# Patient Record
Sex: MALE | Race: WHITE | URBAN - METROPOLITAN AREA
[De-identification: names, ages, dates, MRNs, and addresses within clinical notes are randomized per-mention and may not be internally consistent; named-entity substitution may affect disease eponyms.]

---

## 2017-03-08 ENCOUNTER — TRANSFERRED RECORDS (OUTPATIENT)
Dept: HEALTH INFORMATION MANAGEMENT | Facility: CLINIC | Age: 52
End: 2017-03-08

## 2017-03-09 ENCOUNTER — TRANSFERRED RECORDS (OUTPATIENT)
Dept: HEALTH INFORMATION MANAGEMENT | Facility: CLINIC | Age: 52
End: 2017-03-09

## 2017-03-10 ENCOUNTER — TRANSFERRED RECORDS (OUTPATIENT)
Dept: HEALTH INFORMATION MANAGEMENT | Facility: CLINIC | Age: 52
End: 2017-03-10

## 2017-03-16 PROCEDURE — 88341 IMHCHEM/IMCYTCHM EA ADD ANTB: CPT | Performed by: INTERNAL MEDICINE

## 2017-03-16 PROCEDURE — 00000346 ZZHCL STATISTIC REVIEW OUTSIDE SLIDES TC 88321: Performed by: INTERNAL MEDICINE

## 2017-03-16 PROCEDURE — 88342 IMHCHEM/IMCYTCHM 1ST ANTB: CPT | Performed by: INTERNAL MEDICINE

## 2017-03-16 PROCEDURE — 88323 CONSLTJ&REPRT MATRL PREP SLD: CPT | Performed by: INTERNAL MEDICINE

## 2017-03-21 ENCOUNTER — ONCOLOGY VISIT (OUTPATIENT)
Dept: ONCOLOGY | Facility: CLINIC | Age: 52
End: 2017-03-21
Payer: COMMERCIAL

## 2017-03-21 VITALS
TEMPERATURE: 97.9 F | OXYGEN SATURATION: 97 % | RESPIRATION RATE: 15 BRPM | HEART RATE: 69 BPM | BODY MASS INDEX: 27.7 KG/M2 | SYSTOLIC BLOOD PRESSURE: 90 MMHG | DIASTOLIC BLOOD PRESSURE: 49 MMHG | HEIGHT: 69 IN | WEIGHT: 187 LBS

## 2017-03-21 DIAGNOSIS — C16.9 MALIGNANT NEOPLASM OF STOMACH, UNSPECIFIED LOCATION (H): Primary | ICD-10-CM

## 2017-03-21 PROCEDURE — 99205 OFFICE O/P NEW HI 60 MIN: CPT | Performed by: INTERNAL MEDICINE

## 2017-03-21 RX ORDER — HYDROCHLOROTHIAZIDE 12.5 MG/1
12.5 CAPSULE ORAL EVERY MORNING
COMMUNITY
End: 2017-05-10

## 2017-03-21 ASSESSMENT — PAIN SCALES - GENERAL: PAINLEVEL: SEVERE PAIN (6)

## 2017-03-21 NOTE — NURSING NOTE
Met with patient today and family regarding new diagnosis of gastric cancer.  Patient is from [place]; he needs to have some work-up done first before determining treatment plan - PET scan, EUS with surgeon consult.  He will come back on April 3 to review results with Dr. Maravilla; he will most likely need FOLFOX chemotherapy.  I gave him written information on this plan and briefly reviewed need for PORT, and need for continuous infusion  Pump along with every two week IV infusion.  He will need reinforcement upon return.

## 2017-03-21 NOTE — MR AVS SNAPSHOT
After Visit Summary   3/21/2017    Terry Yi    MRN: [id number]           Patient Information     Date Of Birth          1965        Visit Information        Provider Department      3/21/2017 10:15 AM Yakov Shaffer MD [facility]        Today's Diagnoses     Malignant neoplasm of stomach, unspecified location (H)    -  1      Care Instructions    Schedule PET CT asap    Schedule EUS and Surgery appointment a couple of days AFTER PET CT    See Dr Maravilla on 4/3 in my absence to go over the results        Follow-ups after your visit        Additional Services     GENERAL SURG ADULT REFERRAL       Your provider has referred you to: PREFERRED PROVIDERS: for Gastric cancer surgery    Please be aware that coverage of these services is subject to the terms and limitations of your health insurance plan.  Call member services at your health plan with any benefit or coverage questions.      Please bring the following with you to your appointment:    (1) Any X-Rays, CTs or MRIs which have been performed.  Contact the facility where they were done to arrange for  prior to your scheduled appointment.   (2) List of current medications   (3) This referral request   (4) Any documents/labs given to you for this referral                  Your next 10 appointments already scheduled     Apr 03, 2017  9:00 AM CDT   Return Visit with Raul Maravilla MD   [facility] ([facility])    [id number] [place] [id number] [phone]              Future tests that were ordered for you today     Open Future Orders        Priority Expected Expires Ordered    PET Oncology Whole Body Routine  3/21/2018 3/21/2017            Who to contact     If you have questions or need follow up information about today's clinic visit or your schedule please contact [facility] directly at [phone].  Normal or non-critical lab and imaging  "results will be communicated to you by MyChart, letter or phone within 4 business days after the clinic has received the results. If you do not hear from us within 7 days, please contact the clinic through [employer] or phone. If you have a critical or abnormal lab result, we will notify you by phone as soon as possible.  Submit refill requests through [employer] or call your pharmacy and they will forward the refill request to us. Please allow 3 business days for your refill to be completed.          Additional Information About Your Visit        SoCAT Information     So[employer] is an electronic gateway that provides easy, online access to your medical records. With SoCAT, you can request a clinic appointment, read your test results, renew a prescription or communicate with your care team.     To sign up for [employer] visit the website at [email or web address]   You will be asked to enter the access code listed below, as well as some personal information. Please follow the directions to create your username and password.     Your access code is: [email or web address]  Expires: 2017 11:55 AM     Your access code will  in 90 days. If you need help or a new code, please contact your [facility] or call [phone] for assistance.        Care EveryWhere ID     This is your Care EveryWhere ID. This could be used by other organizations to access your [facility] medical records  [id number]        Your Vitals Were     Pulse Temperature Respirations Height Pulse Oximetry BMI (Body Mass Index)    69 97.9  F (36.6  C) 15 1.753 m (5' 9\") 97% 27.62 kg/m2       Blood Pressure from Last 3 Encounters:   17 90/49    Weight from Last 3 Encounters:   17 84.8 kg (187 lb)              We Performed the Following     GENERAL SURG ADULT REFERRAL     [facility]        Primary Care Provider Office Phone # Fax #    Sahil Mustafa [phone]       [facility]  [address] [place] [address]        Thank you!     Thank you for choosing [facility]  for your care. Our goal is always to provide you with excellent care. Hearing back from our patients is one way we can continue to improve our services. Please take a few minutes to complete the written survey that you may receive in the mail after your visit with us. Thank you!             Your Updated Medication List - Protect others around you: Learn how to safely use, store and throw away your medicines at www.disposemymeds.org.          This list is accurate as of: 3/21/17 11:55 AM.  Always use your most recent med list.                   Brand Name Dispense Instructions for use    ALLOPURINOL PO          aspirin 81 MG tablet      Take by mouth daily       hydrochlorothiazide 12.5 MG capsule    MICROZIDE     Take 12.5 mg by mouth daily       INDOCIN PO      Reported on 3/21/2017       KEFLEX PO          LISINOPRIL PO

## 2017-03-21 NOTE — PATIENT INSTRUCTIONS
Schedule PET CT asap    Schedule EUS and Surgery appointment a couple of days AFTER PET CT    See Dr Maravilla on 4/3 in my absence to go over the results

## 2017-03-21 NOTE — NURSING NOTE
"Terry Yi is a 51 year old male who presents for:  Chief Complaint   Patient presents with     Oncology Clinic Visit     new gastric ca        Initial Vitals:  BP 90/49  Pulse 69  Temp 97.9  F (36.6  C)  Resp 15  Ht 1.753 m (5' 9\")  Wt 84.8 kg (187 lb)  SpO2 97%  BMI 27.62 kg/m2 Estimated body mass index is 27.62 kg/(m^2) as calculated from the following:    Height as of this encounter: 1.753 m (5' 9\").    Weight as of this encounter: 84.8 kg (187 lb).. Body surface area is 2.03 meters squared. BP completed using cuff size: regular  Severe Pain (6) No LMP for male patient. Allergies and medications reviewed.     Medications: Medication refills not needed today.  Pharmacy name entered into EPIC: Data Unavailable    Comments:     5 minutes for nursing intake (face to face time)   Katja Lopez LPN        "

## 2017-03-21 NOTE — PROGRESS NOTES
CHIEF COMPLAINT:  Newly diagnosed gastric cancer.      HISTORY OF PRESENT ILLNESS:  Terry Yi is a 51-year-old male who is here with his wife, son, and a friend.  He has a past medical history significant for hypertension, gout and previous alcohol abuse but has been clean for more than 3-1/2 years.  Over the last 6 months or so, he has started noticing that he cannot eat a full meal.  He was complaining of early satiety, although he was hungry, but if he would eat a bigger portion of a meal he would feel pain in the epigastrium and would vomit.  During this time, he lost about 25 pounds or so.  He told me that if he was not overeating then he was not in any pain and he did not have any nausea and vomiting.  Otherwise, he was continuing to feel well, very active and fully functional without any change in his energy levels.  He eventually had it worked up and had a CT scan of the abdomen and pelvis done on 03/09/2017 which showed a diffusely increased soft tissue density about the proximal abdominal aorta at the level of the superior mesenteric artery and extending distally to the level of the bifurcation and slightly about the proximal common iliac arteries.  There is an enlarged lymph node in the central mesentery at the level of the celiac artery measuring 1.3 x 1.4 cm and multiple additional smaller mesenteric lymph nodes, particularly in the upper mesentery which are not pathologically enlarged.  There was minimal gastric wall thickening; and small 1.3 cm hypodense lesion in the peripheral aspect of the liver, which possibly represents a hemangioma.  Because of these findings, he underwent an EGD on 03/10/2017 which showed significant thickening and hardening of the greater curvature of the stomach with edema.  The biopsies which were taken from it showed invasive gastric carcinoma, diffuse type, grade 3.  IHC for HER2/catrachita was negative; it was zero.  He was then referred here.  He tells me that since the  diagnosis of his gastric cancer he has been eating small frequent meals and has actually gained back 5 or 6 pounds.  As I mentioned, he continues to feel well.  He does not have any neuropathy.  He does not have any infection.  His bowels are working well.  He denies any bleeding, any difficulty breathing.  No neurological problem.  No new lumps, bumps or swellings.  No pain anywhere else.      He tells me that previously many years ago he had surgeries to both of his ears and he does have decreased hearing on both the sides and was told to wear hearing aids, but he is not doing that.  He also tells me that off and on he does have tinnitus on both the sides.  This has been stable for a number of years.      REVIEW OF SYSTEMS:  Otherwise, a comprehensive review of systems was negative.      PAST MEDICAL HISTORY:  Significant for hypertension, gout, stomach cancer as mentioned above, and obesity in the past.  He has a history of alcohol abuse.      FAMILY HISTORY:  His father had coronary artery disease.  One of the maternal aunts probably had colon cancer.  Paternal grandfather probably had throat cancer.  He has 2 kids who are healthy.      SOCIAL HISTORY:  He does not smoke tobacco, but he smokes weed.  Denies IV drug use.  He used to be a very heavy alcohol drinker and underwent rehab and now for the last 44 months or so he has not drank alcohol.  He works in the car and truck business.  He lives in [place].      ALLERGIES:  LATEX.      MEDICATIONS:  He is on baby aspirin.  He takes allopurinol, hydrochlorothiazide 12.5, lisinopril, and indomethacin as needed.      PHYSICAL EXAMINATION:   VITAL SIGNS:  Reviewed.       CONSTITUTIONAL: no acute distress  EYES: PERRLA, no palor or icterus.   ENT/MOUTH: no mouth lesions. Oropharynx normal  CVS: s1s2 no m r g .   RESPIRATORY: clear to auscultation b/l  GI: soft non tender no hepatosplenomegaly  NEURO: AAOX3  Grossly non focal neuro exam  INTEGUMENT: no obvious  rashes  LYMPHATIC: no palpable cervical, supraclavicular, axillary or inguinal LAD  MUSCULOSKELETAL: Unremarkable. No bony tenderness.   EXTREMITIES: no edema  PSYCH: Mentation, mood and affect are normal. Decision making capacity is intact      LABORATORY DATA:  Reviewed from outside.  His recent CBC and comprehensive metabolic panel was unremarkable.        PATHOLOGY:  Reviewed.  The pathology consult from the [facility] is pending.      IMAGING:  Reviewed.  Endoscopy review, as mentioned above.      ASSESSMENT AND PLAN:  Terry is a 51-year-old otherwise pretty healthy gentleman who was recently diagnosed with gastric cancer when he presented with early satiety and significant weight loss and pain in the epigastrium upon eating along with some vomiting.  At this time, I am not sure of the extent of the disease.  The CT scan does show some haziness in the mesentery with 1 enlarged lymph node, but at this point it is not entirely clear whether he has localized disease versus metastatic disease.  I would like to check a PET scan and then if it does not show any evidence of metastatic disease, then we will proceed with endoscopic ultrasound and a meeting with a surgeon, and also possibly a laparoscopy for peritoneal washings to make sure that he does not have evidence of peritoneal spread.  We discussed today that for localized gastric cancer we give treatment with the intent of cure, although metastatic disease is incurable.  We also discussed that typically for localized cancers we give neoadjuvant chemotherapy followed by surgery followed by more adjuvant chemotherapy.  We also discussed that sometimes all of the chemotherapy is given in a neoadjuvant setting and then surgery is performed later on.  We also discussed that for metastatic disease we do not do surgery as it is not curable and only offer palliative chemotherapy.  I did not go into details of chemotherapy with him today.  At this time, my  plan is to complete the staging workup and then decide what would be the best therapeutic approach for him.  I am concerned about his significant hearing issues and tinnitus.  There has been some evidence that anthracyclines do not add a lot of benefit to 5-FU and platinum for gastric cancer and I am inclined towards offering him FOLFOX even if he has localized disease rather than ECF.  Once his above workup is done, then he will return to clinic.  I will be off for the following 2 weeks, so in my absence I would like one of my colleagues to follow up with him.  I also discussed with him that in case anything comes up that needs further testing, like a biopsy, then we will be in touch with him so that appropriate testing could be done.  He also mentioned to me that going forward he would probably prefer getting chemotherapy close to home, although for surgery he would come to the [facility].  I think that this would be very reasonable.        We also discussed the importance of maintaining good nutrition during this time.  Currently, he is eating small frequent meals and actually has gained 5-6 pounds and he is otherwise feeling okay.  In the future, if need be, we can refer him to a nutritionist as well.      All of his questions and his family's questions were answered to their satisfaction.  They are agreeable and comfortable with this plan.     Yakov Shaffer

## 2017-03-22 ENCOUNTER — TELEPHONE (OUTPATIENT)
Dept: GASTROENTEROLOGY | Facility: CLINIC | Age: 52
End: 2017-03-22

## 2017-03-22 ENCOUNTER — CARE COORDINATION (OUTPATIENT)
Dept: ONCOLOGY | Facility: CLINIC | Age: 52
End: 2017-03-22

## 2017-03-22 NOTE — LETTER
March 22, 2017       TO: Terry Yi  [address] [place] [address]         Dear Terry,    It was a pleasure speaking with you this morning.       I am providing you with your appointments and instructions for each.    March 24, 2017    9:00 am. You are scheduled for a pre-op physical in the pre-anesthesia clinic. This appointment is located at the ambulatory surgery clinic, [address] [place] [address]. There is a shuttle bus that can take you to the hospital for your next appointment.        1:45 p.m You are scheduled for your PET scan. This is located at the [facility], [address] [place] [address]. Take the public elevators down 1 floor and follow the signs to the MRI/PET scan area.    To prepare for PET scan do not have anything to eat or drink for 6 hours. Do not smoke, chew gum or mints for 6 hours. Do not wear anything metal but do wear comfortable clothes. The exam can take up to 2 hours to complete.    Drink at least 2 glasses of water the morning of your exam.                                March 27, 2017    You are scheduled for your endoscopic ultrasound with Dr. Hancock. Please check in by 7:55 a.m on the 3rd floor, 3C, of the [facility]. I have included the patient education material for you.      Please use the  parking in front of the hospital.  I have included a Map for you as well.      Please call if you have any question  Thank you      Rema Sanders RN BSN  EUS Care Coordinator  [facility]  [phone]

## 2017-03-22 NOTE — TELEPHONE ENCOUNTER
Message left for patient to call back to schedule EUS for further evaluation of gastric cancer.   Rema Sanders RN

## 2017-03-22 NOTE — PROGRESS NOTES
Spoke with patient regarding his upcoming appointments for PET, PAC evaluation for physical and anesthesia and he is aware and has been informed.  His EUS is scheduled for 3/27/17.  Questions answered.

## 2017-03-22 NOTE — TELEPHONE ENCOUNTER
Contacted patient to schedule EUS with Dr. Hancock on March 27, 2017 at 9:55 with a 7:55 am check in. Patient is scheduled for the PET scan on Friday, March 24 at 1:45 pm. He will have his pre-op physical on Friday at 9:00 a.m    All instructions emailed to patient. Patient has contact number and will call back with any questions.    Rema Sanders RN

## 2017-03-24 ENCOUNTER — APPOINTMENT (OUTPATIENT)
Dept: SURGERY | Facility: CLINIC | Age: 52
End: 2017-03-24

## 2017-03-24 ENCOUNTER — CARE COORDINATION (OUTPATIENT)
Dept: ONCOLOGY | Facility: CLINIC | Age: 52
End: 2017-03-24

## 2017-03-24 ENCOUNTER — HOSPITAL ENCOUNTER (OUTPATIENT)
Dept: PET IMAGING | Facility: CLINIC | Age: 52
Discharge: HOME | End: 2017-03-24
Attending: INTERNAL MEDICINE | Admitting: INTERNAL MEDICINE
Payer: COMMERCIAL

## 2017-03-24 ENCOUNTER — ANESTHESIA EVENT (OUTPATIENT)
Dept: SURGERY | Facility: CLINIC | Age: 52
End: 2017-03-24
Payer: COMMERCIAL

## 2017-03-24 ENCOUNTER — OFFICE VISIT (OUTPATIENT)
Dept: SURGERY | Facility: CLINIC | Age: 52
End: 2017-03-24

## 2017-03-24 VITALS
OXYGEN SATURATION: 99 % | TEMPERATURE: 97.8 F | WEIGHT: 183.3 LBS | DIASTOLIC BLOOD PRESSURE: 77 MMHG | BODY MASS INDEX: 26.24 KG/M2 | SYSTOLIC BLOOD PRESSURE: 137 MMHG | HEART RATE: 82 BPM | HEIGHT: 70 IN | RESPIRATION RATE: 16 BRPM

## 2017-03-24 DIAGNOSIS — Z01.818 PRE-OP EVALUATION: Primary | ICD-10-CM

## 2017-03-24 DIAGNOSIS — C16.9 MALIGNANT NEOPLASM OF STOMACH, UNSPECIFIED LOCATION (H): ICD-10-CM

## 2017-03-24 PROBLEM — R68.81 EARLY SATIETY: Status: ACTIVE | Noted: 2017-03-24

## 2017-03-24 LAB — GLUCOSE BLDC GLUCOMTR-MCNC: 109 MG/DL (ref 70–99)

## 2017-03-24 PROCEDURE — 78816 PET IMAGE W/CT FULL BODY: CPT | Mod: PI

## 2017-03-24 PROCEDURE — 82962 GLUCOSE BLOOD TEST: CPT

## 2017-03-24 PROCEDURE — A9552 F18 FDG: HCPCS | Performed by: INTERNAL MEDICINE

## 2017-03-24 PROCEDURE — 71260 CT THORAX DX C+: CPT

## 2017-03-24 PROCEDURE — 25500064 ZZH RX 255 OP 636: Performed by: INTERNAL MEDICINE

## 2017-03-24 PROCEDURE — 34300033 ZZH RX 343: Performed by: INTERNAL MEDICINE

## 2017-03-24 RX ORDER — IOPAMIDOL 755 MG/ML
10-140 INJECTION, SOLUTION INTRAVASCULAR ONCE
Status: COMPLETED | OUTPATIENT
Start: 2017-03-24 | End: 2017-03-24

## 2017-03-24 RX ADMIN — FLUDEOXYGLUCOSE F-18 11.25 MCI.: 500 INJECTION, SOLUTION INTRAVENOUS at 13:42

## 2017-03-24 RX ADMIN — IOPAMIDOL 99 ML: 755 INJECTION, SOLUTION INTRAVENOUS at 14:35

## 2017-03-24 ASSESSMENT — LIFESTYLE VARIABLES: TOBACCO_USE: 1

## 2017-03-24 NOTE — ANESTHESIA PREPROCEDURE EVALUATION
Anesthesia Evaluation     . Pt has had prior anesthetic. Type: General    No history of anesthetic complications          ROS/MED HX    ENT/Pulmonary:     (+)PEBBLES risk factors vocal cord abnormalities- tobacco use, light smoker in past packs/day  , . .    Neurologic:  - neg neurologic ROS     Cardiovascular: Comment: VASOVAGAL SYNCOPE WITH TRAUMATIC IV START    (+) hypertension----. Taking blood thinners Pt has received instructions: Instructions Given to patient: stopped ASA 3/23/17. . fainting (syncope). :. . No previous cardiac testing      (-) CAD and RUSSELL   METS/Exercise Tolerance: Comment: Can walk miles.  Can run/walk up 13 flights of stairs.  >4 METS   Hematologic:  - neg hematologic  ROS       Musculoskeletal:   (+) , , other musculoskeletal (gout)- gout      GI/Hepatic:     (+) Other GI/Hepatic Gastroparesis     (-) GERD   Renal/Genitourinary:         Endo:  - neg endo ROS       Psychiatric:  - neg psychiatric ROS       Infectious Disease:  - neg infectious disease ROS       Malignancy:   (+) Malignancy History of GI  GI CA Active status post, Gastric cancer        Other:                     Physical Exam      Airway   Mallampati: I  TM distance: >3 FB  Neck ROM: full    Dental   (+) caps and chipped  Comment: Front upper teeth capped and chipped.     Cardiovascular   Rhythm and rate: regular and normal      Pulmonary    breath sounds clear to auscultation    Other findings: Labs 3/8/17 - Outside records  Hgb 14.1  Plt 335    Na+ 142  K+ 4.3  Bun 18  Cr 0.9  Glu 104  TSH 0.6  Ca++9.6  Normal LFT's            PAC Discussion and Assessment    ASA Classification: 3  Case is suitable for: [place]  Anesthetic techniques and relevant risks discussed: [place]  Invasive monitoring and risk discussed: No  Types:   Possibility and Risk of blood transfusion discussed: No  NPO instructions given:   Additional anesthetic preparation and risks discussed:   Needs early admission to pre-op area:   Other:     PAC Resident/NP  "Anesthesia Assessment:  Scheduled for EUS on 3/27/17 by Dr. Macdonald in evaluation of stomach cancer (adeno CA).  PAC referral for risk assessment and optimization for anesthesia with comorbid conditions of:    **RSI due to gastroparesis  **Reports \"extra lubrication\" was needed to pass probe in past    Pre-operative considerations:  1.  Cardiac:  Functional status excellent.  Can walk/run 13 flights of stairs. 0.4%  risk of major adverse cardiac event.  Hypertension, on lisinopril/HCTZ (hold day of surgery).  Instructed to hold ASA and follow up with PMD if ASA should be stopped due to new gastric cancer.   2.  Pulm:  Airway feasible.  PEBBLES risk: low.  Prior smoker.   3.  GI:  Risk of PONV score = 1.  If > 2, anti-emetic intervention recommended.  - Gastroparesis noted on prior EGD 3/17/17.  RSI.  - Grade 3 adeno ca      Patient is optimized and is acceptable candidate for the proposed procedure.  No further diagnostic evaluation is needed.     Patient also evaluated by Dr. Mota. See recommendations below.           Reviewed and Signed by PAC Mid-Level Provider/Resident  Mid-Level Provider/Resident: Manju Turner PA-C  Date: 3/24/17  Time: 1030    Attending Anesthesiologist Anesthesia Assessment:  51 jevon old with recently diagnosed stomach adenocarcinoma. Chart reviewed, patient seen and evaluated; agree with above assessment. As noted, no cardiopulmonary disease; will hold ASA.     Patient is appropriate for the planned procedure without further workup or medical management change. The final anesthesia plan will be determined by the physician anesthesiologist caring for the patient on the day of surgery.      Reviewed and Signed by PAC Anesthesiologist  Anesthesiologist: ESPERANZA  Date: 3/24/2017  Time:   Pass/Fail: Pass  Disposition:     PAC Pharmacist Assessment:        Pharmacist:   Date:   Time:      Anesthesia Plan      History & Physical Review  History and physical reviewed and following examination; no " interval change.    ASA Status:  3 .    NPO Status:  > 8 hours    Plan for General, RSI and ETT with Intravenous induction. Maintenance will be Balanced.    PONV prophylaxis:  Ondansetron (or other 5HT-3) and Dexamethasone or Solumedrol    I have reviewed Dr. Mota's pre-operative evaluation, performed my own assessment, and I agree with the plan for anesthesia.  Plan for GETA with RSI, PIV x 1, routine analgesia and antiemetics.    Richard Braga MD  Anesthesiologist  8:54 AM  March 27, 2017        Postoperative Care  Postoperative pain management:  Multi-modal analgesia.      Consents  Anesthetic plan, risks, benefits and alternatives discussed with:  Patient.  Use of blood products discussed: No .   .                          .

## 2017-03-24 NOTE — PATIENT INSTRUCTIONS
Preparing for Your Surgery      Name:  Terry Yi   MRN:  [id number]   :  1965   Today's Date:  3/24/2017     Arriving for surgery:  Surgery date: 3/27/17  Surgery time:  0955  Arrival time:  0800  Please come to:       [facility] [address] [place]  [address]    -   parking is available in front of the hospital from 5:15 am to 8:00 pm    -  Stop at the Information Desk in the lobby    -   Inform the information person that you are here for surgery. An escort to 3c will be provided. If you would not like an escort, please proceed to 3C on the 3rd floor. [phone]     What can I eat or drink?  -  You may have solid food or milk products until 8 hours prior to your surgery.  -  You may have water, apple juice or 7up/Sprite until 4 hours prior to your surgery.    Which medicines can I take?  -  Do NOT take these medications in the morning, the day of surgery:  Aspirin and fish oil x 7 days before surgery, ibuprofen x 2 days, supplements morning of surgery, lisinopril and hydrochlorothiazide if normally taken in the morning    -  Please take these medications the day of surgery:  Tylenol if needed    How do I prepare myself?  -  Take two showers: one the night before surgery; and one the morning of surgery.         Use Scrubcare or Hibiclens to wash from neck down.  You may use your own shampoo and conditioner. No other hair products.   -  Do NOT use lotion, powder, deodorant, or antiperspirant the day of your surgery.  -  Do NOT wear any makeup, fingernail polish or jewelry.  -  Begin using Incentive Spirometer 1 week prior to surgery.  Use 4 times per day, up to 5-10 breaths each time.  Bring Incentive Spirometer to hospital.  -Do not bring your own medications to the hospital, except for inhalers and eye drops.  -  Bring your ID and insurance card.    Questions or Concerns:  If you have questions or concerns, please call the  Preoperative  Assessment Center, Monday-Friday 7AM-7PM:  [phone]

## 2017-03-24 NOTE — MR AVS SNAPSHOT
After Visit Summary   3/24/2017    Terry Yi    MRN: [id number]           Patient Information     Date Of Birth          1965        Visit Information        Provider Department      3/24/2017 9:30 AM [facility] Preoperative Assessment [facility]        Care Instructions    Preparing for Your Surgery      Name:  Terry Yi   MRN:  [id number]   :  1965   Today's Date:  3/24/2017     Arriving for surgery:  Surgery date: 3/27/17  Surgery time:  0955  Arrival time:  0800  Please come to:       [facility] [address]  [address] [place]  [address]    -   parking is available in front of the hospital from 5:15 am to 8:00 pm    -  Stop at the Information Desk in the lobby    -   Inform the information person that you are here for surgery. An escort to 3c will be provided. If you would not like an escort, please proceed to 3C on the 3rd floor. [phone]     What can I eat or drink?  -  You may have solid food or milk products until 8 hours prior to your surgery.  -  You may have water, apple juice or 7up/Sprite until 4 hours prior to your surgery.    Which medicines can I take?  -  Do NOT take these medications in the morning, the day of surgery:  Aspirin and fish oil x 7 days before surgery, ibuprofen x 2 days, supplements morning of surgery, lisinopril and hydrochlorothiazide if normally taken in the morning    -  Please take these medications the day of surgery:  Tylenol if needed    How do I prepare myself?  -  Take two showers: one the night before surgery; and one the morning of surgery.         Use Scrubcare or Hibiclens to wash from neck down.  You may use your own shampoo and conditioner. No other hair products.   -  Do NOT use lotion, powder, deodorant, or antiperspirant the day of your surgery.  -  Do NOT wear any makeup, fingernail polish or jewelry.  -  Begin using Incentive Spirometer 1 week prior to surgery.  Use 4  times per day, up to 5-10 breaths each time.  Bring Incentive Spirometer to hospital.  -Do not bring your own medications to the hospital, except for inhalers and eye drops.  -  Bring your ID and insurance card.    Questions or Concerns:  If you have questions or concerns, please call the  Preoperative Assessment Center, Monday-Friday 7AM-7PM:  [phone]                  Follow-ups after your visit        Your next 10 appointments already scheduled     Mar 24, 2017 10:30 AM CDT   (Arrive by 10:15 AM)   PAC Anesthesia Consult with  Pac Anesthesiologist   [facility] Preoperative Assessment Center ([facility])    [address]  4th Floor  [facility] [id number] [phone]            Mar 24, 2017 10:45 AM CDT   LAB with  LAB   [facility] Lab ([facility])    [address] [place] [address]  [address] [facility] [id number] [phone]           Patient must bring picture ID.  Patient should be prepared to give a urine specimen  Please do not eat 10-12 hours before your appointment if you are coming in fasting for labs on lipids, cholesterol, or glucose (sugar).  Pregnant women should follow their Care Team instructions. Water with medications is okay. Do not drink coffee or other fluids.   If you have concerns about taking  your medications, please ask at office or if scheduling via [employer], send a message by clicking on Secure Messaging, Message Your Care Team.            Mar 24, 2017  2:15 PM CDT   PET ONCOLOGY WHOLE BODY with UUPET1   [facility] PET [facility] ([facility] [place])    [address] [facility] [id number] [phone]           Tell your doctor:   If there is any chance you may be pregnant or if you are breastfeeding.   If you have problems lying in small spaces (claustrophobia). If you do, your doctor may give you medicine to help you relax. If you have diabetes:   Have your exam early in the  morning. Your blood glucose will go up as the day goes by.   Your glucose level must be 180 or less at the start of the exam. Please take any medicines you need to ensure this blood glucose level. 24 hours before your scan: Don t do any heavy exercise. (No jogging, aerobics or other workouts.) Exercise will make your pictures less accurate. 6 hours before your scan:   Stop all food and liquids (except water).   Do not chew gum or suck on mints.   If you need to take medicine with food, you may take it with a few crackers.  Please call your Imaging Department at your exam site with any questions.            Mar 27, 2017   Procedure with Guru Brien Hancock MD   [facility], [place], Same Day Surgery (--)    [address] [facility] [id number] [phone]            Apr 03, 2017  9:00 AM CDT   Return Visit with Raul Maravilla MD   [facility] ([facility])    [address] [place] [address] [phone]              Who to contact     Please call your clinic at [phone] to:    Ask questions about your health    Make or cancel appointments    Discuss your medicines    Learn about your test results    Speak to your doctor   If you have compliments or concerns about an experience at your clinic, or if you wish to file a complaint, please contact [facility] Patient Relations at [phone] or email us at [email or web address]         Additional Information About Your Visit        [employer] Information     [employer] is an electronic gateway that provides easy, online access to your medical records. With [employer], you can request a clinic appointment, read your test results, renew a prescription or communicate with your care team.     To sign up for [employer] visit the website at [email or web address]   You will be asked to enter the access code listed below, as well as some personal information. Please follow the  directions to create your username and password.     Your access code is: [email or web address]  Expires: 2017 11:55 AM     Your access code will  in 90 days. If you need help or a new code, please contact your [facility] or call [phone] for assistance.        Care EveryWhere ID     This is your Care EveryWhere ID. This could be used by other organizations to access your [place] medical records  [id number]         Blood Pressure from Last 3 Encounters:   17 90/49    Weight from Last 3 Encounters:   17 84.8 kg (187 lb)              Today, you had the following     No orders found for display       Primary Care Provider Office Phone # Fax #    Sahil Mustafa [phone]       [facility]  [address]  [facility] [address]        Thank you!     Thank you for choosing [facility] PREOPERATIVE ASSESSMENT [place]  for your care. Our goal is always to provide you with excellent care. Hearing back from our patients is one way we can continue to improve our services. Please take a few minutes to complete the written survey that you may receive in the mail after your visit with us. Thank you!             Your Updated Medication List - Protect others around you: Learn how to safely use, store and throw away your medicines at www.disposemymeds.org.          This list is accurate as of: 3/24/17  9:34 AM.  Always use your most recent med list.                   Brand Name Dispense Instructions for use    aspirin 81 MG tablet      Take 81 mg by mouth every morning       hydrochlorothiazide 12.5 MG capsule    MICROZIDE     Take 12.5 mg by mouth every morning       KEFLEX PO      Take 500 mg by mouth 2 times daily       LISINOPRIL PO      Take 10 mg by mouth every morning       OMEGA 3 PO      Take by mouth every morning Pt unsure of dosage

## 2017-03-24 NOTE — H&P
Pre-Operative H & P     CC:  Preoperative exam to assess for increased cardiopulmonary risk while undergoing surgery and anesthesia.    Date of Encounter: 3/24/2017  Primary Care Physician:  Sahil Mustafa  Terry Yi is a 51 year old male who presents for pre-operative H & P in preparation for  EUS on 3/27/17 by Dr. Macdonald in evaluation of stomach cancer (adeno CA). Surgery at [facility].  Terry used to weigh #310; now 183.  He had unexplained weight loss and early satiety.  He had EGD at outside hospital with biopsy positive for adenocarcinoma, grade 3.  Here for pre-op for EUS to determine if cancer is spread or localized to finalize treatment plan.  Has MRI and PET scan scheduled for later today.      Will complete Keflex in 1 more day.  Not clear why he was taking it for but had nasal pain that resolved after 2 days of treatment.     No problems with anesthesia.  Vasovagal episode with IV stick.     History is obtained from the patient and electronic health record.     Past Medical History  Past Medical History:   Diagnosis Date     Alcohol abuse     in remission x 4 years (7/2013)     Diverticulosis      Gastric cancer (H)      Gastroparesis      HTN (hypertension)      Hyperplastic colon polyp      Marijuana abuse     Daily use     Vasovagal episode     with IV stick     Weight loss        Past Surgical History  Past Surgical History:   Procedure Laterality Date     AS ESOPHAGOSCOPY, DIAGNOSTIC  03/17/2017     COLONOSCOPY  03/2017     COLONOSCOPY  10/2014       Hx of Blood transfusions/reactions: no     Hx of abnormal bleeding or anti-platelet use: On ASA    Steroid use in the last year: no    Personal or FH with difficulty with Anesthesia:  no    Prior to Admission Medications  Current Outpatient Prescriptions   Medication Sig Dispense Refill     Omega-3 Fatty Acids (OMEGA 3 PO) Take by mouth every morning Pt unsure of dosage       aspirin 81 MG tablet Take 81 mg  by mouth every morning        hydrochlorothiazide (MICROZIDE) 12.5 MG capsule Take 12.5 mg by mouth every morning        LISINOPRIL PO Take 10 mg by mouth every morning        Cephalexin (KEFLEX PO) Take 500 mg by mouth 2 times daily          Allergies  Allergies   Allergen Reactions     Latex      Rash (when picked up rubber tires)       Social History  Social History     Social History     Marital status:      Spouse name: N/A     Number of children: N/A     Years of education: N/A     Occupational History     Not on file.     Social History Main Topics     Smoking status: Former Smoker     Types: Other, Cigarettes     Quit date: 2006     Smokeless tobacco: Not on file      Comment: Light smoker in past     Alcohol use No      Comment: Sober since 7/2013     Drug use: Yes     Special: Marijuana      Comment: marijuana     Sexual activity: Not on file     Other Topics Concern     Not on file     Social History Narrative     x 31 years.      [employer].    2 children - healthy    2 siblings - good health    Parents alive:  Mother with diabetes mellitus, obesity    Father with CAD, stents.       Family History  No family history on file.          ROS/MED HX  The complete review of systems is negative other than noted in the HPI or here.     ENT/Pulmonary:     (+)PEBBLES risk factors tobacco use, light smoker in past packs/day  , . .    Neurologic:  - neg neurologic ROS     Cardiovascular: Comment: VASOVAGAL SYNCOPE WITH TRAUMATIC IV START    (+) hypertension  . fainting (syncope). :. . No previous cardiac testing      (-) CAD and RUSSELL   METS/Exercise Tolerance: Comment: Can walk miles.  Can run/walk up 13 flights of stairs.  >4 METS   Hematologic:  - neg hematologic  ROS       Musculoskeletal:   (+) , , other musculoskeletal (gout)- gout      GI/Hepatic:     (+) Other GI/Hepatic Gastroparesis     (-) GERD   Renal/Genitourinary:         Endo:  - neg endo ROS       Psychiatric:  - neg psychiatric  "ROS       Infectious Disease:  - neg infectious disease ROS       Malignancy:   (+) Malignancy History of GI   Gastric cancer        Other:    Daily marijuana use          Temp: 97.8  F (36.6  C) Temp src: Oral BP: 137/77 Pulse: 82   Resp: 16 SpO2: 99 %      NOTE:  B/p 137/77 but did not take b/p meds today   183 lbs 4.8 oz  5' 9.5\"   Body mass index is 26.68 kg/(m^2).       Physical Exam  Constitutional: Awake, alert, cooperative, no apparent distress, and appears stated age.  Eyes: Pupils equal, round and reactive to light, sclera clear, conjunctiva normal.  HENT: Normocephalic, oral pharynx with moist mucus membranes, good dentition. Chipped front tooth.  Cap front tooth.  No goiter appreciated.   Respiratory: Clear to auscultation bilaterally, no crackles or wheezing.  Cardiovascular: Regular rate and rhythm, normal S1 and S2, and no murmur noted.  Carotids +2, no bruits. No edema. Palpable pulses to  DP and PT arteries.   GI: Normal bowel sounds, soft, non-distended, non-tender, no masses palpated, no hepatosplenomegaly.    Lymph/Hematologic: No cervical lymphadenopathy and no supraclavicular lymphadenopathy.  Skin: Warm and dry.    Musculoskeletal: Full ROM of neck. There is no redness, warmth, or swelling of the joints. Gross motor strength is normal.    Neurologic: Awake, alert, oriented to name, place and time. Cranial nerves II-XII are grossly intact. Gait is normal.   Neuropsychiatric: Calm, cooperative. Normal affect.     Labs: (personally reviewed)   Labs 3/8/17 - Outside records  Hgb 14.1  Plt 335    Na+ 142  K+ 4.3  Bun 18  Cr 0.9  Glu 104  TSH 0.6  Ca++9.6    Normal LFT's     Outside records reviewed from: [facility]    ASSESSMENT and PLAN  Terry Yi is a 51 year old male scheduled to undergo  EUS on 3/27/17 by Dr. Macdonald in evaluation of stomach cancer (adeno CA).  PAC referral for risk assessment and optimization for anesthesia with comorbid conditions of:    **RSI due to " gastroparesis    Pre-operative considerations:  1.  Cardiac:  Functional status excellent.  Can walk/run 13 flights of stairs. 0.4%  risk of major adverse cardiac event.  Hypertension, on lisinopril/HCTZ (hold day of surgery).  Instructed to hold ASA and follow up with PMD if ASA should be stopped due to new gastric cancer.  Also instructed to follow up with PMD if one or both b/p meds should be stopped due to history of low b/p 90/49 after weight loss.   He did not take meds today and pressure 137/77.   2.  Pulm:  Airway feasible.  PEBBLES risk: low.  Prior smoker.   3.  GI:  Risk of PONV score = 1.  If > 2, anti-emetic intervention recommended.  - Gastroparesis noted on prior EGD 3/17/17.  RSI.  - Grade 3 adeno ca  4.  Marijuana abuse.  Instructed to stop 24 hours before surgery.       Patient is optimized and is acceptable candidate for the proposed procedure.  No further diagnostic evaluation is needed.     Patient was discussed with Dr Mota.    Manju Turner PA-C  Preoperative Assessment Center  [facility]  Clinic and Surgery Center  Phone: [phone]  Fax: [phone]

## 2017-03-27 ENCOUNTER — SURGERY (OUTPATIENT)
Age: 52
End: 2017-03-27

## 2017-03-27 ENCOUNTER — HOSPITAL ENCOUNTER (OUTPATIENT)
Facility: CLINIC | Age: 52
Discharge: HOME | End: 2017-03-27
Attending: INTERNAL MEDICINE | Admitting: INTERNAL MEDICINE
Payer: COMMERCIAL

## 2017-03-27 ENCOUNTER — ANESTHESIA (OUTPATIENT)
Dept: SURGERY | Facility: CLINIC | Age: 52
End: 2017-03-27
Payer: COMMERCIAL

## 2017-03-27 VITALS
BODY MASS INDEX: 27.3 KG/M2 | TEMPERATURE: 97.5 F | WEIGHT: 184.3 LBS | SYSTOLIC BLOOD PRESSURE: 107 MMHG | OXYGEN SATURATION: 98 % | RESPIRATION RATE: 16 BRPM | HEIGHT: 69 IN | DIASTOLIC BLOOD PRESSURE: 66 MMHG

## 2017-03-27 LAB
ALBUMIN SERPL-MCNC: 3.6 G/DL (ref 3.4–5)
ALP SERPL-CCNC: 90 U/L (ref 40–150)
ALT SERPL W P-5'-P-CCNC: 26 U/L (ref 0–70)
ANION GAP SERPL CALCULATED.3IONS-SCNC: 9 MMOL/L (ref 3–14)
AST SERPL W P-5'-P-CCNC: 14 U/L (ref 0–45)
BILIRUB SERPL-MCNC: 0.5 MG/DL (ref 0.2–1.3)
BUN SERPL-MCNC: 15 MG/DL (ref 7–30)
CALCIUM SERPL-MCNC: 8.6 MG/DL (ref 8.5–10.1)
CHLORIDE SERPL-SCNC: 104 MMOL/L (ref 94–109)
CO2 SERPL-SCNC: 26 MMOL/L (ref 20–32)
CREAT SERPL-MCNC: 0.86 MG/DL (ref 0.66–1.25)
ERYTHROCYTE [DISTWIDTH] IN BLOOD BY AUTOMATED COUNT: 13.8 % (ref 10–15)
GFR SERPL CREATININE-BSD FRML MDRD: ABNORMAL ML/MIN/1.7M2
GLUCOSE SERPL-MCNC: 85 MG/DL (ref 70–99)
HCT VFR BLD AUTO: 40.9 % (ref 40–53)
HGB BLD-MCNC: 13.4 G/DL (ref 13.3–17.7)
INR PPP: 1.05 (ref 0.86–1.14)
MCH RBC QN AUTO: 28.9 PG (ref 26.5–33)
MCHC RBC AUTO-ENTMCNC: 32.8 G/DL (ref 31.5–36.5)
MCV RBC AUTO: 88 FL (ref 78–100)
PLATELET # BLD AUTO: 252 10E9/L (ref 150–450)
POTASSIUM SERPL-SCNC: 3.5 MMOL/L (ref 3.4–5.3)
PROT SERPL-MCNC: 6.5 G/DL (ref 6.8–8.8)
RBC # BLD AUTO: 4.64 10E12/L (ref 4.4–5.9)
SODIUM SERPL-SCNC: 138 MMOL/L (ref 133–144)
WBC # BLD AUTO: 8.6 10E9/L (ref 4–11)

## 2017-03-27 PROCEDURE — 25000566 ZZH SEVOFLURANE, EA 15 MIN: Performed by: INTERNAL MEDICINE

## 2017-03-27 PROCEDURE — 00000155 ZZHCL STATISTIC H-CELL BLOCK W/STAIN: Performed by: INTERNAL MEDICINE

## 2017-03-27 PROCEDURE — 37000009 ZZH ANESTHESIA TECHNICAL FEE, EACH ADDTL 15 MIN: Performed by: INTERNAL MEDICINE

## 2017-03-27 PROCEDURE — 25800025 ZZH RX 258: Performed by: NURSE ANESTHETIST, CERTIFIED REGISTERED

## 2017-03-27 PROCEDURE — 25000128 H RX IP 250 OP 636: Performed by: ANESTHESIOLOGY

## 2017-03-27 PROCEDURE — 37000008 ZZH ANESTHESIA TECHNICAL FEE, 1ST 30 MIN: Performed by: INTERNAL MEDICINE

## 2017-03-27 PROCEDURE — 85610 PROTHROMBIN TIME: CPT | Performed by: INTERNAL MEDICINE

## 2017-03-27 PROCEDURE — 36000057 ZZH SURGERY LEVEL 3 1ST 30 MIN - UMMC: Performed by: INTERNAL MEDICINE

## 2017-03-27 PROCEDURE — 40000170 ZZH STATISTIC PRE-PROCEDURE ASSESSMENT II: Performed by: INTERNAL MEDICINE

## 2017-03-27 PROCEDURE — 25000128 H RX IP 250 OP 636: Performed by: NURSE ANESTHETIST, CERTIFIED REGISTERED

## 2017-03-27 PROCEDURE — 27210794 ZZH OR GENERAL SUPPLY STERILE: Performed by: INTERNAL MEDICINE

## 2017-03-27 PROCEDURE — 88305 TISSUE EXAM BY PATHOLOGIST: CPT | Performed by: INTERNAL MEDICINE

## 2017-03-27 PROCEDURE — 88173 CYTOPATH EVAL FNA REPORT: CPT | Performed by: INTERNAL MEDICINE

## 2017-03-27 PROCEDURE — 25000125 ZZHC RX 250: Performed by: INTERNAL MEDICINE

## 2017-03-27 PROCEDURE — 88313 SPECIAL STAINS GROUP 2: CPT | Performed by: INTERNAL MEDICINE

## 2017-03-27 PROCEDURE — 36000059 ZZH SURGERY LEVEL 3 EA 15 ADDTL MIN UMMC: Performed by: INTERNAL MEDICINE

## 2017-03-27 PROCEDURE — 36415 COLL VENOUS BLD VENIPUNCTURE: CPT | Performed by: INTERNAL MEDICINE

## 2017-03-27 PROCEDURE — 71000027 ZZH RECOVERY PHASE 2 EACH 15 MINS: Performed by: INTERNAL MEDICINE

## 2017-03-27 PROCEDURE — 71000014 ZZH RECOVERY PHASE 1 LEVEL 2 FIRST HR: Performed by: INTERNAL MEDICINE

## 2017-03-27 PROCEDURE — 85027 COMPLETE CBC AUTOMATED: CPT | Performed by: INTERNAL MEDICINE

## 2017-03-27 PROCEDURE — 25000125 ZZHC RX 250: Performed by: NURSE ANESTHETIST, CERTIFIED REGISTERED

## 2017-03-27 PROCEDURE — C1725 CATH, TRANSLUMIN NON-LASER: HCPCS | Performed by: INTERNAL MEDICINE

## 2017-03-27 PROCEDURE — 80053 COMPREHEN METABOLIC PANEL: CPT | Performed by: INTERNAL MEDICINE

## 2017-03-27 PROCEDURE — 88172 CYTP DX EVAL FNA 1ST EA SITE: CPT | Performed by: INTERNAL MEDICINE

## 2017-03-27 RX ORDER — FENTANYL CITRATE 50 UG/ML
25-50 INJECTION, SOLUTION INTRAMUSCULAR; INTRAVENOUS
Status: DISCONTINUED | OUTPATIENT
Start: 2017-03-27 | End: 2017-03-27 | Stop reason: HOSPADM

## 2017-03-27 RX ORDER — NALOXONE HYDROCHLORIDE 0.4 MG/ML
.1-.4 INJECTION, SOLUTION INTRAMUSCULAR; INTRAVENOUS; SUBCUTANEOUS
Status: DISCONTINUED | OUTPATIENT
Start: 2017-03-27 | End: 2017-03-27 | Stop reason: HOSPADM

## 2017-03-27 RX ORDER — HYDRALAZINE HYDROCHLORIDE 20 MG/ML
2.5-5 INJECTION INTRAMUSCULAR; INTRAVENOUS EVERY 10 MIN PRN
Status: DISCONTINUED | OUTPATIENT
Start: 2017-03-27 | End: 2017-03-27 | Stop reason: HOSPADM

## 2017-03-27 RX ORDER — LIDOCAINE 40 MG/G
CREAM TOPICAL
Status: DISCONTINUED | OUTPATIENT
Start: 2017-03-27 | End: 2017-03-27 | Stop reason: HOSPADM

## 2017-03-27 RX ORDER — ONDANSETRON 2 MG/ML
4 INJECTION INTRAMUSCULAR; INTRAVENOUS EVERY 30 MIN PRN
Status: DISCONTINUED | OUTPATIENT
Start: 2017-03-27 | End: 2017-03-27 | Stop reason: HOSPADM

## 2017-03-27 RX ORDER — FENTANYL CITRATE 50 UG/ML
INJECTION, SOLUTION INTRAMUSCULAR; INTRAVENOUS PRN
Status: DISCONTINUED | OUTPATIENT
Start: 2017-03-27 | End: 2017-03-27

## 2017-03-27 RX ORDER — EPHEDRINE SULFATE 50 MG/ML
INJECTION, SOLUTION INTRAMUSCULAR; INTRAVENOUS; SUBCUTANEOUS PRN
Status: DISCONTINUED | OUTPATIENT
Start: 2017-03-27 | End: 2017-03-27

## 2017-03-27 RX ORDER — SODIUM CHLORIDE, SODIUM LACTATE, POTASSIUM CHLORIDE, CALCIUM CHLORIDE 600; 310; 30; 20 MG/100ML; MG/100ML; MG/100ML; MG/100ML
INJECTION, SOLUTION INTRAVENOUS CONTINUOUS PRN
Status: DISCONTINUED | OUTPATIENT
Start: 2017-03-27 | End: 2017-03-27

## 2017-03-27 RX ORDER — SODIUM CHLORIDE, SODIUM LACTATE, POTASSIUM CHLORIDE, CALCIUM CHLORIDE 600; 310; 30; 20 MG/100ML; MG/100ML; MG/100ML; MG/100ML
INJECTION, SOLUTION INTRAVENOUS CONTINUOUS
Status: DISCONTINUED | OUTPATIENT
Start: 2017-03-27 | End: 2017-03-27

## 2017-03-27 RX ORDER — ONDANSETRON 4 MG/1
4 TABLET, ORALLY DISINTEGRATING ORAL EVERY 30 MIN PRN
Status: DISCONTINUED | OUTPATIENT
Start: 2017-03-27 | End: 2017-03-27 | Stop reason: HOSPADM

## 2017-03-27 RX ORDER — LIDOCAINE HYDROCHLORIDE 20 MG/ML
INJECTION, SOLUTION INFILTRATION; PERINEURAL PRN
Status: DISCONTINUED | OUTPATIENT
Start: 2017-03-27 | End: 2017-03-27

## 2017-03-27 RX ORDER — NEOSTIGMINE METHYLSULFATE 1 MG/ML
VIAL (ML) INJECTION PRN
Status: DISCONTINUED | OUTPATIENT
Start: 2017-03-27 | End: 2017-03-27

## 2017-03-27 RX ORDER — MEPERIDINE HYDROCHLORIDE 25 MG/ML
12.5 INJECTION INTRAMUSCULAR; INTRAVENOUS; SUBCUTANEOUS
Status: DISCONTINUED | OUTPATIENT
Start: 2017-03-27 | End: 2017-03-27 | Stop reason: HOSPADM

## 2017-03-27 RX ORDER — LABETALOL HYDROCHLORIDE 5 MG/ML
10 INJECTION, SOLUTION INTRAVENOUS
Status: DISCONTINUED | OUTPATIENT
Start: 2017-03-27 | End: 2017-03-27 | Stop reason: HOSPADM

## 2017-03-27 RX ORDER — GLYCOPYRROLATE 0.2 MG/ML
INJECTION, SOLUTION INTRAMUSCULAR; INTRAVENOUS PRN
Status: DISCONTINUED | OUTPATIENT
Start: 2017-03-27 | End: 2017-03-27

## 2017-03-27 RX ORDER — FLUMAZENIL 0.1 MG/ML
0.2 INJECTION, SOLUTION INTRAVENOUS
Status: DISCONTINUED | OUTPATIENT
Start: 2017-03-27 | End: 2017-03-27 | Stop reason: HOSPADM

## 2017-03-27 RX ORDER — HYDROMORPHONE HYDROCHLORIDE 1 MG/ML
.3-.5 INJECTION, SOLUTION INTRAMUSCULAR; INTRAVENOUS; SUBCUTANEOUS EVERY 10 MIN PRN
Status: DISCONTINUED | OUTPATIENT
Start: 2017-03-27 | End: 2017-03-27 | Stop reason: HOSPADM

## 2017-03-27 RX ORDER — SODIUM CHLORIDE, SODIUM LACTATE, POTASSIUM CHLORIDE, CALCIUM CHLORIDE 600; 310; 30; 20 MG/100ML; MG/100ML; MG/100ML; MG/100ML
INJECTION, SOLUTION INTRAVENOUS CONTINUOUS
Status: DISCONTINUED | OUTPATIENT
Start: 2017-03-27 | End: 2017-03-27 | Stop reason: HOSPADM

## 2017-03-27 RX ORDER — PROPOFOL 10 MG/ML
INJECTION, EMULSION INTRAVENOUS PRN
Status: DISCONTINUED | OUTPATIENT
Start: 2017-03-27 | End: 2017-03-27

## 2017-03-27 RX ADMIN — FENTANYL CITRATE 50 MCG: 50 INJECTION, SOLUTION INTRAMUSCULAR; INTRAVENOUS at 10:12

## 2017-03-27 RX ADMIN — ROCURONIUM BROMIDE 20 MG: 10 INJECTION INTRAVENOUS at 10:12

## 2017-03-27 RX ADMIN — SODIUM CHLORIDE, POTASSIUM CHLORIDE, SODIUM LACTATE AND CALCIUM CHLORIDE: 600; 310; 30; 20 INJECTION, SOLUTION INTRAVENOUS at 09:06

## 2017-03-27 RX ADMIN — PHENYLEPHRINE HYDROCHLORIDE 100 MCG: 10 INJECTION, SOLUTION INTRAMUSCULAR; INTRAVENOUS; SUBCUTANEOUS at 10:19

## 2017-03-27 RX ADMIN — LIDOCAINE HYDROCHLORIDE 100 MG: 20 INJECTION, SOLUTION INFILTRATION; PERINEURAL at 09:46

## 2017-03-27 RX ADMIN — ROCURONIUM BROMIDE 20 MG: 10 INJECTION INTRAVENOUS at 09:56

## 2017-03-27 RX ADMIN — Medication 10 MG: at 10:07

## 2017-03-27 RX ADMIN — ONDANSETRON 4 MG: 2 INJECTION INTRAMUSCULAR; INTRAVENOUS at 13:17

## 2017-03-27 RX ADMIN — SUCCINYLCHOLINE CHLORIDE 100 MG: 20 INJECTION, SOLUTION INTRAMUSCULAR; INTRAVENOUS at 09:46

## 2017-03-27 RX ADMIN — SIMETHICONE 4 ML: 63.3; 3.7 SOLUTION/ DROPS ORAL at 10:15

## 2017-03-27 RX ADMIN — PHENYLEPHRINE HYDROCHLORIDE 100 MCG: 10 INJECTION, SOLUTION INTRAMUSCULAR; INTRAVENOUS; SUBCUTANEOUS at 10:07

## 2017-03-27 RX ADMIN — Medication 15 MG: at 10:03

## 2017-03-27 RX ADMIN — MIDAZOLAM HYDROCHLORIDE 2 MG: 1 INJECTION, SOLUTION INTRAMUSCULAR; INTRAVENOUS at 09:30

## 2017-03-27 RX ADMIN — Medication 4 MG: at 11:24

## 2017-03-27 RX ADMIN — SODIUM CHLORIDE, POTASSIUM CHLORIDE, SODIUM LACTATE AND CALCIUM CHLORIDE: 600; 310; 30; 20 INJECTION, SOLUTION INTRAVENOUS at 10:33

## 2017-03-27 RX ADMIN — FENTANYL CITRATE 100 MCG: 50 INJECTION, SOLUTION INTRAMUSCULAR; INTRAVENOUS at 09:46

## 2017-03-27 RX ADMIN — GLYCOPYRROLATE 0.6 MG: 0.2 INJECTION, SOLUTION INTRAMUSCULAR; INTRAVENOUS at 11:24

## 2017-03-27 RX ADMIN — ROCURONIUM BROMIDE 10 MG: 10 INJECTION INTRAVENOUS at 10:46

## 2017-03-27 RX ADMIN — PROPOFOL 200 MG: 10 INJECTION, EMULSION INTRAVENOUS at 09:46

## 2017-03-27 RX ADMIN — FENTANYL CITRATE 100 MCG: 50 INJECTION, SOLUTION INTRAMUSCULAR; INTRAVENOUS at 10:13

## 2017-03-27 NOTE — IP AVS SNAPSHOT
MRN:[id number]                      After Visit Summary   3/27/2017    Terry Yi    MRN: [id number]           Thank you!     Thank you for choosing [facility] for your care. Our goal is always to provide you with excellent care. Hearing back from our patients is one way we can continue to improve our services. Please take a few minutes to complete the written survey that you may receive in the mail after you visit with us. Thank you!        Patient Information     Date Of Birth          1965        About your hospital stay     You were admitted on:  March 27, 2017 You last received care in the:  Same Day Surgery [facility]    You were discharged on:  March 27, 2017       Who to Call     For medical emergencies, please call 911.  For non-urgent questions about your medical care, please call your primary care provider or clinic, [phone]  For questions related to your surgery, please call your surgery clinic        Attending Provider     Provider Guru Brien Kahn MD Gastroenterology       Primary Care Provider Office Phone # Fax #    Sahil DIDI Mustafa [phone]       [facility] [address] [facility] [address]        After Care Instructions     Discharge Instructions       Resume pre procedure diet            Discharge Instructions       Restart home medications.                  Your next 10 appointments already scheduled     Mar 29, 2017  4:00 PM CDT   Return Visit with Raul Maravilla MD   [facility] ([facility])    [address] [place] [address] [phone]            Apr 06, 2017  8:45 AM CDT   (Arrive by 8:30 AM)   New Patient Visit with Juan J Jimenez MD   [facility] ([facility])    [address] [facility]  2nd Floor  [facility] [address] [phone]              Further instructions from your care team        [facility]  Same-Day Surgery   Adult Discharge Orders & Instructions     For 24 hours after surgery    1. Get plenty of rest.  A responsible adult must stay with you for at least 24 hours after you leave the hospital.   2. Do not drive or use heavy equipment.  If you have weakness or tingling, don't drive or use heavy equipment until this feeling goes away.  3. Do not drink alcohol.  4. Avoid strenuous or risky activities.  Ask for help when climbing stairs.   5. You may feel lightheaded.  IF so, sit for a few minutes before standing.  Have someone help you get up.   6. If you have nausea (feel sick to your stomach): Drink only clear liquids such as apple juice, ginger ale, broth or 7-Up.  Rest may also help.  Be sure to drink enough fluids.  Move to a regular diet as you feel able.  7. You may have a slight fever. Call the doctor if your fever is over 100 F (37.7 C) (taken under the tongue) or lasts longer than 24 hours.  8. You may have a dry mouth, a sore throat, muscle aches or trouble sleeping.  These should go away after 24 hours.  9. Do not make important or legal decisions.   Call your doctor for any of the followin.  Signs of infection (fever, growing tenderness at the surgery site, a large amount of drainage or bleeding, severe pain, foul-smelling drainage, redness, swelling).    2. It has been over 8 to 10 hours since surgery and you are still not able to urinate (pass water).    3.  Headache for over 24 hours.      To contact a doctor, call Dr. Guru Hancock's @ [phone] (office) or [phone] (clinic) or:       [phone] and ask for the resident on call for GI (answered 24 hours a day)      Emergency Department:    [facility]: [phone]       (TTY for hearing impaired: [phone])              Pending Results     Date and Time Order Name Status Description    3/27/2017 1050 Fine needle aspiration In process             Admission  "Information     Date & Time Provider Department Dept. Phone    3/27/2017 SantiWarrenmellisa Sy MD Same Day Surgery [facility] [phone]      Your Vitals Were     Blood Pressure Temperature Respirations Height Weight Pulse Oximetry    135/80 97.3  F (36.3  C) (Axillary) 16 1.765 m (5' 9.49\") 83.6 kg (184 lb 4.9 oz) 99%    BMI (Body Mass Index)                   26.84 kg/m2           MyChart Information     [employer] gives you secure access to your electronic health record. If you see a primary care provider, you can also send messages to your care team and make appointments. If you have questions, please call your primary care clinic.  If you do not have a primary care provider, please call [phone] and they will assist you.        Care EveryWhere ID     This is your Care EveryWhere ID. This could be used by other organizations to access your [facility] medical records  [id number]           Review of your medicines      UNREVIEWED medicines. Ask your doctor about these medicines        Dose / Directions    aspirin 81 MG tablet        Dose:  81 mg   Take 81 mg by mouth every morning   Refills:  0       hydrochlorothiazide 12.5 MG capsule   Commonly known as:  MICROZIDE        Dose:  12.5 mg   Take 12.5 mg by mouth every morning   Refills:  0       LISINOPRIL PO        Dose:  10 mg   Take 10 mg by mouth every morning   Refills:  0       OMEGA 3 PO        Take by mouth every morning Pt unsure of dosage   Refills:  0                Protect others around you: Learn how to safely use, store and throw away your medicines at www.disposemymeds.org.             Medication List: This is a list of all your medications and when to take them. Check marks below indicate your daily home schedule. Keep this list as a reference.      Medications           Morning Afternoon Evening Bedtime As Needed    aspirin 81 MG tablet   Take 81 mg by mouth every morning                                hydrochlorothiazide " 12.5 MG capsule   Commonly known as:  MICROZIDE   Take 12.5 mg by mouth every morning                                LISINOPRIL PO   Take 10 mg by mouth every morning                                OMEGA 3 PO   Take by mouth every morning Pt unsure of dosage

## 2017-03-27 NOTE — ANESTHESIA POSTPROCEDURE EVALUATION
Patient: Terry Yi    Procedure(s):  Endoscopic Ultrasound with fine needle aspiration biopsy *Latex Allergy* - Wound Class: II-Clean Contaminated    Diagnosis:Malignant Neoplasm Of Stomach   Diagnosis Additional Information: No value filed.    Anesthesia Type:  General, RSI, ETT    Note:  Anesthesia Post Evaluation    Patient location during evaluation: bedside  Patient participation: Able to fully participate in evaluation  Level of consciousness: awake and alert  Pain management: adequate  Cardiovascular status: acceptable  Respiratory status: acceptable  Hydration status: acceptable  PONV: none     Anesthetic complications: None          Last vitals:  Vitals:    03/27/17 0747   BP: 118/74   Resp: 16   Temp: 36.6  C (97.9  F)   SpO2: 98%         Electronically Signed By: Perry Hays MD  March 27, 2017  11:35 AM

## 2017-03-27 NOTE — IP AVS SNAPSHOT
Post Anesthesia Care Unit [address] [address]    Phone:  [phone]                                       After Visit Summary   3/27/2017    Terry Yi    MRN: [id number]           After Visit Summary Signature Page     I have received my discharge instructions, and my questions have been answered. I have discussed any challenges I see with this plan with the nurse or doctor.    ..........................................................................................................................................  Patient/Patient Representative Signature      ..........................................................................................................................................  Patient Representative Print Name and Relationship to Patient    ..................................................               ................................................  Date                                            Time    ..........................................................................................................................................  Reviewed by Signature/Title    ...................................................              ..............................................  Date                                                            Time

## 2017-03-27 NOTE — ANESTHESIA CARE TRANSFER NOTE
Patient: Terry Yi    Procedure(s):  Endoscopic Ultrasound with fine needle aspiration biopsy *Latex Allergy* - Wound Class: II-Clean Contaminated    Diagnosis: Malignant Neoplasm Of Stomach   Diagnosis Additional Information: No value filed.    Anesthesia Type:   General, RSI, ETT     Note:  Airway :Face Mask  Patient transferred to:PACU  Comments: To  PAR awakening and ventilating well color pink  VSS  Report to nurses  Brian Mast CRNA      Vitals: (Last set prior to Anesthesia Care Transfer)    CRNA VITALS  3/27/2017 1102 - 3/27/2017 1141      3/27/2017             Pulse: 86    Ht Rate: 86    SpO2: 99 %    Resp Rate (observed): (!)  6    Resp Rate (set): 12                Electronically Signed By: MARLIN MILLER CRNA  March 27, 2017  11:41 AM

## 2017-03-27 NOTE — BRIEF OP NOTE
[facility] Brief Operative Note    Pre-operative diagnosis: Malignant Neoplasm Of Stomach    Post-operative diagnosis * No post-op diagnosis entered *   Procedure: Procedure(s):  Endoscopic Ultrasound with fine needle aspiration biopsy *Latex Allergy* - Wound Class: II-Clean Contaminated   Surgeon: Guru Santi MD       Estimated blood loss: None    Specimens: None   Findings:    EGD  Thickened gastric fold with two ulcers    EUS  Linitis plastica features    2 cm Soft tissue mass in the aldair-aortic region was visualized    EUS fine needle biopsy was performed using a 22 G Shark core needle (3 passes)    No focal liver masses  Mediastinal lymphadenopathy    Recommendations  Await pathology  Follow up with Medical oncology

## 2017-03-27 NOTE — DISCHARGE INSTRUCTIONS
[facility]  Same-Day Surgery   Adult Discharge Orders & Instructions     For 24 hours after surgery    1. Get plenty of rest.  A responsible adult must stay with you for at least 24 hours after you leave the hospital.   2. Do not drive or use heavy equipment.  If you have weakness or tingling, don't drive or use heavy equipment until this feeling goes away.  3. Do not drink alcohol.  4. Avoid strenuous or risky activities.  Ask for help when climbing stairs.   5. You may feel lightheaded.  IF so, sit for a few minutes before standing.  Have someone help you get up.   6. If you have nausea (feel sick to your stomach): Drink only clear liquids such as apple juice, ginger ale, broth or 7-Up.  Rest may also help.  Be sure to drink enough fluids.  Move to a regular diet as you feel able.  7. You may have a slight fever. Call the doctor if your fever is over 100 F (37.7 C) (taken under the tongue) or lasts longer than 24 hours.  8. You may have a dry mouth, a sore throat, muscle aches or trouble sleeping.  These should go away after 24 hours.  9. Do not make important or legal decisions.   Call your doctor for any of the followin.  Signs of infection (fever, growing tenderness at the surgery site, a large amount of drainage or bleeding, severe pain, foul-smelling drainage, redness, swelling).    2. It has been over 8 to 10 hours since surgery and you are still not able to urinate (pass water).    3.  Headache for over 24 hours.      To contact a doctor, call Dr. Guru Hancock's @ [phone] (office) or [phone] (clinic) or:       [phone] and ask for the resident on call for GI (answered 24 hours a day)      Emergency Department:    [facility]: [phone]       (TTY for hearing impaired: [phone])

## 2017-03-27 NOTE — OR NURSING
Patient has met discharge criteria but will be on hold because Dr. Hancock wants to see patient prior to discharge but he is in the OR operating on another patient.

## 2017-03-28 ENCOUNTER — CARE COORDINATION (OUTPATIENT)
Dept: ONCOLOGY | Facility: CLINIC | Age: 52
End: 2017-03-28

## 2017-03-28 ENCOUNTER — TELEPHONE (OUTPATIENT)
Dept: ONCOLOGY | Facility: CLINIC | Age: 52
End: 2017-03-28

## 2017-03-28 ENCOUNTER — MEDICAL CORRESPONDENCE (OUTPATIENT)
Dept: HEALTH INFORMATION MANAGEMENT | Facility: CLINIC | Age: 52
End: 2017-03-28

## 2017-03-28 LAB — COPATH REPORT: NORMAL

## 2017-03-28 NOTE — PROGRESS NOTES
Spoke with patient and wife today to advise them of appointment with Dr. Maravilla tomorrow at 4:00 p.m. And PORT would be scheduled as soon as possible at [facility].

## 2017-03-29 ENCOUNTER — CARE COORDINATION (OUTPATIENT)
Dept: GASTROENTEROLOGY | Facility: CLINIC | Age: 52
End: 2017-03-29

## 2017-03-29 ENCOUNTER — ONCOLOGY VISIT (OUTPATIENT)
Dept: ONCOLOGY | Facility: CLINIC | Age: 52
End: 2017-03-29
Payer: COMMERCIAL

## 2017-03-29 VITALS
WEIGHT: 187 LBS | HEIGHT: 69 IN | RESPIRATION RATE: 18 BRPM | BODY MASS INDEX: 27.7 KG/M2 | OXYGEN SATURATION: 98 % | HEART RATE: 70 BPM | SYSTOLIC BLOOD PRESSURE: 109 MMHG | TEMPERATURE: 97.5 F | DIASTOLIC BLOOD PRESSURE: 64 MMHG

## 2017-03-29 DIAGNOSIS — C16.9 MALIGNANT NEOPLASM OF STOMACH, UNSPECIFIED LOCATION (H): Primary | ICD-10-CM

## 2017-03-29 PROCEDURE — 99215 OFFICE O/P EST HI 40 MIN: CPT | Performed by: INTERNAL MEDICINE

## 2017-03-29 ASSESSMENT — PAIN SCALES - GENERAL: PAINLEVEL: NO PAIN (0)

## 2017-03-29 NOTE — PROGRESS NOTES
Post EUS (3/27/2017) with Dr. Hancock: Follow-up    Post procedure recommendations: Await pathology: Follow up with Medical oncology    Patient states he is doing fine, state his throat is pretty sore. Denies N/V/F, taking in PO with no issues. They are meeting with oncology later today.    Orders placed: None at this time.     Contact information verified for future questions/concerns.    Miladys SR RN Coordinator  Dr. Graf, Dr. Lopez & Dr. Gonzalez  Pancreas~Biliary  [phone]

## 2017-03-29 NOTE — NURSING NOTE
"Terry Yi is a 51 year old male who presents for:  Chief Complaint   Patient presents with     Oncology Clinic Visit     f/u to discuss test for         Initial Vitals:  /64 (BP Location: Right arm, Patient Position: Chair, Cuff Size: Adult Large)  Pulse 70  Temp 97.5  F (36.4  C) (Oral)  Resp 18  Ht 1.765 m (5' 9.49\")  Wt 84.8 kg (187 lb)  SpO2 98%  BMI 27.23 kg/m2 Estimated body mass index is 27.23 kg/(m^2) as calculated from the following:    Height as of this encounter: 1.765 m (5' 9.49\").    Weight as of this encounter: 84.8 kg (187 lb).. Body surface area is 2.04 meters squared. BP completed using cuff size: large  No Pain (0) No LMP for male patient. Allergies and medications reviewed.     Medications: Medication refills not needed today.  Pharmacy name entered into EPIC: Data Unavailable    Comments:     8 minutes for nursing intake (face to face time)   PAOLO PETTY LPN        "

## 2017-03-29 NOTE — MR AVS SNAPSHOT
After Visit Summary   3/29/2017    Terry Yi    MRN: [id number]           Patient Information     Date Of Birth          1965        Visit Information        Provider Department      3/29/2017 4:00 PM Raul Maravilla MD [facility]        Today's Diagnoses     Malignant neoplasm of stomach, unspecified location (H)    -  1       Follow-ups after your visit        Your next 10 appointments already scheduled     Apr 12, 2017  7:45 AM CDT   Return Visit with NURSE ONLY CANCER CENTER   [facility])    [address] [place] [id number] [phone]            Apr 12, 2017  8:15 AM CDT   Return Visit with Yakov Shaffer MD   [facility])    [address] [place] [id number] [phone]            Apr 12, 2017  8:45 AM CDT   Level 4 with BAY 2 INFUSION   [facility])    [address] [place] [id number]   [phone]              Who to contact     If you have questions or need follow up information about today's clinic visit or your schedule please contact [facility] directly at [phone].  Normal or non-critical lab and imaging results will be communicated to you by MyChart, letter or phone within 4 business days after the clinic has received the results. If you do not hear from us within 7 days, please contact the clinic through MyChart or phone. If you have a critical or abnormal lab result, we will notify you by phone as soon as possible.  Submit refill requests through Zo[employer] or call your pharmacy and they will forward the refill request to us. Please allow 3 business days for your refill to be completed.          Additional Information About Your Visit        [employer] Information     [employer] gives you secure access to your electronic health record. If you see a primary  "care provider, you can also send messages to your care team and make appointments. If you have questions, please call your primary care clinic.  If you do not have a primary care provider, please call [phone] and they will assist you.      [employer] is an electronic gateway that provides easy, online access to your medical records. With [employer], you can request a clinic appointment, read your test results, renew a prescription or communicate with your care team.     To access your existing account, please contact your [facility] or call [phone] for assistance.        Care EveryWhere ID     This is your Care EveryWhere ID. This could be used by other organizations to access your [place] medical records  [id number]        Your Vitals Were     Pulse Temperature Respirations Height Pulse Oximetry BMI (Body Mass Index)    70 97.5  F (36.4  C) (Oral) 18 1.765 m (5' 9.49\") 98% 27.23 kg/m2       Blood Pressure from Last 3 Encounters:   04/06/17 112/72   03/29/17 109/64   03/27/17 107/66    Weight from Last 3 Encounters:   04/06/17 85.7 kg (189 lb)   03/29/17 84.8 kg (187 lb)   03/27/17 83.6 kg (184 lb 4.9 oz)              Today, you had the following     No orders found for display       Primary Care Provider Office Phone # Fax #    Sahil Mustafa [phone]       [facility] [address]  [facility] [address]        Thank you!     Thank you for choosing [facility]  for your care. Our goal is always to provide you with excellent care. Hearing back from our patients is one way we can continue to improve our services. Please take a few minutes to complete the written survey that you may receive in the mail after your visit with us. Thank you!             Your Updated Medication List - Protect others around you: Learn how to safely use, store and throw away your medicines at www.disposemymeds.org.          This list is accurate as of: " 3/29/17 11:59 PM.  Always use your most recent med list.                   Brand Name Dispense Instructions for use    hydrochlorothiazide 12.5 MG capsule    MICROZIDE     Take 12.5 mg by mouth every morning       LISINOPRIL PO      Take 10 mg by mouth every morning

## 2017-03-30 ENCOUNTER — TRANSFERRED RECORDS (OUTPATIENT)
Dept: HEALTH INFORMATION MANAGEMENT | Facility: CLINIC | Age: 52
End: 2017-03-30

## 2017-03-31 ENCOUNTER — CARE COORDINATION (OUTPATIENT)
Dept: ONCOLOGY | Facility: CLINIC | Age: 52
End: 2017-03-31

## 2017-04-02 LAB — UPPER EUS: NORMAL

## 2017-04-06 ENCOUNTER — CARE COORDINATION (OUTPATIENT)
Dept: ONCOLOGY | Facility: CLINIC | Age: 52
End: 2017-04-06

## 2017-04-06 ENCOUNTER — ONCOLOGY VISIT (OUTPATIENT)
Dept: ONCOLOGY | Facility: CLINIC | Age: 52
End: 2017-04-06
Attending: SURGERY
Payer: COMMERCIAL

## 2017-04-06 VITALS
DIASTOLIC BLOOD PRESSURE: 72 MMHG | HEART RATE: 80 BPM | HEIGHT: 71 IN | RESPIRATION RATE: 16 BRPM | TEMPERATURE: 96.7 F | SYSTOLIC BLOOD PRESSURE: 112 MMHG | WEIGHT: 189 LBS | BODY MASS INDEX: 26.46 KG/M2 | OXYGEN SATURATION: 98 %

## 2017-04-06 DIAGNOSIS — C16.9 GASTRIC CANCER (H): Primary | ICD-10-CM

## 2017-04-06 PROCEDURE — 99212 OFFICE O/P EST SF 10 MIN: CPT | Mod: ZF

## 2017-04-06 RX ORDER — LIDOCAINE/PRILOCAINE 2.5 %-2.5%
CREAM (GRAM) TOPICAL PRN
Refills: 2 | COMMUNITY
Start: 2017-03-30

## 2017-04-06 RX ORDER — ALLOPURINOL 100 MG/1
TABLET ORAL PRN
COMMUNITY
End: 2018-01-01 | Stop reason: ALTCHOICE

## 2017-04-06 ASSESSMENT — ENCOUNTER SYMPTOMS
FLANK PAIN: 0
NIGHT SWEATS: 0
NECK PAIN: 0
JOINT SWELLING: 0
DIFFICULTY URINATING: 0
HALLUCINATIONS: 0
BOWEL INCONTINENCE: 0
TROUBLE SWALLOWING: 0
BACK PAIN: 0
MUSCLE WEAKNESS: 1
DECREASED APPETITE: 0
WEIGHT LOSS: 0
STIFFNESS: 0
SORE THROAT: 0
NECK MASS: 0
JAUNDICE: 0
SMELL DISTURBANCE: 0
ARTHRALGIAS: 0
MUSCLE CRAMPS: 0
HEARTBURN: 0
POLYDIPSIA: 0
DYSURIA: 0
WEIGHT GAIN: 1
RECTAL PAIN: 0
CHILLS: 0
NAUSEA: 0
ABDOMINAL PAIN: 1
BLOOD IN STOOL: 0
SKIN CHANGES: 0
SINUS PAIN: 0
VOMITING: 0
TASTE DISTURBANCE: 0
INCREASED ENERGY: 0
NAIL CHANGES: 0
FEVER: 0
SINUS CONGESTION: 0
CONSTIPATION: 1
ALTERED TEMPERATURE REGULATION: 1
POLYPHAGIA: 1
HEMATURIA: 0
POOR WOUND HEALING: 0
RECTAL BLEEDING: 0
FATIGUE: 0
DIARRHEA: 0
HOARSE VOICE: 0
BLOATING: 0
MYALGIAS: 0

## 2017-04-06 ASSESSMENT — PAIN SCALES - GENERAL: PAINLEVEL: NO PAIN (0)

## 2017-04-06 NOTE — PROGRESS NOTES
HISTORY OF PRESENT ILLNESS:  Terry Yi is a 61-year-old man I was asked to see at the request of Dr. Shaffer for evaluation of metastatic gastric cancer.  The patient noticed difficulty eating and weight loss.  He has lost about 40-50 pounds over the last several months.  He underwent an endoscopy which demonstrated diffuse thickening of the stomach.  Biopsies were performed which revealed adenocarcinoma.  He had a CT scan which I have reviewed.  This looks like linitis plastica with  lymphadenopathy.  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  This was biopsied and was found to have metastatic disease.  He then underwent a PET-CT scan which did again show a thickened stomach.  The tumor however was not PET avid.  He has seen [facility]. [facility], and has had a Port-A-Cath placed and is scheduled to start chemotherapy next week.  I was asked to see him to determine surgical resectability.  His main symptoms are difficulty eating and weight loss.  He does not have any abdominal pain.  He does not have any blood in stools.      PAST MEDICAL HISTORY:  Alcohol abuse.  He stopped drinking in 2013.  He has no cardiac, pulmonary, renal or hepatic diseases.      FAMILY HISTORY:  Significant for sister with colon cancer.      PHYSICAL EXAMINATION:   GENERAL:  He is a well-appearing man.  He does not appear to be nutritionally deplete.  He is not jaundiced.  His abdomen is nontender.        IMPRESSION:  Unresectable gastric cancer.      PLAN:  I told the patient because of the metastatic disease in the periaortic lymph node region that he is not a candidate for surgical resection and would not benefit from surgery.  I did recommend systemic chemotherapy.  If he has any surgical needs, I would certainly glad to see him.      TT:  35 minutes.  CT:  30 minute.       cc:   Yakov Shaffer MD   [facility]  [address]  [address]

## 2017-04-06 NOTE — PROGRESS NOTES
Patient had appointment with surgeon today at the [facility]- Dr. Jimenez; not a candidate for surgery and advised to start chemotherapy ASAP.  He is scheduled to start his chemotherapy next Wednesday, April 12.

## 2017-04-06 NOTE — NURSING NOTE
"Terry Yi is a 51 year old male who presents for:  Chief Complaint   Patient presents with     Oncology Clinic Visit     Gastric Ca        Initial Vitals:  /72 (BP Location: Right arm, Patient Position: Chair, Cuff Size: Adult Regular)  Pulse 80  Temp 96.7  F (35.9  C) (Oral)  Resp 16  Ht 1.8 m (5' 10.87\")  Wt 85.7 kg (189 lb)  SpO2 98%  BMI 26.46 kg/m2 Estimated body mass index is 26.46 kg/(m^2) as calculated from the following:    Height as of this encounter: 1.8 m (5' 10.87\").    Weight as of this encounter: 85.7 kg (189 lb).. Body surface area is 2.07 meters squared. BP completed using cuff size: regular  No Pain (0) No LMP for male patient. Allergies and medications reviewed.     Medications: Medication refills not needed today.  Pharmacy name entered into EPIC: Data Unavailable    Comments:     6 minutes for nursing intake (face to face time)   Danyell Matias CMA        "

## 2017-04-06 NOTE — MR AVS SNAPSHOT
After Visit Summary   4/6/2017    Terry Yi    MRN: [id number]           Patient Information     Date Of Birth          1965        Visit Information        Provider Department      4/6/2017 8:45 AM Juan J Jimenez MD [facility]        Today's Diagnoses     Gastric cancer (H)    -  1       Follow-ups after your visit        Your next 10 appointments already scheduled     Apr 12, 2017  7:45 AM CDT   Return Visit with [facility]    [address] [place] [id number] [phone]            Apr 12, 2017  8:15 AM CDT   Return Visit with Yakov Shaffer MD   [facility]    [address] [place] [id number] [phone]            Apr 12, 2017  8:45 AM CDT   Level 4 with BAY 2 INFUSION   [facility])    [address] [place] [id number] [phone]              Who to contact     If you have questions or need follow up information about today's clinic visit or your schedule please contact [facility] directly at [phone].  Normal or non-critical lab and imaging results will be communicated to you by Phantom[employer], letter or phone within 4 business days after the clinic has received the results. If you do not hear from us within 7 days, please contact the clinic through Phantomhart or phone. If you have a critical or abnormal lab result, we will notify you by phone as soon as possible.  Submit refill requests through [employer] or call your pharmacy and they will forward the refill request to us. Please allow 3 business days for your refill to be completed.          Additional Information About Your Visit        [employer] Information     [employer] gives you secure access to your electronic health record. If you see a primary care provider, you can also send messages to your care  "team and make appointments. If you have questions, please call your primary care clinic.  If you do not have a primary care provider, please call [phone] and they will assist you.        Care EveryWhere ID     This is your Care EveryWhere ID. This could be used by other organizations to access your [place] medical records  [id number]        Your Vitals Were     Pulse Temperature Respirations Height Pulse Oximetry BMI (Body Mass Index)    80 96.7  F (35.9  C) (Oral) 16 1.8 m (5' 10.87\") 98% 26.46 kg/m2       Blood Pressure from Last 3 Encounters:   04/06/17 112/72   03/29/17 109/64   03/27/17 107/66    Weight from Last 3 Encounters:   04/06/17 85.7 kg (189 lb)   03/29/17 84.8 kg (187 lb)   03/27/17 83.6 kg (184 lb 4.9 oz)              Today, you had the following     No orders found for display       Primary Care Provider Office Phone # Fax #    Sahil Mustafa [phone]       [facility]  [address]  [facility] [address]        Thank you!     Thank you for choosing [facility]  for your care. Our goal is always to provide you with excellent care. Hearing back from our patients is one way we can continue to improve our services. Please take a few minutes to complete the written survey that you may receive in the mail after your visit with us. Thank you!             Your Updated Medication List - Protect others around you: Learn how to safely use, store and throw away your medicines at www.disposemymeds.org.          This list is accurate as of: 4/6/17 11:59 PM.  Always use your most recent med list.                   Brand Name Dispense Instructions for use    allopurinol 100 MG tablet    ZYLOPRIM         hydrochlorothiazide 12.5 MG capsule    MICROZIDE     Take 12.5 mg by mouth every morning       lidocaine-prilocaine cream    EMLA         LISINOPRIL PO      Take 10 mg by mouth every morning         "

## 2017-04-10 NOTE — PROGRESS NOTES
[facility] CANCER CLINIC  FOLLOW-UP VISIT NOTE    PATIENT NAME: Terry Yi MRN # [id number]  DATE OF VISIT: Mar 29, 2017 YOB: 1965    REFERRING PROVIDER: No referring provider defined for this encounter.    CANCER TYPE: Gastric   STAGE: Ongoing    HISTORY OF PRESENTING ILLNESS:  Terry presented with several months of symptoms of early satiety and weight loss to his PCP. He eventually had it worked up and had a CT scan of the abdomen and pelvis done on 03/09/2017 which showed a diffusely increased soft tissue density about the proximal abdominal aorta at the level of the superior mesenteric artery and extending distally to the level of the bifurcation and slightly about the proximal common iliac arteries. There is an enlarged lymph node in the central mesentery at the level of the celiac artery measuring 1.3 x 1.4 cm and multiple additional smaller mesenteric lymph nodes, particularly in the upper mesentery which are not pathologically enlarged. There was minimal gastric wall thickening; and small 1.3 cm hypodense lesion in the peripheral aspect of the liver, which possibly represents a hemangioma. Because of these findings, he underwent an EGD on 03/10/2017 which showed significant thickening and hardening of the greater curvature of the stomach with edema. The biopsies which were taken from it showed invasive gastric carcinoma, diffuse type, grade 3. IHC for HER2/catrachita was negative; it was zero.     TREATMENT SUMMARY:  Staging completed    CURRENT INTERVENTIONS:  Staging completed and consultation in surgery pending    SUBJECTIVE   Terry is here with his children for follow up of his gastric cancer.     He continues to have early satiety. He has no new complains. He is happy and grateful that his cancer management is being expedited.       PAST MEDICAL HISTORY     Past Medical History:   Diagnosis Date     Alcohol abuse     in remission x 4 years (7/2013)     Diverticulosis  "     Gastric cancer (H)      Gastroparesis      HTN (hypertension)      Hyperplastic colon polyp      Marijuana abuse     Daily use     Vasovagal episode     with IV stick     Weight loss           CURRENT OUTPATIENT MEDICATIONS     Current Outpatient Prescriptions   Medication Sig     hydrochlorothiazide (MICROZIDE) 12.5 MG capsule Take 12.5 mg by mouth every morning      LISINOPRIL PO Take 10 mg by mouth every morning      allopurinol (ZYLOPRIM) 100 MG tablet      lidocaine-prilocaine (EMLA) cream      No current facility-administered medications for this visit.         ALLERGIES     Allergies   Allergen Reactions     Latex      Rash (when picked up rubber tires)        REVIEW OF SYSTEMS   As above in the HPI, o/w complete 12-point ROS was negative.     PHYSICAL EXAM   /64 (BP Location: Right arm, Patient Position: Chair, Cuff Size: Adult Large)  Pulse 70  Temp 97.5  F (36.4  C) (Oral)  Resp 18  Ht 1.765 m (5' 9.49\")  Wt 84.8 kg (187 lb)  SpO2 98%  BMI 27.23 kg/m2  SpO2 Readings from Last 4 Encounters:   04/06/17 98%   03/29/17 98%   03/27/17 98%   03/24/17 99%     Wt Readings from Last 3 Encounters:   04/06/17 85.7 kg (189 lb)   03/29/17 84.8 kg (187 lb)   03/27/17 83.6 kg (184 lb 4.9 oz)     GEN: NAD  HEENT: PERRL, EOMI, no icterus, injection or pallor. Oropharynx is clear.  NECK: no cervical or supraclavicular lymphadenopathy  LUNGS: clear bilaterally  CV: regular, no murmurs, rubs, or gallops  ABDOMEN: soft, non-tender, non-distended, normal bowel sounds, no hepatosplenomegaly by percussion or palpation  EXT: warm, well perfused, no edema  NEURO: alert  SKIN: no rashes   LABORATORY AND IMAGING STUDIES     Recent Labs   Lab Test  03/27/17   0806   NA  138   POTASSIUM  3.5   CHLORIDE  104   CO2  26   ANIONGAP  9   BUN  15   CR  0.86   GLC  85   BRITTNEY  8.6     Recent Labs   Lab Test  03/27/17   0806   WBC  8.6   HGB  13.4   PLT  252   MCV  88     Recent Labs   Lab Test  03/27/17   0806   BILITOTAL  0.5 "   ALKPHOS  90   ALT  26   AST  14   ALBUMIN  3.6       Results for orders placed or performed during the hospital encounter of 03/24/17   PET Oncology Whole Body    Narrative    Combined Report of:    PET and CT on  3/24/2017 3:13 PM :    1. PET of the neck, chest, abdomen, and pelvis.  2. PET CT Fusion for Attenuation Correction and Anatomical  Localization:    3. Diagnostic CT scan of the chest, abdomen, and pelvis with  intravenous contrast for interpretation.  4. 3D MIP and PET-CT fused images were processed on an independent  workstation and archived to PACS and reviewed by a radiologist.    Technique:    1. PET: The patient received 11.25 mCi of F-18-FDG; the serum glucose  was 109 prior to administration, body weight was 83.1 kg. Images were  evaluated in the axial, sagittal, and coronal planes as well as the  rotational whole body MIP. Images were acquired from the Vertex to the  Feet.    UPTAKE WAS MEASURED AT 60 MINUTES.     2. CT: Volumetric acquisition for clinical interpretation of the  chest, abdomen, and pelvis acquired at 3 mm sections  after the  uneventful administration of intravenous contrast. The chest, abdomen,  and pelvis were evaluated at 5 mm sections in bone, soft tissue, and  lung windows.      The patient received 99 cc. Of Isovue 370 intravenously for the  examination.    High resolution images of the neck were obtained with multiple oblique  projection reformats.    3. 3D MIP and PET-CT fused images were processed on an independent  workstation and archived to PACS and reviewed by a radiologist.    INDICATION: staging for stomach cancer, Malignant neoplasm of stomach,  unspecified    ADDITIONAL INFORMATION OBTAINED FROM EMR: recently diagnosed with  gastric cancer, underwent an EGD on 03/10/2017 which showed  significant thickening and hardening of the greater curvature of the  stomach with edema. The biopsies which were taken from it showed  invasive gastric carcinoma, diffuse type,  grade 3. IHC for HER2/catrachita  was negative; it was zero.     COMPARISON: None.    FINDINGS:     HEAD/NECK:  There is no  suspicious FDG uptake in the neck.     The paranasal sinuses are clear. The mastoid air cells .     The mucosal pharyngeal space, the , prevertebral and carotid  spaces are within normal limits.     No masses, mass effect or pathologically enlarged lymph nodes are  evident. The thyroid gland demonstrate hypodense nodule in the right  lobe of the thyroid, with no abnormal FDG uptake.    CHEST:  There is no suspicious FDG uptake in the chest.     Calcified subcarinal and bilateral perihilar lymph nodes with no  abnormal FDG uptake.      There are no pathologically enlarged axillary lymph nodes.    5 mm right upper lobe pulmonary nodule on series 9, image 23, without  abnormal FDG uptake, calcified. There are multiple additional  calcified pulmonary nodules with no abnormal FDG uptake throughout the  lungs. No focal pulmonary consolidation.    There is no significant pericardial or plural effusions.    ABDOMEN AND PELVIS:  There is an extensive ill-defined soft tissue lesion adjacent to the  cardia and gastroesophageal junction on series 3, image 197, without  abnormal FDG uptake. There is diffuse thickening of the gastric wall  with no abnormal FDG uptake. There is extensive infiltration of the  lesser omentum and peripancreatic fat. A soft tissue lesion/lymph node  in the lesser sac measuring 13 mm in short axis on series 3, image  203, unchanged compared to recent exam without abnormal FDG uptake.    Adrenals are unremarkable. Mild dilatation of the right renal  collecting system.    No calcified gallstone. No abnormal FDG uptake throughout the liver  parenchyma. Ill-defined 12 mm subcapsular hypoattenuating lesion in  the right lobe of the liver, segment 5/8 on series 3, image 197,  without abnormal FDG uptake.    No intrahepatic biliary ductal dilatation. There is a rind of soft  tissue  thickening in the left paraortic region on series 3, image 227,  without abnormal FDG uptake, and aortocaval region on series 3, image  222, without abnormal FDG uptake. Soft tissue infiltration along the  inferior mesenteric vasculature in the upper abdomen.    There is no splenomegaly or evidence for splenic mass lesion.  There is symmetric nephrographic renal phase.    There is no evidence for bowel obstruction or free fluid.  Prostate  calcifications. Sigmoid diverticulosis without diverticulitis.    Atherosclerotic calcification of the abdominal aorta. No abdominal  aortic aneurysm. Portal vein and mesenteric vasculature are patent.    LOWER EXTREMITIES:   No abnormal masses or hypermetabolic lesions.    BONES:   Degenerative changes in the lower thoracic and upper lumbar spine  including lower endplate irregularity of T12. Subchondral cyst  formation of the right hip joint and sclerotic changes of the  acetabular margin, consistent with degenerative changes.    There are no suspicious lytic or blastic osseous lesions.  There is no  abnormal FDG uptake in the skeleton.      Impression    IMPRESSION:   1. 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 This tumor is not FDG avid.  2. Indeterminate ill-defined subcapsular hypoattenuating lesion of the  right lobe of the liver, too small to characterize without abnormal  FDG uptake. May consider liver MRI if clinically indicated.  3. Multiple calcified pulmonary nodules along with calcified hilar and  subcarinal lymphadenopathy, most likely sequela of prior granulomatous  disease.    I have personally reviewed the examination and initial interpretation  and I agree with the findings.    ROSINA ROBLES MD         ASSESSMENT AND PLAN   1. Gastric cancer with involvement of the aldair-aortic  node  2. ECOG PS: 0  3. HTN, no other significant medical comorbidity    I had a lengthy discussion with Terry at this clinic visit. I have reviewed his pathology report with our staff pathologist . Ebony-aortic node biopsied during the EGD procedure has come back as positive. I have reviewed actual images of his PET-CT scan. There is no increased FDG uptake even at the site of disease. He does have clear gastric wall thickening. The official read has pointed out mesenteric infiltration by metastatic disease. I would suggest that we keep the appointment in surgery next week. However, I will reach out to Dr. Jimenez today and check if he could review the scans and path so far. If Dr. Jimenez clearly feels he has metastatic disease, then there would be little point in delaying any further.     I briefly reviewed chemotherapy regimens. In fact regimens are not very different in curative setting as neoadjuvant or metastatic setting however the intent changes to palliative in metastatic setting. With the change in intent we would be less tolerant of side effects and more willing to decrease dose, modify schedule and accommodate for all other ongoing life activities as chemotherapy essentially goes on.     We reviewed genetic counseling to assess risk for his kids. I would refer him to genetics counselor for this.     Over 45 min of direct face to face time spent with patient with more than 50% time spent in counseling and coordinating care.            Raul Maravilla  ,  Division of Hematology, Oncology & Transplantation  [facility].

## 2017-04-11 DIAGNOSIS — C16.8 MALIGNANT NEOPLASM OF OVERLAPPING SITES OF STOMACH (H): Primary | ICD-10-CM

## 2017-04-11 RX ORDER — PROCHLORPERAZINE MALEATE 10 MG
10 TABLET ORAL EVERY 6 HOURS PRN
Qty: 30 TABLET | Refills: 2 | Status: SHIPPED | OUTPATIENT
Start: 2017-04-11 | End: 2018-01-01

## 2017-04-11 RX ORDER — ONDANSETRON 8 MG/1
8 TABLET, FILM COATED ORAL EVERY 8 HOURS PRN
Qty: 10 TABLET | Refills: 2 | Status: SHIPPED | OUTPATIENT
Start: 2017-04-11 | End: 2018-01-01

## 2017-04-11 RX ORDER — LORAZEPAM 0.5 MG/1
0.5 TABLET ORAL EVERY 4 HOURS PRN
Qty: 30 TABLET | Refills: 2 | Status: SHIPPED | OUTPATIENT
Start: 2017-04-11 | End: 2018-01-01

## 2017-04-12 ENCOUNTER — INFUSION THERAPY VISIT (OUTPATIENT)
Dept: INFUSION THERAPY | Facility: CLINIC | Age: 52
End: 2017-04-12
Payer: COMMERCIAL

## 2017-04-12 ENCOUNTER — TELEPHONE (OUTPATIENT)
Dept: GENETICS | Facility: CLINIC | Age: 52
End: 2017-04-12

## 2017-04-12 ENCOUNTER — ONCOLOGY VISIT (OUTPATIENT)
Dept: ONCOLOGY | Facility: CLINIC | Age: 52
End: 2017-04-12
Payer: COMMERCIAL

## 2017-04-12 VITALS
DIASTOLIC BLOOD PRESSURE: 71 MMHG | OXYGEN SATURATION: 99 % | HEART RATE: 62 BPM | RESPIRATION RATE: 18 BRPM | SYSTOLIC BLOOD PRESSURE: 113 MMHG

## 2017-04-12 VITALS — OXYGEN SATURATION: 100 % | SYSTOLIC BLOOD PRESSURE: 122 MMHG | DIASTOLIC BLOOD PRESSURE: 63 MMHG | HEART RATE: 64 BPM

## 2017-04-12 DIAGNOSIS — C16.8 MALIGNANT NEOPLASM OF OVERLAPPING SITES OF STOMACH (H): Primary | ICD-10-CM

## 2017-04-12 DIAGNOSIS — C16.9 GASTRIC CANCER (H): Primary | ICD-10-CM

## 2017-04-12 DIAGNOSIS — C16.8 MALIGNANT NEOPLASM OF OVERLAPPING SITES OF STOMACH (H): ICD-10-CM

## 2017-04-12 LAB
ALBUMIN SERPL-MCNC: 3.4 G/DL (ref 3.4–5)
ALP SERPL-CCNC: 94 U/L (ref 40–150)
ALT SERPL W P-5'-P-CCNC: 29 U/L (ref 0–70)
ANION GAP SERPL CALCULATED.3IONS-SCNC: 5 MMOL/L (ref 3–14)
AST SERPL W P-5'-P-CCNC: 23 U/L (ref 0–45)
BASOPHILS # BLD AUTO: 0 10E9/L (ref 0–0.2)
BASOPHILS NFR BLD AUTO: 0.6 %
BILIRUB SERPL-MCNC: 0.5 MG/DL (ref 0.2–1.3)
BUN SERPL-MCNC: 11 MG/DL (ref 7–30)
CALCIUM SERPL-MCNC: 9.1 MG/DL (ref 8.5–10.1)
CHLORIDE SERPL-SCNC: 105 MMOL/L (ref 94–109)
CO2 SERPL-SCNC: 29 MMOL/L (ref 20–32)
CREAT SERPL-MCNC: 0.84 MG/DL (ref 0.66–1.25)
DIFFERENTIAL METHOD BLD: ABNORMAL
EOSINOPHIL # BLD AUTO: 0.2 10E9/L (ref 0–0.7)
EOSINOPHIL NFR BLD AUTO: 2.5 %
ERYTHROCYTE [DISTWIDTH] IN BLOOD BY AUTOMATED COUNT: 14 % (ref 10–15)
GFR SERPL CREATININE-BSD FRML MDRD: ABNORMAL ML/MIN/1.7M2
GLUCOSE BLDC GLUCOMTR-MCNC: 81 MG/DL (ref 70–99)
GLUCOSE SERPL-MCNC: 76 MG/DL (ref 70–99)
HCT VFR BLD AUTO: 38.6 % (ref 40–53)
HGB BLD-MCNC: 13 G/DL (ref 13.3–17.7)
LYMPHOCYTES # BLD AUTO: 1.9 10E9/L (ref 0.8–5.3)
LYMPHOCYTES NFR BLD AUTO: 28.8 %
MCH RBC QN AUTO: 29.1 PG (ref 26.5–33)
MCHC RBC AUTO-ENTMCNC: 33.7 G/DL (ref 31.5–36.5)
MCV RBC AUTO: 87 FL (ref 78–100)
MONOCYTES # BLD AUTO: 0.6 10E9/L (ref 0–1.3)
MONOCYTES NFR BLD AUTO: 9.8 %
NEUTROPHILS # BLD AUTO: 3.8 10E9/L (ref 1.6–8.3)
NEUTROPHILS NFR BLD AUTO: 58.3 %
PLATELET # BLD AUTO: 243 10E9/L (ref 150–450)
POTASSIUM SERPL-SCNC: 4 MMOL/L (ref 3.4–5.3)
PROT SERPL-MCNC: 6.4 G/DL (ref 6.8–8.8)
RBC # BLD AUTO: 4.46 10E12/L (ref 4.4–5.9)
SODIUM SERPL-SCNC: 139 MMOL/L (ref 133–144)
WBC # BLD AUTO: 6.5 10E9/L (ref 4–11)

## 2017-04-12 PROCEDURE — 82962 GLUCOSE BLOOD TEST: CPT | Performed by: INTERNAL MEDICINE

## 2017-04-12 PROCEDURE — 99215 OFFICE O/P EST HI 40 MIN: CPT | Mod: 25 | Performed by: INTERNAL MEDICINE

## 2017-04-12 PROCEDURE — 96416 CHEMO PROLONG INFUSE W/PUMP: CPT | Performed by: INTERNAL MEDICINE

## 2017-04-12 PROCEDURE — 99207 ZZC NO CHARGE NURSE ONLY: CPT

## 2017-04-12 PROCEDURE — 85025 COMPLETE CBC W/AUTO DIFF WBC: CPT | Performed by: NURSE PRACTITIONER

## 2017-04-12 PROCEDURE — 96413 CHEMO IV INFUSION 1 HR: CPT | Performed by: INTERNAL MEDICINE

## 2017-04-12 PROCEDURE — 80053 COMPREHEN METABOLIC PANEL: CPT | Performed by: NURSE PRACTITIONER

## 2017-04-12 PROCEDURE — 96415 CHEMO IV INFUSION ADDL HR: CPT | Performed by: INTERNAL MEDICINE

## 2017-04-12 PROCEDURE — 96411 CHEMO IV PUSH ADDL DRUG: CPT | Performed by: INTERNAL MEDICINE

## 2017-04-12 PROCEDURE — 99207 ZZC NO CHARGE LOS: CPT

## 2017-04-12 PROCEDURE — 96367 TX/PROPH/DG ADDL SEQ IV INF: CPT | Performed by: INTERNAL MEDICINE

## 2017-04-12 RX ORDER — ALBUTEROL SULFATE 90 UG/1
1-2 AEROSOL, METERED RESPIRATORY (INHALATION)
Status: CANCELLED
Start: 2017-04-12

## 2017-04-12 RX ORDER — EPINEPHRINE 0.3 MG/.3ML
0.3 INJECTION SUBCUTANEOUS EVERY 5 MIN PRN
Status: CANCELLED | OUTPATIENT
Start: 2017-04-12

## 2017-04-12 RX ORDER — METHYLPREDNISOLONE SODIUM SUCCINATE 125 MG/2ML
125 INJECTION, POWDER, LYOPHILIZED, FOR SOLUTION INTRAMUSCULAR; INTRAVENOUS
Status: CANCELLED
Start: 2017-04-12

## 2017-04-12 RX ORDER — FLUOROURACIL 50 MG/ML
400 INJECTION, SOLUTION INTRAVENOUS ONCE
Status: CANCELLED | OUTPATIENT
Start: 2017-04-12

## 2017-04-12 RX ORDER — LORAZEPAM 2 MG/ML
0.5 INJECTION INTRAMUSCULAR EVERY 4 HOURS PRN
Status: CANCELLED
Start: 2017-04-12

## 2017-04-12 RX ORDER — SODIUM CHLORIDE 9 MG/ML
1000 INJECTION, SOLUTION INTRAVENOUS CONTINUOUS PRN
Status: CANCELLED
Start: 2017-04-12

## 2017-04-12 RX ORDER — FLUOROURACIL 50 MG/ML
400 INJECTION, SOLUTION INTRAVENOUS ONCE
Status: COMPLETED | OUTPATIENT
Start: 2017-04-12 | End: 2017-04-12

## 2017-04-12 RX ORDER — HEPARIN SODIUM (PORCINE) LOCK FLUSH IV SOLN 100 UNIT/ML 100 UNIT/ML
5 SOLUTION INTRAVENOUS
Status: DISCONTINUED | OUTPATIENT
Start: 2017-04-12 | End: 2017-04-12 | Stop reason: HOSPADM

## 2017-04-12 RX ORDER — MEPERIDINE HYDROCHLORIDE 50 MG/ML
25 INJECTION INTRAMUSCULAR; INTRAVENOUS; SUBCUTANEOUS EVERY 30 MIN PRN
Status: CANCELLED | OUTPATIENT
Start: 2017-04-12

## 2017-04-12 RX ORDER — ALBUTEROL SULFATE 0.83 MG/ML
2.5 SOLUTION RESPIRATORY (INHALATION)
Status: CANCELLED | OUTPATIENT
Start: 2017-04-12

## 2017-04-12 RX ORDER — DIPHENHYDRAMINE HYDROCHLORIDE 50 MG/ML
50 INJECTION INTRAMUSCULAR; INTRAVENOUS
Status: CANCELLED
Start: 2017-04-12

## 2017-04-12 RX ORDER — EPINEPHRINE 1 MG/ML
0.3 INJECTION INTRAMUSCULAR; INTRAVENOUS; SUBCUTANEOUS EVERY 5 MIN PRN
Status: CANCELLED | OUTPATIENT
Start: 2017-04-12

## 2017-04-12 RX ADMIN — FLUOROURACIL 830 MG: 50 INJECTION, SOLUTION INTRAVENOUS at 12:11

## 2017-04-12 RX ADMIN — Medication 1000 ML: at 08:06

## 2017-04-12 NOTE — PROGRESS NOTES
"Infusion Nursing Note:  Terry Yi presents today for C1 D1 Oxaliplatin/ leucovorin/ fluorouracil push and pump connect.    Patient seen by provider today: Yes: Dr. Shaffer   present during visit today: Not Applicable.    Note: Patient \"passed out \" this morning in clinic while getting labs drawn. BP was running in the 80's/50's. Feet where elevated, IV fluids started, EKG checked. Within 15 min his BP normalized and he reported that he felt better. No changes during or after his infusion.    Intravenous Access:  Implanted Port.    Treatment Conditions:  Lab Results   Component Value Date    HGB 13.0 04/12/2017     Lab Results   Component Value Date    WBC 6.5 04/12/2017      Lab Results   Component Value Date    ANEU 3.8 04/12/2017     Lab Results   Component Value Date     04/12/2017      Lab Results   Component Value Date     04/12/2017                   Lab Results   Component Value Date    POTASSIUM 4.0 04/12/2017           No results found for: MAG         Lab Results   Component Value Date    CR 0.84 04/12/2017                   Lab Results   Component Value Date    BRITTNEY 9.1 04/12/2017                Lab Results   Component Value Date    BILITOTAL 0.5 04/12/2017           Lab Results   Component Value Date    ALBUMIN 3.4 04/12/2017                    Lab Results   Component Value Date    ALT 29 04/12/2017           Lab Results   Component Value Date    AST 23 04/12/2017     Results reviewed, labs MET treatment parameters, ok to proceed with treatment.      Post Infusion Assessment:  Patient tolerated infusion without incident.  Blood return noted pre and post infusion.  Prior to discharge: Port is secured in place with tegaderm and flushed with 10cc NS with positive blood return noted.  Continuous home infusion Dosi-Fuser pump connected.    All connectors secured in place and clamps taped open.    Pump started, \"running\" noted on display (CADD): Not Applicable.  Patient instructed to " call our clinic or [facility] with any questions or concerns at home.  Patient verbalized understanding.    Patient set up for pump disconnect at our clinic on 4/14/17 at 1000.            Discharge Plan:   Patient will return 4/14/17 for next appointment.   Patient discharged in stable condition accompanied by: self, wife, son and friend.  Departure Mode: Ambulatory.    Michelle Fermin RN

## 2017-04-12 NOTE — PROGRESS NOTES
4/12/2017    CHIEF COMPLAINT:  Unresectable metastatic invasive gastric carcinoma, diffuse type, grade 3. 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  His tumor is not FDG avid. He has been evaluated by Dr Jimenez who confirmed he is not a surgical candidate.    I am seeing him in follow up after completing staging workup to discuss palliative chemotherapy.  He said he was doing well during this time.  He was able to eat and drink well with small frequent meals without any nausea or vomiting.  He was a little constipated and tried Dulcolax.  That helped.  He denies bleeding.  He denies any pain per se.  He has been able to maintain his weight.  His energy is good.  He continues to work.  He denies any interval infections or fevers or any new neurological problems.  Today when he was in the clinic and he was having his blood drawn from the port, he had a syncopal episode which was very consistent with vasovagal syncope.  He told me that he was out for about 30 seconds or so and then he regained consciousness.  At that time, his blood pressure fell down to 82/40, although his pulse was fine.  His blood sugar was also fine.  He regained consciousness.  There was no seizure-like activity.  There was no postictal phase, either.  He did not have any chest pain, palpitations or shortness of breath or any focal neurological problems.  He told me that once previously when he was having his blood drawn he had kind of a similar episode.  I interviewed him and examined him at the time of the episode and then later on, as well, and later on he had recovered completely.            ROS:  A comprehensive ROS was otherwise neg    I reviewed other hx as  below     PAST MEDICAL HISTORY:  Significant for hypertension, gout, stomach cancer as mentioned above, and obesity in the past.  He has a history of alcohol abuse.  Many years ago he had surgeries to both of his ears and he does have decreased hearing on both the sides and was told to wear hearing aids, but he is not doing that.  He also tells me that off and on he does have tinnitus on both the sides.  This has been stable for a number of years.      FAMILY HISTORY:  His father had coronary artery disease.  One of the maternal aunts probably had colon cancer.  Paternal grandfather probably had throat cancer.  He has 2 kids who are healthy.      SOCIAL HISTORY:  He does not smoke tobacco, but he smokes weed.  Denies IV drug use.  He used to be a very heavy alcohol drinker and underwent rehab and now for the last 44 months or so he has not drank alcohol.  He works in the car and truck business.  He lives in [place].      ALLERGIES:  LATEX.      MEDICATIONS:  He is on baby aspirin.  He takes allopurinol, hydrochlorothiazide 12.5, lisinopril, and indomethacin as needed.      PHYSICAL EXAMINATION:   VITAL SIGNS:  Reviewed.   CONSTITUTIONAL:  After the episode, he was fine and looking normal in no acute  distress.   EYES:  Pupils were equal.  He had good eye movements.  There was no pallor, no icterus.   ENT:  Ears looked normal.  Mouth without oral lesions.     CARDIOVASCULAR:  S1, S2 audible.  No murmurs, rubs or gallops.   RESPIRATORY:  Clear to auscultation bilaterally.   GASTROINTESTINAL:  Abdomen was benign.   NEUROLOGIC:  He was alert and oriented x3.  He had a nonfocal neurologic exam.   EXTREMITIES:  No edema.    PSYCHIATRIC:  Decision-making capacity was intact.  Affect and mood were normal.    INTEGUMENT:  Skin without any obvious rashes.  Port site was intact and clean.     LABORATORY DATA:  Reviewed          PATHOLOGY:  Reviewed.  The pathology consult from the [facility] agrees with outside  reading.      IMAGING AND EUS:  Reviewed.       ASSESSMENT AND PLAN:  Terry is a 51-year-old otherwise pretty healthy gentleman who was recently diagnosed with metastatic gastric cancer when he presented with early satiety and significant weight loss and pain in the epigastrium upon eating along with some vomiting.  There is involvement of periaortic lymph node in the aortocaval region as well as extensive infiltration of lesser omentum and peripancreatic fat. There is an indeterminate right lobe liver lesion as well.  We discussed the unfortunate situation that his disease is incurable and discussed that treatment would be palliative in nature.  He understands that.  I recommended FOLFOX.  Previously he was given formal teaching, but I again went over the rationale, schedule and potential side effects of FOLFOX.  My plan would be to continue it for as long as the cancer is responding to it and he is tolerating it well.  He would start his first cycle today and his next cycle would be due in 2 weeks, and I will see him back.       Vasovagal Syncope during blood draw. EKG with NSR without any acute ST T wave changes. He tells me that this has happened in the past as well while drawing blood. Labs stable. Will observe for now    We again discussed the importance of keeping up with good nutrition and healthy living as well as keeping himself active and exercising regularly for maintenance of general well-being and also to maintain good health so as to increase the chances of tolerating the treatments well.       Because of the gastric cancer, I have recommended that he be evaluated by a genetic counselor.  He agrees with that and I have given him a referral for that.       All of his and his family's questions were answered to their satisfaction.  They are agreeable and comfortable with this plan.  I will see him back in 2 weeks before cycle number 2.  He will call us in the interim if he has any questions or  concerns.         Yakov Shaffer

## 2017-04-12 NOTE — MR AVS SNAPSHOT
After Visit Summary   4/12/2017    Terry Yi    MRN: [id number]           Patient Information     Date Of Birth          1965        Visit Information        Provider Department      4/12/2017 8:45 AM BAY 2 INFUSION [facility]        Today's Diagnoses     Gastric cancer (H)    -  1    Malignant neoplasm of overlapping sites of stomach (H)           Follow-ups after your visit        Your next 10 appointments already scheduled     Apr 14, 2017 10:00 AM CDT   Level O with BAY 1 INFUSION   [facility]    [address] [place] [address] [phone]            Apr 26, 2017  9:00 AM CDT   Return Visit with NURSE ONLY CANCER CENTER   [facility] ([facility])    [address] [place] [address] [phone]            Apr 26, 2017  9:45 AM CDT   Return Visit with Yakov Shaffer MD   [facility])    [address] [place] [address] [phone]            Apr 26, 2017 11:30 AM CDT   Level 4 with [place] 9 INFUSION   [facility])    [address] [place] [address] [phone]            May 24, 2017  2:15 PM CDT   New Visit with Rosa Banegas GC   [facility])    [address] [place] [address] [phone]              Who to contact     If you have questions or need follow up information about today's clinic visit or your schedule please contact [facility] directly at [phone].  Normal or non-critical lab and imaging results will be communicated to you by MyChart, letter or phone within 4 business days after the clinic has received the results. If you do not hear from us within 7 days, please contact the clinic through MyChart or phone. If you have a critical or  abnormal lab result, we will notify you by phone as soon as possible.  Submit refill requests through Boomr or call your pharmacy and they will forward the refill request to us. Please allow 3 business days for your refill to be completed.          Additional Information About Your Visit        [email or web address] Information     Boomr gives you secure access to your electronic health record. If you see a primary care provider, you can also send messages to your care team and make appointments. If you have questions, please call your primary care clinic.  If you do not have a primary care provider, please call [phone] and they will assist you.      Boomr is an electronic gateway that provides easy, online access to your medical records. With Boomr, you can request a clinic appointment, read your test results, renew a prescription or communicate with your care team.     To access your existing account, please contact your [facility] or call [phone] for assistance.        Care EveryWhere ID     This is your Care EveryWhere ID. This could be used by other organizations to access your [place] medical records  [id number]        Your Vitals Were     Pulse Pulse Oximetry                64 100%           Blood Pressure from Last 3 Encounters:   04/12/17 122/63   04/12/17 113/71   04/06/17 112/72    Weight from Last 3 Encounters:   04/06/17 85.7 kg (189 lb)   03/29/17 84.8 kg (187 lb)   03/27/17 83.6 kg (184 lb 4.9 oz)              We Performed the Following     EKG 12-lead complete w/read - Clinics     Glucose by meter     MD INSTRUCTION FOR PROTOCOL     Treatment Conditions        Primary Care Provider Office Phone # Fax #    Sahil Mustafa [phone]       [facility]  [address]  [facility] [address]        Thank you!     Thank you for choosing [facility]  for your care. Our goal is always to provide you with excellent care. Hearing  back from our patients is one way we can continue to improve our services. Please take a few minutes to complete the written survey that you may receive in the mail after your visit with us. Thank you!             Your Updated Medication List - Protect others around you: Learn how to safely use, store and throw away your medicines at www.disposemymeds.org.          This list is accurate as of: 4/12/17  2:21 PM.  Always use your most recent med list.                   Brand Name Dispense Instructions for use    allopurinol 100 MG tablet    ZYLOPRIM         hydrochlorothiazide 12.5 MG capsule    MICROZIDE     Take 12.5 mg by mouth every morning       lidocaine-prilocaine cream    EMLA         LISINOPRIL PO      Take 10 mg by mouth every morning       LORazepam 0.5 MG tablet    ATIVAN    30 tablet    Take 1 tablet (0.5 mg) by mouth every 4 hours as needed (Anxiety, Nausea/Vomiting or Sleep)       ondansetron 8 MG tablet    ZOFRAN    10 tablet    Take 1 tablet (8 mg) by mouth every 8 hours as needed (Nausea/Vomiting)       prochlorperazine 10 MG tablet    COMPAZINE    30 tablet    Take 1 tablet (10 mg) by mouth every 6 hours as needed (Nausea/Vomiting)

## 2017-04-12 NOTE — MR AVS SNAPSHOT
After Visit Summary   4/12/2017    Terry Yi    MRN: [id number]           Patient Information     Date Of Birth          1965        Visit Information        Provider Department      4/12/2017 7:45 AM NURSE ONLY CANCER CENTER [facility]        Today's Diagnoses     Malignant neoplasm of overlapping sites of stomach (H)    -  1       Follow-ups after your visit        Your next 10 appointments already scheduled     Apr 12, 2017  8:45 AM CDT   Level 4 with BAY 2 INFUSION   [facility] ([facility])    [address] [place] [address] [phone]              Who to contact     If you have questions or need follow up information about today's clinic visit or your schedule please contact [facility] directly at [phone].  Normal or non-critical lab and imaging results will be communicated to you by NanoDetection Technologyhart, letter or phone within 4 business days after the clinic has received the results. If you do not hear from us within 7 days, please contact the clinic through NanoDetection Technologyhart or phone. If you have a critical or abnormal lab result, we will notify you by phone as soon as possible.  Submit refill requests through Dials or call your pharmacy and they will forward the refill request to us. Please allow 3 business days for your refill to be completed.          Additional Information About Your Visit        NanoDetection Technologyhart Information     Dials gives you secure access to your electronic health record. If you see a primary care provider, you can also send messages to your care team and make appointments. If you have questions, please call your primary care clinic.  If you do not have a primary care provider, please call [phone] and they will assist you.      Dials is an electronic gateway that provides easy, online access to your medical records. With Dials, you can request a clinic appointment, read your test  results, renew a prescription or communicate with your care team.     To access your existing account, please contact your [facility] or call [phone] for assistance.        Care EveryWhere ID     This is your Care EveryWhere ID. This could be used by other organizations to access your [place] medical records  [id number]         Blood Pressure from Last 3 Encounters:   04/12/17 113/71   04/06/17 112/72   03/29/17 109/64    Weight from Last 3 Encounters:   04/06/17 85.7 kg (189 lb)   03/29/17 84.8 kg (187 lb)   03/27/17 83.6 kg (184 lb 4.9 oz)              We Performed the Following     CBC with platelets differential     Central Venous Catheter: implanted port (Port-A-Cath, Power Port)     Comprehensive metabolic panel        Primary Care Provider Office Phone # Fax #    Sahil TOLBERT Moon [phone]       [facility] [address]  [facility] [address]        Thank you!     Thank you for choosing [facility]  for your care. Our goal is always to provide you with excellent care. Hearing back from our patients is one way we can continue to improve our services. Please take a few minutes to complete the written survey that you may receive in the mail after your visit with us. Thank you!             Your Updated Medication List - Protect others around you: Learn how to safely use, store and throw away your medicines at www.disposemymeds.org.          This list is accurate as of: 4/12/17  8:39 AM.  Always use your most recent med list.                   Brand Name Dispense Instructions for use    allopurinol 100 MG tablet    ZYLOPRIM         hydrochlorothiazide 12.5 MG capsule    MICROZIDE     Take 12.5 mg by mouth every morning       lidocaine-prilocaine cream    EMLA         LISINOPRIL PO      Take 10 mg by mouth every morning       LORazepam 0.5 MG tablet    ATIVAN    30 tablet    Take 1 tablet (0.5 mg) by mouth every 4 hours as needed  (Anxiety, Nausea/Vomiting or Sleep)       ondansetron 8 MG tablet    ZOFRAN    10 tablet    Take 1 tablet (8 mg) by mouth every 8 hours as needed (Nausea/Vomiting)       prochlorperazine 10 MG tablet    COMPAZINE    30 tablet    Take 1 tablet (10 mg) by mouth every 6 hours as needed (Nausea/Vomiting)

## 2017-04-12 NOTE — MR AVS SNAPSHOT
After Visit Summary   4/12/2017    Terry Yi    MRN: [id number]           Patient Information     Date Of Birth          1965        Visit Information        Provider Department      4/12/2017 8:15 AM Yakov Shaffer MD [facility]        Today's Diagnoses     Malignant neoplasm of overlapping sites of stomach (H)    -  1      Care Instructions    Proceed to chemo  See me back in 2 weeks with labs and C#2 FOLFOX  Refer to Genetic Counselor        Follow-ups after your visit        Additional Services     CANCER RISK MGMT/CANCER GENETIC COUNSELING REFERRAL       Your provider has referred you to the Cancer Risk Management Program - Cancer Genetic Counseling.    Reason for Referral: gastric ca    We have a sent a notice to a staff member of the Cancer Risk Management Program to give you a call to assist with scheduling your appointment.  You may also call  [phone] ([phone]) to initiate scheduling.    Please be aware that coverage of these services is subject to the terms and limitations of your health insurance plan.  Call member services at your health plan with any benefit or coverage questions.      Please bring the completed family history sheet to your appointment in addition to any available outside medical records documenting your cancer diagnosis.                  Your next 10 appointments already scheduled     Apr 26, 2017  9:00 AM CDT   Return Visit with NURSE ONLY CANCER CENTER   [facility][note 3])    [address] [place] [id number] [phone]            Apr 26, 2017  9:45 AM CDT   Return Visit with Yakov Shaffer MD   [facility][note 3])    [address] [place] [id number] [phone]            Apr 26, 2017 11:30 AM CDT   Level 4 with [address] ([facility])    [address] [place] [address]  [facility] [address] [phone]            May 24, 2017  2:15 PM CDT   New Visit with Rosa Bangeas GC   [facility] ([facility])    [address] [place] [address] [phone]              Who to contact     If you have questions or need follow up information about today's clinic visit or your schedule please contact [facility] directly at [phone].  Normal or non-critical lab and imaging results will be communicated to you by HSystemhart, letter or phone within 4 business days after the clinic has received the results. If you do not hear from us within 7 days, please contact the clinic through HSystemhart or phone. If you have a critical or abnormal lab result, we will notify you by phone as soon as possible.  Submit refill requests through EnergyHub or call your pharmacy and they will forward the refill request to us. Please allow 3 business days for your refill to be completed.          Additional Information About Your Visit        HSystemharEvaluAgent Information     EnergyHub gives you secure access to your electronic health record. If you see a primary care provider, you can also send messages to your care team and make appointments. If you have questions, please call your primary care clinic.  If you do not have a primary care provider, please call [phone] and they will assist you.      EnergyHub is an electronic gateway that provides easy, online access to your medical records. With EnergyHub, you can request a clinic appointment, read your test results, renew a prescription or communicate with your care team.     To access your existing account, please contact your [facility] or call [phone] for assistance.        Care EveryWhere ID     This is your Care EveryWhere ID. This could be used by other organizations to access your [place] medical records  [id number]        Your Vitals Were     Pulse Respirations  Pulse Oximetry             62 18 99%          Blood Pressure from Last 3 Encounters:   04/12/17 113/71   04/06/17 112/72   03/29/17 109/64    Weight from Last 3 Encounters:   04/06/17 85.7 kg (189 lb)   03/29/17 84.8 kg (187 lb)   03/27/17 83.6 kg (184 lb 4.9 oz)              We Performed the Following     CANCER RISK MGMT/CANCER GENETIC COUNSELING REFERRAL        Primary Care Provider Office Phone # Fax #    Sahil TOLBERT Moon [phone]       [facility]  [address]  [facility] [address]        Thank you!     Thank you for choosing [facility]  for your care. Our goal is always to provide you with excellent care. Hearing back from our patients is one way we can continue to improve our services. Please take a few minutes to complete the written survey that you may receive in the mail after your visit with us. Thank you!             Your Updated Medication List - Protect others around you: Learn how to safely use, store and throw away your medicines at www.disposemymeds.org.          This list is accurate as of: 4/12/17 10:47 AM.  Always use your most recent med list.                   Brand Name Dispense Instructions for use    allopurinol 100 MG tablet    ZYLOPRIM         hydrochlorothiazide 12.5 MG capsule    MICROZIDE     Take 12.5 mg by mouth every morning       lidocaine-prilocaine cream    EMLA         LISINOPRIL PO      Take 10 mg by mouth every morning       LORazepam 0.5 MG tablet    ATIVAN    30 tablet    Take 1 tablet (0.5 mg) by mouth every 4 hours as needed (Anxiety, Nausea/Vomiting or Sleep)       ondansetron 8 MG tablet    ZOFRAN    10 tablet    Take 1 tablet (8 mg) by mouth every 8 hours as needed (Nausea/Vomiting)       prochlorperazine 10 MG tablet    COMPAZINE    30 tablet    Take 1 tablet (10 mg) by mouth every 6 hours as needed (Nausea/Vomiting)

## 2017-04-12 NOTE — TELEPHONE ENCOUNTER
Patient has genetic counseling benefits. No PA is required. Patient will have a $60 copay. No deductible or coinsurance. Ref # [id number]    Left message for patient to call back to give him the information.

## 2017-04-12 NOTE — NURSING NOTE
"Terry Yi is a 51 year old male who presents for:  Chief Complaint   Patient presents with     Oncology Clinic Visit     follow up        Initial Vitals:  /71  Pulse 62  Resp 18  SpO2 99% Estimated body mass index is 26.46 kg/(m^2) as calculated from the following:    Height as of 4/6/17: 1.8 m (5' 10.87\").    Weight as of 4/6/17: 85.7 kg (189 lb).. There is no height or weight on file to calculate BSA. BP completed using cuff size: regular  Data Unavailable No LMP for male patient. Allergies and medications reviewed.     Medications: Medication refills not needed today.  Pharmacy name entered into EPIC: Data Unavailable    Comments:     5 minutes for nursing intake (face to face time)   Katja Lopez LPN        "

## 2017-04-14 ENCOUNTER — INFUSION THERAPY VISIT (OUTPATIENT)
Dept: INFUSION THERAPY | Facility: CLINIC | Age: 52
End: 2017-04-14
Payer: COMMERCIAL

## 2017-04-14 ENCOUNTER — CARE COORDINATION (OUTPATIENT)
Dept: ONCOLOGY | Facility: CLINIC | Age: 52
End: 2017-04-14

## 2017-04-14 ENCOUNTER — ONCOLOGY VISIT (OUTPATIENT)
Dept: INFUSION THERAPY | Facility: CLINIC | Age: 52
End: 2017-04-14

## 2017-04-14 VITALS
OXYGEN SATURATION: 99 % | DIASTOLIC BLOOD PRESSURE: 56 MMHG | RESPIRATION RATE: 16 BRPM | TEMPERATURE: 97.1 F | SYSTOLIC BLOOD PRESSURE: 99 MMHG | HEART RATE: 73 BPM

## 2017-04-14 DIAGNOSIS — C16.9 GASTRIC CANCER (H): Primary | ICD-10-CM

## 2017-04-14 DIAGNOSIS — Z71.81 SPIRITUAL OR RELIGIOUS COUNSELING: Primary | ICD-10-CM

## 2017-04-14 PROCEDURE — 99207 ZZC NO CHARGE LOS: CPT | Performed by: INTERNAL MEDICINE

## 2017-04-14 RX ORDER — HEPARIN SODIUM (PORCINE) LOCK FLUSH IV SOLN 100 UNIT/ML 100 UNIT/ML
5 SOLUTION INTRAVENOUS
Status: DISCONTINUED | OUTPATIENT
Start: 2017-04-14 | End: 2017-04-14 | Stop reason: HOSPADM

## 2017-04-14 RX ADMIN — HEPARIN SODIUM (PORCINE) LOCK FLUSH IV SOLN 100 UNIT/ML 5 ML: 100 SOLUTION at 10:30

## 2017-04-14 NOTE — MR AVS SNAPSHOT
After Visit Summary   4/14/2017    Terry Yi    MRN: [id number]           Patient Information     Date Of Birth          1965        Visit Information        Provider Department      4/14/2017 1:54 PM Segundo Scott [facility]        Today's Diagnoses     Spiritual or Episcopal counseling    -  1       Follow-ups after your visit        Your next 10 appointments already scheduled     Apr 26, 2017  9:00 AM CDT   Return Visit with NURSE ONLY CANCER CENTER   [facility])    [address] [place] [phone] [phone]            Apr 26, 2017  9:45 AM CDT   Return Visit with Yakov Shaffer MD   [facility])    [address] [place] [address] [phone]            Apr 26, 2017 11:30 AM CDT   Level 4 with [place] 9 INFUSION   [facility])    [address] [place] [address] [phone]            May 24, 2017  2:15 PM CDT   New Visit with Rosa Banegas GC   [facility])    [address] [place] [address] [phone]              Who to contact     If you have questions or need follow up information about today's clinic visit or your schedule please contact [facility] directly at [phone].  Normal or non-critical lab and imaging results will be communicated to you by MyChart, letter or phone within 4 business days after the clinic has received the results. If you do not hear from us within 7 days, please contact the clinic through MyChart or phone. If you have a critical or abnormal lab result, we will notify you by phone as soon as possible.  Submit refill requests through "[employer] or call your pharmacy and they will forward the refill request to us. Please allow 3 business days for your refill to be completed.           Additional Information About Your Visit        Jayporehart Information     Droidhen gives you secure access to your electronic health record. If you see a primary care provider, you can also send messages to your care team and make appointments. If you have questions, please call your primary care clinic.  If you do not have a primary care provider, please call [phone] and they will assist you.      Droidhen is an electronic gateway that provides easy, online access to your medical records. With Droidhen, you can request a clinic appointment, read your test results, renew a prescription or communicate with your care team.     To access your existing account, please contact your [facility] or call [phone] for assistance.        Care EveryWhere ID     This is your Care EveryWhere ID. This could be used by other organizations to access your [place] medical records  [id number]         Blood Pressure from Last 3 Encounters:   04/14/17 99/56   04/12/17 122/63   04/12/17 113/71    Weight from Last 3 Encounters:   04/06/17 85.7 kg (189 lb)   03/29/17 84.8 kg (187 lb)   03/27/17 83.6 kg (184 lb 4.9 oz)              Today, you had the following     No orders found for display       Primary Care Provider Office Phone # Fax #    Sahil Mustafa [phone]       [facility] [address]  [facility] [address]        Thank you!     Thank you for choosing [facility]  for your care. Our goal is always to provide you with excellent care. Hearing back from our patients is one way we can continue to improve our services. Please take a few minutes to complete the written survey that you may receive in the mail after your visit with us. Thank you!             Your Updated Medication List - Protect others around you: Learn how to safely use, store and throw away your medicines at www.disposemymeds.org.          This list is accurate as of:  4/14/17  1:56 PM.  Always use your most recent med list.                   Brand Name Dispense Instructions for use    allopurinol 100 MG tablet    ZYLOPRIM         hydrochlorothiazide 12.5 MG capsule    MICROZIDE     Take 12.5 mg by mouth every morning       lidocaine-prilocaine cream    EMLA         LISINOPRIL PO      Take 10 mg by mouth every morning       LORazepam 0.5 MG tablet    ATIVAN    30 tablet    Take 1 tablet (0.5 mg) by mouth every 4 hours as needed (Anxiety, Nausea/Vomiting or Sleep)       ondansetron 8 MG tablet    ZOFRAN    10 tablet    Take 1 tablet (8 mg) by mouth every 8 hours as needed (Nausea/Vomiting)       prochlorperazine 10 MG tablet    COMPAZINE    30 tablet    Take 1 tablet (10 mg) by mouth every 6 hours as needed (Nausea/Vomiting)

## 2017-04-14 NOTE — PROGRESS NOTES
SPIRITUAL HEALTH SERVICES  SPIRITUAL ASSESSMENT Progress Note  [facility]     introduced himself to Terry Yi and informed him of his availability.    Segundo Scott M.Div., [facility]  Staff   Office tel: [phone]

## 2017-04-14 NOTE — MR AVS SNAPSHOT
After Visit Summary   4/14/2017    Terry Yi    MRN: [id number]           Patient Information     Date Of Birth          1965        Visit Information        Provider Department      4/14/2017 10:00 AM [address]        Today's Diagnoses     Gastric cancer (H)    -  1       Follow-ups after your visit        Your next 10 appointments already scheduled     Apr 26, 2017  9:00 AM CDT   Return Visit with NURSE ONLY CANCER CENTER   [facility])    [id number] [place] [id number] [phone]            Apr 26, 2017  9:45 AM CDT   Return Visit with Yakov Shaffer MD   [facility])    [address] [place] [address] [id number] [phone]            Apr 26, 2017 11:30 AM CDT   Level 4 with [address])    [address] [place] [id number] [phone]            May 24, 2017  2:15 PM CDT   New Visit with Rosa Banegas GC   [facility])    [address] [place] [address] [phone]              Who to contact     If you have questions or need follow up information about today's clinic visit or your schedule please contact [facility] directly at [phone].  Normal or non-critical lab and imaging results will be communicated to you by MyChart, letter or phone within 4 business days after the clinic has received the results. If you do not hear from us within 7 days, please contact the clinic through MyChart or phone. If you have a critical or abnormal lab result, we will notify you by phone as soon as possible.  Submit refill requests through [employer] or call your pharmacy and they will forward the refill request to us. Please allow 3 business days for your refill to be completed.           Additional Information About Your Visit        Ubitexxhart Information     Kigo gives you secure access to your electronic health record. If you see a primary care provider, you can also send messages to your care team and make appointments. If you have questions, please call your primary care clinic.  If you do not have a primary care provider, please call [phone] and they will assist you.      Kigo is an electronic gateway that provides easy, online access to your medical records. With Kigo, you can request a clinic appointment, read your test results, renew a prescription or communicate with your care team.     To access your existing account, please contact your [facility] or call [phone] for assistance.        Care EveryWhere ID     This is your Care EveryWhere ID. This could be used by other organizations to access your [place] medical records  [id number]        Your Vitals Were     Pulse Temperature Respirations Pulse Oximetry          73 97.1  F (36.2  C) 16 99%         Blood Pressure from Last 3 Encounters:   04/14/17 99/56   04/12/17 122/63   04/12/17 113/71    Weight from Last 3 Encounters:   04/06/17 85.7 kg (189 lb)   03/29/17 84.8 kg (187 lb)   03/27/17 83.6 kg (184 lb 4.9 oz)              Today, you had the following     No orders found for display       Primary Care Provider Office Phone # Fax #    Sahil Mustafa [phone]       [facility] [address]  [facility] [address]        Thank you!     Thank you for choosing [facility]  for your care. Our goal is always to provide you with excellent care. Hearing back from our patients is one way we can continue to improve our services. Please take a few minutes to complete the written survey that you may receive in the mail after your visit with us. Thank you!             Your Updated Medication List - Protect others around you: Learn how to safely use,  store and throw away your medicines at www.disposemymeds.org.          This list is accurate as of: 4/14/17  3:28 PM.  Always use your most recent med list.                   Brand Name Dispense Instructions for use    allopurinol 100 MG tablet    ZYLOPRIM         hydrochlorothiazide 12.5 MG capsule    MICROZIDE     Take 12.5 mg by mouth every morning       lidocaine-prilocaine cream    EMLA         LISINOPRIL PO      Take 10 mg by mouth every morning       LORazepam 0.5 MG tablet    ATIVAN    30 tablet    Take 1 tablet (0.5 mg) by mouth every 4 hours as needed (Anxiety, Nausea/Vomiting or Sleep)       ondansetron 8 MG tablet    ZOFRAN    10 tablet    Take 1 tablet (8 mg) by mouth every 8 hours as needed (Nausea/Vomiting)       prochlorperazine 10 MG tablet    COMPAZINE    30 tablet    Take 1 tablet (10 mg) by mouth every 6 hours as needed (Nausea/Vomiting)

## 2017-04-14 NOTE — PROGRESS NOTES
Infusion Nursing Note:  Terry Yi presents today for 5-FU pump disconnect.    Patient seen by provider today: No   present during visit today: Not Applicable.    Note: N/A.    Intravenous Access:  Implanted Port.    Treatment Conditions:  Not Applicable.      Post Infusion Assessment:  Patient tolerated infusion without incident.  Blood return noted post infusion.  Site patent and intact, free from redness, edema or discomfort.  No evidence of extravasations.  Access discontinued per protocol.    Discharge Plan:   AVS to patient via MYCHART.  Patient will return 4/26/17 for next appointment.   Patient discharged in stable condition accompanied by: daughter.  Departure Mode: Ambulatory.    Mayte Carbone RN

## 2017-04-18 ENCOUNTER — CARE COORDINATION (OUTPATIENT)
Dept: ONCOLOGY | Facility: CLINIC | Age: 52
End: 2017-04-18

## 2017-04-18 NOTE — PROGRESS NOTES
Message left from Jan, in Dr. Renteria's office with [facility] in [place] regarding message from last week to get Cad-pump disconnected following his chemotherapy here.  We will just need to let Dr. Priest's office know what time he will need to be disconnected.  The office number to call is [phone] to schedule.

## 2017-04-18 NOTE — PROGRESS NOTES
Left message on Harika's (spouse's) phone to let patient know that 5F-U cad pump would be able to be disconnected at the medical oncology clinic in [place].  The clinic would just need to know the time of the disconnect at next infusion cycle.

## 2017-04-24 ENCOUNTER — CARE COORDINATION (OUTPATIENT)
Dept: ONCOLOGY | Facility: CLINIC | Age: 52
End: 2017-04-24

## 2017-04-24 NOTE — PROGRESS NOTES
Confirmed with clinic staff that order for disconnect of CAD pump (5FU) can be disconnected in clinic - would only need order written for time it would need to be disconnected.  Orders can be faxed to [facility] - @ [phone].

## 2017-04-26 ENCOUNTER — ONCOLOGY VISIT (OUTPATIENT)
Dept: ONCOLOGY | Facility: CLINIC | Age: 52
End: 2017-04-26
Payer: COMMERCIAL

## 2017-04-26 ENCOUNTER — INFUSION THERAPY VISIT (OUTPATIENT)
Dept: INFUSION THERAPY | Facility: CLINIC | Age: 52
End: 2017-04-26
Payer: COMMERCIAL

## 2017-04-26 VITALS
WEIGHT: 191 LBS | TEMPERATURE: 97.1 F | SYSTOLIC BLOOD PRESSURE: 109 MMHG | OXYGEN SATURATION: 98 % | RESPIRATION RATE: 15 BRPM | DIASTOLIC BLOOD PRESSURE: 65 MMHG | HEIGHT: 71 IN | HEART RATE: 67 BPM | BODY MASS INDEX: 26.74 KG/M2

## 2017-04-26 DIAGNOSIS — C16.8 MALIGNANT NEOPLASM OF OVERLAPPING SITES OF STOMACH (H): Primary | ICD-10-CM

## 2017-04-26 DIAGNOSIS — C16.9 STOMACH CANCER (H): Primary | ICD-10-CM

## 2017-04-26 DIAGNOSIS — C16.8 MALIGNANT NEOPLASM OF OVERLAPPING SITES OF STOMACH (H): ICD-10-CM

## 2017-04-26 LAB
ALBUMIN SERPL-MCNC: 3.3 G/DL (ref 3.4–5)
ALP SERPL-CCNC: 95 U/L (ref 40–150)
ALT SERPL W P-5'-P-CCNC: 31 U/L (ref 0–70)
ANION GAP SERPL CALCULATED.3IONS-SCNC: 6 MMOL/L (ref 3–14)
AST SERPL W P-5'-P-CCNC: 21 U/L (ref 0–45)
BASOPHILS # BLD AUTO: 0 10E9/L (ref 0–0.2)
BASOPHILS NFR BLD AUTO: 0.6 %
BILIRUB SERPL-MCNC: 0.3 MG/DL (ref 0.2–1.3)
BUN SERPL-MCNC: 13 MG/DL (ref 7–30)
CALCIUM SERPL-MCNC: 8.9 MG/DL (ref 8.5–10.1)
CHLORIDE SERPL-SCNC: 108 MMOL/L (ref 94–109)
CO2 SERPL-SCNC: 29 MMOL/L (ref 20–32)
CREAT SERPL-MCNC: 0.8 MG/DL (ref 0.66–1.25)
DIFFERENTIAL METHOD BLD: ABNORMAL
EOSINOPHIL # BLD AUTO: 0.2 10E9/L (ref 0–0.7)
EOSINOPHIL NFR BLD AUTO: 3 %
ERYTHROCYTE [DISTWIDTH] IN BLOOD BY AUTOMATED COUNT: 14.2 % (ref 10–15)
GFR SERPL CREATININE-BSD FRML MDRD: ABNORMAL ML/MIN/1.7M2
GLUCOSE SERPL-MCNC: 64 MG/DL (ref 70–99)
HCT VFR BLD AUTO: 38.7 % (ref 40–53)
HGB BLD-MCNC: 13.2 G/DL (ref 13.3–17.7)
LYMPHOCYTES # BLD AUTO: 2.4 10E9/L (ref 0.8–5.3)
LYMPHOCYTES NFR BLD AUTO: 38.5 %
MCH RBC QN AUTO: 29.6 PG (ref 26.5–33)
MCHC RBC AUTO-ENTMCNC: 34.1 G/DL (ref 31.5–36.5)
MCV RBC AUTO: 87 FL (ref 78–100)
MONOCYTES # BLD AUTO: 0.6 10E9/L (ref 0–1.3)
MONOCYTES NFR BLD AUTO: 9.5 %
NEUTROPHILS # BLD AUTO: 3.1 10E9/L (ref 1.6–8.3)
NEUTROPHILS NFR BLD AUTO: 48.4 %
PLATELET # BLD AUTO: 261 10E9/L (ref 150–450)
POTASSIUM SERPL-SCNC: 4.1 MMOL/L (ref 3.4–5.3)
PROT SERPL-MCNC: 6.1 G/DL (ref 6.8–8.8)
RBC # BLD AUTO: 4.46 10E12/L (ref 4.4–5.9)
SODIUM SERPL-SCNC: 143 MMOL/L (ref 133–144)
WBC # BLD AUTO: 6.3 10E9/L (ref 4–11)

## 2017-04-26 PROCEDURE — 80053 COMPREHEN METABOLIC PANEL: CPT | Performed by: INTERNAL MEDICINE

## 2017-04-26 PROCEDURE — 96413 CHEMO IV INFUSION 1 HR: CPT | Performed by: INTERNAL MEDICINE

## 2017-04-26 PROCEDURE — 99207 ZZC NO CHARGE NURSE ONLY: CPT

## 2017-04-26 PROCEDURE — 85025 COMPLETE CBC W/AUTO DIFF WBC: CPT | Performed by: INTERNAL MEDICINE

## 2017-04-26 PROCEDURE — 93000 ELECTROCARDIOGRAM COMPLETE: CPT

## 2017-04-26 PROCEDURE — 96416 CHEMO PROLONG INFUSE W/PUMP: CPT | Performed by: INTERNAL MEDICINE

## 2017-04-26 PROCEDURE — 96368 THER/DIAG CONCURRENT INF: CPT | Performed by: INTERNAL MEDICINE

## 2017-04-26 PROCEDURE — 96411 CHEMO IV PUSH ADDL DRUG: CPT | Performed by: INTERNAL MEDICINE

## 2017-04-26 PROCEDURE — 96367 TX/PROPH/DG ADDL SEQ IV INF: CPT | Performed by: INTERNAL MEDICINE

## 2017-04-26 PROCEDURE — 96415 CHEMO IV INFUSION ADDL HR: CPT | Performed by: INTERNAL MEDICINE

## 2017-04-26 PROCEDURE — 99214 OFFICE O/P EST MOD 30 MIN: CPT | Mod: 25 | Performed by: INTERNAL MEDICINE

## 2017-04-26 PROCEDURE — 99207 ZZC NO CHARGE LOS: CPT

## 2017-04-26 RX ORDER — HEPARIN SODIUM (PORCINE) LOCK FLUSH IV SOLN 100 UNIT/ML 100 UNIT/ML
5 SOLUTION INTRAVENOUS
Status: DISCONTINUED | OUTPATIENT
Start: 2017-04-26 | End: 2017-04-26 | Stop reason: HOSPADM

## 2017-04-26 RX ORDER — ALBUTEROL SULFATE 0.83 MG/ML
2.5 SOLUTION RESPIRATORY (INHALATION)
Status: CANCELLED | OUTPATIENT
Start: 2017-04-26

## 2017-04-26 RX ORDER — DIPHENHYDRAMINE HYDROCHLORIDE 50 MG/ML
50 INJECTION INTRAMUSCULAR; INTRAVENOUS
Status: CANCELLED
Start: 2017-04-26

## 2017-04-26 RX ORDER — FLUOROURACIL 50 MG/ML
400 INJECTION, SOLUTION INTRAVENOUS ONCE
Status: COMPLETED | OUTPATIENT
Start: 2017-04-26 | End: 2017-04-26

## 2017-04-26 RX ORDER — EPINEPHRINE 1 MG/ML
0.3 INJECTION INTRAMUSCULAR; INTRAVENOUS; SUBCUTANEOUS EVERY 5 MIN PRN
Status: CANCELLED | OUTPATIENT
Start: 2017-04-26

## 2017-04-26 RX ORDER — METHYLPREDNISOLONE SODIUM SUCCINATE 125 MG/2ML
125 INJECTION, POWDER, LYOPHILIZED, FOR SOLUTION INTRAMUSCULAR; INTRAVENOUS
Status: CANCELLED
Start: 2017-04-26

## 2017-04-26 RX ORDER — ALBUTEROL SULFATE 90 UG/1
1-2 AEROSOL, METERED RESPIRATORY (INHALATION)
Status: CANCELLED
Start: 2017-04-26

## 2017-04-26 RX ORDER — EPINEPHRINE 0.3 MG/.3ML
0.3 INJECTION SUBCUTANEOUS EVERY 5 MIN PRN
Status: CANCELLED | OUTPATIENT
Start: 2017-04-26

## 2017-04-26 RX ORDER — LORAZEPAM 2 MG/ML
0.5 INJECTION INTRAMUSCULAR EVERY 4 HOURS PRN
Status: CANCELLED
Start: 2017-04-26

## 2017-04-26 RX ORDER — SODIUM CHLORIDE 9 MG/ML
1000 INJECTION, SOLUTION INTRAVENOUS CONTINUOUS PRN
Status: CANCELLED
Start: 2017-04-26

## 2017-04-26 RX ORDER — MEPERIDINE HYDROCHLORIDE 50 MG/ML
25 INJECTION INTRAMUSCULAR; INTRAVENOUS; SUBCUTANEOUS EVERY 30 MIN PRN
Status: CANCELLED | OUTPATIENT
Start: 2017-04-26

## 2017-04-26 RX ORDER — FLUOROURACIL 50 MG/ML
400 INJECTION, SOLUTION INTRAVENOUS ONCE
Status: CANCELLED | OUTPATIENT
Start: 2017-04-26

## 2017-04-26 RX ADMIN — FLUOROURACIL 830 MG: 50 INJECTION, SOLUTION INTRAVENOUS at 13:20

## 2017-04-26 RX ADMIN — HEPARIN SODIUM (PORCINE) LOCK FLUSH IV SOLN 100 UNIT/ML 5 ML: 100 SOLUTION at 09:07

## 2017-04-26 ASSESSMENT — PAIN SCALES - GENERAL: PAINLEVEL: NO PAIN (0)

## 2017-04-26 NOTE — PROGRESS NOTES
"Infusion Nursing Note:  Terry Yi presents today for C2 D1 Oxaliplatin/Leucovorin/Fluorouracil bolus and pump connect.    Patient seen by provider today: Yes: Dr Shaffer   present during visit today: Not Applicable.    Note: N/A.    Intravenous Access:  Implanted Port.    Treatment Conditions:  Lab Results   Component Value Date    HGB 13.2 04/26/2017     Lab Results   Component Value Date    WBC 6.3 04/26/2017      Lab Results   Component Value Date    ANEU 3.1 04/26/2017     Lab Results   Component Value Date     04/26/2017      Lab Results   Component Value Date     04/26/2017                   Lab Results   Component Value Date    POTASSIUM 4.1 04/26/2017           No results found for: MAG         Lab Results   Component Value Date    CR 0.80 04/26/2017                   Lab Results   Component Value Date    BRITTNEY 8.9 04/26/2017                Lab Results   Component Value Date    BILITOTAL 0.3 04/26/2017           Lab Results   Component Value Date    ALBUMIN 3.3 04/26/2017                    Lab Results   Component Value Date    ALT 31 04/26/2017           Lab Results   Component Value Date    AST 21 04/26/2017     Results reviewed, labs MET treatment parameters, ok to proceed with treatment.      Post Infusion Assessment:  Patient tolerated infusion without incident.  Prior to discharge: Port is secured in place with tegaderm and flushed with 10cc NS with positive blood return noted.  Continuous home infusion Dosi-Fuser pump connected.    All connectors secured in place and clamps taped open.    Pump started, \"running\" noted on display (CADD): Not Applicable.  Patient instructed to call our clinic or [facility] with any questions or concerns at home.  Patient verbalized understanding.    Patient set up for pump disconnect with [facility]  on 4/28/17 at 1130 .  Patient stated he has the phone number of the nurse there and just has to call her with the time.  "           Discharge Plan:   Copy of AVS reviewed with patient and/or family.  Patient will return 5/10/17 for next appointment.  Patient discharged in stable condition accompanied by: wife, son and friend.  Departure Mode: Ambulatory.    Cheryl Osorio RN

## 2017-04-26 NOTE — MR AVS SNAPSHOT
After Visit Summary   4/26/2017    Terry Yi    MRN: [id number]           Patient Information     Date Of Birth          1965        Visit Information        Provider Department      4/26/2017 11:30 AM [address]        Today's Diagnoses     Malignant neoplasm of overlapping sites of stomach (H)    -  1       Follow-ups after your visit        Your next 10 appointments already scheduled     May 10, 2017  9:15 AM CDT   [facility] with [facility]   [facility]    [address] [facility] [address]   [phone]            May 10, 2017  9:45 AM CDT   Return Visit with Yakov Shaffer MD   [facility])    [address] [facility] [address] [phone]            May 10, 2017 12:15 PM CDT   Level 4 with [address] ([facility])    [address] [facility] [address] [phone]            May 24, 2017  9:00 AM CDT   Return Visit with [facility]SE ONLY CANCER CENTER   [facility] ([facility])    [address] [facility] [address] [phone]            May 24, 2017  9:45 AM CDT   Return Visit with Yakov Shaffer MD   [facility] ([facility])    [address] [facility] [address] [phone]            May 24, 2017 10:15 AM CDT   Level 4 with [address] ([facility])    [address] [facility] [address] [phone]            May 24, 2017  2:15 PM CDT   New Visit with Rosa Banegas GC   [facility] ([facility])    [address] [facility] [address] [phone]              Who to contact     If you have questions or need follow up information about today's clinic  visit or your schedule please contact [facility] directly at [phone].  Normal or non-critical lab and imaging results will be communicated to you by Jemstephart, letter or phone within 4 business days after the clinic has received the results. If you do not hear from us within 7 days, please contact the clinic through Jemstephart or phone. If you have a critical or abnormal lab result, we will notify you by phone as soon as possible.  Submit refill requests through [employer] or call your pharmacy and they will forward the refill request to us. Please allow 3 business days for your refill to be completed.          Additional Information About Your Visit        JemstepharBitbrains Information     [employer] gives you secure access to your electronic health record. If you see a primary care provider, you can also send messages to your care team and make appointments. If you have questions, please call your primary care clinic.  If you do not have a primary care provider, please call [phone] and they will assist you.      [employer] is an electronic gateway that provides easy, online access to your medical records. With [employer], you can request a clinic appointment, read your test results, renew a prescription or communicate with your care team.     To access your existing account, please contact your [facility] or call [phone] for assistance.        Care EveryWhere ID     This is your Care EveryWhere ID. This could be used by other organizations to access your [place] medical records  [id number]         Blood Pressure from Last 3 Encounters:   04/26/17 109/65   04/14/17 99/56   04/12/17 122/63    Weight from Last 3 Encounters:   04/26/17 86.6 kg (191 lb)   04/06/17 85.7 kg (189 lb)   03/29/17 84.8 kg (187 lb)              We Performed the Following     Treatment Conditions        Primary Care Provider Office Phone # Fax #    Sahil Mustafa [phone]        [facility]  [address]  [facility] [address]        Thank you!     Thank you for choosing [facility]  for your care. Our goal is always to provide you with excellent care. Hearing back from our patients is one way we can continue to improve our services. Please take a few minutes to complete the written survey that you may receive in the mail after your visit with us. Thank you!             Your Updated Medication List - Protect others around you: Learn how to safely use, store and throw away your medicines at www.disposemymeds.org.          This list is accurate as of: 4/26/17  2:46 PM.  Always use your most recent med list.                   Brand Name Dispense Instructions for use    allopurinol 100 MG tablet    ZYLOPRIM         hydrochlorothiazide 12.5 MG capsule    MICROZIDE     Take 12.5 mg by mouth every morning Reported on 4/26/2017       lidocaine-prilocaine cream    EMLA         LISINOPRIL PO      Take 5 mg by mouth every morning Reported on 4/26/2017       LORazepam 0.5 MG tablet    ATIVAN    30 tablet    Take 1 tablet (0.5 mg) by mouth every 4 hours as needed (Anxiety, Nausea/Vomiting or Sleep)       ondansetron 8 MG tablet    ZOFRAN    10 tablet    Take 1 tablet (8 mg) by mouth every 8 hours as needed (Nausea/Vomiting)       prochlorperazine 10 MG tablet    COMPAZINE    30 tablet    Take 1 tablet (10 mg) by mouth every 6 hours as needed (Nausea/Vomiting)

## 2017-04-26 NOTE — MR AVS SNAPSHOT
After Visit Summary   4/26/2017    Terry Yi    MRN: [id number]           Patient Information     Date Of Birth          1965        Visit Information        Provider Department      4/26/2017 9:00 AM [facility]        Today's Diagnoses     Malignant neoplasm of overlapping sites of stomach (H)    -  1       Follow-ups after your visit        Your next 10 appointments already scheduled     Apr 26, 2017  9:30 AM CDT   ecg with MG STRESS RM, MG IMAGING NURSE   [facility])    [address] [place] [id number] [phone]            Apr 26, 2017  9:45 AM CDT   Return Visit with Yakov Shaffer MD   [facility])    [address] [place] [id number] [phone]            Apr 26, 2017 11:30 AM CDT   Level 4 with [address] INFUSION   [facility])    [address] [place] [id number] [phone]            May 24, 2017  2:15 PM CDT   New Visit with Rosa Banegas GC   [facility])    [address] [place] [address] [id number] [phone]              Who to contact     If you have questions or need follow up information about today's clinic visit or your schedule please contact [facility] directly at [phone].  Normal or non-critical lab and imaging results will be communicated to you by MyChart, letter or phone within 4 business days after the clinic has received the results. If you do not hear from us within 7 days, please contact the clinic through MyChart or phone. If you have a critical or abnormal lab result, we will notify you by phone as soon as possible.  Submit refill requests through [employer] or call your pharmacy and they will forward the refill request to us. Please allow 3 business days for  your refill to be completed.          Additional Information About Your Visit        DestinationRXhart Information     FITiST gives you secure access to your electronic health record. If you see a primary care provider, you can also send messages to your care team and make appointments. If you have questions, please call your primary care clinic.  If you do not have a primary care provider, please call [phone] and they will assist you.      FIT[employer]ST is an electronic gateway that provides easy, online access to your medical records. With FITiST, you can request a clinic appointment, read your test results, renew a prescription or communicate with your care team.     To access your existing account, please contact your [facility] or call [phone] for assistance.        Care EveryWhere ID     This is your Care EveryWhere ID. This could be used by other organizations to access your [place] medical records  [id number]         Blood Pressure from Last 3 Encounters:   04/14/17 99/56   04/12/17 122/63   04/12/17 113/71    Weight from Last 3 Encounters:   04/06/17 85.7 kg (189 lb)   03/29/17 84.8 kg (187 lb)   03/27/17 83.6 kg (184 lb 4.9 oz)              We Performed the Following     CBC with platelets differential     Comprehensive metabolic panel        Primary Care Provider Office Phone # Fax #    Sahil Mustafa [phone]       [facility]  [address]  [facility] [address]        Thank you!     Thank you for choosing [email or web address]  for your care. Our goal is always to provide you with excellent care. Hearing back from our patients is one way we can continue to improve our services. Please take a few minutes to complete the written survey that you may receive in the mail after your visit with us. Thank you!             Your Updated Medication List - Protect others around you: Learn how to safely use, store and throw away your medicines at  www.disposemymeds.org.          This list is accurate as of: 4/26/17  9:18 AM.  Always use your most recent med list.                   Brand Name Dispense Instructions for use    allopurinol 100 MG tablet    ZYLOPRIM         hydrochlorothiazide 12.5 MG capsule    MICROZIDE     Take 12.5 mg by mouth every morning       lidocaine-prilocaine cream    EMLA         LISINOPRIL PO      Take 10 mg by mouth every morning       LORazepam 0.5 MG tablet    ATIVAN    30 tablet    Take 1 tablet (0.5 mg) by mouth every 4 hours as needed (Anxiety, Nausea/Vomiting or Sleep)       ondansetron 8 MG tablet    ZOFRAN    10 tablet    Take 1 tablet (8 mg) by mouth every 8 hours as needed (Nausea/Vomiting)       prochlorperazine 10 MG tablet    COMPAZINE    30 tablet    Take 1 tablet (10 mg) by mouth every 6 hours as needed (Nausea/Vomiting)

## 2017-04-26 NOTE — PROGRESS NOTES
"Patient's name and  were verified.  See Doc Flowsheet - IV assess for details.  IVAD accessed with 20G 3/4\" matamoros gripper plus needle  blood return positive: YES  Site without redness, tenderness or swelling: YES  flushed with 30cc NS and 5cc 100u/ml heparin  Needle: Left accessed for infusion    Comments: Labs drawn.  Patient tolerated procedure without incident.    Amanda Licea  RN, BSN, OCN        "

## 2017-04-26 NOTE — PROGRESS NOTES
4/26/2017      CHIEF COMPLAINT:  Unresectable metastatic invasive gastric carcinoma, diffuse type, grade 3. 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 His tumor is not FDG avid. He is not a surgical candidate.  He started palliative chemo with FOLFOX on 4/12/17    INTERVAL HX:  Terry comes in today accompanied by his family.  He tells me that he tolerated cycle number 1 of FOLFOX really well.  He had minimal tiredness and minimal nausea for which he has had to take a couple of Compazine, but did not throw up.  He was able to eat and drink well without any GI problems.  He has actually been able to maintain his weight and able to go to work every day.  He did have some cold sensitivity, but no tingling or numbness or neuropathy.  He denies any new pain.  He denies any new swellings.  He denies any interval infections.          ROS:  Otherwise a comprehensive ROS was performed and it was neg    I reviewed other hx in EPIC as below     PAST MEDICAL HISTORY:  Significant for hypertension, gout, stomach cancer as mentioned above, and obesity in the past.  He has a history of alcohol abuse.  Many years ago he had surgeries to both of his ears and he does have decreased hearing on both the sides and was told to wear hearing aids, but he is not doing that.  He also tells me that off and on he does have tinnitus on both the sides.  This has been stable for a number of years.      FAMILY HISTORY:  His father had coronary artery disease.  One of the maternal aunts probably had colon cancer.  Paternal grandfather probably had throat cancer.  He has 2 kids who are healthy.      SOCIAL HISTORY:  He does not smoke tobacco, but he smokes weed.  Denies IV drug use.  He  "used to be a very heavy alcohol drinker and underwent rehab and now for the last 44 months or so he has not drank alcohol.  He works in the car and truck business.  He lives in [place].      ALLERGIES:  LATEX.      MEDICATIONS:  He is on baby aspirin.  He takes allopurinol, hydrochlorothiazide 12.5, lisinopril, and indomethacin as needed.      PHYSICAL EXAMINATION:   /65  Pulse 67  Temp 97.1  F (36.2  C)  Resp 15  Ht 1.8 m (5' 10.87\")  Wt 86.6 kg (191 lb)  SpO2 98%  BMI 26.74 kg/m2  CONSTITUTIONAL:  He is a middle-aged gentleman in no acute distress.   EYES:  Pupils are equal and reactive to light and accommodation without any pallor or icterus.    ENT:  Ears look normal.  There are no oral lesions or mouth sore.    CARDIOVASCULAR:  S1, S2 is audible without any murmurs, rubs or gallops.   RESPIRATORY:  Chest is clear.   GASTROINTESTINAL:  Abdomen is benign.   NEUROLOGIC:  He has a grossly nonfocal neurologic exam.  He is alert and oriented x3.    INTEGUMENT:  No concerning skin rashes.  He does have a dry, scaly rash at the dorsum of the left hand, but it just looks like dry skin.    LYMPHATICS:  No palpable lymph nodes.   EXTREMITIES:  No pedal edema.    PSYCHIATRIC:  Mentation, mood and affect are appropriate.         LABORATORY DATA:      Results for LEONOR RALPH (MRN [id number]) as of 4/26/2017 10:19   Ref. Range 4/26/2017 09:10   Sodium Latest Ref Range: 133 - 144 mmol/L 143   Potassium Latest Ref Range: 3.4 - 5.3 mmol/L 4.1   Chloride Latest Ref Range: 94 - 109 mmol/L 108   Carbon Dioxide Latest Ref Range: 20 - 32 mmol/L 29   Urea Nitrogen Latest Ref Range: 7 - 30 mg/dL 13   Creatinine Latest Ref Range: 0.66 - 1.25 mg/dL 0.80   GFR Estimate Latest Ref Range: >60 mL/min/1.7m2 >90...   GFR Estimate If Black Latest Ref Range: >60 mL/min/1.7m2 >90...   Calcium Latest Ref Range: 8.5 - 10.1 mg/dL 8.9   Anion Gap Latest Ref Range: 3 - 14 mmol/L 6   Albumin Latest Ref Range: 3.4 - 5.0 g/dL 3.3 (L) "   Protein Total Latest Ref Range: 6.8 - 8.8 g/dL 6.1 (L)   Bilirubin Total Latest Ref Range: 0.2 - 1.3 mg/dL 0.3   Alkaline Phosphatase Latest Ref Range: 40 - 150 U/L 95   ALT Latest Ref Range: 0 - 70 U/L 31   AST Latest Ref Range: 0 - 45 U/L 21   Glucose Latest Ref Range: 70 - 99 mg/dL 64 (L)   WBC Latest Ref Range: 4.0 - 11.0 10e9/L 6.3   Hemoglobin Latest Ref Range: 13.3 - 17.7 g/dL 13.2 (L)   Hematocrit Latest Ref Range: 40.0 - 53.0 % 38.7 (L)   Platelet Count Latest Ref Range: 150 - 450 10e9/L 261   RBC Count Latest Ref Range: 4.4 - 5.9 10e12/L 4.46   MCV Latest Ref Range: 78 - 100 fl 87   MCH Latest Ref Range: 26.5 - 33.0 pg 29.6   MCHC Latest Ref Range: 31.5 - 36.5 g/dL 34.1   RDW Latest Ref Range: 10.0 - 15.0 % 14.2   Diff Method Unknown Automated Method   % Neutrophils Latest Units: % 48.4   % Lymphocytes Latest Units: % 38.5   % Monocytes Latest Units: % 9.5   % Eosinophils Latest Units: % 3.0   % Basophils Latest Units: % 0.6   Absolute Neutrophil Latest Ref Range: 1.6 - 8.3 10e9/L 3.1   Absolute Lymphocytes Latest Ref Range: 0.8 - 5.3 10e9/L 2.4   Absolute Monocytes Latest Ref Range: 0.0 - 1.3 10e9/L 0.6   Absolute Eosinophils Latest Ref Range: 0.0 - 0.7 10e9/L 0.2   Absolute Basophils Latest Ref Range: 0.0 - 0.2 10e9/L 0.0        PATHOLOGY:  Reviewed.  The pathology consult from the [facility] agrees with outside reading.      IMAGING AND EUS:  Reviewed.       ASSESSMENT AND PLAN:  Terry is a 51-year-old otherwise pretty healthy gentleman who was recently diagnosed with metastatic gastric cancer when he presented with early satiety and significant weight loss and pain in the epigastrium upon eating along with some vomiting.  There is involvement of periaortic lymph node in the aortocaval region as well as extensive infiltration of lesser omentum and peripancreatic fat. There is an indeterminate right lobe liver lesion as well.  He was started on palliative FOLFOX on 4/12/17.  We will  proceed with C#2 today  Plan would be to give him 2-3 months of chemo before re-imaging  Nausea.  We discussed that he will continue to take Compazine as needed and he also can take Zofran as needed.  Fortunately, his nausea was minimal and he was able to eat and drink well.      We talked about maintaining good nutrition during this time so as to maintain good general health.        We also discussed about the fatigue due to the chemotherapy.  It was minimal.  I encouraged him to try to keep himself active and exercise regularly to help with the fatigue.        He is going to see the genetic counselor next month because of a history of gastric cancer.       I would like to see him back in 2 weeks.  He will call me in the interim if he has any questions or concerns.       All of his questions were answered to his satisfaction.  He is agreeable and comfortable with this plan.         Yakov Shaffer

## 2017-04-26 NOTE — MR AVS SNAPSHOT
After Visit Summary   4/26/2017    Terry Yi    MRN: [id number]           Patient Information     Date Of Birth          1965        Visit Information        Provider Department      4/26/2017 9:45 AM Yakov Shaffer MD [facility]        Care Instructions    Proceed to chemo  See me back in 2 weeks with labs and FOLFOX        Follow-ups after your visit        Your next 10 appointments already scheduled     May 10, 2017  9:15 AM CDT   Return Visit with NURSE ONLY CANCER CENTER   [facility])    [address] [place] [phone]            May 10, 2017  9:45 AM CDT   Return Visit with Yakov Shaffer MD   [facility])    [address] [place] [address] [phone] [phone]            May 10, 2017 12:15 PM CDT   Level 4 with BAY 7 INFUSION   [facility])    [address] [place] [id number] [phone]            May 24, 2017  2:15 PM CDT   New Visit with Rosa Banegas GC   [facility])    [address] [place] [address] [phone]              Who to contact     If you have questions or need follow up information about today's clinic visit or your schedule please contact [facility] directly at [phone].  Normal or non-critical lab and imaging results will be communicated to you by MyChart, letter or phone within 4 business days after the clinic has received the results. If you do not hear from us within 7 days, please contact the clinic through [employer] or phone. If you have a critical or abnormal lab result, we will notify you by phone as soon as possible.  Submit refill requests through iCreate or call your pharmacy and they will forward the refill request to us. Please allow 3 business days for your  "refill to be completed.          Additional Information About Your Visit        [email or web address] Information     [employer] gives you secure access to your electronic health record. If you see a primary care provider, you can also send messages to your care team and make appointments. If you have questions, please call your primary care clinic.  If you do not have a primary care provider, please call [phone] and they will assist you.      Conjur is an electronic gateway that provides easy, online access to your medical records. With [employer]jur, you can request a clinic appointment, read your test results, renew a prescription or communicate with your care team.     To access your existing account, please contact your [facility] or call [phone] for assistance.        Care EveryWhere ID     This is your Care EveryWhere ID. This could be used by other organizations to access your [place] medical records  [id number]        Your Vitals Were     Pulse Temperature Respirations Height Pulse Oximetry BMI (Body Mass Index)    67 97.1  F (36.2  C) 15 1.8 m (5' 10.87\") 98% 26.74 kg/m2       Blood Pressure from Last 3 Encounters:   04/26/17 109/65   04/14/17 99/56   04/12/17 122/63    Weight from Last 3 Encounters:   04/26/17 86.6 kg (191 lb)   04/06/17 85.7 kg (189 lb)   03/29/17 84.8 kg (187 lb)              Today, you had the following     No orders found for display       Primary Care Provider Office Phone # Fax #    Sahil Mustafa [phone]       [facility] [address]  [facility] [address]        Thank you!     Thank you for choosing [facility]  for your care. Our goal is always to provide you with excellent care. Hearing back from our patients is one way we can continue to improve our services. Please take a few minutes to complete the written survey that you may receive in the mail after your visit with us. Thank you!             Your " Updated Medication List - Protect others around you: Learn how to safely use, store and throw away your medicines at www.disposemymeds.org.          This list is accurate as of: 4/26/17  1:13 PM.  Always use your most recent med list.                   Brand Name Dispense Instructions for use    allopurinol 100 MG tablet    ZYLOPRIM         hydrochlorothiazide 12.5 MG capsule    MICROZIDE     Take 12.5 mg by mouth every morning Reported on 4/26/2017       lidocaine-prilocaine cream    EMLA         LISINOPRIL PO      Take 5 mg by mouth every morning Reported on 4/26/2017       LORazepam 0.5 MG tablet    ATIVAN    30 tablet    Take 1 tablet (0.5 mg) by mouth every 4 hours as needed (Anxiety, Nausea/Vomiting or Sleep)       ondansetron 8 MG tablet    ZOFRAN    10 tablet    Take 1 tablet (8 mg) by mouth every 8 hours as needed (Nausea/Vomiting)       prochlorperazine 10 MG tablet    COMPAZINE    30 tablet    Take 1 tablet (10 mg) by mouth every 6 hours as needed (Nausea/Vomiting)

## 2017-05-01 DIAGNOSIS — R55 SYNCOPE: Primary | ICD-10-CM

## 2017-05-01 DIAGNOSIS — C16.9 GASTRIC CANCER (H): ICD-10-CM

## 2017-05-01 PROCEDURE — 93000 ELECTROCARDIOGRAM COMPLETE: CPT | Performed by: INTERNAL MEDICINE

## 2017-05-03 DIAGNOSIS — R55 SYNCOPE: Primary | ICD-10-CM

## 2017-05-03 DIAGNOSIS — C16.9 GASTRIC CANCER (H): ICD-10-CM

## 2017-05-03 PROCEDURE — 93000 ELECTROCARDIOGRAM COMPLETE: CPT | Performed by: INTERNAL MEDICINE

## 2017-05-10 ENCOUNTER — INFUSION THERAPY VISIT (OUTPATIENT)
Dept: INFUSION THERAPY | Facility: CLINIC | Age: 52
End: 2017-05-10
Payer: COMMERCIAL

## 2017-05-10 ENCOUNTER — ONCOLOGY VISIT (OUTPATIENT)
Dept: ONCOLOGY | Facility: CLINIC | Age: 52
End: 2017-05-10
Payer: COMMERCIAL

## 2017-05-10 VITALS
WEIGHT: 185 LBS | TEMPERATURE: 97.6 F | DIASTOLIC BLOOD PRESSURE: 56 MMHG | RESPIRATION RATE: 20 BRPM | OXYGEN SATURATION: 100 % | SYSTOLIC BLOOD PRESSURE: 99 MMHG | HEIGHT: 71 IN | BODY MASS INDEX: 25.9 KG/M2 | HEART RATE: 60 BPM

## 2017-05-10 DIAGNOSIS — C16.8 MALIGNANT NEOPLASM OF OVERLAPPING SITES OF STOMACH (H): Primary | ICD-10-CM

## 2017-05-10 LAB
ALBUMIN SERPL-MCNC: 3.4 G/DL (ref 3.4–5)
ALP SERPL-CCNC: 87 U/L (ref 40–150)
ALT SERPL W P-5'-P-CCNC: 41 U/L (ref 0–70)
ANION GAP SERPL CALCULATED.3IONS-SCNC: 4 MMOL/L (ref 3–14)
AST SERPL W P-5'-P-CCNC: 29 U/L (ref 0–45)
BASOPHILS # BLD AUTO: 0.1 10E9/L (ref 0–0.2)
BASOPHILS NFR BLD AUTO: 1.1 %
BILIRUB SERPL-MCNC: 0.5 MG/DL (ref 0.2–1.3)
BUN SERPL-MCNC: 15 MG/DL (ref 7–30)
CALCIUM SERPL-MCNC: 9.1 MG/DL (ref 8.5–10.1)
CHLORIDE SERPL-SCNC: 108 MMOL/L (ref 94–109)
CO2 SERPL-SCNC: 30 MMOL/L (ref 20–32)
CREAT SERPL-MCNC: 0.84 MG/DL (ref 0.66–1.25)
DIFFERENTIAL METHOD BLD: ABNORMAL
EOSINOPHIL # BLD AUTO: 0.2 10E9/L (ref 0–0.7)
EOSINOPHIL NFR BLD AUTO: 3.6 %
ERYTHROCYTE [DISTWIDTH] IN BLOOD BY AUTOMATED COUNT: 15.1 % (ref 10–15)
GFR SERPL CREATININE-BSD FRML MDRD: ABNORMAL ML/MIN/1.7M2
GLUCOSE SERPL-MCNC: 97 MG/DL (ref 70–99)
HCT VFR BLD AUTO: 36.9 % (ref 40–53)
HGB BLD-MCNC: 12.7 G/DL (ref 13.3–17.7)
LYMPHOCYTES # BLD AUTO: 1.8 10E9/L (ref 0.8–5.3)
LYMPHOCYTES NFR BLD AUTO: 34 %
MCH RBC QN AUTO: 30.1 PG (ref 26.5–33)
MCHC RBC AUTO-ENTMCNC: 34.4 G/DL (ref 31.5–36.5)
MCV RBC AUTO: 87 FL (ref 78–100)
MONOCYTES # BLD AUTO: 0.6 10E9/L (ref 0–1.3)
MONOCYTES NFR BLD AUTO: 11 %
NEUTROPHILS # BLD AUTO: 2.6 10E9/L (ref 1.6–8.3)
NEUTROPHILS NFR BLD AUTO: 50.3 %
PLATELET # BLD AUTO: 165 10E9/L (ref 150–450)
POTASSIUM SERPL-SCNC: 4.4 MMOL/L (ref 3.4–5.3)
PROT SERPL-MCNC: 6.3 G/DL (ref 6.8–8.8)
RBC # BLD AUTO: 4.22 10E12/L (ref 4.4–5.9)
SODIUM SERPL-SCNC: 142 MMOL/L (ref 133–144)
WBC # BLD AUTO: 5.3 10E9/L (ref 4–11)

## 2017-05-10 PROCEDURE — 96367 TX/PROPH/DG ADDL SEQ IV INF: CPT | Mod: 59 | Performed by: INTERNAL MEDICINE

## 2017-05-10 PROCEDURE — 96416 CHEMO PROLONG INFUSE W/PUMP: CPT | Performed by: INTERNAL MEDICINE

## 2017-05-10 PROCEDURE — 99207 ZZC NO CHARGE LOS: CPT

## 2017-05-10 PROCEDURE — 96368 THER/DIAG CONCURRENT INF: CPT | Performed by: INTERNAL MEDICINE

## 2017-05-10 PROCEDURE — 36593 DECLOT VASCULAR DEVICE: CPT | Performed by: INTERNAL MEDICINE

## 2017-05-10 PROCEDURE — 99207 ZZC NO CHARGE NURSE ONLY: CPT

## 2017-05-10 PROCEDURE — 80053 COMPREHEN METABOLIC PANEL: CPT | Performed by: INTERNAL MEDICINE

## 2017-05-10 PROCEDURE — 96415 CHEMO IV INFUSION ADDL HR: CPT | Performed by: INTERNAL MEDICINE

## 2017-05-10 PROCEDURE — 85025 COMPLETE CBC W/AUTO DIFF WBC: CPT | Performed by: INTERNAL MEDICINE

## 2017-05-10 PROCEDURE — 96413 CHEMO IV INFUSION 1 HR: CPT | Performed by: INTERNAL MEDICINE

## 2017-05-10 PROCEDURE — 99214 OFFICE O/P EST MOD 30 MIN: CPT | Mod: 25 | Performed by: INTERNAL MEDICINE

## 2017-05-10 PROCEDURE — 96411 CHEMO IV PUSH ADDL DRUG: CPT | Performed by: INTERNAL MEDICINE

## 2017-05-10 RX ORDER — EPINEPHRINE 0.3 MG/.3ML
0.3 INJECTION SUBCUTANEOUS EVERY 5 MIN PRN
Status: CANCELLED | OUTPATIENT
Start: 2017-05-10

## 2017-05-10 RX ORDER — ALBUTEROL SULFATE 90 UG/1
1-2 AEROSOL, METERED RESPIRATORY (INHALATION)
Status: CANCELLED
Start: 2017-05-10

## 2017-05-10 RX ORDER — MEPERIDINE HYDROCHLORIDE 50 MG/ML
25 INJECTION INTRAMUSCULAR; INTRAVENOUS; SUBCUTANEOUS EVERY 30 MIN PRN
Status: CANCELLED | OUTPATIENT
Start: 2017-05-10

## 2017-05-10 RX ORDER — HEPARIN SODIUM (PORCINE) LOCK FLUSH IV SOLN 100 UNIT/ML 100 UNIT/ML
5 SOLUTION INTRAVENOUS
Status: DISCONTINUED | OUTPATIENT
Start: 2017-05-10 | End: 2017-05-10 | Stop reason: HOSPADM

## 2017-05-10 RX ORDER — FLUOROURACIL 50 MG/ML
400 INJECTION, SOLUTION INTRAVENOUS ONCE
Status: CANCELLED | OUTPATIENT
Start: 2017-05-10

## 2017-05-10 RX ORDER — LORAZEPAM 2 MG/ML
0.5 INJECTION INTRAMUSCULAR EVERY 4 HOURS PRN
Status: CANCELLED
Start: 2017-05-10

## 2017-05-10 RX ORDER — METHYLPREDNISOLONE SODIUM SUCCINATE 125 MG/2ML
125 INJECTION, POWDER, LYOPHILIZED, FOR SOLUTION INTRAMUSCULAR; INTRAVENOUS
Status: CANCELLED
Start: 2017-05-10

## 2017-05-10 RX ORDER — SODIUM CHLORIDE 9 MG/ML
1000 INJECTION, SOLUTION INTRAVENOUS CONTINUOUS PRN
Status: CANCELLED
Start: 2017-05-10

## 2017-05-10 RX ORDER — ALBUTEROL SULFATE 0.83 MG/ML
2.5 SOLUTION RESPIRATORY (INHALATION)
Status: CANCELLED | OUTPATIENT
Start: 2017-05-10

## 2017-05-10 RX ORDER — EPINEPHRINE 1 MG/ML
0.3 INJECTION INTRAMUSCULAR; INTRAVENOUS; SUBCUTANEOUS EVERY 5 MIN PRN
Status: CANCELLED | OUTPATIENT
Start: 2017-05-10

## 2017-05-10 RX ORDER — FLUOROURACIL 50 MG/ML
400 INJECTION, SOLUTION INTRAVENOUS ONCE
Status: COMPLETED | OUTPATIENT
Start: 2017-05-10 | End: 2017-05-10

## 2017-05-10 RX ORDER — DIPHENHYDRAMINE HYDROCHLORIDE 50 MG/ML
50 INJECTION INTRAMUSCULAR; INTRAVENOUS
Status: CANCELLED
Start: 2017-05-10

## 2017-05-10 RX ADMIN — HEPARIN SODIUM (PORCINE) LOCK FLUSH IV SOLN 100 UNIT/ML 5 ML: 100 SOLUTION at 09:34

## 2017-05-10 RX ADMIN — FLUOROURACIL 830 MG: 50 INJECTION, SOLUTION INTRAVENOUS at 15:59

## 2017-05-10 RX ADMIN — Medication 2 MG: at 12:49

## 2017-05-10 RX ADMIN — Medication 500 ML: at 11:45

## 2017-05-10 ASSESSMENT — PAIN SCALES - GENERAL: PAINLEVEL: NO PAIN (0)

## 2017-05-10 NOTE — MR AVS SNAPSHOT
After Visit Summary   5/10/2017    Terry Yi    MRN: [id number]           Patient Information     Date Of Birth          1965        Visit Information        Provider Department      5/10/2017 9:45 AM Yakov Shaffer MD [facility]        Today's Diagnoses     Malignant neoplasm of overlapping sites of stomach (H)    -  1      Care Instructions    Proceed to chemo    Give  cc today with chemo    See me back in 2 weeks with labs and next FOLFOX        Follow-ups after your visit        Your next 10 appointments already scheduled     May 24, 2017  9:00 AM CDT   Return Visit with [facility] ONLY [facility]CER [facility]ENTER   [facility]    [address] [place] [phone]            May 24, 2017  9:45 AM CDT   Return Visit with Yakov Shaffer MD   [facility] ([facility])    [address] [place] [phone]            May 24, 2017 10:15 AM CDT   Level 4 with BAY 7 INFUSION   [facility] ([facility]    [address] [place] [phone]            May 24, 2017  2:15 PM CDT   New Visit with Rosa Banegas GC   [facility] ([facility])    [address] [place] [address] [phone]              Who to contact     If you have questions or need follow up information about today's clinic visit or your schedule please contact [facility] directly at [phone].  Normal or non-critical lab and imaging results will be communicated to you by MyChart, letter or phone within 4 business days after the clinic has received the results. If you do not hear from us within 7 days, please contact the clinic through MyChart or phone. If you have a critical or abnormal lab result, we will notify you by phone as soon as possible.  Submit refill  "requests through [employer] or call your pharmacy and they will forward the refill request to us. Please allow 3 business days for your refill to be completed.          Additional Information About Your Visit        Masterbranch[employer] Apron Information     [employer] Software gives you secure access to your electronic health record. If you see a primary care provider, you can also send messages to your care team and make appointments. If you have questions, please call your primary care clinic.  If you do not have a primary care provider, please call [phone] and they will assist you.      [employer] is an electronic gateway that provides easy, online access to your medical records. With [employer], you can request a clinic appointment, read your test results, renew a prescription or communicate with your care team.     To access your existing account, please contact your [facility] or call [phone] for assistance.        Care EveryWhere ID     This is your Care EveryWhere ID. This could be used by other organizations to access your [place] medical records  [id number]        Your Vitals Were     Pulse Temperature Respirations Height Pulse Oximetry BMI (Body Mass Index)    60 97.6  F (36.4  C) (Oral) 20 1.8 m (5' 10.87\") 100% 25.9 kg/m2       Blood Pressure from Last 3 Encounters:   05/10/17 99/56   04/26/17 109/65   04/14/17 99/56    Weight from Last 3 Encounters:   05/10/17 83.9 kg (185 lb)   04/26/17 86.6 kg (191 lb)   04/06/17 85.7 kg (189 lb)              Today, you had the following     No orders found for display       Primary Care Provider Office Phone # Fax #    Sandra Dickerson [phone]       [facility]  [address]  [facility] [address]        Thank you!     Thank you for choosing [facility]  for your care. Our goal is always to provide you with excellent care. Hearing back from our patients is one way we can continue to improve our services. Please take a " few minutes to complete the written survey that you may receive in the mail after your visit with us. Thank you!             Your Updated Medication List - Protect others around you: Learn how to safely use, store and throw away your medicines at www.disposemymeds.org.          This list is accurate as of: 5/10/17 11:59 PM.  Always use your most recent med list.                   Brand Name Dispense Instructions for use    allopurinol 100 MG tablet    ZYLOPRIM         lidocaine-prilocaine cream    EMLA         LISINOPRIL PO      Take 5 mg by mouth every morning Reported on 4/26/2017       LORazepam 0.5 MG tablet    ATIVAN    30 tablet    Take 1 tablet (0.5 mg) by mouth every 4 hours as needed (Anxiety, Nausea/Vomiting or Sleep)       ondansetron 8 MG tablet    ZOFRAN    10 tablet    Take 1 tablet (8 mg) by mouth every 8 hours as needed (Nausea/Vomiting)       prochlorperazine 10 MG tablet    COMPAZINE    30 tablet    Take 1 tablet (10 mg) by mouth every 6 hours as needed (Nausea/Vomiting)

## 2017-05-10 NOTE — MR AVS SNAPSHOT
After Visit Summary   5/10/2017    Terry Yi    MRN: [id number]           Patient Information     Date Of Birth          1965        Visit Information        Provider Department      5/10/2017 12:15 PM [facility]        Today's Diagnoses     Malignant neoplasm of overlapping sites of stomach (H)    -  1       Follow-ups after your visit        Your next 10 appointments already scheduled     May 24, 2017  9:00 AM CDT   Return Visit with [facility]URSE ONLY CANCER CENTER   [facility]    [address] [place] [address] [id number] [phone]            May 24, 2017  9:45 AM CDT   Return Visit with Yakov Shaffer MD   [facility] ([facility])    [address] [place] [address] [phone]            May 24, 2017 10:15 AM CDT   Level 4 with [address] ([facility])    [address] [place] [address] [phone]            May 24, 2017  2:15 PM CDT   New Visit with Rosa Banegas GC   [facility])    [address] [place] [address] [phone] [phone]              Who to contact     If you have questions or need follow up information about today's clinic visit or your schedule please contact [facility] directly at [phone].  Normal or non-critical lab and imaging results will be communicated to you by MyChart, letter or phone within 4 business days after the clinic has received the results. If you do not hear from us within 7 days, please contact the clinic through MyChart or phone. If you have a critical or abnormal lab result, we will notify you by phone as soon as possible.  Submit refill requests through [employer] or call your pharmacy and they will forward the refill request to us. Please allow 3 business days for your  refill to be completed.          Additional Information About Your Visit        Dixon Technologieshart Information     [employer] gives you secure access to your electronic health record. If you see a primary care provider, you can also send messages to your care team and make appointments. If you have questions, please call your primary care clinic.  If you do not have a primary care provider, please call [phone] and they will assist you.      [employer] is an electronic gateway that provides easy, online access to your medical records. With [employer], you can request a clinic appointment, read your test results, renew a prescription or communicate with your care team.     To access your existing account, please contact your [facility] or call [phone] for assistance.        Care EveryWhere ID     This is your Care EveryWhere ID. This could be used by other organizations to access your [place] medical records  [id number]         Blood Pressure from Last 3 Encounters:   05/10/17 99/56   04/26/17 109/65   04/14/17 99/56    Weight from Last 3 Encounters:   05/10/17 83.9 kg (185 lb)   04/26/17 86.6 kg (191 lb)   04/06/17 85.7 kg (189 lb)              We Performed the Following     Treatment Conditions        Primary Care Provider Office Phone # Fax #    Sandra Dickerson [phone]       [facility]  [address]  [facility] [address]        Thank you!     Thank you for choosing [facility]  for your care. Our goal is always to provide you with excellent care. Hearing back from our patients is one way we can continue to improve our services. Please take a few minutes to complete the written survey that you may receive in the mail after your visit with us. Thank you!             Your Updated Medication List - Protect others around you: Learn how to safely use, store and throw away your medicines at www.disposemymeds.org.          This list is accurate as of:  5/10/17  4:15 PM.  Always use your most recent med list.                   Brand Name Dispense Instructions for use    allopurinol 100 MG tablet    ZYLOPRIM         lidocaine-prilocaine cream    EMLA         LISINOPRIL PO      Take 5 mg by mouth every morning Reported on 4/26/2017       LORazepam 0.5 MG tablet    ATIVAN    30 tablet    Take 1 tablet (0.5 mg) by mouth every 4 hours as needed (Anxiety, Nausea/Vomiting or Sleep)       ondansetron 8 MG tablet    ZOFRAN    10 tablet    Take 1 tablet (8 mg) by mouth every 8 hours as needed (Nausea/Vomiting)       prochlorperazine 10 MG tablet    COMPAZINE    30 tablet    Take 1 tablet (10 mg) by mouth every 6 hours as needed (Nausea/Vomiting)

## 2017-05-10 NOTE — PROGRESS NOTES
5/10/2017      CHIEF COMPLAINT:  Unresectable metastatic invasive gastric carcinoma, diffuse type, grade 3. 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 His tumor is not FDG avid. He is not a surgical candidate.  He started palliative chemo with FOLFOX on 4/12/17  C#2 4/26/17  INTERVAL HISTORY:  Terry comes in today and tells me that he tolerated the infusion really well.  His energy remains good.  He is trying to keep himself active and he is working full-time.  He had very mild nausea, but it was not to the point that he had to take any medications for it.  He does mention about cold sensitivity which lasts a couple of days and then it resolves.  He denies any lingering neuropathy.  He denies any interval infections or fevers.  He has been able to eat and drink well.  He does tell me that he is not drinking enough water or fluids.  He probably just drinks less than a liter of water, but he does drink 5-6 cups of coffee daily.  He has not noticed any new lumps, bumps or swellings.      REVIEW OF SYSTEMS:  Otherwise, a comprehensive review of the systems was negative.     I reviewed other hx in EPIC as below     PAST MEDICAL HISTORY:  Significant for hypertension, gout, stomach cancer as mentioned above, and obesity in the past.  He has a history of alcohol abuse.  Many years ago he had surgeries to both of his ears and he does have decreased hearing on both the sides and was told to wear hearing aids, but he is not doing that.  He also tells me that off and on he does have tinnitus on both the sides.  This has been stable for a number of years.      FAMILY HISTORY:  His father had coronary artery disease.  One of the maternal aunts probably had colon  "cancer.  Paternal grandfather probably had throat cancer.  He has 2 kids who are healthy.      SOCIAL HISTORY:  He does not smoke tobacco, but he smokes weed.  Denies IV drug use.  He used to be a very heavy alcohol drinker and underwent rehab and now for the last 44 months or so he has not drank alcohol.  He works in the car and truck business.  He lives in [place].      ALLERGIES:  LATEX.      MEDICATIONS:  He is on baby aspirin.  He takes allopurinol, hydrochlorothiazide 12.5, lisinopril, and indomethacin as needed.      PHYSICAL EXAMINATION:   BP 99/56  Pulse 60  Temp 97.6  F (36.4  C) (Oral)  Resp 20  Ht 1.8 m (5' 10.87\")  Wt 83.9 kg (185 lb)  SpO2 100%  BMI 25.9 kg/m2  PHYSICAL EXAMINATION:    CONSTITUTIONAL:  No apparent distress.    EYES:  Pupils are equal and reactive to light and accommodation without any pallor or icterus.   ENT:  Ears are unremarkable.  There are no mouth lesions.    CARDIOVASCULAR:  S1, S2 audible and normal.   RESPIRATORY:  Chest is clear bilaterally.    GASTROINTESTINAL:  Abdomen is soft, nontender.  No hepatosplenomegaly noted.   NEUROLOGIC:  Alert and oriented x3.  Nonfocal neurological exam.    INTEGUMENT:  No concerning skin problems.  The port site is intact.   LYMPHATICS:  No palpable cervical, supraclavicular or axillary lymph nodes.   EXTREMITIES:  No edema.   PSYCHIATRIC:  Decision-making capacity is intact.       LABORATORY DATA:  Reviewed.     PATHOLOGY:  Reviewed.  The pathology consult from the [facility] agrees with outside reading.      IMAGING AND EUS:  Reviewed.       ASSESSMENT AND PLAN:  Terry is a 51-year-old otherwise pretty healthy gentleman who was recently diagnosed with metastatic gastric cancer when he presented with early satiety and significant weight loss and pain in the epigastrium upon eating along with some vomiting.  There is involvement of periaortic lymph node in the aortocaval region as well as extensive infiltration of " lesser omentum and peripancreatic fat. There is an indeterminate right lobe liver lesion as well.  He was started on palliative FOLFOX on 4/12/17. C#2 given on 4/26/17  He is tolerating chemotherapy well.  I am going to proceed with cycle number 3 today.  In 2 weeks he will have cycle number 4.  My plan is to give him at least 5 or 6 cycles of FOLFOX before re-imaging.      He does have mild nausea.  With this cycle he did not take any p.r.n. medication, but he has p.r.n. antiemetics if need be.       He has some cold sensitivity but no lingering neuropathy.  At this time, I told him to avoid cold exposure after the chemotherapy.  We will continue to observe without any active treatment for that.        With this cycle he did have some weight loss, although he continued to eat well.  He also attributes it partly to clothes which he was wearing last time versus this time.  We will keep a close watch on his weight.  I encouraged him to eat healthy.  If need be, he will be seen by a nutritionist.        I encouraged him to continue exercising regularly.  He is working full-time, which is good.        Because of the history of gastric cancer, he is going to meet with a genetic counselor later this month.       He has a blood pressure of 99/56 today.  He does not drink enough fluids.  I encouraged him to drink plenty of fluids.  I also told him that he should not count any caffeinated drinks towards the fluid intake.  I will give him 500 mL of normal saline today with chemotherapy.       All of his and his family's questions were answered to their satisfaction.  They are agreeable and comfortable with this plan.  I will see him back in 2 weeks prior to his next cycle of chemotherapy.       Yakov Shaffer

## 2017-05-10 NOTE — PROGRESS NOTES
"Infusion Nursing Note:  Terry Yi presents today for C3 Oxaliplatin/ leucovorin/ fluorouracil push and pump connect.    Patient seen by provider today: Yes: Dr. Shaffer   present during visit today: Not Applicable.    Note: port would not give blood return. TPA instilled for 30 min and good return noted after.     Intravenous Access:  Implanted Port.    Treatment Conditions:  Lab Results   Component Value Date    HGB 12.7 05/10/2017     Lab Results   Component Value Date    WBC 5.3 05/10/2017      Lab Results   Component Value Date    ANEU 2.6 05/10/2017     Lab Results   Component Value Date     05/10/2017      Lab Results   Component Value Date     05/10/2017                   Lab Results   Component Value Date    POTASSIUM 4.4 05/10/2017           No results found for: MAG         Lab Results   Component Value Date    CR 0.84 05/10/2017                   Lab Results   Component Value Date    BRITTNEY 9.1 05/10/2017                Lab Results   Component Value Date    BILITOTAL 0.5 05/10/2017           Lab Results   Component Value Date    ALBUMIN 3.4 05/10/2017                    Lab Results   Component Value Date    ALT 41 05/10/2017           Lab Results   Component Value Date    AST 29 05/10/2017     Results reviewed, labs MET treatment parameters, ok to proceed with treatment.      Post Infusion Assessment:  Patient tolerated infusion without incident.  Blood return noted pre and post infusion.  Prior to discharge: Port is secured in place with tegaderm and flushed with 10cc NS with positive blood return noted.  Continuous home infusion Dosi-Fuser pump connected.    All connectors secured in place and clamps taped open.    Pump started, \"running\" noted on display (CADD): Not Applicable.  Patient instructed to call our clinic or [facility] with any questions or concerns at home.  Patient verbalized understanding.    Patient set up for pump disconnect in [place] clinic on " 5/12/17 at 2pm.            Discharge Plan:   Patient will return 5/24/17 for next appointment.   Patient discharged in stable condition accompanied by: self, wife and friend.  Departure Mode: Ambulatory.    Michelle Fermin RN

## 2017-05-10 NOTE — NURSING NOTE
"Oncology Rooming Note    May 10, 2017 9:50 AM   Terry Yi is a 51 year old male who presents for:    Chief Complaint   Patient presents with     Oncology Clinic Visit     f/u prior to tx     Initial Vitals: Ht 1.8 m (5' 10.87\")  Wt 83.9 kg (185 lb)  BMI 25.9 kg/m2 Estimated body mass index is 25.9 kg/(m^2) as calculated from the following:    Height as of this encounter: 1.8 m (5' 10.87\").    Weight as of this encounter: 83.9 kg (185 lb). Body surface area is 2.05 meters squared.  Data Unavailable Comment: Data Unavailable   No LMP for male patient.  Allergies reviewed: Yes  Medications reviewed: Yes    Medications: Medication refills not needed today.  Pharmacy name entered into EPIC: Data Unavailable    Clinical concerns:    8 minutes for nursing intake (face to face time)     PAOLO PETTY LPN              "

## 2017-05-10 NOTE — MR AVS SNAPSHOT
After Visit Summary   5/10/2017    Terry Yi    MRN: [id number]           Patient Information     Date Of Birth          1965        Visit Information        Provider Department      5/10/2017 9:15 AM NURSE ONLY CANCER CENTER [facility]        Today's Diagnoses     Malignant neoplasm of overlapping sites of stomach (H)    -  1       Follow-ups after your visit        Your next 10 appointments already scheduled     May 10, 2017  9:45 AM CDT   Return Visit with Yakov Shaffer MD   [facility] ([facility])    [address] [facility] [address] [phone]            May 10, 2017 12:15 PM CDT   Level 4 with BAY  INFUSION   [facility] ([facility])    [address] [facility] [address] [phone]            May 24, 2017  9:00 AM CDT   Return Visit with NURSE ONLY CANCER CENTER   [facility] ([facility])    [address] [facility] [address] [phone]            May 24, 2017  9:45 AM CDT   Return Visit with Yakov Shaffer MD   [facility] ([facility])    [address] [facility] [address] [phone]            May 24, 2017 10:15 AM CDT   Level 4 with BAY 7 INFUSION   [facility] ([facility])    [address] [facility] [address] [phone]            May 24, 2017  2:15 PM CDT   New Visit with Rosa Banegas GC   [facility] ([facility])    [address] [facility] [address] [phone]              Who to contact     If you have questions or need follow up information about today's clinic visit or your schedule please contact [facility] directly at [phone].  Normal or non-critical lab and imaging results will be communicated to you by MyChart, letter or phone  within 4 business days after the clinic has received the results. If you do not hear from us within 7 days, please contact the clinic through Jelli or phone. If you have a critical or abnormal lab result, we will notify you by phone as soon as possible.  Submit refill requests through Jelli or call your pharmacy and they will forward the refill request to us. Please allow 3 business days for your refill to be completed.          Additional Information About Your Visit        [employer] Information     Jelli gives you secure access to your electronic health record. If you see a primary care provider, you can also send messages to your care team and make appointments. If you have questions, please call your primary care clinic.  If you do not have a primary care provider, please call [phone] and they will assist you.      Jelli is an electronic gateway that provides easy, online access to your medical records. With Jelli, you can request a clinic appointment, read your test results, renew a prescription or communicate with your care team.     To access your existing account, please contact your [facility] or call [phone] for assistance.        Care EveryWhere ID     This is your Care EveryWhere ID. This could be used by other organizations to access your [place] medical records  [id number]         Blood Pressure from Last 3 Encounters:   04/26/17 109/65   04/14/17 99/56   04/12/17 122/63    Weight from Last 3 Encounters:   04/26/17 86.6 kg (191 lb)   04/06/17 85.7 kg (189 lb)   03/29/17 84.8 kg (187 lb)              We Performed the Following     CBC with platelets differential     Central Venous Catheter: implanted port (Port-A-Cath, Power Port)     Comprehensive metabolic panel        Primary Care Provider Office Phone # Fax #    Sandra Dickerson [phone]       [facility]  [address]  [facility] [address]        Thank you!     Thank you  for choosing [facility]  for your care. Our goal is always to provide you with excellent care. Hearing back from our patients is one way we can continue to improve our services. Please take a few minutes to complete the written survey that you may receive in the mail after your visit with us. Thank you!             Your Updated Medication List - Protect others around you: Learn how to safely use, store and throw away your medicines at www.disposemymeds.org.          This list is accurate as of: 5/10/17  9:34 AM.  Always use your most recent med list.                   Brand Name Dispense Instructions for use    allopurinol 100 MG tablet    ZYLOPRIM         hydrochlorothiazide 12.5 MG capsule    MICROZIDE     Take 12.5 mg by mouth every morning Reported on 4/26/2017       lidocaine-prilocaine cream    EMLA         LISINOPRIL PO      Take 5 mg by mouth every morning Reported on 4/26/2017       LORazepam 0.5 MG tablet    ATIVAN    30 tablet    Take 1 tablet (0.5 mg) by mouth every 4 hours as needed (Anxiety, Nausea/Vomiting or Sleep)       ondansetron 8 MG tablet    ZOFRAN    10 tablet    Take 1 tablet (8 mg) by mouth every 8 hours as needed (Nausea/Vomiting)       prochlorperazine 10 MG tablet    COMPAZINE    30 tablet    Take 1 tablet (10 mg) by mouth every 6 hours as needed (Nausea/Vomiting)

## 2017-05-24 ENCOUNTER — ONCOLOGY VISIT (OUTPATIENT)
Dept: ONCOLOGY | Facility: CLINIC | Age: 52
End: 2017-05-24
Payer: COMMERCIAL

## 2017-05-24 ENCOUNTER — INFUSION THERAPY VISIT (OUTPATIENT)
Dept: INFUSION THERAPY | Facility: CLINIC | Age: 52
End: 2017-05-24
Payer: COMMERCIAL

## 2017-05-24 VITALS
DIASTOLIC BLOOD PRESSURE: 66 MMHG | SYSTOLIC BLOOD PRESSURE: 108 MMHG | HEIGHT: 71 IN | BODY MASS INDEX: 26.39 KG/M2 | WEIGHT: 188.5 LBS | HEART RATE: 96 BPM | RESPIRATION RATE: 15 BRPM | TEMPERATURE: 97 F

## 2017-05-24 DIAGNOSIS — C16.8 MALIGNANT NEOPLASM OF OVERLAPPING SITES OF STOMACH (H): Primary | ICD-10-CM

## 2017-05-24 DIAGNOSIS — Z80.0 FAMILY HISTORY OF COLON CANCER: ICD-10-CM

## 2017-05-24 LAB
ALBUMIN SERPL-MCNC: 3.4 G/DL (ref 3.4–5)
ALP SERPL-CCNC: 81 U/L (ref 40–150)
ALT SERPL W P-5'-P-CCNC: 39 U/L (ref 0–70)
ANION GAP SERPL CALCULATED.3IONS-SCNC: 6 MMOL/L (ref 3–14)
AST SERPL W P-5'-P-CCNC: 24 U/L (ref 0–45)
BASOPHILS # BLD AUTO: 0.1 10E9/L (ref 0–0.2)
BASOPHILS NFR BLD AUTO: 1.6 %
BILIRUB SERPL-MCNC: 0.3 MG/DL (ref 0.2–1.3)
BUN SERPL-MCNC: 14 MG/DL (ref 7–30)
CALCIUM SERPL-MCNC: 8.7 MG/DL (ref 8.5–10.1)
CHLORIDE SERPL-SCNC: 105 MMOL/L (ref 94–109)
CO2 SERPL-SCNC: 28 MMOL/L (ref 20–32)
CREAT SERPL-MCNC: 0.72 MG/DL (ref 0.66–1.25)
DIFFERENTIAL METHOD BLD: ABNORMAL
EOSINOPHIL # BLD AUTO: 0.3 10E9/L (ref 0–0.7)
EOSINOPHIL NFR BLD AUTO: 4.4 %
ERYTHROCYTE [DISTWIDTH] IN BLOOD BY AUTOMATED COUNT: 16 % (ref 10–15)
GFR SERPL CREATININE-BSD FRML MDRD: ABNORMAL ML/MIN/1.7M2
GLUCOSE SERPL-MCNC: 99 MG/DL (ref 70–99)
HCT VFR BLD AUTO: 37.6 % (ref 40–53)
HGB BLD-MCNC: 12.9 G/DL (ref 13.3–17.7)
LYMPHOCYTES # BLD AUTO: 1.7 10E9/L (ref 0.8–5.3)
LYMPHOCYTES NFR BLD AUTO: 30.4 %
MCH RBC QN AUTO: 30.1 PG (ref 26.5–33)
MCHC RBC AUTO-ENTMCNC: 34.3 G/DL (ref 31.5–36.5)
MCV RBC AUTO: 88 FL (ref 78–100)
MISCELLANEOUS TEST: NORMAL
MISCELLANEOUS TEST: NORMAL
MONOCYTES # BLD AUTO: 0.6 10E9/L (ref 0–1.3)
MONOCYTES NFR BLD AUTO: 10.8 %
NEUTROPHILS # BLD AUTO: 3 10E9/L (ref 1.6–8.3)
NEUTROPHILS NFR BLD AUTO: 52.8 %
PLATELET # BLD AUTO: 133 10E9/L (ref 150–450)
POTASSIUM SERPL-SCNC: 4.3 MMOL/L (ref 3.4–5.3)
PROT SERPL-MCNC: 6 G/DL (ref 6.8–8.8)
RBC # BLD AUTO: 4.28 10E12/L (ref 4.4–5.9)
SODIUM SERPL-SCNC: 139 MMOL/L (ref 133–144)
WBC # BLD AUTO: 5.7 10E9/L (ref 4–11)

## 2017-05-24 PROCEDURE — 99207 ZZC NO CHARGE NURSE ONLY: CPT

## 2017-05-24 PROCEDURE — 99214 OFFICE O/P EST MOD 30 MIN: CPT | Mod: 25 | Performed by: INTERNAL MEDICINE

## 2017-05-24 PROCEDURE — 96040 ZZC GENETIC COUNSELING, EACH 30 MIN: CPT | Performed by: GENETIC COUNSELOR, MS

## 2017-05-24 PROCEDURE — 96416 CHEMO PROLONG INFUSE W/PUMP: CPT | Performed by: INTERNAL MEDICINE

## 2017-05-24 PROCEDURE — 96375 TX/PRO/DX INJ NEW DRUG ADDON: CPT | Performed by: INTERNAL MEDICINE

## 2017-05-24 PROCEDURE — 80053 COMPREHEN METABOLIC PANEL: CPT | Performed by: INTERNAL MEDICINE

## 2017-05-24 PROCEDURE — 85025 COMPLETE CBC W/AUTO DIFF WBC: CPT | Performed by: INTERNAL MEDICINE

## 2017-05-24 PROCEDURE — 96411 CHEMO IV PUSH ADDL DRUG: CPT | Performed by: INTERNAL MEDICINE

## 2017-05-24 PROCEDURE — 96368 THER/DIAG CONCURRENT INF: CPT | Performed by: INTERNAL MEDICINE

## 2017-05-24 PROCEDURE — 96413 CHEMO IV INFUSION 1 HR: CPT | Performed by: INTERNAL MEDICINE

## 2017-05-24 PROCEDURE — 96415 CHEMO IV INFUSION ADDL HR: CPT | Performed by: INTERNAL MEDICINE

## 2017-05-24 PROCEDURE — 99207 ZZC NO CHARGE LOS: CPT

## 2017-05-24 RX ORDER — EPINEPHRINE 0.3 MG/.3ML
0.3 INJECTION SUBCUTANEOUS EVERY 5 MIN PRN
Status: CANCELLED | OUTPATIENT
Start: 2017-05-24

## 2017-05-24 RX ORDER — HEPARIN SODIUM (PORCINE) LOCK FLUSH IV SOLN 100 UNIT/ML 100 UNIT/ML
5 SOLUTION INTRAVENOUS
Status: DISCONTINUED | OUTPATIENT
Start: 2017-05-24 | End: 2017-05-24 | Stop reason: HOSPADM

## 2017-05-24 RX ORDER — ALBUTEROL SULFATE 0.83 MG/ML
2.5 SOLUTION RESPIRATORY (INHALATION)
Status: CANCELLED | OUTPATIENT
Start: 2017-05-24

## 2017-05-24 RX ORDER — DIPHENHYDRAMINE HYDROCHLORIDE 50 MG/ML
50 INJECTION INTRAMUSCULAR; INTRAVENOUS
Status: CANCELLED
Start: 2017-05-24

## 2017-05-24 RX ORDER — MEPERIDINE HYDROCHLORIDE 50 MG/ML
25 INJECTION INTRAMUSCULAR; INTRAVENOUS; SUBCUTANEOUS EVERY 30 MIN PRN
Status: CANCELLED | OUTPATIENT
Start: 2017-05-24

## 2017-05-24 RX ORDER — SODIUM CHLORIDE 9 MG/ML
1000 INJECTION, SOLUTION INTRAVENOUS CONTINUOUS PRN
Status: CANCELLED
Start: 2017-05-24

## 2017-05-24 RX ORDER — METHYLPREDNISOLONE SODIUM SUCCINATE 125 MG/2ML
125 INJECTION, POWDER, LYOPHILIZED, FOR SOLUTION INTRAMUSCULAR; INTRAVENOUS
Status: CANCELLED
Start: 2017-05-24

## 2017-05-24 RX ORDER — ASPIRIN 81 MG/1
81 TABLET, CHEWABLE ORAL DAILY
Status: ON HOLD | COMMUNITY
End: 2018-01-01

## 2017-05-24 RX ORDER — ALBUTEROL SULFATE 90 UG/1
1-2 AEROSOL, METERED RESPIRATORY (INHALATION)
Status: CANCELLED
Start: 2017-05-24

## 2017-05-24 RX ORDER — FLUOROURACIL 50 MG/ML
400 INJECTION, SOLUTION INTRAVENOUS ONCE
Status: COMPLETED | OUTPATIENT
Start: 2017-05-24 | End: 2017-05-24

## 2017-05-24 RX ORDER — EPINEPHRINE 1 MG/ML
0.3 INJECTION INTRAMUSCULAR; INTRAVENOUS; SUBCUTANEOUS EVERY 5 MIN PRN
Status: CANCELLED | OUTPATIENT
Start: 2017-05-24

## 2017-05-24 RX ORDER — LORAZEPAM 2 MG/ML
0.5 INJECTION INTRAMUSCULAR EVERY 4 HOURS PRN
Status: CANCELLED
Start: 2017-05-24

## 2017-05-24 RX ADMIN — FLUOROURACIL 830 MG: 50 INJECTION, SOLUTION INTRAVENOUS at 13:07

## 2017-05-24 ASSESSMENT — PAIN SCALES - GENERAL: PAINLEVEL: NO PAIN (0)

## 2017-05-24 NOTE — MR AVS SNAPSHOT
After Visit Summary   5/24/2017    Terry Yi    MRN: [id number]           Patient Information     Date Of Birth          1965        Visit Information        Provider Department      5/24/2017 10:15 AM BAY  INFUSION [facility]        Today's Diagnoses     Malignant neoplasm of overlapping sites of stomach (H)    -  1       Follow-ups after your visit        Your next 10 appointments already scheduled     Jun 07, 2017 10:30 AM CDT   Return Visit with NURSE ONLY CANCER CENTER   [facility] ([facility])    [address] [place] [address] [phone]            Jun 07, 2017 11:15 AM CDT   Return Visit with Yakov Shaffer MD   [facility] ([facility])    [address] [place] [id number] [phone]            Jun 07, 2017 12:30 PM CDT   Level 4 with [address])    [address] [place] [id number] [phone]              Who to contact     If you have questions or need follow up information about today's clinic visit or your schedule please contact [facility] directly at [phone].  Normal or non-critical lab and imaging results will be communicated to you by MyChart, letter or phone within 4 business days after the clinic has received the results. If you do not hear from us within 7 days, please contact the clinic through MyChart or phone. If you have a critical or abnormal lab result, we will notify you by phone as soon as possible.  Submit refill requests through L[employer] or call your pharmacy and they will forward the refill request to us. Please allow 3 business days for your refill to be completed.          Additional Information About Your Visit        [employer] Information     [employer] gives you secure access to your electronic health record. If you see a primary care  provider, you can also send messages to your care team and make appointments. If you have questions, please call your primary care clinic.  If you do not have a primary care provider, please call [phone] and they will assist you.      [employer] is an electronic gateway that provides easy, online access to your medical records. With [employer], you can request a clinic appointment, read your test results, renew a prescription or communicate with your care team.     To access your existing account, please contact your [facility] or call [phone] for assistance.        Care EveryWhere ID     This is your Care EveryWhere ID. This could be used by other organizations to access your [place] medical records  [id number]         Blood Pressure from Last 3 Encounters:   05/24/17 108/66   05/10/17 99/56   04/26/17 109/65    Weight from Last 3 Encounters:   05/24/17 85.5 kg (188 lb 8 oz)   05/10/17 83.9 kg (185 lb)   04/26/17 86.6 kg (191 lb)              Today, you had the following     No orders found for display       Primary Care Provider Office Phone # Fax #    Sandra Dickerson [phone]       [facility]  [address]  [facility] [address]        Thank you!     Thank you for choosing [facility]  for your care. Our goal is always to provide you with excellent care. Hearing back from our patients is one way we can continue to improve our services. Please take a few minutes to complete the written survey that you may receive in the mail after your visit with us. Thank you!             Your Updated Medication List - Protect others around you: Learn how to safely use, store and throw away your medicines at www.disposemymeds.org.          This list is accurate as of: 5/24/17  3:00 PM.  Always use your most recent med list.                   Brand Name Dispense Instructions for use    allopurinol 100 MG tablet    ZYLOPRIM         aspirin 81 MG  chewable tablet      Take 81 mg by mouth daily       lidocaine-prilocaine cream    EMLA         LORazepam 0.5 MG tablet    ATIVAN    30 tablet    Take 1 tablet (0.5 mg) by mouth every 4 hours as needed (Anxiety, Nausea/Vomiting or Sleep)       ondansetron 8 MG tablet    ZOFRAN    10 tablet    Take 1 tablet (8 mg) by mouth every 8 hours as needed (Nausea/Vomiting)       prochlorperazine 10 MG tablet    COMPAZINE    30 tablet    Take 1 tablet (10 mg) by mouth every 6 hours as needed (Nausea/Vomiting)

## 2017-05-24 NOTE — PROGRESS NOTES
"Infusion Nursing Note:  Terry Yi presents today for C4 D1 Oxaliplatin/Leucovorin/Fluorouracil bolus and pump connect  Patient seen by provider today: Yes: Dr Shaffer   present during visit today: Not Applicable.    Note: N/A.    Intravenous Access:  Implanted Port.    Treatment Conditions:  Lab Results   Component Value Date    HGB 12.9 05/24/2017     Lab Results   Component Value Date    WBC 5.7 05/24/2017      Lab Results   Component Value Date    ANEU 3.0 05/24/2017     Lab Results   Component Value Date     05/24/2017      Lab Results   Component Value Date     05/24/2017                   Lab Results   Component Value Date    POTASSIUM 4.3 05/24/2017           No results found for: MAG         Lab Results   Component Value Date    CR 0.72 05/24/2017                   Lab Results   Component Value Date    BRITTNEY 8.7 05/24/2017                Lab Results   Component Value Date    BILITOTAL 0.3 05/24/2017           Lab Results   Component Value Date    ALBUMIN 3.4 05/24/2017                    Lab Results   Component Value Date    ALT 39 05/24/2017           Lab Results   Component Value Date    AST 24 05/24/2017     Results reviewed, labs MET treatment parameters, ok to proceed with treatment.      Post Infusion Assessment:  Patient tolerated infusion without incident.  Prior to discharge: Port is secured in place with tegaderm and flushed with 10cc NS with positive blood return noted.  Continuous home infusion Dosi-Fuser pump connected.    All connectors secured in place and clamps taped open.    Pump started, \"running\" noted on display (CADD): Not Applicable.  Patient instructed to call our clinic or [facility] with any questions or concerns at home.  Patient verbalized understanding.    Patient set up for pump disconnect at the [facility] in [place] on Fridaay 5/26 at 1100 He makes his own appointment.      Discharge Plan:   Patient and/or family verbalized understanding of " discharge instructions and all questions answered.  Patient discharged in stable condition accompanied by: wife, son, daughter and friend.    Cheryl Osorio RN

## 2017-05-24 NOTE — LETTER
Cancer Risk Management  Program Locations    [facility]  [facility]  Mailing Address  Cancer Risk Management Program  [facility]  [address]  [place] [address]    New patient appointments  [phone]  May 26, 2017    Terry Mixon [address] [place] [address]      Dear Terry,    It was a pleasure meeting with you at the [facility] on 5/24/2017.  Here is a copy of the progress note from your recent genetic counseling visit to the Cancer Risk Management Program.  If you have any additional questions, please feel free to call.    Referring Provider: Yakov Shaffer MD    Presenting Information:   I met with Terry Yi today for genetic counseling at the Cancer Risk Management Program at the [facility] due to his recent diagnosis of diffuse gastric cancer.  He is here today to review this history and available genetic testing options.    Personal History:  Terry is a 51 year old male.  He was diagnosed with metastatic invasive gastric carcinoma (diffuse type) at age 51; treatment has included chemotherapy.      His most recent colonoscopies at age 48 and 51 were reportedly normal.  He does not regularly do any other cancer screening at this time.  Terry reported a past history of tobacco use and also has a previous history of alcohol abuse.    Family History: (Please see scanned pedigree for detailed family history information)    One maternal aunt was diagnosed with colon cancer in her 80's and passed away at 87    His paternal grandfather was reportedly diagnosed with skin cancer (neck) at age 56 and passed away at age 57.      He also has a paternal family history of skin cancer at older ages (including his father and two paternal uncles), reportedly due to sun  exposure.  His father reportedly has colonoscopies annually due to a history of fewer than 10 polyps.      His maternal ethnicity is [place]. His paternal ethnicity is [place]ese.  There is no known Ashkenazi Taoist ancestry on either side of his family.     Discussion:    Terry's personal history of diffuse gastric cancer may be suggestive of a possible hereditary cancer syndrome.    We reviewed the features of sporadic, familial, and hereditary cancers.  Terry's personal history of invasive gastric cancer, diffuse type, may be suggestive of a hereditary cancer syndrome caused by mutations in the CDH1 gene: Hereditary Diffuse Gastric Cancer.  With that being said, Terry's family history does not present with features consistent with Hereditary Diffuse Gastric Cancer, or any other currently identifiable hereditary cancer syndrome.  We discussed that the vast majority of cancers are considered sporadic and not primarily due to an inherited factor.  Individuals can develop cancer due to aging, chance events, environmental exposures or lifestyles.        We discussed the natural history and genetics of Hereditary Diffuse Gastric (HDGC) cancer, due to mutations in the CDH1 gene, as a possible genetic explanation for diffuse gastric cancer. HDGC is associated with increased risks for diffuse gastric cancer and lobular breast cancer. A detailed handout regarding HDGC and the information we discussed was provided to Terry at the end of our appointment today and can be found in the after visit summary.  Topics included: inheritance pattern, cancer risks, cancer screening recommendations, and also risks, benefits and limitations of testing.    We discussed that there are additional genes that could cause increased risk of stomach cancer.  As many of these genes present with overlapping features in a family, it would be reasonable for Terry to consider panel genetic testing to analyze multiple genes at once.   We discussed the option for more comprehensive panel genetic testing, due to his personal history of gastric cancer and family history of colon cancer and polyps.    Genetic testing is available for 17 genes associated with increased risk for colon cancer: ColoNext (APC, BMPR1A, CDH1, CHEK2, GREM1, EPCAM, MLH1, MSH2, MSH6, MUTYH, PMS2, POLD1, POLE, PTEN, SMAD4, STK11, and TP53).  Several of the genes on this panel are associated with increased risk for stomach cancer.    We discussed that some of the genes in the ColoNext panel are associated with specific hereditary cancer syndromes and have published management guidelines:  Luke syndrome (MLH1, MSH2, MSH6, PMS2, EPCAM), Familial Adenomatous Polyposis (APC), MUTYH Associated Polyposis (MUTYH), Juvenile Polyposis syndrome (SMAD4, BMPR1A), Peutz-Jeghers syndrome (STK11), Cowden syndrome (PTEN), Li Fraumeni syndrome (TP53), Hereditary Diffuse Gastric Cancer (CDH1).   The CHEK2, GREM1, POLD1, and POLE genes have also been associated with increased colon cancer risk and have published management guidelines.  Terry was provided with a detailed brochure from [employer] explaining the ColoNext testing.    Terry elected to pursue sequencing and deletion/duplication analysis of the CDH1 gene.  He was also interested in pursuing ColoNext genetic testing, if CDH1 analysis is negative/normal.  Therefore, sequential testing of CDH1 and the ColoNext panel was sent to [facility].        Consent was obtained and genetic testing for CDH1 and the ColoNext panel was sent to [facility]. Results for the CDH1 gene should be available in 3-4 weeks.  If negative, the results for the remaining genes should be available in an additional 3-4 weeks.      Medical Management: The information from genetic testing may determine additional cancer screening recommendations as well as options for risk reducing surgeries for Terry and his relatives.  These recommendations  will be discussed in detail once genetic testing is completed.    Plan:  1) Today Terry elected to proceed with CDH1 sequencing and deletion/duplication analysis, followed by ColoNext panel testing if negative through [facility].   2) This information should be available in 3-4 weeks.  3) Terry will return to clinic to discuss the results    Face to face time: 45 minutes    Rosa Banegas MS, [facility]  Certified Genetic Counselor  [phone]

## 2017-05-24 NOTE — MR AVS SNAPSHOT
After Visit Summary   5/24/2017    Terry Yi    MRN: [id number]           Patient Information     Date Of Birth          1965        Visit Information        Provider Department      5/24/2017 9:45 AM Yakov Shaffer MD [facility]        Today's Diagnoses     Malignant neoplasm of overlapping sites of stomach (H)    -  1      Care Instructions    Proceed to chemo    See me back in 2 weeks with labs and FOLFOX            Follow-ups after your visit        Your next 10 appointments already scheduled     May 24, 2017  2:15 PM CDT   [facility] Visit with Rosa Banegas [facility]   [facility]    [address] [place] [id number]   [phone]            Jun 07, 2017 10:30 AM CDT   Return Visit with NURSE ONLY CANCER CENTER   [facility]    [address] [place] [id number] [phone]            Jun 07, 2017 11:15 AM CDT   Return Visit with Yakov Shaffer MD   [facility])    [address] [place] [id number] [phone]            Jun 07, 2017 12:30 PM CDT   Level 4 with BAY 7 INFUSION   [facility])    [address] [place] [id number] [phone]              Who to contact     If you have questions or need follow up information about today's clinic visit or your schedule please contact [facility] directly at [phone].  Normal or non-critical lab and imaging results will be communicated to you by MyChart, letter or phone within 4 business days after the clinic has received the results. If you do not hear from us within 7 days, please contact the clinic through MyChart or phone. If you have a critical or abnormal lab result, we will notify you by phone as soon as possible.  Submit refill requests through [facility] or call your  "pharmacy and they will forward the refill request to us. Please allow 3 business days for your refill to be completed.          Additional Information About Your Visit        [facility] Information     [employer] gives you secure access to your electronic health record. If you see a primary care provider, you can also send messages to your care team and make appointments. If you have questions, please call your primary care clinic.  If you do not have a primary care provider, please call [phone] and they will assist you.      [employer] is an electronic gateway that provides easy, online access to your medical records. With [employer], you can request a clinic appointment, read your test results, renew a prescription or communicate with your care team.     To access your existing account, please contact your [facility] or call [phone] for assistance.        Care EveryWhere ID     This is your Care EveryWhere ID. This could be used by other organizations to access your [facility] medical records  [id number]        Your Vitals Were     Pulse Temperature Respirations Height BMI (Body Mass Index)       96 97  F (36.1  C) 15 1.8 m (5' 10.87\") 26.39 kg/m2        Blood Pressure from Last 3 Encounters:   05/24/17 108/66   05/10/17 99/56   04/26/17 109/65    Weight from Last 3 Encounters:   05/24/17 85.5 kg (188 lb 8 oz)   05/10/17 83.9 kg (185 lb)   04/26/17 86.6 kg (191 lb)              Today, you had the following     No orders found for display       Primary Care Provider Office Phone # Fax #    Sandra Dickerson [phone]       [facility]  [address]  [facility] [address]        Thank you!     Thank you for choosing [facility]  for your care. Our goal is always to provide you with excellent care. Hearing back from our patients is one way we can continue to improve our services. Please take a few minutes to complete the written survey that you may " receive in the mail after your visit with us. Thank you!             Your Updated Medication List - Protect others around you: Learn how to safely use, store and throw away your medicines at www.disposemymeds.org.          This list is accurate as of: 5/24/17 12:57 PM.  Always use your most recent med list.                   Brand Name Dispense Instructions for use    allopurinol 100 MG tablet    ZYLOPRIM         aspirin 81 MG chewable tablet      Take 81 mg by mouth daily       lidocaine-prilocaine cream    EMLA         LORazepam 0.5 MG tablet    ATIVAN    30 tablet    Take 1 tablet (0.5 mg) by mouth every 4 hours as needed (Anxiety, Nausea/Vomiting or Sleep)       ondansetron 8 MG tablet    ZOFRAN    10 tablet    Take 1 tablet (8 mg) by mouth every 8 hours as needed (Nausea/Vomiting)       prochlorperazine 10 MG tablet    COMPAZINE    30 tablet    Take 1 tablet (10 mg) by mouth every 6 hours as needed (Nausea/Vomiting)

## 2017-05-24 NOTE — PROGRESS NOTES
5/24/2017    Referring Provider: Yakov Shaffer MD    Presenting Information:   I met with Terry Yi today for genetic counseling at the Cancer Risk Management Program at the [facility] due to his recent diagnosis of diffuse gastric cancer.  He is here today to review this history and available genetic testing options.    Personal History:  Terry is a 51 year old male.  He was diagnosed with metastatic invasive gastric carcinoma (diffuse type) at age 51; treatment has included chemotherapy.      His most recent colonoscopies at age 48 and 51 were reportedly normal.  He does not regularly do any other cancer screening at this time.  Terry reported a past history of tobacco use and also has a previous history of alcohol abuse.    Family History: (Please see scanned pedigree for detailed family history information)    One maternal aunt was diagnosed with colon cancer in her 80's and passed away at 87    His paternal grandfather was reportedly diagnosed with skin cancer (neck) at age 56 and passed away at age 57.      He also has a paternal family history of skin cancer at older ages (including his father and two paternal uncles), reportedly due to sun exposure.  His father reportedly has colonoscopies annually due to a history of fewer than 10 polyps.      His maternal ethnicity is [place]divian. His paternal ethnicity is [place]divian/[place]an/[place]ese.  There is no known Ashkenazi Sabianism ancestry on either side of his family.     Discussion:    Terry's personal history of diffuse gastric cancer may be suggestive of a possible hereditary cancer syndrome.    We reviewed the features of sporadic, familial, and hereditary cancers.  Terry's personal history of invasive gastric cancer, diffuse type, may be suggestive of a hereditary cancer syndrome caused by mutations in the CDH1 gene: Hereditary Diffuse Gastric Cancer.  With that being said, Terry's family history does not present with features consistent with  Hereditary Diffuse Gastric Cancer, or any other currently identifiable hereditary cancer syndrome.  We discussed that the vast majority of cancers are considered sporadic and not primarily due to an inherited factor.  Individuals can develop cancer due to aging, chance events, environmental exposures or lifestyles.        We discussed the natural history and genetics of Hereditary Diffuse Gastric (HDGC) cancer, due to mutations in the CDH1 gene, as a possible genetic explanation for diffuse gastric cancer. HDGC is associated with increased risks for diffuse gastric cancer and lobular breast cancer. A detailed handout regarding HDGC and the information we discussed was provided to Terry at the end of our appointment today and can be found in the after visit summary.  Topics included: inheritance pattern, cancer risks, cancer screening recommendations, and also risks, benefits and limitations of testing.    We discussed that there are additional genes that could cause increased risk of stomach cancer.  As many of these genes present with overlapping features in a family, it would be reasonable for Terry to consider panel genetic testing to analyze multiple genes at once.  We discussed the option for more comprehensive panel genetic testing, due to his personal history of gastric cancer and family history of colon cancer and polyps.    Genetic testing is available for 17 genes associated with increased risk for colon cancer: ColoNext (APC, BMPR1A, CDH1, CHEK2, GREM1, EPCAM, MLH1, MSH2, MSH6, MUTYH, PMS2, POLD1, POLE, PTEN, SMAD4, STK11, and TP53).  Several of the genes on this panel are associated with increased risk for stomach cancer.    We discussed that some of the genes in the ColoNext panel are associated with specific hereditary cancer syndromes and have published management guidelines:  Luke syndrome (MLH1, MSH2, MSH6, PMS2, EPCAM), Familial Adenomatous Polyposis (APC), MUTYH Associated Polyposis (MUTYH),  Juvenile Polyposis syndrome (SMAD4, BMPR1A), Peutz-Jeghers syndrome (STK11), Cowden syndrome (PTEN), Li Fraumeni syndrome (TP53), Hereditary Diffuse Gastric Cancer (CDH1).   The CHEK2, GREM1, POLD1, and POLE genes have also been associated with increased colon cancer risk and have published management guidelines.  Terry was provided with a detailed brochure from [employer] explaining the ColoNext testing.    Terry elected to pursue sequencing and deletion/duplication analysis of the CDH1 gene.  He was also interested in pursuing ColoNext genetic testing, if CDH1 analysis is negative/normal.  Therefore, sequential testing of CDH1 and the ColoNext panel was sent to [facility].        Consent was obtained and genetic testing for CDH1 and the ColoNext panel was sent to [facility]. Results for the CDH1 gene should be available in 3-4 weeks.  If negative, the results for the remaining genes should be available in an additional 3-4 weeks.      Medical Management: The information from genetic testing may determine additional cancer screening recommendations as well as options for risk reducing surgeries for Terry and his relatives.  These recommendations will be discussed in detail once genetic testing is completed.    Plan:  1) Today Terry elected to proceed with CDH1 sequencing and deletion/duplication analysis, followed by ColoNext panel testing if negative through [facility] [facility].   2) This information should be available in 3-4 weeks.  3) Terry will return to clinic to discuss the results    Face to face time: 45 minutes    Rosa Banegas MS, [facility] [place]  Certified Genetic Counselor  [phone]

## 2017-05-24 NOTE — MR AVS SNAPSHOT
After Visit Summary   5/24/2017    Terry Yi    MRN: [id number]           Patient Information     Date Of Birth          1965        Visit Information        Provider Department      5/24/2017 2:15 PM Rosa Banegas GC [facility]        Today's Diagnoses     Malignant neoplasm of overlapping sites of stomach (H)    -  1    Family history of colon cancer          Care Instructions        Assessing Cancer Risk  Only about 5-10% of cancers are thought to be due to an inherited cancer susceptibility gene.    These families often have:    Several people with the same or related types of cancer    Cancers diagnosed at a young age (before age 50)    Individuals with more than one primary cancer    Multiple generations of the family affected with cancer    Hereditary Diffuse Gastric Cancer (HDGC)  We each inherit two copies of every gene in our bodies: one from our mother and one from our father.  Each gene has a specific job to do.  When a gene has a mistake or  mutation  in it, it does not work like it should. Currently, one gene is known to cause hereditary diffuse gastric cancer: CDH1.  Of people diagnosed with HDGC, 30-50% have a mutation in the CDH1 gene.  This suggests there are likely other genes that may cause HDGC that have not been identified yet.  Individuals with HDGC are at increased risk for diffuse gastric cancer and lobular breast cancer. The table below lists the chance that someone with a CDH1 mutation would develop cancer in his or her lifetime3.      Lifetime Cancer Risks    General Population HDGC    Diffuse Gastric  <1% 67-83%   Breast 12% 39-52%     Inheriting a mutation does not mean a person will develop cancer, but it does significantly increase his or her risk above the general population risk.    Inheritance   CDH1 mutations are inherited in an autosomal dominant pattern.  This means that if a parent has a mutation, each of his or her children will have a  50% chance of inheriting that same mutation.  Therefore, each child--male or female--would have a 50% chance of being at increased risk for developing cancer.          Image obtained from Genetics Home Reference, 2013     Genetic Testing  Genetic testing involves a blood test and will look at the genetic information in the CDH1 for any harmful mutations that are associated with increased cancer risk.  If possible, it is recommended that the person(s) who has had cancer be tested first before other family members.  That person will give us the most useful information about whether or not a specific gene is associated with the cancer in the family.    Results  There are three possible results of CDH1 genetic testing:    Positive--a harmful mutation was identified     Negative--no mutation was identified     Variant of unknown significance--a variation in the CDH1 gene was identified, but it is unclear how this impacts cancer risk in the family    Advantages and Disadvantages  There are advantages and disadvantages to genetic testing of CDH1.    Advantages    May clarify your cancer risk    Can help you make medical decisions    May explain the cancers in your family    May give useful information to your family members (if you share your results)    Disadvantages    Possible negative emotional impact of learning about inherited cancer risk    Uncertainty in interpreting a negative test result in some situations    Possible genetic discrimination concerns (see below)    Genetic Information Nondiscrimination Act (ANAID)  ANAID is a federal law that protects individuals from health insurance or employment discrimination based on a genetic test result alone.  Although rare, there are currently no legal protections in terms of life insurance, long term care, or disability insurances.  Visit the National Human Genome Research [place] genome.gov/63505638 to learn more.    Reducing Cancer Risk  Current screening  recommendations for people with hereditary diffuse gastric cancer include1,2,3:    Stomach:   o H. Pylori test at diagnosis; treat if positive1  o Upper endoscopy (EGD) with multiple random biopsies at diagnosis; repeated every 6-12 months  o Recommend preventative gastrectomy between age 18-40.  Upper endoscopy with multiple random biopsies should be done prior to surgery.     Breast (same as BRCA carriers):  o Breast self-exams starting at age 18; monthly  o Clinical breast exams starting at age 25; repeated every 6-12 months  o Annual breast MRI starting at age 25   o Annual mammogram and breast MRI at age 30 (for women with and without a breast cancer history)  o Consideration of preventative mastectomy    Colorectal: Colonoscopy beginning at age 40; repeated every 3-5 years1    Questions to Think About Regarding Genetic Testing    What effect will the test result have on me and my relationship with my family members if I have an inherited gene mutation?  If I don t have a gene mutation?    Should I share my test results, and how will my family react to this news, which may also affect them?    Are my children ready to learn new information that may one day affect their own health?    Resources  No stomach for cancer, Inc. nostomachforcancer.[email or web address]   Stomach cancer relief network scrnet.org   American Cancer Society (ACS) cancer.org   National Cancer [place] (NCI) cancer.gov     Please call us if you have any questions or concerns.        Cancer Risk Management Program [phone] ([phone])    ? Rema Alvarez, MS, [facility]  [phone]  ? Karla Wiseman, MS, [facility]  [phone]  ? Kallie Nevarez, MS, [facility]  [phone]  ? Rosa Banegas, MS, [facility]  [phone]    References  1. Bari ORTIZ, Warren R, Carlos D, Nay F, Fermin P, Pk V, Fam DC, Satish J, Leon K, Van [place]ne FALLON, Nish S, Rola C; International Gastric Cancer Linkage Consortium. Hereditary diffuse gastric cancer: updated  consensus guidelines for clinical management and directions for future research. 2010.   2. National Comprehensive Cancer Network. Clinical practice guidelines in oncology, Gastric Cancer. Available online (registration required). 2015.  3. National Comprehensive Cancer Network. Clinical practice guidelines in oncology, genetic/familial high-risk assessment: breast and ovarian. Available online (registration required). 2015.            Follow-ups after your visit        Your next 10 appointments already scheduled     Jun 07, 2017 10:30 AM CDT   Return Visit with [facility] [place] [facility]   [facility])    [address] [place] [address] [id number] [phone]            Jun 07, 2017 11:15 AM CDT   Return Visit with Yakov Shaffer MD   [facility])    [address] [place] [address] [id number] [phone]            Jun 07, 2017 12:30 PM CDT   Level 4 with BAY 7 INFUSION   [facility])    [address] [place] [address] [id number] [phone]              Who to contact     If you have questions or need follow up information about today's clinic visit or your schedule please contact [facility] directly at [phone].  Normal or non-critical lab and imaging results will be communicated to you by MyChart, letter or phone within 4 business days after the clinic has received the results. If you do not hear from us within 7 days, please contact the clinic through MyChart or phone. If you have a critical or abnormal lab result, we will notify you by phone as soon as possible.  Submit refill requests through [employer] or call your pharmacy and they will forward the refill request to us. Please allow 3 business days for your refill to be completed.          Additional Information About Your Visit        [employer] Information     [employer] gives you  secure access to your electronic health record. If you see a primary care provider, you can also send messages to your care team and make appointments. If you have questions, please call your primary care clinic.  If you do not have a primary care provider, please call [phone] and they will assist you.      [employer] is an electronic gateway that provides easy, online access to your medical records. With [employer], you can request a clinic appointment, read your test results, renew a prescription or communicate with your care team.     To access your existing account, please contact your [facility] or call [phone] for assistance.        Care EveryWhere ID     This is your Care EveryWhere ID. This could be used by other organizations to access your [place] medical records  [id number]         Blood Pressure from Last 3 Encounters:   05/24/17 108/66   05/10/17 99/56   04/26/17 109/65    Weight from Last 3 Encounters:   05/24/17 85.5 kg (188 lb 8 oz)   05/10/17 83.9 kg (185 lb)   04/26/17 86.6 kg (191 lb)               Primary Care Provider Office Phone # Fax #    Sandra Dickerson [phone]       [facility]  [address]  [facility] [address]        Thank you!     Thank you for choosing [facility]  for your care. Our goal is always to provide you with excellent care. Hearing back from our patients is one way we can continue to improve our services. Please take a few minutes to complete the written survey that you may receive in the mail after your visit with us. Thank you!             Your Updated Medication List - Protect others around you: Learn how to safely use, store and throw away your medicines at www.disposemymeds.org.          This list is accurate as of: 5/24/17  3:16 PM.  Always use your most recent med list.                   Brand Name Dispense Instructions for use    allopurinol 100 MG tablet    ZYLOPRIM         aspirin 81  MG chewable tablet      Take 81 mg by mouth daily       lidocaine-prilocaine cream    EMLA         LORazepam 0.5 MG tablet    ATIVAN    30 tablet    Take 1 tablet (0.5 mg) by mouth every 4 hours as needed (Anxiety, Nausea/Vomiting or Sleep)       ondansetron 8 MG tablet    ZOFRAN    10 tablet    Take 1 tablet (8 mg) by mouth every 8 hours as needed (Nausea/Vomiting)       prochlorperazine 10 MG tablet    COMPAZINE    30 tablet    Take 1 tablet (10 mg) by mouth every 6 hours as needed (Nausea/Vomiting)

## 2017-05-24 NOTE — MR AVS SNAPSHOT
After Visit Summary   5/24/2017    Terry Yi    MRN: [id number]           Patient Information     Date Of Birth          1965        Visit Information        Provider Department      5/24/2017 9:00 AM [facility]        Today's Diagnoses     Malignant neoplasm of overlapping sites of stomach (H)    -  1       Follow-ups after your visit        Your next 10 appointments already scheduled     May 24, 2017  9:45 AM CDT   Return Visit with Yakov Shaffer MD   [facility])    [address] [place] [id number] [phone]            May 24, 2017 10:15 AM CDT   Level 4 with BAY 7 INFUSION   [facility])    [address] [place] [id number] [phone]            May 24, 2017  2:15 PM CDT   New Visit with Roas Banegas GC   [facility])    [address] [place] [id number] [phone]              Who to contact     If you have questions or need follow up information about today's clinic visit or your schedule please contact [facility] directly at [phone].  Normal or non-critical lab and imaging results will be communicated to you by MyChart, letter or phone within 4 business days after the clinic has received the results. If you do not hear from us within 7 days, please contact the clinic through MyChart or phone. If you have a critical or abnormal lab result, we will notify you by phone as soon as possible.  Submit refill requests through [employer] or call your pharmacy and they will forward the refill request to us. Please allow 3 business days for your refill to be completed.          Additional Information About Your Visit        [employer] Information     [employer] gives you secure access to your electronic health record. If you see a primary care  provider, you can also send messages to your care team and make appointments. If you have questions, please call your primary care clinic.  If you do not have a primary care provider, please call [phone] and they will assist you.      [employer] is an electronic gateway that provides easy, online access to your medical records. With [employer], you can request a clinic appointment, read your test results, renew a prescription or communicate with your care team.     To access your existing account, please contact your [facility] or call [phone] for assistance.        Care EveryWhere ID     This is your Care EveryWhere ID. This could be used by other organizations to access your [place] medical records  [id number]         Blood Pressure from Last 3 Encounters:   05/10/17 99/56   04/26/17 109/65   04/14/17 99/56    Weight from Last 3 Encounters:   05/10/17 83.9 kg (185 lb)   04/26/17 86.6 kg (191 lb)   04/06/17 85.7 kg (189 lb)              We Performed the Following     CBC with platelets differential     Comprehensive metabolic panel        Primary Care Provider Office Phone # Fax #    Sandra Dickerson [phone]       [facility]  [address]  [facility] [address]        Thank you!     Thank you for choosing [facility]  for your care. Our goal is always to provide you with excellent care. Hearing back from our patients is one way we can continue to improve our services. Please take a few minutes to complete the written survey that you may receive in the mail after your visit with us. Thank you!             Your Updated Medication List - Protect others around you: Learn how to safely use, store and throw away your medicines at www.disposemymeds.org.          This list is accurate as of: 5/24/17  9:15 AM.  Always use your most recent med list.                   Brand Name Dispense Instructions for use    allopurinol 100 MG tablet     ZYLOPRIM         aspirin 81 MG chewable tablet      Take 81 mg by mouth daily       lidocaine-prilocaine cream    EMLA         LORazepam 0.5 MG tablet    ATIVAN    30 tablet    Take 1 tablet (0.5 mg) by mouth every 4 hours as needed (Anxiety, Nausea/Vomiting or Sleep)       ondansetron 8 MG tablet    ZOFRAN    10 tablet    Take 1 tablet (8 mg) by mouth every 8 hours as needed (Nausea/Vomiting)       prochlorperazine 10 MG tablet    COMPAZINE    30 tablet    Take 1 tablet (10 mg) by mouth every 6 hours as needed (Nausea/Vomiting)

## 2017-05-24 NOTE — NURSING NOTE
"Oncology Rooming Note    May 24, 2017 9:11 AM   Terry Yi is a 51 year old male who presents for:    Chief Complaint   Patient presents with     Oncology Clinic Visit     follow up     Initial Vitals: There were no vitals taken for this visit. Estimated body mass index is 25.9 kg/(m^2) as calculated from the following:    Height as of 5/10/17: 1.8 m (5' 10.87\").    Weight as of 5/10/17: 83.9 kg (185 lb). There is no height or weight on file to calculate BSA.  Data Unavailable Comment: Data Unavailable   No LMP for male patient.  Allergies reviewed: Yes  Medications reviewed: Yes    Medications: Medication refills not needed today.  Pharmacy name entered into EPIC: Data Unavailable        5 minutes for nursing intake (face to face time)     Katja Lopez LPN              "

## 2017-05-24 NOTE — PATIENT INSTRUCTIONS
Assessing Cancer Risk  Only about 5-10% of cancers are thought to be due to an inherited cancer susceptibility gene.    These families often have:    Several people with the same or related types of cancer    Cancers diagnosed at a young age (before age 50)    Individuals with more than one primary cancer    Multiple generations of the family affected with cancer    Hereditary Diffuse Gastric Cancer (HDGC)  We each inherit two copies of every gene in our bodies: one from our mother and one from our father.  Each gene has a specific job to do.  When a gene has a mistake or  mutation  in it, it does not work like it should. Currently, one gene is known to cause hereditary diffuse gastric cancer: CDH1.  Of people diagnosed with HDGC, 30-50% have a mutation in the CDH1 gene.  This suggests there are likely other genes that may cause HDGC that have not been identified yet.  Individuals with HDGC are at increased risk for diffuse gastric cancer and lobular breast cancer. The table below lists the chance that someone with a CDH1 mutation would develop cancer in his or her lifetime3.      Lifetime Cancer Risks    General Population HDGC    Diffuse Gastric  <1% 67-83%   Breast 12% 39-52%     Inheriting a mutation does not mean a person will develop cancer, but it does significantly increase his or her risk above the general population risk.    Inheritance   CDH1 mutations are inherited in an autosomal dominant pattern.  This means that if a parent has a mutation, each of his or her children will have a 50% chance of inheriting that same mutation.  Therefore, each child--male or female--would have a 50% chance of being at increased risk for developing cancer.          Image obtained from [employer] Home Reference, 2013     Genetic Testing  Genetic testing involves a blood test and will look at the genetic information in the CDH1 for any harmful mutations that are associated with increased cancer risk.  If possible, it is  recommended that the person(s) who has had cancer be tested first before other family members.  That person will give us the most useful information about whether or not a specific gene is associated with the cancer in the family.    Results  There are three possible results of CDH1 genetic testing:    Positive--a harmful mutation was identified     Negative--no mutation was identified     Variant of unknown significance--a variation in the CDH1 gene was identified, but it is unclear how this impacts cancer risk in the family    Advantages and Disadvantages  There are advantages and disadvantages to genetic testing of CDH1.    Advantages    May clarify your cancer risk    Can help you make medical decisions    May explain the cancers in your family    May give useful information to your family members (if you share your results)    Disadvantages    Possible negative emotional impact of learning about inherited cancer risk    Uncertainty in interpreting a negative test result in some situations    Possible genetic discrimination concerns (see below)    Genetic Information Nondiscrimination Act (ANAID)  ANAID is a federal law that protects individuals from health insurance or employment discrimination based on a genetic test result alone.  Although rare, there are currently no legal protections in terms of life insurance, long term care, or disability insurances.  Visit the National Human Genome Research [place] genome.gov/81911295 to learn more.    Reducing Cancer Risk  Current screening recommendations for people with hereditary diffuse gastric cancer include1,2,3:    Stomach:   o H. Pylori test at diagnosis; treat if positive1  o Upper endoscopy (EGD) with multiple random biopsies at diagnosis; repeated every 6-12 months  o Recommend preventative gastrectomy between age 18-40.  Upper endoscopy with multiple random biopsies should be done prior to surgery.     Breast (same as BRCA carriers):  o Breast self-exams  starting at age 18; monthly  o Clinical breast exams starting at age 25; repeated every 6-12 months  o Annual breast MRI starting at age 25   o Annual mammogram and breast MRI at age 30 (for women with and without a breast cancer history)  o Consideration of preventative mastectomy    Colorectal: Colonoscopy beginning at age 40; repeated every 3-5 years1    Questions to Think About Regarding Genetic Testing    What effect will the test result have on me and my relationship with my family members if I have an inherited gene mutation?  If I don t have a gene mutation?    Should I share my test results, and how will my family react to this news, which may also affect them?    Are my children ready to learn new information that may one day affect their own health?    Resources  No stomach for cancer, Inc. nostomachforcancer.org   Stomach cancer relief network scrnet.org   American Cancer Society (ACS) cancer.org   National Cancer [place] (NCI) cancer.gov     Please call us if you have any questions or concerns.        Cancer Risk Management Program [phone] ([phone])    ? Rema Kim, MS, [facility]  [phone]  ? Karla Ivone, MS, [facility]  [phone]  ? Kallie Nevarez, MS, [facility]  [phone]  ? Rosa Banegas, MS, [facility]  [phone]    References  1. Bari ORTIZ, Warren R, Carlos D, Nay F, Fermin P, Pk V, Fam DC, Satish J, Leon K, Van Lynne JH, Nish S, Rola C; International Gastric Cancer Linkage Consortium. Hereditary diffuse gastric cancer: updated consensus guidelines for clinical management and directions for future research. 2010.   2. National Comprehensive Cancer Network. Clinical practice guidelines in oncology, Gastric Cancer. Available online (registration required). 2015.  3. National Comprehensive Cancer Network. Clinical practice guidelines in oncology, genetic/familial high-risk assessment: breast and ovarian. Available online (registration required). 2015.

## 2017-05-25 NOTE — PROGRESS NOTES
REASON FOR VISIT:  Follow-up for unresectable metastatic gastric carcinoma, diffuse type, grade 3, HER-2 negative.      HISTORY OF PRESENT ILLNESS:  Please see my previous note for details.  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  Up to now, he has received three cycles of FOLFOX; the last one was on 05/10/2017.      INTERVAL HISTORY:  He comes in today for follow-up, accompanied by his family members.  He tells me that he tolerated chemotherapy very well.  He did have mild cold sensitivity which resolved in a few days.  He also felt somewhat different in the bottom of the feet, as if his feet were swollen, but they were not swollen.  That feeling also lasted a few days after chemotherapy but then it resolved.  Denies any lingering neuropathy to me.  He denies any nausea or vomiting at this time.  He was able to eat and drink well and actually gained a few pounds from last visit.  He also tells me he did not have any infections No new lumps, bumps or swellings.  He denies any pain.  No trouble with diarrhea or constipation.  His energy continues to be good and he is working full-time.      REVIEW OF SYSTEMS:  Otherwise, a comprehensive review of systems was performed and it was negative.      PAST MEDICAL HISTORY:  I reviewed his other history in Epic.      MEDICATIONS:  Reviewed in Epic.      PHYSICAL EXAMINATION:   VITAL SIGNS:  His vital signs are reviewed and they are stable.   CONSTITUTIONAL:  He is a middle-age gentleman in no acute distress.   EYES:  Pupils are equal, reactive to light and accommodation.  No pallor, no icterus.   ENT:  Ears are normal.  No oral lesions.   CARDIOVASCULAR:  S1-S2 is audible and normal.   CHEST:  Clear.   GASTROINTESTINAL:  Abdomen is benign.   NEUROLOGIC:  Alert,  oriented x3.  Nonfocal neuro exam.   INTEGUMENT:  No concerning skin rashes.   LYMPHATIC:  No palpable lymph nodes.   EXTREMITIES:  No pedal edema.   PSYCHIATRIC:  Mood and affect are appropriate.      LABS:  Reviewed and they are stable.      ASSESSMENT AND PLAN:  Metastatic gastric cancer presenting with early satiety and significant weight loss and epigastric discomfort upon eating with some vomiting.  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  There is an indeterminate right liver lobe lesion.  We are giving him palliative FOLFOX.  He has been on it since 04/12/2017, and has received 3 cycles and is tolerating it well.  We will proceed with cycle #4 today.  My plan would be to give him at least 1-2 additional cycles before reimaging.      He does have mild cold sensitivity and mild neuropathy manifesting as some altered sensation in the bottom of the feet, but this is not lingering, and at this time he is asymptomatic.  We will keep a watch on that.  I told him to make sure that he keeps himself warm to prevent the discomfort from cold sensitivity due to oxaliplatin.      I encouraged him to keep himself active and exercise regularly.  He is trying to do that.      Previously, he did lose some weight, but this time he gained it back.  I encouraged him to eat some frequent meals rather than three big meals.  I encouraged him to pay good attention to his nutrition and healthy eating.  If need-be, we can refer him to a nutritionist in the future, but at this time it is not needed.      Because of the history of gastric cancer, he is going to meet with genetic counselor today.      I would like to see him back in two weeks for the next FOLFOX cycle.      All of his and family's questions were answered to their satisfaction.  They are agreeable and comfortable with this plan.         LANEY HERNANDEZ MD             D:  2017 09:54   T: 2017 07:06   MT: ELINOR#[id number]      Name:     LEONOR RALPH   MRN:      [id number]        Account:      [id number]   :      1965           Visit Date:   2017      Document: [id number]       cc: Sandra Dickerson NP

## 2017-06-07 ENCOUNTER — ONCOLOGY VISIT (OUTPATIENT)
Dept: ONCOLOGY | Facility: CLINIC | Age: 52
End: 2017-06-07
Payer: COMMERCIAL

## 2017-06-07 ENCOUNTER — INFUSION THERAPY VISIT (OUTPATIENT)
Dept: INFUSION THERAPY | Facility: CLINIC | Age: 52
End: 2017-06-07
Payer: COMMERCIAL

## 2017-06-07 VITALS
BODY MASS INDEX: 26.18 KG/M2 | OXYGEN SATURATION: 97 % | SYSTOLIC BLOOD PRESSURE: 117 MMHG | TEMPERATURE: 97.7 F | HEART RATE: 64 BPM | HEIGHT: 71 IN | WEIGHT: 187 LBS | RESPIRATION RATE: 20 BRPM | DIASTOLIC BLOOD PRESSURE: 72 MMHG

## 2017-06-07 DIAGNOSIS — C16.8 MALIGNANT NEOPLASM OF OVERLAPPING SITES OF STOMACH (H): Primary | ICD-10-CM

## 2017-06-07 LAB
ALBUMIN SERPL-MCNC: 3.4 G/DL (ref 3.4–5)
ALP SERPL-CCNC: 78 U/L (ref 40–150)
ALT SERPL W P-5'-P-CCNC: 45 U/L (ref 0–70)
ANION GAP SERPL CALCULATED.3IONS-SCNC: 3 MMOL/L (ref 3–14)
AST SERPL W P-5'-P-CCNC: 30 U/L (ref 0–45)
BASOPHILS # BLD AUTO: 0 10E9/L (ref 0–0.2)
BASOPHILS NFR BLD AUTO: 0.6 %
BILIRUB SERPL-MCNC: 0.3 MG/DL (ref 0.2–1.3)
BUN SERPL-MCNC: 17 MG/DL (ref 7–30)
CALCIUM SERPL-MCNC: 9.1 MG/DL (ref 8.5–10.1)
CHLORIDE SERPL-SCNC: 107 MMOL/L (ref 94–109)
CO2 SERPL-SCNC: 30 MMOL/L (ref 20–32)
CREAT SERPL-MCNC: 0.78 MG/DL (ref 0.66–1.25)
DIFFERENTIAL METHOD BLD: ABNORMAL
EOSINOPHIL # BLD AUTO: 0.2 10E9/L (ref 0–0.7)
EOSINOPHIL NFR BLD AUTO: 4.4 %
ERYTHROCYTE [DISTWIDTH] IN BLOOD BY AUTOMATED COUNT: 16.6 % (ref 10–15)
GFR SERPL CREATININE-BSD FRML MDRD: ABNORMAL ML/MIN/1.7M2
GLUCOSE SERPL-MCNC: 102 MG/DL (ref 70–99)
HCT VFR BLD AUTO: 37.6 % (ref 40–53)
HGB BLD-MCNC: 12.9 G/DL (ref 13.3–17.7)
LYMPHOCYTES # BLD AUTO: 2.1 10E9/L (ref 0.8–5.3)
LYMPHOCYTES NFR BLD AUTO: 40.1 %
MCH RBC QN AUTO: 30.4 PG (ref 26.5–33)
MCHC RBC AUTO-ENTMCNC: 34.3 G/DL (ref 31.5–36.5)
MCV RBC AUTO: 89 FL (ref 78–100)
MONOCYTES # BLD AUTO: 0.6 10E9/L (ref 0–1.3)
MONOCYTES NFR BLD AUTO: 11.3 %
NEUTROPHILS # BLD AUTO: 2.3 10E9/L (ref 1.6–8.3)
NEUTROPHILS NFR BLD AUTO: 43.6 %
PLATELET # BLD AUTO: 147 10E9/L (ref 150–450)
POTASSIUM SERPL-SCNC: 4.2 MMOL/L (ref 3.4–5.3)
PROT SERPL-MCNC: 6.2 G/DL (ref 6.8–8.8)
RBC # BLD AUTO: 4.24 10E12/L (ref 4.4–5.9)
SODIUM SERPL-SCNC: 140 MMOL/L (ref 133–144)
WBC # BLD AUTO: 5.2 10E9/L (ref 4–11)

## 2017-06-07 PROCEDURE — 96413 CHEMO IV INFUSION 1 HR: CPT | Performed by: INTERNAL MEDICINE

## 2017-06-07 PROCEDURE — 96415 CHEMO IV INFUSION ADDL HR: CPT | Performed by: INTERNAL MEDICINE

## 2017-06-07 PROCEDURE — 96411 CHEMO IV PUSH ADDL DRUG: CPT | Performed by: INTERNAL MEDICINE

## 2017-06-07 PROCEDURE — 96368 THER/DIAG CONCURRENT INF: CPT | Performed by: INTERNAL MEDICINE

## 2017-06-07 PROCEDURE — 85025 COMPLETE CBC W/AUTO DIFF WBC: CPT | Performed by: INTERNAL MEDICINE

## 2017-06-07 PROCEDURE — 99214 OFFICE O/P EST MOD 30 MIN: CPT | Mod: 25 | Performed by: INTERNAL MEDICINE

## 2017-06-07 PROCEDURE — 80053 COMPREHEN METABOLIC PANEL: CPT | Performed by: INTERNAL MEDICINE

## 2017-06-07 PROCEDURE — 99207 ZZC NO CHARGE LOS: CPT

## 2017-06-07 PROCEDURE — 99207 ZZC NO CHARGE NURSE ONLY: CPT

## 2017-06-07 PROCEDURE — 96375 TX/PRO/DX INJ NEW DRUG ADDON: CPT | Performed by: INTERNAL MEDICINE

## 2017-06-07 PROCEDURE — 96416 CHEMO PROLONG INFUSE W/PUMP: CPT | Performed by: INTERNAL MEDICINE

## 2017-06-07 RX ORDER — DIPHENHYDRAMINE HYDROCHLORIDE 50 MG/ML
50 INJECTION INTRAMUSCULAR; INTRAVENOUS
Status: CANCELLED
Start: 2017-06-07

## 2017-06-07 RX ORDER — MEPERIDINE HYDROCHLORIDE 50 MG/ML
25 INJECTION INTRAMUSCULAR; INTRAVENOUS; SUBCUTANEOUS EVERY 30 MIN PRN
Status: CANCELLED | OUTPATIENT
Start: 2017-06-07

## 2017-06-07 RX ORDER — ALBUTEROL SULFATE 90 UG/1
1-2 AEROSOL, METERED RESPIRATORY (INHALATION)
Status: CANCELLED
Start: 2017-06-07

## 2017-06-07 RX ORDER — HEPARIN SODIUM (PORCINE) LOCK FLUSH IV SOLN 100 UNIT/ML 100 UNIT/ML
5 SOLUTION INTRAVENOUS
Status: DISCONTINUED | OUTPATIENT
Start: 2017-06-07 | End: 2017-06-07 | Stop reason: HOSPADM

## 2017-06-07 RX ORDER — EPINEPHRINE 1 MG/ML
0.3 INJECTION INTRAMUSCULAR; INTRAVENOUS; SUBCUTANEOUS EVERY 5 MIN PRN
Status: CANCELLED | OUTPATIENT
Start: 2017-06-07

## 2017-06-07 RX ORDER — METHYLPREDNISOLONE SODIUM SUCCINATE 125 MG/2ML
125 INJECTION, POWDER, LYOPHILIZED, FOR SOLUTION INTRAMUSCULAR; INTRAVENOUS
Status: CANCELLED
Start: 2017-06-07

## 2017-06-07 RX ORDER — ALBUTEROL SULFATE 0.83 MG/ML
2.5 SOLUTION RESPIRATORY (INHALATION)
Status: CANCELLED | OUTPATIENT
Start: 2017-06-07

## 2017-06-07 RX ORDER — FLUOROURACIL 50 MG/ML
400 INJECTION, SOLUTION INTRAVENOUS ONCE
Status: COMPLETED | OUTPATIENT
Start: 2017-06-07 | End: 2017-06-07

## 2017-06-07 RX ORDER — SODIUM CHLORIDE 9 MG/ML
1000 INJECTION, SOLUTION INTRAVENOUS CONTINUOUS PRN
Status: CANCELLED
Start: 2017-06-07

## 2017-06-07 RX ORDER — LORAZEPAM 2 MG/ML
0.5 INJECTION INTRAMUSCULAR EVERY 4 HOURS PRN
Status: CANCELLED
Start: 2017-06-07

## 2017-06-07 RX ORDER — EPINEPHRINE 0.3 MG/.3ML
0.3 INJECTION SUBCUTANEOUS EVERY 5 MIN PRN
Status: CANCELLED | OUTPATIENT
Start: 2017-06-07

## 2017-06-07 RX ORDER — FLUOROURACIL 50 MG/ML
400 INJECTION, SOLUTION INTRAVENOUS ONCE
Status: CANCELLED | OUTPATIENT
Start: 2017-06-07

## 2017-06-07 RX ADMIN — FLUOROURACIL 830 MG: 50 INJECTION, SOLUTION INTRAVENOUS at 14:34

## 2017-06-07 ASSESSMENT — PAIN SCALES - GENERAL: PAINLEVEL: NO PAIN (0)

## 2017-06-07 NOTE — MR AVS SNAPSHOT
After Visit Summary   6/7/2017    Terry Yi    MRN: [id number]           Patient Information     Date Of Birth          1965        Visit Information        Provider Department      6/7/2017 10:30 AM [facility] ONLY [facility]        Today's Diagnoses     Malignant neoplasm of overlapping sites of stomach (H)    -  1       Follow-ups after your visit        Your next 10 appointments already scheduled     Jun 07, 2017 11:15 AM CDT   [facility]   [facility]    [address] [place] [address] [phone]            Jun 07, 2017 12:30 PM CDT   Level 4 with [place] 7 INFUSION   [facility])    [address] [place] [address] [phone]              Who to contact     If you have questions or need follow up information about today's clinic visit or your schedule please contact [facility] directly at [phone].  Normal or non-critical lab and imaging results will be communicated to you by ColibrÃ­hart, letter or phone within 4 business days after the clinic has received the results. If you do not hear from us within 7 days, please contact the clinic through ColibrÃ­hart or phone. If you have a critical or abnormal lab result, we will notify you by phone as soon as possible.  Submit refill requests through Partigi or call your pharmacy and they will forward the refill request to us. Please allow 3 business days for your refill to be completed.          Additional Information About Your Visit        MyChart Information     Partigi gives you secure access to your electronic health record. If you see a primary care provider, you can also send messages to your care team and make appointments. If you have questions, please call your primary care clinic.  If you do not have a primary care provider, please call  [phone] and they will assist you.      kalidea is an electronic gateway that provides easy, online access to your medical records. With kalidea, you can request a clinic appointment, read your test results, renew a prescription or communicate with your care team.     To access your existing account, please contact your [facility] or call [phone] for assistance.        Care EveryWhere ID     This is your Care EveryWhere ID. This could be used by other organizations to access your [place] medical records  [id number]         Blood Pressure from Last 3 Encounters:   05/24/17 108/66   05/10/17 99/56   04/26/17 109/65    Weight from Last 3 Encounters:   05/24/17 85.5 kg (188 lb 8 oz)   05/10/17 83.9 kg (185 lb)   04/26/17 86.6 kg (191 lb)              We Performed the Following     CBC with platelets differential     Comprehensive metabolic panel        Primary Care Provider Office Phone # Fax #    Sandra Dickerson [phone]       [facility]  [address]  [facility] [address]        Thank you!     Thank you for choosing [facility]  for your care. Our goal is always to provide you with excellent care. Hearing back from our patients is one way we can continue to improve our services. Please take a few minutes to complete the written survey that you may receive in the mail after your visit with us. Thank you!             Your Updated Medication List - Protect others around you: Learn how to safely use, store and throw away your medicines at www.disposemymeds.org.          This list is accurate as of: 6/7/17 10:36 AM.  Always use your most recent med list.                   Brand Name Dispense Instructions for use    allopurinol 100 MG tablet    ZYLOPRIM         aspirin 81 MG chewable tablet      Take 81 mg by mouth daily       lidocaine-prilocaine cream    EMLA         LORazepam 0.5 MG tablet    ATIVAN    30 tablet    Take 1 tablet (0.5  mg) by mouth every 4 hours as needed (Anxiety, Nausea/Vomiting or Sleep)       ondansetron 8 MG tablet    ZOFRAN    10 tablet    Take 1 tablet (8 mg) by mouth every 8 hours as needed (Nausea/Vomiting)       prochlorperazine 10 MG tablet    COMPAZINE    30 tablet    Take 1 tablet (10 mg) by mouth every 6 hours as needed (Nausea/Vomiting)

## 2017-06-07 NOTE — NURSING NOTE
"Oncology Rooming Note    June 7, 2017 11:16 AM   Terry Yi is a 51 year old male who presents for:    Chief Complaint   Patient presents with     Oncology Clinic Visit     f/u prior to tx     Initial Vitals: /72  Pulse 64  Temp 97.7  F (36.5  C) (Oral)  Resp 20  Ht 1.8 m (5' 10.87\")  Wt 84.8 kg (187 lb)  SpO2 97%  BMI 26.18 kg/m2 Estimated body mass index is 26.18 kg/(m^2) as calculated from the following:    Height as of this encounter: 1.8 m (5' 10.87\").    Weight as of this encounter: 84.8 kg (187 lb). Body surface area is 2.06 meters squared.  No Pain (0) Comment: Data Unavailable   No LMP for male patient.  Allergies reviewed: Yes  Medications reviewed: Yes    Medications: Medication refills not needed today.  Pharmacy name entered into EPIC: Data Unavailable    Clinical concerns:     8 minutes for nursing intake (face to face time)     PAOLO PETTY LPN              "

## 2017-06-07 NOTE — PROGRESS NOTES
"Infusion Nursing Note:  Terry Yi presents today for   C5 D1 Oxalilpatin/Leucovorin/Fluorouracil bolus and pump connect   Patient seen by provider today: Yes: Dr Shaffer   present during visit today: Not Applicable.    Note: N/A.    Intravenous Access:  Implanted Port.    Treatment Conditions:  Lab Results   Component Value Date    HGB 12.9 06/07/2017     Lab Results   Component Value Date    WBC 5.2 06/07/2017      Lab Results   Component Value Date    ANEU 2.3 06/07/2017     Lab Results   Component Value Date     06/07/2017      Lab Results   Component Value Date     06/07/2017                   Lab Results   Component Value Date    POTASSIUM 4.2 06/07/2017           No results found for: MAG         Lab Results   Component Value Date    CR 0.78 06/07/2017                   Lab Results   Component Value Date    BRITTNEY 9.1 06/07/2017                Lab Results   Component Value Date    BILITOTAL 0.3 06/07/2017           Lab Results   Component Value Date    ALBUMIN 3.4 06/07/2017                    Lab Results   Component Value Date    ALT 45 06/07/2017           Lab Results   Component Value Date    AST 30 06/07/2017     Prior to discharge: Port is secured in place with tegaderm and flushed with 10cc NS with positive blood return noted.  Continuous home infusion Dosi-Fuser pump connected.    All connectors secured in place and clamps taped open.    Pump started, \"running\" noted on display (CADD): Not Applicable.  Patient instructed to call our clinic or [facility] with any questions or concerns at home.  Patient verbalized understanding.    Patient set up for pump disconnect at [facility] on Friday 6/9/17 at 1230.  Patient sets up his own appointment.            Post Infusion Assessment:  Patient tolerated infusion without incident.    Discharge Plan:   Copy of AVS reviewed with patient and/or family.  Patient will return 6/21/17 for next appointment.  Patient discharged " in stable condition accompanied by: wife.  Departure Mode: Ambulatory.    Cheryl Osorio RN

## 2017-06-07 NOTE — PATIENT INSTRUCTIONS
Proceed with chemo    In 2 weeks see me with labs and next chemo    On June 30th, repeat CT CAP    On July 5th, see me back with labs and next FOLFOX

## 2017-06-07 NOTE — MR AVS SNAPSHOT
After Visit Summary   6/7/2017    Terry Yi    MRN: [id number]           Patient Information     Date Of Birth          1965        Visit Information        Provider Department      6/7/2017 12:30 PM [place] 7 INFUSION [facility] [place]        Today's Diagnoses     Malignant neoplasm of overlapping sites of stomach (H)    -  1       Follow-ups after your visit        Your next 10 appointments already scheduled     Jun 21, 2017  8:30 AM CDT   Return Visit with NURSE ONLY CANCER CENTER   [facility] ([facility])    [address] [facility] [address]   [phone]            Jun 21, 2017  9:15 AM CDT   Return Visit with Yakov Shaffer MD   [facility] [place] 2[place]    [address] [facility] [address]   [phone]            Jun 21, 2017 10:00 AM CDT   Level 4 with [address] ([facility] [place])    [address] [facility] [address]   [phone]            Jun 30, 2017  8:00 AM CDT   Nurse Only with MG IMAGING NURSE   [facility] [place] ([facility] [place])    [address] [facility] [address]   [phone]            Jun 30, 2017  8:15 AM CDT   CT CHEST ABDOMEN PELVIS W/O & W CONTRAST with MGCT1   [facility] [place]note 2[place]    [address] [facility] [address]   [phone]           Please bring any scans or X-rays taken at other hospitals, if similar tests were done. Also bring a list of your medicines, including vitamins, minerals and over-the-counter drugs. It is safest to leave personal items at home.  Be sure to tell your doctor:   If you have any allergies.   If there s any chance you are pregnant.   If you are breastfeeding.   If you have any special needs.  You may have contrast for this exam. To prepare:   Do not eat or drink for 2 hours before  your exam. If you need to take medicine, you may take it with small sips of water. (We may ask you to take liquid medicine as well.)   The day before your exam, drink extra fluids at least six 8-ounce glasses (unless your doctor tells you to restrict your fluids).  Patients over 70 or patients with diabetes or kidney problems:   If you haven t had a blood test (creatinine test) within the last 30 days, go to your clinic or Diagnostic Imaging Department for this test.  If you have diabetes:   If your kidney function is normal, continue taking your metformin (Avandamet, Glucophage, Glucovance, Metaglip) on the day of your exam.   If your kidney function is abnormal, wait 48 hours before restarting this medicine.  You will have oral contrast for this exam:   You will drink the contrast at home. Get this from your clinic or Diagnostic Imaging Department. Please follow the directions given.  Please wear loose clothing, such as a sweat suit or jogging clothes. Avoid snaps, zippers and other metal. We may ask you to undress and put on a hospital gown.  If you have any questions, please call the Imaging Department where you will have your exam.            Jul 05, 2017  8:30 AM CDT   Return Visit with NURSE [place] [facility]CER CENTER   [facility])    [address] [place] [address] [phone]            Jul 05, 2017  9:15 AM CDT   Return Visit with Yakov Shaffer MD   [facility])    [address] [facility] [address] [facility] [address] [phone]            Jul 05, 2017 10:00 AM CDT   Level 4 with BAY 1 INFUSION   [facility])    [address] [facility] [id number] [phone]              Future tests that were ordered for you today     Open Future Orders        Priority Expected Expires Ordered    CT Chest abdomen pelvis w & w/o contrast Routine 6/30/2017  6/7/2018 6/7/2017            Who to contact     If you have questions or need follow up information about today's clinic visit or your schedule please contact [facility] directly at [phone].  Normal or non-critical lab and imaging results will be communicated to you by Culture Kitchenhart, letter or phone within 4 business days after the clinic has received the results. If you do not hear from us within 7 days, please contact the clinic through Culture Kitchenhart or phone. If you have a critical or abnormal lab result, we will notify you by phone as soon as possible.  Submit refill requests through [employer] or call your pharmacy and they will forward the refill request to us. Please allow 3 business days for your refill to be completed.          Additional Information About Your Visit        [employer] Information     [employer] gives you secure access to your electronic health record. If you see a primary care provider, you can also send messages to your care team and make appointments. If you have questions, please call your primary care clinic.  If you do not have a primary care provider, please call [phone] and they will assist you.      [employer] is an electronic gateway that provides easy, online access to your medical records. With [employer], you can request a clinic appointment, read your test results, renew a prescription or communicate with your care team.     To access your existing account, please contact your [facility] or call [phone] for assistance.        Care EveryWhere ID     This is your Care EveryWhere ID. This could be used by other organizations to access your [place] medical records  [id number]         Blood Pressure from Last 3 Encounters:   06/07/17 117/72   05/24/17 108/66   05/10/17 99/56    Weight from Last 3 Encounters:   06/07/17 84.8 kg (187 lb)   05/24/17 85.5 kg (188 lb 8 oz)   05/10/17 83.9 kg (185 lb)              Today, you had the following     No  orders found for display       Primary Care Provider Office Phone # Fax #    Sandra SumnerSARAI lal [phone]       [facility]  [address]  [facility] [address]        Thank you!     Thank you for choosing [facility]  for your care. Our goal is always to provide you with excellent care. Hearing back from our patients is one way we can continue to improve our services. Please take a few minutes to complete the written survey that you may receive in the mail after your visit with us. Thank you!             Your Updated Medication List - Protect others around you: Learn how to safely use, store and throw away your medicines at www.disposemymeds.org.          This list is accurate as of: 6/7/17  3:51 PM.  Always use your most recent med list.                   Brand Name Dispense Instructions for use    allopurinol 100 MG tablet    ZYLOPRIM         aspirin 81 MG chewable tablet      Take 81 mg by mouth daily       lidocaine-prilocaine cream    EMLA         LORazepam 0.5 MG tablet    ATIVAN    30 tablet    Take 1 tablet (0.5 mg) by mouth every 4 hours as needed (Anxiety, Nausea/Vomiting or Sleep)       ondansetron 8 MG tablet    ZOFRAN    10 tablet    Take 1 tablet (8 mg) by mouth every 8 hours as needed (Nausea/Vomiting)       prochlorperazine 10 MG tablet    COMPAZINE    30 tablet    Take 1 tablet (10 mg) by mouth every 6 hours as needed (Nausea/Vomiting)

## 2017-06-07 NOTE — PROGRESS NOTES
"6/7/2017      REASON FOR VISIT:  Follow-up for unresectable metastatic gastric carcinoma, diffuse type, grade 3, HER-2 negative.      HISTORY OF PRESENT ILLNESS:  Please see my previous note for details.  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  Up to now, he has received 4 cycles of FOLFOX    INTERVAL HISTORY:  He comes in today along with his family members and he tells me that he is doing well.  Chemotherapy is going well.  He does notice minimal fatigue but he continues to work full-time.  He denies any nausea or vomiting.  He has very occasional loose stools but it is not caren diarrhea.  He does notice cold sensitivity for a few days, but then it gets better in a few days and he can touch cold things and eat ice cream.  He does not have any lingering tingling, numbness or any neuropathy.  He denies any interval infections.  Denies pain.  Denies any new swellings.  He has been able to eat and drink well.  His weight has remained stable.  No new skin problems.      REVIEW OF SYSTEMS:  A comprehensive review of systems was negative.     PAST MEDICAL HISTORY:  I reviewed his other history in Epic.      MEDICATIONS:   Current Outpatient Prescriptions   Medication     aspirin 81 MG chewable tablet     LORazepam (ATIVAN) 0.5 MG tablet     prochlorperazine (COMPAZINE) 10 MG tablet     ondansetron (ZOFRAN) 8 MG tablet     allopurinol (ZYLOPRIM) 100 MG tablet     lidocaine-prilocaine (EMLA) cream     No current facility-administered medications for this visit.           PHYSICAL EXAMINATION:   /72  Pulse 64  Temp 97.7  F (36.5  C) (Oral)  Resp 20  Ht 1.8 m (5' 10.87\")  Wt 84.8 kg (187 lb)  SpO2 97%  BMI 26.18 kg/m2    CONSTITUTIONAL: no acute distress  EYES: PERRLA, no palor or icterus. "   ENT/MOUTH: no mouth lesions. Oropharynx normal  CVS: s1s2 no m r g .   RESPIRATORY: clear to auscultation b/l  GI: soft non tender no hepatosplenomegaly  NEURO: AAOX3  Grossly non focal neuro exam  INTEGUMENT: no obvious rashes  LYMPHATIC: no palpable cervical, supraclavicular, axillary or inguinal LAD  MUSCULOSKELETAL: Unremarkable. No bony tenderness.   EXTREMITIES: no edema  PSYCH: Mentation, mood and affect are normal. Decision making capacity is intact       LABS:  CBC RESULTS:   Recent Labs   Lab Test  06/07/17   1032   WBC  5.2   RBC  4.24*   HGB  12.9*   HCT  37.6*   MCV  89   MCH  30.4   MCHC  34.3   RDW  16.6*   PLT  147*     Recent Labs   Lab Test  06/07/17   1032  05/24/17   0907   NA  140  139   POTASSIUM  4.2  4.3   CHLORIDE  107  105   CO2  30  28   ANIONGAP  3  6   GLC  102*  99   BUN  17  14   CR  0.78  0.72   BRITTNEY  9.1  8.7     Liver Function Studies -   Recent Labs   Lab Test  06/07/17   1032   PROTTOTAL  6.2*   ALBUMIN  3.4   BILITOTAL  0.3   ALKPHOS  78   AST  30   ALT  45           ASSESSMENT AND PLAN:  Metastatic gastric cancer presenting with early satiety and significant weight loss and epigastric discomfort upon eating with some vomiting.  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  There is an indeterminate right liver lobe lesion.      ASSESSMENT AND PLAN:     1.  He started palliative FOLFOX on 03/12/2017 and has received 4 cycles up until now.  We will proceed with cycle #5 today.  My plan is to give him 6 cycles of FOLFOX and then repeat imaging which would be around the end of June or early July.  Currently, he is tolerating chemotherapy really well.   2.  Cold sensitivity.  This is for the first few days and then it resolves.  He does not have any lingering neuropathy at this time.  We will keep a watch on it and he knows to keep himself warm when he is getting the cold sensitivity due to  oxaliplatin.     3.  Nutrition.  His nutrition has certainly improved during this time and his weight has now stabilized.  He is eating much better.  He denies any nausea or vomiting.  He does have occasional loose stools for which he does not have to take anything at this time.  I encouraged him to continue with good and healthy eating, which he is doing.     4.  We also discussed the importance of continuing to keep himself active and exercise regularly.   5.  He has met with a genetic counselor and has had genetic testing done, the results of which are pending as of now.      All of his and his family's questions were answered to their satisfaction.  I will see him back in 2 weeks before his cycle #6 of chemotherapy.  He is comfortable and agreeable with this plan.      LANEY HERNANDEZ MD

## 2017-06-07 NOTE — MR AVS SNAPSHOT
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)    -  1      Care Instructions    Proceed with chemo    In 2 weeks see me with labs and next chemo    On June 30th, repeat CT CAP    On July 5th, see me back with labs and next FOLFOX            Follow-ups after your visit        Your next 10 appointments already scheduled     Jun 07, 2017 12:30 PM CDT   Level 4 with BAY 7 INFUSION   [facility] ([facility])    [address] [facility] [address]   [phone]            Jun 21, 2017  8:30 AM CDT   Return Visit with NURSE ONLY [facility]CER CENTER   [facility] ([facility])    [address] [facility] [address]   [phone]            Jun 21, 2017  9:15 AM CDT   Return Visit with Yakov Shaffer MD   [facility] ([facility])    [address] [facility] [address]   [phone]            Jun 21, 2017 10:00 AM CDT   Level 4 with BAY 1 INFUSION   [facility] ([facility])    [address] [facility] [address]   [phone]            Jun 30, 2017  8:15 AM CDT   CT CHEST ABDOMEN PELVIS W/O & W CONTRAST with MGCT1   [facility])    [address] [place] [address]   [phone]           Please bring any scans or X-rays taken at other hospitals, if similar tests were done. Also bring a list of your medicines, including vitamins, minerals and over-the-counter drugs. It is safest to leave personal items at home.  Be sure to tell your doctor:   If you have any allergies.   If there s any  chance you are pregnant.   If you are breastfeeding.   If you have any special needs.  You may have contrast for this exam. To prepare:   Do not eat or drink for 2 hours before your exam. If you need to take medicine, you may take it with small sips of water. (We may ask you to take liquid medicine as well.)   The day before your exam, drink extra fluids at least six 8-ounce glasses (unless your doctor tells you to restrict your fluids).  Patients over 70 or patients with diabetes or kidney problems:   If you haven t had a blood test (creatinine test) within the last 30 days, go to your clinic or Diagnostic Imaging Department for this test.  If you have diabetes:   If your kidney function is normal, continue taking your metformin (Avandamet, Glucophage, Glucovance, Metaglip) on the day of your exam.   If your kidney function is abnormal, wait 48 hours before restarting this medicine.  You will have oral contrast for this exam:   You will drink the contrast at home. Get this from your clinic or Diagnostic Imaging Department. Please follow the directions given.  Please wear loose clothing, such as a sweat suit or jogging clothes. Avoid snaps, zippers and other metal. We may ask you to undress and put on a hospital gown.  If you have any questions, please call the Imaging Department where you will have your exam.            Jul 05, 2017  8:30 AM CDT   Return Visit with NURSE [place] [facility]   [facility] [place]    [address] [place] [address]   [phone]            Jul 05, 2017  9:15 AM CDT   Return Visit with Yakov Shaffer MD   [facility] [place] [facility]    [id number] [facility] [id number] [phone]            Jul 05, 2017 10:00 AM CDT   Level 4 with BAY 1 [facility])    [address] [place] [address]   [phone]               Future tests that were ordered for you today     Open Future Orders        Priority Expected Expires Ordered    CT Chest abdomen pelvis w & w/o contrast Routine 6/30/2017 6/7/2018 6/7/2017            Who to contact     If you have questions or need follow up information about today's clinic visit or your schedule please contact [facility] directly at [phone].  Normal or non-critical lab and imaging results will be communicated to you by IGLOO Softwarehart, letter or phone within 4 business days after the clinic has received the results. If you do not hear from us within 7 days, please contact the clinic through IGLOO Softwarehart or phone. If you have a critical or abnormal lab result, we will notify you by phone as soon as possible.  Submit refill requests through [email or web address] or call your pharmacy and they will forward the refill request to us. Please allow 3 business days for your refill to be completed.          Additional Information About Your Visit        IGLOO SoftwareharIdenIve Information     [email or web address] gives you secure access to your electronic health record. If you see a primary care provider, you can also send messages to your care team and make appointments. If you have questions, please call your primary care clinic.  If you do not have a primary care provider, please call [phone] and they will assist you.      [email or web address] is an electronic gateway that provides easy, online access to your medical records. With [email or web address], you can request a clinic appointment, read your test results, renew a prescription or communicate with your care team.     To access your existing account, please contact your [facility] or call [phone] for assistance.        Care EveryWhere ID     This is your Care EveryWhere ID. This could be used by other organizations to access your [place] medical records  [id number]        Your Vitals Were     Pulse Temperature Respirations Height Pulse Oximetry BMI (Body Mass  "Index)    64 97.7  F (36.5  C) (Oral) 20 1.8 m (5' 10.87\") 97% 26.18 kg/m2       Blood Pressure from Last 3 Encounters:   06/07/17 117/72   05/24/17 108/66   05/10/17 99/56    Weight from Last 3 Encounters:   06/07/17 84.8 kg (187 lb)   05/24/17 85.5 kg (188 lb 8 oz)   05/10/17 83.9 kg (185 lb)               Primary Care Provider Office Phone # Fax #    Sandra Dickerson [phone]       [facility]  [address]  [facility] [address]        Thank you!     Thank you for choosing [facility]  for your care. Our goal is always to provide you with excellent care. Hearing back from our patients is one way we can continue to improve our services. Please take a few minutes to complete the written survey that you may receive in the mail after your visit with us. Thank you!             Your Updated Medication List - Protect others around you: Learn how to safely use, store and throw away your medicines at www.disposemymeds.org.          This list is accurate as of: 6/7/17 12:03 PM.  Always use your most recent med list.                   Brand Name Dispense Instructions for use    allopurinol 100 MG tablet    ZYLOPRIM         aspirin 81 MG chewable tablet      Take 81 mg by mouth daily       lidocaine-prilocaine cream    EMLA         LORazepam 0.5 MG tablet    ATIVAN    30 tablet    Take 1 tablet (0.5 mg) by mouth every 4 hours as needed (Anxiety, Nausea/Vomiting or Sleep)       ondansetron 8 MG tablet    ZOFRAN    10 tablet    Take 1 tablet (8 mg) by mouth every 8 hours as needed (Nausea/Vomiting)       prochlorperazine 10 MG tablet    COMPAZINE    30 tablet    Take 1 tablet (10 mg) by mouth every 6 hours as needed (Nausea/Vomiting)         "

## 2017-06-15 LAB — LAB SCANNED RESULT: NORMAL

## 2017-06-21 ENCOUNTER — INFUSION THERAPY VISIT (OUTPATIENT)
Dept: INFUSION THERAPY | Facility: CLINIC | Age: 52
End: 2017-06-21
Payer: COMMERCIAL

## 2017-06-21 ENCOUNTER — ONCOLOGY VISIT (OUTPATIENT)
Dept: ONCOLOGY | Facility: CLINIC | Age: 52
End: 2017-06-21
Payer: COMMERCIAL

## 2017-06-21 VITALS
WEIGHT: 188 LBS | HEART RATE: 67 BPM | BODY MASS INDEX: 26.32 KG/M2 | RESPIRATION RATE: 15 BRPM | HEIGHT: 71 IN | DIASTOLIC BLOOD PRESSURE: 77 MMHG | SYSTOLIC BLOOD PRESSURE: 127 MMHG | OXYGEN SATURATION: 99 % | TEMPERATURE: 97.4 F

## 2017-06-21 DIAGNOSIS — C16.8 MALIGNANT NEOPLASM OF OVERLAPPING SITES OF STOMACH (H): Primary | ICD-10-CM

## 2017-06-21 DIAGNOSIS — Z80.0 FAMILY HISTORY OF COLON CANCER: ICD-10-CM

## 2017-06-21 LAB
ALBUMIN SERPL-MCNC: 3.5 G/DL (ref 3.4–5)
ALP SERPL-CCNC: 84 U/L (ref 40–150)
ALT SERPL W P-5'-P-CCNC: 49 U/L (ref 0–70)
ANION GAP SERPL CALCULATED.3IONS-SCNC: 9 MMOL/L (ref 3–14)
AST SERPL W P-5'-P-CCNC: 31 U/L (ref 0–45)
BASOPHILS # BLD AUTO: 0.1 10E9/L (ref 0–0.2)
BASOPHILS NFR BLD AUTO: 1 %
BILIRUB SERPL-MCNC: 0.2 MG/DL (ref 0.2–1.3)
BUN SERPL-MCNC: 18 MG/DL (ref 7–30)
CALCIUM SERPL-MCNC: 8.9 MG/DL (ref 8.5–10.1)
CHLORIDE SERPL-SCNC: 107 MMOL/L (ref 94–109)
CO2 SERPL-SCNC: 27 MMOL/L (ref 20–32)
CREAT SERPL-MCNC: 0.72 MG/DL (ref 0.66–1.25)
DIFFERENTIAL METHOD BLD: ABNORMAL
EOSINOPHIL # BLD AUTO: 0.2 10E9/L (ref 0–0.7)
EOSINOPHIL NFR BLD AUTO: 3.3 %
ERYTHROCYTE [DISTWIDTH] IN BLOOD BY AUTOMATED COUNT: 17.1 % (ref 10–15)
GFR SERPL CREATININE-BSD FRML MDRD: ABNORMAL ML/MIN/1.7M2
GLUCOSE SERPL-MCNC: 102 MG/DL (ref 70–99)
HCT VFR BLD AUTO: 39.1 % (ref 40–53)
HGB BLD-MCNC: 13.3 G/DL (ref 13.3–17.7)
LYMPHOCYTES # BLD AUTO: 1.9 10E9/L (ref 0.8–5.3)
LYMPHOCYTES NFR BLD AUTO: 36.7 %
MCH RBC QN AUTO: 30.4 PG (ref 26.5–33)
MCHC RBC AUTO-ENTMCNC: 34 G/DL (ref 31.5–36.5)
MCV RBC AUTO: 90 FL (ref 78–100)
MONOCYTES # BLD AUTO: 0.5 10E9/L (ref 0–1.3)
MONOCYTES NFR BLD AUTO: 9.8 %
NEUTROPHILS # BLD AUTO: 2.6 10E9/L (ref 1.6–8.3)
NEUTROPHILS NFR BLD AUTO: 49.2 %
PLATELET # BLD AUTO: 141 10E9/L (ref 150–450)
POTASSIUM SERPL-SCNC: 4.2 MMOL/L (ref 3.4–5.3)
PROT SERPL-MCNC: 6.6 G/DL (ref 6.8–8.8)
RBC # BLD AUTO: 4.37 10E12/L (ref 4.4–5.9)
SODIUM SERPL-SCNC: 143 MMOL/L (ref 133–144)
WBC # BLD AUTO: 5.2 10E9/L (ref 4–11)

## 2017-06-21 PROCEDURE — 99207 ZZC NO CHARGE LOS: CPT | Performed by: GENETIC COUNSELOR, MS

## 2017-06-21 PROCEDURE — 80053 COMPREHEN METABOLIC PANEL: CPT | Performed by: INTERNAL MEDICINE

## 2017-06-21 PROCEDURE — 99207 ZZC NO CHARGE LOS: CPT

## 2017-06-21 PROCEDURE — 99214 OFFICE O/P EST MOD 30 MIN: CPT | Mod: 25 | Performed by: INTERNAL MEDICINE

## 2017-06-21 PROCEDURE — 96415 CHEMO IV INFUSION ADDL HR: CPT | Performed by: INTERNAL MEDICINE

## 2017-06-21 PROCEDURE — 96416 CHEMO PROLONG INFUSE W/PUMP: CPT | Performed by: INTERNAL MEDICINE

## 2017-06-21 PROCEDURE — 96368 THER/DIAG CONCURRENT INF: CPT | Performed by: INTERNAL MEDICINE

## 2017-06-21 PROCEDURE — 96375 TX/PRO/DX INJ NEW DRUG ADDON: CPT | Performed by: INTERNAL MEDICINE

## 2017-06-21 PROCEDURE — 96411 CHEMO IV PUSH ADDL DRUG: CPT | Performed by: INTERNAL MEDICINE

## 2017-06-21 PROCEDURE — 96413 CHEMO IV INFUSION 1 HR: CPT | Performed by: INTERNAL MEDICINE

## 2017-06-21 PROCEDURE — 85025 COMPLETE CBC W/AUTO DIFF WBC: CPT | Performed by: INTERNAL MEDICINE

## 2017-06-21 PROCEDURE — 99207 ZZC NO CHARGE NURSE ONLY: CPT

## 2017-06-21 RX ORDER — FLUOROURACIL 50 MG/ML
400 INJECTION, SOLUTION INTRAVENOUS ONCE
Status: COMPLETED | OUTPATIENT
Start: 2017-06-21 | End: 2017-06-21

## 2017-06-21 RX ORDER — MEPERIDINE HYDROCHLORIDE 50 MG/ML
25 INJECTION INTRAMUSCULAR; INTRAVENOUS; SUBCUTANEOUS EVERY 30 MIN PRN
Status: CANCELLED | OUTPATIENT
Start: 2017-06-21

## 2017-06-21 RX ORDER — ALBUTEROL SULFATE 90 UG/1
1-2 AEROSOL, METERED RESPIRATORY (INHALATION)
Status: CANCELLED
Start: 2017-06-21

## 2017-06-21 RX ORDER — LORAZEPAM 2 MG/ML
0.5 INJECTION INTRAMUSCULAR EVERY 4 HOURS PRN
Status: CANCELLED
Start: 2017-06-21

## 2017-06-21 RX ORDER — DIPHENHYDRAMINE HYDROCHLORIDE 50 MG/ML
50 INJECTION INTRAMUSCULAR; INTRAVENOUS
Status: CANCELLED
Start: 2017-06-21

## 2017-06-21 RX ORDER — EPINEPHRINE 0.3 MG/.3ML
0.3 INJECTION SUBCUTANEOUS EVERY 5 MIN PRN
Status: CANCELLED | OUTPATIENT
Start: 2017-06-21

## 2017-06-21 RX ORDER — ALBUTEROL SULFATE 0.83 MG/ML
2.5 SOLUTION RESPIRATORY (INHALATION)
Status: CANCELLED | OUTPATIENT
Start: 2017-06-21

## 2017-06-21 RX ORDER — FLUOROURACIL 50 MG/ML
400 INJECTION, SOLUTION INTRAVENOUS ONCE
Status: CANCELLED | OUTPATIENT
Start: 2017-06-21

## 2017-06-21 RX ORDER — SODIUM CHLORIDE 9 MG/ML
1000 INJECTION, SOLUTION INTRAVENOUS CONTINUOUS PRN
Status: CANCELLED
Start: 2017-06-21

## 2017-06-21 RX ORDER — HEPARIN SODIUM (PORCINE) LOCK FLUSH IV SOLN 100 UNIT/ML 100 UNIT/ML
5 SOLUTION INTRAVENOUS
Status: DISCONTINUED | OUTPATIENT
Start: 2017-06-21 | End: 2017-06-21 | Stop reason: HOSPADM

## 2017-06-21 RX ORDER — EPINEPHRINE 1 MG/ML
0.3 INJECTION INTRAMUSCULAR; INTRAVENOUS; SUBCUTANEOUS EVERY 5 MIN PRN
Status: CANCELLED | OUTPATIENT
Start: 2017-06-21

## 2017-06-21 RX ORDER — METHYLPREDNISOLONE SODIUM SUCCINATE 125 MG/2ML
125 INJECTION, POWDER, LYOPHILIZED, FOR SOLUTION INTRAMUSCULAR; INTRAVENOUS
Status: CANCELLED
Start: 2017-06-21

## 2017-06-21 RX ADMIN — FLUOROURACIL 830 MG: 50 INJECTION, SOLUTION INTRAVENOUS at 12:37

## 2017-06-21 ASSESSMENT — PAIN SCALES - GENERAL: PAINLEVEL: NO PAIN (0)

## 2017-06-21 NOTE — MR AVS SNAPSHOT
After Visit Summary   6/21/2017    Terry Yi    MRN: [id number]           Patient Information     Date Of Birth          1965        Visit Information        Provider Department      6/21/2017 8:30 AM [facility]        Today's Diagnoses     Malignant neoplasm of overlapping sites of stomach (H)    -  1       Follow-ups after your visit        Your next 10 appointments already scheduled     Jun 21, 2017  9:15 AM CDT   Return Visit with Yakov Shaffer MD   [facility])    [address] [facility] [address]   [phone]            Jun 21, 2017 10:00 AM CDT   Level 4 with BAY 1 INFUSION   [facility])    [address] [facility] [address]   [phone]            Jun 21, 2017  1:15 PM CDT   Return Visit with Rosa Banegas GC   [facility])    [address] [facility] [address]   [phone]            Jun 30, 2017  8:00 AM CDT   Nurse Only with MG IMAGING NURSE   [facility])    [address] [facility] [address]   [phone]            Jun 30, 2017  8:15 AM CDT   CT CHEST ABDOMEN PELVIS W/O & W CONTRAST with MGCT1   [facility])    [address] [facility] [address]   [phone]           Please bring any scans or X-rays taken at other hospitals, if similar tests were done. Also bring a list of your medicines, including vitamins, minerals and over-the-counter drugs. It is safest to leave personal items at home.  Be sure to tell your doctor:   If you have any allergies.   If there s any chance you are pregnant.   If you are breastfeeding.   If you have any special needs.  You may have contrast for this exam. To prepare:   Do not eat or drink for 2 hours before  your exam. If you need to take medicine, you may take it with small sips of water. (We may ask you to take liquid medicine as well.)   The day before your exam, drink extra fluids at least six 8-ounce glasses (unless your doctor tells you to restrict your fluids).  Patients over 70 or patients with diabetes or kidney problems:   If you haven t had a blood test (creatinine test) within the last 30 days, go to your clinic or Diagnostic Imaging Department for this test.  If you have diabetes:   If your kidney function is normal, continue taking your metformin (Avandamet, Glucophage, Glucovance, Metaglip) on the day of your exam.   If your kidney function is abnormal, wait 48 hours before restarting this medicine.  You will have oral contrast for this exam:   You will drink the contrast at home. Get this from your clinic or Diagnostic Imaging Department. Please follow the directions given.  Please wear loose clothing, such as a sweat suit or jogging clothes. Avoid snaps, zippers and other metal. We may ask you to undress and put on a hospital gown.  If you have any questions, please call the Imaging Department where you will have your exam.            Jul 05, 2017  8:30 AM CDT   Return Visit with NURSE [place] [facility]   [facility])    [address] [place] [id number] [phone]            Jul 05, 2017  9:15 AM CDT   Return Visit with Yakov Shaffer MD   [facility])    [address] [place] [id number] [phone]            Jul 05, 2017 10:00 AM CDT   Level 4 with BAY 1 INFUSION   [facility]    [address] [place] [id number] [phone]              Who to contact     If you have questions or need follow up information about today's clinic visit or your schedule please contact [facility] directly at  [phone].  Normal or non-critical lab and imaging results will be communicated to you by ParentsWarehart, letter or phone within 4 business days after the clinic has received the results. If you do not hear from us within 7 days, please contact the clinic through Gushcloudt or phone. If you have a critical or abnormal lab result, we will notify you by phone as soon as possible.  Submit refill requests through Rol[employer] or call your pharmacy and they will forward the refill request to us. Please allow 3 business days for your refill to be completed.          Additional Information About Your Visit        ParentsWareharPrisync Information     [employer] gives you secure access to your electronic health record. If you see a primary care provider, you can also send messages to your care team and make appointments. If you have questions, please call your primary care clinic.  If you do not have a primary care provider, please call [phone] and they will assist you.      [employer] is an electronic gateway that provides easy, online access to your medical records. With [employer], you can request a clinic appointment, read your test results, renew a prescription or communicate with your care team.     To access your existing account, please contact your [facility] or call [phone] for assistance.        Care EveryWhere ID     This is your Care EveryWhere ID. This could be used by other organizations to access your [place] medical records  [id number]         Blood Pressure from Last 3 Encounters:   06/07/17 117/72   05/24/17 108/66   05/10/17 99/56    Weight from Last 3 Encounters:   06/07/17 84.8 kg (187 lb)   05/24/17 85.5 kg (188 lb 8 oz)   05/10/17 83.9 kg (185 lb)              We Performed the Following     CBC with platelets differential     Comprehensive metabolic panel        Primary Care Provider Office Phone # Fax #    Sandra Dickerson [phone]       [facility]  [address]  [place] [address]        Equal Access to Services     [facility] : Hadii tk pelaez conradocastro Sojacquelinali, waaxda luqadaha, qaybta kajoshualadonna dumont, claire zapienrezalia browne. So [facility] [phone].    ATENCIÓN: Si habla español, tiene a cuevas disposición servicios gratuitos de asistencia lingüística. Britneyame al [phone].    We comply with applicable federal civil rights laws and [place] laws. We do not discriminate on the basis of race, color, national origin, age, disability sex, sexual orientation or gender identity.            Thank you!     Thank you for choosing [facility]  for your care. Our goal is always to provide you with excellent care. Hearing back from our patients is one way we can continue to improve our services. Please take a few minutes to complete the written survey that you may receive in the mail after your visit with us. Thank you!             Your Updated Medication List - Protect others around you: Learn how to safely use, store and throw away your medicines at www.disposemymeds.org.          This list is accurate as of: 6/21/17  8:31 AM.  Always use your most recent med list.                   Brand Name Dispense Instructions for use Diagnosis    allopurinol 100 MG tablet    ZYLOPRIM          aspirin 81 MG chewable tablet      Take 81 mg by mouth daily        lidocaine-prilocaine cream    EMLA          LORazepam 0.5 MG tablet    ATIVAN    30 tablet    Take 1 tablet (0.5 mg) by mouth every 4 hours as needed (Anxiety, Nausea/Vomiting or Sleep)    Malignant neoplasm of overlapping sites of stomach (H)       ondansetron 8 MG tablet    ZOFRAN    10 tablet    Take 1 tablet (8 mg) by mouth every 8 hours as needed (Nausea/Vomiting)    Malignant neoplasm of overlapping sites of stomach (H)       prochlorperazine 10 MG tablet    COMPAZINE    30 tablet    Take 1 tablet (10 mg) by mouth every 6 hours as needed (Nausea/Vomiting)    Malignant neoplasm of overlapping  sites of stomach (H)

## 2017-06-21 NOTE — MR AVS SNAPSHOT
After Visit Summary   6/21/2017    Terry Yi    MRN: [id number]           Patient Information     Date Of Birth          1965        Visit Information        Provider Department      6/21/2017 1:15 PM Rosa Banegas GC [facility]        Today's Diagnoses     Malignant neoplasm of overlapping sites of stomach (H)    -  1    Family history of colon cancer          Care Instructions            Negative Genetic Test Result    Genetic Testing  You had a blood test that looked at the genetic information in one or more genes associated with increased cancer risk.  The testing looked for any harmful changes that would stop this particular gene from working like it should. If an individual does not have any harmful changes or variants of unknown significance found from their blood test, their genetic test result is reported as negative.       Results  The genetic test did not identify any pathogenic (harmful) changes in the genes that were tested. There are several possible explanations for a negative test result. Without knowing the gene mutation in your family, the cause of the cancer in you or your relatives is still unknown. Your genetic counselor can help interpret the result for you and your relatives. In this case, there are several reasons that may explain the negative test result:    There may be a gene mutation in the family that you did not inherit.     You may have a gene mutation in a different gene that was not included in the test, or has not yet been discovered.     The cancers in you or your family may be due to a combination of genetic factors and environment (multifactorial/familial).    The cancers in you or your family may be sporadic/random cancers.    There is very small chance that a mutation was not found by current testing methods.  As testing technology evolves over time, it may still be possible to identify a mutation in a gene that was not found on this  test.    It is important to note which genes were included in your test. A list of these genes can be found on your test result.    Screening Recommendations  Due to this negative test result, cancer screening recommendations should be based on your personal and family history. This may include increased cancer screening for you and/or your family members. Your genetic counselor and health care provider can help make appropriate recommendations.      Please call us if you have any questions or concerns.     Cancer Risk Management Program  [phone] ([phone])  ? Rema Alvarez, MS, [facility]  [phone]  ? Karla Ivone, MS, [facility]  [phone]  ? Kallie Nevarez, MS, [facility]  [phone]  ? Rosa Banegas, MS, [facility]  [phone]  ? Colten Fabian, MS, [facility]  [phone]              Follow-ups after your visit        Your next 10 appointments already scheduled     Jun 30, 2017  8:00 AM CDT   Nurse Only with MG IMAGING NURSE   [facility])    [address] [place] [id number]   [phone]            Jun 30, 2017  8:15 AM CDT   CT CHEST ABDOMEN PELVIS W/O & W CONTRAST with MGCT1   [facility])    [address] [place] [address]   [phone]           Please bring any scans or X-rays taken at other hospitals, if similar tests were done. Also bring a list of your medicines, including vitamins, minerals and over-the-counter drugs. It is safest to leave personal items at home.  Be sure to tell your doctor:   If you have any allergies.   If there s any chance you are pregnant.   If you are breastfeeding.   If you have any special needs.  You may have contrast for this exam. To prepare:   Do not eat or drink for 2 hours before your exam. If you need to take medicine, you may take it with small sips of water. (We may ask you to take liquid medicine as well.)   The day before your exam, drink extra fluids at  least six 8-ounce glasses (unless your doctor tells you to restrict your fluids).  Patients over 70 or patients with diabetes or kidney problems:   If you haven t had a blood test (creatinine test) within the last 30 days, go to your clinic or Diagnostic Imaging Department for this test.  If you have diabetes:   If your kidney function is normal, continue taking your metformin (Avandamet, Glucophage, Glucovance, Metaglip) on the day of your exam.   If your kidney function is abnormal, wait 48 hours before restarting this medicine.  You will have oral contrast for this exam:   You will drink the contrast at home. Get this from your clinic or Diagnostic Imaging Department. Please follow the directions given.  Please wear loose clothing, such as a sweat suit or jogging clothes. Avoid snaps, zippers and other metal. We may ask you to undress and put on a hospital gown.  If you have any questions, please call the Imaging Department where you will have your exam.            Jul 05, 2017  8:30 AM CDT   Return Visit with [facility]   [facility])    [address] [address] [place] [address] [phone]            Jul 05, 2017  9:15 AM CDT   Return Visit with Yakov Shaffer MD   [facility])    [address] [address] [place] [address] [phone]            Jul 05, 2017 10:00 AM CDT   Level 4 with [address])    [id number] [place] [address] [phone]              Who to contact     If you have questions or need follow up information about today's clinic visit or your schedule please contact [facility] directly at [phone].  Normal or non-critical lab and imaging results will be communicated to you by MyChart, letter or phone within 4 business days after the clinic has received the results. If  you do not hear from us within 7 days, please contact the clinic through [employer] or phone. If you have a critical or abnormal lab result, we will notify you by phone as soon as possible.  Submit refill requests through [employer] or call your pharmacy and they will forward the refill request to us. Please allow 3 business days for your refill to be completed.          Additional Information About Your Visit        [employer] Information     [employer] gives you secure access to your electronic health record. If you see a primary care provider, you can also send messages to your care team and make appointments. If you have questions, please call your primary care clinic.  If you do not have a primary care provider, please call [phone] and they will assist you.      [employer] is an electronic gateway that provides easy, online access to your medical records. With [employer], you can request a clinic appointment, read your test results, renew a prescription or communicate with your care team.     To access your existing account, please contact your [facility] or call [phone] for assistance.        Care EveryWhere ID     This is your Care EveryWhere ID. This could be used by other organizations to access your [place] medical records  [id number]         Blood Pressure from Last 3 Encounters:   06/21/17 127/77   06/07/17 117/72   05/24/17 108/66    Weight from Last 3 Encounters:   06/21/17 85.3 kg (188 lb)   06/07/17 84.8 kg (187 lb)   05/24/17 85.5 kg (188 lb 8 oz)              Today, you had the following     No orders found for display       Primary Care Provider Office Phone # Fax #    Sandra Dickerson [phone]       [facility]  [address]  [facility] [address]        Equal Access to Services     DONOVAN GALLO : Hadii tk [place], waaxda luqadaha, qaybta kaalmada tim, claire browne. So [facility]  [phone].    ATENCIÓN: Si arabella luke, tiene a cuevas disposición servicios gratuitos de asistencia lingüística. Ciara ji [phone].    We comply with applicable federal civil rights laws and [place] laws. We do not discriminate on the basis of race, color, national origin, age, disability sex, sexual orientation or gender identity.            Thank you!     Thank you for choosing [facility]  for your care. Our goal is always to provide you with excellent care. Hearing back from our patients is one way we can continue to improve our services. Please take a few minutes to complete the written survey that you may receive in the mail after your visit with us. Thank you!             Your Updated Medication List - Protect others around you: Learn how to safely use, store and throw away your medicines at www.disposemymeds.org.          This list is accurate as of: 6/21/17  2:55 PM.  Always use your most recent med list.                   Brand Name Dispense Instructions for use Diagnosis    allopurinol 100 MG tablet    ZYLOPRIM          aspirin 81 MG chewable tablet      Take 81 mg by mouth daily        lidocaine-prilocaine cream    EMLA          LORazepam 0.5 MG tablet    ATIVAN    30 tablet    Take 1 tablet (0.5 mg) by mouth every 4 hours as needed (Anxiety, Nausea/Vomiting or Sleep)    Malignant neoplasm of overlapping sites of stomach (H)       ondansetron 8 MG tablet    ZOFRAN    10 tablet    Take 1 tablet (8 mg) by mouth every 8 hours as needed (Nausea/Vomiting)    Malignant neoplasm of overlapping sites of stomach (H)       prochlorperazine 10 MG tablet    COMPAZINE    30 tablet    Take 1 tablet (10 mg) by mouth every 6 hours as needed (Nausea/Vomiting)    Malignant neoplasm of overlapping sites of stomach (H)

## 2017-06-21 NOTE — NURSING NOTE
"Oncology Rooming Note    June 21, 2017 8:56 AM   Terry Yi is a 51 year old male who presents for:    Chief Complaint   Patient presents with     Oncology Clinic Visit     follow up     Initial Vitals: /77  Pulse 67  Temp 97.4  F (36.3  C)  Resp 15  Ht 1.8 m (5' 10.87\")  Wt 85.3 kg (188 lb)  SpO2 99%  BMI 26.32 kg/m2 Estimated body mass index is 26.32 kg/(m^2) as calculated from the following:    Height as of this encounter: 1.8 m (5' 10.87\").    Weight as of this encounter: 85.3 kg (188 lb). Body surface area is 2.07 meters squared.  No Pain (0) Comment: Data Unavailable   No LMP for male patient.  Allergies reviewed: Yes  Medications reviewed: Yes    Medications: Medication refills not needed today.  Pharmacy name entered into EPIC: Data Unavailable        5 minutes for nursing intake (face to face time)     Katja Lopez LPN              "

## 2017-06-21 NOTE — PATIENT INSTRUCTIONS
Negative Genetic Test Result    Genetic Testing  You had a blood test that looked at the genetic information in one or more genes associated with increased cancer risk.  The testing looked for any harmful changes that would stop this particular gene from working like it should. If an individual does not have any harmful changes or variants of unknown significance found from their blood test, their genetic test result is reported as negative.       Results  The genetic test did not identify any pathogenic (harmful) changes in the genes that were tested. There are several possible explanations for a negative test result. Without knowing the gene mutation in your family, the cause of the cancer in you or your relatives is still unknown. Your genetic counselor can help interpret the result for you and your relatives. In this case, there are several reasons that may explain the negative test result:    There may be a gene mutation in the family that you did not inherit.     You may have a gene mutation in a different gene that was not included in the test, or has not yet been discovered.     The cancers in you or your family may be due to a combination of genetic factors and environment (multifactorial/familial).    The cancers in you or your family may be sporadic/random cancers.    There is very small chance that a mutation was not found by current testing methods.  As testing technology evolves over time, it may still be possible to identify a mutation in a gene that was not found on this test.    It is important to note which genes were included in your test. A list of these genes can be found on your test result.    Screening Recommendations  Due to this negative test result, cancer screening recommendations should be based on your personal and family history. This may include increased cancer screening for you and/or your family members. Your genetic counselor and health care provider can help make  appropriate recommendations.      Please call us if you have any questions or concerns.     Cancer Risk Management Program  [phone]-CANCER ([phone])  ? Rema Alvarez, MS, [facility]  [phone]  ? Karla Wiseman, MS, [facility]  [phone]  ? Kallie Nevarez, MS, [facility]  [phone]  ? Rosa Banegas, MS, [facility]  [phone]  ? Colten Fabian, MS, [facility]  [phone]

## 2017-06-21 NOTE — PROGRESS NOTES
"6/21/2017    Referring Provider: Yakov Shaffer MD    Presenting Information:  Terry Yi returned to the Cancer Risk Management Program at the [facility] to discuss his genetic testing results. His blood was drawn on 5/24/2017.  CDH1 analysis with reflex to the ColoNext panel was ordered from [employer]. This testing was done because of his personal history of diffuse gastric cancer.    Genetic Testing Result: NEGATIVE  Terry is negative for mutations in the APC, BMPR1A, CDH1, CHEK2, EPCAM, GREM1, MLH1, MSH2, MSH6, MUTYH, PMS2, POLD1, POLE, PTEN, SMAD4, STK11, and TP53 genes.    No mutations were found in any of the 17 genes analyzed.  This test involved sequencing and deletion/duplication analysis of all genes with the exceptions of EPCAM and GREM1 (deletions and duplications only).    Testing did not detect an identifiable mutation associated with Luke syndrome (MLH1, MSH2, MSH6, PMS2, EPCAM), Familial Adenomatous Polyposis (APC), Hereditary Diffuse Gastric Cancer (CDH1), Juvenile Polyposis syndrome (BMPR1A, SMAD4), Cowden syndrome (PTEN), Li Fraumeni syndrome (TP53), Peutz-Jeghers syndrome (STK11), MUTYH Associated Polyposis (MUTYH).    A copy of the test report can be found in the Laboratory tab, dated 5/24/2017, and named \"SEND OUTS MISC TEST\". There are two reports scanned in as linked document; one for the initial CDH1 sequence and deletion/duplication analyses, and one for the ColoNext panel (17 genes, including the CDH1 results).     Interpretation:  We discussed several different interpretations of this negative test result.    1. One explanation may be that there is a different gene or combination of genes and environment that are associated with Terry's gastric cancer.    2. There is also a small possibility that there is a mutation in one of these genes, and we could not find it with our current testing methods.       Screening:  Based on this negative test result, it is important " for Terry and his relatives to refer back to the family history for appropriate cancer screening.  We reviewed that population cancer screening, such as recommendations by the American Cancer Society, are likely appropriate for Terry's close relatives at this time.  These screening recommendations may change if there are changes to Terry's personal and/or family history.  Final screening recommendations should be made by each individual's managing physician.    We discussed the importance for Terry and his family to contact me if the personal or family history changes. This may modify our assessment regarding risk for a hereditary cancer syndrome and/or additional genetic testing options.     Inheritance:  We reviewed the autosomal dominant inheritance of mutations in the 17 genes tested.  We discussed that Terry did not pass on an identifiable mutation in these genes to his children based on this test result.  Mutations in these gene do not skip generations.      Summary:  We do not have an explanation for Terry's gastric cancer.  Because of that, it is important that he and his close relatives continue with cancer screening based on the history discussed above.    Genetic testing is rapidly advancing, and new cancer susceptibility genes will most likely be identified in the future.  Therefore, I encouraged Terry to contact me if he would like to revisit available genetic testing in the future, or if there are changes in his personal or family history.  This may change how we assess his family's cancer risk, screening, and the testing we may offer.    Plan:  1.  I provided Terry with a copy of his test results today.  2. He plans to follow-up with his care providers.  3. He should contact me if there are changes to his family history, or if he would like to revisit available genetic testing options in the future.     If Terry has any further questions, I encouraged him to contact me at  [phone].    Time spent face to face: 10 minutes.    Rosa Banegas MS, [facility]  Certified Genetic Counselor  [phone]

## 2017-06-21 NOTE — MR AVS SNAPSHOT
After Visit Summary   6/21/2017    Terry Yi    MRN: [id number]           Patient Information     Date Of Birth          1965        Visit Information        Provider Department      6/21/2017 10:00 AM BAY 1 INFUSION [facility]        Today's Diagnoses     Malignant neoplasm of overlapping sites of stomach (H)    -  1       Follow-ups after your visit        Your next 10 appointments already scheduled     Jun 30, 2017  8:00 AM CDT   Nurse Only with MG IMAGING NURSE   [facility])    [address] [place] [id number] [phone]            Jun 30, 2017  8:15 AM CDT   CT CHEST ABDOMEN PELVIS W/O & W CONTRAST with MGCT1   [facility])    [address] [place] [id number] [phone]           Please bring any scans or X-rays taken at other hospitals, if similar tests were done. Also bring a list of your medicines, including vitamins, minerals and over-the-counter drugs. It is safest to leave personal items at home.  Be sure to tell your doctor:   If you have any allergies.   If there s any chance you are pregnant.   If you are breastfeeding.   If you have any special needs.  You may have contrast for this exam. To prepare:   Do not eat or drink for 2 hours before your exam. If you need to take medicine, you may take it with small sips of water. (We may ask you to take liquid medicine as well.)   The day before your exam, drink extra fluids at least six 8-ounce glasses (unless your doctor tells you to restrict your fluids).  Patients over 70 or patients with diabetes or kidney problems:   If you haven t had a blood test (creatinine test) within the last 30 days, go to your clinic or Diagnostic Imaging Department for this test.  If you have diabetes:   If your kidney function is normal, continue taking your metformin (Avandamet, Glucophage, Glucovance,  Metaglip) on the day of your exam.   If your kidney function is abnormal, wait 48 hours before restarting this medicine.  You will have oral contrast for this exam:   You will drink the contrast at home. Get this from your clinic or Diagnostic Imaging Department. Please follow the directions given.  Please wear loose clothing, such as a sweat suit or jogging clothes. Avoid snaps, zippers and other metal. We may ask you to undress and put on a hospital gown.  If you have any questions, please call the Imaging Department where you will have your exam.            Jul 05, 2017  8:30 AM CDT   Return Visit with [facility]SE [facility]   [facility]    [address] [place] [address] [id number] [phone]            Jul 05, 2017  9:15 AM CDT   Return Visit with Yakov Shaffer MD   [facility])    [address] [place] [phone]   [phone]            Jul 05, 2017 10:00 AM CDT   Level 4 with BAY 1 INFUSION   [facility]    [address] [place] [id number]   [phone]              Who to contact     If you have questions or need follow up information about today's clinic visit or your schedule please contact [facility] directly at [phone].  Normal or non-critical lab and imaging results will be communicated to you by MyChart, letter or phone within 4 business days after the clinic has received the results. If you do not hear from us within 7 days, please contact the clinic through MyChart or phone. If you have a critical or abnormal lab result, we will notify you by phone as soon as possible.  Submit refill requests through Canatu or call your pharmacy and they will forward the refill request to us. Please allow 3 business days for your refill to be completed.          Additional Information About Your Visit         ClearMyMailhart Information     [email or web address]¡ gives you secure access to your electronic health record. If you see a primary care provider, you can also send messages to your care team and make appointments. If you have questions, please call your primary care clinic.  If you do not have a primary care provider, please call [phone] and they will assist you.      Chegue.lÃ¡ is an electronic gateway that provides easy, online access to your medical records. With [email or web address]¡, you can request a clinic appointment, read your test results, renew a prescription or communicate with your care team.     To access your existing account, please contact your [facility] or call [phone] for assistance.        Care EveryWhere ID     This is your Care EveryWhere ID. This could be used by other organizations to access your [place] medical records  [id number]         Blood Pressure from Last 3 Encounters:   06/21/17 127/77   06/07/17 117/72   05/24/17 108/66    Weight from Last 3 Encounters:   06/21/17 85.3 kg (188 lb)   06/07/17 84.8 kg (187 lb)   05/24/17 85.5 kg (188 lb 8 oz)              Today, you had the following     No orders found for display       Primary Care Provider Office Phone # Fax #    Sandra Dickerson [phone]       [facility]  [address]  [facility] [address]        Equal Access to Services     CHANDAN GALLO : Hadii aad ku hadasho Soomaali, waaxda luqadaha, qaybta kaalmada adeegyada, claire cowart . So [facility] [phone].    ATENCIÓN: Si habla español, tiene a cuevas disposición servicios gratuitos de asistencia lingüística. Ciara al [phone].    We comply with applicable federal civil rights laws and [place] laws. We do not discriminate on the basis of race, color, national origin, age, disability sex, sexual orientation or gender identity.            Thank you!     Thank you for choosing [facility]  for your care.  Our goal is always to provide you with excellent care. Hearing back from our patients is one way we can continue to improve our services. Please take a few minutes to complete the written survey that you may receive in the mail after your visit with us. Thank you!             Your Updated Medication List - Protect others around you: Learn how to safely use, store and throw away your medicines at www.disposemymeds.org.          This list is accurate as of: 6/21/17  1:41 PM.  Always use your most recent med list.                   Brand Name Dispense Instructions for use Diagnosis    allopurinol 100 MG tablet    ZYLOPRIM          aspirin 81 MG chewable tablet      Take 81 mg by mouth daily        lidocaine-prilocaine cream    EMLA          LORazepam 0.5 MG tablet    ATIVAN    30 tablet    Take 1 tablet (0.5 mg) by mouth every 4 hours as needed (Anxiety, Nausea/Vomiting or Sleep)    Malignant neoplasm of overlapping sites of stomach (H)       ondansetron 8 MG tablet    ZOFRAN    10 tablet    Take 1 tablet (8 mg) by mouth every 8 hours as needed (Nausea/Vomiting)    Malignant neoplasm of overlapping sites of stomach (H)       prochlorperazine 10 MG tablet    COMPAZINE    30 tablet    Take 1 tablet (10 mg) by mouth every 6 hours as needed (Nausea/Vomiting)    Malignant neoplasm of overlapping sites of stomach (H)

## 2017-06-21 NOTE — MR AVS SNAPSHOT
After Visit Summary   6/21/2017    Terry Yi    MRN: [id number]           Patient Information     Date Of Birth          1965        Visit Information        Provider Department      6/21/2017 9:15 AM Yakov Shaffer MD [facility]        Today's Diagnoses     Malignant neoplasm of overlapping sites of stomach (H)    -  1      Care Instructions    Proceed with chemo    CT on 6/30    See me back 7/5 with labs and next chemo          Follow-ups after your visit        Your next 10 appointments already scheduled     Jun 30, 2017  8:00 AM CDT   Nurse Only with MG IMAGING NURSE   [facility] ([facility])    [id number] [place] [phone]            Jun 30, 2017  8:15 AM CDT   CT CHEST ABDOMEN PELVIS W/O & W CONTRAST with MGCT1   [facility] ([facility])    [address] [place] [phone]           Please bring any scans or X-rays taken at other hospitals, if similar tests were done. Also bring a list of your medicines, including vitamins, minerals and over-the-counter drugs. It is safest to leave personal items at home.  Be sure to tell your doctor:   If you have any allergies.   If there s any chance you are pregnant.   If you are breastfeeding.   If you have any special needs.  You may have contrast for this exam. To prepare:   Do not eat or drink for 2 hours before your exam. If you need to take medicine, you may take it with small sips of water. (We may ask you to take liquid medicine as well.)   The day before your exam, drink extra fluids at least six 8-ounce glasses (unless your doctor tells you to restrict your fluids).  Patients over 70 or patients with diabetes or kidney problems:   If you haven t had a blood test (creatinine test) within the last 30 days, go to your clinic or Diagnostic Imaging Department for this test.  If you have diabetes:    If your kidney function is normal, continue taking your metformin (Avandamet, Glucophage, Glucovance, Metaglip) on the day of your exam.   If your kidney function is abnormal, wait 48 hours before restarting this medicine.  You will have oral contrast for this exam:   You will drink the contrast at home. Get this from your clinic or Diagnostic Imaging Department. Please follow the directions given.  Please wear loose clothing, such as a sweat suit or jogging clothes. Avoid snaps, zippers and other metal. We may ask you to undress and put on a hospital gown.  If you have any questions, please call the Imaging Department where you will have your exam.            Jul 05, 2017  8:30 AM CDT   Return Visit with NURSE ONLY CANCER CENTER   [facility])    [address] [facility] [address]   [phone]            Jul 05, 2017  9:15 AM CDT   Return Visit with Yakov Shaffer MD   [facility])    [address] [facility] [address]   [phone]            Jul 05, 2017 10:00 AM CDT   Level 4 with BAY 1 INFUSION   [facility])    [address] [facility] [address]   [phone]            Jul 19, 2017  9:00 AM CDT   Return Visit with NURSE ONLY CANCER CENTER   [facility])    [address] [facility] [address]   [phone]            Jul 19, 2017  9:45 AM CDT   Return Visit with Yakov Shaffer MD   [facility])    [address] [facility] [address]   [phone]            Jul 19, 2017 10:30 AM CDT   Level 4 with BAY 1 INFUSION   [facility])    [address] [facility] [address]   [phone]              Who to contact     If you have questions or need follow up  "information about today's clinic visit or your schedule please contact [facility] directly at [phone].  Normal or non-critical lab and imaging results will be communicated to you by Tranzlogichart, letter or phone within 4 business days after the clinic has received the results. If you do not hear from us within 7 days, please contact the clinic through Tranzlogichart or phone. If you have a critical or abnormal lab result, we will notify you by phone as soon as possible.  Submit refill requests through Lumaqco or call your pharmacy and they will forward the refill request to us. Please allow 3 business days for your refill to be completed.          Additional Information About Your Visit        TranzlogicharQalendra Information     Lumaqco gives you secure access to your electronic health record. If you see a primary care provider, you can also send messages to your care team and make appointments. If you have questions, please call your primary care clinic.  If you do not have a primary care provider, please call [phone] and they will assist you.      Lumaqco is an electronic gateway that provides easy, online access to your medical records. With Lumaqco, you can request a clinic appointment, read your test results, renew a prescription or communicate with your care team.     To access your existing account, please contact your [facility] or call [phone] for assistance.        Care EveryWhere ID     This is your Care EveryWhere ID. This could be used by other organizations to access your [place] medical records  [id number]        Your Vitals Were     Pulse Temperature Respirations Height Pulse Oximetry BMI (Body Mass Index)    67 97.4  F (36.3  C) 15 1.8 m (5' 10.87\") 99% 26.32 kg/m2       Blood Pressure from Last 3 Encounters:   06/21/17 127/77   06/07/17 117/72   05/24/17 108/66    Weight from Last 3 Encounters:   06/21/17 85.3 kg (188 lb)   06/07/17 84.8 kg (187 lb) "   05/24/17 85.5 kg (188 lb 8 oz)              Today, you had the following     No orders found for display       Primary Care Provider Office Phone # Fax #    Sandra TOLBERT SARAI Dickerson [phone]       [facility]  [address]  [facility] [address]        Equal Access to Services     [facility]: Hadii aad ku hadasho Soomaali, waaxda luqadaha, qaybta kaalmada adeegyada, waxay idiin hayaan adeeg kharash la'aan . So [facility] [phone].    ATENCIÓN: Si habla español, tiene a cuevas disposición servicios gratuitos de asistencia lingüística. Britneyame al [phone].    We comply with applicable federal civil rights laws and [place] laws. We do not discriminate on the basis of race, color, national origin, age, disability sex, sexual orientation or gender identity.            Thank you!     Thank you for choosing [facility]  for your care. Our goal is always to provide you with excellent care. Hearing back from our patients is one way we can continue to improve our services. Please take a few minutes to complete the written survey that you may receive in the mail after your visit with us. Thank you!             Your Updated Medication List - Protect others around you: Learn how to safely use, store and throw away your medicines at www.disposemymeds.org.          This list is accurate as of: 6/21/17 11:59 PM.  Always use your most recent med list.                   Brand Name Dispense Instructions for use Diagnosis    allopurinol 100 MG tablet    ZYLOPRIM          aspirin 81 MG chewable tablet      Take 81 mg by mouth daily        lidocaine-prilocaine cream    EMLA          LORazepam 0.5 MG tablet    ATIVAN    30 tablet    Take 1 tablet (0.5 mg) by mouth every 4 hours as needed (Anxiety, Nausea/Vomiting or Sleep)    Malignant neoplasm of overlapping sites of stomach (H)       ondansetron 8 MG tablet    ZOFRAN    10 tablet    Take 1 tablet (8 mg) by mouth every 8 hours as needed  (Nausea/Vomiting)    Malignant neoplasm of overlapping sites of stomach (H)       prochlorperazine 10 MG tablet    COMPAZINE    30 tablet    Take 1 tablet (10 mg) by mouth every 6 hours as needed (Nausea/Vomiting)    Malignant neoplasm of overlapping sites of stomach (H)

## 2017-06-21 NOTE — PROGRESS NOTES
"Infusion Nursing Note:  Terry Yi presents today for C6 D1 Oxaliplatin/Leucovorin/Fluorouracil bolus and pump connect .    Patient seen by provider today: Yes: [place]   present during visit today: Not Applicable.    Note: N/A.    Intravenous Access:  Implanted Port.    Treatment Conditions:  Lab Results   Component Value Date    HGB 13.3 06/21/2017     Lab Results   Component Value Date    WBC 5.2 06/21/2017      Lab Results   Component Value Date    ANEU 2.6 06/21/2017     Lab Results   Component Value Date     06/21/2017      Lab Results   Component Value Date     06/21/2017                   Lab Results   Component Value Date    POTASSIUM 4.2 06/21/2017           No results found for: MAG         Lab Results   Component Value Date    CR 0.72 06/21/2017                   Lab Results   Component Value Date    BRITTNEY 8.9 06/21/2017                Lab Results   Component Value Date    BILITOTAL 0.2 06/21/2017           Lab Results   Component Value Date    ALBUMIN 3.5 06/21/2017                    Lab Results   Component Value Date    ALT 49 06/21/2017           Lab Results   Component Value Date    AST 31 06/21/2017     Results reviewed, labs MET treatment parameters, ok to proceed with treatment.      Post Infusion Assessment:  Patient tolerated infusion without incident.  Prior to discharge: Port is secured in place with tegaderm and flushed with 10cc NS with positive blood return noted.  Continuous home infusion Dosi-Fuser pump connected.    All connectors secured in place and clamps taped open.    Pump started, \"running\" noted on display (CADD): Not Applicable.  Patient instructed to call our clinic or [facility] with any questions or concerns at home.  Patient verbalized understanding.    Patient set self up for pump disconnect  At [facility] on 6/23 at 1030        Discharge Plan:   Copy of AVS reviewed with patient and/or family.  Patient will return 7/5/17 for next " appointment.  Patient discharged in stable condition accompanied by: wife, daughter and friend.  Departure Mode: Ambulatory.    Cheryl Osorio RN

## 2017-06-21 NOTE — PROGRESS NOTES
"6/21/2017        REASON FOR VISIT:  Follow-up for unresectable metastatic gastric carcinoma, diffuse type, grade 3, HER-2 negative.      HISTORY OF PRESENT ILLNESS:  Please see my previous note for details.  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  Up to now, he has received 5 cycles of FOLFOX      INTERVAL HISTORY:   He is tolerating chemotherapy really well.  He does feel some cold sensitivity for 3-4 days, but does not have any lingering neuropathy.  His appetite is a little down for a few days, but he is still able to eat normally, and during this time he for the most part has been eating really well without any nausea or vomiting, and his weight has remained stable.  His energy is good.  He continues to work full-time.  He denies any interval infections.  No new lumps, bumps or swellings.  He denies any pain.      Otherwise, a comprehensive review of systems was performed, and it was negative.        MEDICATIONS:   Current Outpatient Prescriptions   Medication     aspirin 81 MG chewable tablet     LORazepam (ATIVAN) 0.5 MG tablet     prochlorperazine (COMPAZINE) 10 MG tablet     ondansetron (ZOFRAN) 8 MG tablet     allopurinol (ZYLOPRIM) 100 MG tablet     lidocaine-prilocaine (EMLA) cream     No current facility-administered medications for this visit.           PHYSICAL EXAMINATION:   /77  Pulse 67  Temp 97.4  F (36.3  C)  Resp 15  Ht 1.8 m (5' 10.87\")  Wt 85.3 kg (188 lb)  SpO2 99%  BMI 26.32 kg/m2    PHYSICAL EXAMINATION:   CONSTITUTIONAL:  Middle-aged gentleman, no apparent distress.   EYES:  Pupils are equal and reactive to light and accommodation.  No pallor.  No icterus.   ENT:  Ears look normal.  No oral lesions.   CARDIOVASCULAR:  S1, S2 audible.  Normal.   RESPIRATORY:  Clear " chest.   GASTROINTESTINAL:  Benign abdomen.   NEUROLOGIC:  Nonfocal.   LYMPHATICS:  No palpable lymph nodes.   EXTREMITIES:  No pedal edema.   PSYCHIATRIC:  Mood and affect are normal.   CHEST:  Port site is clean and intact.     LABS:  Reviewed        ASSESSMENT AND PLAN:  Metastatic gastric cancer presenting with early satiety and significant weight loss and epigastric discomfort upon eating with some vomiting.  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  There is an indeterminate right liver lobe lesion.      ASSESSMENT AND PLAN:  He received 5 cycles of palliative FOLFOX, which was initiated on 03/12/2017.  He is tolerating the therapy really well.  My plan would be to continue with cycle #6 today and then he has repeat scans planned before cycle #7.  I will see him in 2 weeks to go over the scan and decide about future plan of action.  He understands that as long as he is tolerating chemotherapy well and as long as his tumor is in check, we will continue with the chemotherapy.      He does have a mild cold sensitivity but no lingering neuropathy.  At this time, we will keep a watch on that.  He knows to keep himself warmer when he feels cold sensitivity due to oxaliplatin      I again encouraged him to remain active and exercises regularly, which he continues to do so and I think that is helping him tolerate the treatments really well.      His nutrition has significantly improved and his weight has remained stable during this time.  I encouraged him to continue to keep up with good and healthy nutrition.      He had genetic testing done and no identifiable mutation was seen.  He is going to meet with the genetic counselor again today to go over the results in detail.      All of his questions were answered to his satisfaction.  He is agreeable and comfortable with this plan.       LANEY HERNANDEZ MD

## 2017-06-21 NOTE — LETTER
Cancer Risk Management  Program Locations    [facility] [place] [facility]  Mailing Address  Cancer Risk Management Program  [facility]  [address]  [place] [address]    New patient appointments  [phone]  June 21, 2017    Terry Yi  [address] [place] [address]      Dear Terry,    It was a pleasure meeting with you again at the [facility].  Here is a copy of the progress note from your recent genetic counseling visit to the Cancer Risk Management Program on 6/21/2017.  If you have any additional questions, please feel free to call.    Referring Provider: Yakov Shaffer MD    Presenting Information:  Terry Yi returned to the Cancer Risk Management Program at the [facility] to discuss his genetic testing results. His blood was drawn on 5/24/2017.  CDH1 analysis with reflex to the ColoNext panel was ordered from [facility]. This testing was done because of his personal history of diffuse gastric cancer.    Genetic Testing Result: NEGATIVE  Terry is negative for mutations in the APC, BMPR1A, CDH1, CHEK2, EPCAM, GREM1, MLH1, MSH2, MSH6, MUTYH, PMS2, POLD1, POLE, PTEN, SMAD4, STK11, and TP53 genes.    No mutations were found in any of the 17 genes analyzed.  This test involved sequencing and deletion/duplication analysis of all genes with the exceptions of EPCAM and GREM1 (deletions and duplications only).    Testing did not detect an identifiable mutation associated with Luke syndrome (MLH1, MSH2, MSH6, PMS2, EPCAM), Familial Adenomatous Polyposis (APC), Hereditary Diffuse Gastric Cancer (CDH1), Juvenile Polyposis syndrome (BMPR1A, SMAD4), Cowden syndrome (PTEN), Li Fraumeni syndrome (TP53), Peutz-Jeghers syndrome (STK11), MUTYH Associated Polyposis  (MUTYH).    Interpretation:  We discussed several different interpretations of this negative test result.    1. One explanation may be that there is a different gene or combination of genes and environment that are associated with Terry's gastric cancer.    2. There is also a small possibility that there is a mutation in one of these genes, and we could not find it with our current testing methods.       Screening:  Based on this negative test result, it is important for Terry and his relatives to refer back to the family history for appropriate cancer screening.  We reviewed that population cancer screening, such as recommendations by the American Cancer Society, are likely appropriate for Terry's close relatives at this time.  These screening recommendations may change if there are changes to Terry's personal and/or family history.  Final screening recommendations should be made by each individual's managing physician.    We discussed the importance for Terry and his family to contact me if the personal or family history changes. This may modify our assessment regarding risk for a hereditary cancer syndrome and/or additional genetic testing options.     Inheritance:  We reviewed the autosomal dominant inheritance of mutations in the 17 genes tested.  We discussed that Terry did not pass on an identifiable mutation in these genes to his children based on this test result.  Mutations in these gene do not skip generations.      Summary:  We do not have an explanation for Terry's gastric cancer.  Because of that, it is important that he and his close relatives continue with cancer screening based on the history discussed above.    Genetic testing is rapidly advancing, and new cancer susceptibility genes will most likely be identified in the future.  Therefore, I encouraged Terry to contact me if he would like to revisit available genetic testing in the future, or if there are changes in his personal or family  history.  This may change how we assess his family's cancer risk, screening, and the testing we may offer.    Plan:  1.  I provided Terry with a copy of his test results today.  2. He plans to follow-up with his care providers.  3. He should contact me if there are changes to his family history, or if he would like to revisit available genetic testing options in the future.     If Terry has any further questions, I encouraged him to contact me at [phone].    Rosa Banegas MS, [facility]  Certified Genetic Counselor  [phone]

## 2017-06-29 LAB — LAB SCANNED RESULT: NORMAL

## 2017-06-30 ENCOUNTER — ANCILLARY PROCEDURE (OUTPATIENT)
Dept: GENERAL RADIOLOGY | Facility: CLINIC | Age: 52
End: 2017-06-30
Payer: COMMERCIAL

## 2017-06-30 ENCOUNTER — RADIANT APPOINTMENT (OUTPATIENT)
Dept: CT IMAGING | Facility: CLINIC | Age: 52
End: 2017-06-30
Attending: INTERNAL MEDICINE
Payer: COMMERCIAL

## 2017-06-30 DIAGNOSIS — C16.8 MALIGNANT NEOPLASM OF OVERLAPPING SITES OF STOMACH (H): ICD-10-CM

## 2017-06-30 DIAGNOSIS — C16.9 MALIGNANT NEOPLASM OF STOMACH, UNSPECIFIED LOCATION (H): Primary | ICD-10-CM

## 2017-06-30 LAB
CREAT BLD-MCNC: 0.8 MG/DL (ref 0.66–1.25)
GFR SERPL CREATININE-BSD FRML MDRD: >90 ML/MIN/1.7M2

## 2017-06-30 PROCEDURE — 99207 ZZC NO BILLABLE SERVICE THIS VISIT: CPT

## 2017-06-30 PROCEDURE — 74177 CT ABD & PELVIS W/CONTRAST: CPT | Performed by: RADIOLOGY

## 2017-06-30 PROCEDURE — 71260 CT THORAX DX C+: CPT | Performed by: RADIOLOGY

## 2017-06-30 PROCEDURE — 82565 ASSAY OF CREATININE: CPT | Performed by: FAMILY MEDICINE

## 2017-06-30 RX ORDER — IOPAMIDOL 755 MG/ML
115 INJECTION, SOLUTION INTRAVASCULAR ONCE
Status: COMPLETED | OUTPATIENT
Start: 2017-06-30 | End: 2017-06-30

## 2017-06-30 RX ADMIN — IOPAMIDOL 115 ML: 755 INJECTION, SOLUTION INTRAVASCULAR at 08:18

## 2017-06-30 NOTE — NURSING NOTE
IVAD accessed with 20 gauge 3/4inch matamoros gripper plus needle.  Flushed with 10 ml Sterile 0.9% NaCl (NDC [address]-10) and dressed with sterile Tegaderm.  Blood return positive: yes  Site without redness, tenderness or swelling: yes  Flushed with 20 cc NS (NDC [address]-01)and 5 cc 100 unit/ml Heparin (NDC [address]-61)  Needle: d/c'd intact

## 2017-06-30 NOTE — MR AVS SNAPSHOT
MRN:[id number]                      After Visit Summary   6/30/2017    Terry Yi    MRN: [id number]           Visit Information        Provider Department      6/30/2017 8:00 AM MG IMAGING NURSE [facility]        Your next 10 appointments already scheduled     Jul 05, 2017  8:30 AM CDT   Return Visit with NURSE ONLY CANCER CENTER   [facility] ([facility])    [address] [place] [address]   [phone]            Jul 05, 2017  9:15 AM CDT   Return Visit with Yakov Shaffer MD   [facility])    [address] [place] [address]   [phone]            Jul 05, 2017 10:00 AM CDT   Level 4 with [facility] INFUSION   [facility])    [address] [place] [address]   [phone]            Jul 19, 2017  9:00 AM CDT   Return Visit with NURSE ONLY CANCER CENTER   [facility] ([facility])    [address] [place] [address]   [phone]            Jul 19, 2017  9:45 AM CDT   Return Visit with Yakov Shaffer MD   [facility])    [address] [place] [address]   [phone]            Jul 19, 2017 10:30 AM CDT   Level 4 with [address])    [address] [place] [address]   [phone]              [employer] Information     [employer] gives you secure access to your electronic health record. If you see a primary care provider, you can also send messages to your care team and make appointments. If you have questions, please call your primary care clinic.  If you do not have a primary care provider, please call [phone] and they will assist you.      [employer] is an electronic gateway that provides easy, online access to your medical  records. With [employer], you can request a clinic appointment, read your test results, renew a prescription or communicate with your care team.     To access your existing account, please contact your [facility] or call [phone] for assistance.        Care EveryWhere ID     This is your Care EveryWhere ID. This could be used by other organizations to access your [place] medical records  [id number]        Equal Access to Services     CHANDAN GALLO : Jemal Casillas, matt rincon, karime dumont, claire browne. So [facility] [phone].    ATENCIÓN: Si habla español, tiene a cuevas disposición servicios gratuitos de asistencia lingüística. Llame al [phone].    We comply with applicable federal civil rights laws and [place] laws. We do not discriminate on the basis of race, color, national origin, age, disability sex, sexual orientation or gender identity.

## 2017-07-05 ENCOUNTER — NURSE TRIAGE (OUTPATIENT)
Dept: NURSING | Facility: CLINIC | Age: 52
End: 2017-07-05

## 2017-07-05 ENCOUNTER — INFUSION THERAPY VISIT (OUTPATIENT)
Dept: INFUSION THERAPY | Facility: CLINIC | Age: 52
End: 2017-07-05
Payer: COMMERCIAL

## 2017-07-05 ENCOUNTER — ONCOLOGY VISIT (OUTPATIENT)
Dept: ONCOLOGY | Facility: CLINIC | Age: 52
End: 2017-07-05
Payer: COMMERCIAL

## 2017-07-05 VITALS
TEMPERATURE: 97.2 F | SYSTOLIC BLOOD PRESSURE: 121 MMHG | WEIGHT: 195 LBS | OXYGEN SATURATION: 98 % | RESPIRATION RATE: 18 BRPM | HEIGHT: 71 IN | BODY MASS INDEX: 27.3 KG/M2 | DIASTOLIC BLOOD PRESSURE: 75 MMHG | HEART RATE: 76 BPM

## 2017-07-05 DIAGNOSIS — C16.8 MALIGNANT NEOPLASM OF OVERLAPPING SITES OF STOMACH (H): Primary | ICD-10-CM

## 2017-07-05 LAB
ALBUMIN SERPL-MCNC: 3.2 G/DL (ref 3.4–5)
ALP SERPL-CCNC: 77 U/L (ref 40–150)
ALT SERPL W P-5'-P-CCNC: 44 U/L (ref 0–70)
ANION GAP SERPL CALCULATED.3IONS-SCNC: 7 MMOL/L (ref 3–14)
AST SERPL W P-5'-P-CCNC: 32 U/L (ref 0–45)
BASOPHILS # BLD AUTO: 0 10E9/L (ref 0–0.2)
BASOPHILS NFR BLD AUTO: 0.8 %
BILIRUB SERPL-MCNC: 0.2 MG/DL (ref 0.2–1.3)
BUN SERPL-MCNC: 15 MG/DL (ref 7–30)
CALCIUM SERPL-MCNC: 8.6 MG/DL (ref 8.5–10.1)
CHLORIDE SERPL-SCNC: 110 MMOL/L (ref 94–109)
CO2 SERPL-SCNC: 27 MMOL/L (ref 20–32)
CREAT SERPL-MCNC: 0.75 MG/DL (ref 0.66–1.25)
DIFFERENTIAL METHOD BLD: ABNORMAL
EOSINOPHIL # BLD AUTO: 0.2 10E9/L (ref 0–0.7)
EOSINOPHIL NFR BLD AUTO: 3.1 %
ERYTHROCYTE [DISTWIDTH] IN BLOOD BY AUTOMATED COUNT: 17.4 % (ref 10–15)
GFR SERPL CREATININE-BSD FRML MDRD: ABNORMAL ML/MIN/1.7M2
GLUCOSE SERPL-MCNC: 93 MG/DL (ref 70–99)
HCT VFR BLD AUTO: 36.9 % (ref 40–53)
HGB BLD-MCNC: 12.7 G/DL (ref 13.3–17.7)
LYMPHOCYTES # BLD AUTO: 1.7 10E9/L (ref 0.8–5.3)
LYMPHOCYTES NFR BLD AUTO: 34.9 %
MCH RBC QN AUTO: 31.2 PG (ref 26.5–33)
MCHC RBC AUTO-ENTMCNC: 34.4 G/DL (ref 31.5–36.5)
MCV RBC AUTO: 91 FL (ref 78–100)
MONOCYTES # BLD AUTO: 0.6 10E9/L (ref 0–1.3)
MONOCYTES NFR BLD AUTO: 13 %
NEUTROPHILS # BLD AUTO: 2.3 10E9/L (ref 1.6–8.3)
NEUTROPHILS NFR BLD AUTO: 48.2 %
PLATELET # BLD AUTO: 113 10E9/L (ref 150–450)
POTASSIUM SERPL-SCNC: 4 MMOL/L (ref 3.4–5.3)
PROT SERPL-MCNC: 6.2 G/DL (ref 6.8–8.8)
RBC # BLD AUTO: 4.07 10E12/L (ref 4.4–5.9)
SODIUM SERPL-SCNC: 144 MMOL/L (ref 133–144)
WBC # BLD AUTO: 4.8 10E9/L (ref 4–11)

## 2017-07-05 PROCEDURE — 96415 CHEMO IV INFUSION ADDL HR: CPT | Performed by: INTERNAL MEDICINE

## 2017-07-05 PROCEDURE — 96367 TX/PROPH/DG ADDL SEQ IV INF: CPT | Performed by: INTERNAL MEDICINE

## 2017-07-05 PROCEDURE — 99207 ZZC NO CHARGE LOS: CPT

## 2017-07-05 PROCEDURE — 96416 CHEMO PROLONG INFUSE W/PUMP: CPT | Performed by: INTERNAL MEDICINE

## 2017-07-05 PROCEDURE — 96411 CHEMO IV PUSH ADDL DRUG: CPT | Performed by: INTERNAL MEDICINE

## 2017-07-05 PROCEDURE — 96368 THER/DIAG CONCURRENT INF: CPT | Performed by: INTERNAL MEDICINE

## 2017-07-05 PROCEDURE — 80053 COMPREHEN METABOLIC PANEL: CPT | Performed by: INTERNAL MEDICINE

## 2017-07-05 PROCEDURE — 99214 OFFICE O/P EST MOD 30 MIN: CPT | Mod: 25 | Performed by: INTERNAL MEDICINE

## 2017-07-05 PROCEDURE — 96413 CHEMO IV INFUSION 1 HR: CPT | Performed by: INTERNAL MEDICINE

## 2017-07-05 PROCEDURE — 99207 ZZC NO CHARGE NURSE ONLY: CPT

## 2017-07-05 PROCEDURE — 85025 COMPLETE CBC W/AUTO DIFF WBC: CPT | Performed by: INTERNAL MEDICINE

## 2017-07-05 RX ORDER — METHYLPREDNISOLONE SODIUM SUCCINATE 125 MG/2ML
125 INJECTION, POWDER, LYOPHILIZED, FOR SOLUTION INTRAMUSCULAR; INTRAVENOUS
Status: CANCELLED
Start: 2017-07-05

## 2017-07-05 RX ORDER — ALBUTEROL SULFATE 0.83 MG/ML
2.5 SOLUTION RESPIRATORY (INHALATION)
Status: CANCELLED | OUTPATIENT
Start: 2017-07-05

## 2017-07-05 RX ORDER — DIPHENHYDRAMINE HYDROCHLORIDE 50 MG/ML
50 INJECTION INTRAMUSCULAR; INTRAVENOUS
Status: CANCELLED
Start: 2017-07-05

## 2017-07-05 RX ORDER — ALBUTEROL SULFATE 90 UG/1
1-2 AEROSOL, METERED RESPIRATORY (INHALATION)
Status: CANCELLED
Start: 2017-07-05

## 2017-07-05 RX ORDER — EPINEPHRINE 1 MG/ML
0.3 INJECTION INTRAMUSCULAR; INTRAVENOUS; SUBCUTANEOUS EVERY 5 MIN PRN
Status: CANCELLED | OUTPATIENT
Start: 2017-07-05

## 2017-07-05 RX ORDER — FLUOROURACIL 50 MG/ML
400 INJECTION, SOLUTION INTRAVENOUS ONCE
Status: CANCELLED | OUTPATIENT
Start: 2017-07-05

## 2017-07-05 RX ORDER — HEPARIN SODIUM (PORCINE) LOCK FLUSH IV SOLN 100 UNIT/ML 100 UNIT/ML
5 SOLUTION INTRAVENOUS
Status: DISCONTINUED | OUTPATIENT
Start: 2017-07-05 | End: 2017-07-05 | Stop reason: HOSPADM

## 2017-07-05 RX ORDER — SODIUM CHLORIDE 9 MG/ML
1000 INJECTION, SOLUTION INTRAVENOUS CONTINUOUS PRN
Status: CANCELLED
Start: 2017-07-05

## 2017-07-05 RX ORDER — FLUOROURACIL 50 MG/ML
400 INJECTION, SOLUTION INTRAVENOUS ONCE
Status: COMPLETED | OUTPATIENT
Start: 2017-07-05 | End: 2017-07-05

## 2017-07-05 RX ORDER — MEPERIDINE HYDROCHLORIDE 50 MG/ML
25 INJECTION INTRAMUSCULAR; INTRAVENOUS; SUBCUTANEOUS EVERY 30 MIN PRN
Status: CANCELLED | OUTPATIENT
Start: 2017-07-05

## 2017-07-05 RX ORDER — LORAZEPAM 2 MG/ML
0.5 INJECTION INTRAMUSCULAR EVERY 4 HOURS PRN
Status: CANCELLED
Start: 2017-07-05

## 2017-07-05 RX ORDER — EPINEPHRINE 0.3 MG/.3ML
0.3 INJECTION SUBCUTANEOUS EVERY 5 MIN PRN
Status: CANCELLED | OUTPATIENT
Start: 2017-07-05

## 2017-07-05 RX ADMIN — FLUOROURACIL 830 MG: 50 INJECTION, SOLUTION INTRAVENOUS at 12:33

## 2017-07-05 ASSESSMENT — PAIN SCALES - GENERAL: PAINLEVEL: NO PAIN (0)

## 2017-07-05 NOTE — MR AVS SNAPSHOT
After Visit Summary   7/5/2017    Terry Yi    MRN: [id number]           Patient Information     Date Of Birth          1965        Visit Information        Provider Department      7/5/2017 9:15 AM Yakov Shaffer MD [facility]        Today's Diagnoses     Malignant neoplasm of overlapping sites of stomach (H)    -  1      Care Instructions    Proceed with chemo    See me back in 2 weeks with labs and chemo          Follow-ups after your visit        Your next 10 appointments already scheduled     Jul 05, 2017 10:00 AM CDT   Level 4 with BAY 1 INFUSION   [facility] ([facility])    [address] [place] [address] [phone]            Jul 19, 2017  9:00 AM CDT   Return Visit with NURSE ONLY CANCER CENTER   [facility] ([facility])    [address] [place] [address] [phone]            Jul 19, 2017  9:45 AM CDT   Return Visit with Yakov Shaffer MD   [facility])    [address] [place] [address] [phone]            Jul 19, 2017 10:30 AM CDT   Level 4 with BAY 1 INFUSION   [facility])    [address] [place] [id number] [phone]              Who to contact     If you have questions or need follow up information about today's clinic visit or your schedule please contact [facility] directly at [phone].  Normal or non-critical lab and imaging results will be communicated to you by MyChart, letter or phone within 4 business days after the clinic has received the results. If you do not hear from us within 7 days, please contact the clinic through MyChart or phone. If you have a critical or abnormal lab result, we will notify you by phone as soon as possible.  Submit refill requests through [employer] or call your  "pharmacy and they will forward the refill request to us. Please allow 3 business days for your refill to be completed.          Additional Information About Your Visit        [employer] Information     [employer] gives you secure access to your electronic health record. If you see a primary care provider, you can also send messages to your care team and make appointments. If you have questions, please call your primary care clinic.  If you do not have a primary care provider, please call [phone] and they will assist you.      [employer] is an electronic gateway that provides easy, online access to your medical records. With [employer], you can request a clinic appointment, read your test results, renew a prescription or communicate with your care team.     To access your existing account, please contact your [facility] or call [phone] for assistance.        Care EveryWhere ID     This is your Care EveryWhere ID. This could be used by other organizations to access your [place] medical records  [id number]        Your Vitals Were     Pulse Temperature Respirations Height Pulse Oximetry BMI (Body Mass Index)    76 97.2  F (36.2  C) (Oral) 18 1.8 m (5' 10.87\") 98% 27.3 kg/m2       Blood Pressure from Last 3 Encounters:   07/05/17 121/75   06/21/17 127/77   06/07/17 117/72    Weight from Last 3 Encounters:   07/05/17 88.5 kg (195 lb)   06/21/17 85.3 kg (188 lb)   06/07/17 84.8 kg (187 lb)              Today, you had the following     No orders found for display       Primary Care Provider Office Phone # Fax #    Sandra Dickerson [phone]       [facility]  [address]  [facility] [address]        Equal Access to Services     [facility] : Hadii aad latanya campo Sojacquelinali, waaxda luqadaha, qaybta kaalmada adeegyada, claire browne. So [facility] [phone].    ATENCIÓN: Si habla español, tiene a cuevas disposición servicios gratuitos de asistencia " lingüística. Ciara al [phone].    We comply with applicable federal civil rights laws and [place] laws. We do not discriminate on the basis of race, color, national origin, age, disability sex, sexual orientation or gender identity.            Thank you!     Thank you for choosing [facility]  for your care. Our goal is always to provide you with excellent care. Hearing back from our patients is one way we can continue to improve our services. Please take a few minutes to complete the written survey that you may receive in the mail after your visit with us. Thank you!             Your Updated Medication List - Protect others around you: Learn how to safely use, store and throw away your medicines at www.disposemymeds.org.          This list is accurate as of: 7/5/17  9:36 AM.  Always use your most recent med list.                   Brand Name Dispense Instructions for use Diagnosis    allopurinol 100 MG tablet    ZYLOPRIM          aspirin 81 MG chewable tablet      Take 81 mg by mouth daily        lidocaine-prilocaine cream    EMLA          LORazepam 0.5 MG tablet    ATIVAN    30 tablet    Take 1 tablet (0.5 mg) by mouth every 4 hours as needed (Anxiety, Nausea/Vomiting or Sleep)    Malignant neoplasm of overlapping sites of stomach (H)       ondansetron 8 MG tablet    ZOFRAN    10 tablet    Take 1 tablet (8 mg) by mouth every 8 hours as needed (Nausea/Vomiting)    Malignant neoplasm of overlapping sites of stomach (H)       prochlorperazine 10 MG tablet    COMPAZINE    30 tablet    Take 1 tablet (10 mg) by mouth every 6 hours as needed (Nausea/Vomiting)    Malignant neoplasm of overlapping sites of stomach (H)

## 2017-07-05 NOTE — PROGRESS NOTES
"7/5/2017    REASON FOR VISIT:  Follow-up for unresectable metastatic gastric carcinoma, diffuse type, grade 3, HER-2 negative.      HISTORY OF PRESENT ILLNESS:  Please see my previous note for details.  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  Cycle 6 was administered on 6/21/2017   He had repeat staging done on June 30, 2017 with a CT scan chest abdomen and pelvis     INTERVAL HISTORY:   [place] comes in today. His family accompanies him. He doesn't he is tolerating chemotherapy well. He has minimal fatigue. He does feel some cold sensitivity. He has minimal tingling and numbness of the fingers and feet which are not hampering with his activities. He has good balance   He continues to work full-time   He denies any interval infections   He does not have any nausea vomiting diarrhea or constipation. He is eating well. He has gained a few pounds during the last couple of weeks.    ROS:  A comprehensive ROS was otherwise neg       MEDICATIONS:   Current Outpatient Prescriptions   Medication     aspirin 81 MG chewable tablet     LORazepam (ATIVAN) 0.5 MG tablet     prochlorperazine (COMPAZINE) 10 MG tablet     ondansetron (ZOFRAN) 8 MG tablet     allopurinol (ZYLOPRIM) 100 MG tablet     lidocaine-prilocaine (EMLA) cream     No current facility-administered medications for this visit.      Facility-Administered Medications Ordered in Other Visits   Medication     heparin 100 UNIT/ML injection 5 mL     sodium chloride (PF) 0.9% PF flush 10-20 mL          PHYSICAL EXAMINATION:   /75  Pulse 76  Temp 97.2  F (36.2  C) (Oral)  Resp 18  Ht 1.8 m (5' 10.87\")  Wt 88.5 kg (195 lb)  SpO2 98%  BMI 27.3 kg/m2    CONSTITUTIONAL:  No acute distress  EYES:  Without any paler or icterus  ENT:  Ears are " unremarkable. No mouth sores   CARDIOVASCULAR:  S1, S2 audible.  No murmurs rubs or gallops   RESPIRATORY:  Clear to auscultation bilaterally  GASTROINTESTINAL:  Soft nontender no hepatosplenomegaly  NEUROLOGIC:  Alert and oriented ×3 he has a grossly nonfocal neurological exam. His gait is normal. Romberg's test is negative  LYMPHATICS:  No abnormal lymph nodes palpable  EXTREMITIES:  No edema  PSYCHIATRIC:  Mood and affect are appropriate   CHEST:  Port site is clean dry and intact    LABS:  Reviewed and stable    CT scan of chest abdomen and pelvis dated 6/30/2017  FINDINGS:     Chest: Stable appearance of 12 mm hypodense nodule in the inferior  right thyroid lobe. Several enlarged, partially calcified hilar and  mediastinal lymph nodes are unchanged from 3/24/2017, likely sequelae  of prior granulomatous disease. No new enlarged mediastinal or hilar  lymph nodes. Heart size is normal. Trace pericardial fluid. Coronary  artery and aortic arch calcifications. Pulmonary vasculature is  normal. Right chest wall portacatheter tip at the superior atriocaval  junction.     The central tracheobronchial tree is patent. There is no pleural  effusion or pneumothorax. No consolidative pulmonary opacities.  Multiple bilateral calcified and noncalcified pulmonary nodules are  again seen. No new or suspicious pulmonary nodules are identified.     Abdomen and Pelvis:   No significant change in size or appearance of ill-defined, hypodense  subcapsular lesion in the right hepatic lobe (series 3, image 153).  This lesion was not FDG avid on prior PET/CT. No intra or extrahepatic  biliary dilatation. The gallbladder, pancreas, and spleen are normal.  The adrenal glands and kidneys are normal. No hydronephrosis or  hydroureter. The bladder is distended and appears normal. Coarse  prostatic calcifications.     Unchanged diffuse thickening of the gastric wall. Extensive mesenteric  stranding adjacent to the stomach and extending  inferiorly to involve  the lesser omentum, peripancreatic fat, and pericaval/periaortic soft  tissue is unchanged. This includes the previously identified area of  soft tissue thickening adjacent to the gastric cardia and gastric  esophageal junction, (series 3, image 145). There is continued soft  tissue thickening along the course of the inferior mesenteric  vasculature, with prominent adjacent mesenteric lymph nodes. Enlarged  lymph node in the lesser sac measuring 13 mm in short axis (series 3,  image 154) is unchanged.      There is atherosclerotic calcification of the aorta the iliac  arteries. There is no aortic aneurysm. The small intestine, and colon  are not distended. Sigmoid diverticulosis without diverticulitis.  Appendix is normal. There is abnormal free fluid in the pelvic cavity.  There is no pneumoperitoneum.     Bones and soft tissues:  There is no suspicious osseous abnormality. Similar appearance of  subchondral cyst formation involving the right hip.         Impression:   1. No significant change in diffuse gastric wall thickening, and  mesenteric inflammation around the stomach, pancreas, and  retroperitoneum, consistent with invasive gastric malignancy.   1a. No significant change in size of the soft tissue mass in the  gastrohepatic ligament. The mass does appear to be more heterogeneous  on this exam with areas of focal enhancement where previously it was  more diffusely enhancing.   2. Stable appearance of ill-defined hypodensity in the right hepatic  lobe, not FDG avid on prior PET/CT.  3. Sequelae of prior granulomatous disease in the chest. No new or  enlarging pulmonary nodules.   4. Colonic diverticulosis without diverticulitis.     ASSESSMENT AND PLAN:  Metastatic gastric cancer presenting with early satiety and significant weight loss and epigastric discomfort upon eating with some vomiting.  There is linitis plastica with involvement of periaortic lymph node in the aortocaval region  as well as extensive mesenteric infiltration.  There is an indeterminate right liver lobe lesion.      Palliative FOLFOX was initiated on 03/12/2017.   He has received 6 cycles up until now . His repeat CT scans show stable disease   He is tolerating chemotherapy well. At this time my plan would be to continue with the same chemotherapy   I would like to give him at least a couple of more months of chemotherapy before repeating his imaging   We will proceed with cycle #7 today     He does have cold sensitivity and minimal neuropathy. At this time it is very mild so we will continue the same dose of oxaliplatin. He will let me know if it gets worse and then we can make an adjustment to oxaliplatin dosage    Fatigue. This is minimal and he continues to work full-time. I encouraged him to keep himself active and exercises regularly.    We also discussed importance of adequate nutrition     He has met with genetic counselor and genetic testing does not reveal any identifiable mutation      I will see him back in 2 weeks for his next chemotherapy  All of his questions were answered to his satisfaction.  He is agreeable and comfortable with this plan.       LANEY HERNANDEZ MD

## 2017-07-05 NOTE — MR AVS SNAPSHOT
After Visit Summary   7/5/2017    Terry Yi    MRN: [id number]           Patient Information     Date Of Birth          1965        Visit Information        Provider Department      7/5/2017 8:30 AM NURSE ONLY CANCER CENTER [facility]        Today's Diagnoses     Malignant neoplasm of overlapping sites of stomach (H)    -  1       Follow-ups after your visit        Your next 10 appointments already scheduled     Jul 05, 2017  9:15 AM CDT   Return Visit with Yakov Shaffer MD   [facility])    [address] [place] [address]   [phone]            Jul 05, 2017 10:00 AM CDT   Level 4 with BAY 1 INFUSION   [facility]    [address] [facility] [address]   [phone]            Jul 19, 2017  9:00 AM CDT   Return Visit with NURSE ONLY CANCER CENTER   [facility]    [address] [facility] [address]   [phone]            Jul 19, 2017  9:45 AM CDT   Return Visit with Yakov Shaffer MD   [facility]    [address] [facility] [address]   [phone]            Jul 19, 2017 10:30 AM CDT   Level 4 with BAY 1 INFUSION   [facility])    [address] [place] [address]   [phone]              Who to contact     If you have questions or need follow up information about today's clinic visit or your schedule please contact [facility] directly at [phone].  Normal or non-critical lab and imaging results will be communicated to you by MyChart, letter or phone within 4 business days after the clinic has received the results. If you do not hear from us within 7 days, please contact the clinic through MyChart or phone. If you have a critical or abnormal lab  result, we will notify you by phone as soon as possible.  Submit refill requests through [employer] or call your pharmacy and they will forward the refill request to us. Please allow 3 business days for your refill to be completed.          Additional Information About Your Visit        [employer] Information     [employer] gives you secure access to your electronic health record. If you see a primary care provider, you can also send messages to your care team and make appointments. If you have questions, please call your primary care clinic.  If you do not have a primary care provider, please call [phone] and they will assist you.      [employer] is an electronic gateway that provides easy, online access to your medical records. With [employer], you can request a clinic appointment, read your test results, renew a prescription or communicate with your care team.     To access your existing account, please contact your [facility] or call [phone] for assistance.        Care EveryWhere ID     This is your Care EveryWhere ID. This could be used by other organizations to access your [place] medical records  [id number]         Blood Pressure from Last 3 Encounters:   06/21/17 127/77   06/07/17 117/72   05/24/17 108/66    Weight from Last 3 Encounters:   06/21/17 85.3 kg (188 lb)   06/07/17 84.8 kg (187 lb)   05/24/17 85.5 kg (188 lb 8 oz)              We Performed the Following     CBC with platelets differential     Comprehensive metabolic panel        Primary Care Provider Office Phone # Fax #    Sandra Dickerson [phone]       [facility]  [address]  [facility] [address]        Equal Access to Services     [facility]: Hadii aad ku hadasho Soomaali, waaxda luqadaha, qaybta kaalmada adeegyada, claire cowart . So [facility] [place] [phone].    ATENCIÓN: Si habla español, tiene a cuevas disposición servicios gratuitos de asistencia lingüística.  Ciara ji [phone].    We comply with applicable federal civil rights laws and [place] laws. We do not discriminate on the basis of race, color, national origin, age, disability sex, sexual orientation or gender identity.            Thank you!     Thank you for choosing [facility]  for your care. Our goal is always to provide you with excellent care. Hearing back from our patients is one way we can continue to improve our services. Please take a few minutes to complete the written survey that you may receive in the mail after your visit with us. Thank you!             Your Updated Medication List - Protect others around you: Learn how to safely use, store and throw away your medicines at www.disposemymeds.org.          This list is accurate as of: 7/5/17  8:35 AM.  Always use your most recent med list.                   Brand Name Dispense Instructions for use Diagnosis    allopurinol 100 MG tablet    ZYLOPRIM          aspirin 81 MG chewable tablet      Take 81 mg by mouth daily        lidocaine-prilocaine cream    EMLA          LORazepam 0.5 MG tablet    ATIVAN    30 tablet    Take 1 tablet (0.5 mg) by mouth every 4 hours as needed (Anxiety, Nausea/Vomiting or Sleep)    Malignant neoplasm of overlapping sites of stomach (H)       ondansetron 8 MG tablet    ZOFRAN    10 tablet    Take 1 tablet (8 mg) by mouth every 8 hours as needed (Nausea/Vomiting)    Malignant neoplasm of overlapping sites of stomach (H)       prochlorperazine 10 MG tablet    COMPAZINE    30 tablet    Take 1 tablet (10 mg) by mouth every 6 hours as needed (Nausea/Vomiting)    Malignant neoplasm of overlapping sites of stomach (H)

## 2017-07-05 NOTE — NURSING NOTE
"Oncology Rooming Note    July 5, 2017 8:55 AM   Terry Yi is a 51 year old male who presents for:    Chief Complaint   Patient presents with     Oncology Clinic Visit     f/u prior to tx     Initial Vitals: There were no vitals taken for this visit. Estimated body mass index is 26.32 kg/(m^2) as calculated from the following:    Height as of 6/21/17: 1.8 m (5' 10.87\").    Weight as of 6/21/17: 85.3 kg (188 lb). There is no height or weight on file to calculate BSA.  Data Unavailable Comment: Data Unavailable   No LMP for male patient.  Allergies reviewed: Yes  Medications reviewed: Yes    Medications: Medication refills not needed today.  Pharmacy name entered into EPIC: Data Unavailable    Clinical concerns:    8 minutes for nursing intake (face to face time)     PAOLO PETTY LPN              "

## 2017-07-05 NOTE — MR AVS SNAPSHOT
After Visit Summary   7/5/2017    Terry Yi    MRN: [id number]           Patient Information     Date Of Birth          1965        Visit Information        Provider Department      7/5/2017 10:00 AM BAY  INFUSION [facility]        Today's Diagnoses     Malignant neoplasm of overlapping sites of stomach (H)    -  1       Follow-ups after your visit        Your next 10 appointments already scheduled     Jul 19, 2017  9:00 AM CDT   Return Visit with NURSE ONLY CANCER CENTER   [facility])    [address] [place] [id number] [phone]            Jul 19, 2017  9:45 AM CDT   Return Visit with Yakov Shaffer MD   [facility])    [address] [place] [id number] [phone]            Jul 19, 2017 10:30 AM CDT   Level 4 with [address])    [address] [place] [id number] [phone]              Who to contact     If you have questions or need follow up information about today's clinic visit or your schedule please contact [facility] directly at [phone].  Normal or non-critical lab and imaging results will be communicated to you by MyChart, letter or phone within 4 business days after the clinic has received the results. If you do not hear from us within 7 days, please contact the clinic through [employer] or phone. If you have a critical or abnormal lab result, we will notify you by phone as soon as possible.  Submit refill requests through [employer] or call your pharmacy and they will forward the refill request to us. Please allow 3 business days for your refill to be completed.          Additional Information About Your Visit        [employer] Information     [employer] gives you secure access to your electronic health record. If you see a primary care  provider, you can also send messages to your care team and make appointments. If you have questions, please call your primary care clinic.  If you do not have a primary care provider, please call [phone] and they will assist you.      [employer] is an electronic gateway that provides easy, online access to your medical records. With [employer], you can request a clinic appointment, read your test results, renew a prescription or communicate with your care team.     To access your existing account, please contact your [facility] or call [phone] for assistance.        Care EveryWhere ID     This is your Care EveryWhere ID. This could be used by other organizations to access your [place] medical records  [id number]         Blood Pressure from Last 3 Encounters:   07/05/17 121/75   06/21/17 127/77   06/07/17 117/72    Weight from Last 3 Encounters:   07/05/17 88.5 kg (195 lb)   06/21/17 85.3 kg (188 lb)   06/07/17 84.8 kg (187 lb)              Today, you had the following     No orders found for display       Primary Care Provider Office Phone # Fax #    Sandra Dickerson [phone]       [facility]  [address]  [facility] [address]        Equal Access to Services     CHANDAN GALLO : Hadii aad ku hadasho Soomaali, waaxda luqadaha, qaybta kaalmada adeegyada, waxay idiin hayaan charley cowart . So [facility] [phone].    ATENCIÓN: Si habla español, tiene a cuevas disposición servicios gratuitos de asistencia lingüística. Llame al [phone].    We comply with applicable federal civil rights laws and [place] laws. We do not discriminate on the basis of race, color, national origin, age, disability sex, sexual orientation or gender identity.            Thank you!     Thank you for choosing [facility]  for your care. Our goal is always to provide you with excellent care. Hearing back from our patients is one way we can continue to improve  our services. Please take a few minutes to complete the written survey that you may receive in the mail after your visit with us. Thank you!             Your Updated Medication List - Protect others around you: Learn how to safely use, store and throw away your medicines at www.disposemymeds.org.          This list is accurate as of: 7/5/17  2:15 PM.  Always use your most recent med list.                   Brand Name Dispense Instructions for use Diagnosis    allopurinol 100 MG tablet    ZYLOPRIM          aspirin 81 MG chewable tablet      Take 81 mg by mouth daily        lidocaine-prilocaine cream    EMLA          LORazepam 0.5 MG tablet    ATIVAN    30 tablet    Take 1 tablet (0.5 mg) by mouth every 4 hours as needed (Anxiety, Nausea/Vomiting or Sleep)    Malignant neoplasm of overlapping sites of stomach (H)       ondansetron 8 MG tablet    ZOFRAN    10 tablet    Take 1 tablet (8 mg) by mouth every 8 hours as needed (Nausea/Vomiting)    Malignant neoplasm of overlapping sites of stomach (H)       prochlorperazine 10 MG tablet    COMPAZINE    30 tablet    Take 1 tablet (10 mg) by mouth every 6 hours as needed (Nausea/Vomiting)    Malignant neoplasm of overlapping sites of stomach (H)

## 2017-07-05 NOTE — PROGRESS NOTES
Infusion Nursing Note:  Terry Yi presents today for D1C7 Oxaliplatin/Leucovorin/Florouricil push & pump connect.    Patient seen by provider today: Yes: Dr. Shaffer   present during visit today: Not Applicable.    Note: Prior to discharge: Port is secured in place with tegaderm and flushed with 10cc NS with positive blood return noted.  Continuous home infusion Dosi-Fuser pump connected.    All connectors secured in place and clamps taped open.    Patient instructed to call our clinic or [facility] with any questions or concerns at home.  Patient verbalized understanding.    Patient set up for pump disconnect in [place] on 7/7/17 at 1050.        .    Intravenous Access:  Implanted Port.    Treatment Conditions:  Lab Results   Component Value Date    HGB 12.7 07/05/2017     Lab Results   Component Value Date    WBC 4.8 07/05/2017      Lab Results   Component Value Date    ANEU 2.3 07/05/2017     Lab Results   Component Value Date     07/05/2017      Lab Results   Component Value Date     07/05/2017                   Lab Results   Component Value Date    POTASSIUM 4.0 07/05/2017           No results found for: MAG         Lab Results   Component Value Date    CR 0.75 07/05/2017                   Lab Results   Component Value Date    BRITTNEY 8.6 07/05/2017                Lab Results   Component Value Date    BILITOTAL 0.2 07/05/2017           Lab Results   Component Value Date    ALBUMIN 3.2 07/05/2017                    Lab Results   Component Value Date    ALT 44 07/05/2017           Lab Results   Component Value Date    AST 32 07/05/2017     Results reviewed, labs MET treatment parameters, ok to proceed with treatment.      Post Infusion Assessment:  Patient tolerated infusion without incident.  Blood return noted pre and post infusion.  Site patent and intact, free from redness, edema or discomfort.    Discharge Plan:   Discharge instructions reviewed with: Patient.  Patient  and/or family verbalized understanding of discharge instructions and all questions answered.  Patient discharged in stable condition accompanied by: wife.  Patient will return 7/19/17 for next appointment.  Departure Mode: Ambulatory.    Mayte Carbone RN

## 2017-07-06 NOTE — TELEPHONE ENCOUNTER
"Clinic Action Needed:No  Reason for Call: Wife calling:  \"He had chemo today and that little ball in a pouch he wears is leaking\".  Paged on call provider for Oncology/Peyton Melvin/[facility] to speak to caller at [phone].  Dr. Queen is on call, page sent via [employer] page  at 10:44 pm.    Routed to: Not routed.    Ayla Willoughby RN  [facility]      "

## 2017-07-19 ENCOUNTER — ONCOLOGY VISIT (OUTPATIENT)
Dept: ONCOLOGY | Facility: CLINIC | Age: 52
End: 2017-07-19
Payer: COMMERCIAL

## 2017-07-19 ENCOUNTER — INFUSION THERAPY VISIT (OUTPATIENT)
Dept: INFUSION THERAPY | Facility: CLINIC | Age: 52
End: 2017-07-19
Payer: COMMERCIAL

## 2017-07-19 VITALS
BODY MASS INDEX: 26.74 KG/M2 | HEART RATE: 99 BPM | SYSTOLIC BLOOD PRESSURE: 137 MMHG | HEIGHT: 71 IN | RESPIRATION RATE: 15 BRPM | TEMPERATURE: 97.7 F | WEIGHT: 191 LBS | OXYGEN SATURATION: 98 % | DIASTOLIC BLOOD PRESSURE: 77 MMHG

## 2017-07-19 DIAGNOSIS — C16.8 MALIGNANT NEOPLASM OF OVERLAPPING SITES OF STOMACH (H): Primary | ICD-10-CM

## 2017-07-19 LAB
ALBUMIN SERPL-MCNC: 3.5 G/DL (ref 3.4–5)
ALP SERPL-CCNC: 80 U/L (ref 40–150)
ALT SERPL W P-5'-P-CCNC: 47 U/L (ref 0–70)
ANION GAP SERPL CALCULATED.3IONS-SCNC: 2 MMOL/L (ref 3–14)
AST SERPL W P-5'-P-CCNC: 29 U/L (ref 0–45)
BASOPHILS # BLD AUTO: 0.1 10E9/L (ref 0–0.2)
BASOPHILS NFR BLD AUTO: 1.4 %
BILIRUB SERPL-MCNC: 0.3 MG/DL (ref 0.2–1.3)
BUN SERPL-MCNC: 15 MG/DL (ref 7–30)
CALCIUM SERPL-MCNC: 9 MG/DL (ref 8.5–10.1)
CHLORIDE SERPL-SCNC: 109 MMOL/L (ref 94–109)
CO2 SERPL-SCNC: 31 MMOL/L (ref 20–32)
CREAT SERPL-MCNC: 0.74 MG/DL (ref 0.66–1.25)
DIFFERENTIAL METHOD BLD: ABNORMAL
EOSINOPHIL # BLD AUTO: 0.1 10E9/L (ref 0–0.7)
EOSINOPHIL NFR BLD AUTO: 3.3 %
ERYTHROCYTE [DISTWIDTH] IN BLOOD BY AUTOMATED COUNT: 16.9 % (ref 10–15)
GFR SERPL CREATININE-BSD FRML MDRD: ABNORMAL ML/MIN/1.7M2
GLUCOSE SERPL-MCNC: 83 MG/DL (ref 70–99)
HCT VFR BLD AUTO: 38.1 % (ref 40–53)
HGB BLD-MCNC: 13.3 G/DL (ref 13.3–17.7)
LYMPHOCYTES # BLD AUTO: 1.8 10E9/L (ref 0.8–5.3)
LYMPHOCYTES NFR BLD AUTO: 43.1 %
MCH RBC QN AUTO: 31.7 PG (ref 26.5–33)
MCHC RBC AUTO-ENTMCNC: 34.9 G/DL (ref 31.5–36.5)
MCV RBC AUTO: 91 FL (ref 78–100)
MONOCYTES # BLD AUTO: 0.5 10E9/L (ref 0–1.3)
MONOCYTES NFR BLD AUTO: 11.2 %
NEUTROPHILS # BLD AUTO: 1.7 10E9/L (ref 1.6–8.3)
NEUTROPHILS NFR BLD AUTO: 41 %
PLATELET # BLD AUTO: 118 10E9/L (ref 150–450)
POTASSIUM SERPL-SCNC: 4.3 MMOL/L (ref 3.4–5.3)
PROT SERPL-MCNC: 6.6 G/DL (ref 6.8–8.8)
RBC # BLD AUTO: 4.2 10E12/L (ref 4.4–5.9)
SODIUM SERPL-SCNC: 142 MMOL/L (ref 133–144)
WBC # BLD AUTO: 4.2 10E9/L (ref 4–11)

## 2017-07-19 PROCEDURE — 99207 ZZC NO CHARGE LOS: CPT

## 2017-07-19 PROCEDURE — 99207 ZZC NO CHARGE NURSE ONLY: CPT

## 2017-07-19 PROCEDURE — 96411 CHEMO IV PUSH ADDL DRUG: CPT | Performed by: INTERNAL MEDICINE

## 2017-07-19 PROCEDURE — 96375 TX/PRO/DX INJ NEW DRUG ADDON: CPT | Performed by: INTERNAL MEDICINE

## 2017-07-19 PROCEDURE — 80053 COMPREHEN METABOLIC PANEL: CPT | Performed by: INTERNAL MEDICINE

## 2017-07-19 PROCEDURE — 96413 CHEMO IV INFUSION 1 HR: CPT | Performed by: INTERNAL MEDICINE

## 2017-07-19 PROCEDURE — 96416 CHEMO PROLONG INFUSE W/PUMP: CPT | Performed by: INTERNAL MEDICINE

## 2017-07-19 PROCEDURE — 99214 OFFICE O/P EST MOD 30 MIN: CPT | Mod: 25 | Performed by: INTERNAL MEDICINE

## 2017-07-19 PROCEDURE — 96368 THER/DIAG CONCURRENT INF: CPT | Performed by: INTERNAL MEDICINE

## 2017-07-19 PROCEDURE — 85025 COMPLETE CBC W/AUTO DIFF WBC: CPT | Performed by: INTERNAL MEDICINE

## 2017-07-19 PROCEDURE — 96415 CHEMO IV INFUSION ADDL HR: CPT | Performed by: INTERNAL MEDICINE

## 2017-07-19 RX ORDER — EPINEPHRINE 1 MG/ML
0.3 INJECTION INTRAMUSCULAR; INTRAVENOUS; SUBCUTANEOUS EVERY 5 MIN PRN
Status: CANCELLED | OUTPATIENT
Start: 2017-07-19

## 2017-07-19 RX ORDER — MEPERIDINE HYDROCHLORIDE 50 MG/ML
25 INJECTION INTRAMUSCULAR; INTRAVENOUS; SUBCUTANEOUS EVERY 30 MIN PRN
Status: CANCELLED | OUTPATIENT
Start: 2017-07-19

## 2017-07-19 RX ORDER — DIPHENHYDRAMINE HYDROCHLORIDE 50 MG/ML
50 INJECTION INTRAMUSCULAR; INTRAVENOUS
Status: CANCELLED
Start: 2017-07-19

## 2017-07-19 RX ORDER — METHYLPREDNISOLONE SODIUM SUCCINATE 125 MG/2ML
125 INJECTION, POWDER, LYOPHILIZED, FOR SOLUTION INTRAMUSCULAR; INTRAVENOUS
Status: CANCELLED
Start: 2017-07-19

## 2017-07-19 RX ORDER — ALBUTEROL SULFATE 90 UG/1
1-2 AEROSOL, METERED RESPIRATORY (INHALATION)
Status: CANCELLED
Start: 2017-07-19

## 2017-07-19 RX ORDER — LORAZEPAM 2 MG/ML
0.5 INJECTION INTRAMUSCULAR EVERY 4 HOURS PRN
Status: CANCELLED
Start: 2017-07-19

## 2017-07-19 RX ORDER — FLUOROURACIL 50 MG/ML
400 INJECTION, SOLUTION INTRAVENOUS ONCE
Status: CANCELLED | OUTPATIENT
Start: 2017-07-19

## 2017-07-19 RX ORDER — ALBUTEROL SULFATE 0.83 MG/ML
2.5 SOLUTION RESPIRATORY (INHALATION)
Status: CANCELLED | OUTPATIENT
Start: 2017-07-19

## 2017-07-19 RX ORDER — SODIUM CHLORIDE 9 MG/ML
1000 INJECTION, SOLUTION INTRAVENOUS CONTINUOUS PRN
Status: CANCELLED
Start: 2017-07-19

## 2017-07-19 RX ORDER — EPINEPHRINE 0.3 MG/.3ML
0.3 INJECTION SUBCUTANEOUS EVERY 5 MIN PRN
Status: CANCELLED | OUTPATIENT
Start: 2017-07-19

## 2017-07-19 RX ORDER — HEPARIN SODIUM (PORCINE) LOCK FLUSH IV SOLN 100 UNIT/ML 100 UNIT/ML
5 SOLUTION INTRAVENOUS
Status: DISCONTINUED | OUTPATIENT
Start: 2017-07-19 | End: 2017-07-19 | Stop reason: HOSPADM

## 2017-07-19 RX ORDER — FLUOROURACIL 50 MG/ML
400 INJECTION, SOLUTION INTRAVENOUS ONCE
Status: COMPLETED | OUTPATIENT
Start: 2017-07-19 | End: 2017-07-19

## 2017-07-19 RX ADMIN — FLUOROURACIL 830 MG: 50 INJECTION, SOLUTION INTRAVENOUS at 13:18

## 2017-07-19 ASSESSMENT — PAIN SCALES - GENERAL: PAINLEVEL: NO PAIN (0)

## 2017-07-19 NOTE — NURSING NOTE
"Oncology Rooming Note    July 19, 2017 9:43 AM   Terry Yi is a 51 year old male who presents for:    Chief Complaint   Patient presents with     Oncology Clinic Visit     follow up     Initial Vitals: /77  Pulse 99  Temp 97.7  F (36.5  C)  Resp 15  Ht 1.8 m (5' 10.87\")  Wt 86.6 kg (191 lb)  SpO2 98%  BMI 26.74 kg/m2 Estimated body mass index is 26.74 kg/(m^2) as calculated from the following:    Height as of this encounter: 1.8 m (5' 10.87\").    Weight as of this encounter: 86.6 kg (191 lb). Body surface area is 2.08 meters squared.  No Pain (0) Comment: Data Unavailable   No LMP for male patient.  Allergies reviewed: Yes  Medications reviewed: Yes    Medications: Medication refills not needed today.  Pharmacy name entered into EPIC: Data Unavailable        5 minutes for nursing intake (face to face time)     Katja Lopez LPN              "

## 2017-07-19 NOTE — MR AVS SNAPSHOT
After Visit Summary   7/19/2017    Terry Yi    MRN: [id number]           Patient Information     Date Of Birth          1965        Visit Information        Provider Department      7/19/2017 10:30 AM BAY 1 INFUSION [facility]        Today's Diagnoses     Malignant neoplasm of overlapping sites of stomach (H)    -  1       Follow-ups after your visit        Who to contact     If you have questions or need follow up information about today's clinic visit or your schedule please contact [facility] directly at [phone].  Normal or non-critical lab and imaging results will be communicated to you by [employer], letter or phone within 4 business days after the clinic has received the results. If you do not hear from us within 7 days, please contact the clinic through [employer] or phone. If you have a critical or abnormal lab result, we will notify you by phone as soon as possible.  Submit refill requests through [employer] or call your pharmacy and they will forward the refill request to us. Please allow 3 business days for your refill to be completed.          Additional Information About Your Visit        MyChart Information     [employer] gives you secure access to your electronic health record. If you see a primary care provider, you can also send messages to your care team and make appointments. If you have questions, please call your primary care clinic.  If you do not have a primary care provider, please call [phone] and they will assist you.      [employer] is an electronic gateway that provides easy, online access to your medical records. With [employer], you can request a clinic appointment, read your test results, renew a prescription or communicate with your care team.     To access your existing account, please contact your [facility] or call [phone] for assistance.        Care EveryWhere ID     This is your Care  EveryWhere ID. This could be used by other organizations to access your [place] medical records  [id number]         Blood Pressure from Last 3 Encounters:   07/19/17 137/77   07/05/17 121/75   06/21/17 127/77    Weight from Last 3 Encounters:   07/19/17 86.6 kg (191 lb)   07/05/17 88.5 kg (195 lb)   06/21/17 85.3 kg (188 lb)              Today, you had the following     No orders found for display       Primary Care Provider Office Phone # Fax #    Sandra DIDI Dickerson, SARAI [phone]       [facility]  [address]  [facility] [address]        Equal Access to Services     DONOVAN GALLO : Hadii aad ku hadasho Soomaali, waaxda luqadaha, qaybta kaalmada adeegyada, waxtejinder cowart . So [facility] [phone].    ATENCIÓN: Si habla español, tiene a cuevas disposición servicios gratuitos de asistencia lingüística. Llame al [phone].    We comply with applicable federal civil rights laws and [place] laws. We do not discriminate on the basis of race, color, national origin, age, disability sex, sexual orientation or gender identity.            Thank you!     Thank you for choosing [facility]  for your care. Our goal is always to provide you with excellent care. Hearing back from our patients is one way we can continue to improve our services. Please take a few minutes to complete the written survey that you may receive in the mail after your visit with us. Thank you!             Your Updated Medication List - Protect others around you: Learn how to safely use, store and throw away your medicines at www.disposemymeds.org.          This list is accurate as of: 7/19/17  2:35 PM.  Always use your most recent med list.                   Brand Name Dispense Instructions for use Diagnosis    allopurinol 100 MG tablet    ZYLOPRIM          aspirin 81 MG chewable tablet      Take 81 mg by mouth daily        lidocaine-prilocaine cream    EMLA          LORazepam 0.5 MG tablet     ATIVAN    30 tablet    Take 1 tablet (0.5 mg) by mouth every 4 hours as needed (Anxiety, Nausea/Vomiting or Sleep)    Malignant neoplasm of overlapping sites of stomach (H)       ondansetron 8 MG tablet    ZOFRAN    10 tablet    Take 1 tablet (8 mg) by mouth every 8 hours as needed (Nausea/Vomiting)    Malignant neoplasm of overlapping sites of stomach (H)       prochlorperazine 10 MG tablet    COMPAZINE    30 tablet    Take 1 tablet (10 mg) by mouth every 6 hours as needed (Nausea/Vomiting)    Malignant neoplasm of overlapping sites of stomach (H)

## 2017-07-19 NOTE — MR AVS SNAPSHOT
After Visit Summary   7/19/2017    Terry Yi    MRN: [id number]           Patient Information     Date Of Birth          1965        Visit Information        Provider Department      7/19/2017 9:00 AM NURSE ONLY CANCER CENTER [facility]        Today's Diagnoses     Malignant neoplasm of overlapping sites of stomach (H)    -  1       Follow-ups after your visit        Your next 10 appointments already scheduled     Jul 19, 2017  9:45 AM CDT   Return Visit with Yakov Shaffer MD   [facility])    [address] [place] [id number] [phone]            Jul 19, 2017 10:30 AM CDT   Level 4 with BAY 1 INFUSION   [facility])    [address] [place] [id number] [phone]              Who to contact     If you have questions or need follow up information about today's clinic visit or your schedule please contact [facility] directly at [phone].  Normal or non-critical lab and imaging results will be communicated to you by [employer], letter or phone within 4 business days after the clinic has received the results. If you do not hear from us within 7 days, please contact the clinic through [employer] or phone. If you have a critical or abnormal lab result, we will notify you by phone as soon as possible.  Submit refill requests through [employer] or call your pharmacy and they will forward the refill request to us. Please allow 3 business days for your refill to be completed.          Additional Information About Your Visit        [employer] Information     [employer] gives you secure access to your electronic health record. If you see a primary care provider, you can also send messages to your care team and make appointments. If you have questions, please call your primary care clinic.  If you do not have a primary care provider, please call  [phone] and they will assist you.      [employer] is an electronic gateway that provides easy, online access to your medical records. With [employer], you can request a clinic appointment, read your test results, renew a prescription or communicate with your care team.     To access your existing account, please contact your [facility] or call [phone] for assistance.        Care EveryWhere ID     This is your Care EveryWhere ID. This could be used by other organizations to access your [place] medical records  [id number]         Blood Pressure from Last 3 Encounters:   07/05/17 121/75   06/21/17 127/77   06/07/17 117/72    Weight from Last 3 Encounters:   07/05/17 88.5 kg (195 lb)   06/21/17 85.3 kg (188 lb)   06/07/17 84.8 kg (187 lb)              We Performed the Following     CBC with platelets differential     Comprehensive metabolic panel        Primary Care Provider Office Phone # Fax #    Sandra Dickerson [phone]       [facility]  [address]  [facility] [address]        Equal Access to Services     DONOVAN GALLO : Hadii aad ku hadasho Soomaali, waaxda luqadaha, qaybta kaalmada adeegyada, claire cowart . So [facility] [phone].    ATENCIÓN: Si habla español, tiene a cuevas disposición servicios gratuitos de asistencia lingüística. LlPremier Health [phone].    We comply with applicable federal civil rights laws and [place] laws. We do not discriminate on the basis of race, color, national origin, age, disability sex, sexual orientation or gender identity.            Thank you!     Thank you for choosing [facility]  for your care. Our goal is always to provide you with excellent care. Hearing back from our patients is one way we can continue to improve our services. Please take a few minutes to complete the written survey that you may receive in the mail after your visit with us. Thank you!             Your  Updated Medication List - Protect others around you: Learn how to safely use, store and throw away your medicines at www.disposemymeds.org.          This list is accurate as of: 7/19/17  9:12 AM.  Always use your most recent med list.                   Brand Name Dispense Instructions for use Diagnosis    allopurinol 100 MG tablet    ZYLOPRIM          aspirin 81 MG chewable tablet      Take 81 mg by mouth daily        lidocaine-prilocaine cream    EMLA          LORazepam 0.5 MG tablet    ATIVAN    30 tablet    Take 1 tablet (0.5 mg) by mouth every 4 hours as needed (Anxiety, Nausea/Vomiting or Sleep)    Malignant neoplasm of overlapping sites of stomach (H)       ondansetron 8 MG tablet    ZOFRAN    10 tablet    Take 1 tablet (8 mg) by mouth every 8 hours as needed (Nausea/Vomiting)    Malignant neoplasm of overlapping sites of stomach (H)       prochlorperazine 10 MG tablet    COMPAZINE    30 tablet    Take 1 tablet (10 mg) by mouth every 6 hours as needed (Nausea/Vomiting)    Malignant neoplasm of overlapping sites of stomach (H)

## 2017-07-19 NOTE — PROGRESS NOTES
"7/19/2017      REASON FOR VISIT:  Follow-up for unresectable metastatic gastric carcinoma, diffuse type, grade 3, HER-2 negative.      HISTORY OF PRESENT ILLNESS:  Please see my previous note for details.  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  Cycle 6 was administered on 6/21/2017   He had repeat staging done on June 30, 2017 with a CT scan chest abdomen and pelvis which showed a stable disease     He received cycle #7 of FOLFOX on 7/5/2017   INTERVAL HISTORY:    he comes in today with his family members and tells me that he is tolerating the hemotherapy really well.   he denies any nausea or vomiting. He is eating well. His energy is is stable. He continues to be fully active.  He has started to developed tingling and numbness in his fingers and feet. This is not hampering his activities. At times he has mild difficulty buttoning his shirt. He denies any problems with balance. He denies any neuropathic pain. He thinks that this time the cold sensitivity lasted a little longer    He denies any infections. No new pains. No new swellings.   review of the system.   Otherwise a comprehensive review of the system was performed and rest of it was unremarkable.     MEDICATIONS:   Current Outpatient Prescriptions   Medication     lidocaine-prilocaine (EMLA) cream     aspirin 81 MG chewable tablet     LORazepam (ATIVAN) 0.5 MG tablet     prochlorperazine (COMPAZINE) 10 MG tablet     ondansetron (ZOFRAN) 8 MG tablet     allopurinol (ZYLOPRIM) 100 MG tablet     No current facility-administered medications for this visit.           PHYSICAL EXAMINATION:   /77  Pulse 99  Temp 97.7  F (36.5  C)  Resp 15  Ht 1.8 m (5' 10.87\")  Wt 86.6 kg (191 lb)  SpO2 98%  BMI 26.74 kg/m2    CONSTITUTIONAL:   Very " pleasant male in no distress  EYES:   Pupils are equal and reactive to light. There is no pallor or icterus  ENT:  Ears are normal. No oral lesions.  CARDIOVASCULAR:  S1, S2 is normal  RESPIRATORY:   Chest is clear  GASTROINTESTINAL:   Abdomen is benign  NEUROLOGIC:  Alert and oriented ×3.  Minimal tingling and numbness of the fingers and feet. His gait is normal. Romberg test is  normal.  LYMPHATICS:  No palpable lymphadnopathy  EXTREMITIES:  No pedal edema  PSYCHIATRIC:  Mood and affect are  normal  CHEST:  Port site is fine    LABS:  Reviewed and stable       ASSESSMENT AND PLAN:  Metastatic gastric cancer presenting with early satiety and significant weight loss and epigastric discomfort upon eating with some vomiting.  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  There is an indeterminate right liver lobe lesion.      Palliative FOLFOX was initiated on 03/12/2017.    his repeat his  Scansafter 6 cycles of FOLFOX shows stable disease. He is tolerating chemotherapy well. He has  Received 7 cycles up till now. We will continue with the  Cycle #8 today    I will see him back in 2 weeks    He does have mild neuropathy  Although it is still grade 1 but I suspect that very soon we will have to either dose adjust oxaliplatin or stop oxaliplatin     I again encouraged him to remain active and exercises regularly.     We also discussed importance of maintaining weight by eating healthy      his genetic testing did not  Show any identifiable mutation not reveal any identifiable mutation   I answeredall of his and his family's questions to their satisfaction and they are agreeable and comfortable with the plan    LANEY HERNANDEZ MD

## 2017-07-19 NOTE — PROGRESS NOTES
Infusion Nursing Note:  Terry Yi presents today for Folfox.    Patient seen by provider today: Yes: MD   present during visit today: Not Applicable.    Note: N/A.    Intravenous Access:  Implanted Port.    Treatment Conditions:  Results reviewed, labs MET treatment parameters, ok to proceed with treatment.      Post Infusion Assessment:  Patient tolerated infusion without incident.    Discharge Plan:   Pt disconnects at home clinic. F/u appts per MD checkout note      GREY MARCELO RN

## 2017-07-19 NOTE — MR AVS SNAPSHOT
After Visit Summary   7/19/2017    Terry Yi    MRN: [id number]           Patient Information     Date Of Birth          1965        Visit Information        Provider Department      7/19/2017 9:45 AM Yakov Shaffer MD [facility]        Today's Diagnoses     Malignant neoplasm of overlapping sites of stomach (H)    -  1      Care Instructions    Proceed with chemo today    See me back in 2 weeks with repeat labs and next chemo          Follow-ups after your visit        Your next 10 appointments already scheduled     Aug 03, 2017  8:45 AM CDT   Return Visit with [facility] ONLY CANCER CENTER   [facility]    [address] [place] [id number] [phone]            Aug 03, 2017  9:15 AM CDT   Return Visit with MARLIN Morales CNP   [facility])    [address] [place] [id number] [phone]            Aug 03, 2017 11:30 AM CDT   Level 4 with BAY 10 INFUSION   [facility])    [address] [place] [id number] [phone]              Who to contact     If you have questions or need follow up information about today's clinic visit or your schedule please contact [facility] directly at [phone].  Normal or non-critical lab and imaging results will be communicated to you by MyChart, letter or phone within 4 business days after the clinic has received the results. If you do not hear from us within 7 days, please contact the clinic through MyChart or phone. If you have a critical or abnormal lab result, we will notify you by phone as soon as possible.  Submit refill requests through [employer] or call your pharmacy and they will forward the refill request to us. Please allow 3 business days for your refill to be completed.          Additional Information About Your  "Visit        [employer] Information     [employer] gives you secure access to your electronic health record. If you see a primary care provider, you can also send messages to your care team and make appointments. If you have questions, please call your primary care clinic.  If you do not have a primary care provider, please call [phone] and they will assist you.      [employer] is an electronic gateway that provides easy, online access to your medical records. With [employer], you can request a clinic appointment, read your test results, renew a prescription or communicate with your care team.     To access your existing account, please contact your [facility] or call [phone] for assistance.        Care EveryWhere ID     This is your Care EveryWhere ID. This could be used by other organizations to access your [place] medical records  [id number]        Your Vitals Were     Pulse Temperature Respirations Height Pulse Oximetry BMI (Body Mass Index)    99 97.7  F (36.5  C) 15 1.8 m (5' 10.87\") 98% 26.74 kg/m2       Blood Pressure from Last 3 Encounters:   07/19/17 137/77   07/05/17 121/75   06/21/17 127/77    Weight from Last 3 Encounters:   07/19/17 86.6 kg (191 lb)   07/05/17 88.5 kg (195 lb)   06/21/17 85.3 kg (188 lb)              Today, you had the following     No orders found for display       Primary Care Provider Office Phone # Fax #    Sandra Dickerson [phone]       [facility]  [address]  [facility] [address]        Equal Access to Services     [facility]: Hadii aad ku hadasho Soomaali, waaxda luqadaha, qaybta kaalmada adeegyada, claire mcdaniel hayguillermo cowart . So [facility] [phone].    ATENCIÓN: Si habla español, tiene a cuevas disposición servicios gratuitos de asistencia lingüística. Llame al [phone].    We comply with applicable federal civil rights laws and [place] laws. We do not discriminate on the basis of race, color, " national origin, age, disability sex, sexual orientation or gender identity.            Thank you!     Thank you for choosing [facility]  for your care. Our goal is always to provide you with excellent care. Hearing back from our patients is one way we can continue to improve our services. Please take a few minutes to complete the written survey that you may receive in the mail after your visit with us. Thank you!             Your Updated Medication List - Protect others around you: Learn how to safely use, store and throw away your medicines at www.disposemymeds.org.          This list is accurate as of: 7/19/17 11:59 PM.  Always use your most recent med list.                   Brand Name Dispense Instructions for use Diagnosis    allopurinol 100 MG tablet    ZYLOPRIM          aspirin 81 MG chewable tablet      Take 81 mg by mouth daily        lidocaine-prilocaine cream    EMLA          LORazepam 0.5 MG tablet    ATIVAN    30 tablet    Take 1 tablet (0.5 mg) by mouth every 4 hours as needed (Anxiety, Nausea/Vomiting or Sleep)    Malignant neoplasm of overlapping sites of stomach (H)       ondansetron 8 MG tablet    ZOFRAN    10 tablet    Take 1 tablet (8 mg) by mouth every 8 hours as needed (Nausea/Vomiting)    Malignant neoplasm of overlapping sites of stomach (H)       prochlorperazine 10 MG tablet    COMPAZINE    30 tablet    Take 1 tablet (10 mg) by mouth every 6 hours as needed (Nausea/Vomiting)    Malignant neoplasm of overlapping sites of stomach (H)

## 2017-08-02 ENCOUNTER — ONCOLOGY VISIT (OUTPATIENT)
Dept: ONCOLOGY | Facility: CLINIC | Age: 52
End: 2017-08-02
Payer: COMMERCIAL

## 2017-08-02 ENCOUNTER — INFUSION THERAPY VISIT (OUTPATIENT)
Dept: INFUSION THERAPY | Facility: CLINIC | Age: 52
End: 2017-08-02
Payer: COMMERCIAL

## 2017-08-02 VITALS
TEMPERATURE: 97.1 F | HEIGHT: 71 IN | RESPIRATION RATE: 15 BRPM | SYSTOLIC BLOOD PRESSURE: 125 MMHG | DIASTOLIC BLOOD PRESSURE: 67 MMHG | OXYGEN SATURATION: 97 % | WEIGHT: 196 LBS | HEART RATE: 69 BPM | BODY MASS INDEX: 27.44 KG/M2

## 2017-08-02 DIAGNOSIS — C16.8 MALIGNANT NEOPLASM OF OVERLAPPING SITES OF STOMACH (H): Primary | ICD-10-CM

## 2017-08-02 LAB
ALBUMIN SERPL-MCNC: 3.4 G/DL (ref 3.4–5)
ALP SERPL-CCNC: 81 U/L (ref 40–150)
ALT SERPL W P-5'-P-CCNC: 41 U/L (ref 0–70)
ANION GAP SERPL CALCULATED.3IONS-SCNC: 7 MMOL/L (ref 3–14)
AST SERPL W P-5'-P-CCNC: 35 U/L (ref 0–45)
BASOPHILS # BLD AUTO: 0.1 10E9/L (ref 0–0.2)
BASOPHILS NFR BLD AUTO: 0.9 %
BILIRUB SERPL-MCNC: 0.4 MG/DL (ref 0.2–1.3)
BUN SERPL-MCNC: 15 MG/DL (ref 7–30)
CALCIUM SERPL-MCNC: 9.1 MG/DL (ref 8.5–10.1)
CHLORIDE SERPL-SCNC: 107 MMOL/L (ref 94–109)
CO2 SERPL-SCNC: 26 MMOL/L (ref 20–32)
CREAT SERPL-MCNC: 0.69 MG/DL (ref 0.66–1.25)
DIFFERENTIAL METHOD BLD: ABNORMAL
EOSINOPHIL # BLD AUTO: 0.2 10E9/L (ref 0–0.7)
EOSINOPHIL NFR BLD AUTO: 2.2 %
ERYTHROCYTE [DISTWIDTH] IN BLOOD BY AUTOMATED COUNT: 16.5 % (ref 10–15)
GFR SERPL CREATININE-BSD FRML MDRD: ABNORMAL ML/MIN/1.7M2
GLUCOSE SERPL-MCNC: 99 MG/DL (ref 70–99)
HCT VFR BLD AUTO: 38.5 % (ref 40–53)
HGB BLD-MCNC: 13.2 G/DL (ref 13.3–17.7)
LYMPHOCYTES # BLD AUTO: 2.2 10E9/L (ref 0.8–5.3)
LYMPHOCYTES NFR BLD AUTO: 25.1 %
MCH RBC QN AUTO: 31.7 PG (ref 26.5–33)
MCHC RBC AUTO-ENTMCNC: 34.3 G/DL (ref 31.5–36.5)
MCV RBC AUTO: 92 FL (ref 78–100)
MONOCYTES # BLD AUTO: 0.8 10E9/L (ref 0–1.3)
MONOCYTES NFR BLD AUTO: 8.9 %
NEUTROPHILS # BLD AUTO: 5.4 10E9/L (ref 1.6–8.3)
NEUTROPHILS NFR BLD AUTO: 62.9 %
PLATELET # BLD AUTO: 108 10E9/L (ref 150–450)
POTASSIUM SERPL-SCNC: 4 MMOL/L (ref 3.4–5.3)
PROT SERPL-MCNC: 6.7 G/DL (ref 6.8–8.8)
RBC # BLD AUTO: 4.17 10E12/L (ref 4.4–5.9)
SODIUM SERPL-SCNC: 140 MMOL/L (ref 133–144)
WBC # BLD AUTO: 8.6 10E9/L (ref 4–11)

## 2017-08-02 PROCEDURE — 96368 THER/DIAG CONCURRENT INF: CPT | Performed by: INTERNAL MEDICINE

## 2017-08-02 PROCEDURE — 99215 OFFICE O/P EST HI 40 MIN: CPT | Mod: 25 | Performed by: INTERNAL MEDICINE

## 2017-08-02 PROCEDURE — 96413 CHEMO IV INFUSION 1 HR: CPT | Performed by: INTERNAL MEDICINE

## 2017-08-02 PROCEDURE — 85025 COMPLETE CBC W/AUTO DIFF WBC: CPT | Performed by: INTERNAL MEDICINE

## 2017-08-02 PROCEDURE — 96415 CHEMO IV INFUSION ADDL HR: CPT | Performed by: INTERNAL MEDICINE

## 2017-08-02 PROCEDURE — 96416 CHEMO PROLONG INFUSE W/PUMP: CPT | Performed by: INTERNAL MEDICINE

## 2017-08-02 PROCEDURE — 96411 CHEMO IV PUSH ADDL DRUG: CPT | Performed by: INTERNAL MEDICINE

## 2017-08-02 PROCEDURE — 99207 ZZC NO CHARGE NURSE ONLY: CPT

## 2017-08-02 PROCEDURE — 99207 ZZC NO CHARGE LOS: CPT

## 2017-08-02 PROCEDURE — 80053 COMPREHEN METABOLIC PANEL: CPT | Performed by: INTERNAL MEDICINE

## 2017-08-02 PROCEDURE — 96375 TX/PRO/DX INJ NEW DRUG ADDON: CPT | Performed by: INTERNAL MEDICINE

## 2017-08-02 RX ORDER — HEPARIN SODIUM (PORCINE) LOCK FLUSH IV SOLN 100 UNIT/ML 100 UNIT/ML
5 SOLUTION INTRAVENOUS
Status: DISCONTINUED | OUTPATIENT
Start: 2017-08-02 | End: 2017-08-02 | Stop reason: HOSPADM

## 2017-08-02 RX ORDER — SODIUM CHLORIDE 9 MG/ML
1000 INJECTION, SOLUTION INTRAVENOUS CONTINUOUS PRN
Status: CANCELLED
Start: 2017-08-02

## 2017-08-02 RX ORDER — MEPERIDINE HYDROCHLORIDE 50 MG/ML
25 INJECTION INTRAMUSCULAR; INTRAVENOUS; SUBCUTANEOUS EVERY 30 MIN PRN
Status: CANCELLED | OUTPATIENT
Start: 2017-08-02

## 2017-08-02 RX ORDER — ALBUTEROL SULFATE 0.83 MG/ML
2.5 SOLUTION RESPIRATORY (INHALATION)
Status: CANCELLED | OUTPATIENT
Start: 2017-08-02

## 2017-08-02 RX ORDER — FLUOROURACIL 50 MG/ML
400 INJECTION, SOLUTION INTRAVENOUS ONCE
Status: CANCELLED | OUTPATIENT
Start: 2017-08-02

## 2017-08-02 RX ORDER — LORAZEPAM 2 MG/ML
0.5 INJECTION INTRAMUSCULAR EVERY 4 HOURS PRN
Status: CANCELLED
Start: 2017-08-02

## 2017-08-02 RX ORDER — EPINEPHRINE 0.3 MG/.3ML
0.3 INJECTION SUBCUTANEOUS EVERY 5 MIN PRN
Status: CANCELLED | OUTPATIENT
Start: 2017-08-02

## 2017-08-02 RX ORDER — FLUOROURACIL 50 MG/ML
400 INJECTION, SOLUTION INTRAVENOUS ONCE
Status: COMPLETED | OUTPATIENT
Start: 2017-08-02 | End: 2017-08-02

## 2017-08-02 RX ORDER — EPINEPHRINE 1 MG/ML
0.3 INJECTION INTRAMUSCULAR; INTRAVENOUS; SUBCUTANEOUS EVERY 5 MIN PRN
Status: CANCELLED | OUTPATIENT
Start: 2017-08-02

## 2017-08-02 RX ORDER — ALBUTEROL SULFATE 90 UG/1
1-2 AEROSOL, METERED RESPIRATORY (INHALATION)
Status: CANCELLED
Start: 2017-08-02

## 2017-08-02 RX ORDER — DIPHENHYDRAMINE HYDROCHLORIDE 50 MG/ML
50 INJECTION INTRAMUSCULAR; INTRAVENOUS
Status: CANCELLED
Start: 2017-08-02

## 2017-08-02 RX ORDER — METHYLPREDNISOLONE SODIUM SUCCINATE 125 MG/2ML
125 INJECTION, POWDER, LYOPHILIZED, FOR SOLUTION INTRAMUSCULAR; INTRAVENOUS
Status: CANCELLED
Start: 2017-08-02

## 2017-08-02 RX ADMIN — FLUOROURACIL 830 MG: 50 INJECTION, SOLUTION INTRAVENOUS at 12:03

## 2017-08-02 RX ADMIN — HEPARIN SODIUM (PORCINE) LOCK FLUSH IV SOLN 100 UNIT/ML 5 ML: 100 SOLUTION at 08:19

## 2017-08-02 ASSESSMENT — PAIN SCALES - GENERAL: PAINLEVEL: NO PAIN (0)

## 2017-08-02 NOTE — NURSING NOTE
"Oncology Rooming Note    August 2, 2017 8:42 AM   Terry Yi is a 51 year old male who presents for:    Chief Complaint   Patient presents with     Oncology Clinic Visit     follow up     Initial Vitals: /67  Pulse 69  Temp 97.1  F (36.2  C)  Resp 15  Ht 1.8 m (5' 10.87\")  Wt 88.9 kg (196 lb)  SpO2 97%  BMI 27.44 kg/m2 Estimated body mass index is 27.44 kg/(m^2) as calculated from the following:    Height as of this encounter: 1.8 m (5' 10.87\").    Weight as of this encounter: 88.9 kg (196 lb). Body surface area is 2.11 meters squared.  No Pain (0) Comment: Data Unavailable   No LMP for male patient.  Allergies reviewed: Yes  Medications reviewed: Yes    Medications: Medication refills not needed today.  Pharmacy name entered into EPIC: Data Unavailable        5 minutes for nursing intake (face to face time)     Katja Lopez LPN              "

## 2017-08-02 NOTE — MR AVS SNAPSHOT
After Visit Summary   8/2/2017    Terry Yi    MRN: [id number]           Patient Information     Date Of Birth          1965        Visit Information        Provider Department      8/2/2017 8:45 AM Yakov Shaffer MD [facility]        Today's Diagnoses     Malignant neoplasm of overlapping sites of stomach (H)    -  1      Care Instructions    Proceed with chemo today    See me back Aug 23rd at 1015 with labs prior and chemo. It is OK to take NP slot              Follow-ups after your visit        Your next 10 appointments already scheduled     Aug 22, 2017  9:30 AM CDT   [facility] with [facility]   [facility]    [address] [place] [phone]            Aug 22, 2017 10:15 AM CDT   Return Visit with Yakov Shaffer MD   [facility])    [address] [place] [id number] [phone]            Aug 22, 2017 11:30 AM CDT   Level 4 with BAY 9 INFUSION   [facility]    [address] [place] [phone]              Who to contact     If you have questions or need follow up information about today's clinic visit or your schedule please contact [facility] directly at [phone].  Normal or non-critical lab and imaging results will be communicated to you by MyChart, letter or phone within 4 business days after the clinic has received the results. If you do not hear from us within 7 days, please contact the clinic through MyChart or phone. If you have a critical or abnormal lab result, we will notify you by phone as soon as possible.  Submit refill requests through [employer] or call your pharmacy and they will forward the refill request to us. Please allow 3 business days for your refill to be completed.          Additional Information  "About Your Visit        [employer] Information     [employer] gives you secure access to your electronic health record. If you see a primary care provider, you can also send messages to your care team and make appointments. If you have questions, please call your primary care clinic.  If you do not have a primary care provider, please call [phone] and they will assist you.      [employer] is an electronic gateway that provides easy, online access to your medical records. With [employer], you can request a clinic appointment, read your test results, renew a prescription or communicate with your care team.     To access your existing account, please contact your [facility] or call [phone] for assistance.        Care EveryWhere ID     This is your Care EveryWhere ID. This could be used by other organizations to access your [place] medical records  [id number]        Your Vitals Were     Pulse Temperature Respirations Height Pulse Oximetry BMI (Body Mass Index)    69 97.1  F (36.2  C) 15 1.8 m (5' 10.87\") 97% 27.44 kg/m2       Blood Pressure from Last 3 Encounters:   08/02/17 125/67   07/19/17 137/77   07/05/17 121/75    Weight from Last 3 Encounters:   08/02/17 88.9 kg (196 lb)   07/19/17 86.6 kg (191 lb)   07/05/17 88.5 kg (195 lb)              Today, you had the following     No orders found for display       Primary Care Provider Office Phone # Fax #    Sandra Dickerson [phone]       [facility]  [address]  [facility] [address]        Equal Access to Services     [facility]: Hadii aad ku hadasho Soomaali, waaxda luqadaha, qaybta kaalmada adeegyada, claier cowart . So [facility] [phone].    ATENCIÓN: Si habla español, tiene a cuevas disposición servicios gratuitos de asistencia lingüística. Llame al [phone].    We comply with applicable federal civil rights laws and [place] laws. We do not discriminate on the basis of race, " color, national origin, age, disability sex, sexual orientation or gender identity.            Thank you!     Thank you for choosing [facility]  for your care. Our goal is always to provide you with excellent care. Hearing back from our patients is one way we can continue to improve our services. Please take a few minutes to complete the written survey that you may receive in the mail after your visit with us. Thank you!             Your Updated Medication List - Protect others around you: Learn how to safely use, store and throw away your medicines at www.disposemymeds.org.          This list is accurate as of: 8/2/17 11:59 PM.  Always use your most recent med list.                   Brand Name Dispense Instructions for use Diagnosis    allopurinol 100 MG tablet    ZYLOPRIM          aspirin 81 MG chewable tablet      Take 81 mg by mouth daily        lidocaine-prilocaine cream    EMLA          LORazepam 0.5 MG tablet    ATIVAN    30 tablet    Take 1 tablet (0.5 mg) by mouth every 4 hours as needed (Anxiety, Nausea/Vomiting or Sleep)    Malignant neoplasm of overlapping sites of stomach (H)       ondansetron 8 MG tablet    ZOFRAN    10 tablet    Take 1 tablet (8 mg) by mouth every 8 hours as needed (Nausea/Vomiting)    Malignant neoplasm of overlapping sites of stomach (H)       prochlorperazine 10 MG tablet    COMPAZINE    30 tablet    Take 1 tablet (10 mg) by mouth every 6 hours as needed (Nausea/Vomiting)    Malignant neoplasm of overlapping sites of stomach (H)

## 2017-08-02 NOTE — MR AVS SNAPSHOT
After Visit Summary   8/2/2017    Terry Yi    MRN: [id number]           Patient Information     Date Of Birth          1965        Visit Information        Provider Department      8/2/2017 9:00 AM BAY 10 INFUSION [facility]        Today's Diagnoses     Malignant neoplasm of overlapping sites of stomach (H)    -  1       Follow-ups after your visit        Who to contact     If you have questions or need follow up information about today's clinic visit or your schedule please contact [facility] directly at [phone].  Normal or non-critical lab and imaging results will be communicated to you by Tagrulehart, letter or phone within 4 business days after the clinic has received the results. If you do not hear from us within 7 days, please contact the clinic through [employer] or phone. If you have a critical or abnormal lab result, we will notify you by phone as soon as possible.  Submit refill requests through [employer] or call your pharmacy and they will forward the refill request to us. Please allow 3 business days for your refill to be completed.          Additional Information About Your Visit        MyChart Information     [employer] gives you secure access to your electronic health record. If you see a primary care provider, you can also send messages to your care team and make appointments. If you have questions, please call your primary care clinic.  If you do not have a primary care provider, please call [phone] and they will assist you.      [employer] is an electronic gateway that provides easy, online access to your medical records. With [employer], you can request a clinic appointment, read your test results, renew a prescription or communicate with your care team.     To access your existing account, please contact your [facility] or call [phone] for assistance.        Care EveryWhere ID     This is your Care EveryWhere  ID. This could be used by other organizations to access your [place] medical records  [id number]         Blood Pressure from Last 3 Encounters:   08/02/17 125/67   07/19/17 137/77   07/05/17 121/75    Weight from Last 3 Encounters:   08/02/17 88.9 kg (196 lb)   07/19/17 86.6 kg (191 lb)   07/05/17 88.5 kg (195 lb)              Today, you had the following     No orders found for display       Primary Care Provider Office Phone # Fax #    Sandra Dickerson, SARAI [phone]       [facility]  [address]  [facility] [address]        Equal Access to Services     CHANDAN GALLO : Hadii tk ku hadasho Soleeanne, waaxda luqadaha, qaybta kaalmada adeegyada, claire cowart . So [facility] [phone].    ATENCIÓN: Si habla español, tiene a cuevas disposición servicios gratuitos de asistencia lingüística. Llame al [phone].    We comply with applicable federal civil rights laws and [place] laws. We do not discriminate on the basis of race, color, national origin, age, disability sex, sexual orientation or gender identity.            Thank you!     Thank you for choosing [facility]  for your care. Our goal is always to provide you with excellent care. Hearing back from our patients is one way we can continue to improve our services. Please take a few minutes to complete the written survey that you may receive in the mail after your visit with us. Thank you!             Your Updated Medication List - Protect others around you: Learn how to safely use, store and throw away your medicines at www.disposemymeds.org.          This list is accurate as of: 8/2/17  3:32 PM.  Always use your most recent med list.                   Brand Name Dispense Instructions for use Diagnosis    allopurinol 100 MG tablet    ZYLOPRIM          aspirin 81 MG chewable tablet      Take 81 mg by mouth daily        lidocaine-prilocaine cream    EMLA          LORazepam 0.5 MG tablet    ATIVAN     30 tablet    Take 1 tablet (0.5 mg) by mouth every 4 hours as needed (Anxiety, Nausea/Vomiting or Sleep)    Malignant neoplasm of overlapping sites of stomach (H)       ondansetron 8 MG tablet    ZOFRAN    10 tablet    Take 1 tablet (8 mg) by mouth every 8 hours as needed (Nausea/Vomiting)    Malignant neoplasm of overlapping sites of stomach (H)       prochlorperazine 10 MG tablet    COMPAZINE    30 tablet    Take 1 tablet (10 mg) by mouth every 6 hours as needed (Nausea/Vomiting)    Malignant neoplasm of overlapping sites of stomach (H)

## 2017-08-02 NOTE — PROGRESS NOTES
"8/2/2017        REASON FOR VISIT:  Follow-up for unresectable metastatic gastric carcinoma, diffuse type, grade 3, HER-2 negative.      HISTORY OF PRESENT ILLNESS:  Please see my previous note for details.  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  Cycle 6 was administered on 6/21/2017   He had repeat staging done on June 30, 2017 with a CT scan chest abdomen and pelvis which showed a stable disease     He received cycle #8 of FOLFOX on 7/19/2017  C# 9 FOLFOX 8/2/2017 ( Oxaliplatin dose reduced to 70mg/m2 due to neuropathy )       INTERVAL HISTORY:    Terry comes in today along with his family members. He is tolerating the treatments well. He does mention that now neuropathy is slightly worsening. He continues to have tingling and numbness throughout. And at times it is difficult for him to button shirts. He denies any neuropathic pain. He denies nausea or vomiting. He's been able to eat and drink well and his weight has improved. He denies pain or new swellings. Denies infections. His energy continues to be good and he remains fully functional. He is planning to go to [place] in 2 weeks so he is requesting if his next chemotherapy cycle be delayed by one week.    Review of the systems. I performed a comprehensive review of the systems and other than what is mentioned above it was unremarkable      MEDICATIONS:   Reviewed       PHYSICAL EXAMINATION:   /67  Pulse 69  Temp 97.1  F (36.2  C)  Resp 15  Ht 1.8 m (5' 10.87\")  Wt 88.9 kg (196 lb)  SpO2 97%  BMI 27.44 kg/m2    CONSTITUTIONAL:   He is a middle aged very pleasant gentleman in no acute distress  EYES:   Pupils are equal and reactive to light. Eyes without any pallor or icterus  ENT:  Ears are unremarkable. No mouth " sores.  CARDIOVASCULAR:  S1, S2 is normal without murmurs  RESPIRATORY:  Chest is clear to auscultation bilaterally  GASTROINTESTINAL:   Abdomen is soft, nontender and without any hepatosplenomegaly  NEUROLOGIC:  Alert and oriented ×3.  Neurological exam is nonfocal apart from mild subjective tingling and numbness of the fingers and feet. His gait is normal.   LYMPHATICS:  No palpable lymph nodes  EXTREMITIES:  No leg edema  PSYCHIATRIC:  Mood and affect are  appropriate.  CHEST:  Port site is clean dry and intact    LABS:  Reviewed and stable       ASSESSMENT AND PLAN:  Metastatic gastric cancer presenting with early satiety and significant weight loss and epigastric discomfort upon eating with some vomiting.  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  There is an indeterminate right liver lobe lesion.      Palliative FOLFOX was initiated on 03/12/2017.    his repeat his  Scans after 6 cycles of FOLFOX shows stable disease.   He has received 8 cycles up till now.  We will proceed with cycle #9 today but I will decrease the dose of oxaliplatin to 70 mg/m  because of neuropathy    Neuropathy. This is manifested as tingling and numbness of fingers or feet. At times he has difficulty buttoning his shirt. He still has good balance. But now since then neuropathy is worsening I am going to decrease the dose of oxaliplatin to 70 mg/m . He understands that in the future there is a good chance that we will have to hold oxaliplatin if neuropathy continues to worsen.    Nutrition. This has significantly improved with time and his weight has improved. I encouraged him to continue eating healthy.    We also discussed importance of remaining active and exercising regularly.    He wants to delay his next chemotherapy by 1 week as he plans to go to [place] with his family. I would honor his wishes and we will give him next cycle in 3  weeks. I will see him then.    I did not address the following today:  His genetic testing did not show any identifiable mutation not reveal any identifiable mutation     I will see him back in 3 weeks.    I answeredall of his and his family's questions to their satisfaction and they are agreeable and comfortable with the plan    LANEY HERNANDEZ MD

## 2017-08-02 NOTE — MR AVS SNAPSHOT
After Visit Summary   8/2/2017    Terry Yi    MRN: [id number]           Patient Information     Date Of Birth          1965        Visit Information        Provider Department      8/2/2017 8:00 AM NURSE ONLY CANCER CENTER [facility]        Today's Diagnoses     Malignant neoplasm of overlapping sites of stomach (H)    -  1       Follow-ups after your visit        Your next 10 appointments already scheduled     Aug 02, 2017  8:45 AM CDT   Return Visit with Yakov Shaffer MD   [facility]    [address] [place] [id number] [phone]            Aug 02, 2017  9:00 AM CDT   Level 4 with BAY 10 INFUSION   [facility])    [address] [place] [address] [phone]              Who to contact     If you have questions or need follow up information about today's clinic visit or your schedule please contact [facility] directly at [phone].  Normal or non-critical lab and imaging results will be communicated to you by LoopPayhart, letter or phone within 4 business days after the clinic has received the results. If you do not hear from us within 7 days, please contact the clinic through [employer] or phone. If you have a critical or abnormal lab result, we will notify you by phone as soon as possible.  Submit refill requests through [employer] or call your pharmacy and they will forward the refill request to us. Please allow 3 business days for your refill to be completed.          Additional Information About Your Visit        MyChart Information     [employer] gives you secure access to your electronic health record. If you see a primary care provider, you can also send messages to your care team and make appointments. If you have questions, please call your primary care clinic.  If you do not have a primary care provider, please call  [phone] and they will assist you.      [employer] is an electronic gateway that provides easy, online access to your medical records. With [employer], you can request a clinic appointment, read your test results, renew a prescription or communicate with your care team.     To access your existing account, please contact your [facility] or call [phone] for assistance.        Care EveryWhere ID     This is your Care EveryWhere ID. This could be used by other organizations to access your [place] medical records  [id number]         Blood Pressure from Last 3 Encounters:   07/19/17 137/77   07/05/17 121/75   06/21/17 127/77    Weight from Last 3 Encounters:   07/19/17 86.6 kg (191 lb)   07/05/17 88.5 kg (195 lb)   06/21/17 85.3 kg (188 lb)              We Performed the Following     CBC with platelets differential     Comprehensive metabolic panel        Primary Care Provider Office Phone # Fax #    Sandra Dickerson [phone]       [facility]  [address]  [facility] [address]        Equal Access to Services     DONOVAN GALLO : Hadii aad ku hadasho Soomaali, waaxda luqadaha, qaybta kaalmada adeegyada, claire cowart . So [facility] [phone].    ATENCIÓN: Si habla español, tiene a cuevas disposición servicios gratuitos de asistencia lingüística. LlSelect [facility] [phone].    We comply with applicable federal civil rights laws and [place] laws. We do not discriminate on the basis of race, color, national origin, age, disability sex, sexual orientation or gender identity.            Thank you!     Thank you for choosing [facility]  for your care. Our goal is always to provide you with excellent care. Hearing back from our patients is one way we can continue to improve our services. Please take a few minutes to complete the written survey that you may receive in the mail after your visit with us. Thank you!             Your  Updated Medication List - Protect others around you: Learn how to safely use, store and throw away your medicines at www.disposemymeds.org.          This list is accurate as of: 8/2/17  8:20 AM.  Always use your most recent med list.                   Brand Name Dispense Instructions for use Diagnosis    allopurinol 100 MG tablet    ZYLOPRIM          aspirin 81 MG chewable tablet      Take 81 mg by mouth daily        lidocaine-prilocaine cream    EMLA          LORazepam 0.5 MG tablet    ATIVAN    30 tablet    Take 1 tablet (0.5 mg) by mouth every 4 hours as needed (Anxiety, Nausea/Vomiting or Sleep)    Malignant neoplasm of overlapping sites of stomach (H)       ondansetron 8 MG tablet    ZOFRAN    10 tablet    Take 1 tablet (8 mg) by mouth every 8 hours as needed (Nausea/Vomiting)    Malignant neoplasm of overlapping sites of stomach (H)       prochlorperazine 10 MG tablet    COMPAZINE    30 tablet    Take 1 tablet (10 mg) by mouth every 6 hours as needed (Nausea/Vomiting)    Malignant neoplasm of overlapping sites of stomach (H)

## 2017-08-02 NOTE — PATIENT INSTRUCTIONS
Proceed with chemo today    See me back Aug 23rd at 1015 with labs prior and chemo. It is OK to take NP slot

## 2017-08-02 NOTE — PROGRESS NOTES
"Infusion Nursing Note:  Terry Yi presents today for D1C9 Leucovorin/Oxaliplatin/Fluorouracil push/pump connect.    Patient seen by provider today: Yes: Dr. Shaffer.   present during visit today: Not Applicable.    Note: Prior to discharge: Port is secured in place with tegaderm and flushed with 10cc NS with positive blood return noted.  Continuous home infusion Dosi-Fuser pump connected.    All connectors secured in place and clamps taped open.    Pump started, \"running\" noted on display (CADD): Not Applicable.  Patient instructed to call our clinic or [facility] with any questions or concerns at home.  Patient verbalized understanding.    Patient set up for pump disconnect Friday 8/4/17 in [place]. Patient calls to schedule the pump disconnect.       Intravenous Access:  Implanted Port.    Treatment Conditions:  Lab Results   Component Value Date    HGB 13.2 08/02/2017     Lab Results   Component Value Date    WBC 8.6 08/02/2017      Lab Results   Component Value Date    ANEU 5.4 08/02/2017     Lab Results   Component Value Date     08/02/2017      Lab Results   Component Value Date     08/02/2017                   Lab Results   Component Value Date    POTASSIUM 4.0 08/02/2017           No results found for: MAG         Lab Results   Component Value Date    CR 0.69 08/02/2017                   Lab Results   Component Value Date    BRITTNEY 9.1 08/02/2017                Lab Results   Component Value Date    BILITOTAL 0.4 08/02/2017           Lab Results   Component Value Date    ALBUMIN 3.4 08/02/2017                    Lab Results   Component Value Date    ALT 41 08/02/2017           Lab Results   Component Value Date    AST 35 08/02/2017     Results reviewed, labs MET treatment parameters, ok to proceed with treatment.    Post Infusion Assessment:  Patient tolerated infusion without incident.  Blood return noted pre and post infusion.  Site patent and intact, free from redness, " edema or discomfort.    Discharge Plan:   Patient discharged in stable condition accompanied by: family members.  Departure Mode: Ambulatory.    Mayte Carbone RN

## 2017-08-22 ENCOUNTER — ONCOLOGY VISIT (OUTPATIENT)
Dept: ONCOLOGY | Facility: CLINIC | Age: 52
End: 2017-08-22
Payer: COMMERCIAL

## 2017-08-22 ENCOUNTER — INFUSION THERAPY VISIT (OUTPATIENT)
Dept: INFUSION THERAPY | Facility: CLINIC | Age: 52
End: 2017-08-22
Payer: COMMERCIAL

## 2017-08-22 VITALS
DIASTOLIC BLOOD PRESSURE: 70 MMHG | HEIGHT: 71 IN | SYSTOLIC BLOOD PRESSURE: 115 MMHG | BODY MASS INDEX: 28.14 KG/M2 | HEART RATE: 70 BPM | TEMPERATURE: 97.1 F | WEIGHT: 201 LBS | OXYGEN SATURATION: 99 % | RESPIRATION RATE: 20 BRPM

## 2017-08-22 DIAGNOSIS — C16.8 MALIGNANT NEOPLASM OF OVERLAPPING SITES OF STOMACH (H): Primary | ICD-10-CM

## 2017-08-22 LAB
ALBUMIN SERPL-MCNC: 3.3 G/DL (ref 3.4–5)
ALP SERPL-CCNC: 89 U/L (ref 40–150)
ALT SERPL W P-5'-P-CCNC: 38 U/L (ref 0–70)
ANION GAP SERPL CALCULATED.3IONS-SCNC: 5 MMOL/L (ref 3–14)
AST SERPL W P-5'-P-CCNC: 24 U/L (ref 0–45)
BASOPHILS # BLD AUTO: 0 10E9/L (ref 0–0.2)
BASOPHILS NFR BLD AUTO: 1 %
BILIRUB SERPL-MCNC: 0.3 MG/DL (ref 0.2–1.3)
BUN SERPL-MCNC: 14 MG/DL (ref 7–30)
CALCIUM SERPL-MCNC: 8.4 MG/DL (ref 8.5–10.1)
CHLORIDE SERPL-SCNC: 108 MMOL/L (ref 94–109)
CO2 SERPL-SCNC: 28 MMOL/L (ref 20–32)
CREAT SERPL-MCNC: 0.73 MG/DL (ref 0.66–1.25)
DIFFERENTIAL METHOD BLD: ABNORMAL
EOSINOPHIL # BLD AUTO: 0.1 10E9/L (ref 0–0.7)
EOSINOPHIL NFR BLD AUTO: 3.1 %
ERYTHROCYTE [DISTWIDTH] IN BLOOD BY AUTOMATED COUNT: 16 % (ref 10–15)
GFR SERPL CREATININE-BSD FRML MDRD: >90 ML/MIN/1.7M2
GLUCOSE SERPL-MCNC: 97 MG/DL (ref 70–99)
HCT VFR BLD AUTO: 36.8 % (ref 40–53)
HGB BLD-MCNC: 12.6 G/DL (ref 13.3–17.7)
LYMPHOCYTES # BLD AUTO: 1.8 10E9/L (ref 0.8–5.3)
LYMPHOCYTES NFR BLD AUTO: 42 %
MCH RBC QN AUTO: 32.1 PG (ref 26.5–33)
MCHC RBC AUTO-ENTMCNC: 34.2 G/DL (ref 31.5–36.5)
MCV RBC AUTO: 94 FL (ref 78–100)
MONOCYTES # BLD AUTO: 0.7 10E9/L (ref 0–1.3)
MONOCYTES NFR BLD AUTO: 16.1 %
NEUTROPHILS # BLD AUTO: 1.6 10E9/L (ref 1.6–8.3)
NEUTROPHILS NFR BLD AUTO: 37.8 %
PLATELET # BLD AUTO: 120 10E9/L (ref 150–450)
POTASSIUM SERPL-SCNC: 4.1 MMOL/L (ref 3.4–5.3)
PROT SERPL-MCNC: 6.6 G/DL (ref 6.8–8.8)
RBC # BLD AUTO: 3.92 10E12/L (ref 4.4–5.9)
SODIUM SERPL-SCNC: 141 MMOL/L (ref 133–144)
WBC # BLD AUTO: 4.2 10E9/L (ref 4–11)

## 2017-08-22 PROCEDURE — 99207 ZZC NO CHARGE NURSE ONLY: CPT

## 2017-08-22 PROCEDURE — 96417 CHEMO IV INFUS EACH ADDL SEQ: CPT | Performed by: INTERNAL MEDICINE

## 2017-08-22 PROCEDURE — 85025 COMPLETE CBC W/AUTO DIFF WBC: CPT | Performed by: INTERNAL MEDICINE

## 2017-08-22 PROCEDURE — 80053 COMPREHEN METABOLIC PANEL: CPT | Performed by: INTERNAL MEDICINE

## 2017-08-22 PROCEDURE — 96413 CHEMO IV INFUSION 1 HR: CPT | Performed by: INTERNAL MEDICINE

## 2017-08-22 PROCEDURE — 96411 CHEMO IV PUSH ADDL DRUG: CPT | Performed by: INTERNAL MEDICINE

## 2017-08-22 PROCEDURE — 96367 TX/PROPH/DG ADDL SEQ IV INF: CPT | Performed by: INTERNAL MEDICINE

## 2017-08-22 PROCEDURE — 96415 CHEMO IV INFUSION ADDL HR: CPT | Performed by: INTERNAL MEDICINE

## 2017-08-22 PROCEDURE — 96416 CHEMO PROLONG INFUSE W/PUMP: CPT | Performed by: INTERNAL MEDICINE

## 2017-08-22 PROCEDURE — 99215 OFFICE O/P EST HI 40 MIN: CPT | Mod: 25 | Performed by: INTERNAL MEDICINE

## 2017-08-22 RX ORDER — METHYLPREDNISOLONE SODIUM SUCCINATE 125 MG/2ML
125 INJECTION, POWDER, LYOPHILIZED, FOR SOLUTION INTRAMUSCULAR; INTRAVENOUS
Status: CANCELLED
Start: 2017-08-22

## 2017-08-22 RX ORDER — ALBUTEROL SULFATE 0.83 MG/ML
2.5 SOLUTION RESPIRATORY (INHALATION)
Status: CANCELLED | OUTPATIENT
Start: 2017-08-22

## 2017-08-22 RX ORDER — EPINEPHRINE 0.3 MG/.3ML
0.3 INJECTION SUBCUTANEOUS EVERY 5 MIN PRN
Status: CANCELLED | OUTPATIENT
Start: 2017-08-22

## 2017-08-22 RX ORDER — DIPHENHYDRAMINE HYDROCHLORIDE 50 MG/ML
50 INJECTION INTRAMUSCULAR; INTRAVENOUS
Status: CANCELLED
Start: 2017-08-22

## 2017-08-22 RX ORDER — MEPERIDINE HYDROCHLORIDE 50 MG/ML
25 INJECTION INTRAMUSCULAR; INTRAVENOUS; SUBCUTANEOUS EVERY 30 MIN PRN
Status: CANCELLED | OUTPATIENT
Start: 2017-08-22

## 2017-08-22 RX ORDER — LORAZEPAM 2 MG/ML
0.5 INJECTION INTRAMUSCULAR EVERY 4 HOURS PRN
Status: CANCELLED
Start: 2017-08-22

## 2017-08-22 RX ORDER — HEPARIN SODIUM (PORCINE) LOCK FLUSH IV SOLN 100 UNIT/ML 100 UNIT/ML
5 SOLUTION INTRAVENOUS
Status: DISCONTINUED | OUTPATIENT
Start: 2017-08-22 | End: 2017-08-22 | Stop reason: HOSPADM

## 2017-08-22 RX ORDER — SODIUM CHLORIDE 9 MG/ML
1000 INJECTION, SOLUTION INTRAVENOUS CONTINUOUS PRN
Status: CANCELLED
Start: 2017-08-22

## 2017-08-22 RX ORDER — ALBUTEROL SULFATE 90 UG/1
1-2 AEROSOL, METERED RESPIRATORY (INHALATION)
Status: CANCELLED
Start: 2017-08-22

## 2017-08-22 RX ORDER — EPINEPHRINE 1 MG/ML
0.3 INJECTION INTRAMUSCULAR; INTRAVENOUS; SUBCUTANEOUS EVERY 5 MIN PRN
Status: CANCELLED | OUTPATIENT
Start: 2017-08-22

## 2017-08-22 RX ORDER — FLUOROURACIL 50 MG/ML
400 INJECTION, SOLUTION INTRAVENOUS ONCE
Status: CANCELLED | OUTPATIENT
Start: 2017-08-22

## 2017-08-22 RX ORDER — FLUOROURACIL 50 MG/ML
400 INJECTION, SOLUTION INTRAVENOUS ONCE
Status: COMPLETED | OUTPATIENT
Start: 2017-08-22 | End: 2017-08-22

## 2017-08-22 RX ADMIN — FLUOROURACIL 830 MG: 50 INJECTION, SOLUTION INTRAVENOUS at 14:05

## 2017-08-22 ASSESSMENT — PAIN SCALES - GENERAL: PAINLEVEL: NO PAIN (0)

## 2017-08-22 NOTE — MR AVS SNAPSHOT
After Visit Summary   8/22/2017    Terry Yi    MRN: [id number]           Patient Information     Date Of Birth          1965        Visit Information        Provider Department      8/22/2017 9:30 AM NURSE ONLY CANCER CENTER [facility]        Today's Diagnoses     Malignant neoplasm of overlapping sites of stomach (H)    -  1       Follow-ups after your visit        Your next 10 appointments already scheduled     Aug 22, 2017 10:15 AM CDT   Return Visit with Yakov Shaffer MD   [facility] ([facility]    [address] [place] [address] [phone]            Aug 22, 2017 11:30 AM CDT   Level 4 with BAY 9 INFUSION   [facility])    [address] [place] [address] [phone]              Who to contact     If you have questions or need follow up information about today's clinic visit or your schedule please contact [facility] directly at [phone].  Normal or non-critical lab and imaging results will be communicated to you by WeissBeergerhart, letter or phone within 4 business days after the clinic has received the results. If you do not hear from us within 7 days, please contact the clinic through [employer] or phone. If you have a critical or abnormal lab result, we will notify you by phone as soon as possible.  Submit refill requests through [employer] or call your pharmacy and they will forward the refill request to us. Please allow 3 business days for your refill to be completed.          Additional Information About Your Visit        MyChart Information     [employer] gives you secure access to your electronic health record. If you see a primary care provider, you can also send messages to your care team and make appointments. If you have questions, please call your primary care clinic.  If you do not have a primary care provider, please call  [phone] and they will assist you.      [employer] is an electronic gateway that provides easy, online access to your medical records. With [employer], you can request a clinic appointment, read your test results, renew a prescription or communicate with your care team.     To access your existing account, please contact your [facility] [place] [facility] or call [phone] for assistance.        Care EveryWhere ID     This is your Care EveryWhere ID. This could be used by other organizations to access your [place] medical records  [id number]         Blood Pressure from Last 3 Encounters:   08/02/17 125/67   07/19/17 137/77   07/05/17 121/75    Weight from Last 3 Encounters:   08/02/17 88.9 kg (196 lb)   07/19/17 86.6 kg (191 lb)   07/05/17 88.5 kg (195 lb)              Today, you had the following     No orders found for display       Primary Care Provider Office Phone # Fax #    Sandra Dickerson [phone]       [facility] [address]  [facility] [address]        Equal Access to Services     DONOVAN [facility] CenterD[facility] : Hadii aad ku hadasho Soomaali, waaxda luqadaha, qaybta kaalmada adeegyada, claire cowart . So [facility] [phone].    ATENCIÓN: Si habla español, tiene a cuevas disposición servicios gratuitos de asistencia lingüística. LlSelect Medical Specialty [facility] [phone].    We comply with applicable federal civil rights laws and [place] laws. We do not discriminate on the basis of race, color, national origin, age, disability sex, sexual orientation or gender identity.            Thank you!     Thank you for choosing [facility]  for your care. Our goal is always to provide you with excellent care. Hearing back from our patients is one way we can continue to improve our services. Please take a few minutes to complete the written survey that you may receive in the mail after your visit with us. Thank you!             Your Updated Medication List - Protect  others around you: Learn how to safely use, store and throw away your medicines at www.disposemymeds.org.          This list is accurate as of: 8/22/17  9:43 AM.  Always use your most recent med list.                   Brand Name Dispense Instructions for use Diagnosis    allopurinol 100 MG tablet    ZYLOPRIM          aspirin 81 MG chewable tablet      Take 81 mg by mouth daily        lidocaine-prilocaine cream    EMLA          LORazepam 0.5 MG tablet    ATIVAN    30 tablet    Take 1 tablet (0.5 mg) by mouth every 4 hours as needed (Anxiety, Nausea/Vomiting or Sleep)    Malignant neoplasm of overlapping sites of stomach (H)       ondansetron 8 MG tablet    ZOFRAN    10 tablet    Take 1 tablet (8 mg) by mouth every 8 hours as needed (Nausea/Vomiting)    Malignant neoplasm of overlapping sites of stomach (H)       prochlorperazine 10 MG tablet    COMPAZINE    30 tablet    Take 1 tablet (10 mg) by mouth every 6 hours as needed (Nausea/Vomiting)    Malignant neoplasm of overlapping sites of stomach (H)

## 2017-08-22 NOTE — PROGRESS NOTES
"8/22/2017      REASON FOR VISIT:  Follow-up for unresectable metastatic gastric carcinoma, diffuse type, grade 3, HER-2 negative.      HISTORY OF PRESENT ILLNESS:  Please see my previous note for details.  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  Cycle 6 was administered on 6/21/2017   He had repeat staging done on June 30, 2017 with a CT scan chest abdomen and pelvis which showed a stable disease     He received cycle #8 of FOLFOX on 7/19/2017  C# 9 FOLFOX 8/2/2017 ( Oxaliplatin dose reduced to 70mg/m2 due to neuropathy )  C#10 delayed for 1 week as he went on vacations to [place]  C#10 8/22/2017 ( Oxaliplatin dose reduced to 60mg/m2 due to neuropathy )         INTERVAL HISTORY:    Terry had a good time in [place]. He was able to walk a lot and felt well in general. He was able to eat and drink well without any nausea or vomiting. Denies any pain denies any diarrhea or constipation. His main complaint has been some worsening of the neuropathy and now he notices more numbness in his fingers. He also notices that the bottom of the feet are numb. He does not have any pain from neuropathy. Otherwise denies any interval infections.     ROS:  A comprehensive ROS was otherwise neg        MEDICATIONS:   Reviewed       PHYSICAL EXAMINATION:   /70  Pulse 70  Temp 97.1  F (36.2  C) (Oral)  Resp 20  Ht 1.8 m (5' 10.87\")  Wt 91.2 kg (201 lb)  SpO2 99%  BMI 28.14 kg/m2    CONSTITUTIONAL:   No apparent distress  EYES:   No pallor or icterus  ENT:  Ears are normal. Oropharynx normal  CARDIOVASCULAR:  S1, S2 is audible and normal  RESPIRATORY:  Clear chest  GASTROINTESTINAL:   Abdomen is benign  NEUROLOGIC:  Alert and oriented ×3.  Other than the subjective tingling and numbness of fingers and bottom of " the feet, otherwise neurological exam was unremarkable  LYMPHATICS:  No palpable lymphadenopathy  EXTREMITIES:  No ankle edema  PSYCHIATRIC:  Mood and affect are  normal  CHEST:  Port site is unremarkable    LABS:  Reviewed and stable       ASSESSMENT AND PLAN:  Metastatic gastric cancer presenting with early satiety and significant weight loss and epigastric discomfort upon eating with some vomiting.  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  There is an indeterminate right liver lobe lesion.      Palliative FOLFOX was initiated on 03/12/2017.    his repeat his  Scans after 6 cycles of FOLFOX shows stable disease.   He has received 8 cycles up till now.  With cycle #9 I decrease the dose of oxaliplatin to 70 mg/m  because of neuropathy  We delayed cycle #10 for one week because he wanted to go on vacation to [place]. We will resume chemotherapy with cycle #10 today cycle #10  My plan will be to repeat imaging after cycle #12    Neuropathy. He has some worsening of the neuropathy especially in the bottom of the feet where he feels numb. The numbness in the fingers is mild. At this time I will decrease the dose of oxaliplatin to 60 mg/m .   We will keep a close watch on the neuropathy and if it worsens then I will stop oxaliplatin     Nutrition. His weight has increased over the last 3 weeks. He attributes it to eating more when he was on vacations. We discussed importance of maintaining good nutrition    We also discussed importance of regular exercise    I did not address the following today:  His genetic testing did not show any identifiable mutation not reveal any identifiable mutation     He will seepractitioner in 2 weeks and I will see him back in 4 weeks    I answered all of his and his family's questions to their satisfaction and they are agreeable and comfortable with the plan    LANEY HERNANDEZ MD

## 2017-08-22 NOTE — MR AVS SNAPSHOT
After Visit Summary   8/22/2017    Terry Yi    MRN: [id number]           Patient Information     Date Of Birth          1965        Visit Information        Provider Department      8/22/2017 11:30 AM BAY 9 INFUSION [facility]        Today's Diagnoses     Malignant neoplasm of overlapping sites of stomach (H)    -  1       Follow-ups after your visit        Your next 10 appointments already scheduled     Sep 06, 2017 12:00 PM CDT   Return Visit with [facility]   [facility]    [address] [facility] [address]   [phone]            Sep 06, 2017 12:30 PM CDT   Level 4 with BAY 4 INFUSION   [facility]    [address] [facility] [address] [phone]            Sep 06, 2017  1:30 PM CDT   Return Visit with MARLIN Morales [facility])    [address] [facility] [address] [phone]            Sep 20, 2017  8:00 AM CDT   Return Visit with [facility] ONLY CANCER CENTER   [facility]    [address] [facility] [address] [phone]            Sep 20, 2017  8:45 AM CDT   Return Visit with Yakov Shaffer MD   [facility])    [address] [facility] [address] [phone]            Sep 20, 2017  9:15 AM CDT   Level 4 with BAY 7 INFUSION   [facility] ([facility])    [address] [facility] [address] [phone]              Who to contact     If you have questions or need follow up information about today's clinic visit or your schedule please contact [facility] directly at [phone].  Normal or non-critical lab and imaging results will be communicated to you by Tai  letter or phone within 4 business days after the clinic has received the results. If you do not hear from us within 7 days, please contact the clinic through BeInSync or phone. If you have a critical or abnormal lab result, we will notify you by phone as soon as possible.  Submit refill requests through BeInSync or call your pharmacy and they will forward the refill request to us. Please allow 3 business days for your refill to be completed.          Additional Information About Your Visit        FablisticharSorbisense Information     BeInSync gives you secure access to your electronic health record. If you see a primary care provider, you can also send messages to your care team and make appointments. If you have questions, please call your primary care clinic.  If you do not have a primary care provider, please call [phone] and they will assist you.      BeInSync is an electronic gateway that provides easy, online access to your medical records. With BeInSync, you can request a clinic appointment, read your test results, renew a prescription or communicate with your care team.     To access your existing account, please contact your [facility] or call [phone] for assistance.        Care EveryWhere ID     This is your Care EveryWhere ID. This could be used by other organizations to access your [facility] medical records  [id number]         Blood Pressure from Last 3 Encounters:   08/22/17 115/70   08/02/17 125/67   07/19/17 137/77    Weight from Last 3 Encounters:   08/22/17 91.2 kg (201 lb)   08/02/17 88.9 kg (196 lb)   07/19/17 86.6 kg (191 lb)              Today, you had the following     No orders found for display       Primary Care Provider Office Phone # Fax #    Sandra Dickerson [phone]       [facility]  [address]  [facility] [address]        Equal Access to Services     CHANDAN GALLO : Jemal Casillas, matt rincon, karime vega  claire dumontjennifer currybret la'aan ah. So [facility] [phone].    ATENCIÓN: Si arabella luke, tiene a cuevas disposición servicios gratuitos de asistencia lingüística. Ciara al [phone].    We comply with applicable federal civil rights laws and [place] laws. We do not discriminate on the basis of race, color, national origin, age, disability sex, sexual orientation or gender identity.            Thank you!     Thank you for choosing [facility]  for your care. Our goal is always to provide you with excellent care. Hearing back from our patients is one way we can continue to improve our services. Please take a few minutes to complete the written survey that you may receive in the mail after your visit with us. Thank you!             Your Updated Medication List - Protect others around you: Learn how to safely use, store and throw away your medicines at www.disposemymeds.org.          This list is accurate as of: 8/22/17  3:20 PM.  Always use your most recent med list.                   Brand Name Dispense Instructions for use Diagnosis    allopurinol 100 MG tablet    ZYLOPRIM          aspirin 81 MG chewable tablet      Take 81 mg by mouth daily        lidocaine-prilocaine cream    EMLA          LORazepam 0.5 MG tablet    ATIVAN    30 tablet    Take 1 tablet (0.5 mg) by mouth every 4 hours as needed (Anxiety, Nausea/Vomiting or Sleep)    Malignant neoplasm of overlapping sites of stomach (H)       ondansetron 8 MG tablet    ZOFRAN    10 tablet    Take 1 tablet (8 mg) by mouth every 8 hours as needed (Nausea/Vomiting)    Malignant neoplasm of overlapping sites of stomach (H)       prochlorperazine 10 MG tablet    COMPAZINE    30 tablet    Take 1 tablet (10 mg) by mouth every 6 hours as needed (Nausea/Vomiting)    Malignant neoplasm of overlapping sites of stomach (H)

## 2017-08-22 NOTE — MR AVS SNAPSHOT
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)    -  1      Care Instructions    Proceed with chemo today    See Virgen with labs in 2 weeks and chemo    See me with labs in 4 weeks and chemo          Follow-ups after your visit        Your next 10 appointments already scheduled     Sep 06, 2017 12:00 PM CDT   Return Visit with NURSE ONLY CANCER CENTER   [facility] ([facility])    [address] [place] [address] [phone]            Sep 06, 2017 12:30 PM CDT   Level 4 with BAY 4 INFUSION   [facility])    [address] [place] [address] [phone]            Sep 06, 2017  1:30 PM CDT   Return Visit with MARLIN Morales CNP   [facility] ([facility])    [address] [facility] [address] [phone]            Sep 20, 2017  8:00 AM CDT   Return Visit with NURSE ONLY CANCER CENTER   [facility] ([facility])    [address] [facility] [address] [phone]            Sep 20, 2017  8:45 AM CDT   Return Visit with Yakov Shaffer MD   [facility])    [address] [facility] [address] [phone]            Sep 20, 2017  9:15 AM CDT   Level 4 with BAY 7 INFUSION   [facility])    [address] [place] [address] [phone]              Who to contact     If you have questions or need follow up information about today's clinic visit or your schedule please contact [facility] directly at [phone].  Normal or non-critical lab and imaging results will be communicated to you by Comecerhart, letter or phone within 4 business days after the clinic has received the results. If you do not hear from us within 7 days, please contact the clinic through Comecerhart or phone. If you have a critical or abnormal lab result, we will notify you by phone as soon as possible.  Submit refill requests through PANOSOL or call your pharmacy and they will forward the refill request to us. Please allow 3 business days for your refill to be completed.          Additional Information About Your Visit        PANOSOL Information     PANOSOL gives you secure access to your electronic health record. If you see a primary care provider, you can also send messages to your care team and make appointments. If you have questions, please call your primary care clinic.  If you do not have a primary care provider, please call [phone] and they will assist you.      PANOSOL is an electronic gateway that provides easy, online access to your medical records. With PANOSOL, you can request a clinic appointment, read your test results, renew a prescription or communicate with your care team.     To access your existing account, please contact your [facility] or call [phone] for assistance.        Care EveryWhere ID     This is your Care EveryWhere ID. This could be used by other organizations to access your [facility] medical records  [id number]        Your Vitals Were     Pulse Temperature Respirations Height Pulse Oximetry BMI (Body Mass Index)    70 97.1  F (36.2  C) (Oral) 20 1.8 m (5' 10.87\") 99% 28.14 kg/m2       Blood Pressure from Last 3 Encounters:   08/22/17 115/70   08/02/17 125/67   07/19/17 137/77    Weight from Last 3 Encounters:   08/22/17 91.2 kg (201 lb)   08/02/17 88.9 kg (196 lb)   07/19/17 86.6 kg (191 lb)              Today, you had the following  "    No orders found for display       Primary Care Provider Office Phone # Fax #    Sandra Dickerson, SARAI [phone]       [facility]  [address]  [facility] [address]        Equal Access to Services     CHANDAN GALLO : Hadii aad ku hadsohano Soomaali, waaxda luqadaha, qaybta kaalmada adeegyada, waxtejinder prabhain hayrodrigon mayurjennifer june sofya . So [facility] [phone].    ATENCIÓN: Si habla español, tiene a cuevas disposición servicios gratuitos de asistencia lingüística. Llame al [phone].    We comply with applicable federal civil rights laws and [place] laws. We do not discriminate on the basis of race, color, national origin, age, disability sex, sexual orientation or gender identity.            Thank you!     Thank you for choosing [facility]  for your care. Our goal is always to provide you with excellent care. Hearing back from our patients is one way we can continue to improve our services. Please take a few minutes to complete the written survey that you may receive in the mail after your visit with us. Thank you!             Your Updated Medication List - Protect others around you: Learn how to safely use, store and throw away your medicines at www.disposemymeds.org.          This list is accurate as of: 8/22/17 11:59 PM.  Always use your most recent med list.                   Brand Name Dispense Instructions for use Diagnosis    allopurinol 100 MG tablet    ZYLOPRIM          aspirin 81 MG chewable tablet      Take 81 mg by mouth daily        lidocaine-prilocaine cream    EMLA          LORazepam 0.5 MG tablet    ATIVAN    30 tablet    Take 1 tablet (0.5 mg) by mouth every 4 hours as needed (Anxiety, Nausea/Vomiting or Sleep)    Malignant neoplasm of overlapping sites of stomach (H)       ondansetron 8 MG tablet    ZOFRAN    10 tablet    Take 1 tablet (8 mg) by mouth every 8 hours as needed (Nausea/Vomiting)    Malignant neoplasm of overlapping sites of stomach (H)        prochlorperazine 10 MG tablet    COMPAZINE    30 tablet    Take 1 tablet (10 mg) by mouth every 6 hours as needed (Nausea/Vomiting)    Malignant neoplasm of overlapping sites of stomach (H)

## 2017-08-22 NOTE — PROGRESS NOTES
"Infusion Nursing Note:  Terry Yi presents today for C10 Oxaliplatin/ lecovorin/ fluorouracil push and pump connect.    Patient seen by provider today: Yes: Dr. Shaffer   present during visit today: Not Applicable.    Note: N/A.    Intravenous Access:  Implanted Port.    Treatment Conditions:  Lab Results   Component Value Date    HGB 12.6 08/22/2017     Lab Results   Component Value Date    WBC 4.2 08/22/2017      Lab Results   Component Value Date    ANEU 1.6 08/22/2017     Lab Results   Component Value Date     08/22/2017      Lab Results   Component Value Date     08/22/2017                   Lab Results   Component Value Date    POTASSIUM 4.1 08/22/2017           No results found for: MAG         Lab Results   Component Value Date    CR 0.73 08/22/2017                   Lab Results   Component Value Date    BRITTNEY 8.4 08/22/2017                Lab Results   Component Value Date    BILITOTAL 0.3 08/22/2017           Lab Results   Component Value Date    ALBUMIN 3.3 08/22/2017                    Lab Results   Component Value Date    ALT 38 08/22/2017           Lab Results   Component Value Date    AST 24 08/22/2017     Results reviewed, labs MET treatment parameters, ok to proceed with treatment.          Post Infusion Assessment:  Patient tolerated infusion without incident.  Blood return noted pre and post infusion.  Prior to discharge: Port is secured in place with tegaderm and flushed with 10cc NS with positive blood return noted.  Continuous home infusion Dosi-Fuser pump connected.    All connectors secured in place and clamps taped open.    Pump started, \"running\" noted on display (CADD): Not Applicable.  Patient instructed to call our clinic or [facility] with any questions or concerns at home.  Patient verbalized understanding.    Patient set up for pump disconnect in [facility]  on 8/24/17 at 1230pm.            Discharge Plan:   Patient will return 9/20/17 for next " appointment.   Patient discharged in stable condition accompanied by: self, wife, son and friend.  Departure Mode: Ambulatory.    Michelle Fermin RN

## 2017-08-22 NOTE — PATIENT INSTRUCTIONS
Proceed with chemo today    See Virgen with labs in 2 weeks and chemo    See me with labs in 4 weeks and chemo

## 2017-08-22 NOTE — NURSING NOTE
"Oncology Rooming Note    August 22, 2017 10:00 AM   Terry Yi is a 51 year old male who presents for:    Chief Complaint   Patient presents with     Oncology Clinic Visit     prior to tx     Initial Vitals: There were no vitals taken for this visit. Estimated body mass index is 27.44 kg/(m^2) as calculated from the following:    Height as of 8/2/17: 1.8 m (5' 10.87\").    Weight as of 8/2/17: 88.9 kg (196 lb). There is no height or weight on file to calculate BSA.  Data Unavailable Comment: Data Unavailable   No LMP for male patient.  Allergies reviewed: Yes  Medications reviewed: Yes    Medications: Medication refills not needed today.  Pharmacy name entered into EPIC: Data Unavailable    Clinical concerns:     8 minutes for nursing intake (face to face time)     PAOLO PETTY LPN              "

## 2017-09-06 ENCOUNTER — ONCOLOGY VISIT (OUTPATIENT)
Dept: ONCOLOGY | Facility: CLINIC | Age: 52
End: 2017-09-06
Payer: COMMERCIAL

## 2017-09-06 ENCOUNTER — INFUSION THERAPY VISIT (OUTPATIENT)
Dept: INFUSION THERAPY | Facility: CLINIC | Age: 52
End: 2017-09-06
Payer: COMMERCIAL

## 2017-09-06 VITALS
OXYGEN SATURATION: 100 % | SYSTOLIC BLOOD PRESSURE: 106 MMHG | WEIGHT: 203 LBS | HEART RATE: 73 BPM | DIASTOLIC BLOOD PRESSURE: 61 MMHG | BODY MASS INDEX: 28.42 KG/M2 | TEMPERATURE: 97.4 F | RESPIRATION RATE: 18 BRPM

## 2017-09-06 DIAGNOSIS — M25.551 HIP PAIN, RIGHT: ICD-10-CM

## 2017-09-06 DIAGNOSIS — G62.0 DRUG-INDUCED POLYNEUROPATHY (H): ICD-10-CM

## 2017-09-06 DIAGNOSIS — C16.8 MALIGNANT NEOPLASM OF OVERLAPPING SITES OF STOMACH (H): Primary | ICD-10-CM

## 2017-09-06 LAB
ALBUMIN SERPL-MCNC: 3.6 G/DL (ref 3.4–5)
ALP SERPL-CCNC: 91 U/L (ref 40–150)
ALT SERPL W P-5'-P-CCNC: 44 U/L (ref 0–70)
ANION GAP SERPL CALCULATED.3IONS-SCNC: 6 MMOL/L (ref 3–14)
AST SERPL W P-5'-P-CCNC: 32 U/L (ref 0–45)
BASOPHILS # BLD AUTO: 0 10E9/L (ref 0–0.2)
BASOPHILS NFR BLD AUTO: 0.6 %
BILIRUB SERPL-MCNC: 0.3 MG/DL (ref 0.2–1.3)
BUN SERPL-MCNC: 13 MG/DL (ref 7–30)
CALCIUM SERPL-MCNC: 8.7 MG/DL (ref 8.5–10.1)
CHLORIDE SERPL-SCNC: 107 MMOL/L (ref 94–109)
CO2 SERPL-SCNC: 27 MMOL/L (ref 20–32)
CREAT SERPL-MCNC: 0.77 MG/DL (ref 0.66–1.25)
DIFFERENTIAL METHOD BLD: ABNORMAL
EOSINOPHIL # BLD AUTO: 0.1 10E9/L (ref 0–0.7)
EOSINOPHIL NFR BLD AUTO: 2.1 %
ERYTHROCYTE [DISTWIDTH] IN BLOOD BY AUTOMATED COUNT: 14.7 % (ref 10–15)
GFR SERPL CREATININE-BSD FRML MDRD: >90 ML/MIN/1.7M2
GLUCOSE SERPL-MCNC: 103 MG/DL (ref 70–99)
HCT VFR BLD AUTO: 38.6 % (ref 40–53)
HGB BLD-MCNC: 13.2 G/DL (ref 13.3–17.7)
LYMPHOCYTES # BLD AUTO: 2 10E9/L (ref 0.8–5.3)
LYMPHOCYTES NFR BLD AUTO: 30.4 %
MCH RBC QN AUTO: 32 PG (ref 26.5–33)
MCHC RBC AUTO-ENTMCNC: 34.2 G/DL (ref 31.5–36.5)
MCV RBC AUTO: 94 FL (ref 78–100)
MONOCYTES # BLD AUTO: 0.6 10E9/L (ref 0–1.3)
MONOCYTES NFR BLD AUTO: 8.4 %
NEUTROPHILS # BLD AUTO: 3.9 10E9/L (ref 1.6–8.3)
NEUTROPHILS NFR BLD AUTO: 58.5 %
PLATELET # BLD AUTO: 127 10E9/L (ref 150–450)
POTASSIUM SERPL-SCNC: 4 MMOL/L (ref 3.4–5.3)
PROT SERPL-MCNC: 6.9 G/DL (ref 6.8–8.8)
RBC # BLD AUTO: 4.12 10E12/L (ref 4.4–5.9)
SODIUM SERPL-SCNC: 140 MMOL/L (ref 133–144)
WBC # BLD AUTO: 6.7 10E9/L (ref 4–11)

## 2017-09-06 PROCEDURE — 99207 ZZC NO CHARGE LOS: CPT

## 2017-09-06 PROCEDURE — 96416 CHEMO PROLONG INFUSE W/PUMP: CPT | Performed by: INTERNAL MEDICINE

## 2017-09-06 PROCEDURE — 96367 TX/PROPH/DG ADDL SEQ IV INF: CPT | Performed by: INTERNAL MEDICINE

## 2017-09-06 PROCEDURE — 96409 CHEMO IV PUSH SNGL DRUG: CPT | Performed by: INTERNAL MEDICINE

## 2017-09-06 PROCEDURE — 99214 OFFICE O/P EST MOD 30 MIN: CPT | Mod: 25 | Performed by: NURSE PRACTITIONER

## 2017-09-06 PROCEDURE — 99207 ZZC NO CHARGE NURSE ONLY: CPT

## 2017-09-06 PROCEDURE — 80053 COMPREHEN METABOLIC PANEL: CPT | Performed by: INTERNAL MEDICINE

## 2017-09-06 PROCEDURE — 85025 COMPLETE CBC W/AUTO DIFF WBC: CPT | Performed by: INTERNAL MEDICINE

## 2017-09-06 RX ORDER — EPINEPHRINE 1 MG/ML
0.3 INJECTION INTRAMUSCULAR; INTRAVENOUS; SUBCUTANEOUS EVERY 5 MIN PRN
Status: CANCELLED | OUTPATIENT
Start: 2017-09-06

## 2017-09-06 RX ORDER — LORAZEPAM 2 MG/ML
0.5 INJECTION INTRAMUSCULAR EVERY 4 HOURS PRN
Status: CANCELLED
Start: 2017-09-06

## 2017-09-06 RX ORDER — FLUOROURACIL 50 MG/ML
400 INJECTION, SOLUTION INTRAVENOUS ONCE
Status: CANCELLED | OUTPATIENT
Start: 2017-09-06

## 2017-09-06 RX ORDER — HEPARIN SODIUM (PORCINE) LOCK FLUSH IV SOLN 100 UNIT/ML 100 UNIT/ML
5 SOLUTION INTRAVENOUS
Status: DISCONTINUED | OUTPATIENT
Start: 2017-09-06 | End: 2017-09-06 | Stop reason: HOSPADM

## 2017-09-06 RX ORDER — ALBUTEROL SULFATE 90 UG/1
1-2 AEROSOL, METERED RESPIRATORY (INHALATION)
Status: CANCELLED
Start: 2017-09-06

## 2017-09-06 RX ORDER — METHYLPREDNISOLONE SODIUM SUCCINATE 125 MG/2ML
125 INJECTION, POWDER, LYOPHILIZED, FOR SOLUTION INTRAMUSCULAR; INTRAVENOUS
Status: CANCELLED
Start: 2017-09-06

## 2017-09-06 RX ORDER — ALBUTEROL SULFATE 0.83 MG/ML
2.5 SOLUTION RESPIRATORY (INHALATION)
Status: CANCELLED | OUTPATIENT
Start: 2017-09-06

## 2017-09-06 RX ORDER — EPINEPHRINE 0.3 MG/.3ML
0.3 INJECTION SUBCUTANEOUS EVERY 5 MIN PRN
Status: CANCELLED | OUTPATIENT
Start: 2017-09-06

## 2017-09-06 RX ORDER — SODIUM CHLORIDE 9 MG/ML
1000 INJECTION, SOLUTION INTRAVENOUS CONTINUOUS PRN
Status: CANCELLED
Start: 2017-09-06

## 2017-09-06 RX ORDER — MEPERIDINE HYDROCHLORIDE 50 MG/ML
25 INJECTION INTRAMUSCULAR; INTRAVENOUS; SUBCUTANEOUS EVERY 30 MIN PRN
Status: CANCELLED | OUTPATIENT
Start: 2017-09-06

## 2017-09-06 RX ORDER — DIPHENHYDRAMINE HYDROCHLORIDE 50 MG/ML
50 INJECTION INTRAMUSCULAR; INTRAVENOUS
Status: CANCELLED
Start: 2017-09-06

## 2017-09-06 RX ORDER — FLUOROURACIL 50 MG/ML
400 INJECTION, SOLUTION INTRAVENOUS ONCE
Status: COMPLETED | OUTPATIENT
Start: 2017-09-06 | End: 2017-09-06

## 2017-09-06 RX ADMIN — HEPARIN SODIUM (PORCINE) LOCK FLUSH IV SOLN 100 UNIT/ML 5 ML: 100 SOLUTION at 12:08

## 2017-09-06 RX ADMIN — FLUOROURACIL 830 MG: 50 INJECTION, SOLUTION INTRAVENOUS at 14:12

## 2017-09-06 ASSESSMENT — PAIN SCALES - GENERAL: PAINLEVEL: MODERATE PAIN (5)

## 2017-09-06 NOTE — PROGRESS NOTES
Infusion Nursing Note:  Terry Yi presents today for Folfox/ (d/c oxaliplatin).    Patient seen by provider today: Yes: NP   present during visit today: Not Applicable.    Note: pt neuropathy continues to worsen.  Oxaliplatin removed from from regimen  Intravenous Access:  Implanted Port.    Treatment Conditions:  Results reviewed, labs MET treatment parameters, ok to proceed with treatment.      Post Infusion Assessment:  Patient tolerated infusion without incident.    Discharge Plan:   Pt to call for appt at local clinic for chemo d/c.    GREY MARCELO RN

## 2017-09-06 NOTE — MR AVS SNAPSHOT
After Visit Summary   9/6/2017    Terry Yi    MRN: [id number]           Patient Information     Date Of Birth          1965        Visit Information        Provider Department      9/6/2017 12:00 PM NURSE ONLY CANCER CENTER [facility]        Today's Diagnoses     Malignant neoplasm of overlapping sites of stomach (H)    -  1       Follow-ups after your visit        Your next 10 appointments already scheduled     Sep 06, 2017 12:30 PM CDT   Level 4 with BAY 4 INFUSION   [facility] ([facility])    [address] [place] [id number]   [phone]            Sep 06, 2017  1:30 PM CDT   Return Visit with MARLIN Morales CNP   [facility] ([facility])    [address] [place] [address]   [phone]            Sep 20, 2017  8:00 AM CDT   Return Visit with NURSE ONLY CANCER CENTER   [facility] ([facility])    [address] [place] [address]   [phone]            Sep 20, 2017  8:45 AM CDT   Return Visit with Yakov Shaffer MD   [facility])    [address] [place] [address]   [phone]            Sep 20, 2017  9:15 AM CDT   Level 4 with BAY 7 INFUSION   [facility])    [address] [place] [address]   [phone]              Who to contact     If you have questions or need follow up information about today's clinic visit or your schedule please contact [facility] directly at [phone].  Normal or non-critical lab and imaging results will be communicated to you by MyChart, letter or phone within 4 business days after the clinic has received the results. If you do not hear from us within 7 days, please contact the clinic through MyChart or phone. If you have a critical or  abnormal lab result, we will notify you by phone as soon as possible.  Submit refill requests through Smartisan or call your pharmacy and they will forward the refill request to us. Please allow 3 business days for your refill to be completed.          Additional Information About Your Visit        [employer] Information     Smartisan gives you secure access to your electronic health record. If you see a primary care provider, you can also send messages to your care team and make appointments. If you have questions, please call your primary care clinic.  If you do not have a primary care provider, please call [phone] and they will assist you.      Smartisan is an electronic gateway that provides easy, online access to your medical records. With Smartisan, you can request a clinic appointment, read your test results, renew a prescription or communicate with your care team.     To access your existing account, please contact your [facility] or call [phone] for assistance.        Care EveryWhere ID     This is your Care EveryWhere ID. This could be used by other organizations to access your [place] medical records  [id number]         Blood Pressure from Last 3 Encounters:   08/22/17 115/70   08/02/17 125/67   07/19/17 137/77    Weight from Last 3 Encounters:   08/22/17 91.2 kg (201 lb)   08/02/17 88.9 kg (196 lb)   07/19/17 86.6 kg (191 lb)              We Performed the Following     CBC with platelets differential     Comprehensive metabolic panel        Primary Care Provider Office Phone # Fax #    Sandra Dickerson [phone]       [facility]  [address]  [facility] [address]        Equal Access to Services     [facility]: Hadii aad ku hadasho Soomaali, waaxda luqadaha, qaybta kaalmada adeegyada, claire cowart . So [facility] [phone].    ATENCIÓN: Si habla español, tiene a cuevas disposición servicios gratuitos de asistencia  lingüística. Ciara al [phone].    We comply with applicable federal civil rights laws and [place] laws. We do not discriminate on the basis of race, color, national origin, age, disability sex, sexual orientation or gender identity.            Thank you!     Thank you for choosing [facility]  for your care. Our goal is always to provide you with excellent care. Hearing back from our patients is one way we can continue to improve our services. Please take a few minutes to complete the written survey that you may receive in the mail after your visit with us. Thank you!             Your Updated Medication List - Protect others around you: Learn how to safely use, store and throw away your medicines at www.disposemymeds.org.          This list is accurate as of: 9/6/17 12:23 PM.  Always use your most recent med list.                   Brand Name Dispense Instructions for use Diagnosis    allopurinol 100 MG tablet    ZYLOPRIM          aspirin 81 MG chewable tablet      Take 81 mg by mouth daily        lidocaine-prilocaine cream    EMLA          LORazepam 0.5 MG tablet    ATIVAN    30 tablet    Take 1 tablet (0.5 mg) by mouth every 4 hours as needed (Anxiety, Nausea/Vomiting or Sleep)    Malignant neoplasm of overlapping sites of stomach (H)       ondansetron 8 MG tablet    ZOFRAN    10 tablet    Take 1 tablet (8 mg) by mouth every 8 hours as needed (Nausea/Vomiting)    Malignant neoplasm of overlapping sites of stomach (H)       prochlorperazine 10 MG tablet    COMPAZINE    30 tablet    Take 1 tablet (10 mg) by mouth every 6 hours as needed (Nausea/Vomiting)    Malignant neoplasm of overlapping sites of stomach (H)

## 2017-09-06 NOTE — MR AVS SNAPSHOT
After Visit Summary   9/6/2017    Terry Yi    MRN: [id number]           Patient Information     Date Of Birth          1965        Visit Information        Provider Department      9/6/2017 12:30 PM BAY 4 INFUSION [facility]        Today's Diagnoses     Malignant neoplasm of overlapping sites of stomach (H)    -  1       Follow-ups after your visit        Your next 10 appointments already scheduled     Sep 20, 2017  8:00 AM CDT   Return Visit with NURSE ONLY CANCER CENTER   [facility] ([facility])    [address] [place] [phone]            Sep 20, 2017  8:45 AM CDT   Return Visit with Yakov Shaffer MD   [facility] ([facility])    [address] [place] [phone]            Sep 20, 2017  9:15 AM CDT   Level 4 with BAY 7 INFUSION   [facility])    [address] [place] [phone] [phone]              Who to contact     If you have questions or need follow up information about today's clinic visit or your schedule please contact [facility] directly at [phone].  Normal or non-critical lab and imaging results will be communicated to you by MyChart, letter or phone within 4 business days after the clinic has received the results. If you do not hear from us within 7 days, please contact the clinic through MyChart or phone. If you have a critical or abnormal lab result, we will notify you by phone as soon as possible.  Submit refill requests through [employer] Tornado or call your pharmacy and they will forward the refill request to us. Please allow 3 business days for your refill to be completed.          Additional Information About Your Visit        [employer] Information     [employer] gives you secure access to your electronic health record. If you see a primary care  provider, you can also send messages to your care team and make appointments. If you have questions, please call your primary care clinic.  If you do not have a primary care provider, please call [phone] and they will assist you.      [employer] is an electronic gateway that provides easy, online access to your medical records. With [employer], you can request a clinic appointment, read your test results, renew a prescription or communicate with your care team.     To access your existing account, please contact your [facility] or call [phone] for assistance.        Care EveryWhere ID     This is your Care EveryWhere ID. This could be used by other organizations to access your [place] medical records  [id number]         Blood Pressure from Last 3 Encounters:   09/06/17 106/61   08/22/17 115/70   08/02/17 125/67    Weight from Last 3 Encounters:   09/06/17 92.1 kg (203 lb)   08/22/17 91.2 kg (201 lb)   08/02/17 88.9 kg (196 lb)              Today, you had the following     No orders found for display       Primary Care Provider Office Phone # Fax #    Sandra Dickerson [phone]       [facility]  [address]  [facility] [address]        Equal Access to Services     CHANDAN GALLO : Hadii aad ku hadasho Soomaali, waaxda luqadaha, qaybta kaalmada adeegyada, waxay idiin hayaan charley cowart . So [facility] [place] [phone].    ATENCIÓN: Si habla español, tiene a cuevas disposición servicios gratuitos de asistencia lingüística. Llame al [phone].    We comply with applicable federal civil rights laws and [place] laws. We do not discriminate on the basis of race, color, national origin, age, disability sex, sexual orientation or gender identity.            Thank you!     Thank you for choosing [facility]  for your care. Our goal is always to provide you with excellent care. Hearing back from our patients is one way we can continue to improve  our services. Please take a few minutes to complete the written survey that you may receive in the mail after your visit with us. Thank you!             Your Updated Medication List - Protect others around you: Learn how to safely use, store and throw away your medicines at www.disposemymeds.org.          This list is accurate as of: 9/6/17  3:18 PM.  Always use your most recent med list.                   Brand Name Dispense Instructions for use Diagnosis    allopurinol 100 MG tablet    ZYLOPRIM          aspirin 81 MG chewable tablet      Take 81 mg by mouth daily        lidocaine-prilocaine cream    EMLA          LORazepam 0.5 MG tablet    ATIVAN    30 tablet    Take 1 tablet (0.5 mg) by mouth every 4 hours as needed (Anxiety, Nausea/Vomiting or Sleep)    Malignant neoplasm of overlapping sites of stomach (H)       ondansetron 8 MG tablet    ZOFRAN    10 tablet    Take 1 tablet (8 mg) by mouth every 8 hours as needed (Nausea/Vomiting)    Malignant neoplasm of overlapping sites of stomach (H)       prochlorperazine 10 MG tablet    COMPAZINE    30 tablet    Take 1 tablet (10 mg) by mouth every 6 hours as needed (Nausea/Vomiting)    Malignant neoplasm of overlapping sites of stomach (H)

## 2017-09-06 NOTE — PROGRESS NOTES
Oncology Follow Up Visit: September 6, 2017    Oncologist: Dr Yakov Shaffer  PCP: Sandra Dickerson    Diagnosis: Metastatic Gastric Carcinoma  Terry Yi is a 50 yo  male That originally presented with early satiety and weight loss Was found on 3/19/17 to have diffusely increased soft tissue densityby the proximal abdominal aorta at the level of the superior mesentery artery and extending distally to the level of bifurcation and slightly above the proximal common iliac arteries.  Also noted enlarged lymph node in the central mesentery at the celiac artery level measuring 1.3 x 1.4 cm with additional multiple small mesenteric lymph nodes. There was also minimal gastric wall thickening and a small 1.3 cm hypodense lesion in the peripheral aspect of the liver possibly representing a hemangioma.Because of these findings, he underwent an EGD on 03/10/2017 which showed significant thickening and hardening of the greater curvature of the stomach with edema. The biopsies which were taken from it showed invasive gastric carcinoma, diffuse type, grade 3. IHC for HER2/catrachita was negative.  No identifiable mutations found with genetic testing  Treatment:   4/12/2017 began FOLFOX     Interval History: Mr iY comes to clinic for toxicity review prior to cycle 11 of FOLFOX with decrease in oxaliplatin with cycle 10 due to neuropathy. Pt today states he has had -Concerns about peripheral neuropathy to his fingertips and the bottom of his feet feeling like  His socks are crumpled when he is walking-e is also lost dexterity to his fingers for buttoning and other close work.Patient is concerned is not getting any better.Is also sharing right hip pain that is worse as he is walking longer distances  Greater than 1 block is not using anything for the pain at this point but is sore by the end of the day.-He is a  and is walking a lot quite a bit throughout the day. Denies any nausea mild sore shortness of breath fevers  bowel or bladder changes. He feels he is eating well and has not lost any weight denies any weakness,skin issues or trouble sleeping.  Rest of comprehensive and complete ROS is reviewed and is negative.   Past Medical History:   Diagnosis Date     Alcohol abuse     in remission x 4 years (7/2013)     Diverticulosis      Gastric cancer (H)      Gastroparesis      HTN (hypertension)      Hyperplastic colon polyp      Marijuana abuse     Daily use     Vasovagal episode     with IV stick     Weight loss      Current Outpatient Prescriptions   Medication     lidocaine-prilocaine (EMLA) cream     aspirin 81 MG chewable tablet     LORazepam (ATIVAN) 0.5 MG tablet     prochlorperazine (COMPAZINE) 10 MG tablet     ondansetron (ZOFRAN) 8 MG tablet     allopurinol (ZYLOPRIM) 100 MG tablet     No current facility-administered medications for this visit.      Allergies   Allergen Reactions     Latex      Rash (when picked up rubber tires)       Physical Exam:/61  Pulse 73  Temp 97.4  F (36.3  C) (Oral)  Resp 18  Wt 92.1 kg (203 lb)  SpO2 100%  BMI 28.42 kg/m2 -Weight stable  ECOG PS- 0  Constitutional: Alert and in no distress.   ENT: Eyes bright, No mouth sores  Neck: Supple, No adenopathy.Thyroid symmetric, normal size  Cardiac: Heart rate and rhythm is regular and strong without murmur  Respiratory: Breathing easy. Lung sounds clear to auscultation  GI: Abdomen is soft, non-tender, BS normal. No masses or organomegaly  MS: Muscle tone normal-Walking without limp at this point, extremities normal with no edema. Strength is good bilaterally to his hands and good movement to his feet.  Skin: No suspicious lesions or rashes  Neuro: Sensory grossly WNL, gait normal.   Lymph: Normal ant/post cervical, axillary, supraclavicular nodes  Psych: Mentation appears normal and affect normal/bright and smiling    Laboratory Results:   Results for orders placed or performed in visit on 09/06/17   CBC with platelets differential    Result Value Ref Range    WBC 6.7 4.0 - 11.0 10e9/L    RBC Count 4.12 (L) 4.4 - 5.9 10e12/L    Hemoglobin 13.2 (L) 13.3 - 17.7 g/dL    Hematocrit 38.6 (L) 40.0 - 53.0 %    MCV 94 78 - 100 fl    MCH 32.0 26.5 - 33.0 pg    MCHC 34.2 31.5 - 36.5 g/dL    RDW 14.7 10.0 - 15.0 %    Platelet Count 127 (L) 150 - 450 10e9/L    Diff Method Automated Method     % Neutrophils 58.5 %    % Lymphocytes 30.4 %    % Monocytes 8.4 %    % Eosinophils 2.1 %    % Basophils 0.6 %    Absolute Neutrophil 3.9 1.6 - 8.3 10e9/L    Absolute Lymphocytes 2.0 0.8 - 5.3 10e9/L    Absolute Monocytes 0.6 0.0 - 1.3 10e9/L    Absolute Eosinophils 0.1 0.0 - 0.7 10e9/L    Absolute Basophils 0.0 0.0 - 0.2 10e9/L   Comprehensive metabolic panel   Result Value Ref Range    Sodium 140 133 - 144 mmol/L    Potassium 4.0 3.4 - 5.3 mmol/L    Chloride 107 94 - 109 mmol/L    Carbon Dioxide 27 20 - 32 mmol/L    Anion Gap 6 3 - 14 mmol/L    Glucose 103 (H) 70 - 99 mg/dL    Urea Nitrogen 13 7 - 30 mg/dL    Creatinine 0.77 0.66 - 1.25 mg/dL    GFR Estimate >90 >60 mL/min/1.7m2    GFR Estimate If Black >90 >60 mL/min/1.7m2    Calcium 8.7 8.5 - 10.1 mg/dL    Bilirubin Total 0.3 0.2 - 1.3 mg/dL    Albumin 3.6 3.4 - 5.0 g/dL    Protein Total 6.9 6.8 - 8.8 g/dL    Alkaline Phosphatase 91 40 - 150 U/L    ALT 44 0 - 70 U/L    AST 32 0 - 45 U/L     Assessment and Plan:   Unresectable metastatic gastric carcinoma-Patient today will be completing cycle 11 of his FOLFOX that was initiated on 4/12/2017. He meets also continue with plan today but peripheral neuropathy is progressive  even with decrease in dose with cycle 10 so we will drop the oxaliplatin from his plan at this time.  He will see Dr. Shaffer prior to next infusion and will plan on repeating imaging after cycle 12.  Peripheral neuropathy-Appears to be progressive had a 20% decrease of his oxaliplatin with cycle 10.Touched on ability to use B vitamins to see if this would help the neuropathy.  Right hip pain-States  he can walk about a block without any pain but any extended walking is causing him more pain to his right hip. He has not been treating up until this time but suggested he could use Tylenol once or twice during his work to see if that would help cover some of the pain since he is on that hip and leg quite a bit of the time. Hope to review this hip with imaging due after cycle 12.  Healthy living-patient is doing well to keep his weight up and is eating. Diet he was given credit for doing good job keeping active as well  This was a 30 min visit with > 50% in counseling and coordinating care including education and management of concerns.    Virgen Garcia,CNP

## 2017-09-06 NOTE — MR AVS SNAPSHOT
After Visit Summary   9/6/2017    Terry Yi    MRN: [id number]           Patient Information     Date Of Birth          1965        Visit Information        Provider Department      9/6/2017 1:30 PM Virgen Garcia APRN CNP [facility]         Follow-ups after your visit        Your next 10 appointments already scheduled     Sep 06, 2017 12:00 PM CDT   Return Visit with NURSE ONLY CANCER CENTER   [facility] ([facility])    [address] [facility] [address] [phone]            Sep 06, 2017 12:30 PM CDT   Level 4 with BAY 4 INFUSION   [facility] ([facility])    [address] [place] [address] [phone]            Sep 06, 2017  1:30 PM CDT   Return Visit with MARLIN Morales CNP   [facility] ([facility])    [address] [place] [phone]            Sep 20, 2017  8:00 AM CDT   Return Visit with NURSE ONLY CANCER CENTER   [facility] ([facility])    [address] [facility] [address] [phone]            Sep 20, 2017  8:45 AM CDT   Return Visit with Yakov Shaffer MD   [facility])    [address] [facility] [phone] [phone]            Sep 20, 2017  9:15 AM CDT   Level 4 with BAY 7 INFUSION   [facility])    [address] [place] [phone]              Who to contact     If you have questions or need follow up information about today's clinic visit or your schedule please contact [facility] directly at [phone].  Normal or non-critical lab and imaging results will be communicated to you by MyChart, letter or phone within 4 business days after the clinic has received the results.  If you do not hear from us within 7 days, please contact the clinic through CDP or phone. If you have a critical or abnormal lab result, we will notify you by phone as soon as possible.  Submit refill requests through CDP or call your pharmacy and they will forward the refill request to us. Please allow 3 business days for your refill to be completed.          Additional Information About Your Visit        [employer] Information     CDP gives you secure access to your electronic health record. If you see a primary care provider, you can also send messages to your care team and make appointments. If you have questions, please call your primary care clinic.  If you do not have a primary care provider, please call [phone] and they will assist you.      CDP is an electronic gateway that provides easy, online access to your medical records. With CDP, you can request a clinic appointment, read your test results, renew a prescription or communicate with your care team.     To access your existing account, please contact your [facility] or call [phone] for assistance.        Care EveryWhere ID     This is your Care EveryWhere ID. This could be used by other organizations to access your [place] medical records  [id number]         Blood Pressure from Last 3 Encounters:   08/22/17 115/70   08/02/17 125/67   07/19/17 137/77    Weight from Last 3 Encounters:   08/22/17 91.2 kg (201 lb)   08/02/17 88.9 kg (196 lb)   07/19/17 86.6 kg (191 lb)              Today, you had the following     No orders found for display       Primary Care Provider Office Phone # Fax #    Sandra Dickerson [phone]       [facility]  [address]  [facility] [address]        Equal Access to Services     [facility] CLEO : Hadii tk campo Soleeanne, waaxda luqadaha, qaybta kaalmada tim, claire browne. So [facility] [phone].    ATENCIÓN:  Si arabella marly, tiene a cuevas disposición servicios gratuitos de asistencia lingüística. Ciara ji [phone].    We comply with applicable federal civil rights laws and [place] laws. We do not discriminate on the basis of race, color, national origin, age, disability sex, sexual orientation or gender identity.            Thank you!     Thank you for choosing [facility]  for your care. Our goal is always to provide you with excellent care. Hearing back from our patients is one way we can continue to improve our services. Please take a few minutes to complete the written survey that you may receive in the mail after your visit with us. Thank you!             Your Updated Medication List - Protect others around you: Learn how to safely use, store and throw away your medicines at www.disposemymeds.org.          This list is accurate as of: 8/22/17  2:20 PM.  Always use your most recent med list.                   Brand Name Dispense Instructions for use Diagnosis    allopurinol 100 MG tablet    ZYLOPRIM          aspirin 81 MG chewable tablet      Take 81 mg by mouth daily        lidocaine-prilocaine cream    EMLA          LORazepam 0.5 MG tablet    ATIVAN    30 tablet    Take 1 tablet (0.5 mg) by mouth every 4 hours as needed (Anxiety, Nausea/Vomiting or Sleep)    Malignant neoplasm of overlapping sites of stomach (H)       ondansetron 8 MG tablet    ZOFRAN    10 tablet    Take 1 tablet (8 mg) by mouth every 8 hours as needed (Nausea/Vomiting)    Malignant neoplasm of overlapping sites of stomach (H)       prochlorperazine 10 MG tablet    COMPAZINE    30 tablet    Take 1 tablet (10 mg) by mouth every 6 hours as needed (Nausea/Vomiting)    Malignant neoplasm of overlapping sites of stomach (H)

## 2017-09-06 NOTE — NURSING NOTE
"Oncology Rooming Note    September 6, 2017 12:41 PM   Terry Yi is a 51 year old male who presents for:    Chief Complaint   Patient presents with     Oncology Clinic Visit     Initial Vitals: /61  Pulse 73  Temp 97.4  F (36.3  C) (Oral)  Resp 18  Wt 92.1 kg (203 lb)  SpO2 100%  BMI 28.42 kg/m2 Estimated body mass index is 28.42 kg/(m^2) as calculated from the following:    Height as of 8/22/17: 1.8 m (5' 10.87\").    Weight as of this encounter: 92.1 kg (203 lb). Body surface area is 2.15 meters squared.  Moderate Pain (5) Comment: R hip   No LMP for male patient.  Allergies reviewed: Yes  Medications reviewed: Yes    Medications: Medication refills not needed today.  Pharmacy name entered into EPIC: Data Unavailable    Clinical concerns: more aches R hip NP was notified.    10 minutes for nursing intake (face to face time)     GREY MARCELO RN              "

## 2017-09-20 ENCOUNTER — ONCOLOGY VISIT (OUTPATIENT)
Dept: ONCOLOGY | Facility: CLINIC | Age: 52
End: 2017-09-20
Payer: COMMERCIAL

## 2017-09-20 ENCOUNTER — INFUSION THERAPY VISIT (OUTPATIENT)
Dept: INFUSION THERAPY | Facility: CLINIC | Age: 52
End: 2017-09-20
Payer: COMMERCIAL

## 2017-09-20 VITALS
BODY MASS INDEX: 28.28 KG/M2 | SYSTOLIC BLOOD PRESSURE: 119 MMHG | HEIGHT: 71 IN | OXYGEN SATURATION: 98 % | DIASTOLIC BLOOD PRESSURE: 75 MMHG | WEIGHT: 202 LBS | HEART RATE: 52 BPM | RESPIRATION RATE: 18 BRPM | TEMPERATURE: 97.1 F

## 2017-09-20 DIAGNOSIS — C16.8 MALIGNANT NEOPLASM OF OVERLAPPING SITES OF STOMACH (H): Primary | ICD-10-CM

## 2017-09-20 LAB
ALBUMIN SERPL-MCNC: 3.5 G/DL (ref 3.4–5)
ALP SERPL-CCNC: 85 U/L (ref 40–150)
ALT SERPL W P-5'-P-CCNC: 51 U/L (ref 0–70)
ANION GAP SERPL CALCULATED.3IONS-SCNC: 3 MMOL/L (ref 3–14)
AST SERPL W P-5'-P-CCNC: 31 U/L (ref 0–45)
BASOPHILS # BLD AUTO: 0 10E9/L (ref 0–0.2)
BASOPHILS NFR BLD AUTO: 0.6 %
BILIRUB SERPL-MCNC: 0.5 MG/DL (ref 0.2–1.3)
BUN SERPL-MCNC: 14 MG/DL (ref 7–30)
CALCIUM SERPL-MCNC: 9 MG/DL (ref 8.5–10.1)
CHLORIDE SERPL-SCNC: 107 MMOL/L (ref 94–109)
CO2 SERPL-SCNC: 30 MMOL/L (ref 20–32)
CREAT SERPL-MCNC: 0.86 MG/DL (ref 0.66–1.25)
DIFFERENTIAL METHOD BLD: ABNORMAL
EOSINOPHIL # BLD AUTO: 0.2 10E9/L (ref 0–0.7)
EOSINOPHIL NFR BLD AUTO: 3 %
ERYTHROCYTE [DISTWIDTH] IN BLOOD BY AUTOMATED COUNT: 14.5 % (ref 10–15)
GFR SERPL CREATININE-BSD FRML MDRD: >90 ML/MIN/1.7M2
GLUCOSE SERPL-MCNC: 97 MG/DL (ref 70–99)
HCT VFR BLD AUTO: 37.1 % (ref 40–53)
HGB BLD-MCNC: 12.8 G/DL (ref 13.3–17.7)
LYMPHOCYTES # BLD AUTO: 1.7 10E9/L (ref 0.8–5.3)
LYMPHOCYTES NFR BLD AUTO: 33.1 %
MCH RBC QN AUTO: 32 PG (ref 26.5–33)
MCHC RBC AUTO-ENTMCNC: 34.5 G/DL (ref 31.5–36.5)
MCV RBC AUTO: 93 FL (ref 78–100)
MONOCYTES # BLD AUTO: 0.6 10E9/L (ref 0–1.3)
MONOCYTES NFR BLD AUTO: 10.8 %
NEUTROPHILS # BLD AUTO: 2.8 10E9/L (ref 1.6–8.3)
NEUTROPHILS NFR BLD AUTO: 52.5 %
PLATELET # BLD AUTO: 119 10E9/L (ref 150–450)
POTASSIUM SERPL-SCNC: 4.3 MMOL/L (ref 3.4–5.3)
PROT SERPL-MCNC: 6.7 G/DL (ref 6.8–8.8)
RBC # BLD AUTO: 4 10E12/L (ref 4.4–5.9)
SODIUM SERPL-SCNC: 140 MMOL/L (ref 133–144)
WBC # BLD AUTO: 5.3 10E9/L (ref 4–11)

## 2017-09-20 PROCEDURE — 99214 OFFICE O/P EST MOD 30 MIN: CPT | Mod: 25 | Performed by: INTERNAL MEDICINE

## 2017-09-20 PROCEDURE — 96416 CHEMO PROLONG INFUSE W/PUMP: CPT | Performed by: INTERNAL MEDICINE

## 2017-09-20 PROCEDURE — 80053 COMPREHEN METABOLIC PANEL: CPT | Performed by: INTERNAL MEDICINE

## 2017-09-20 PROCEDURE — 96409 CHEMO IV PUSH SNGL DRUG: CPT | Performed by: INTERNAL MEDICINE

## 2017-09-20 PROCEDURE — 99207 ZZC NO CHARGE LOS: CPT

## 2017-09-20 PROCEDURE — 96367 TX/PROPH/DG ADDL SEQ IV INF: CPT | Performed by: INTERNAL MEDICINE

## 2017-09-20 PROCEDURE — 85025 COMPLETE CBC W/AUTO DIFF WBC: CPT | Performed by: INTERNAL MEDICINE

## 2017-09-20 PROCEDURE — 99207 ZZC NO CHARGE NURSE ONLY: CPT

## 2017-09-20 RX ORDER — HEPARIN SODIUM (PORCINE) LOCK FLUSH IV SOLN 100 UNIT/ML 100 UNIT/ML
5 SOLUTION INTRAVENOUS
Status: DISCONTINUED | OUTPATIENT
Start: 2017-09-20 | End: 2017-09-20 | Stop reason: HOSPADM

## 2017-09-20 RX ORDER — FLUOROURACIL 50 MG/ML
400 INJECTION, SOLUTION INTRAVENOUS ONCE
Status: CANCELLED | OUTPATIENT
Start: 2017-09-20

## 2017-09-20 RX ORDER — LORAZEPAM 2 MG/ML
0.5 INJECTION INTRAMUSCULAR EVERY 4 HOURS PRN
Status: CANCELLED
Start: 2017-09-20

## 2017-09-20 RX ORDER — METHYLPREDNISOLONE SODIUM SUCCINATE 125 MG/2ML
125 INJECTION, POWDER, LYOPHILIZED, FOR SOLUTION INTRAMUSCULAR; INTRAVENOUS
Status: CANCELLED
Start: 2017-09-20

## 2017-09-20 RX ORDER — EPINEPHRINE 0.3 MG/.3ML
0.3 INJECTION SUBCUTANEOUS EVERY 5 MIN PRN
Status: CANCELLED | OUTPATIENT
Start: 2017-09-20

## 2017-09-20 RX ORDER — FLUOROURACIL 50 MG/ML
400 INJECTION, SOLUTION INTRAVENOUS ONCE
Status: COMPLETED | OUTPATIENT
Start: 2017-09-20 | End: 2017-09-20

## 2017-09-20 RX ORDER — MEPERIDINE HYDROCHLORIDE 50 MG/ML
25 INJECTION INTRAMUSCULAR; INTRAVENOUS; SUBCUTANEOUS EVERY 30 MIN PRN
Status: CANCELLED | OUTPATIENT
Start: 2017-09-20

## 2017-09-20 RX ORDER — EPINEPHRINE 1 MG/ML
0.3 INJECTION INTRAMUSCULAR; INTRAVENOUS; SUBCUTANEOUS EVERY 5 MIN PRN
Status: CANCELLED | OUTPATIENT
Start: 2017-09-20

## 2017-09-20 RX ORDER — DIPHENHYDRAMINE HYDROCHLORIDE 50 MG/ML
50 INJECTION INTRAMUSCULAR; INTRAVENOUS
Status: CANCELLED
Start: 2017-09-20

## 2017-09-20 RX ORDER — SODIUM CHLORIDE 9 MG/ML
1000 INJECTION, SOLUTION INTRAVENOUS CONTINUOUS PRN
Status: CANCELLED
Start: 2017-09-20

## 2017-09-20 RX ORDER — ALBUTEROL SULFATE 90 UG/1
1-2 AEROSOL, METERED RESPIRATORY (INHALATION)
Status: CANCELLED
Start: 2017-09-20

## 2017-09-20 RX ORDER — ALBUTEROL SULFATE 0.83 MG/ML
2.5 SOLUTION RESPIRATORY (INHALATION)
Status: CANCELLED | OUTPATIENT
Start: 2017-09-20

## 2017-09-20 RX ADMIN — FLUOROURACIL 830 MG: 50 INJECTION, SOLUTION INTRAVENOUS at 10:32

## 2017-09-20 RX ADMIN — HEPARIN SODIUM (PORCINE) LOCK FLUSH IV SOLN 100 UNIT/ML 5 ML: 100 SOLUTION at 08:19

## 2017-09-20 ASSESSMENT — PAIN SCALES - GENERAL: PAINLEVEL: NO PAIN (0)

## 2017-09-20 NOTE — PROGRESS NOTES
Infusion Nursing Note:  Terry Yi presents today for Leucovorin/florouracil + pump connect  Patient seen by provider today: Yes: Dr. Shaffer   present during visit today: Not Applicable.    Note: N/A.    Intravenous Access:  Implanted Port.    Treatment Conditions:  Lab Results   Component Value Date    HGB 12.8 09/20/2017     Lab Results   Component Value Date    WBC 5.3 09/20/2017      Lab Results   Component Value Date    ANEU 2.8 09/20/2017     Lab Results   Component Value Date     09/20/2017      Lab Results   Component Value Date     09/20/2017                   Lab Results   Component Value Date    POTASSIUM 4.3 09/20/2017           No results found for: MAG         Lab Results   Component Value Date    CR 0.86 09/20/2017                   Lab Results   Component Value Date    BRITTNEY 9.0 09/20/2017                Lab Results   Component Value Date    BILITOTAL 0.5 09/20/2017           Lab Results   Component Value Date    ALBUMIN 3.5 09/20/2017                    Lab Results   Component Value Date    ALT 51 09/20/2017           Lab Results   Component Value Date    AST 31 09/20/2017     Results reviewed, labs MET treatment parameters, ok to proceed with treatment.          Post Infusion Assessment:  Patient tolerated infusion without incident.  Site patent and intact, free from redness, edema or discomfort.  No evidence of extravasations.  .    Discharge Plan:   Discharge instructions reviewed with: Patient.  Patient and/or family verbalized understanding of discharge instructions and all questions answered.  Patient discharged in stable condition accompanied by: wife, brother and son.  Departure Mode: Ambulatory.    Michelle Castro RN          Prior to discharge: Port is secured in place with tegaderm and flushed with 10cc NS with positive blood return noted.  Continuous home infusion Dosi-Fuser pump connected.    All connectors secured in place and clamps taped open.    Pump  "started, \"running\" noted on display (CADD): Not Applicable.  Patient instructed to call our clinic or [facility] with any questions or concerns at home.  Patient verbalized understanding.    Patient set up for pump disconnect in [place]  on Friday @ 0830. Pt to call and schedule appt.                   "

## 2017-09-20 NOTE — NURSING NOTE
"Oncology Rooming Note    September 20, 2017 8:42 AM   Terry Yi is a 51 year old male who presents for:    Chief Complaint   Patient presents with     Oncology Clinic Visit     f/u prior to tx     Initial Vitals: There were no vitals taken for this visit. Estimated body mass index is 28.42 kg/(m^2) as calculated from the following:    Height as of 8/22/17: 1.8 m (5' 10.87\").    Weight as of 9/6/17: 92.1 kg (203 lb). There is no height or weight on file to calculate BSA.  Data Unavailable Comment: Data Unavailable   No LMP for male patient.  Allergies reviewed: Yes  Medications reviewed: Yes    Medications: Medication refills not needed today.  Pharmacy name entered into EPIC: Data Unavailable    Clinical concerns:     8 minutes for nursing intake (face to face time)     PAOLO PETTY LPN              "

## 2017-09-20 NOTE — MR AVS SNAPSHOT
After Visit Summary   9/20/2017    Terry Yi    MRN: [id number]           Patient Information     Date Of Birth          1965        Visit Information        Provider Department      9/20/2017 9:15 AM [address]        Today's Diagnoses     Malignant neoplasm of overlapping sites of stomach (H)    -  1       Follow-ups after your visit        Your next 10 appointments already scheduled     Sep 29, 2017  8:30 AM CDT   CT CHEST ABDOMEN PELVIS W/O & W CONTRAST with MGCT1   [facility] ([facility])    [address] [place] [address] [phone]           Please bring any scans or X-rays taken at other hospitals, if similar tests were done. Also bring a list of your medicines, including vitamins, minerals and over-the-counter drugs. It is safest to leave personal items at home.  Be sure to tell your doctor:   If you have any allergies.   If there s any chance you are pregnant.   If you are breastfeeding.   If you have any special needs.  You may have contrast for this exam. To prepare:   Do not eat or drink for 2 hours before your exam. If you need to take medicine, you may take it with small sips of water. (We may ask you to take liquid medicine as well.)   The day before your exam, drink extra fluids at least six 8-ounce glasses (unless your doctor tells you to restrict your fluids).  Patients over 70 or patients with diabetes or kidney problems:   If you haven t had a blood test (creatinine test) within the last 30 days, go to your clinic or Diagnostic Imaging Department for this test.  If you have diabetes:   If your kidney function is normal, continue taking your metformin (Avandamet, Glucophage, Glucovance, Metaglip) on the day of your exam.   If your kidney function is abnormal, wait 48 hours before restarting this medicine.  You will have oral contrast for this exam:   You will drink the contrast at home.  Get this from your clinic or Diagnostic Imaging Department. Please follow the directions given.  Please wear loose clothing, such as a sweat suit or jogging clothes. Avoid snaps, zippers and other metal. We may ask you to undress and put on a hospital gown.  If you have any questions, please call the Imaging Department where you will have your exam.            Oct 04, 2017  9:00 AM CDT   Return Visit with NURSE ONLY CANCER CENTER   [facility] ([facility])    [address] [facility] [address]   [phone]            Oct 04, 2017  9:45 AM CDT   Return Visit with Yakov Shaffer MD   [facility] ([facility])    [address] [facility] [address]   [phone]            Oct 04, 2017 12:00 PM CDT   Level 1 with [place] [address] [facility] ([facility])    [address] [facility] [address]   [phone]            Oct 18, 2017  8:00 AM CDT   Return Visit with NURSE ONLY CANCER CENTER   [facility] ([facility])    [address] [facility] [address]   [phone]            Oct 18, 2017  8:45 AM CDT   Return Visit with Yakov Shaffer MD   [facility])    [address] [facility] [address]   [phone]            Oct 18, 2017  9:15 AM CDT   Level 1 with [place] 8 INFUSION   [facility])    [address] [facility] [address]   [phone]              Future tests that were ordered for you today     Open Future Orders        Priority Expected Expires Ordered    CT Chest abdomen pelvis w & w/o contrast Routine 9/29/2017 9/20/2018 9/20/2017            Who to contact     If you have questions or need follow up information about today's clinic visit or your schedule please contact [facility] directly at  [phone].  Normal or non-critical lab and imaging results will be communicated to you by [employer], letter or phone within 4 business days after the clinic has received the results. If you do not hear from us within 7 days, please contact the clinic through [employer] or phone. If you have a critical or abnormal lab result, we will notify you by phone as soon as possible.  Submit refill requests through vidCoin or call your pharmacy and they will forward the refill request to us. Please allow 3 business days for your refill to be completed.          Additional Information About Your Visit        [employer] Information     vidCoin gives you secure access to your electronic health record. If you see a primary care provider, you can also send messages to your care team and make appointments. If you have questions, please call your primary care clinic.  If you do not have a primary care provider, please call [phone] and they will assist you.      vidCoin is an electronic gateway that provides easy, online access to your medical records. With vidCoin, you can request a clinic appointment, read your test results, renew a prescription or communicate with your care team.     To access your existing account, please contact your [facility] or call [phone] for assistance.        Care EveryWhere ID     This is your Care EveryWhere ID. This could be used by other organizations to access your [place] medical records  [id number]         Blood Pressure from Last 3 Encounters:   09/20/17 119/75   09/06/17 106/61   08/22/17 115/70    Weight from Last 3 Encounters:   09/20/17 91.6 kg (202 lb)   09/06/17 92.1 kg (203 lb)   08/22/17 91.2 kg (201 lb)              Today, you had the following     No orders found for display       Primary Care Provider Office Phone # Fax #    Sandra Dickerson [phone]       [facility]  [address]  [facility] [address]        Equal Access  to Services     CHANDAN GALLO : Jemal Casillas, waben farmeradaha, qaybta kaalmaclaire swan. So [facility] [phone].    ATENCIÓN: Si arabella luke, tiene a cuevas disposición servicios gratuitos de asistencia lingüística. Llame al [phone].    We comply with applicable federal civil rights laws and [place] laws. We do not discriminate on the basis of race, color, national origin, age, disability sex, sexual orientation or gender identity.            Thank you!     Thank you for choosing [facility]  for your care. Our goal is always to provide you with excellent care. Hearing back from our patients is one way we can continue to improve our services. Please take a few minutes to complete the written survey that you may receive in the mail after your visit with us. Thank you!             Your Updated Medication List - Protect others around you: Learn how to safely use, store and throw away your medicines at www.disposemymeds.org.          This list is accurate as of: 9/20/17 12:58 PM.  Always use your most recent med list.                   Brand Name Dispense Instructions for use Diagnosis    allopurinol 100 MG tablet    ZYLOPRIM          aspirin 81 MG chewable tablet      Take 81 mg by mouth daily        lidocaine-prilocaine cream    EMLA          LORazepam 0.5 MG tablet    ATIVAN    30 tablet    Take 1 tablet (0.5 mg) by mouth every 4 hours as needed (Anxiety, Nausea/Vomiting or Sleep)    Malignant neoplasm of overlapping sites of stomach (H)       ondansetron 8 MG tablet    ZOFRAN    10 tablet    Take 1 tablet (8 mg) by mouth every 8 hours as needed (Nausea/Vomiting)    Malignant neoplasm of overlapping sites of stomach (H)       prochlorperazine 10 MG tablet    COMPAZINE    30 tablet    Take 1 tablet (10 mg) by mouth every 6 hours as needed (Nausea/Vomiting)    Malignant neoplasm of overlapping sites of stomach (H)

## 2017-09-20 NOTE — MR AVS SNAPSHOT
After Visit Summary   9/20/2017    Terry Yi    MRN: [id number]           Patient Information     Date Of Birth          1965        Visit Information        Provider Department      9/20/2017 8:45 AM Yakov Shaffer MD [facility]        Today's Diagnoses     Malignant neoplasm of overlapping sites of stomach (H)    -  1      Care Instructions    Proceed with chemo today    On 9/29/17- repeat CT CAP    See me back in 2 weeks with labs prior and next chemo          Follow-ups after your visit        Your next 10 appointments already scheduled     Sep 29, 2017  8:30 AM CDT   CT CHEST ABDOMEN PELVIS W/O & W CONTRAST with MGCT1   [facility] ([facility])    [address] [place] [address] [phone]           Please bring any scans or X-rays taken at other hospitals, if similar tests were done. Also bring a list of your medicines, including vitamins, minerals and over-the-counter drugs. It is safest to leave personal items at home.  Be sure to tell your doctor:   If you have any allergies.   If there s any chance you are pregnant.   If you are breastfeeding.   If you have any special needs.  You may have contrast for this exam. To prepare:   Do not eat or drink for 2 hours before your exam. If you need to take medicine, you may take it with small sips of water. (We may ask you to take liquid medicine as well.)   The day before your exam, drink extra fluids at least six 8-ounce glasses (unless your doctor tells you to restrict your fluids).  Patients over 70 or patients with diabetes or kidney problems:   If you haven t had a blood test (creatinine test) within the last 30 days, go to your clinic or Diagnostic Imaging Department for this test.  If you have diabetes:   If your kidney function is normal, continue taking your metformin (Avandamet, Glucophage, Glucovance, Metaglip) on the day of your exam.   If your kidney function  is abnormal, wait 48 hours before restarting this medicine.  You will have oral contrast for this exam:   You will drink the contrast at home. Get this from your clinic or Diagnostic Imaging Department. Please follow the directions given.  Please wear loose clothing, such as a sweat suit or jogging clothes. Avoid snaps, zippers and other metal. We may ask you to undress and put on a hospital gown.  If you have any questions, please call the Imaging Department where you will have your exam.            Oct 04, 2017  9:00 AM CDT   Return Visit with NURSE ONLY CANCER CENTER   [facility]    [address] [facility] [address]   [phone]            Oct 04, 2017  9:45 AM CDT   Return Visit with Yakov Shaffer MD   [facility])    [address] [facility] [address]   [phone]            Oct 04, 2017 12:00 PM CDT   Level 1 with [place] 5 INFUSION   [facility])    [address] [facility] [address]   [phone]            Oct 18, 2017  8:00 AM CDT   Return Visit with NURSE ONLY CANCER CENTER   [facility])    [address] [facility] [address]   [phone]            Oct 18, 2017  8:45 AM CDT   Return Visit with Yakov Shaffer MD   [facility])    [address] [facility] [address]   [phone]            Oct 18, 2017  9:15 AM CDT   Level 1 with [place] 8 INFUSION   [facility])    [address] [facility] [address]   [phone]              Future tests that were ordered for you today     Open Future Orders        Priority Expected Expires Ordered    CT Chest abdomen pelvis w & w/o contrast Routine 9/29/2017 9/20/2018 9/20/2017            Who to contact     If you have  "questions or need follow up information about today's clinic visit or your schedule please contact [facility] directly at [phone].  Normal or non-critical lab and imaging results will be communicated to you by [employer], letter or phone within 4 business days after the clinic has received the results. If you do not hear from us within 7 days, please contact the clinic through [employer] or phone. If you have a critical or abnormal lab result, we will notify you by phone as soon as possible.  Submit refill requests through Libretto or call your pharmacy and they will forward the refill request to us. Please allow 3 business days for your refill to be completed.          Additional Information About Your Visit        Libretto Information     Libretto gives you secure access to your electronic health record. If you see a primary care provider, you can also send messages to your care team and make appointments. If you have questions, please call your primary care clinic.  If you do not have a primary care provider, please call [phone] and they will assist you.      Libretto is an electronic gateway that provides easy, online access to your medical records. With Libretto, you can request a clinic appointment, read your test results, renew a prescription or communicate with your care team.     To access your existing account, please contact your [facility] or call [phone] for assistance.        Care EveryWhere ID     This is your Care EveryWhere ID. This could be used by other organizations to access your [place] medical records  [id number]        Your Vitals Were     Pulse Temperature Respirations Height Pulse Oximetry BMI (Body Mass Index)    52 97.1  F (36.2  C) (Oral) 18 1.8 m (5' 10.87\") 98% 28.28 kg/m2       Blood Pressure from Last 3 Encounters:   09/20/17 119/75   09/06/17 106/61   08/22/17 115/70    Weight from Last 3 Encounters:   09/20/17 91.6 kg (202 lb) "   09/06/17 92.1 kg (203 lb)   08/22/17 91.2 kg (201 lb)               Primary Care Provider Office Phone # Fax #    Sandra TOLBERT SARAI Dickerson [phone]       [facility]  [address]  [facility] [address]        Equal Access to Services     CHANDAN GALLO : Hadii aad ku hadasho Soomaali, waaxda luqadaha, qaybta kaalmada adeegyada, waxay idiin hayaan adeeg kharash la'aan . So [facility] [phone].    ATENCIÓN: Si habla español, tiene a cuevas disposición servicios gratuitos de asistencia lingüística. Britneyame al [phone].    We comply with applicable federal civil rights laws and [place] laws. We do not discriminate on the basis of race, color, national origin, age, disability sex, sexual orientation or gender identity.            Thank you!     Thank you for choosing [facility]  for your care. Our goal is always to provide you with excellent care. Hearing back from our patients is one way we can continue to improve our services. Please take a few minutes to complete the written survey that you may receive in the mail after your visit with us. Thank you!             Your Updated Medication List - Protect others around you: Learn how to safely use, store and throw away your medicines at www.disposemymeds.org.          This list is accurate as of: 9/20/17 10:47 AM.  Always use your most recent med list.                   Brand Name Dispense Instructions for use Diagnosis    allopurinol 100 MG tablet    ZYLOPRIM          aspirin 81 MG chewable tablet      Take 81 mg by mouth daily        lidocaine-prilocaine cream    EMLA          LORazepam 0.5 MG tablet    ATIVAN    30 tablet    Take 1 tablet (0.5 mg) by mouth every 4 hours as needed (Anxiety, Nausea/Vomiting or Sleep)    Malignant neoplasm of overlapping sites of stomach (H)       ondansetron 8 MG tablet    ZOFRAN    10 tablet    Take 1 tablet (8 mg) by mouth every 8 hours as needed (Nausea/Vomiting)    Malignant neoplasm of overlapping  sites of stomach (H)       prochlorperazine 10 MG tablet    COMPAZINE    30 tablet    Take 1 tablet (10 mg) by mouth every 6 hours as needed (Nausea/Vomiting)    Malignant neoplasm of overlapping sites of stomach (H)

## 2017-09-20 NOTE — PROGRESS NOTES
9/20/2017      REASON FOR VISIT:  Follow-up for unresectable metastatic gastric carcinoma, diffuse type, grade 3, HER-2 negative.      HISTORY OF PRESENT ILLNESS:  Please see my previous note for details.  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  Cycle 6 was administered on 6/21/2017   He had repeat staging done on June 30, 2017 with a CT scan chest abdomen and pelvis which showed a stable disease     He received cycle #8 of FOLFOX on 7/19/2017  C# 9 FOLFOX 8/2/2017 ( Oxaliplatin dose reduced to 70mg/m2 due to neuropathy )  C#10 delayed for 1 week as he went on vacations to [place]  C#10 8/22/2017 ( Oxaliplatin dose reduced to 60mg/m2 due to neuropathy )  C#11 9/6/17 Only 5FU/LV  C#12 9/20/2017 5FU/LV         INTERVAL HISTORY:    Terry comes in today and tells me that he has been doing well. He continues to have tingling and numbness of the fingertips and tips of the toes and this has not significantly changed since stopping oxaliplatin. He denies any problems with balance. Otherwise he has decent energy and he continues to remain active. He denies any nausea vomiting diarrhea or constipation or bleeding. He continues to eat well. He denies any interval infections. Denies any new pain. He continues to have off and on the right hip pain with exertion and walking any tells me that this has been an ongoing issue for him for a couple of years for which he has been seeing a chiropractor but recently he did not see one so he thinks that because of that it is bothering him a little bit more. Last week he saw a chiropractor and he now thinks that the pain in the right hip is better.       ROS:  A comprehensive ROS was otherwise neg        MEDICATIONS:   Reviewed       PHYSICAL EXAMINATION:   /75  Pulse  "52  Temp 97.1  F (36.2  C) (Oral)  Resp 18  Ht 1.8 m (5' 10.87\")  Wt 91.6 kg (202 lb)  SpO2 98%  BMI 28.28 kg/m2    CONSTITUTIONAL: no acute distress  EYES: PERRLA, no palor or icterus.   ENT/MOUTH: no mouth lesions. Ears normal  CVS: s1s2 no m r g .   RESPIRATORY: clear to auscultation b/l  GI: soft non tender no hepatosplenomegaly  NEURO: AAOX3  . He does have subjective tingling and numbness of the fingertips and toes. When I asked him to stand up with eyes closed he felt minimally imbalanced. She did not sway much. His gait is normal. Otherwise neurological exam is nonfocal  INTEGUMENT: no obvious rashes. Port site is intact  LYMPHATIC: no palpable cervical, supraclavicular, axillary LAD  MUSCULOSKELETAL: Unremarkable. No bony tenderness.   EXTREMITIES: no edema  PSYCH: Mentation, mood and affect are normal. Decision making capacity is intact      LABS:  Reviewed and stable    Results for LEONOR RALPH (MRN [id number]) as of 9/20/2017 08:49   Ref. Range 9/20/2017 08:15   Sodium Latest Ref Range: 133 - 144 mmol/L 140   Potassium Latest Ref Range: 3.4 - 5.3 mmol/L 4.3   Chloride Latest Ref Range: 94 - 109 mmol/L 107   Carbon Dioxide Latest Ref Range: 20 - 32 mmol/L 30   Urea Nitrogen Latest Ref Range: 7 - 30 mg/dL 14   Creatinine Latest Ref Range: 0.66 - 1.25 mg/dL 0.86   GFR Estimate Latest Ref Range: >60 mL/min/1.7m2 >90   GFR Estimate If Black Latest Ref Range: >60 mL/min/1.7m2 >90   Calcium Latest Ref Range: 8.5 - 10.1 mg/dL 9.0   Anion Gap Latest Ref Range: 3 - 14 mmol/L 3   Albumin Latest Ref Range: 3.4 - 5.0 g/dL 3.5   Protein Total Latest Ref Range: 6.8 - 8.8 g/dL 6.7 (L)   Bilirubin Total Latest Ref Range: 0.2 - 1.3 mg/dL 0.5   Alkaline Phosphatase Latest Ref Range: 40 - 150 U/L 85   ALT Latest Ref Range: 0 - 70 U/L 51   AST Latest Ref Range: 0 - 45 U/L 31   Glucose Latest Ref Range: 70 - 99 mg/dL 97   WBC Latest Ref Range: 4.0 - 11.0 10e9/L 5.3   Hemoglobin Latest Ref Range: 13.3 - 17.7 g/dL 12.8 " (L)   Hematocrit Latest Ref Range: 40.0 - 53.0 % 37.1 (L)   Platelet Count Latest Ref Range: 150 - 450 10e9/L 119 (L)   RBC Count Latest Ref Range: 4.4 - 5.9 10e12/L 4.00 (L)   MCV Latest Ref Range: 78 - 100 fl 93   MCH Latest Ref Range: 26.5 - 33.0 pg 32.0   MCHC Latest Ref Range: 31.5 - 36.5 g/dL 34.5   RDW Latest Ref Range: 10.0 - 15.0 % 14.5   Diff Method Unknown Automated Method   % Neutrophils Latest Units: % 52.5   % Lymphocytes Latest Units: % 33.1   % Monocytes Latest Units: % 10.8   % Eosinophils Latest Units: % 3.0   % Basophils Latest Units: % 0.6   Absolute Neutrophil Latest Ref Range: 1.6 - 8.3 10e9/L 2.8   Absolute Lymphocytes Latest Ref Range: 0.8 - 5.3 10e9/L 1.7   Absolute Monocytes Latest Ref Range: 0.0 - 1.3 10e9/L 0.6   Absolute Eosinophils Latest Ref Range: 0.0 - 0.7 10e9/L 0.2   Absolute Basophils Latest Ref Range: 0.0 - 0.2 10e9/L 0.0          ASSESSMENT AND PLAN:  Metastatic gastric cancer presenting with early satiety and significant weight loss and epigastric discomfort upon eating with some vomiting.  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  There is an indeterminate right liver lobe lesion.      We started palliative chemotherapy with FOLFOX on 03/12/2017.   Scans after 6 cycles of FOLFOX shows stable disease.     C #9- dose of oxaliplatin decreased to 70 mg/m  because of neuropathy  We delayed cycle #10 for one week because he wanted to go on vacation to [place].   C#10- Dose of Oxaliplatin decreased further to 60mg/m2     C#11 9/6/17- 5FU/LV without oxaliplatin    Proceed with C#12 today with 5FU/LV.    We will repeat imaging prior to next cycle      Neuropathy. We had to drop oxaliplatin because of worsening neuropathy. He has tingling and numbness involving the tips of the fingers and toes. He denies any pain. Hopefully now that oxaliplatin has been discontinued for neuropathy would  improve with time. We discussed that it can take several weeks to see any improvement in the neuropathy     Nutrition. He is doing better and his weight is stable. He is eating well. We again discussed importance of healthy nutrition     We discussed importance of remaining active which she tries to do. His energy is decent     He has chronic right hip pain for which he sees chiropractor. This has been going on off and on for the last couple of years and I do not believe this is related to his current cancer. He thinks it is a little better now. His repeat imaging is scheduled before next cycle and the right hip would be included in the imaging as well    I did not address the following today:  His genetic testing did not show any identifiable mutation not reveal any identifiable mutation     He will have repeat his scans prior to next cycle and then he will see me back in 2 weeks    I answered all of his and his family's questions to their satisfaction and they are agreeable and comfortable with the plan    LANEY HERNANDEZ MD

## 2017-09-20 NOTE — MR AVS SNAPSHOT
After Visit Summary   9/20/2017    Terry Yi    MRN: [id number]           Patient Information     Date Of Birth          1965        Visit Information        Provider Department      9/20/2017 8:00 AM NURSE ONLY CANCER CENTER [facility]        Today's Diagnoses     Malignant neoplasm of overlapping sites of stomach (H)    -  1       Follow-ups after your visit        Your next 10 appointments already scheduled     Sep 20, 2017  8:45 AM CDT   Return Visit with Yakov Shaffer MD   [facility]    [address] [place] [address] [phone]            Sep 20, 2017  9:15 AM CDT   Level 4 with BAY 7 INFUSION   [facility])    [address] [place] [address] [phone]              Who to contact     If you have questions or need follow up information about today's clinic visit or your schedule please contact [facility] directly at [phone].  Normal or non-critical lab and imaging results will be communicated to you by HelloNaturehart, letter or phone within 4 business days after the clinic has received the results. If you do not hear from us within 7 days, please contact the clinic through PagaTodo Mobilet or phone. If you have a critical or abnormal lab result, we will notify you by phone as soon as possible.  Submit refill requests through Ohlalapps or call your pharmacy and they will forward the refill request to us. Please allow 3 business days for your refill to be completed.          Additional Information About Your Visit        MyChart Information     Ohlalapps gives you secure access to your electronic health record. If you see a primary care provider, you can also send messages to your care team and make appointments. If you have questions, please call your primary care clinic.  If you do not have a primary care provider, please call  [phone] and they will assist you.      Intralign is an electronic gateway that provides easy, online access to your medical records. With Intralign, you can request a clinic appointment, read your test results, renew a prescription or communicate with your care team.     To access your existing account, please contact your [facility] or call [phone] for assistance.        Care EveryWhere ID     This is your Care EveryWhere ID. This could be used by other organizations to access your [place] medical records  [id number]         Blood Pressure from Last 3 Encounters:   09/06/17 106/61   08/22/17 115/70   08/02/17 125/67    Weight from Last 3 Encounters:   09/06/17 92.1 kg (203 lb)   08/22/17 91.2 kg (201 lb)   08/02/17 88.9 kg (196 lb)              We Performed the Following     CBC with platelets differential     Comprehensive metabolic panel        Primary Care Provider Office Phone # Fax #    Sandra Dickerson [phone]       [facility]  [address]  [facility] [address]        Equal Access to Services     DONOVAN GALLO : Hadii aad ku hadasho Soomaali, waaxda luqadaha, qaybta kaalmada adeegyada, claire cowart . So [facility] [phone].    ATENCIÓN: Si habla español, tiene a cuevas disposición servicios gratuitos de asistencia lingüística. [facility] [phone].    We comply with applicable federal civil rights laws and [place] laws. We do not discriminate on the basis of race, color, national origin, age, disability sex, sexual orientation or gender identity.            Thank you!     Thank you for choosing [facility]  for your care. Our goal is always to provide you with excellent care. Hearing back from our patients is one way we can continue to improve our services. Please take a few minutes to complete the written survey that you may receive in the mail after your visit with us. Thank you!             Your  Updated Medication List - Protect others around you: Learn how to safely use, store and throw away your medicines at www.disposemymeds.org.          This list is accurate as of: 9/20/17  8:19 AM.  Always use your most recent med list.                   Brand Name Dispense Instructions for use Diagnosis    allopurinol 100 MG tablet    ZYLOPRIM          aspirin 81 MG chewable tablet      Take 81 mg by mouth daily        lidocaine-prilocaine cream    EMLA          LORazepam 0.5 MG tablet    ATIVAN    30 tablet    Take 1 tablet (0.5 mg) by mouth every 4 hours as needed (Anxiety, Nausea/Vomiting or Sleep)    Malignant neoplasm of overlapping sites of stomach (H)       ondansetron 8 MG tablet    ZOFRAN    10 tablet    Take 1 tablet (8 mg) by mouth every 8 hours as needed (Nausea/Vomiting)    Malignant neoplasm of overlapping sites of stomach (H)       prochlorperazine 10 MG tablet    COMPAZINE    30 tablet    Take 1 tablet (10 mg) by mouth every 6 hours as needed (Nausea/Vomiting)    Malignant neoplasm of overlapping sites of stomach (H)

## 2017-09-29 ENCOUNTER — ANCILLARY PROCEDURE (OUTPATIENT)
Dept: GENERAL RADIOLOGY | Facility: CLINIC | Age: 52
End: 2017-09-29

## 2017-09-29 ENCOUNTER — RADIANT APPOINTMENT (OUTPATIENT)
Dept: CT IMAGING | Facility: CLINIC | Age: 52
End: 2017-09-29
Attending: INTERNAL MEDICINE
Payer: COMMERCIAL

## 2017-09-29 DIAGNOSIS — C16.8 MALIGNANT NEOPLASM OF OVERLAPPING SITES OF STOMACH (H): ICD-10-CM

## 2017-09-29 DIAGNOSIS — C16.8 MALIGNANT NEOPLASM OF OVERLAPPING SITES OF STOMACH (H): Primary | ICD-10-CM

## 2017-09-29 LAB
CREAT BLD-MCNC: 0.9 MG/DL (ref 0.66–1.25)
GFR SERPL CREATININE-BSD FRML MDRD: 88 ML/MIN/1.7M2

## 2017-09-29 PROCEDURE — 74177 CT ABD & PELVIS W/CONTRAST: CPT | Performed by: RADIOLOGY

## 2017-09-29 PROCEDURE — 71260 CT THORAX DX C+: CPT | Performed by: RADIOLOGY

## 2017-09-29 PROCEDURE — 82565 ASSAY OF CREATININE: CPT | Performed by: INTERNAL MEDICINE

## 2017-09-29 RX ORDER — IOPAMIDOL 755 MG/ML
124 INJECTION, SOLUTION INTRAVASCULAR ONCE
Status: COMPLETED | OUTPATIENT
Start: 2017-09-29 | End: 2017-09-29

## 2017-09-29 RX ORDER — HEPARIN SODIUM (PORCINE) LOCK FLUSH IV SOLN 100 UNIT/ML 100 UNIT/ML
5 SOLUTION INTRAVENOUS ONCE
Status: COMPLETED | OUTPATIENT
Start: 2017-09-29 | End: 2017-09-29

## 2017-09-29 RX ADMIN — HEPARIN SODIUM (PORCINE) LOCK FLUSH IV SOLN 100 UNIT/ML 5 ML: 100 SOLUTION at 08:43

## 2017-09-29 RX ADMIN — IOPAMIDOL 124 ML: 755 INJECTION, SOLUTION INTRAVASCULAR at 08:37

## 2017-10-04 ENCOUNTER — ONCOLOGY VISIT (OUTPATIENT)
Dept: ONCOLOGY | Facility: CLINIC | Age: 52
End: 2017-10-04
Payer: COMMERCIAL

## 2017-10-04 ENCOUNTER — INFUSION THERAPY VISIT (OUTPATIENT)
Dept: INFUSION THERAPY | Facility: CLINIC | Age: 52
End: 2017-10-04
Payer: COMMERCIAL

## 2017-10-04 VITALS
TEMPERATURE: 97.1 F | WEIGHT: 205.5 LBS | OXYGEN SATURATION: 97 % | RESPIRATION RATE: 18 BRPM | DIASTOLIC BLOOD PRESSURE: 71 MMHG | SYSTOLIC BLOOD PRESSURE: 119 MMHG | BODY MASS INDEX: 28.77 KG/M2 | HEART RATE: 57 BPM

## 2017-10-04 DIAGNOSIS — C16.8 MALIGNANT NEOPLASM OF OVERLAPPING SITES OF STOMACH (H): Primary | ICD-10-CM

## 2017-10-04 LAB
ALBUMIN SERPL-MCNC: 3.5 G/DL (ref 3.4–5)
ALP SERPL-CCNC: 82 U/L (ref 40–150)
ALT SERPL W P-5'-P-CCNC: 53 U/L (ref 0–70)
ANION GAP SERPL CALCULATED.3IONS-SCNC: 6 MMOL/L (ref 3–14)
AST SERPL W P-5'-P-CCNC: 37 U/L (ref 0–45)
BASOPHILS # BLD AUTO: 0 10E9/L (ref 0–0.2)
BASOPHILS NFR BLD AUTO: 0.8 %
BILIRUB SERPL-MCNC: 0.3 MG/DL (ref 0.2–1.3)
BUN SERPL-MCNC: 13 MG/DL (ref 7–30)
CALCIUM SERPL-MCNC: 8.5 MG/DL (ref 8.5–10.1)
CHLORIDE SERPL-SCNC: 107 MMOL/L (ref 94–109)
CO2 SERPL-SCNC: 27 MMOL/L (ref 20–32)
CREAT SERPL-MCNC: 0.82 MG/DL (ref 0.66–1.25)
DIFFERENTIAL METHOD BLD: ABNORMAL
EOSINOPHIL # BLD AUTO: 0.2 10E9/L (ref 0–0.7)
EOSINOPHIL NFR BLD AUTO: 3.2 %
ERYTHROCYTE [DISTWIDTH] IN BLOOD BY AUTOMATED COUNT: 14.7 % (ref 10–15)
GFR SERPL CREATININE-BSD FRML MDRD: >90 ML/MIN/1.7M2
GLUCOSE SERPL-MCNC: 105 MG/DL (ref 70–99)
HCT VFR BLD AUTO: 37.6 % (ref 40–53)
HGB BLD-MCNC: 12.9 G/DL (ref 13.3–17.7)
LYMPHOCYTES # BLD AUTO: 1.6 10E9/L (ref 0.8–5.3)
LYMPHOCYTES NFR BLD AUTO: 33.7 %
MCH RBC QN AUTO: 31.9 PG (ref 26.5–33)
MCHC RBC AUTO-ENTMCNC: 34.3 G/DL (ref 31.5–36.5)
MCV RBC AUTO: 93 FL (ref 78–100)
MONOCYTES # BLD AUTO: 0.5 10E9/L (ref 0–1.3)
MONOCYTES NFR BLD AUTO: 9.7 %
NEUTROPHILS # BLD AUTO: 2.5 10E9/L (ref 1.6–8.3)
NEUTROPHILS NFR BLD AUTO: 52.6 %
PLATELET # BLD AUTO: 112 10E9/L (ref 150–450)
POTASSIUM SERPL-SCNC: 4.2 MMOL/L (ref 3.4–5.3)
PROT SERPL-MCNC: 6.8 G/DL (ref 6.8–8.8)
RBC # BLD AUTO: 4.05 10E12/L (ref 4.4–5.9)
SODIUM SERPL-SCNC: 140 MMOL/L (ref 133–144)
WBC # BLD AUTO: 4.8 10E9/L (ref 4–11)

## 2017-10-04 PROCEDURE — 96416 CHEMO PROLONG INFUSE W/PUMP: CPT | Performed by: INTERNAL MEDICINE

## 2017-10-04 PROCEDURE — 80053 COMPREHEN METABOLIC PANEL: CPT | Performed by: INTERNAL MEDICINE

## 2017-10-04 PROCEDURE — 99207 ZZC NO CHARGE NURSE ONLY: CPT

## 2017-10-04 PROCEDURE — 96367 TX/PROPH/DG ADDL SEQ IV INF: CPT | Performed by: INTERNAL MEDICINE

## 2017-10-04 PROCEDURE — 99214 OFFICE O/P EST MOD 30 MIN: CPT | Mod: 25 | Performed by: INTERNAL MEDICINE

## 2017-10-04 PROCEDURE — 85025 COMPLETE CBC W/AUTO DIFF WBC: CPT | Performed by: INTERNAL MEDICINE

## 2017-10-04 PROCEDURE — 96409 CHEMO IV PUSH SNGL DRUG: CPT | Performed by: INTERNAL MEDICINE

## 2017-10-04 RX ORDER — HEPARIN SODIUM (PORCINE) LOCK FLUSH IV SOLN 100 UNIT/ML 100 UNIT/ML
5 SOLUTION INTRAVENOUS
Status: DISCONTINUED | OUTPATIENT
Start: 2017-10-04 | End: 2017-10-04 | Stop reason: HOSPADM

## 2017-10-04 RX ORDER — LORAZEPAM 2 MG/ML
0.5 INJECTION INTRAMUSCULAR EVERY 4 HOURS PRN
Status: CANCELLED
Start: 2017-10-04

## 2017-10-04 RX ORDER — ALBUTEROL SULFATE 90 UG/1
1-2 AEROSOL, METERED RESPIRATORY (INHALATION)
Status: CANCELLED
Start: 2017-10-04

## 2017-10-04 RX ORDER — MEPERIDINE HYDROCHLORIDE 50 MG/ML
25 INJECTION INTRAMUSCULAR; INTRAVENOUS; SUBCUTANEOUS EVERY 30 MIN PRN
Status: CANCELLED | OUTPATIENT
Start: 2017-10-04

## 2017-10-04 RX ORDER — DIPHENHYDRAMINE HYDROCHLORIDE 50 MG/ML
50 INJECTION INTRAMUSCULAR; INTRAVENOUS
Status: CANCELLED
Start: 2017-10-04

## 2017-10-04 RX ORDER — EPINEPHRINE 1 MG/ML
0.3 INJECTION INTRAMUSCULAR; INTRAVENOUS; SUBCUTANEOUS EVERY 5 MIN PRN
Status: CANCELLED | OUTPATIENT
Start: 2017-10-04

## 2017-10-04 RX ORDER — EPINEPHRINE 0.3 MG/.3ML
0.3 INJECTION SUBCUTANEOUS EVERY 5 MIN PRN
Status: CANCELLED | OUTPATIENT
Start: 2017-10-04

## 2017-10-04 RX ORDER — SODIUM CHLORIDE 9 MG/ML
1000 INJECTION, SOLUTION INTRAVENOUS CONTINUOUS PRN
Status: CANCELLED
Start: 2017-10-04

## 2017-10-04 RX ORDER — ALBUTEROL SULFATE 0.83 MG/ML
2.5 SOLUTION RESPIRATORY (INHALATION)
Status: CANCELLED | OUTPATIENT
Start: 2017-10-04

## 2017-10-04 RX ORDER — FLUOROURACIL 50 MG/ML
400 INJECTION, SOLUTION INTRAVENOUS ONCE
Status: CANCELLED | OUTPATIENT
Start: 2017-10-04

## 2017-10-04 RX ORDER — METHYLPREDNISOLONE SODIUM SUCCINATE 125 MG/2ML
125 INJECTION, POWDER, LYOPHILIZED, FOR SOLUTION INTRAMUSCULAR; INTRAVENOUS
Status: CANCELLED
Start: 2017-10-04

## 2017-10-04 RX ORDER — FLUOROURACIL 50 MG/ML
400 INJECTION, SOLUTION INTRAVENOUS ONCE
Status: COMPLETED | OUTPATIENT
Start: 2017-10-04 | End: 2017-10-04

## 2017-10-04 RX ADMIN — HEPARIN SODIUM (PORCINE) LOCK FLUSH IV SOLN 100 UNIT/ML 5 ML: 100 SOLUTION at 09:21

## 2017-10-04 RX ADMIN — FLUOROURACIL 830 MG: 50 INJECTION, SOLUTION INTRAVENOUS at 11:53

## 2017-10-04 ASSESSMENT — PAIN SCALES - GENERAL: PAINLEVEL: NO PAIN (0)

## 2017-10-04 NOTE — MR AVS SNAPSHOT
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)    -  1       Follow-ups after your visit        Your next 10 appointments already scheduled     Oct 04, 2017  9:45 AM CDT   [facility] Visit with [facility]   [facility]    [address] [place] [address]   [phone]            Oct 04, 2017 12:00 PM CDT   Level 1 with BAY 5 INFUSION   [facility]    [address] [place] [address]   [phone]            Oct 18, 2017  8:00 AM CDT   Return Visit with NURSE ONLY CANCER CENTER   [facility]    [address] [facility] [address]   [phone]            Oct 18, 2017  8:45 AM CDT   Return Visit with Yakov Shaffer MD   [facility]    [address] [facility] [address]   [phone]            Oct 18, 2017  9:15 AM CDT   Level 1 with BAY 8 INFUSION   [facility]    [address] [place] [address]   [phone]              Who to contact     If you have questions or need follow up information about today's clinic visit or your schedule please contact [facility] directly at [phone].  Normal or non-critical lab and imaging results will be communicated to you by MyChart, letter or phone within 4 business days after the clinic has received the results. If you do not hear from us within 7 days, please contact the clinic through MyChart or phone. If you have a critical or abnormal lab  result, we will notify you by phone as soon as possible.  Submit refill requests through CleanScapes or call your pharmacy and they will forward the refill request to us. Please allow 3 business days for your refill to be completed.          Additional Information About Your Visit        SocialRephar[employer] Information     CleanScapes gives you secure access to your electronic health record. If you see a primary care provider, you can also send messages to your care team and make appointments. If you have questions, please call your primary care clinic.  If you do not have a primary care provider, please call [phone] and they will assist you.      CleanScapes is an electronic gateway that provides easy, online access to your medical records. With CleanScapes, you can request a clinic appointment, read your test results, renew a prescription or communicate with your care team.     To access your existing account, please contact your [facility] or call [phone] for assistance.        Care EveryWhere ID     This is your Care EveryWhere ID. This could be used by other organizations to access your [place] medical records  [id number]         Blood Pressure from Last 3 Encounters:   09/20/17 119/75   09/06/17 106/61   08/22/17 115/70    Weight from Last 3 Encounters:   09/20/17 91.6 kg (202 lb)   09/06/17 92.1 kg (203 lb)   08/22/17 91.2 kg (201 lb)              We Performed the Following     CBC with platelets differential     Comprehensive metabolic panel        Primary Care Provider Office Phone # Fax #    Sandra Dickerson [phone]       [facility]  [address]  [facility] [address]        Equal Access to Services     [facility]: Hadii aad ku hadasho Soomaali, waaxda luqadaha, qaybta kaalmada adeegyada, claire cowart . So [facility] [phone].    ATENCIÓN: Si habla español, tiene a cuevas disposición servicios gratuitos de asistencia lingüística. Llame  al [phone].    We comply with applicable federal civil rights laws and [place] laws. We do not discriminate on the basis of race, color, national origin, age, disability, sex, sexual orientation, or gender identity.            Thank you!     Thank you for choosing [facility]  for your care. Our goal is always to provide you with excellent care. Hearing back from our patients is one way we can continue to improve our services. Please take a few minutes to complete the written survey that you may receive in the mail after your visit with us. Thank you!             Your Updated Medication List - Protect others around you: Learn how to safely use, store and throw away your medicines at www.disposemymeds.org.          This list is accurate as of: 10/4/17  9:24 AM.  Always use your most recent med list.                   Brand Name Dispense Instructions for use Diagnosis    allopurinol 100 MG tablet    ZYLOPRIM          aspirin 81 MG chewable tablet      Take 81 mg by mouth daily        lidocaine-prilocaine cream    EMLA          LORazepam 0.5 MG tablet    ATIVAN    30 tablet    Take 1 tablet (0.5 mg) by mouth every 4 hours as needed (Anxiety, Nausea/Vomiting or Sleep)    Malignant neoplasm of overlapping sites of stomach (H)       ondansetron 8 MG tablet    ZOFRAN    10 tablet    Take 1 tablet (8 mg) by mouth every 8 hours as needed (Nausea/Vomiting)    Malignant neoplasm of overlapping sites of stomach (H)       prochlorperazine 10 MG tablet    COMPAZINE    30 tablet    Take 1 tablet (10 mg) by mouth every 6 hours as needed (Nausea/Vomiting)    Malignant neoplasm of overlapping sites of stomach (H)

## 2017-10-04 NOTE — MR AVS SNAPSHOT
After Visit Summary   10/4/2017    Terry Yi    MRN: [id number]           Patient Information     Date Of Birth          1965        Visit Information        Provider Department      10/4/2017 9:45 AM Yakov Shaffer MD [facility]        Today's Diagnoses     Malignant neoplasm of overlapping sites of stomach (H)    -  1      Care Instructions    Proceed with chemo    See me back in 2 weeks with labs prior and chemo to follow    Discuss with your PCP regarding referral to Orthopedics            Follow-ups after your visit        Your next 10 appointments already scheduled     Oct 04, 2017 12:00 PM CDT   Level 1 with BAY 5 INFUSION   [facility]    [address] [facility] [address]   [phone]            Oct 18, 2017  8:00 AM CDT   Return Visit with NURSE ONLY CANCER CENTER   [facility])    [address] [facility] [address]   [phone]            Oct 18, 2017  8:45 AM CDT   Return Visit with Yakov Shaffer MD   [facility])    [address] [facility] [address]   [phone]            Oct 18, 2017  9:15 AM CDT   Level 1 with BAY 8 INFUSION   [facility])    [address] [facility] [address]   [phone]            Oct 31, 2017  8:00 AM CDT   Return Visit with NURSE ONLY CANCER CENTER   [facility] ([facility])    [address] [facility] [address]   [phone]            Oct 31, 2017  8:45 AM CDT   Return Visit with Yakov Shaffer MD   [facility])    [address] [facility] [address]   [phone]            Oct 31, 2017  9:15 AM CDT   Level 1 with BAY 7 INFUSION   [facility])     [address] [place] [address] [phone]              Who to contact     If you have questions or need follow up information about today's clinic visit or your schedule please contact [facility] directly at [phone].  Normal or non-critical lab and imaging results will be communicated to you by MyChart, letter or phone within 4 business days after the clinic has received the results. If you do not hear from us within 7 days, please contact the clinic through Infracommercehart or phone. If you have a critical or abnormal lab result, we will notify you by phone as soon as possible.  Submit refill requests through LiveIntent or call your pharmacy and they will forward the refill request to us. Please allow 3 business days for your refill to be completed.          Additional Information About Your Visit        Infracommercehart Information     LiveIntent gives you secure access to your electronic health record. If you see a primary care provider, you can also send messages to your care team and make appointments. If you have questions, please call your primary care clinic.  If you do not have a primary care provider, please call [phone] and they will assist you.      LiveIntent is an electronic gateway that provides easy, online access to your medical records. With LiveIntent, you can request a clinic appointment, read your test results, renew a prescription or communicate with your care team.     To access your existing account, please contact your [facility] or call [phone] for assistance.        Care EveryWhere ID     This is your Care EveryWhere ID. This could be used by other organizations to access your [place] medical records  [id number]        Your Vitals Were     Pulse Temperature Respirations Pulse Oximetry BMI (Body Mass Index)       57 97.1  F (36.2  C) (Oral) 18 97% 28.77 kg/m2        Blood Pressure from Last 3 Encounters:   10/04/17 119/71    09/20/17 119/75   09/06/17 106/61    Weight from Last 3 Encounters:   10/04/17 93.2 kg (205 lb 8 oz)   09/20/17 91.6 kg (202 lb)   09/06/17 92.1 kg (203 lb)              Today, you had the following     No orders found for display       Primary Care Provider Office Phone # Fax #    Sandra Dickerson [phone]       [facility]  [address]  [facility] [address]        Equal Access to Services     [facility]: Hadii aad ku hadasho Soomaali, waaxda luqadaha, qaybta kaalmada adeegyada, waxay idiin hayrodrigon adeeg shaylee cowart . So [facility] [phone].    ATENCIÓN: Si habla español, tiene a cuevas disposición servicios gratuitos de asistencia lingüística. [facility] [phone].    We comply with applicable federal civil rights laws and [place] laws. We do not discriminate on the basis of race, color, national origin, age, disability, sex, sexual orientation, or gender identity.            Thank you!     Thank you for choosing [facility]  for your care. Our goal is always to provide you with excellent care. Hearing back from our patients is one way we can continue to improve our services. Please take a few minutes to complete the written survey that you may receive in the mail after your visit with us. Thank you!             Your Updated Medication List - Protect others around you: Learn how to safely use, store and throw away your medicines at www.disposemymeds.org.          This list is accurate as of: 10/4/17 10:51 AM.  Always use your most recent med list.                   Brand Name Dispense Instructions for use Diagnosis    allopurinol 100 MG tablet    ZYLOPRIM          aspirin 81 MG chewable tablet      Take 81 mg by mouth daily        lidocaine-prilocaine cream    EMLA          LORazepam 0.5 MG tablet    ATIVAN    30 tablet    Take 1 tablet (0.5 mg) by mouth every 4 hours as needed (Anxiety, Nausea/Vomiting or Sleep)    Malignant neoplasm of overlapping sites of  stomach (H)       ondansetron 8 MG tablet    ZOFRAN    10 tablet    Take 1 tablet (8 mg) by mouth every 8 hours as needed (Nausea/Vomiting)    Malignant neoplasm of overlapping sites of stomach (H)       prochlorperazine 10 MG tablet    COMPAZINE    30 tablet    Take 1 tablet (10 mg) by mouth every 6 hours as needed (Nausea/Vomiting)    Malignant neoplasm of overlapping sites of stomach (H)

## 2017-10-04 NOTE — PROGRESS NOTES
See IV flow sheet for details of port access. IVAD left accessed for chemotherapy.    Camille Parada RN

## 2017-10-04 NOTE — NURSING NOTE
"Oncology Rooming Note    October 4, 2017 9:46 AM   Terry Yi is a 51 year old male who presents for:    Chief Complaint   Patient presents with     Oncology Clinic Visit     Return     Initial Vitals: /71 (BP Location: Right arm, Patient Position: Sitting)  Pulse 57  Temp 97.1  F (36.2  C) (Oral)  Resp 18  Wt 93.2 kg (205 lb 8 oz)  SpO2 97%  BMI 28.77 kg/m2 Estimated body mass index is 28.77 kg/(m^2) as calculated from the following:    Height as of 9/20/17: 1.8 m (5' 10.87\").    Weight as of this encounter: 93.2 kg (205 lb 8 oz). Body surface area is 2.16 meters squared.  No Pain (0) Comment: Data Unavailable   No LMP for male patient.  Allergies reviewed: Yes  Medications reviewed: Yes    Medications: Medication refills not needed today.  Pharmacy name entered into EPIC: Data Unavailable    Clinical concerns: Pt states his right hip feels worse, peripheral neuropathy has become worse over the past month.    10  minutes for nursing intake (face to face time)     Matilda Ziegler RN            "

## 2017-10-04 NOTE — PROGRESS NOTES
"Infusion Nursing Note:  Terry Yi presents today for C13 Leucovorin/fluorouracil push/fluorouracil home infusion.    Patient seen by provider today: Yes: Dr. Shaffer   present during visit today: Not Applicable.    Note: Continues to experience cold sensitivities to feet and fingers. Pt will go to [facility] for pump d/c.    Intravenous Access:  Implanted Port.    Treatment Conditions:  Lab Results   Component Value Date    HGB 12.9 10/04/2017     Lab Results   Component Value Date    WBC 4.8 10/04/2017      Lab Results   Component Value Date    ANEU 2.5 10/04/2017     Lab Results   Component Value Date     10/04/2017      Lab Results   Component Value Date     10/04/2017                   Lab Results   Component Value Date    POTASSIUM 4.2 10/04/2017           No results found for: MAG         Lab Results   Component Value Date    CR 0.82 10/04/2017                   Lab Results   Component Value Date    BRITTNEY 8.5 10/04/2017                Lab Results   Component Value Date    BILITOTAL 0.3 10/04/2017           Lab Results   Component Value Date    ALBUMIN 3.5 10/04/2017                    Lab Results   Component Value Date    ALT 53 10/04/2017           Lab Results   Component Value Date    AST 37 10/04/2017     Results reviewed, labs MET treatment parameters, ok to proceed with treatment.      Post Infusion Assessment:  Patient tolerated infusion without incident.  Blood return noted pre and post infusion.  Site patent and intact, free from redness, edema or discomfort.  No evidence of extravasations.     Prior to discharge: Port is secured in place with tegaderm and flushed with 10cc NS with positive blood return noted.  Continuous home infusion Dosi-Fuser pump connected.    All connectors secured in place and clamps taped open.    Pump started, \"running\" noted on display (CADD): N/A.  Patient instructed to call our clinic or [facility] with any questions or concerns at " home.  Patient verbalized understanding.    Patient set up for pump disconnect in [place] on 10/6/17 at 10:10am.        Discharge Plan:   Patient will return 10/18/17 for next appointment.   Patient discharged in stable condition accompanied by: wife.  Departure Mode: Ambulatory.    Arleth Brandon RN

## 2017-10-04 NOTE — PATIENT INSTRUCTIONS
Proceed with chemo    See me back in 2 weeks with labs prior and chemo to follow    Discuss with your PCP regarding referral to Orthopedics

## 2017-10-04 NOTE — MR AVS SNAPSHOT
After Visit Summary   10/4/2017    Terry Yi    MRN: [id number]           Patient Information     Date Of Birth          1965        Visit Information        Provider Department      10/4/2017 12:00 PM BAY 5 INFUSION [facility]        Today's Diagnoses     Malignant neoplasm of overlapping sites of stomach (H)    -  1       Follow-ups after your visit        Your next 10 appointments already scheduled     Oct 18, 2017  8:00 AM CDT   [facility] Visit with [facility]   [facility]    [address] [facility] [id number] [phone]            Oct 18, 2017  8:45 AM CDT   Return Visit with Yakov Shaffer MD   [facility] ([facility])    [address] [facility] [id number] [phone]            Oct 18, 2017  9:15 AM CDT   Level [facility] with [place] [facility] ([facility])    [address] [facility] [id number] [phone]            Oct 31, 2017  8:00 AM CDT   Return Visit with [facility]   [facility] ([facility])    [address] [facility] [id number] [phone]            Oct 31, 2017  8:45 AM CDT   Return Visit with Yakov Shaffer MD   [facility] ([facility])    [address] [facility] [id number] [phone]            Oct 31, 2017  9:15 AM CDT   Level 1 with [place] [facility] ([facility])    [address] [facility] [id number] [phone]              Who to contact     If you have questions or need follow up information about today's clinic visit or your schedule please contact [facility] directly at [phone].  Normal or non-critical lab and imaging results will be communicated to you by MyChart, letter or phone  within 4 business days after the clinic has received the results. If you do not hear from us within 7 days, please contact the clinic through [employer] or phone. If you have a critical or abnormal lab result, we will notify you by phone as soon as possible.  Submit refill requests through In[employer] or call your pharmacy and they will forward the refill request to us. Please allow 3 business days for your refill to be completed.          Additional Information About Your Visit        Invaluable Information     Invaluable gives you secure access to your electronic health record. If you see a primary care provider, you can also send messages to your care team and make appointments. If you have questions, please call your primary care clinic.  If you do not have a primary care provider, please call [phone] and they will assist you.      In[employer]uable is an electronic gateway that provides easy, online access to your medical records. With [employer], you can request a clinic appointment, read your test results, renew a prescription or communicate with your care team.     To access your existing account, please contact your [facility] or call [phone] for assistance.        Care EveryWhere ID     This is your Care EveryWhere ID. This could be used by other organizations to access your [place] medical records  [id number]         Blood Pressure from Last 3 Encounters:   10/04/17 119/71   09/20/17 119/75   09/06/17 106/61    Weight from Last 3 Encounters:   10/04/17 93.2 kg (205 lb 8 oz)   09/20/17 91.6 kg (202 lb)   09/06/17 92.1 kg (203 lb)              Today, you had the following     No orders found for display       Primary Care Provider Office Phone # Fax #    Sandra Dickerson [phone]       [facility]  [address]  [place] [address]        Equal Access to Services     CHANDAN GALLO : Jemal Casillas, matt rincon, claire whitmore  violeta zapienbret la'aan ah. So [facility] [phone].    ATENCIÓN: Si arabella luke, tiene a cuevas disposición servicios gratuitos de asistencia lingüística. Ciara al [phone].    We comply with applicable federal civil rights laws and [place] laws. We do not discriminate on the basis of race, color, national origin, age, disability, sex, sexual orientation, or gender identity.            Thank you!     Thank you for choosing [facility]  for your care. Our goal is always to provide you with excellent care. Hearing back from our patients is one way we can continue to improve our services. Please take a few minutes to complete the written survey that you may receive in the mail after your visit with us. Thank you!             Your Updated Medication List - Protect others around you: Learn how to safely use, store and throw away your medicines at www.disposemymeds.org.          This list is accurate as of: 10/4/17  1:50 PM.  Always use your most recent med list.                   Brand Name Dispense Instructions for use Diagnosis    allopurinol 100 MG tablet    ZYLOPRIM          aspirin 81 MG chewable tablet      Take 81 mg by mouth daily        lidocaine-prilocaine cream    EMLA          LORazepam 0.5 MG tablet    ATIVAN    30 tablet    Take 1 tablet (0.5 mg) by mouth every 4 hours as needed (Anxiety, Nausea/Vomiting or Sleep)    Malignant neoplasm of overlapping sites of stomach (H)       ondansetron 8 MG tablet    ZOFRAN    10 tablet    Take 1 tablet (8 mg) by mouth every 8 hours as needed (Nausea/Vomiting)    Malignant neoplasm of overlapping sites of stomach (H)       prochlorperazine 10 MG tablet    COMPAZINE    30 tablet    Take 1 tablet (10 mg) by mouth every 6 hours as needed (Nausea/Vomiting)    Malignant neoplasm of overlapping sites of stomach (H)

## 2017-10-04 NOTE — PROGRESS NOTES
10/4/2017        REASON FOR VISIT:  Follow-up for unresectable metastatic gastric carcinoma, diffuse type, grade 3, HER-2 negative.      HISTORY OF PRESENT ILLNESS:  Please see my previous note for details.  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  Cycle 6 was administered on 6/21/2017   He had repeat staging done on June 30, 2017 with a CT scan chest abdomen and pelvis which showed a stable disease     He received cycle #8 of FOLFOX on 7/19/2017  C# 9 FOLFOX 8/2/2017 ( Oxaliplatin dose reduced to 70mg/m2 due to neuropathy )  C#10 delayed for 1 week as he went on vacations to [place]  C#10 8/22/2017 ( Oxaliplatin dose reduced to 60mg/m2 due to neuropathy )  C#11 9/6/17 Only 5FU/LV  C#12 9/20/2017 5FU/LV  C#13 10/4/2017 5FU/LV       INTERVAL HISTORY:   He comes in today accompanied by his family and is feeling about the same as before. He has a stable energy. He continues to work full-time. He continues to have issues with tingling and numbness of fingers and feet. He thinks they are slightly worse now. Denies any pain. He continues to have issues with left hip pain and then there are some good and bad days. He took Tylenol last night and noted that this morning the pain was less. Denies new pains. No nausea vomiting diarrhea or constipation or bleeding. Her weight has remained well. No infections.      ROS:  Otherwise the complaints of review of systems was performed and essentially it was unremarkable        MEDICATIONS:   Reviewed       PHYSICAL EXAMINATION:   /71 (BP Location: Right arm, Patient Position: Sitting)  Pulse 57  Temp 97.1  F (36.2  C) (Oral)  Resp 18  Wt 93.2 kg (205 lb 8 oz)  SpO2 97%  BMI 28.77 kg/m2    CONSTITUTIONAL: no apparent distress  EYES: PERRLA, without any pallor  or jaundice  ENT/MOUTH: no oral thrush or other lesions. Ears unremarkable  CVS: s1s2 normal  RESPIRATORY: Clear chest  GI: Abdomen is benign  NEURO: AAOX3  . He continues to have subjective tingling and numbness of the toes and fingertips. Upon asking him to stand up with feet joined together and eyes closed he felt slightly imbalanced but did not sway on either side. His gait is normal. Rest of the neurological exam is grossly nonfocal   INTEGUMENT: no concerning skin rashes. Port site is intact  LYMPHATIC: no palpable lymph nodes  MUSCULOSKELETAL: Unremarkable. No bony tenderness.   EXTREMITIES: no edema  PSYCH: Mentation, mood and affect are appropriate    LABS:  Reviewed and stable  Results for LEONOR RALPH (MRN [id number]) as of 10/4/2017 09:57   Ref. Range 10/4/2017 09:11   Sodium Latest Ref Range: 133 - 144 mmol/L 140   Potassium Latest Ref Range: 3.4 - 5.3 mmol/L 4.2   Chloride Latest Ref Range: 94 - 109 mmol/L 107   Carbon Dioxide Latest Ref Range: 20 - 32 mmol/L 27   Urea Nitrogen Latest Ref Range: 7 - 30 mg/dL 13   Creatinine Latest Ref Range: 0.66 - 1.25 mg/dL 0.82   GFR Estimate Latest Ref Range: >60 mL/min/1.7m2 >90   GFR Estimate If Black Latest Ref Range: >60 mL/min/1.7m2 >90   Calcium Latest Ref Range: 8.5 - 10.1 mg/dL 8.5   Anion Gap Latest Ref Range: 3 - 14 mmol/L 6   Albumin Latest Ref Range: 3.4 - 5.0 g/dL 3.5   Protein Total Latest Ref Range: 6.8 - 8.8 g/dL 6.8   Bilirubin Total Latest Ref Range: 0.2 - 1.3 mg/dL 0.3   Alkaline Phosphatase Latest Ref Range: 40 - 150 U/L 82   ALT Latest Ref Range: 0 - 70 U/L 53   AST Latest Ref Range: 0 - 45 U/L 37   Glucose Latest Ref Range: 70 - 99 mg/dL 105 (H)   WBC Latest Ref Range: 4.0 - 11.0 10e9/L 4.8   Hemoglobin Latest Ref Range: 13.3 - 17.7 g/dL 12.9 (L)   Hematocrit Latest Ref Range: 40.0 - 53.0 % 37.6 (L)   Platelet Count Latest Ref Range: 150 - 450 10e9/L 112 (L)   RBC Count Latest Ref Range: 4.4 - 5.9 10e12/L 4.05 (L)   MCV Latest Ref Range: 78 -  100 fl 93   MCH Latest Ref Range: 26.5 - 33.0 pg 31.9   MCHC Latest Ref Range: 31.5 - 36.5 g/dL 34.3   RDW Latest Ref Range: 10.0 - 15.0 % 14.7   Diff Method Unknown Automated Method   % Neutrophils Latest Units: % 52.6   % Lymphocytes Latest Units: % 33.7   % Monocytes Latest Units: % 9.7   % Eosinophils Latest Units: % 3.2   % Basophils Latest Units: % 0.8   Absolute Neutrophil Latest Ref Range: 1.6 - 8.3 10e9/L 2.5   Absolute Lymphocytes Latest Ref Range: 0.8 - 5.3 10e9/L 1.6   Absolute Monocytes Latest Ref Range: 0.0 - 1.3 10e9/L 0.5   Absolute Eosinophils Latest Ref Range: 0.0 - 0.7 10e9/L 0.2     EXAMINATION: CT CHEST/ABDOMEN/PELVIS W CONTRAST, 9/29/2017 8:37 AM      TECHNIQUE:  Helical CT images from the thoracic inlet through the  symphysis pubis were obtained  with contrast. Contrast dose: isovue  370 124 ml      COMPARISON: 6/30/2017, 3/24/2017     HISTORY: Malignant neoplasm of overlapping sites of stomach     FINDINGS:     Support devices: Right-sided Port-A-Cath with tip at the cavoatrial  junction.     Chest: Stable appearance of hypodense nodule in the right thyroid  lobe. Heart size is normal. No pericardial effusion. Coronary artery  calcifications. The aorta and pulmonary artery are normal in caliber.  Atherosclerotic calcifications of the aortic arch. Several enlarged,  partially calcified hilar mediastinal lymph nodes are unchanged from  3/24/2017. No new enlarged axillary, hilar, or mediastinal lymph  nodes. Coronary calcifications.     The central tracheobronchial tree is clear. No pleural effusion or  pneumothorax. No focal consolidations. Bilateral atelectasis. Numerous  scattered solid calcified and noncalcified pulmonary nodules. No  suspicious pulmonary nodules.     Abdomen and pelvis: Decreased prominence of ill-defined, hypodense  subscapular lesion in the right hepatic lobe (series 3 image 142),  measuring 1.3 cm. This lesion was not FDG avid on prior PET/CT. Tiny  hypodense lesions at  the right hepatic dome and in the medial segment  of the left hepatic lobe, stable. No intra or extrahepatic biliary  dilation. The gallbladder, spleen, and pancreas are normal. The  adrenal glands and kidneys are normal. No hydronephrosis or  nephrolithiasis. The bladder is distended and unremarkable. Unchanged  prostatic calcifications.     Decreased diffuse thickening of the gastric wall, although the stomach  is more distended than on the prior exam. Persistent mesenteric  stranding adjacent to the stomach and involving the lesser omentum,  peripancreatic fat and pericaval/periaortic soft tissue and extending  down along the course of the inferior mesenteric vasculature. Stable  appearance of heterogeneous soft tissue attenuation adjacent to the  gastric cardia and gastroesophageal junction (series 3 images 133-149)  without convincing masslike lesion. Numerous prominent mesenteric  lymph nodes. Previously measured lymph node in the lesser now measures  1.0 cm compared to 1.3 on prior (series 3 image 154).      Atherosclerotic calcifications of the aorta and iliac arteries.  Nondilated loops of bowel. Pancolonic diverticulosis without evidence  for diverticulitis. The appendix is normal. No intra-abdominal free  air or free fluid.      Bones and soft tissues: No aggressive osseous lesions. Unchanged  appearance of subchondral cyst formation involving the both hips.  Multilevel degenerative changes of the spine. Degenerative changes  both hips.        ASSESSMENT AND PLAN:  Metastatic gastric cancer presenting with early satiety and significant weight loss and epigastric discomfort upon eating with some vomiting.  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  There is an indeterminate right liver lobe lesion.      We started palliative chemotherapy with FOLFOX on 03/12/2017.   Scans after 6 cycles of FOLFOX shows stable  disease.     C #9- dose of oxaliplatin decreased to 70 mg/m  because of neuropathy  We delayed cycle #10 for one week because he wanted to go on vacation to [place].   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 It is manifested as stinging and numbness of fingers and feet. My hope is that over the next several weeks it will slowly improve    Left hip pain. He does have degenerative changes seen on the CT scan but no obvious metastatic lesion is seen. I encouraged him to follow with orthopedics and he is going to talk to his primary care physician to get a referral to see orthopedics close to home. He might benefit from a steroid injection.    Mild fatigue. I encouraged him to be active and exercises regularly. I encouraged him to walk on a regular basis.      I did not address the following today:  His genetic testing did not show any identifiable mutation not reveal any identifiable mutation     Return to clinic in 2 weeks    I answered all of his and his family's questions to their satisfaction and they are agreeable and comfortable with the plan    LANEY HERNANDEZ MD

## 2017-10-18 ENCOUNTER — ONCOLOGY VISIT (OUTPATIENT)
Dept: ONCOLOGY | Facility: CLINIC | Age: 52
End: 2017-10-18
Payer: COMMERCIAL

## 2017-10-18 ENCOUNTER — INFUSION THERAPY VISIT (OUTPATIENT)
Dept: INFUSION THERAPY | Facility: CLINIC | Age: 52
End: 2017-10-18
Payer: COMMERCIAL

## 2017-10-18 VITALS
TEMPERATURE: 98.2 F | HEART RATE: 74 BPM | SYSTOLIC BLOOD PRESSURE: 116 MMHG | OXYGEN SATURATION: 97 % | HEIGHT: 71 IN | BODY MASS INDEX: 28.7 KG/M2 | RESPIRATION RATE: 20 BRPM | WEIGHT: 205 LBS | DIASTOLIC BLOOD PRESSURE: 72 MMHG

## 2017-10-18 DIAGNOSIS — C16.8 MALIGNANT NEOPLASM OF OVERLAPPING SITES OF STOMACH (H): Primary | ICD-10-CM

## 2017-10-18 LAB
ALBUMIN SERPL-MCNC: 3.7 G/DL (ref 3.4–5)
ALP SERPL-CCNC: 83 U/L (ref 40–150)
ALT SERPL W P-5'-P-CCNC: 59 U/L (ref 0–70)
ANION GAP SERPL CALCULATED.3IONS-SCNC: 5 MMOL/L (ref 3–14)
AST SERPL W P-5'-P-CCNC: 38 U/L (ref 0–45)
BASOPHILS # BLD AUTO: 0.1 10E9/L (ref 0–0.2)
BASOPHILS NFR BLD AUTO: 0.9 %
BILIRUB SERPL-MCNC: 0.4 MG/DL (ref 0.2–1.3)
BUN SERPL-MCNC: 16 MG/DL (ref 7–30)
CALCIUM SERPL-MCNC: 8.9 MG/DL (ref 8.5–10.1)
CHLORIDE SERPL-SCNC: 106 MMOL/L (ref 94–109)
CO2 SERPL-SCNC: 28 MMOL/L (ref 20–32)
CREAT SERPL-MCNC: 0.9 MG/DL (ref 0.66–1.25)
DIFFERENTIAL METHOD BLD: ABNORMAL
EOSINOPHIL # BLD AUTO: 0.2 10E9/L (ref 0–0.7)
EOSINOPHIL NFR BLD AUTO: 4.1 %
ERYTHROCYTE [DISTWIDTH] IN BLOOD BY AUTOMATED COUNT: 14.6 % (ref 10–15)
GFR SERPL CREATININE-BSD FRML MDRD: 88 ML/MIN/1.7M2
GLUCOSE SERPL-MCNC: 105 MG/DL (ref 70–99)
HCT VFR BLD AUTO: 38.9 % (ref 40–53)
HGB BLD-MCNC: 13.4 G/DL (ref 13.3–17.7)
LYMPHOCYTES # BLD AUTO: 1.5 10E9/L (ref 0.8–5.3)
LYMPHOCYTES NFR BLD AUTO: 28.2 %
MCH RBC QN AUTO: 32 PG (ref 26.5–33)
MCHC RBC AUTO-ENTMCNC: 34.4 G/DL (ref 31.5–36.5)
MCV RBC AUTO: 93 FL (ref 78–100)
MONOCYTES # BLD AUTO: 0.7 10E9/L (ref 0–1.3)
MONOCYTES NFR BLD AUTO: 13.6 %
NEUTROPHILS # BLD AUTO: 2.9 10E9/L (ref 1.6–8.3)
NEUTROPHILS NFR BLD AUTO: 53.2 %
PLATELET # BLD AUTO: 123 10E9/L (ref 150–450)
POTASSIUM SERPL-SCNC: 4.4 MMOL/L (ref 3.4–5.3)
PROT SERPL-MCNC: 6.9 G/DL (ref 6.8–8.8)
RBC # BLD AUTO: 4.19 10E12/L (ref 4.4–5.9)
SODIUM SERPL-SCNC: 139 MMOL/L (ref 133–144)
WBC # BLD AUTO: 5.4 10E9/L (ref 4–11)

## 2017-10-18 PROCEDURE — 80053 COMPREHEN METABOLIC PANEL: CPT | Performed by: INTERNAL MEDICINE

## 2017-10-18 PROCEDURE — 96409 CHEMO IV PUSH SNGL DRUG: CPT | Performed by: INTERNAL MEDICINE

## 2017-10-18 PROCEDURE — 99215 OFFICE O/P EST HI 40 MIN: CPT | Mod: 25 | Performed by: INTERNAL MEDICINE

## 2017-10-18 PROCEDURE — 96416 CHEMO PROLONG INFUSE W/PUMP: CPT | Performed by: INTERNAL MEDICINE

## 2017-10-18 PROCEDURE — 85025 COMPLETE CBC W/AUTO DIFF WBC: CPT | Performed by: INTERNAL MEDICINE

## 2017-10-18 PROCEDURE — 99207 ZZC NO CHARGE LOS: CPT

## 2017-10-18 PROCEDURE — 96367 TX/PROPH/DG ADDL SEQ IV INF: CPT | Performed by: INTERNAL MEDICINE

## 2017-10-18 PROCEDURE — 96375 TX/PRO/DX INJ NEW DRUG ADDON: CPT | Performed by: INTERNAL MEDICINE

## 2017-10-18 PROCEDURE — 99207 ZZC NO CHARGE NURSE ONLY: CPT

## 2017-10-18 RX ORDER — EPINEPHRINE 1 MG/ML
0.3 INJECTION, SOLUTION INTRAMUSCULAR; SUBCUTANEOUS EVERY 5 MIN PRN
Status: CANCELLED | OUTPATIENT
Start: 2017-10-18

## 2017-10-18 RX ORDER — SODIUM CHLORIDE 9 MG/ML
1000 INJECTION, SOLUTION INTRAVENOUS CONTINUOUS PRN
Status: CANCELLED
Start: 2017-10-18

## 2017-10-18 RX ORDER — MEPERIDINE HYDROCHLORIDE 50 MG/ML
25 INJECTION INTRAMUSCULAR; INTRAVENOUS; SUBCUTANEOUS EVERY 30 MIN PRN
Status: CANCELLED | OUTPATIENT
Start: 2017-10-18

## 2017-10-18 RX ORDER — ALBUTEROL SULFATE 90 UG/1
1-2 AEROSOL, METERED RESPIRATORY (INHALATION)
Status: CANCELLED
Start: 2017-10-18

## 2017-10-18 RX ORDER — FLUOROURACIL 50 MG/ML
400 INJECTION, SOLUTION INTRAVENOUS ONCE
Status: COMPLETED | OUTPATIENT
Start: 2017-10-18 | End: 2017-10-18

## 2017-10-18 RX ORDER — HEPARIN SODIUM (PORCINE) LOCK FLUSH IV SOLN 100 UNIT/ML 100 UNIT/ML
5 SOLUTION INTRAVENOUS
Status: DISCONTINUED | OUTPATIENT
Start: 2017-10-18 | End: 2017-10-18 | Stop reason: HOSPADM

## 2017-10-18 RX ORDER — LORAZEPAM 2 MG/ML
0.5 INJECTION INTRAMUSCULAR EVERY 4 HOURS PRN
Status: CANCELLED
Start: 2017-10-18

## 2017-10-18 RX ORDER — FLUOROURACIL 50 MG/ML
400 INJECTION, SOLUTION INTRAVENOUS ONCE
Status: CANCELLED | OUTPATIENT
Start: 2017-10-18

## 2017-10-18 RX ORDER — DIPHENHYDRAMINE HYDROCHLORIDE 50 MG/ML
50 INJECTION INTRAMUSCULAR; INTRAVENOUS
Status: CANCELLED
Start: 2017-10-18

## 2017-10-18 RX ORDER — METHYLPREDNISOLONE SODIUM SUCCINATE 125 MG/2ML
125 INJECTION, POWDER, LYOPHILIZED, FOR SOLUTION INTRAMUSCULAR; INTRAVENOUS
Status: CANCELLED
Start: 2017-10-18

## 2017-10-18 RX ORDER — ALBUTEROL SULFATE 0.83 MG/ML
2.5 SOLUTION RESPIRATORY (INHALATION)
Status: CANCELLED | OUTPATIENT
Start: 2017-10-18

## 2017-10-18 RX ORDER — EPINEPHRINE 0.3 MG/.3ML
0.3 INJECTION SUBCUTANEOUS EVERY 5 MIN PRN
Status: CANCELLED | OUTPATIENT
Start: 2017-10-18

## 2017-10-18 RX ADMIN — FLUOROURACIL 830 MG: 50 INJECTION, SOLUTION INTRAVENOUS at 10:28

## 2017-10-18 RX ADMIN — HEPARIN SODIUM (PORCINE) LOCK FLUSH IV SOLN 100 UNIT/ML 5 ML: 100 SOLUTION at 08:05

## 2017-10-18 ASSESSMENT — PAIN SCALES - GENERAL: PAINLEVEL: NO PAIN (0)

## 2017-10-18 NOTE — PROGRESS NOTES
Infusion Nursing Note:  Terry Yi presents today for LV/5FU .    Patient seen by provider today: Yes: MD   present during visit today: Not Applicable.    Note: N/A.    Intravenous Access:  Implanted Port.    Treatment Conditions:  Results reviewed, labs MET treatment parameters, ok to proceed with treatment.      Post Infusion Assessment:  Patient tolerated infusion without incident.    Discharge Plan:   Pt goes to home clinic for d/c.    GREY MARCELO RN

## 2017-10-18 NOTE — MR AVS SNAPSHOT
After Visit Summary   10/18/2017    Terry Yi    MRN: [id number]           Patient Information     Date Of Birth          1965        Visit Information        Provider Department      10/18/2017 8:45 AM Yakov Shaffer MD [facility]        Today's Diagnoses     Malignant neoplasm of overlapping sites of stomach (H)    -  1      Care Instructions    Proceed with chemo    See me back as scheduled              Follow-ups after your visit        Your next 10 appointments already scheduled     Oct 31, 2017  8:00 AM CDT   Return Visit with [facility]   [facility] ([facility])    [address] [place] [address]   [phone]            Oct 31, 2017  8:45 AM CDT   Return Visit with Yakov Shaffer MD   [facility] ([facility])    [address] [place] [address]   [phone]            Oct 31, 2017  9:15 AM CDT   Level 2 with BAY 7 INFUSION   [facility] ([facility])    [address] [place] [address]   [phone]            Nov 15, 2017  9:00 AM CST   Return Visit with [facility]   [facility] ([facility])    [address] [place] [address]   [phone]            Nov 15, 2017  9:45 AM CST   Return Visit with Yakov Shaffer MD   [facility])    [address] [place] [address] [phone]            Nov 15, 2017 10:30 AM CST   Level 2 with BAY 4 INFUSION   [facility] ([facility])    [address] [place] [address] [phone]              Who to contact     If you have questions or need follow up information about today's clinic visit or your schedule please contact [facility] directly at [phone].  Normal or  "non-critical lab and imaging results will be communicated to you by MyChart, letter or phone within 4 business days after the clinic has received the results. If you do not hear from us within 7 days, please contact the clinic through [employer] or phone. If you have a critical or abnormal lab result, we will notify you by phone as soon as possible.  Submit refill requests through [employer] or call your pharmacy and they will forward the refill request to us. Please allow 3 business days for your refill to be completed.          Additional Information About Your Visit        [employer] Information     [employer] gives you secure access to your electronic health record. If you see a primary care provider, you can also send messages to your care team and make appointments. If you have questions, please call your primary care clinic.  If you do not have a primary care provider, please call [phone] and they will assist you.      [employer] is an electronic gateway that provides easy, online access to your medical records. With [employer], you can request a clinic appointment, read your test results, renew a prescription or communicate with your care team.     To access your existing account, please contact your [facility] or call [phone] for assistance.        Care EveryWhere ID     This is your Care EveryWhere ID. This could be used by other organizations to access your [place] medical records  [id number]        Your Vitals Were     Pulse Temperature Respirations Height Pulse Oximetry BMI (Body Mass Index)    74 98.2  F (36.8  C) (Oral) 20 1.8 m (5' 10.87\") 97% 28.7 kg/m2       Blood Pressure from Last 3 Encounters:   10/18/17 116/72   10/04/17 119/71   09/20/17 119/75    Weight from Last 3 Encounters:   10/18/17 93 kg (205 lb)   10/04/17 93.2 kg (205 lb 8 oz)   09/20/17 91.6 kg (202 lb)              Today, you had the following     No orders found for display       Primary Care Provider " Office Phone # Fax #    Sandra Dickerson [phone]       [facility]  [address]  [facility] [address]        Equal Access to Services     CHANDAN GALLO : Hadii tk ku hadsohano Soomaali, waaxda luqadaha, qaybta kaalmada adeegyada, claire hawley mayurjennifer june sofya browne. So [facility] [phone].    ATENCIÓN: Si habla español, tiene a cuevas disposición servicios gratuitos de asistencia lingüística. Llame al [phone].    We comply with applicable federal civil rights laws and [place] laws. We do not discriminate on the basis of race, color, national origin, age, disability, sex, sexual orientation, or gender identity.            Thank you!     Thank you for choosing [facility]  for your care. Our goal is always to provide you with excellent care. Hearing back from our patients is one way we can continue to improve our services. Please take a few minutes to complete the written survey that you may receive in the mail after your visit with us. Thank you!             Your Updated Medication List - Protect others around you: Learn how to safely use, store and throw away your medicines at www.disposemymeds.org.          This list is accurate as of: 10/18/17 11:59 PM.  Always use your most recent med list.                   Brand Name Dispense Instructions for use Diagnosis    allopurinol 100 MG tablet    ZYLOPRIM          aspirin 81 MG chewable tablet      Take 81 mg by mouth daily        lidocaine-prilocaine cream    EMLA          LORazepam 0.5 MG tablet    ATIVAN    30 tablet    Take 1 tablet (0.5 mg) by mouth every 4 hours as needed (Anxiety, Nausea/Vomiting or Sleep)    Malignant neoplasm of overlapping sites of stomach (H)       ondansetron 8 MG tablet    ZOFRAN    10 tablet    Take 1 tablet (8 mg) by mouth every 8 hours as needed (Nausea/Vomiting)    Malignant neoplasm of overlapping sites of stomach (H)       prochlorperazine 10 MG tablet    COMPAZINE    30 tablet     Take 1 tablet (10 mg) by mouth every 6 hours as needed (Nausea/Vomiting)    Malignant neoplasm of overlapping sites of stomach (H)

## 2017-10-18 NOTE — MR AVS SNAPSHOT
After Visit Summary   10/18/2017    Terry Yi    MRN: [id number]           Patient Information     Date Of Birth          1965        Visit Information        Provider Department      10/18/2017 9:15 AM BAY 8 INFUSION [facility]        Today's Diagnoses     Malignant neoplasm of overlapping sites of stomach (H)    -  1       Follow-ups after your visit        Your next 10 appointments already scheduled     Oct 31, 2017  8:00 AM CDT   Return Visit with [facility]   [facility] ([facility])    [address] [facility] [address]   [phone]            Oct 31, 2017  8:45 AM CDT   Return Visit with Yakov Shaffer MD   [facility] ([facility])    [address] [facility] [address] [phone]            Oct 31, 2017  9:15 AM CDT   Level 2 with BAY 7 INFUSION   [facility] ([facility])    [address] [facility] [address] [phone]            Nov 15, 2017  9:00 AM CST   Return Visit with [facility]   [facility] ([facility])    [address] [facility] [address]   [phone]            Nov 15, 2017  9:45 AM CST   Return Visit with Yakov Shaffer MD   [facility])    [address] [facility] [address] [phone]            Nov 15, 2017 10:30 AM CST   Level 2 with BAY 4 INFUSION   [facility] ([facility])    [address] [facility] [address] [phone]              Who to contact     If you have questions or need follow up information about today's clinic visit or your schedule please contact [facility] directly at [phone].  Normal or non-critical lab and imaging results will be communicated to you by MyChart, letter or phone  within 4 business days after the clinic has received the results. If you do not hear from us within 7 days, please contact the clinic through [employer] or phone. If you have a critical or abnormal lab result, we will notify you by phone as soon as possible.  Submit refill requests through [employer] or call your pharmacy and they will forward the refill request to us. Please allow 3 business days for your refill to be completed.          Additional Information About Your Visit        [employer] Information     [employer] gives you secure access to your electronic health record. If you see a primary care provider, you can also send messages to your care team and make appointments. If you have questions, please call your primary care clinic.  If you do not have a primary care provider, please call [phone] and they will assist you.      [employer] is an electronic gateway that provides easy, online access to your medical records. With [employer], you can request a clinic appointment, read your test results, renew a prescription or communicate with your care team.     To access your existing account, please contact your [facility] or call [phone] for assistance.        Care EveryWhere ID     This is your Care EveryWhere ID. This could be used by other organizations to access your [place] medical records  [id number]         Blood Pressure from Last 3 Encounters:   10/18/17 116/72   10/04/17 119/71   09/20/17 119/75    Weight from Last 3 Encounters:   10/18/17 93 kg (205 lb)   10/04/17 93.2 kg (205 lb 8 oz)   09/20/17 91.6 kg (202 lb)              Today, you had the following     No orders found for display       Primary Care Provider Office Phone # Fax #    Sandra iDckerson [phone]       [facility]  [address]  [facility] [address]        Equal Access to Services     CHANDAN GALLO : matt Henao, cliare whitmore  violeta zapienbret la'aan ah. So [facility] [phone].    ATENCIÓN: Si arabella luke, tiene a cuevas disposición servicios gratuitos de asistencia lingüística. Ciara al [phone].    We comply with applicable federal civil rights laws and [place] laws. We do not discriminate on the basis of race, color, national origin, age, disability, sex, sexual orientation, or gender identity.            Thank you!     Thank you for choosing [facility]  for your care. Our goal is always to provide you with excellent care. Hearing back from our patients is one way we can continue to improve our services. Please take a few minutes to complete the written survey that you may receive in the mail after your visit with us. Thank you!             Your Updated Medication List - Protect others around you: Learn how to safely use, store and throw away your medicines at www.disposemymeds.org.          This list is accurate as of: 10/18/17  4:11 PM.  Always use your most recent med list.                   Brand Name Dispense Instructions for use Diagnosis    allopurinol 100 MG tablet    ZYLOPRIM          aspirin 81 MG chewable tablet      Take 81 mg by mouth daily        lidocaine-prilocaine cream    EMLA          LORazepam 0.5 MG tablet    ATIVAN    30 tablet    Take 1 tablet (0.5 mg) by mouth every 4 hours as needed (Anxiety, Nausea/Vomiting or Sleep)    Malignant neoplasm of overlapping sites of stomach (H)       ondansetron 8 MG tablet    ZOFRAN    10 tablet    Take 1 tablet (8 mg) by mouth every 8 hours as needed (Nausea/Vomiting)    Malignant neoplasm of overlapping sites of stomach (H)       prochlorperazine 10 MG tablet    COMPAZINE    30 tablet    Take 1 tablet (10 mg) by mouth every 6 hours as needed (Nausea/Vomiting)    Malignant neoplasm of overlapping sites of stomach (H)

## 2017-10-18 NOTE — NURSING NOTE
"Oncology Rooming Note    October 18, 2017 8:31 AM   Terry Yi is a 51 year old male who presents for:    Chief Complaint   Patient presents with     Oncology Clinic Visit     f/u prior to tx     Initial Vitals: There were no vitals taken for this visit. Estimated body mass index is 28.77 kg/(m^2) as calculated from the following:    Height as of 9/20/17: 1.8 m (5' 10.87\").    Weight as of 10/4/17: 93.2 kg (205 lb 8 oz). There is no height or weight on file to calculate BSA.  Data Unavailable Comment: Data Unavailable   No LMP for male patient.  Allergies reviewed: Yes  Medications reviewed: Yes    Medications: Medication refills not needed today.  Pharmacy name entered into EPIC: Data Unavailable    Clinical concerns:     8 minutes for nursing intake (face to face time)     PAOLO PETTY LPN              "

## 2017-10-18 NOTE — PROGRESS NOTES
"10/18/2017      REASON FOR VISIT:  Follow-up for unresectable metastatic gastric carcinoma, diffuse type, grade 3, HER-2 negative.      HISTORY OF PRESENT ILLNESS:  Please see my previous note for details.  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      CT CAP after C#6 shows stable disease      C# 9 FOLFOX 8/2/2017 ( Oxaliplatin dose reduced to 70mg/m2 due to neuropathy )  C#10 delayed for 1 week as he went on vacations to [place]  C#10 8/22/2017 ( Oxaliplatin dose reduced to 60mg/m2 due to neuropathy )  C#11 9/6/17 Only 5FU/LV  C#12 9/20/2017 5FU/LV    Repeat CT scan after C#12 shows stable to slightly improved disease.    C#13 10/4/2017 5FU/LV  C#14 10/18/2017 5FU/LV         INTERVAL HISTORY:   He is tolerating chemotherapy well. He denies any nausea or vomiting. He's been eating and drinking well. Energy has been stable. Denies new pain or new swellings. He thinks that the left hip pain is improved and he did not go anything for it it improved on its own. He has not followed with his primary care physician or orthopedics for that. At this time he says that he does not plan to follow with them because it is not bothering him much. No diarrhea or constipation. No bleeding. No respiratory complaints. He thinks his neuropathy is about the same as before.    ROS:  A comprehensive ROS was otherwise neg        MEDICATIONS:   Reviewed       PHYSICAL EXAMINATION:   /72  Pulse 74  Temp 98.2  F (36.8  C) (Oral)  Resp 20  Ht 1.8 m (5' 10.87\")  Wt 93 kg (205 lb)  SpO2 97%  BMI 28.7 kg/m2    CONSTITUTIONAL: no acute distress  EYES: PERRLA, no palor or icterus.   ENT/MOUTH: no mouth lesions. Oropharynx normal  CVS: s1s2 no m r g .   RESPIRATORY: clear to auscultation b/l  GI: soft non tender no " hepatosplenomegaly  NEURO: AAOX3  Grossly non focal neuro exam apart from subjective tingling and numbness of the fingertips and the feet  INTEGUMENT: no obvious rashes. Port site is clean and dry and intact.  LYMPHATIC: no palpable cervical, supraclavicular, axillary LAD  MUSCULOSKELETAL: Unremarkable. No bony tenderness.   EXTREMITIES: no edema  PSYCH: Mentation, mood and affect are normal. Decision making capacity is intact      LABS:  Reviewed and stable  Results for LEONOR RALPH (MRN [id number]) as of 10/18/2017 09:01   Ref. Range 10/18/2017 08:03   Sodium Latest Ref Range: 133 - 144 mmol/L 139   Potassium Latest Ref Range: 3.4 - 5.3 mmol/L 4.4   Chloride Latest Ref Range: 94 - 109 mmol/L 106   Carbon Dioxide Latest Ref Range: 20 - 32 mmol/L 28   Urea Nitrogen Latest Ref Range: 7 - 30 mg/dL 16   Creatinine Latest Ref Range: 0.66 - 1.25 mg/dL 0.90   GFR Estimate Latest Ref Range: >60 mL/min/1.7m2 88   GFR Estimate If Black Latest Ref Range: >60 mL/min/1.7m2 >90   Calcium Latest Ref Range: 8.5 - 10.1 mg/dL 8.9   Anion Gap Latest Ref Range: 3 - 14 mmol/L 5   Albumin Latest Ref Range: 3.4 - 5.0 g/dL 3.7   Protein Total Latest Ref Range: 6.8 - 8.8 g/dL 6.9   Bilirubin Total Latest Ref Range: 0.2 - 1.3 mg/dL 0.4   Alkaline Phosphatase Latest Ref Range: 40 - 150 U/L 83   ALT Latest Ref Range: 0 - 70 U/L 59   AST Latest Ref Range: 0 - 45 U/L 38   Glucose Latest Ref Range: 70 - 99 mg/dL 105 (H)   WBC Latest Ref Range: 4.0 - 11.0 10e9/L 5.4   Hemoglobin Latest Ref Range: 13.3 - 17.7 g/dL 13.4   Hematocrit Latest Ref Range: 40.0 - 53.0 % 38.9 (L)   Platelet Count Latest Ref Range: 150 - 450 10e9/L 123 (L)   RBC Count Latest Ref Range: 4.4 - 5.9 10e12/L 4.19 (L)   MCV Latest Ref Range: 78 - 100 fl 93   MCH Latest Ref Range: 26.5 - 33.0 pg 32.0   MCHC Latest Ref Range: 31.5 - 36.5 g/dL 34.4   RDW Latest Ref Range: 10.0 - 15.0 % 14.6   Diff Method Unknown Automated Method   % Neutrophils Latest Units: % 53.2   % Lymphocytes  Latest Units: % 28.2   % Monocytes Latest Units: % 13.6   % Eosinophils Latest Units: % 4.1   % Basophils Latest Units: % 0.9   Absolute Neutrophil Latest Ref Range: 1.6 - 8.3 10e9/L 2.9   Absolute Lymphocytes Latest Ref Range: 0.8 - 5.3 10e9/L 1.5   Absolute Monocytes Latest Ref Range: 0.0 - 1.3 10e9/L 0.7             ASSESSMENT AND PLAN:  Metastatic gastric cancer presenting with early satiety and significant weight loss and epigastric discomfort upon eating with some vomiting.  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  There is an indeterminate right liver lobe lesion.      We started palliative chemotherapy with FOLFOX on 03/12/2017.   Scans after 6 cycles of FOLFOX shows stable disease.     C #9- dose of oxaliplatin decreased to 70 mg/m  because of neuropathy  We delayed cycle #10 for one week because he wanted to go on vacation to [place].   C#10- Dose of Oxaliplatin decreased further to 60mg/m2     Repeat his scans after cycle #12 shows stable to slightly improved disease  He is tolerating chemotherapy really well and we will proceed with cycle #14 today.  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 He's been having tingling and numbness of fingers and feet. This is stable from last time. At this time we will keep a watch on this. Hopefully with time this will continue to improve.    Thrombocytopenia. This is mild and stable and due to chemotherapy. He is asymptomatic from it. At this time is to watch him that without making any further changes in chemotherapy.    Left hip pain. This is likely in the setting of degenerative changes seen on the most recent CT scan. He thinks that the pain is better. Previously I had recommended that he follows with the orthopedic doctor. At this time he wants to wait because the pain is better.     I discussed  with him that it is important that he exercises on a regular basis. Although he keeps himself active and walks at his job but I told him that he should take a separate time out during the day for regular exercise.    We also discussed importance of healthy nutrition. Unfortunately during this time his nutrition has not been an issue and he is eating healthy and maintaining his weight.    I did not address the following today:  His genetic testing did not show any identifiable mutation not reveal any identifiable mutation     Return to clinic in 2 weeks as scheduled    All of his and his family's questions were answered to their satisfaction. They're agreeable and comfortable with the plan.     LANEY HERNANDEZ MD

## 2017-10-18 NOTE — PROGRESS NOTES
Needle left accessed for treatment. Tolerated lab draw without complaint. See documentation flowsheet. Arleth Brandon, RN, BSN, OCN

## 2017-10-18 NOTE — MR AVS SNAPSHOT
After Visit Summary   10/18/2017    Terry Yi    MRN: [id number]           Patient Information     Date Of Birth          1965        Visit Information        Provider Department      10/18/2017 8:00 AM NURSE ONLY CANCER CENTER [facility]        Today's Diagnoses     Malignant neoplasm of overlapping sites of stomach (H)    -  1       Follow-ups after your visit        Your next 10 appointments already scheduled     Oct 18, 2017  8:45 AM CDT   Return Visit with Yakov Shaffer MD   [facility] ([facility])    [address] [place] [address]   [phone]            Oct 18, 2017  9:15 AM CDT   Level 1 with BAY 8 INFUSION   [facility] ([facility])    [address] [facility] [address]   [phone]            Oct 31, 2017  8:00 AM CDT   Return Visit with NURSE ONLY CANCER CENTER   [facility] ([facility])    [address] [facility] [address]   [phone]            Oct 31, 2017  8:45 AM CDT   Return Visit with Yakov Shaffer MD   [facility] ([facility])    [address] [facility] [address] [phone]            Oct 31, 2017  9:15 AM CDT   Level 1 with BAY 7 INFUSION   [facility] ([facility])    [address] [place] [address]   [phone]              Who to contact     If you have questions or need follow up information about today's clinic visit or your schedule please contact [facility] directly at [phone].  Normal or non-critical lab and imaging results will be communicated to you by MyChart, letter or phone within 4 business days after the clinic has received the results. If you do not hear from us within 7 days, please contact the clinic through MyChart or phone. If you have a critical or abnormal lab  result, we will notify you by phone as soon as possible.  Submit refill requests through [employer] or call your pharmacy and they will forward the refill request to us. Please allow 3 business days for your refill to be completed.          Additional Information About Your Visit        [employer] Information     [employer] gives you secure access to your electronic health record. If you see a primary care provider, you can also send messages to your care team and make appointments. If you have questions, please call your primary care clinic.  If you do not have a primary care provider, please call [phone] and they will assist you.      [employer] is an electronic gateway that provides easy, online access to your medical records. With [employer], you can request a clinic appointment, read your test results, renew a prescription or communicate with your care team.     To access your existing account, please contact your [facility] or call [phone] for assistance.        Care EveryWhere ID     This is your Care EveryWhere ID. This could be used by other organizations to access your [place] medical records  [id number]         Blood Pressure from Last 3 Encounters:   10/04/17 119/71   09/20/17 119/75   09/06/17 106/61    Weight from Last 3 Encounters:   10/04/17 93.2 kg (205 lb 8 oz)   09/20/17 91.6 kg (202 lb)   09/06/17 92.1 kg (203 lb)              We Performed the Following     CBC with platelets differential     Comprehensive metabolic panel        Primary Care Provider Office Phone # Fax #    Sandra Dickerson [phone]       [facility]  [address]  [facility] [address]        Equal Access to Services     [facility]: Hadii aad ku hadasho Soomaali, waaxda luqadaha, qaybta kaalmada adeegyada, claire cowart . So [facility] [phone].    ATENCIÓN: Si habla español, tiene a cuevas disposición servicios gratuitos de asistencia lingüística.  Ciara ji [phone].    We comply with applicable federal civil rights laws and [place] laws. We do not discriminate on the basis of race, color, national origin, age, disability, sex, sexual orientation, or gender identity.            Thank you!     Thank you for choosing [facility]  for your care. Our goal is always to provide you with excellent care. Hearing back from our patients is one way we can continue to improve our services. Please take a few minutes to complete the written survey that you may receive in the mail after your visit with us. Thank you!             Your Updated Medication List - Protect others around you: Learn how to safely use, store and throw away your medicines at www.disposemymeds.org.          This list is accurate as of: 10/18/17  8:16 AM.  Always use your most recent med list.                   Brand Name Dispense Instructions for use Diagnosis    allopurinol 100 MG tablet    ZYLOPRIM          aspirin 81 MG chewable tablet      Take 81 mg by mouth daily        lidocaine-prilocaine cream    EMLA          LORazepam 0.5 MG tablet    ATIVAN    30 tablet    Take 1 tablet (0.5 mg) by mouth every 4 hours as needed (Anxiety, Nausea/Vomiting or Sleep)    Malignant neoplasm of overlapping sites of stomach (H)       ondansetron 8 MG tablet    ZOFRAN    10 tablet    Take 1 tablet (8 mg) by mouth every 8 hours as needed (Nausea/Vomiting)    Malignant neoplasm of overlapping sites of stomach (H)       prochlorperazine 10 MG tablet    COMPAZINE    30 tablet    Take 1 tablet (10 mg) by mouth every 6 hours as needed (Nausea/Vomiting)    Malignant neoplasm of overlapping sites of stomach (H)

## 2017-10-31 ENCOUNTER — ONCOLOGY VISIT (OUTPATIENT)
Dept: ONCOLOGY | Facility: CLINIC | Age: 52
End: 2017-10-31
Payer: COMMERCIAL

## 2017-10-31 ENCOUNTER — INFUSION THERAPY VISIT (OUTPATIENT)
Dept: INFUSION THERAPY | Facility: CLINIC | Age: 52
End: 2017-10-31
Payer: COMMERCIAL

## 2017-10-31 VITALS
RESPIRATION RATE: 15 BRPM | OXYGEN SATURATION: 99 % | TEMPERATURE: 98.7 F | HEIGHT: 71 IN | HEART RATE: 65 BPM | DIASTOLIC BLOOD PRESSURE: 76 MMHG | WEIGHT: 207 LBS | BODY MASS INDEX: 28.98 KG/M2 | SYSTOLIC BLOOD PRESSURE: 137 MMHG

## 2017-10-31 DIAGNOSIS — C16.8 MALIGNANT NEOPLASM OF OVERLAPPING SITES OF STOMACH (H): Primary | ICD-10-CM

## 2017-10-31 LAB
ALBUMIN SERPL-MCNC: 3.6 G/DL (ref 3.4–5)
ALP SERPL-CCNC: 78 U/L (ref 40–150)
ALT SERPL W P-5'-P-CCNC: 66 U/L (ref 0–70)
ANION GAP SERPL CALCULATED.3IONS-SCNC: 5 MMOL/L (ref 3–14)
AST SERPL W P-5'-P-CCNC: 39 U/L (ref 0–45)
BASOPHILS # BLD AUTO: 0 10E9/L (ref 0–0.2)
BASOPHILS NFR BLD AUTO: 0.8 %
BILIRUB SERPL-MCNC: 0.3 MG/DL (ref 0.2–1.3)
BUN SERPL-MCNC: 17 MG/DL (ref 7–30)
CALCIUM SERPL-MCNC: 8.9 MG/DL (ref 8.5–10.1)
CHLORIDE SERPL-SCNC: 107 MMOL/L (ref 94–109)
CO2 SERPL-SCNC: 28 MMOL/L (ref 20–32)
CREAT SERPL-MCNC: 0.85 MG/DL (ref 0.66–1.25)
DIFFERENTIAL METHOD BLD: ABNORMAL
EOSINOPHIL # BLD AUTO: 0.3 10E9/L (ref 0–0.7)
EOSINOPHIL NFR BLD AUTO: 5.4 %
ERYTHROCYTE [DISTWIDTH] IN BLOOD BY AUTOMATED COUNT: 15 % (ref 10–15)
GFR SERPL CREATININE-BSD FRML MDRD: >90 ML/MIN/1.7M2
GLUCOSE SERPL-MCNC: 93 MG/DL (ref 70–99)
HCT VFR BLD AUTO: 38.3 % (ref 40–53)
HGB BLD-MCNC: 13.1 G/DL (ref 13.3–17.7)
LYMPHOCYTES # BLD AUTO: 1.8 10E9/L (ref 0.8–5.3)
LYMPHOCYTES NFR BLD AUTO: 36.4 %
MCH RBC QN AUTO: 31.6 PG (ref 26.5–33)
MCHC RBC AUTO-ENTMCNC: 34.2 G/DL (ref 31.5–36.5)
MCV RBC AUTO: 93 FL (ref 78–100)
MONOCYTES # BLD AUTO: 0.5 10E9/L (ref 0–1.3)
MONOCYTES NFR BLD AUTO: 10.3 %
NEUTROPHILS # BLD AUTO: 2.3 10E9/L (ref 1.6–8.3)
NEUTROPHILS NFR BLD AUTO: 47.1 %
PLATELET # BLD AUTO: 143 10E9/L (ref 150–450)
POTASSIUM SERPL-SCNC: 4.5 MMOL/L (ref 3.4–5.3)
PROT SERPL-MCNC: 6.8 G/DL (ref 6.8–8.8)
RBC # BLD AUTO: 4.14 10E12/L (ref 4.4–5.9)
SODIUM SERPL-SCNC: 140 MMOL/L (ref 133–144)
WBC # BLD AUTO: 4.8 10E9/L (ref 4–11)

## 2017-10-31 PROCEDURE — 99215 OFFICE O/P EST HI 40 MIN: CPT | Mod: 25 | Performed by: INTERNAL MEDICINE

## 2017-10-31 PROCEDURE — 96367 TX/PROPH/DG ADDL SEQ IV INF: CPT | Performed by: INTERNAL MEDICINE

## 2017-10-31 PROCEDURE — 96416 CHEMO PROLONG INFUSE W/PUMP: CPT | Performed by: INTERNAL MEDICINE

## 2017-10-31 PROCEDURE — 99207 ZZC NO CHARGE LOS: CPT

## 2017-10-31 PROCEDURE — 96409 CHEMO IV PUSH SNGL DRUG: CPT | Performed by: INTERNAL MEDICINE

## 2017-10-31 PROCEDURE — 80053 COMPREHEN METABOLIC PANEL: CPT | Performed by: INTERNAL MEDICINE

## 2017-10-31 PROCEDURE — 85025 COMPLETE CBC W/AUTO DIFF WBC: CPT | Performed by: INTERNAL MEDICINE

## 2017-10-31 RX ORDER — FLUOROURACIL 50 MG/ML
400 INJECTION, SOLUTION INTRAVENOUS ONCE
Status: COMPLETED | OUTPATIENT
Start: 2017-10-31 | End: 2017-10-31

## 2017-10-31 RX ORDER — EPINEPHRINE 1 MG/ML
0.3 INJECTION, SOLUTION INTRAMUSCULAR; SUBCUTANEOUS EVERY 5 MIN PRN
Status: CANCELLED | OUTPATIENT
Start: 2017-10-31

## 2017-10-31 RX ORDER — METHYLPREDNISOLONE SODIUM SUCCINATE 125 MG/2ML
125 INJECTION, POWDER, LYOPHILIZED, FOR SOLUTION INTRAMUSCULAR; INTRAVENOUS
Status: CANCELLED
Start: 2017-10-31

## 2017-10-31 RX ORDER — ALBUTEROL SULFATE 90 UG/1
1-2 AEROSOL, METERED RESPIRATORY (INHALATION)
Status: CANCELLED
Start: 2017-10-31

## 2017-10-31 RX ORDER — SODIUM CHLORIDE 9 MG/ML
1000 INJECTION, SOLUTION INTRAVENOUS CONTINUOUS PRN
Status: CANCELLED
Start: 2017-10-31

## 2017-10-31 RX ORDER — HEPARIN SODIUM (PORCINE) LOCK FLUSH IV SOLN 100 UNIT/ML 100 UNIT/ML
5 SOLUTION INTRAVENOUS ONCE
Status: COMPLETED | OUTPATIENT
Start: 2017-10-31 | End: 2017-10-31

## 2017-10-31 RX ORDER — ALBUTEROL SULFATE 0.83 MG/ML
2.5 SOLUTION RESPIRATORY (INHALATION)
Status: CANCELLED | OUTPATIENT
Start: 2017-10-31

## 2017-10-31 RX ORDER — DIPHENHYDRAMINE HYDROCHLORIDE 50 MG/ML
50 INJECTION INTRAMUSCULAR; INTRAVENOUS
Status: CANCELLED
Start: 2017-10-31

## 2017-10-31 RX ORDER — LORAZEPAM 2 MG/ML
0.5 INJECTION INTRAMUSCULAR EVERY 4 HOURS PRN
Status: CANCELLED
Start: 2017-10-31

## 2017-10-31 RX ORDER — FLUOROURACIL 50 MG/ML
400 INJECTION, SOLUTION INTRAVENOUS ONCE
Status: CANCELLED | OUTPATIENT
Start: 2017-10-31

## 2017-10-31 RX ORDER — MEPERIDINE HYDROCHLORIDE 50 MG/ML
25 INJECTION INTRAMUSCULAR; INTRAVENOUS; SUBCUTANEOUS EVERY 30 MIN PRN
Status: CANCELLED | OUTPATIENT
Start: 2017-10-31

## 2017-10-31 RX ORDER — EPINEPHRINE 0.3 MG/.3ML
0.3 INJECTION SUBCUTANEOUS EVERY 5 MIN PRN
Status: CANCELLED | OUTPATIENT
Start: 2017-10-31

## 2017-10-31 RX ADMIN — HEPARIN SODIUM (PORCINE) LOCK FLUSH IV SOLN 100 UNIT/ML 5 ML: 100 SOLUTION at 08:25

## 2017-10-31 RX ADMIN — FLUOROURACIL 830 MG: 50 INJECTION, SOLUTION INTRAVENOUS at 10:44

## 2017-10-31 ASSESSMENT — PAIN SCALES - GENERAL: PAINLEVEL: NO PAIN (0)

## 2017-10-31 NOTE — PROGRESS NOTES
10/31/2017      REASON FOR VISIT:  Follow-up for unresectable metastatic gastric carcinoma, diffuse type, grade 3, HER-2 negative.      HISTORY OF PRESENT ILLNESS:  Please see my previous note for details.  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      CT CAP after C#6 shows stable disease      C# 9 FOLFOX 8/2/2017 ( Oxaliplatin dose reduced to 70mg/m2 due to neuropathy )  C#10 delayed for 1 week as he went on vacations to [place]  C#10 8/22/2017 ( Oxaliplatin dose reduced to 60mg/m2 due to neuropathy )  C#11 9/6/17 Only 5FU/LV  C#12 9/20/2017 5FU/LV    Repeat CT scan after C#12 shows stable to slightly improved disease.    C#13 10/4/2017 5FU/LV  C#14 10/18/2017 5FU/LV  C#15 10/31/2017 5FU/LV       INTERVAL HISTORY:   He comes in today accompanied by his family and tells me that he is doing well. His main complaint has been persistent invading and numbness of fingers and feet which he believes has been a stable over the last 1 month or so. At times he has difficulty buttoning his shirt. When he walks a few blocks and then he also notices pain in the bottom of his feet. Otherwise denies new pain. Pain in the hip has completely resolved. He went to a chiropractor and he thinks it really helped him. He is not taking any pain medications. Denies any nausea or vomiting. No diarrhea or constipation. No interval infections. He has been eating well. His energy is in decent but he is currently not exercising regularly.   He tells me that his mother was recently admitted to the hospital because of acute kidney injury due to dehydration. She is now doing well and is back home.      ROS:  Otherwise the rest of the comprehensive review of systems was essentially unremarkable      MEDICATIONS:   Reviewed       PHYSICAL  "EXAMINATION:   /76  Pulse 65  Temp 98.7  F (37.1  C)  Resp 15  Ht 1.8 m (5' 10.87\")  Wt 93.9 kg (207 lb)  SpO2 99%  BMI 28.98 kg/m2    CONSTITUTIONAL: No apparent distress  EYES: PERRLA, without pallor or jaundice  ENT/MOUTH: Ears unremarkable. No oral lesions  CVS: s1s2 normal  RESPIRATORY: Chest is clear  GI: Abdomen is benign  NEURO: He is alert and oriented ×3 Grossly non focal neuro exam apart from subjective tingling and numbness of the fingertips and the feet. Romberg's test is negative. Gait is normal  INTEGUMENT: no concerning his skin rashes. Port site looks normal  LYMPHATIC: no palpable lymphadenopathy  MUSCULOSKELETAL: Unremarkable. No bony tenderness.   EXTREMITIES: no pedal edema  PSYCH: Mentation, mood and affect are appropriate          LABS:  Reviewed     Results for LEONOR RALPH (MRN [id number]) as of 10/31/2017 09:06   Ref. Range 10/31/2017 08:30   Sodium Latest Ref Range: 133 - 144 mmol/L 140   Potassium Latest Ref Range: 3.4 - 5.3 mmol/L 4.5   Chloride Latest Ref Range: 94 - 109 mmol/L 107   Carbon Dioxide Latest Ref Range: 20 - 32 mmol/L 28   Urea Nitrogen Latest Ref Range: 7 - 30 mg/dL 17   Creatinine Latest Ref Range: 0.66 - 1.25 mg/dL 0.85   GFR Estimate Latest Ref Range: >60 mL/min/1.7m2 >90   GFR Estimate If Black Latest Ref Range: >60 mL/min/1.7m2 >90   Calcium Latest Ref Range: 8.5 - 10.1 mg/dL 8.9   Anion Gap Latest Ref Range: 3 - 14 mmol/L 5   Albumin Latest Ref Range: 3.4 - 5.0 g/dL 3.6   Protein Total Latest Ref Range: 6.8 - 8.8 g/dL 6.8   Bilirubin Total Latest Ref Range: 0.2 - 1.3 mg/dL 0.3   Alkaline Phosphatase Latest Ref Range: 40 - 150 U/L 78   ALT Latest Ref Range: 0 - 70 U/L 66   AST Latest Ref Range: 0 - 45 U/L 39   Glucose Latest Ref Range: 70 - 99 mg/dL 93   WBC Latest Ref Range: 4.0 - 11.0 10e9/L 4.8   Hemoglobin Latest Ref Range: 13.3 - 17.7 g/dL 13.1 (L)   Hematocrit Latest Ref Range: 40.0 - 53.0 % 38.3 (L)   Platelet Count Latest Ref Range: 150 - 450 " 10e9/L 143 (L)   RBC Count Latest Ref Range: 4.4 - 5.9 10e12/L 4.14 (L)   MCV Latest Ref Range: 78 - 100 fl 93   MCH Latest Ref Range: 26.5 - 33.0 pg 31.6   MCHC Latest Ref Range: 31.5 - 36.5 g/dL 34.2   RDW Latest Ref Range: 10.0 - 15.0 % 15.0   Diff Method Unknown Automated Method   % Neutrophils Latest Units: % 47.1   % Lymphocytes Latest Units: % 36.4   % Monocytes Latest Units: % 10.3   % Eosinophils Latest Units: % 5.4   % Basophils Latest Units: % 0.8   Absolute Neutrophil Latest Ref Range: 1.6 - 8.3 10e9/L 2.3   Absolute Lymphocytes Latest Ref Range: 0.8 - 5.3 10e9/L 1.8   Absolute Monocytes Latest Ref Range: 0.0 - 1.3 10e9/L 0.5   Absolute Eosinophils Latest Ref Range: 0.0 - 0.7 10e9/L 0.3   Absolute Basophils Latest Ref Range: 0.0 - 0.2 10e9/L 0.0       ASSESSMENT AND PLAN:  Metastatic gastric cancer, HER-2/catrachita negative, presenting with early satiety and significant weight loss and epigastric discomfort upon eating with some vomiting.  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  There is an indeterminate right liver lobe lesion.      We started palliative chemotherapy with FOLFOX on 03/12/2017.     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 This is manifested as tingling and numbness of fingers and feet. He does be it has been a stable over the last 1 month or so. I discussed with him potentially the use of gabapentin at night but he is not interested. At  this time we will continue to observe. Hopefully with time it will improve.     Thrombocytopenia. This is due to chemotherapy and today it has improved. It is mild. It is asymptomatic. We will continue to monitor it.     Left hip pain. This is likely due to degenerative disease. He went to a chiropractor and he thinks it has really helped him. Currently he is pain free.    Minimal fatigue. I encouraged him to exercise regularly to help with his energy    Nutrition. This has been really good and he is maintaining his weight. I encouraged him to eat healthy.       I did not address the following today:  His genetic testing did not show any identifiable mutation not reveal any identifiable mutation     Return to clinic in 2 weeks as scheduled    All of his and his family's questions were answered to their satisfaction. They're agreeable and comfortable with the plan.     LANEY HERNANDEZ MD

## 2017-10-31 NOTE — MR AVS SNAPSHOT
After Visit Summary   10/31/2017    Terry Yi    MRN: [id number]           Patient Information     Date Of Birth          1965        Visit Information        Provider Department      10/31/2017 8:00 AM NURSE ONLY CANCER CENTER [facility]        Today's Diagnoses     Malignant neoplasm of overlapping sites of stomach (H)    -  1       Follow-ups after your visit        Your next 10 appointments already scheduled     Oct 31, 2017  8:45 AM CDT   Return Visit with Yakov Shaffer MD   [facility] ([facility])    [address] [place] [address]   [phone]            Oct 31, 2017  9:15 AM CDT   Level 2 with BAY 7 INFUSION   [facility] ([facility])    [address] [address] [address]   [phone]            Nov 15, 2017  9:00 AM CST   Return Visit with NURSE ONLY CANCER CENTER   [facility] ([facility])    [address] [facility] [address]   [phone]            Nov 15, 2017  9:45 AM CST   Return Visit with Yakov Shaffer MD   [facility] ([facility])    [address] [facility] [address]   [phone]            Nov 15, 2017 10:30 AM CST   Level 2 with BAY 4 INFUSION   [facility] ([facility])    [address] [place] [address]   [phone]              Who to contact     If you have questions or need follow up information about today's clinic visit or your schedule please contact [facility] directly at [phone].  Normal or non-critical lab and imaging results will be communicated to you by MyChart, letter or phone within 4 business days after the clinic has received the results. If you do not hear from us within 7 days, please contact the clinic through MyChart or phone. If you have a critical or abnormal lab  result, we will notify you by phone as soon as possible.  Submit refill requests through [employer] or call your pharmacy and they will forward the refill request to us. Please allow 3 business days for your refill to be completed.          Additional Information About Your Visit        [email or web address] Information     [employer] gives you secure access to your electronic health record. If you see a primary care provider, you can also send messages to your care team and make appointments. If you have questions, please call your primary care clinic.  If you do not have a primary care provider, please call [phone] and they will assist you.      [employer] is an electronic gateway that provides easy, online access to your medical records. With [employer], you can request a clinic appointment, read your test results, renew a prescription or communicate with your care team.     To access your existing account, please contact your [facility] or call [phone] for assistance.        Care EveryWhere ID     This is your Care EveryWhere ID. This could be used by other organizations to access your [place] medical records  [id number]         Blood Pressure from Last 3 Encounters:   10/18/17 116/72   10/04/17 119/71   09/20/17 119/75    Weight from Last 3 Encounters:   10/18/17 93 kg (205 lb)   10/04/17 93.2 kg (205 lb 8 oz)   09/20/17 91.6 kg (202 lb)              We Performed the Following     CBC with platelets differential     Comprehensive metabolic panel        Primary Care Provider Office Phone # Fax #    Sandra Dickerson [phone]       [facility]  [address]  [facility] [address]        Equal Access to Services     [facility]: Hadii aad ku hadasho Soomaali, waaxda luqadaha, qaybta kaalmada adeegyada, claire cowart . So [facility] [phone].    ATENCIÓN: Si habla español, tiene a cuevas disposición servicios gratuitos de asistencia lingüística.  Ciara ji [phone].    We comply with applicable federal civil rights laws and [place] laws. We do not discriminate on the basis of race, color, national origin, age, disability, sex, sexual orientation, or gender identity.            Thank you!     Thank you for choosing [facility]  for your care. Our goal is always to provide you with excellent care. Hearing back from our patients is one way we can continue to improve our services. Please take a few minutes to complete the written survey that you may receive in the mail after your visit with us. Thank you!             Your Updated Medication List - Protect others around you: Learn how to safely use, store and throw away your medicines at www.disposemymeds.org.          This list is accurate as of: 10/31/17  8:40 AM.  Always use your most recent med list.                   Brand Name Dispense Instructions for use Diagnosis    allopurinol 100 MG tablet    ZYLOPRIM          aspirin 81 MG chewable tablet      Take 81 mg by mouth daily        lidocaine-prilocaine cream    EMLA          LORazepam 0.5 MG tablet    ATIVAN    30 tablet    Take 1 tablet (0.5 mg) by mouth every 4 hours as needed (Anxiety, Nausea/Vomiting or Sleep)    Malignant neoplasm of overlapping sites of stomach (H)       ondansetron 8 MG tablet    ZOFRAN    10 tablet    Take 1 tablet (8 mg) by mouth every 8 hours as needed (Nausea/Vomiting)    Malignant neoplasm of overlapping sites of stomach (H)       prochlorperazine 10 MG tablet    COMPAZINE    30 tablet    Take 1 tablet (10 mg) by mouth every 6 hours as needed (Nausea/Vomiting)    Malignant neoplasm of overlapping sites of stomach (H)

## 2017-10-31 NOTE — MR AVS SNAPSHOT
After Visit Summary   10/31/2017    Terry Yi    MRN: [id number]           Patient Information     Date Of Birth          1965        Visit Information        Provider Department      10/31/2017 8:45 AM Yakov Shaffer MD [facility]        Today's Diagnoses     Malignant neoplasm of overlapping sites of stomach (H)    -  1      Care Instructions    Proceed with chemo    RTC as scheduled    Nov 29- RTC with labs and chemo and see me          Follow-ups after your visit        Your next 10 appointments already scheduled     Nov 15, 2017  9:00 AM CST   Return Visit with NURSE ONLY CANCER CENTER   [facility] ([facility])    [address] [place] [address]   [phone]            Nov 15, 2017  9:45 AM CST   Return Visit with Yakov Shaffer MD   [facility] ([facility])    [address] [place] [address]   [phone]            Nov 15, 2017 10:30 AM CST   Level 2 with BAY 4 INFUSION   [facility] ([facility])    [address] [place] [address]   [phone]            Nov 29, 2017  8:00 AM CST   Return Visit with NURSE ONLY CANCER CENTER   [facility] ([facility])    [address] [place] [address]   [phone]            Nov 29, 2017  8:45 AM CST   Return Visit with Yakov Shaffer MD   [facility] ([facility])    [address] [place] [address]   [phone]            Nov 29, 2017  9:30 AM CST   Level 2 with BAY 6 INFUSION   [facility] ([facility])    [address] [address] [address]   [phone]              Who to contact     If you have questions or need follow up information about today's clinic visit or your schedule please contact [facility] directly at  "[phone].  Normal or non-critical lab and imaging results will be communicated to you by [employer], letter or phone within 4 business days after the clinic has received the results. If you do not hear from us within 7 days, please contact the clinic through [employer] or phone. If you have a critical or abnormal lab result, we will notify you by phone as soon as possible.  Submit refill requests through [employer] or call your pharmacy and they will forward the refill request to us. Please allow 3 business days for your refill to be completed.          Additional Information About Your Visit        [employer] Information     [employer] gives you secure access to your electronic health record. If you see a primary care provider, you can also send messages to your care team and make appointments. If you have questions, please call your primary care clinic.  If you do not have a primary care provider, please call [phone] and they will assist you.      [employer] is an electronic gateway that provides easy, online access to your medical records. With [employer], you can request a clinic appointment, read your test results, renew a prescription or communicate with your care team.     To access your existing account, please contact your [facility] or call [phone] for assistance.        Care EveryWhere ID     This is your Care EveryWhere ID. This could be used by other organizations to access your [facility] medical records  [id number]        Your Vitals Were     Pulse Temperature Respirations Height Pulse Oximetry BMI (Body Mass Index)    65 98.7  F (37.1  C) 15 1.8 m (5' 10.87\") 99% 28.98 kg/m2       Blood Pressure from Last 3 Encounters:   10/31/17 137/76   10/18/17 116/72   10/04/17 119/71    Weight from Last 3 Encounters:   10/31/17 93.9 kg (207 lb)   10/18/17 93 kg (205 lb)   10/04/17 93.2 kg (205 lb 8 oz)              Today, you had the following     No orders found for display       Primary " Care Provider Office Phone # Fax #    Sandra Dickerson, SARAI [phone]       [facility]  [address]  [facility] [address]        Equal Access to Services     CHANDAN GALLO : Hadii tk ku hadsohano Soomaali, waaxda luqadaha, qaybta kaalmada adeegyada, waxtejinder leen charley june sofya browne. So [facility] [phone].    ATENCIÓN: Si habla español, tiene a cuevas disposición servicios gratuitos de asistencia lingüística. Llame al [phone].    We comply with applicable federal civil rights laws and [place] laws. We do not discriminate on the basis of race, color, national origin, age, disability, sex, sexual orientation, or gender identity.            Thank you!     Thank you for choosing [facility]  for your care. Our goal is always to provide you with excellent care. Hearing back from our patients is one way we can continue to improve our services. Please take a few minutes to complete the written survey that you may receive in the mail after your visit with us. Thank you!             Your Updated Medication List - Protect others around you: Learn how to safely use, store and throw away your medicines at www.disposemymeds.org.          This list is accurate as of: 10/31/17  9:46 AM.  Always use your most recent med list.                   Brand Name Dispense Instructions for use Diagnosis    allopurinol 100 MG tablet    ZYLOPRIM          aspirin 81 MG chewable tablet      Take 81 mg by mouth daily        lidocaine-prilocaine cream    EMLA          LORazepam 0.5 MG tablet    ATIVAN    30 tablet    Take 1 tablet (0.5 mg) by mouth every 4 hours as needed (Anxiety, Nausea/Vomiting or Sleep)    Malignant neoplasm of overlapping sites of stomach (H)       ondansetron 8 MG tablet    ZOFRAN    10 tablet    Take 1 tablet (8 mg) by mouth every 8 hours as needed (Nausea/Vomiting)    Malignant neoplasm of overlapping sites of stomach (H)       prochlorperazine 10 MG tablet    COMPAZINE    30  tablet    Take 1 tablet (10 mg) by mouth every 6 hours as needed (Nausea/Vomiting)    Malignant neoplasm of overlapping sites of stomach (H)

## 2017-10-31 NOTE — LETTER
10/31/2017         RE: Terry Yi  [address] [place] [address]        Dear Colleague,    Thank you for referring your patient, Terry Yi, to the [facility]. Please see a copy of my visit note below.    10/31/2017      REASON FOR VISIT:  Follow-up for unresectable metastatic gastric carcinoma, diffuse type, grade 3, HER-2 negative.      HISTORY OF PRESENT ILLNESS:  Please see my previous note for details.  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      CT CAP after C#6 shows stable disease      C# 9 FOLFOX 8/2/2017 ( Oxaliplatin dose reduced to 70mg/m2 due to neuropathy )  C#10 delayed for 1 week as he went on vacations to [place]  C#10 8/22/2017 ( Oxaliplatin dose reduced to 60mg/m2 due to neuropathy )  C#11 9/6/17 Only 5FU/LV  C#12 9/20/2017 5FU/LV    Repeat CT scan after C#12 shows stable to slightly improved disease.    C#13 10/4/2017 5FU/LV  C#14 10/18/2017 5FU/LV  C#15 10/31/2017 5FU/LV       INTERVAL HISTORY:   He comes in today accompanied by his family and tells me that he is doing well. His main complaint has been persistent invading and numbness of fingers and feet which he believes has been a stable over the last 1 month or so. At times he has difficulty buttoning his shirt. When he walks a few blocks and then he also notices pain in the bottom of his feet. Otherwise denies new pain. Pain in the hip has completely resolved. He went to a chiropractor and he thinks it really helped him. He is not taking any pain medications. Denies any nausea or vomiting. No diarrhea or constipation. No interval infections. He has been eating well. His energy is in decent but he is currently not exercising regularly.   He tells me that his mother was recently admitted  "to the hospital because of acute kidney injury due to dehydration. She is now doing well and is back home.      ROS:  Otherwise the rest of the comprehensive review of systems was essentially unremarkable      MEDICATIONS:   Reviewed       PHYSICAL EXAMINATION:   /76  Pulse 65  Temp 98.7  F (37.1  C)  Resp 15  Ht 1.8 m (5' 10.87\")  Wt 93.9 kg (207 lb)  SpO2 99%  BMI 28.98 kg/m2    CONSTITUTIONAL: No apparent distress  EYES: PERRLA, without pallor or jaundice  ENT/MOUTH: Ears unremarkable. No oral lesions  CVS: s1s2 normal  RESPIRATORY: Chest is clear  GI: Abdomen is benign  NEURO: He is alert and oriented ×3 Grossly non focal neuro exam apart from subjective tingling and numbness of the fingertips and the feet. Romberg's test is negative. Gait is normal  INTEGUMENT: no concerning his skin rashes. Port site looks normal  LYMPHATIC: no palpable lymphadenopathy  MUSCULOSKELETAL: Unremarkable. No bony tenderness.   EXTREMITIES: no pedal edema  PSYCH: Mentation, mood and affect are appropriate          LABS:  Reviewed     Results for LEONOR RALPH (MRN [id number]) as of 10/31/2017 09:06   Ref. Range 10/31/2017 08:30   Sodium Latest Ref Range: 133 - 144 mmol/L 140   Potassium Latest Ref Range: 3.4 - 5.3 mmol/L 4.5   Chloride Latest Ref Range: 94 - 109 mmol/L 107   Carbon Dioxide Latest Ref Range: 20 - 32 mmol/L 28   Urea Nitrogen Latest Ref Range: 7 - 30 mg/dL 17   Creatinine Latest Ref Range: 0.66 - 1.25 mg/dL 0.85   GFR Estimate Latest Ref Range: >60 mL/min/1.7m2 >90   GFR Estimate If Black Latest Ref Range: >60 mL/min/1.7m2 >90   Calcium Latest Ref Range: 8.5 - 10.1 mg/dL 8.9   Anion Gap Latest Ref Range: 3 - 14 mmol/L 5   Albumin Latest Ref Range: 3.4 - 5.0 g/dL 3.6   Protein Total Latest Ref Range: 6.8 - 8.8 g/dL 6.8   Bilirubin Total Latest Ref Range: 0.2 - 1.3 mg/dL 0.3   Alkaline Phosphatase Latest Ref Range: 40 - 150 U/L 78   ALT Latest Ref Range: 0 - 70 U/L 66   AST Latest Ref Range: 0 - 45 U/L 39 "   Glucose Latest Ref Range: 70 - 99 mg/dL 93   WBC Latest Ref Range: 4.0 - 11.0 10e9/L 4.8   Hemoglobin Latest Ref Range: 13.3 - 17.7 g/dL 13.1 (L)   Hematocrit Latest Ref Range: 40.0 - 53.0 % 38.3 (L)   Platelet Count Latest Ref Range: 150 - 450 10e9/L 143 (L)   RBC Count Latest Ref Range: 4.4 - 5.9 10e12/L 4.14 (L)   MCV Latest Ref Range: 78 - 100 fl 93   MCH Latest Ref Range: 26.5 - 33.0 pg 31.6   MCHC Latest Ref Range: 31.5 - 36.5 g/dL 34.2   RDW Latest Ref Range: 10.0 - 15.0 % 15.0   Diff Method Unknown Automated Method   % Neutrophils Latest Units: % 47.1   % Lymphocytes Latest Units: % 36.4   % Monocytes Latest Units: % 10.3   % Eosinophils Latest Units: % 5.4   % Basophils Latest Units: % 0.8   Absolute Neutrophil Latest Ref Range: 1.6 - 8.3 10e9/L 2.3   Absolute Lymphocytes Latest Ref Range: 0.8 - 5.3 10e9/L 1.8   Absolute Monocytes Latest Ref Range: 0.0 - 1.3 10e9/L 0.5   Absolute Eosinophils Latest Ref Range: 0.0 - 0.7 10e9/L 0.3   Absolute Basophils Latest Ref Range: 0.0 - 0.2 10e9/L 0.0       ASSESSMENT AND PLAN:  Metastatic gastric cancer, HER-2/catrachita negative, presenting with early satiety and significant weight loss and epigastric discomfort upon eating with some vomiting.  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  There is an indeterminate right liver lobe lesion.      We started palliative chemotherapy with FOLFOX on 03/12/2017.     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 This is manifested as tingling and numbness of fingers and feet. He does be it has been a stable over the last 1 month or so. I discussed with him potentially the use of gabapentin at night but he is not interested. At this time we will continue to observe. Hopefully with time it will improve.     Thrombocytopenia. This is due to chemotherapy and today it has improved. It is mild. It is asymptomatic. We will continue to monitor it.     Left hip pain. This is likely due to degenerative disease. He went to a chiropractor and he thinks it has really helped him. Currently he is pain free.    Minimal fatigue. I encouraged him to exercise regularly to help with his energy    Nutrition. This has been really good and he is maintaining his weight. I encouraged him to eat healthy.       I did not address the following today:  His genetic testing did not show any identifiable mutation not reveal any identifiable mutation     Return to clinic in 2 weeks as scheduled    All of his and his family's questions were answered to their satisfaction. They're agreeable and comfortable with the plan.     LANEY SHAFFER MD              Again, thank you for allowing me to participate in the care of your patient.        Sincerely,        Laney Shaffer MD

## 2017-10-31 NOTE — PROGRESS NOTES
Port accessed , labs drawn per protocol, port heparin flushed per protocol.      Cheryl Osorio RN, OCN

## 2017-10-31 NOTE — NURSING NOTE
"Oncology Rooming Note    October 31, 2017 8:35 AM   Terry Yi is a 51 year old male who presents for:    Chief Complaint   Patient presents with     Oncology Clinic Visit     prior to tx     Initial Vitals: /76  Pulse 65  Temp 98.7  F (37.1  C)  Resp 15  Ht 1.8 m (5' 10.87\")  Wt 93.9 kg (207 lb)  SpO2 99%  BMI 28.98 kg/m2 Estimated body mass index is 28.98 kg/(m^2) as calculated from the following:    Height as of this encounter: 1.8 m (5' 10.87\").    Weight as of this encounter: 93.9 kg (207 lb). Body surface area is 2.17 meters squared.  No Pain (0) Comment: Data Unavailable   No LMP for male patient.  Allergies reviewed: Yes  Medications reviewed: Yes    Medications: Medication refills not needed today.  Pharmacy name entered into EPIC: Data Unavailable        5 minutes for nursing intake (face to face time)     Katja Lopez LPN              "

## 2017-10-31 NOTE — MR AVS SNAPSHOT
After Visit Summary   10/31/2017    Terry Yi    MRN: [id number]           Patient Information     Date Of Birth          1965        Visit Information        Provider Department      10/31/2017 9:15 AM BAY 7 INFUSION [facility]        Today's Diagnoses     Malignant neoplasm of overlapping sites of stomach (H)    -  1       Follow-ups after your visit        Your next 10 appointments already scheduled     Nov 15, 2017  9:00 AM CST   Return Visit with NURSE ONLY CANCER CENTER   [facility] ([facility])    [address] [facility] [address] [phone]            Nov 15, 2017  9:45 AM CST   Return Visit with Yakov Shaffer MD   [facility] ([facility])    [address] [facility] [address] [phone]            Nov 15, 2017 10:30 AM CST   Level 2 with BAY 4 INFUSION   [facility] ([facility])    [address] [facility] [address] [phone]            Nov 29, 2017  8:00 AM CST   Return Visit with NURSE ONLY CANCER CENTER   [facility] ([facility])    [address] [facility] [address] [phone]            Nov 29, 2017  8:45 AM CST   Return Visit with Yakov Shaffer MD   [facility])    [address] [facility] [address] [phone]            Nov 29, 2017  9:30 AM CST   Level 2 with BAY 6 INFUSION   [facility] ([facility])    [address] [facility] [address] [phone]              Who to contact     If you have questions or need follow up information about today's clinic visit or your schedule please contact [facility] directly at [phone].  Normal or non-critical lab and imaging results will be communicated to you by MyChart, letter or phone  within 4 business days after the clinic has received the results. If you do not hear from us within 7 days, please contact the clinic through [employer] or phone. If you have a critical or abnormal lab result, we will notify you by phone as soon as possible.  Submit refill requests through [employer] or call your pharmacy and they will forward the refill request to us. Please allow 3 business days for your refill to be completed.          Additional Information About Your Visit        [employer] Information     [employer] gives you secure access to your electronic health record. If you see a primary care provider, you can also send messages to your care team and make appointments. If you have questions, please call your primary care clinic.  If you do not have a primary care provider, please call [phone] and they will assist you.      [employer] is an electronic gateway that provides easy, online access to your medical records. With [employer], you can request a clinic appointment, read your test results, renew a prescription or communicate with your care team.     To access your existing account, please contact your [facility] or call [phone] for assistance.        Care EveryWhere ID     This is your Care EveryWhere ID. This could be used by other organizations to access your [facility] medical records  [id number]         Blood Pressure from Last 3 Encounters:   10/31/17 137/76   10/18/17 116/72   10/04/17 119/71    Weight from Last 3 Encounters:   10/31/17 93.9 kg (207 lb)   10/18/17 93 kg (205 lb)   10/04/17 93.2 kg (205 lb 8 oz)              Today, you had the following     No orders found for display       Primary Care Provider Office Phone # Fax #    Sandra Dickerson [phone]       [facility]  [address]  [facility] [address]        Equal Access to Services     CHANDAN GALLO : matt Henao, claire whitmore  violeta zapienbret la'aan ah. So [facility] [phone].    ATENCIÓN: Si arabella luke, tiene a cuevas disposición servicios gratuitos de asistencia lingüística. Ciara al [phone].    We comply with applicable federal civil rights laws and [place] laws. We do not discriminate on the basis of race, color, national origin, age, disability, sex, sexual orientation, or gender identity.            Thank you!     Thank you for choosing [facility]  for your care. Our goal is always to provide you with excellent care. Hearing back from our patients is one way we can continue to improve our services. Please take a few minutes to complete the written survey that you may receive in the mail after your visit with us. Thank you!             Your Updated Medication List - Protect others around you: Learn how to safely use, store and throw away your medicines at www.disposemymeds.org.          This list is accurate as of: 10/31/17 11:59 PM.  Always use your most recent med list.                   Brand Name Dispense Instructions for use Diagnosis    allopurinol 100 MG tablet    ZYLOPRIM          aspirin 81 MG chewable tablet      Take 81 mg by mouth daily        lidocaine-prilocaine cream    EMLA          LORazepam 0.5 MG tablet    ATIVAN    30 tablet    Take 1 tablet (0.5 mg) by mouth every 4 hours as needed (Anxiety, Nausea/Vomiting or Sleep)    Malignant neoplasm of overlapping sites of stomach (H)       ondansetron 8 MG tablet    ZOFRAN    10 tablet    Take 1 tablet (8 mg) by mouth every 8 hours as needed (Nausea/Vomiting)    Malignant neoplasm of overlapping sites of stomach (H)       prochlorperazine 10 MG tablet    COMPAZINE    30 tablet    Take 1 tablet (10 mg) by mouth every 6 hours as needed (Nausea/Vomiting)    Malignant neoplasm of overlapping sites of stomach (H)

## 2017-11-10 NOTE — PROGRESS NOTES
Infusion Nursing Note:  Terry Yi presents today for LV/5FU.    Patient seen by provider today: Yes: MD   present during visit today: Not Applicable.    Note: N/A.    Intravenous Access:  Implanted Port.    Treatment Conditions:  Results reviewed, labs MET treatment parameters, ok to proceed with treatment.      Post Infusion Assessment:  Patient tolerated infusion without incident.    Discharge Plan:   Pt to disconnect at home clinic/infusion ctr.    GREY MARCELO RN

## 2017-11-15 ENCOUNTER — ONCOLOGY VISIT (OUTPATIENT)
Dept: ONCOLOGY | Facility: CLINIC | Age: 52
End: 2017-11-15
Payer: COMMERCIAL

## 2017-11-15 ENCOUNTER — CARE COORDINATION (OUTPATIENT)
Dept: ONCOLOGY | Facility: CLINIC | Age: 52
End: 2017-11-15

## 2017-11-15 ENCOUNTER — INFUSION THERAPY VISIT (OUTPATIENT)
Dept: INFUSION THERAPY | Facility: CLINIC | Age: 52
End: 2017-11-15
Payer: COMMERCIAL

## 2017-11-15 VITALS
HEART RATE: 64 BPM | OXYGEN SATURATION: 97 % | BODY MASS INDEX: 29.26 KG/M2 | TEMPERATURE: 97.9 F | DIASTOLIC BLOOD PRESSURE: 81 MMHG | WEIGHT: 209 LBS | SYSTOLIC BLOOD PRESSURE: 128 MMHG

## 2017-11-15 DIAGNOSIS — G62.0 CHEMOTHERAPY-INDUCED PERIPHERAL NEUROPATHY (H): ICD-10-CM

## 2017-11-15 DIAGNOSIS — C16.8 MALIGNANT NEOPLASM OF OVERLAPPING SITES OF STOMACH (H): Primary | ICD-10-CM

## 2017-11-15 DIAGNOSIS — T45.1X5A CHEMOTHERAPY-INDUCED PERIPHERAL NEUROPATHY (H): ICD-10-CM

## 2017-11-15 LAB
ALBUMIN SERPL-MCNC: 2 G/DL (ref 3.4–5)
ALBUMIN SERPL-MCNC: 3.9 G/DL (ref 3.4–5)
ALP SERPL-CCNC: 41 U/L (ref 40–150)
ALP SERPL-CCNC: 82 U/L (ref 40–150)
ALT SERPL W P-5'-P-CCNC: 28 U/L (ref 0–70)
ALT SERPL W P-5'-P-CCNC: 53 U/L (ref 0–70)
ANION GAP SERPL CALCULATED.3IONS-SCNC: 4 MMOL/L (ref 3–14)
ANION GAP SERPL CALCULATED.3IONS-SCNC: 7 MMOL/L (ref 3–14)
AST SERPL W P-5'-P-CCNC: 15 U/L (ref 0–45)
AST SERPL W P-5'-P-CCNC: 31 U/L (ref 0–45)
BASOPHILS # BLD AUTO: 0 10E9/L (ref 0–0.2)
BASOPHILS NFR BLD AUTO: 0.5 %
BILIRUB SERPL-MCNC: 0.2 MG/DL (ref 0.2–1.3)
BILIRUB SERPL-MCNC: 0.3 MG/DL (ref 0.2–1.3)
BUN SERPL-MCNC: 12 MG/DL (ref 7–30)
BUN SERPL-MCNC: 19 MG/DL (ref 7–30)
CALCIUM SERPL-MCNC: 5.3 MG/DL (ref 8.5–10.1)
CALCIUM SERPL-MCNC: 9.1 MG/DL (ref 8.5–10.1)
CHLORIDE SERPL-SCNC: 106 MMOL/L (ref 94–109)
CHLORIDE SERPL-SCNC: 125 MMOL/L (ref 94–109)
CO2 SERPL-SCNC: 18 MMOL/L (ref 20–32)
CO2 SERPL-SCNC: 29 MMOL/L (ref 20–32)
CREAT SERPL-MCNC: 0.56 MG/DL (ref 0.66–1.25)
CREAT SERPL-MCNC: 0.94 MG/DL (ref 0.66–1.25)
DIFFERENTIAL METHOD BLD: ABNORMAL
EOSINOPHIL # BLD AUTO: 0.1 10E9/L (ref 0–0.7)
EOSINOPHIL NFR BLD AUTO: 2.7 %
ERYTHROCYTE [DISTWIDTH] IN BLOOD BY AUTOMATED COUNT: 15 % (ref 10–15)
ERYTHROCYTE [DISTWIDTH] IN BLOOD BY AUTOMATED COUNT: 15.1 % (ref 10–15)
GFR SERPL CREATININE-BSD FRML MDRD: 84 ML/MIN/1.7M2
GFR SERPL CREATININE-BSD FRML MDRD: >90 ML/MIN/1.7M2
GLUCOSE SERPL-MCNC: 54 MG/DL (ref 70–99)
GLUCOSE SERPL-MCNC: 86 MG/DL (ref 70–99)
HCT VFR BLD AUTO: 24.2 % (ref 40–53)
HCT VFR BLD AUTO: 38.7 % (ref 40–53)
HGB BLD-MCNC: 13.4 G/DL (ref 13.3–17.7)
HGB BLD-MCNC: 8.2 G/DL (ref 13.3–17.7)
LYMPHOCYTES # BLD AUTO: 1.1 10E9/L (ref 0.8–5.3)
LYMPHOCYTES NFR BLD AUTO: 27.3 %
MCH RBC QN AUTO: 31.4 PG (ref 26.5–33)
MCH RBC QN AUTO: 32.1 PG (ref 26.5–33)
MCHC RBC AUTO-ENTMCNC: 33.9 G/DL (ref 31.5–36.5)
MCHC RBC AUTO-ENTMCNC: 34.6 G/DL (ref 31.5–36.5)
MCV RBC AUTO: 93 FL (ref 78–100)
MCV RBC AUTO: 93 FL (ref 78–100)
MONOCYTES # BLD AUTO: 0.4 10E9/L (ref 0–1.3)
MONOCYTES NFR BLD AUTO: 10.7 %
NEUTROPHILS # BLD AUTO: 2.4 10E9/L (ref 1.6–8.3)
NEUTROPHILS NFR BLD AUTO: 58.8 %
PLATELET # BLD AUTO: 126 10E9/L (ref 150–450)
PLATELET # BLD AUTO: 72 10E9/L (ref 150–450)
POTASSIUM SERPL-SCNC: 2.5 MMOL/L (ref 3.4–5.3)
POTASSIUM SERPL-SCNC: 4.3 MMOL/L (ref 3.4–5.3)
PROT SERPL-MCNC: 3.5 G/DL (ref 6.8–8.8)
PROT SERPL-MCNC: 7 G/DL (ref 6.8–8.8)
RBC # BLD AUTO: 2.61 10E12/L (ref 4.4–5.9)
RBC # BLD AUTO: 4.18 10E12/L (ref 4.4–5.9)
SODIUM SERPL-SCNC: 139 MMOL/L (ref 133–144)
SODIUM SERPL-SCNC: 150 MMOL/L (ref 133–144)
WBC # BLD AUTO: 4 10E9/L (ref 4–11)
WBC # BLD AUTO: 7.1 10E9/L (ref 4–11)

## 2017-11-15 PROCEDURE — 80053 COMPREHEN METABOLIC PANEL: CPT | Performed by: INTERNAL MEDICINE

## 2017-11-15 PROCEDURE — 99215 OFFICE O/P EST HI 40 MIN: CPT | Mod: 25 | Performed by: INTERNAL MEDICINE

## 2017-11-15 PROCEDURE — 96409 CHEMO IV PUSH SNGL DRUG: CPT | Performed by: INTERNAL MEDICINE

## 2017-11-15 PROCEDURE — 96367 TX/PROPH/DG ADDL SEQ IV INF: CPT | Performed by: INTERNAL MEDICINE

## 2017-11-15 PROCEDURE — 96375 TX/PRO/DX INJ NEW DRUG ADDON: CPT | Performed by: INTERNAL MEDICINE

## 2017-11-15 PROCEDURE — 96416 CHEMO PROLONG INFUSE W/PUMP: CPT | Performed by: INTERNAL MEDICINE

## 2017-11-15 PROCEDURE — 85025 COMPLETE CBC W/AUTO DIFF WBC: CPT | Performed by: INTERNAL MEDICINE

## 2017-11-15 PROCEDURE — 99207 ZZC NO CHARGE LOS: CPT

## 2017-11-15 RX ORDER — FLUOROURACIL 50 MG/ML
400 INJECTION, SOLUTION INTRAVENOUS ONCE
Status: CANCELLED | OUTPATIENT
Start: 2017-11-15

## 2017-11-15 RX ORDER — METHYLPREDNISOLONE SODIUM SUCCINATE 125 MG/2ML
125 INJECTION, POWDER, LYOPHILIZED, FOR SOLUTION INTRAMUSCULAR; INTRAVENOUS
Status: CANCELLED
Start: 2017-11-15

## 2017-11-15 RX ORDER — DIPHENHYDRAMINE HYDROCHLORIDE 50 MG/ML
50 INJECTION INTRAMUSCULAR; INTRAVENOUS
Status: CANCELLED
Start: 2017-11-15

## 2017-11-15 RX ORDER — EPINEPHRINE 0.3 MG/.3ML
0.3 INJECTION SUBCUTANEOUS EVERY 5 MIN PRN
Status: CANCELLED | OUTPATIENT
Start: 2017-11-15

## 2017-11-15 RX ORDER — ALBUTEROL SULFATE 90 UG/1
1-2 AEROSOL, METERED RESPIRATORY (INHALATION)
Status: CANCELLED
Start: 2017-11-15

## 2017-11-15 RX ORDER — ALBUTEROL SULFATE 0.83 MG/ML
2.5 SOLUTION RESPIRATORY (INHALATION)
Status: CANCELLED | OUTPATIENT
Start: 2017-11-15

## 2017-11-15 RX ORDER — EPINEPHRINE 1 MG/ML
0.3 INJECTION, SOLUTION INTRAMUSCULAR; SUBCUTANEOUS EVERY 5 MIN PRN
Status: CANCELLED | OUTPATIENT
Start: 2017-11-15

## 2017-11-15 RX ORDER — DULOXETIN HYDROCHLORIDE 30 MG/1
30 CAPSULE, DELAYED RELEASE ORAL DAILY
Qty: 60 CAPSULE | Refills: 1 | Status: SHIPPED | OUTPATIENT
Start: 2017-11-15 | End: 2017-12-13

## 2017-11-15 RX ORDER — SODIUM CHLORIDE 9 MG/ML
1000 INJECTION, SOLUTION INTRAVENOUS CONTINUOUS PRN
Status: CANCELLED
Start: 2017-11-15

## 2017-11-15 RX ORDER — MEPERIDINE HYDROCHLORIDE 50 MG/ML
25 INJECTION INTRAMUSCULAR; INTRAVENOUS; SUBCUTANEOUS EVERY 30 MIN PRN
Status: CANCELLED | OUTPATIENT
Start: 2017-11-15

## 2017-11-15 RX ORDER — LORAZEPAM 2 MG/ML
0.5 INJECTION INTRAMUSCULAR EVERY 4 HOURS PRN
Status: CANCELLED
Start: 2017-11-15

## 2017-11-15 RX ORDER — FLUOROURACIL 50 MG/ML
400 INJECTION, SOLUTION INTRAVENOUS ONCE
Status: COMPLETED | OUTPATIENT
Start: 2017-11-15 | End: 2017-11-15

## 2017-11-15 RX ORDER — HEPARIN SODIUM (PORCINE) LOCK FLUSH IV SOLN 100 UNIT/ML 100 UNIT/ML
5 SOLUTION INTRAVENOUS
Status: DISCONTINUED | OUTPATIENT
Start: 2017-11-15 | End: 2017-11-15 | Stop reason: HOSPADM

## 2017-11-15 RX ADMIN — HEPARIN SODIUM (PORCINE) LOCK FLUSH IV SOLN 100 UNIT/ML 5 ML: 100 SOLUTION at 10:30

## 2017-11-15 RX ADMIN — FLUOROURACIL 830 MG: 50 INJECTION, SOLUTION INTRAVENOUS at 11:44

## 2017-11-15 ASSESSMENT — PAIN SCALES - GENERAL: PAINLEVEL: SEVERE PAIN (7)

## 2017-11-15 NOTE — NURSING NOTE
"Oncology Rooming Note    November 15, 2017 10:02 AM   Terry Yi is a 51 year old male who presents for:    Chief Complaint   Patient presents with     Oncology Clinic Visit     Initial Vitals: /81 (BP Location: Right arm, Cuff Size: Adult Regular)  Pulse 64  Temp 97.9  F (36.6  C) (Oral)  Wt 94.8 kg (209 lb)  SpO2 97%  BMI 29.26 kg/m2 Estimated body mass index is 29.26 kg/(m^2) as calculated from the following:    Height as of 10/31/17: 1.8 m (5' 10.87\").    Weight as of this encounter: 94.8 kg (209 lb). Body surface area is 2.18 meters squared.  Severe Pain (7) Comment: pain started today.  neuropathy in hands and feet   No LMP for male patient.  Allergies reviewed: Yes  Medications reviewed: Yes    Medications: Medication refills not needed today.  Pharmacy name entered into EPIC: Data Unavailable    Clinical concerns: neuropathy Dr. Shaffer was notified.    5 minutes for nursing intake (face to face time)     Marcos Corley RN              "

## 2017-11-15 NOTE — LETTER
11/15/2017         RE: Terry Yi  [address] [place] [address]        Dear Colleague,    Thank you for referring your patient, Terry Yi, to the [facility]. Please see a copy of my visit note below.    11/15/2017      REASON FOR VISIT:  Follow-up for unresectable metastatic gastric carcinoma, diffuse type, grade 3, HER-2 negative.      HISTORY OF PRESENT ILLNESS:  Please see my previous note for details.  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      CT CAP after C#6 shows stable disease      C# 9 FOLFOX 8/2/2017 ( Oxaliplatin dose reduced to 70mg/m2 due to neuropathy )  C#10 delayed for 1 week as he went on vacations to [place]  C#10 8/22/2017 ( Oxaliplatin dose reduced to 60mg/m2 due to neuropathy )  C#11 9/6/17 Only 5FU/LV  C#12 9/20/2017 5FU/LV    Repeat CT scan after C#12 shows stable to slightly improved disease.    C#13 10/4/2017 5FU/LV  C#14 10/18/2017 5FU/LV  C#15 10/31/2017 5FU/LV  C#16 11/15/2017 5FU/LV     INTERVAL HISTORY:   He is accompanied by his family today. He tells me that he has been doing really well. Denies any pain. No nausea or vomiting. No diarrhea or constipation. No black stools or blood in the stools. His energy is decent and he continues to work full-time. He's been able to eat and drink well. His weight has remained stable. Denies any fevers or infections. Denies any lightheadedness or dizziness. His main complaint has been ongoing numbness and tingling of the fingers and feet. He does not think it is much worse than last time although it is certainly not better. Previously I had discussed with him potential use of medications to help with neuropathy but at that time he was not interested but today he is more interested in  trying something. Denies any difficulty breathing or respiratory symptoms. He said he probably had mild trauma to the left ankle so now it feels a little sore and somewhat swollen      ROS:  I performed a comprehensive review of the system and other than what is mentioned above essentially the rest of it is unremarkable    MEDICATIONS:   Reviewed       PHYSICAL EXAMINATION:   /81 (BP Location: Right arm, Cuff Size: Adult Regular)  Pulse 64  Temp 97.9  F (36.6  C) (Oral)  Wt 94.8 kg (209 lb)  SpO2 97%  BMI 29.26 kg/m2    CONSTITUTIONAL: no acute distress. He is in his usual jolly mood  EYES: PERRLA, no palor or icterus.   ENT/MOUTH: no mouth lesions. Ears normal  CVS: s1s2 no m r g .   RESPIRATORY: clear to auscultation b/l  GI: soft non tender no hepatosplenomegaly  NEURO: AAOX3  . He has subjective tingling and numbness of the fingertips and the feet.  INTEGUMENT: no obvious rashes  LYMPHATIC: no palpable cervical, supraclavicular, axillary or inguinal LAD  MUSCULOSKELETAL: Mild left ankle tenderness with minimal swelling but no signs of erythema or infection. He has good range of motion   EXTREMITIES: no edema  PSYCH: Mentation, mood and affect are normal. Decision making capacity is intact      LABS:   Reviewed. Initially the labs that are done are very abnormal and he is feeling fine so I repeated the blood work as I believe that there was some error with the first set of labs    Repeat CBC is stable  Repeat comprehensive metabolic panel      ASSESSMENT AND PLAN:  Metastatic gastric cancer, HER-2/catrachita negative, presenting with early satiety and significant weight loss and epigastric discomfort upon eating with some vomiting.  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  There is an indeterminate right liver lobe lesion.      We started palliative chemotherapy with FOLFOX on 03/12/2017.     He is receiving  5-FU/leucovorin from cycle #11 onwards as the have dropped oxaliplatin due to worsening neuropathy after 10 cycles    Scans after cycle #12 show stable to slightly improved tumor burden    My plan is to proceed with cycle #16 today.    Will repeat imaging before C#19    I have also requested that MSI testing be performed on his tumor specimen to see if he would be eligible for immunotherapy in the future.      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 Although it is a stable but it is his main complaint. He is having numbness and tingling in the fingers and feet. He would like to try something and I recommend that we try Cymbalta. I discussed this rationale as well as potential side effects including potential of causing depression and even suicidal ideation. He understands that but he wants to try it. We will start at a low dose 30 mg daily and see if it helps him.     Thrombocytopenia. This is a stable. We will continue to observe it. He does not have any bleeding.     Initial blood work shows serious electrolyte disturbances as well as significant cytopenias. As he is feeling well I have requested repeat blood work as I think that the initial lab had some error. On repeat testing the CBC is stable. Comprehensive metabolic panel is also unremarkable    The following was not addressed today    Left hip pain. This is likely due to degenerative disease. He went to a chiropractor and he thinks it has really helped him. Currently he is pain free.    Minimal fatigue. I encouraged him to exercise regularly to help with his energy    Nutrition. This has been really good and he is maintaining his weight. I encouraged him to eat healthy.       His genetic testing did not show any identifiable mutation not reveal any identifiable mutation     Return to clinic in 2 weeks with labs and next chemo     All of his and his family's questions were answered to their satisfaction. They're agreeable and comfortable with the plan.     LANEY  CARTER SHAFFER MD              Again, thank you for allowing me to participate in the care of your patient.        Sincerely,        Yakov Shaffer MD

## 2017-11-15 NOTE — MR AVS SNAPSHOT
After Visit Summary   11/15/2017    Terry Yi    MRN: [id number]           Patient Information     Date Of Birth          1965        Visit Information        Provider Department      11/15/2017 10:30 AM BAY [address] [facility]        Today's Diagnoses     Malignant neoplasm of overlapping sites of stomach (H)    -  1       Follow-ups after your visit        Your next 10 appointments already scheduled     Nov 29, 2017  8:00 AM CST   Return Visit with [facility]   [facility] ([facility])    [address] [facility] [address]   [phone]            Nov 29, 2017  8:45 AM CST   Return Visit with Yakov Shaffer MD   [facility] ([facility])    [address] [facility] [address]   [phone]            Nov 29, 2017  9:30 AM CST   Level 2 with BAY 6 INFUSION   [facility] ([facility])    [address] [facility] [address]   [phone]            Dec 13, 2017  9:00 AM CST   Return Visit with [facility]   [facility] ([facility])    [address] [facility] [address]   [phone]            Dec 13, 2017  9:45 AM CST   Return Visit with Yakov Shaffer MD   [facility] ([facility])    [address] [facility] [address]   [phone]            Dec 13, 2017 11:30 AM CST   Level 2 with BAY 2 INFUSION   [facility] ([facility])    [address] [facility] [address]   [phone]            Dec 27, 2017  7:30 AM CST   Return Visit with [facility]   [facility] ([facility])    [address] [facility] [address]   [phone]            Dec 27, 2017  8:15 AM CST   Return Visit with Yakov Shaffer MD   [facility]  [facility] ([facility])    [address] [place] [address] [phone]            Dec 27, 2017  9:00 AM CST   Level 2 with BAY 3 INFUSION   [facility]    [address] [place] [address] [phone]              Who to contact     If you have questions or need follow up information about today's clinic visit or your schedule please contact [facility] directly at [phone].  Normal or non-critical lab and imaging results will be communicated to you by [employer], letter or phone within 4 business days after the clinic has received the results. If you do not hear from us within 7 days, please contact the clinic through [employer] or phone. If you have a critical or abnormal lab result, we will notify you by phone as soon as possible.  Submit refill requests through [employer] or call your pharmacy and they will forward the refill request to us. Please allow 3 business days for your refill to be completed.          Additional Information About Your Visit        [employer] Information     [employer] gives you secure access to your electronic health record. If you see a primary care provider, you can also send messages to your care team and make appointments. If you have questions, please call your primary care clinic.  If you do not have a primary care provider, please call [phone] and they will assist you.      [employer] is an electronic gateway that provides easy, online access to your medical records. With [employer], you can request a clinic appointment, read your test results, renew a prescription or communicate with your care team.     To access your existing account, please contact your [facility] or call [phone] for assistance.        Care EveryWhere ID     This is your Care EveryWhere ID. This could be used by other organizations to access your [facility]  records  [id number]         Blood Pressure from Last 3 Encounters:   11/15/17 128/81   10/31/17 137/76   10/18/17 116/72    Weight from Last 3 Encounters:   11/15/17 94.8 kg (209 lb)   10/31/17 93.9 kg (207 lb)   10/18/17 93 kg (205 lb)              Today, you had the following     No orders found for display         Today's Medication Changes          These changes are accurate as of: 11/15/17  3:28 PM.  If you have any questions, ask your nurse or doctor.               Start taking these medicines.        Dose/Directions    DULoxetine 30 MG EC capsule   Commonly known as:  CYMBALTA   Used for:  Malignant neoplasm of overlapping sites of stomach (H), Chemotherapy-induced peripheral neuropathy (H)   Started by:  Yakov Shaffer MD        Dose:  30 mg   Take 1 capsule (30 mg) by mouth daily   Quantity:  60 capsule   Refills:  1            Where to get your medicines      These medications were sent to [facility] - [place], [place] - [address], [address]  [address], [facility] [address]     Phone:  [phone]     DULoxetine 30 MG EC capsule                Primary Care Provider Office Phone # Fax #    Sandra Dickerson [phone]       [facility]  [address]  [facility] [address]        Equal Access to Services     [facility] AH: Hadii aad ku hadasho Soomaali, waaxda luqadaha, qaybta kaalmada adeegyada, claire cowart . So [facility] [phone].    ATENCIÓN: Si habla español, tiene a cuevas disposición servicios gratuitos de asistencia lingüística. Llame al [phone].    We comply with applicable federal civil rights laws and [place] laws. We do not discriminate on the basis of race, color, national origin, age, disability, sex, sexual orientation, or gender identity.            Thank you!     Thank you for choosing [facility]  for your care. Our goal is always to provide you with excellent care.  Hearing back from our patients is one way we can continue to improve our services. Please take a few minutes to complete the written survey that you may receive in the mail after your visit with us. Thank you!             Your Updated Medication List - Protect others around you: Learn how to safely use, store and throw away your medicines at www.disposemymeds.org.          This list is accurate as of: 11/15/17  3:28 PM.  Always use your most recent med list.                   Brand Name Dispense Instructions for use Diagnosis    allopurinol 100 MG tablet    ZYLOPRIM          aspirin 81 MG chewable tablet      Take 81 mg by mouth daily        DULoxetine 30 MG EC capsule    CYMBALTA    60 capsule    Take 1 capsule (30 mg) by mouth daily    Malignant neoplasm of overlapping sites of stomach (H), Chemotherapy-induced peripheral neuropathy (H)       lidocaine-prilocaine cream    EMLA          LORazepam 0.5 MG tablet    ATIVAN    30 tablet    Take 1 tablet (0.5 mg) by mouth every 4 hours as needed (Anxiety, Nausea/Vomiting or Sleep)    Malignant neoplasm of overlapping sites of stomach (H)       ondansetron 8 MG tablet    ZOFRAN    10 tablet    Take 1 tablet (8 mg) by mouth every 8 hours as needed (Nausea/Vomiting)    Malignant neoplasm of overlapping sites of stomach (H)       prochlorperazine 10 MG tablet    COMPAZINE    30 tablet    Take 1 tablet (10 mg) by mouth every 6 hours as needed (Nausea/Vomiting)    Malignant neoplasm of overlapping sites of stomach (H)

## 2017-11-15 NOTE — PROGRESS NOTES
"Patient's name and  were verified.  See Doc Flowsheet - IV assess for details.  IVAD accessed with 20G 3/4\" maatmoros gripper plus needle  blood return positive: YES  Site without redness, tenderness or swelling: YES  flushed with 20cc NS and 5cc 100u/ml heparin  Needle: left in place  Comments: Labs drawn.  Patient tolerated procedure without incident.                        " 5

## 2017-11-15 NOTE — PROGRESS NOTES
11/15/2017      REASON FOR VISIT:  Follow-up for unresectable metastatic gastric carcinoma, diffuse type, grade 3, HER-2 negative.      HISTORY OF PRESENT ILLNESS:  Please see my previous note for details.  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      CT CAP after C#6 shows stable disease      C# 9 FOLFOX 8/2/2017 ( Oxaliplatin dose reduced to 70mg/m2 due to neuropathy )  C#10 delayed for 1 week as he went on vacations to [place]  C#10 8/22/2017 ( Oxaliplatin dose reduced to 60mg/m2 due to neuropathy )  C#11 9/6/17 Only 5FU/LV  C#12 9/20/2017 5FU/LV    Repeat CT scan after C#12 shows stable to slightly improved disease.    C#13 10/4/2017 5FU/LV  C#14 10/18/2017 5FU/LV  C#15 10/31/2017 5FU/LV  C#16 11/15/2017 5FU/LV     INTERVAL HISTORY:   He is accompanied by his family today. He tells me that he has been doing really well. Denies any pain. No nausea or vomiting. No diarrhea or constipation. No black stools or blood in the stools. His energy is decent and he continues to work full-time. He's been able to eat and drink well. His weight has remained stable. Denies any fevers or infections. Denies any lightheadedness or dizziness. His main complaint has been ongoing numbness and tingling of the fingers and feet. He does not think it is much worse than last time although it is certainly not better. Previously I had discussed with him potential use of medications to help with neuropathy but at that time he was not interested but today he is more interested in trying something. Denies any difficulty breathing or respiratory symptoms. He said he probably had mild trauma to the left ankle so now it feels a little sore and somewhat swollen      ROS:  I performed a comprehensive review of the system and other than  what is mentioned above essentially the rest of it is unremarkable    MEDICATIONS:   Reviewed       PHYSICAL EXAMINATION:   /81 (BP Location: Right arm, Cuff Size: Adult Regular)  Pulse 64  Temp 97.9  F (36.6  C) (Oral)  Wt 94.8 kg (209 lb)  SpO2 97%  BMI 29.26 kg/m2    CONSTITUTIONAL: no acute distress. He is in his usual jolly mood  EYES: PERRLA, no palor or icterus.   ENT/MOUTH: no mouth lesions. Ears normal  CVS: s1s2 no m r g .   RESPIRATORY: clear to auscultation b/l  GI: soft non tender no hepatosplenomegaly  NEURO: AAOX3  . He has subjective tingling and numbness of the fingertips and the feet.  INTEGUMENT: no obvious rashes  LYMPHATIC: no palpable cervical, supraclavicular, axillary or inguinal LAD  MUSCULOSKELETAL: Mild left ankle tenderness with minimal swelling but no signs of erythema or infection. He has good range of motion   EXTREMITIES: no edema  PSYCH: Mentation, mood and affect are normal. Decision making capacity is intact      LABS:   Reviewed. Initially the labs that are done are very abnormal and he is feeling fine so I repeated the blood work as I believe that there was some error with the first set of labs    Repeat CBC is stable  Repeat comprehensive metabolic panel      ASSESSMENT AND PLAN:  Metastatic gastric cancer, HER-2/catrachita negative, presenting with early satiety and significant weight loss and epigastric discomfort upon eating with some vomiting.  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  There is an indeterminate right liver lobe lesion.      We started palliative chemotherapy with FOLFOX on 03/12/2017.     He is receiving 5-FU/leucovorin from cycle #11 onwards as the have dropped oxaliplatin due to worsening neuropathy after 10 cycles    Scans after cycle #12 show stable to slightly improved tumor burden    My plan is to proceed with cycle #16 today.    Will repeat imaging  before C#19    I have also requested that MSI testing be performed on his tumor specimen to see if he would be eligible for immunotherapy in the future.      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 Although it is a stable but it is his main complaint. He is having numbness and tingling in the fingers and feet. He would like to try something and I recommend that we try Cymbalta. I discussed this rationale as well as potential side effects including potential of causing depression and even suicidal ideation. He understands that but he wants to try it. We will start at a low dose 30 mg daily and see if it helps him.     Thrombocytopenia. This is a stable. We will continue to observe it. He does not have any bleeding.     Initial blood work shows serious electrolyte disturbances as well as significant cytopenias. As he is feeling well I have requested repeat blood work as I think that the initial lab had some error. On repeat testing the CBC is stable. Comprehensive metabolic panel is also unremarkable    The following was not addressed today    Left hip pain. This is likely due to degenerative disease. He went to a chiropractor and he thinks it has really helped him. Currently he is pain free.    Minimal fatigue. I encouraged him to exercise regularly to help with his energy    Nutrition. This has been really good and he is maintaining his weight. I encouraged him to eat healthy.       His genetic testing did not show any identifiable mutation not reveal any identifiable mutation     Return to clinic in 2 weeks with labs and next chemo     All of his and his family's questions were answered to their satisfaction. They're agreeable and comfortable with the plan.     LANEY HERNANDEZ MD

## 2017-11-15 NOTE — MR AVS SNAPSHOT
After Visit Summary   11/15/2017    Terry Yi    MRN: [id number]           Patient Information     Date Of Birth          1965        Visit Information        Provider Department      11/15/2017 9:45 AM Yakov Shaffer MD [facility]        Today's Diagnoses     Malignant neoplasm of overlapping sites of stomach (H)    -  1    Chemotherapy-induced peripheral neuropathy (H)          Care Instructions    Proceed with chemo    Start Cymbalta 30 mg daily for neuropathy    RTC in 2 weeks as scheduled              Follow-ups after your visit        Your next 10 appointments already scheduled     Nov 29, 2017  8:00 AM CST   Return Visit with NURSE ONLY CANCER CENTER   [facility] ([facility])    [address] [facility] [address]   [phone]            Nov 29, 2017  8:45 AM CST   Return Visit with Yakov Shaffer MD   [facility])    [address] [facility] [address]   [phone]            Nov 29, 2017  9:30 AM CST   Level 2 with BAY 6 INFUSION   [facility]    [address] [facility] [address]   [phone]            Dec 13, 2017  9:00 AM CST   Return Visit with [facility]URSE ONLY CANCER CENTER   [facility] ([facility])    [address] [facility] [address]   [phone]            Dec 13, 2017  9:45 AM CST   Return Visit with Yakov Shaffer MD   [facility])    [address] [facility] [address]   [phone]            Dec 13, 2017 11:30 AM CST   Level 2 with BAY 2 INFUSION   [facility] ([facility])    [address] [facility] [address]   [phone]            Dec 27, 2017  7:30 AM CST   Return Visit with NURSE ONLY CANCER CENTER   [facility] (  [facility])    [address] [place] [address] [phone]            Dec 27, 2017  8:15 AM CST   Return Visit with Yakov Shaffer MD   [facility])    [address] [place] [address] [phone]            Dec 27, 2017  9:00 AM CST   Level 2 with BAY 3 INFUSION   [facility] ([facility])    [address] [place] [address] [phone]              Who to contact     If you have questions or need follow up information about today's clinic visit or your schedule please contact [facility] directly at [phone].  Normal or non-critical lab and imaging results will be communicated to you by [employer], letter or phone within 4 business days after the clinic has received the results. If you do not hear from us within 7 days, please contact the clinic through [employer] or phone. If you have a critical or abnormal lab result, we will notify you by phone as soon as possible.  Submit refill requests through [employer]or[employer] or call your pharmacy and they will forward the refill request to us. Please allow 3 business days for your refill to be completed.          Additional Information About Your Visit        Donor[employer] Information     [employer] gives you secure access to your electronic health record. If you see a primary care provider, you can also send messages to your care team and make appointments. If you have questions, please call your primary care clinic.  If you do not have a primary care provider, please call [phone] and they will assist you.      [employer] is an electronic gateway that provides easy, online access to your medical records. With [employer], you can request a clinic appointment, read your test results, renew a prescription or communicate with your care team.     To access your existing account, please contact your [facility] or call [phone] for assistance.        Care EveryWhere ID     This is your Care EveryWhere ID. This could be used by other organizations to access your [facility] medical records  [id number]        Your Vitals Were     Pulse Temperature Pulse Oximetry BMI (Body Mass Index)          64 97.9  F (36.6  C) (Oral) 97% 29.26 kg/m2         Blood Pressure from Last 3 Encounters:   11/15/17 128/81   10/31/17 137/76   10/18/17 116/72    Weight from Last 3 Encounters:   11/15/17 94.8 kg (209 lb)   10/31/17 93.9 kg (207 lb)   10/18/17 93 kg (205 lb)              We Performed the Following     CBC with platelets     CMP - Comprehensive Metabolic Panel     Microsat instability colon cancer scr          Today's Medication Changes          These changes are accurate as of: 11/15/17 11:24 AM.  If you have any questions, ask your nurse or doctor.               Start taking these medicines.        Dose/Directions    DULoxetine 30 MG EC capsule   Commonly known as:  CYMBALTA   Used for:  Malignant neoplasm of overlapping sites of stomach (H), Chemotherapy-induced peripheral neuropathy (H)   Started by:  Yakov Shaffer MD        Dose:  30 mg   Take 1 capsule (30 mg) by mouth daily   Quantity:  60 capsule   Refills:  1            Where to get your medicines      These medications were sent to [facility] - [place], [place] - [address]  [address], [facility] [address]     Phone:  [phone]     DULoxetine 30 MG EC capsule                Primary Care Provider Office Phone # Fax #    Sandra Dickerson [phone]       [facility]  [address]  [facility] [address]        Equal Access to Services     CHANDAN GALLO AH: Jemal Casillas, waaxda luqadaha, qaybta kaalmada adeegyada, claire browne. So [facility] [phone].    ATENCIÓN: Si habla español, tiene a cuevas disposición servicios gratuitos de asistencia  lingüística. Ciara al [phone].    We comply with applicable federal civil rights laws and [place] laws. We do not discriminate on the basis of race, color, national origin, age, disability, sex, sexual orientation, or gender identity.            Thank you!     Thank you for choosing [facility]  for your care. Our goal is always to provide you with excellent care. Hearing back from our patients is one way we can continue to improve our services. Please take a few minutes to complete the written survey that you may receive in the mail after your visit with us. Thank you!             Your Updated Medication List - Protect others around you: Learn how to safely use, store and throw away your medicines at www.disposemymeds.org.          This list is accurate as of: 11/15/17 11:24 AM.  Always use your most recent med list.                   Brand Name Dispense Instructions for use Diagnosis    allopurinol 100 MG tablet    ZYLOPRIM          aspirin 81 MG chewable tablet      Take 81 mg by mouth daily        DULoxetine 30 MG EC capsule    CYMBALTA    60 capsule    Take 1 capsule (30 mg) by mouth daily    Malignant neoplasm of overlapping sites of stomach (H), Chemotherapy-induced peripheral neuropathy (H)       lidocaine-prilocaine cream    EMLA          LORazepam 0.5 MG tablet    ATIVAN    30 tablet    Take 1 tablet (0.5 mg) by mouth every 4 hours as needed (Anxiety, Nausea/Vomiting or Sleep)    Malignant neoplasm of overlapping sites of stomach (H)       ondansetron 8 MG tablet    ZOFRAN    10 tablet    Take 1 tablet (8 mg) by mouth every 8 hours as needed (Nausea/Vomiting)    Malignant neoplasm of overlapping sites of stomach (H)       prochlorperazine 10 MG tablet    COMPAZINE    30 tablet    Take 1 tablet (10 mg) by mouth every 6 hours as needed (Nausea/Vomiting)    Malignant neoplasm of overlapping sites of stomach (H)

## 2017-11-15 NOTE — PATIENT INSTRUCTIONS
Proceed with chemo    Start Cymbalta 30 mg daily for neuropathy    RTC in 2 weeks as scheduled

## 2017-11-15 NOTE — PROGRESS NOTES
Faxed order for IHC on biopsy specimen to Pathology at the [facility] ordered by Dr. Shaffer

## 2017-11-29 ENCOUNTER — INFUSION THERAPY VISIT (OUTPATIENT)
Dept: INFUSION THERAPY | Facility: CLINIC | Age: 52
End: 2017-11-29
Payer: COMMERCIAL

## 2017-11-29 ENCOUNTER — ONCOLOGY VISIT (OUTPATIENT)
Dept: ONCOLOGY | Facility: CLINIC | Age: 52
End: 2017-11-29
Payer: COMMERCIAL

## 2017-11-29 VITALS
DIASTOLIC BLOOD PRESSURE: 80 MMHG | OXYGEN SATURATION: 98 % | SYSTOLIC BLOOD PRESSURE: 128 MMHG | TEMPERATURE: 98.2 F | BODY MASS INDEX: 29.37 KG/M2 | HEART RATE: 68 BPM | WEIGHT: 209.8 LBS

## 2017-11-29 DIAGNOSIS — C16.8 MALIGNANT NEOPLASM OF OVERLAPPING SITES OF STOMACH (H): Primary | ICD-10-CM

## 2017-11-29 DIAGNOSIS — G62.9 NEUROPATHY: Primary | ICD-10-CM

## 2017-11-29 DIAGNOSIS — C16.8 MALIGNANT NEOPLASM OF OVERLAPPING SITES OF STOMACH (H): ICD-10-CM

## 2017-11-29 LAB
ALBUMIN SERPL-MCNC: 3.7 G/DL (ref 3.4–5)
ALP SERPL-CCNC: 77 U/L (ref 40–150)
ALT SERPL W P-5'-P-CCNC: 54 U/L (ref 0–70)
ANION GAP SERPL CALCULATED.3IONS-SCNC: 5 MMOL/L (ref 3–14)
AST SERPL W P-5'-P-CCNC: 36 U/L (ref 0–45)
BASOPHILS # BLD AUTO: 0.1 10E9/L (ref 0–0.2)
BASOPHILS NFR BLD AUTO: 1 %
BILIRUB SERPL-MCNC: 0.3 MG/DL (ref 0.2–1.3)
BUN SERPL-MCNC: 15 MG/DL (ref 7–30)
CALCIUM SERPL-MCNC: 9.1 MG/DL (ref 8.5–10.1)
CHLORIDE SERPL-SCNC: 109 MMOL/L (ref 94–109)
CO2 SERPL-SCNC: 28 MMOL/L (ref 20–32)
COPATH REPORT: NORMAL
CREAT SERPL-MCNC: 0.79 MG/DL (ref 0.66–1.25)
DIFFERENTIAL METHOD BLD: ABNORMAL
EOSINOPHIL # BLD AUTO: 0.1 10E9/L (ref 0–0.7)
EOSINOPHIL NFR BLD AUTO: 2.6 %
ERYTHROCYTE [DISTWIDTH] IN BLOOD BY AUTOMATED COUNT: 14.7 % (ref 10–15)
GFR SERPL CREATININE-BSD FRML MDRD: >90 ML/MIN/1.7M2
GLUCOSE SERPL-MCNC: 94 MG/DL (ref 70–99)
HCT VFR BLD AUTO: 38.9 % (ref 40–53)
HGB BLD-MCNC: 13.1 G/DL (ref 13.3–17.7)
IMM GRANULOCYTES # BLD: 0 10E9/L (ref 0–0.4)
IMM GRANULOCYTES NFR BLD: 0.2 %
LYMPHOCYTES # BLD AUTO: 1.5 10E9/L (ref 0.8–5.3)
LYMPHOCYTES NFR BLD AUTO: 29.7 %
MCH RBC QN AUTO: 31.7 PG (ref 26.5–33)
MCHC RBC AUTO-ENTMCNC: 33.7 G/DL (ref 31.5–36.5)
MCV RBC AUTO: 94 FL (ref 78–100)
MONOCYTES # BLD AUTO: 0.6 10E9/L (ref 0–1.3)
MONOCYTES NFR BLD AUTO: 11.1 %
NEUTROPHILS # BLD AUTO: 2.8 10E9/L (ref 1.6–8.3)
NEUTROPHILS NFR BLD AUTO: 55.4 %
PLATELET # BLD AUTO: 143 10E9/L (ref 150–450)
POTASSIUM SERPL-SCNC: 4.5 MMOL/L (ref 3.4–5.3)
PROT SERPL-MCNC: 6.8 G/DL (ref 6.8–8.8)
RBC # BLD AUTO: 4.13 10E12/L (ref 4.4–5.9)
SODIUM SERPL-SCNC: 142 MMOL/L (ref 133–144)
VIT B12 SERPL-MCNC: 568 PG/ML (ref 193–986)
WBC # BLD AUTO: 5.1 10E9/L (ref 4–11)

## 2017-11-29 PROCEDURE — 99207 ZZC NO CHARGE LOS: CPT

## 2017-11-29 PROCEDURE — 82607 VITAMIN B-12: CPT | Performed by: INTERNAL MEDICINE

## 2017-11-29 PROCEDURE — 96367 TX/PROPH/DG ADDL SEQ IV INF: CPT | Performed by: INTERNAL MEDICINE

## 2017-11-29 PROCEDURE — 80053 COMPREHEN METABOLIC PANEL: CPT | Performed by: INTERNAL MEDICINE

## 2017-11-29 PROCEDURE — 85025 COMPLETE CBC W/AUTO DIFF WBC: CPT | Performed by: INTERNAL MEDICINE

## 2017-11-29 PROCEDURE — 99214 OFFICE O/P EST MOD 30 MIN: CPT | Mod: 25 | Performed by: INTERNAL MEDICINE

## 2017-11-29 PROCEDURE — 96416 CHEMO PROLONG INFUSE W/PUMP: CPT | Performed by: INTERNAL MEDICINE

## 2017-11-29 PROCEDURE — 96409 CHEMO IV PUSH SNGL DRUG: CPT | Performed by: INTERNAL MEDICINE

## 2017-11-29 PROCEDURE — 99207 ZZC NO CHARGE NURSE ONLY: CPT

## 2017-11-29 RX ORDER — FLUOROURACIL 50 MG/ML
400 INJECTION, SOLUTION INTRAVENOUS ONCE
Status: COMPLETED | OUTPATIENT
Start: 2017-11-29 | End: 2017-11-29

## 2017-11-29 RX ORDER — MEPERIDINE HYDROCHLORIDE 50 MG/ML
25 INJECTION INTRAMUSCULAR; INTRAVENOUS; SUBCUTANEOUS EVERY 30 MIN PRN
Status: CANCELLED | OUTPATIENT
Start: 2017-11-29

## 2017-11-29 RX ORDER — DIPHENHYDRAMINE HYDROCHLORIDE 50 MG/ML
50 INJECTION INTRAMUSCULAR; INTRAVENOUS
Status: CANCELLED
Start: 2017-11-29

## 2017-11-29 RX ORDER — SODIUM CHLORIDE 9 MG/ML
1000 INJECTION, SOLUTION INTRAVENOUS CONTINUOUS PRN
Status: CANCELLED
Start: 2017-11-29

## 2017-11-29 RX ORDER — ALBUTEROL SULFATE 0.83 MG/ML
2.5 SOLUTION RESPIRATORY (INHALATION)
Status: CANCELLED | OUTPATIENT
Start: 2017-11-29

## 2017-11-29 RX ORDER — EPINEPHRINE 1 MG/ML
0.3 INJECTION, SOLUTION INTRAMUSCULAR; SUBCUTANEOUS EVERY 5 MIN PRN
Status: CANCELLED | OUTPATIENT
Start: 2017-11-29

## 2017-11-29 RX ORDER — METHYLPREDNISOLONE SODIUM SUCCINATE 125 MG/2ML
125 INJECTION, POWDER, LYOPHILIZED, FOR SOLUTION INTRAMUSCULAR; INTRAVENOUS
Status: CANCELLED
Start: 2017-11-29

## 2017-11-29 RX ORDER — ALBUTEROL SULFATE 90 UG/1
1-2 AEROSOL, METERED RESPIRATORY (INHALATION)
Status: CANCELLED
Start: 2017-11-29

## 2017-11-29 RX ORDER — EPINEPHRINE 0.3 MG/.3ML
0.3 INJECTION SUBCUTANEOUS EVERY 5 MIN PRN
Status: CANCELLED | OUTPATIENT
Start: 2017-11-29

## 2017-11-29 RX ORDER — FLUOROURACIL 50 MG/ML
400 INJECTION, SOLUTION INTRAVENOUS ONCE
Status: CANCELLED | OUTPATIENT
Start: 2017-11-29

## 2017-11-29 RX ORDER — LORAZEPAM 2 MG/ML
0.5 INJECTION INTRAMUSCULAR EVERY 4 HOURS PRN
Status: CANCELLED
Start: 2017-11-29

## 2017-11-29 RX ORDER — HEPARIN SODIUM (PORCINE) LOCK FLUSH IV SOLN 100 UNIT/ML 100 UNIT/ML
5 SOLUTION INTRAVENOUS
Status: DISCONTINUED | OUTPATIENT
Start: 2017-11-29 | End: 2017-11-29 | Stop reason: HOSPADM

## 2017-11-29 RX ADMIN — FLUOROURACIL 830 MG: 50 INJECTION, SOLUTION INTRAVENOUS at 10:41

## 2017-11-29 RX ADMIN — HEPARIN SODIUM (PORCINE) LOCK FLUSH IV SOLN 100 UNIT/ML 5 ML: 100 SOLUTION at 08:08

## 2017-11-29 ASSESSMENT — PAIN SCALES - GENERAL: PAINLEVEL: NO PAIN (0)

## 2017-11-29 NOTE — PROGRESS NOTES
11/29/2017      REASON FOR VISIT:  Follow-up for unresectable metastatic gastric carcinoma, diffuse type, grade 3, HER-2 negative.      HISTORY OF PRESENT ILLNESS:  Please see my previous note for details.  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      CT CAP after C#6 shows stable disease    C# 9 FOLFOX 8/2/2017 ( Oxaliplatin dose reduced to 70mg/m2 due to neuropathy )  C#10 8/22/2017 ( Oxaliplatin dose reduced to 60mg/m2 due to neuropathy )  Delayed by 1 week- patient preference  C#11 9/6/17 Only 5FU/LV  C#12 9/20/2017 5FU/LV    Repeat CT scan after C#12 shows stable to slightly improved disease.    C#13 10/4/2017 5FU/LV  C#14 10/18/2017 5FU/LV  C#15 10/31/2017 5FU/LV  C#16 11/15/2017 5FU/LV  C#17 11/29/2017 5FU/LV        INTERVAL HISTORY:    [place] comes in today. His family accompanies him. He is tolerating chemotherapy very well without any nausea and vomiting. No mouth sores. He has been able to eat and drink well. Bowels are working well. No bleeding. His weight has gone up by a few pounds. No new swellings. No new pain. No new infections. No trouble breathing. 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 Last time I started him on Cymbalta but he only took it for 2 days and noticed that his fingers and toes were getting more sensitive so he stopped it. He has not restarted it and does not want to go back on it. He tells me that his fingers and toes seem more sensitive but at the same time he tells me that he seems to be getting some of the feeling back in the fingers and toes so he thinks that is an improvement. He can walk normally although at times he feels as if he is walking on rocks. He at times feels a little imbalance but he has not fallen. Very occasionally he has  dropped things. He can button his shirts but it is a little difficult doing so. His energy continues to be good and he continues to work full-time.    ROS:  A comprehensive ROS was otherwise neg    MEDICATIONS:   Reviewed       PHYSICAL EXAMINATION:   /80 (BP Location: Right arm, Cuff Size: Adult Regular)  Pulse 68  Temp 98.2  F (36.8  C) (Oral)  Wt 95.2 kg (209 lb 12.8 oz)  SpO2 98%  BMI 29.37 kg/m2  CONSTITUTIONAL: No apparent distress  EYES: PERRLA, without pallor or jaundice  ENT/MOUTH: Ears unremarkable. No oral thrush  CVS: s1s2 normal  RESPIRATORY: Chest is clear  GI: Abdomen is benign  NEURO: He is alert and oriented ×3. He has numbness of his fingertips and toes. He felt minimally imbalanced when I was checking Romberg test. Gait is normal  INTEGUMENT: no concerning his skin rashes   LYMPHATIC: no palpable lymphadenopathy  MUSCULOSKELETAL: Unremarkable. No bony tenderness.   EXTREMITIES: no pedal edema  PSYCH: Mentation, mood and affect are appropriate      LABS:   Reviewed.       ASSESSMENT AND PLAN:  Metastatic gastric cancer, HER-2/catrachita negative, presenting with early satiety and significant weight loss and epigastric discomfort upon eating with some vomiting.  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  There is an indeterminate right liver lobe lesion.      We started palliative chemotherapy with FOLFOX on 03/12/2017.     He is receiving 5-FU/leucovorin from cycle #11 onwards as we have dropped oxaliplatin due to worsening neuropathy after 10 cycles    Scans after cycle #12 show stable to slightly improved tumor burden  He will get cycle #17 today.  In 2 weeks he will return to clinic for cycle #18 and then we will plan to repeat his scans after that    I have also requested that MSI testing be performed on his tumor specimen to see if he would be eligible for immunotherapy in the future. 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 He did not like Cymbalta. We discussed other option of trying gabapentin but at this time he wants to wait and watch. He does mention that may be he has started to have some feeling back in the toes and the fingers so this might be an encouraging sign. I will check vitamin B12 level just in case there is something else that can potentially be done for his neuropathy    Anemia and Thrombocytopenia. These are mild and stable and due to chemotherapy. At this time we will continue with observation   The following was not addressed today    We did not address the following today    Left hip pain. This is likely due to degenerative disease. He went to a chiropractor and he thinks it has really helped him. Currently he is pain free.    His genetic testing did not show any identifiable mutation not reveal any identifiable mutation     Return to clinic in 2 weeks with labs and next chemo     I answered all of their questions to their satisfaction. They're agreeable and comfortable with the plan.     LANEY HERNANDEZ MD

## 2017-11-29 NOTE — MR AVS SNAPSHOT
After Visit Summary   11/29/2017    Terry Yi    MRN: [id number]           Patient Information     Date Of Birth          1965        Visit Information        Provider Department      11/29/2017 9:30 AM BAY 6 INFUSION [facility]        Today's Diagnoses     Malignant neoplasm of overlapping sites of stomach (H)    -  1       Follow-ups after your visit        Your next 10 appointments already scheduled     Dec 13, 2017  9:00 AM CST   Return Visit with NURSE ONLY CANCER CENTER   [facility] ([facility])    [address] [facility] [address]   [phone]            Dec 13, 2017  9:45 AM CST   Return Visit with aYkov Shaffer MD   [facility])    [address] [facility] [address]   [phone]            Dec 13, 2017 11:30 AM CST   Level 2 with [place] 2 [facility] ([facility])    [address] [facility] [address]   [phone]            Dec 22, 2017  8:00 AM CST   Nurse Only with MG IMAGING NURSE   [facility])    [address] [facility] [address]   [phone]            Dec 22, 2017  8:15 AM CST   CT CHEST ABDOMEN PELVIS W/O & W CONTRAST with MGCT1   [facility])    [address] [facility] [address] [phone]           Please bring any scans or X-rays taken at other hospitals, if similar tests were done. Also bring a list of your medicines, including vitamins, minerals and over-the-counter drugs. It is safest to leave personal items at home.  Be sure to tell your doctor:   If you have any allergies.   If there s any chance you are pregnant.   If you are breastfeeding.   If you have any special needs.  You may have contrast for this exam. To prepare:   Do not eat or drink for 2 hours  before your exam. If you need to take medicine, you may take it with small sips of water. (We may ask you to take liquid medicine as well.)   The day before your exam, drink extra fluids at least six 8-ounce glasses (unless your doctor tells you to restrict your fluids).  Patients over 70 or patients with diabetes or kidney problems:   If you haven t had a blood test (creatinine test) within the last 30 days, go to your clinic or Diagnostic Imaging Department for this test.  If you have diabetes:   If your kidney function is normal, continue taking your metformin (Avandamet, Glucophage, Glucovance, Metaglip) on the day of your exam.   If your kidney function is abnormal, wait 48 hours before restarting this medicine.  You will have oral contrast for this exam:   You will drink the contrast at home. Get this from your clinic or Diagnostic Imaging Department. Please follow the directions given.  Please wear loose clothing, such as a sweat suit or jogging clothes. Avoid snaps, zippers and other metal. We may ask you to undress and put on a hospital gown.  If you have any questions, please call the Imaging Department where you will have your exam.            Dec 27, 2017  7:30 AM CST   Return Visit with NURSE ONLY CANCER CENTER   [facility])    [address] [facility] [address] [phone]            Dec 27, 2017  8:15 AM CST   Return Visit with Yakov Shaffer MD   [facility])    [address] [facility] [address] [phone]            Dec 27, 2017  9:00 AM CST   Level 2 with BAY 3 INFUSION   [facility])    [address] [facility] [address] [phone]              Future tests that were ordered for you today     Open Future Orders        Priority Expected Expires Ordered    CT Chest abdomen pelvis w & w/o contrast Routine  12/22/2017 11/29/2018 11/29/2017            Who to contact     If you have questions or need follow up information about today's clinic visit or your schedule please contact [facility] directly at [phone].  Normal or non-critical lab and imaging results will be communicated to you by [employer]hart, letter or phone within 4 business days after the clinic has received the results. If you do not hear from us within 7 days, please contact the clinic through [employer] or phone. If you have a critical or abnormal lab result, we will notify you by phone as soon as possible.  Submit refill requests through [employer] or call your pharmacy and they will forward the refill request to us. Please allow 3 business days for your refill to be completed.          Additional Information About Your Visit        Rerecipe Information     [employer] gives you secure access to your electronic health record. If you see a primary care provider, you can also send messages to your care team and make appointments. If you have questions, please call your primary care clinic.  If you do not have a primary care provider, please call [phone] and they will assist you.      [employer] is an electronic gateway that provides easy, online access to your medical records. With [employer], you can request a clinic appointment, read your test results, renew a prescription or communicate with your care team.     To access your existing account, please contact your [facility] or call [phone] for assistance.        Care EveryWhere ID     This is your Care EveryWhere ID. This could be used by other organizations to access your [facility] medical records  [id number]         Blood Pressure from Last 3 Encounters:   11/29/17 128/80   11/15/17 128/81   10/31/17 137/76    Weight from Last 3 Encounters:   11/29/17 95.2 kg (209 lb 12.8 oz)   11/15/17 94.8 kg (209 lb)   10/31/17 93.9 kg (207 lb)              Today, you had  the following     No orders found for display       Primary Care Provider Office Phone # Fax #    Sandra Dickerson, SARAI [phone]       [facility]  [address]  [facility] [address]        Equal Access to Services     CHANDAN GALLO : Hadii aad ku hadsohano Soomaali, waaxda luqadaha, qaybta kaalmada adeegyada, waxay idiin hayrodrigon mayurjennifer june sofya . So [facility] [phone].    ATENCIÓN: Si habla español, tiene a cuevas disposición servicios gratuitos de asistencia lingüística. Llame al [phone].    We comply with applicable federal civil rights laws and [place] laws. We do not discriminate on the basis of race, color, national origin, age, disability, sex, sexual orientation, or gender identity.            Thank you!     Thank you for choosing [facility]  for your care. Our goal is always to provide you with excellent care. Hearing back from our patients is one way we can continue to improve our services. Please take a few minutes to complete the written survey that you may receive in the mail after your visit with us. Thank you!             Your Updated Medication List - Protect others around you: Learn how to safely use, store and throw away your medicines at www.disposemymeds.org.          This list is accurate as of: 11/29/17  5:14 PM.  Always use your most recent med list.                   Brand Name Dispense Instructions for use Diagnosis    allopurinol 100 MG tablet    ZYLOPRIM          aspirin 81 MG chewable tablet      Take 81 mg by mouth daily        DULoxetine 30 MG EC capsule    CYMBALTA    60 capsule    Take 1 capsule (30 mg) by mouth daily    Malignant neoplasm of overlapping sites of stomach (H), Chemotherapy-induced peripheral neuropathy (H)       lidocaine-prilocaine cream    EMLA          LORazepam 0.5 MG tablet    ATIVAN    30 tablet    Take 1 tablet (0.5 mg) by mouth every 4 hours as needed (Anxiety, Nausea/Vomiting or Sleep)    Malignant neoplasm of  overlapping sites of stomach (H)       ondansetron 8 MG tablet    ZOFRAN    10 tablet    Take 1 tablet (8 mg) by mouth every 8 hours as needed (Nausea/Vomiting)    Malignant neoplasm of overlapping sites of stomach (H)       prochlorperazine 10 MG tablet    COMPAZINE    30 tablet    Take 1 tablet (10 mg) by mouth every 6 hours as needed (Nausea/Vomiting)    Malignant neoplasm of overlapping sites of stomach (H)

## 2017-11-29 NOTE — LETTER
11/29/2017         RE: Terry Yi  [address] [place] [address]        Dear Colleague,    Thank you for referring your patient, Terry Yi, to the [facility]. Please see a copy of my visit note below.    11/29/2017      REASON FOR VISIT:  Follow-up for unresectable metastatic gastric carcinoma, diffuse type, grade 3, HER-2 negative.      HISTORY OF PRESENT ILLNESS:  Please see my previous note for details.  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      CT CAP after C#6 shows stable disease    C# 9 FOLFOX 8/2/2017 ( Oxaliplatin dose reduced to 70mg/m2 due to neuropathy )  C#10 8/22/2017 ( Oxaliplatin dose reduced to 60mg/m2 due to neuropathy )  Delayed by 1 week- patient preference  C#11 9/6/17 Only 5FU/LV  C#12 9/20/2017 5FU/LV    Repeat CT scan after C#12 shows stable to slightly improved disease.    C#13 10/4/2017 5FU/LV  C#14 10/18/2017 5FU/LV  C#15 10/31/2017 5FU/LV  C#16 11/15/2017 5FU/LV  C#17 11/29/2017 5FU/LV        INTERVAL HISTORY:    Israel comes in today. His family accompanies him. He is tolerating chemotherapy very well without any nausea and vomiting. No mouth sores. He has been able to eat and drink well. Bowels are working well. No bleeding. His weight has gone up by a few pounds. No new swellings. No new pain. No new infections. No trouble breathing. 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 Last time I started him on Cymbalta but he only took it for 2 days and noticed that his fingers and toes were getting more sensitive so he stopped it. He has not restarted it and does not want to go back on it. He tells me that his fingers and toes seem more sensitive but at the same time he tells me that he seems to be getting some  of the feeling back in the fingers and toes so he thinks that is an improvement. He can walk normally although at times he feels as if he is walking on rocks. He at times feels a little imbalance but he has not fallen. Very occasionally he has dropped things. He can button his shirts but it is a little difficult doing so. His energy continues to be good and he continues to work full-time.    ROS:  A comprehensive ROS was otherwise neg    MEDICATIONS:   Reviewed       PHYSICAL EXAMINATION:   /80 (BP Location: Right arm, Cuff Size: Adult Regular)  Pulse 68  Temp 98.2  F (36.8  C) (Oral)  Wt 95.2 kg (209 lb 12.8 oz)  SpO2 98%  BMI 29.37 kg/m2  CONSTITUTIONAL: No apparent distress  EYES: PERRLA, without pallor or jaundice  ENT/MOUTH: Ears unremarkable. No oral thrush  CVS: s1s2 normal  RESPIRATORY: Chest is clear  GI: Abdomen is benign  NEURO: He is alert and oriented ×3. He has numbness of his fingertips and toes. He felt minimally imbalanced when I was checking Romberg test. Gait is normal  INTEGUMENT: no concerning his skin rashes   LYMPHATIC: no palpable lymphadenopathy  MUSCULOSKELETAL: Unremarkable. No bony tenderness.   EXTREMITIES: no pedal edema  PSYCH: Mentation, mood and affect are appropriate      LABS:   Reviewed.       ASSESSMENT AND PLAN:  Metastatic gastric cancer, HER-2/catrachita negative, presenting with early satiety and significant weight loss and epigastric discomfort upon eating with some vomiting.  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  There is an indeterminate right liver lobe lesion.      We started palliative chemotherapy with FOLFOX on 03/12/2017.     He is receiving 5-FU/leucovorin from cycle #11 onwards as we have dropped oxaliplatin due to worsening neuropathy after 10 cycles    Scans after cycle #12 show stable to slightly improved tumor burden  He will get cycle #17 today.  In 2  weeks he will return to clinic for cycle #18 and then we will plan to repeat his scans after that    I have also requested that MSI testing be performed on his tumor specimen to see if he would be eligible for immunotherapy in the future. 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 He did not like Cymbalta. We discussed other option of trying gabapentin but at this time he wants to wait and watch. He does mention that may be he has started to have some feeling back in the toes and the fingers so this might be an encouraging sign. I will check vitamin B12 level just in case there is something else that can potentially be done for his neuropathy    Anemia and Thrombocytopenia. These are mild and stable and due to chemotherapy. At this time we will continue with observation   The following was not addressed today    We did not address the following today    Left hip pain. This is likely due to degenerative disease. He went to a chiropractor and he thinks it has really helped him. Currently he is pain free.    His genetic testing did not show any identifiable mutation not reveal any identifiable mutation     Return to clinic in 2 weeks with labs and next chemo     I answered all of their questions to their satisfaction. They're agreeable and comfortable with the plan.     LANEY SHAFFER MD              Again, thank you for allowing me to participate in the care of your patient.        Sincerely,        Laney Shaffer MD

## 2017-11-29 NOTE — MR AVS SNAPSHOT
After Visit Summary   11/29/2017    Terry Yi    MRN: [id number]           Patient Information     Date Of Birth          1965        Visit Information        Provider Department      11/29/2017 8:45 AM Yakov Shaffer MD [facility]        Today's Diagnoses     Neuropathy    -  1    Malignant neoplasm of overlapping sites of stomach (H)          Care Instructions    Proceed with chemo    See me back in 2 weeks with labs and next chemo    CT CAP Dec 22nd          Follow-ups after your visit        Your next 10 appointments already scheduled     Nov 29, 2017  9:30 AM CST   Level 2 with BAY 6 INFUSION   [facility] ([facility])    [address] [facility] [address]   [phone]            Dec 13, 2017  9:00 AM CST   Return Visit with NURSE ONLY CANCER CENTER   [facility] ([facility])    [address] [facility] [address]   [phone]            Dec 13, 2017  9:45 AM CST   Return Visit with Yakov Shaffer MD   [facility] ([facility])    [address] [facility] [address]   [phone]            Dec 13, 2017 11:30 AM CST   Level 2 with BAY 2 INFUSION   [facility] ([facility])    [address] [facility] [address]   [phone]            Dec 22, 2017  8:00 AM CST   Nurse Only with MG IMAGING NURSE   [facility])    [address] [facility] [address]   [phone]            Dec 22, 2017  8:15 AM CST   CT CHEST ABDOMEN PELVIS W/O & W CONTRAST with MGCT1   [facility])    [address] [facility] [address]   [phone]           Please bring any scans or X-rays taken at other hospitals, if similar tests were done. Also bring a list of your medicines, including vitamins,  minerals and over-the-counter drugs. It is safest to leave personal items at home.  Be sure to tell your doctor:   If you have any allergies.   If there s any chance you are pregnant.   If you are breastfeeding.   If you have any special needs.  You may have contrast for this exam. To prepare:   Do not eat or drink for 2 hours before your exam. If you need to take medicine, you may take it with small sips of water. (We may ask you to take liquid medicine as well.)   The day before your exam, drink extra fluids at least six 8-ounce glasses (unless your doctor tells you to restrict your fluids).  Patients over 70 or patients with diabetes or kidney problems:   If you haven t had a blood test (creatinine test) within the last 30 days, go to your clinic or Diagnostic Imaging Department for this test.  If you have diabetes:   If your kidney function is normal, continue taking your metformin (Avandamet, Glucophage, Glucovance, Metaglip) on the day of your exam.   If your kidney function is abnormal, wait 48 hours before restarting this medicine.  You will have oral contrast for this exam:   You will drink the contrast at home. Get this from your clinic or Diagnostic Imaging Department. Please follow the directions given.  Please wear loose clothing, such as a sweat suit or jogging clothes. Avoid snaps, zippers and other metal. We may ask you to undress and put on a hospital gown.  If you have any questions, please call the Imaging Department where you will have your exam.            Dec 27, 2017  7:30 AM CST   Return Visit with NURSE ONLY CANCER C[facility]   [facility]    [address] [place] [id number] [phone]            Dec 27, 2017  8:15 AM CST   Return Visit with Yaokv Shaffer MD   [facility]    [address] [place] [id number] [phone]            Dec 27, 2017  9:00 AM CST   Level 2  with BAY 3 INFUSION   [facility] ([facility])    [address] [place] [address] [phone]              Future tests that were ordered for you today     Open Future Orders        Priority Expected Expires Ordered    CT Chest abdomen pelvis w & w/o contrast Routine 12/22/2017 11/29/2018 11/29/2017            Who to contact     If you have questions or need follow up information about today's clinic visit or your schedule please contact [facility] directly at [phone].  Normal or non-critical lab and imaging results will be communicated to you by Nor1hart, letter or phone within 4 business days after the clinic has received the results. If you do not hear from us within 7 days, please contact the clinic through Tumrit or phone. If you have a critical or abnormal lab result, we will notify you by phone as soon as possible.  Submit refill requests through Policard or call your pharmacy and they will forward the refill request to us. Please allow 3 business days for your refill to be completed.          Additional Information About Your Visit        Nor1hart Information     Policard gives you secure access to your electronic health record. If you see a primary care provider, you can also send messages to your care team and make appointments. If you have questions, please call your primary care clinic.  If you do not have a primary care provider, please call [phone] and they will assist you.      Policard is an electronic gateway that provides easy, online access to your medical records. With Policard, you can request a clinic appointment, read your test results, renew a prescription or communicate with your care team.     To access your existing account, please contact your [facility] or call [phone] for assistance.        Care EveryWhere ID     This is your Care EveryWhere ID. This could be used by other  organizations to access your [facility] medical records  [id number]        Your Vitals Were     Pulse Temperature Pulse Oximetry BMI (Body Mass Index)          68 98.2  F (36.8  C) (Oral) 98% 29.37 kg/m2         Blood Pressure from Last 3 Encounters:   11/29/17 128/80   11/15/17 128/81   10/31/17 137/76    Weight from Last 3 Encounters:   11/29/17 95.2 kg (209 lb 12.8 oz)   11/15/17 94.8 kg (209 lb)   10/31/17 93.9 kg (207 lb)              We Performed the Following     Vitamin B12        Primary Care Provider Office Phone # Fax #    Sandra DIDI Dickerson, SARAI [phone]       [facility]  [address]  [facility] [address]        Equal Access to Services     CHANDAN GALLO : Hadii aad ku hadasho Soomaali, waaxda luqadaha, qaybta kaalmada adeegyada, claire cowart . So [facility] [phone].    ATENCIÓN: Si habla español, tiene a cuevas disposición servicios gratuitos de asistencia lingüística. Ciara al [phone].    We comply with applicable federal civil rights laws and [place] laws. We do not discriminate on the basis of race, color, national origin, age, disability, sex, sexual orientation, or gender identity.            Thank you!     Thank you for choosing [facility]  for your care. Our goal is always to provide you with excellent care. Hearing back from our patients is one way we can continue to improve our services. Please take a few minutes to complete the written survey that you may receive in the mail after your visit with us. Thank you!             Your Updated Medication List - Protect others around you: Learn how to safely use, store and throw away your medicines at www.disposemymeds.org.          This list is accurate as of: 11/29/17  9:13 AM.  Always use your most recent med list.                   Brand Name Dispense Instructions for use Diagnosis    allopurinol 100 MG tablet    ZYLOPRIM          aspirin 81 MG chewable tablet      Take 81 mg by  mouth daily        DULoxetine 30 MG EC capsule    CYMBALTA    60 capsule    Take 1 capsule (30 mg) by mouth daily    Malignant neoplasm of overlapping sites of stomach (H), Chemotherapy-induced peripheral neuropathy (H)       lidocaine-prilocaine cream    EMLA          LORazepam 0.5 MG tablet    ATIVAN    30 tablet    Take 1 tablet (0.5 mg) by mouth every 4 hours as needed (Anxiety, Nausea/Vomiting or Sleep)    Malignant neoplasm of overlapping sites of stomach (H)       ondansetron 8 MG tablet    ZOFRAN    10 tablet    Take 1 tablet (8 mg) by mouth every 8 hours as needed (Nausea/Vomiting)    Malignant neoplasm of overlapping sites of stomach (H)       prochlorperazine 10 MG tablet    COMPAZINE    30 tablet    Take 1 tablet (10 mg) by mouth every 6 hours as needed (Nausea/Vomiting)    Malignant neoplasm of overlapping sites of stomach (H)

## 2017-11-29 NOTE — MR AVS SNAPSHOT
After Visit Summary   11/29/2017    Terry Yi    MRN: [id number]           Patient Information     Date Of Birth          1965        Visit Information        Provider Department      11/29/2017 8:00 AM NURSE ONLY CANCER CENTER [facility]        Today's Diagnoses     Malignant neoplasm of overlapping sites of stomach (H)    -  1       Follow-ups after your visit        Your next 10 appointments already scheduled     Nov 29, 2017  8:45 AM CST   Return Visit with Yakov Shaffer MD   [facility] ([facility])    [address] [facility] [address]   [phone]            Nov 29, 2017  9:30 AM CST   Level 2 with BAY 6 INFUSION   [facility] ([facility])    [address] [facility] [address]   [phone]            Dec 13, 2017  9:00 AM CST   Return Visit with NURSE ONLY CANCER CENTER   [facility] ([facility])    [address] [facility] [address]   [phone]            Dec 13, 2017  9:45 AM CST   Return Visit with Yakov Shaffer MD   [facility] ([facility])    [address] [facility] [address]   [phone]            Dec 13, 2017 11:30 AM CST   Level 2 with BAY 2 INFUSION   [facility] ([facility])    [address] [facility] [address]   [phone]            Dec 27, 2017  7:30 AM CST   Return Visit with NURSE ONLY CANCER CENTER   [facility] ([facility])    [address] [facility] [address]   [phone]            Dec 27, 2017  8:15 AM CST   Return Visit with Yakov Shaffer MD   [facility] ([facility])    [address] [facility] [address]   [phone]            Dec 27, 2017  9:00 AM CST   Level 2 with BAY 3 INFUSION   [facility] ([facility])    [address] [place] [address] [phone]              Who to contact     If you have questions or need follow up information about today's clinic visit or your schedule please contact [facility] directly at [phone].  Normal or non-critical lab and imaging results will be communicated to you by MyChart, letter or phone within 4 business days after the clinic has received the results. If you do not hear from us within 7 days, please contact the clinic through "[employer]hart or phone. If you have a critical or abnormal lab result, we will notify you by phone as soon as possible.  Submit refill requests through Pathable or call your pharmacy and they will forward the refill request to us. Please allow 3 business days for your refill to be completed.          Additional Information About Your Visit        "[employer]hart Information     Pathable gives you secure access to your electronic health record. If you see a primary care provider, you can also send messages to your care team and make appointments. If you have questions, please call your primary care clinic.  If you do not have a primary care provider, please call [phone] and they will assist you.      Pathable is an electronic gateway that provides easy, online access to your medical records. With Pathable, you can request a clinic appointment, read your test results, renew a prescription or communicate with your care team.     To access your existing account, please contact your [facility] or call [phone] for assistance.        Care EveryWhere ID     This is your Care EveryWhere ID. This could be used by other organizations to access your [facility] medical records  [id number]         Blood Pressure from Last 3 Encounters:   11/15/17 128/81   10/31/17 137/76   10/18/17 116/72    Weight from Last 3 Encounters:   11/15/17 94.8 kg (209 lb)   10/31/17 93.9 kg  (207 lb)   10/18/17 93 kg (205 lb)              We Performed the Following     CBC with platelets differential     Comprehensive metabolic panel        Primary Care Provider Office Phone # Fax #    Sandra EasonSARAI dye [phone]       [facility]  [address]  [facility] [address]        Equal Access to Services     [facility]: Hadii aad ku hadasho Soomaali, waaxda luqadaha, qaybta kaalmada adeegyada, waxay idiin hayaan adeeg kharash la'aan . So [facility] [phone].    ATENCIÓN: Si habla español, tiene a cuevas disposición servicios gratuitos de asistencia lingüística. Llame al [phone].    We comply with applicable federal civil rights laws and [place] laws. We do not discriminate on the basis of race, color, national origin, age, disability, sex, sexual orientation, or gender identity.            Thank you!     Thank you for choosing [facility]  for your care. Our goal is always to provide you with excellent care. Hearing back from our patients is one way we can continue to improve our services. Please take a few minutes to complete the written survey that you may receive in the mail after your visit with us. Thank you!             Your Updated Medication List - Protect others around you: Learn how to safely use, store and throw away your medicines at www.disposemymeds.org.          This list is accurate as of: 11/29/17  8:29 AM.  Always use your most recent med list.                   Brand Name Dispense Instructions for use Diagnosis    allopurinol 100 MG tablet    ZYLOPRIM          aspirin 81 MG chewable tablet      Take 81 mg by mouth daily        DULoxetine 30 MG EC capsule    CYMBALTA    60 capsule    Take 1 capsule (30 mg) by mouth daily    Malignant neoplasm of overlapping sites of stomach (H), Chemotherapy-induced peripheral neuropathy (H)       lidocaine-prilocaine cream    EMLA          LORazepam 0.5 MG tablet    ATIVAN    30 tablet    Take 1 tablet (0.5 mg)  by mouth every 4 hours as needed (Anxiety, Nausea/Vomiting or Sleep)    Malignant neoplasm of overlapping sites of stomach (H)       ondansetron 8 MG tablet    ZOFRAN    10 tablet    Take 1 tablet (8 mg) by mouth every 8 hours as needed (Nausea/Vomiting)    Malignant neoplasm of overlapping sites of stomach (H)       prochlorperazine 10 MG tablet    COMPAZINE    30 tablet    Take 1 tablet (10 mg) by mouth every 6 hours as needed (Nausea/Vomiting)    Malignant neoplasm of overlapping sites of stomach (H)

## 2017-11-29 NOTE — PROGRESS NOTES
"Infusion Nursing Note:  Terry Yi presents today for C17D1.    Patient seen by provider today: Yes: Dr. Shaffer   present during visit today: Not Applicable.    Note: Prior to discharge: Port is secured in place with tegaderm and flushed with 10cc NS with positive blood return noted.  Continuous home infusion Dosi-Fuser pump connected.    All connectors secured in place and clamps taped open.    Pump started, \"running\" noted on display (CADD): Not Applicable.  Patient instructed to call our clinic or [facility] Infusion with any questions or concerns at home.  Patient verbalized understanding.    Patient set up for pump disconnect in [place] on 12/1/17.      Intravenous Access:  Implanted Port.    Treatment Conditions:  Lab Results   Component Value Date    HGB 13.1 11/29/2017     Lab Results   Component Value Date    WBC 5.1 11/29/2017      Lab Results   Component Value Date    ANEU 2.8 11/29/2017     Lab Results   Component Value Date     11/29/2017      Lab Results   Component Value Date     11/29/2017                   Lab Results   Component Value Date    POTASSIUM 4.5 11/29/2017           No results found for: MAG         Lab Results   Component Value Date    CR 0.79 11/29/2017                   Lab Results   Component Value Date    BRITTENY 9.1 11/29/2017                Lab Results   Component Value Date    BILITOTAL 0.3 11/29/2017           Lab Results   Component Value Date    ALBUMIN 3.7 11/29/2017                    Lab Results   Component Value Date    ALT 54 11/29/2017           Lab Results   Component Value Date    AST 36 11/29/2017     Results reviewed, labs MET treatment parameters, ok to proceed with treatment.    Post Infusion Assessment:  Patient tolerated infusion without incident.  Blood return noted pre and post infusion.  Site patent and intact, free from redness, edema or discomfort.    Discharge Plan:   Patient will return 12/13/17 for next appointment.   Patient " discharged in stable condition accompanied by: wife.  Departure Mode: Ambulatory.    Mayte Carbone RN

## 2017-11-29 NOTE — NURSING NOTE
"Oncology Rooming Note    November 29, 2017 8:39 AM   Terry Yi is a 51 year old male who presents for:    Chief Complaint   Patient presents with     Oncology Clinic Visit     Initial Vitals: /80 (BP Location: Right arm, Cuff Size: Adult Regular)  Pulse 68  Temp 98.2  F (36.8  C) (Oral)  Wt 95.2 kg (209 lb 12.8 oz)  SpO2 98%  BMI 29.37 kg/m2 Estimated body mass index is 29.37 kg/(m^2) as calculated from the following:    Height as of 10/31/17: 1.8 m (5' 10.87\").    Weight as of this encounter: 95.2 kg (209 lb 12.8 oz). Body surface area is 2.18 meters squared.  No Pain (0) Comment: Data Unavailable   No LMP for male patient.  Allergies reviewed: Yes  Medications reviewed: Yes    Medications: Medication refills not needed today.  Pharmacy name entered into EPIC: Data Unavailable    Clinical concerns: none Dr. Shaffer was NOT notified.    5 minutes for nursing intake (face to face time)     Marcos Corley RN              "

## 2017-12-13 ENCOUNTER — ONCOLOGY VISIT (OUTPATIENT)
Dept: ONCOLOGY | Facility: CLINIC | Age: 52
End: 2017-12-13
Payer: COMMERCIAL

## 2017-12-13 ENCOUNTER — INFUSION THERAPY VISIT (OUTPATIENT)
Dept: INFUSION THERAPY | Facility: CLINIC | Age: 52
End: 2017-12-13
Payer: COMMERCIAL

## 2017-12-13 VITALS
WEIGHT: 211.3 LBS | DIASTOLIC BLOOD PRESSURE: 75 MMHG | RESPIRATION RATE: 18 BRPM | HEART RATE: 63 BPM | SYSTOLIC BLOOD PRESSURE: 114 MMHG | TEMPERATURE: 97 F | OXYGEN SATURATION: 94 % | BODY MASS INDEX: 29.58 KG/M2

## 2017-12-13 DIAGNOSIS — C16.8 MALIGNANT NEOPLASM OF OVERLAPPING SITES OF STOMACH (H): Primary | ICD-10-CM

## 2017-12-13 LAB
ALBUMIN SERPL-MCNC: 3.8 G/DL (ref 3.4–5)
ALP SERPL-CCNC: 79 U/L (ref 40–150)
ALT SERPL W P-5'-P-CCNC: 62 U/L (ref 0–70)
ANION GAP SERPL CALCULATED.3IONS-SCNC: 8 MMOL/L (ref 3–14)
AST SERPL W P-5'-P-CCNC: 44 U/L (ref 0–45)
BASOPHILS # BLD AUTO: 0.1 10E9/L (ref 0–0.2)
BASOPHILS NFR BLD AUTO: 1.1 %
BILIRUB SERPL-MCNC: 0.5 MG/DL (ref 0.2–1.3)
BUN SERPL-MCNC: 17 MG/DL (ref 7–30)
CALCIUM SERPL-MCNC: 9.1 MG/DL (ref 8.5–10.1)
CHLORIDE SERPL-SCNC: 104 MMOL/L (ref 94–109)
CO2 SERPL-SCNC: 27 MMOL/L (ref 20–32)
CREAT SERPL-MCNC: 0.93 MG/DL (ref 0.66–1.25)
DIFFERENTIAL METHOD BLD: ABNORMAL
EOSINOPHIL # BLD AUTO: 0.2 10E9/L (ref 0–0.7)
EOSINOPHIL NFR BLD AUTO: 2.4 %
ERYTHROCYTE [DISTWIDTH] IN BLOOD BY AUTOMATED COUNT: 14.9 % (ref 10–15)
GFR SERPL CREATININE-BSD FRML MDRD: 85 ML/MIN/1.7M2
GLUCOSE SERPL-MCNC: 85 MG/DL (ref 70–99)
HCT VFR BLD AUTO: 39.8 % (ref 40–53)
HGB BLD-MCNC: 13.3 G/DL (ref 13.3–17.7)
IMM GRANULOCYTES # BLD: 0 10E9/L (ref 0–0.4)
IMM GRANULOCYTES NFR BLD: 0.2 %
LYMPHOCYTES # BLD AUTO: 2 10E9/L (ref 0.8–5.3)
LYMPHOCYTES NFR BLD AUTO: 33 %
MCH RBC QN AUTO: 31.6 PG (ref 26.5–33)
MCHC RBC AUTO-ENTMCNC: 33.4 G/DL (ref 31.5–36.5)
MCV RBC AUTO: 95 FL (ref 78–100)
MONOCYTES # BLD AUTO: 0.8 10E9/L (ref 0–1.3)
MONOCYTES NFR BLD AUTO: 12.3 %
NEUTROPHILS # BLD AUTO: 3.2 10E9/L (ref 1.6–8.3)
NEUTROPHILS NFR BLD AUTO: 51 %
PLATELET # BLD AUTO: 152 10E9/L (ref 150–450)
POTASSIUM SERPL-SCNC: 4.4 MMOL/L (ref 3.4–5.3)
PROT SERPL-MCNC: 7 G/DL (ref 6.8–8.8)
RBC # BLD AUTO: 4.21 10E12/L (ref 4.4–5.9)
SODIUM SERPL-SCNC: 139 MMOL/L (ref 133–144)
WBC # BLD AUTO: 6.2 10E9/L (ref 4–11)

## 2017-12-13 PROCEDURE — 96416 CHEMO PROLONG INFUSE W/PUMP: CPT | Performed by: INTERNAL MEDICINE

## 2017-12-13 PROCEDURE — 96367 TX/PROPH/DG ADDL SEQ IV INF: CPT | Performed by: INTERNAL MEDICINE

## 2017-12-13 PROCEDURE — 99214 OFFICE O/P EST MOD 30 MIN: CPT | Mod: 25 | Performed by: INTERNAL MEDICINE

## 2017-12-13 PROCEDURE — 99207 ZZC NO CHARGE NURSE ONLY: CPT

## 2017-12-13 PROCEDURE — 85025 COMPLETE CBC W/AUTO DIFF WBC: CPT | Performed by: INTERNAL MEDICINE

## 2017-12-13 PROCEDURE — 80053 COMPREHEN METABOLIC PANEL: CPT | Performed by: INTERNAL MEDICINE

## 2017-12-13 PROCEDURE — 99207 ZZC NO CHARGE LOS: CPT

## 2017-12-13 PROCEDURE — 96409 CHEMO IV PUSH SNGL DRUG: CPT | Performed by: INTERNAL MEDICINE

## 2017-12-13 RX ORDER — ALBUTEROL SULFATE 0.83 MG/ML
2.5 SOLUTION RESPIRATORY (INHALATION)
Status: CANCELLED | OUTPATIENT
Start: 2017-12-13

## 2017-12-13 RX ORDER — EPINEPHRINE 0.3 MG/.3ML
0.3 INJECTION SUBCUTANEOUS EVERY 5 MIN PRN
Status: CANCELLED | OUTPATIENT
Start: 2017-12-13

## 2017-12-13 RX ORDER — SODIUM CHLORIDE 9 MG/ML
1000 INJECTION, SOLUTION INTRAVENOUS CONTINUOUS PRN
Status: CANCELLED
Start: 2017-12-13

## 2017-12-13 RX ORDER — METHYLPREDNISOLONE SODIUM SUCCINATE 125 MG/2ML
125 INJECTION, POWDER, LYOPHILIZED, FOR SOLUTION INTRAMUSCULAR; INTRAVENOUS
Status: CANCELLED
Start: 2017-12-13

## 2017-12-13 RX ORDER — FLUOROURACIL 50 MG/ML
400 INJECTION, SOLUTION INTRAVENOUS ONCE
Status: COMPLETED | OUTPATIENT
Start: 2017-12-13 | End: 2017-12-13

## 2017-12-13 RX ORDER — LORAZEPAM 2 MG/ML
0.5 INJECTION INTRAMUSCULAR EVERY 4 HOURS PRN
Status: CANCELLED
Start: 2017-12-13

## 2017-12-13 RX ORDER — ALBUTEROL SULFATE 90 UG/1
1-2 AEROSOL, METERED RESPIRATORY (INHALATION)
Status: CANCELLED
Start: 2017-12-13

## 2017-12-13 RX ORDER — FLUOROURACIL 50 MG/ML
400 INJECTION, SOLUTION INTRAVENOUS ONCE
Status: CANCELLED | OUTPATIENT
Start: 2017-12-13

## 2017-12-13 RX ORDER — MEPERIDINE HYDROCHLORIDE 50 MG/ML
25 INJECTION INTRAMUSCULAR; INTRAVENOUS; SUBCUTANEOUS EVERY 30 MIN PRN
Status: CANCELLED | OUTPATIENT
Start: 2017-12-13

## 2017-12-13 RX ORDER — EPINEPHRINE 1 MG/ML
0.3 INJECTION, SOLUTION INTRAMUSCULAR; SUBCUTANEOUS EVERY 5 MIN PRN
Status: CANCELLED | OUTPATIENT
Start: 2017-12-13

## 2017-12-13 RX ORDER — HEPARIN SODIUM (PORCINE) LOCK FLUSH IV SOLN 100 UNIT/ML 100 UNIT/ML
5 SOLUTION INTRAVENOUS
Status: DISCONTINUED | OUTPATIENT
Start: 2017-12-13 | End: 2017-12-13 | Stop reason: HOSPADM

## 2017-12-13 RX ORDER — DIPHENHYDRAMINE HYDROCHLORIDE 50 MG/ML
50 INJECTION INTRAMUSCULAR; INTRAVENOUS
Status: CANCELLED
Start: 2017-12-13

## 2017-12-13 RX ADMIN — HEPARIN SODIUM (PORCINE) LOCK FLUSH IV SOLN 100 UNIT/ML 5 ML: 100 SOLUTION at 09:07

## 2017-12-13 RX ADMIN — FLUOROURACIL 830 MG: 50 INJECTION, SOLUTION INTRAVENOUS at 12:57

## 2017-12-13 ASSESSMENT — PAIN SCALES - GENERAL: PAINLEVEL: MODERATE PAIN (5)

## 2017-12-13 NOTE — PROGRESS NOTES
Needle left accessed for treatment. Port positional-brisk blood return obtained when pt turned his head to the side. Tolerated lab draw without complaint. Parish drawn-Red/Green/Purple tubes. See documentation flowsheet. Arleth Brandon, RN, BSN, OCN

## 2017-12-13 NOTE — PROGRESS NOTES
"Infusion Nursing Note:  Terry Yi presents today for Cycle 18 Day 1 Fluorouracil bolus (Leucovorin) and pump     Patient seen by provider today: No   present during visit today: Not Applicable.    Note: N/A.    Intravenous Access:  Implanted Port.    Treatment Conditions:  Lab Results   Component Value Date    HGB 13.3 12/13/2017     Lab Results   Component Value Date    WBC 6.2 12/13/2017      Lab Results   Component Value Date    ANEU 3.2 12/13/2017     Lab Results   Component Value Date     12/13/2017      Lab Results   Component Value Date     12/13/2017                   Lab Results   Component Value Date    POTASSIUM 4.4 12/13/2017           No results found for: MAG         Lab Results   Component Value Date    CR 0.93 12/13/2017                   Lab Results   Component Value Date    BRITTNEY 9.1 12/13/2017                Lab Results   Component Value Date    BILITOTAL 0.5 12/13/2017           Lab Results   Component Value Date    ALBUMIN 3.8 12/13/2017                    Lab Results   Component Value Date    ALT 62 12/13/2017           Lab Results   Component Value Date    AST 44 12/13/2017     Results reviewed, labs MET treatment parameters, ok to proceed with treatment.    Prior to discharge: Port is secured in place with tegaderm and flushed with 10cc NS with positive blood return noted.  Continuous home infusion Dosi-Fuser pump connected.    All connectors secured in place and clamps taped open.    Pump started, \"running\" noted on display (CADD): Not Applicable.  Patient instructed to call our clinic or [facility] with any questions or concerns at home.  Patient verbalized understanding.    [facility]15 @ 11am.            Post Infusion Assessment:  Patient tolerated infusion without incident.  No evidence of extravasations.  Access discontinued per protocol.    Discharge Plan:   Copy of AVS " reviewed with patient and/or family.  Patient will return 12/27 for next appointment.  Patient discharged in stable condition accompanied by: self and wife.  Departure Mode: Ambulatory.    Susan Braden RN

## 2017-12-13 NOTE — MR AVS SNAPSHOT
After Visit Summary   12/13/2017    Terry Yi    MRN: [id number]           Patient Information     Date Of Birth          1965        Visit Information        Provider Department      12/13/2017 11:30 AM BAY 2 INFUSION [facility]        Today's Diagnoses     Malignant neoplasm of overlapping sites of stomach (H)    -  1       Follow-ups after your visit        Your next 10 appointments already scheduled     Dec 22, 2017  8:00 AM CST   Nurse Only with MG IMAGING NURSE   [facility])    [id number] [place] [address] [id number] [phone]            Dec 22, 2017  8:15 AM CST   CT CHEST ABDOMEN PELVIS W/O & W CONTRAST with MGCT1   [facility])    [address] [place] [address] [phone]           Please bring any scans or X-rays taken at other hospitals, if similar tests were done. Also bring a list of your medicines, including vitamins, minerals and over-the-counter drugs. It is safest to leave personal items at home.  Be sure to tell your doctor:   If you have any allergies.   If there s any chance you are pregnant.   If you are breastfeeding.   If you have any special needs.  You may have contrast for this exam. To prepare:   Do not eat or drink for 2 hours before your exam. If you need to take medicine, you may take it with small sips of water. (We may ask you to take liquid medicine as well.)   The day before your exam, drink extra fluids at least six 8-ounce glasses (unless your doctor tells you to restrict your fluids).  Patients over 70 or patients with diabetes or kidney problems:   If you haven t had a blood test (creatinine test) within the last 30 days, go to your clinic or Diagnostic Imaging Department for this test.  If you have diabetes:   If your kidney function is normal, continue taking your metformin (Avandamet, Glucophage, Glucovance,  Metaglip) on the day of your exam.   If your kidney function is abnormal, wait 48 hours before restarting this medicine.  You will have oral contrast for this exam:   You will drink the contrast at home. Get this from your clinic or Diagnostic Imaging Department. Please follow the directions given.  Please wear loose clothing, such as a sweat suit or jogging clothes. Avoid snaps, zippers and other metal. We may ask you to undress and put on a hospital gown.  If you have any questions, please call the Imaging Department where you will have your exam.            Dec 27, 2017  7:30 AM CST   [facility] Visit [facility]    [address] [place] [address] [phone]            Dec 27, 2017  8:15 AM CST   Return Visit with Yakov Shaffer MD   [facility])    [address] [place] [phone]   [phone]            Dec 27, 2017  9:00 AM CST   Level 2 with BAY 3 INFUSION   [facility]    [address] [place] [phone]   [phone]              Who to contact     If you have questions or need follow up information about today's clinic visit or your schedule please contact [facility] directly at [phone].  Normal or non-critical lab and imaging results will be communicated to you by MyChart, letter or phone within 4 business days after the clinic has received the results. If you do not hear from us within 7 days, please contact the clinic through MyChart or phone. If you have a critical or abnormal lab result, we will notify you by phone as soon as possible.  Submit refill requests through [employer] or call your pharmacy and they will forward the refill request to us. Please allow 3 business days for your refill to be completed.          Additional Information About Your Visit         [employer] Information     [employer] gives you secure access to your electronic health record. If you see a primary care provider, you can also send messages to your care team and make appointments. If you have questions, please call your primary care clinic.  If you do not have a primary care provider, please call [phone] and they will assist you.      [employer] is an electronic gateway that provides easy, online access to your medical records. With [employer], you can request a clinic appointment, read your test results, renew a prescription or communicate with your care team.     To access your existing account, please contact your [facility] or call [phone] for assistance.        Care EveryWhere ID     This is your Care EveryWhere ID. This could be used by other organizations to access your [facility] medical records  [id number]         Blood Pressure from Last 3 Encounters:   12/13/17 114/75   11/29/17 128/80   11/15/17 128/81    Weight from Last 3 Encounters:   12/13/17 95.8 kg (211 lb 4.8 oz)   11/29/17 95.2 kg (209 lb 12.8 oz)   11/15/17 94.8 kg (209 lb)              Today, you had the following     No orders found for display       Primary Care Provider Office Phone # Fax #    Sandra Dickerson [phone]       [facility]  [address]  [facility] [address]        Equal Access to Services     [facility]: Hadii aad ku hadasho Soomaali, waaxda luqadaha, qaybta kaalmada adeegyada, claire mcdaniel hayguillermo zapienaralia cowart . So [facility] [phone].    ATENCIÓN: Si habla español, tiene a cuevas disposición servicios gratuitos de asistencia lingüística. Llame al [phone].    We comply with applicable federal civil rights laws and [place] laws. We do not discriminate on the basis of race, color, national origin, age, disability, sex, sexual orientation, or gender identity.            Thank you!     Thank you for choosing [facility]  for  your care. Our goal is always to provide you with excellent care. Hearing back from our patients is one way we can continue to improve our services. Please take a few minutes to complete the written survey that you may receive in the mail after your visit with us. Thank you!             Your Updated Medication List - Protect others around you: Learn how to safely use, store and throw away your medicines at www.disposemymeds.org.          This list is accurate as of: 12/13/17 11:59 PM.  Always use your most recent med list.                   Brand Name Dispense Instructions for use Diagnosis    allopurinol 100 MG tablet    ZYLOPRIM     as needed (when eating shrimp)        aspirin 81 MG chewable tablet      Take 81 mg by mouth daily        lidocaine-prilocaine cream    EMLA          LORazepam 0.5 MG tablet    ATIVAN    30 tablet    Take 1 tablet (0.5 mg) by mouth every 4 hours as needed (Anxiety, Nausea/Vomiting or Sleep)    Malignant neoplasm of overlapping sites of stomach (H)       ondansetron 8 MG tablet    ZOFRAN    10 tablet    Take 1 tablet (8 mg) by mouth every 8 hours as needed (Nausea/Vomiting)    Malignant neoplasm of overlapping sites of stomach (H)       prochlorperazine 10 MG tablet    COMPAZINE    30 tablet    Take 1 tablet (10 mg) by mouth every 6 hours as needed (Nausea/Vomiting)    Malignant neoplasm of overlapping sites of stomach (H)

## 2017-12-13 NOTE — MR AVS SNAPSHOT
After Visit Summary   12/13/2017    Terry Yi    MRN: [id number]           Patient Information     Date Of Birth          1965        Visit Information        Provider Department      12/13/2017 9:00 AM NURSE ONLY CANCER CENTER [facility]        Today's Diagnoses     Malignant neoplasm of overlapping sites of stomach (H)    -  1       Follow-ups after your visit        Your next 10 appointments already scheduled     Dec 13, 2017  9:45 AM CST   Return Visit with Yakov Shaffer MD   [facility])    [address] [facility] [address]   [phone]            Dec 13, 2017 11:30 AM CST   Level 2 with BAY 2 INFUSION   [facility])    [address] [facility] [address]   [phone]            Dec 22, 2017  8:00 AM CST   Nurse Only with MG IMAGING NURSE   [facility])    [address] [facility] [address]   [phone]            Dec 22, 2017  8:15 AM CST   CT CHEST ABDOMEN PELVIS W/O & W CONTRAST with MGCT1   [facility])    [address] [facility] [address]   [phone]           Please bring any scans or X-rays taken at other hospitals, if similar tests were done. Also bring a list of your medicines, including vitamins, minerals and over-the-counter drugs. It is safest to leave personal items at home.  Be sure to tell your doctor:   If you have any allergies.   If there s any chance you are pregnant.   If you are breastfeeding.   If you have any special needs.  You may have contrast for this exam. To prepare:   Do not eat or drink for 2 hours before your exam. If you need to take medicine, you may take it with small sips of water. (We may ask you to take liquid medicine as well.)   The day before your exam, drink extra fluids at least six  8-ounce glasses (unless your doctor tells you to restrict your fluids).  Patients over 70 or patients with diabetes or kidney problems:   If you haven t had a blood test (creatinine test) within the last 30 days, go to your clinic or Diagnostic Imaging Department for this test.  If you have diabetes:   If your kidney function is normal, continue taking your metformin (Avandamet, Glucophage, Glucovance, Metaglip) on the day of your exam.   If your kidney function is abnormal, wait 48 hours before restarting this medicine.  You will have oral contrast for this exam:   You will drink the contrast at home. Get this from your clinic or Diagnostic Imaging Department. Please follow the directions given.  Please wear loose clothing, such as a sweat suit or jogging clothes. Avoid snaps, zippers and other metal. We may ask you to undress and put on a hospital gown.  If you have any questions, please call the Imaging Department where you will have your exam.            Dec 27, 2017  7:30 AM CST   Return Visit with NURSE [facility]   [facility])    [address] [place] [address]   [phone]            Dec 27, 2017  8:15 AM CST   Return Visit with Yakov Shaffer MD   [facility])    [address] [place] [phone]   [phone]            Dec 27, 2017  9:00 AM CST   Level 2 with BAY 3 INFUSION   [facility])    [address] [place] [address]   [phone]              Who to contact     If you have questions or need follow up information about today's clinic visit or your schedule please contact [facility] directly at [phone].  Normal or non-critical lab and imaging results will be communicated to you by MyChart, letter or phone within 4 business days after the clinic has received the results. If you do not  hear from us within 7 days, please contact the clinic through [employer] or phone. If you have a critical or abnormal lab result, we will notify you by phone as soon as possible.  Submit refill requests through [employer] or call your pharmacy and they will forward the refill request to us. Please allow 3 business days for your refill to be completed.          Additional Information About Your Visit        [email or web address] Information     [employer] gives you secure access to your electronic health record. If you see a primary care provider, you can also send messages to your care team and make appointments. If you have questions, please call your primary care clinic.  If you do not have a primary care provider, please call [phone] and they will assist you.      [employer] is an electronic gateway that provides easy, online access to your medical records. With [employer], you can request a clinic appointment, read your test results, renew a prescription or communicate with your care team.     To access your existing account, please contact your [facility] or call [phone] for assistance.        Care EveryWhere ID     This is your Care EveryWhere ID. This could be used by other organizations to access your [facility] medical records  [id number]         Blood Pressure from Last 3 Encounters:   11/29/17 128/80   11/15/17 128/81   10/31/17 137/76    Weight from Last 3 Encounters:   11/29/17 95.2 kg (209 lb 12.8 oz)   11/15/17 94.8 kg (209 lb)   10/31/17 93.9 kg (207 lb)              We Performed the Following     CBC with platelets differential     Comprehensive metabolic panel        Primary Care Provider Office Phone # Fax #    Sandra Dickerson [phone]       [facility]  [address]  [facility] [address]        Equal Access to Services     [facility]: Hadii tk campo Sojacquelinali, waaxda luqadaha, qaybta kaalmada adeedgardo, claire browne. So  [facility] [phone].    ATENCIÓN: Si arabella luke, tiene a cuevas disposición servicios gratuitos de asistencia lingüística. Ciara ji [phone].    We comply with applicable federal civil rights laws and [place] laws. We do not discriminate on the basis of race, color, national origin, age, disability, sex, sexual orientation, or gender identity.            Thank you!     Thank you for choosing [facility]  for your care. Our goal is always to provide you with excellent care. Hearing back from our patients is one way we can continue to improve our services. Please take a few minutes to complete the written survey that you may receive in the mail after your visit with us. Thank you!             Your Updated Medication List - Protect others around you: Learn how to safely use, store and throw away your medicines at www.disposemymeds.org.          This list is accurate as of: 12/13/17  9:33 AM.  Always use your most recent med list.                   Brand Name Dispense Instructions for use Diagnosis    allopurinol 100 MG tablet    ZYLOPRIM          aspirin 81 MG chewable tablet      Take 81 mg by mouth daily        DULoxetine 30 MG EC capsule    CYMBALTA    60 capsule    Take 1 capsule (30 mg) by mouth daily    Malignant neoplasm of overlapping sites of stomach (H), Chemotherapy-induced peripheral neuropathy (H)       lidocaine-prilocaine cream    EMLA          LORazepam 0.5 MG tablet    ATIVAN    30 tablet    Take 1 tablet (0.5 mg) by mouth every 4 hours as needed (Anxiety, Nausea/Vomiting or Sleep)    Malignant neoplasm of overlapping sites of stomach (H)       ondansetron 8 MG tablet    ZOFRAN    10 tablet    Take 1 tablet (8 mg) by mouth every 8 hours as needed (Nausea/Vomiting)    Malignant neoplasm of overlapping sites of stomach (H)       prochlorperazine 10 MG tablet    COMPAZINE    30 tablet    Take 1 tablet (10 mg) by mouth every 6 hours as needed (Nausea/Vomiting)    Malignant neoplasm of  overlapping sites of stomach (H)

## 2017-12-13 NOTE — LETTER
12/13/2017         RE: Terry Yi  [address] [place] [address]        Dear Colleague,    Thank you for referring your patient, Terry Yi, to the [facility]. Please see a copy of my visit note below.    12/13/2017      REASON FOR VISIT:  Follow-up for unresectable metastatic gastric carcinoma, diffuse type, grade 3, HER-2 negative.      HISTORY OF PRESENT ILLNESS:  Please see my previous note for details.  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      CT CAP after C#6 shows stable disease    C# 9 FOLFOX 8/2/2017 ( Oxaliplatin dose reduced to 70mg/m2 due to neuropathy )  C#10 8/22/2017 ( Oxaliplatin dose reduced to 60mg/m2 due to neuropathy )  Delayed by 1 week- patient preference  C#11 9/6/17 Only 5FU/LV  C#12 9/20/2017 5FU/LV    Repeat CT scan after C#12 shows stable to slightly improved disease.    C#13 10/4/2017 5FU/LV  C#14 10/18/2017 5FU/LV  C#15 10/31/2017 5FU/LV  C#16 11/15/2017 5FU/LV  C#17 11/29/2017 5FU/LV   C#18 12/13/2017 5FU/LV     INTERVAL HISTORY:    He comes in today and tells me that he is feeling about the same as before. He continues to have neuropathy in his hands and feet but now he things that there are days when he can button his shirt better so this might be a little improvement from before. There is certainly no worsening. Otherwise he continues to do well and is able to eat and drink well without any GI problems. His weight has remained stable. No infections. Energy is good. Occasionally he has right hip pain for which very lately he takes Tylenol. He went to see a chiropractor and that relieved his pain a great deal. He is going to follow up with chiropractor again. Denies any new pains.     ROS:  Otherwise a comprehensive review of  the system was performed and apart from what is mentioned above essentially it was unremarkable    MEDICATIONS:   Reviewed       PHYSICAL EXAMINATION:   /75  Pulse 63  Temp 97  F (36.1  C) (Oral)  Resp 18  Wt 95.8 kg (211 lb 4.8 oz)  SpO2 94%  BMI 29.58 kg/m2  CONSTITUTIONAL: no acute distress  EYES: PERRLA, no palor or icterus.   ENT/MOUTH: no mouth lesions. Oropharynx normal  CVS: s1s2 no m r g .   RESPIRATORY: clear to auscultation b/l  GI: soft non tender no hepatosplenomegaly  NEURO: AAOX3  he has subjective numbness of fingers and toes  INTEGUMENT: no obvious rashes  LYMPHATIC: no palpable cervical, supraclavicular, axillary or inguinal LAD  MUSCULOSKELETAL: Unremarkable. No bony tenderness.   EXTREMITIES: no edema  PSYCH: Mentation, mood and affect are normal. Decision making capacity is intact        LABS:   Reviewed and stable      ASSESSMENT AND PLAN:  Metastatic gastric cancer, HER-2/catrachita negative, MS I intact, presenting with early satiety and significant weight loss and epigastric discomfort upon eating with some vomiting.  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  There is an indeterminate right liver lobe lesion.      We started palliative chemotherapy with FOLFOX on 03/12/2017.     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 He did not like Cymbalta. He does not want to try gabapentin. At this time we will continue to wait and watch. Hopefully with time this will get better. He does mention that there are days when he can now button his shirt so this might be a little improvement. I even checked  a vitamin B12 level which was normal     Cytopenias. These are due to chemotherapy. Slowly they are improving and today the cytopenias have resolved.    Right hip pain. Due to degenerative disease. He takes Tylenol as needed. He will follow with chiropractor as needed.        We did not address the following today    His genetic testing did not show any identifiable mutation not reveal any identifiable mutation     Return to clinic as a scheduled    All of his and his family's questions were answered to their satisfaction and they are all in agreement with this plan     LANEY SHAFFER MD              Again, thank you for allowing me to participate in the care of your patient.        Sincerely,        Laney Shaffer MD

## 2017-12-13 NOTE — PROGRESS NOTES
12/13/2017      REASON FOR VISIT:  Follow-up for unresectable metastatic gastric carcinoma, diffuse type, grade 3, HER-2 negative.      HISTORY OF PRESENT ILLNESS:  Please see my previous note for details.  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      CT CAP after C#6 shows stable disease    C# 9 FOLFOX 8/2/2017 ( Oxaliplatin dose reduced to 70mg/m2 due to neuropathy )  C#10 8/22/2017 ( Oxaliplatin dose reduced to 60mg/m2 due to neuropathy )  Delayed by 1 week- patient preference  C#11 9/6/17 Only 5FU/LV  C#12 9/20/2017 5FU/LV    Repeat CT scan after C#12 shows stable to slightly improved disease.    C#13 10/4/2017 5FU/LV  C#14 10/18/2017 5FU/LV  C#15 10/31/2017 5FU/LV  C#16 11/15/2017 5FU/LV  C#17 11/29/2017 5FU/LV   C#18 12/13/2017 5FU/LV     INTERVAL HISTORY:    He comes in today and tells me that he is feeling about the same as before. He continues to have neuropathy in his hands and feet but now he things that there are days when he can button his shirt better so this might be a little improvement from before. There is certainly no worsening. Otherwise he continues to do well and is able to eat and drink well without any GI problems. His weight has remained stable. No infections. Energy is good. Occasionally he has right hip pain for which very lately he takes Tylenol. He went to see a chiropractor and that relieved his pain a great deal. He is going to follow up with chiropractor again. Denies any new pains.     ROS:  Otherwise a comprehensive review of the system was performed and apart from what is mentioned above essentially it was unremarkable    MEDICATIONS:   Reviewed       PHYSICAL EXAMINATION:   /75  Pulse 63  Temp 97  F (36.1  C) (Oral)  Resp 18  Wt 95.8 kg (211 lb 4.8 oz)  SpO2  94%  BMI 29.58 kg/m2  CONSTITUTIONAL: no acute distress  EYES: PERRLA, no palor or icterus.   ENT/MOUTH: no mouth lesions. Oropharynx normal  CVS: s1s2 no m r g .   RESPIRATORY: clear to auscultation b/l  GI: soft non tender no hepatosplenomegaly  NEURO: AAOX3  he has subjective numbness of fingers and toes  INTEGUMENT: no obvious rashes  LYMPHATIC: no palpable cervical, supraclavicular, axillary or inguinal LAD  MUSCULOSKELETAL: Unremarkable. No bony tenderness.   EXTREMITIES: no edema  PSYCH: Mentation, mood and affect are normal. Decision making capacity is intact        LABS:   Reviewed and stable      ASSESSMENT AND PLAN:  Metastatic gastric cancer, HER-2/catrachita negative, MS I intact, presenting with early satiety and significant weight loss and epigastric discomfort upon eating with some vomiting.  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  There is an indeterminate right liver lobe lesion.      We started palliative chemotherapy with FOLFOX on 03/12/2017.     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 He did not like Cymbalta. He does not want to try gabapentin. At this time we will continue to wait and watch. Hopefully with time this will get better. He does mention that there are days when he can now button his shirt so this might be a little improvement. I even checked a vitamin B12 level which was normal     Cytopenias. These are due to chemotherapy. Slowly they are improving and today the cytopenias have resolved.    Right hip pain. Due to degenerative disease. He takes Tylenol as needed. He will follow with  chiropractor as needed.        We did not address the following today    His genetic testing did not show any identifiable mutation not reveal any identifiable mutation     Return to clinic as a scheduled    All of his and his family's questions were answered to their satisfaction and they are all in agreement with this plan     LANEY HERNANDEZ MD

## 2017-12-13 NOTE — NURSING NOTE
"Oncology Rooming Note    December 13, 2017 9:50 AM   Terry Yi is a 51 year old male who presents for:    Chief Complaint   Patient presents with     Oncology Clinic Visit     Initial Vitals: /75  Pulse 63  Temp 97  F (36.1  C) (Oral)  Resp 18  Wt 95.8 kg (211 lb 4.8 oz)  SpO2 94%  BMI 29.58 kg/m2 Estimated body mass index is 29.58 kg/(m^2) as calculated from the following:    Height as of 10/31/17: 1.8 m (5' 10.87\").    Weight as of this encounter: 95.8 kg (211 lb 4.8 oz). Body surface area is 2.19 meters squared.  Moderate Pain (5) Comment: R hip   No LMP for male patient.  Allergies reviewed: Yes  Medications reviewed: Yes    Medications: Medication refills not needed today.  Pharmacy name entered into EPIC: Data Unavailable    Clinical concerns: none MD was notified.    8 minutes for nursing intake (face to face time)     GREY MARCELO RN              "

## 2017-12-13 NOTE — MR AVS SNAPSHOT
After Visit Summary   12/13/2017    Terry Yi    MRN: [id number]           Patient Information     Date Of Birth          1965        Visit Information        Provider Department      12/13/2017 9:45 AM Yakov Shaffer MD [facility]        Today's Diagnoses     Malignant neoplasm of overlapping sites of stomach (H)    -  1      Care Instructions    Chemo today    CT scan 12/22    See me back as scheduled              Follow-ups after your visit        Your next 10 appointments already scheduled     Dec 22, 2017  8:00 AM CST   Nurse Only with MG IMAGING NURSE   [facility] ([facility])    [address] [place] [id number] [phone]            Dec 22, 2017  8:15 AM CST   CT CHEST ABDOMEN PELVIS W/O & W CONTRAST with MGCT1   [facility])    [address] [place] [id number] [phone]           Please bring any scans or X-rays taken at other hospitals, if similar tests were done. Also bring a list of your medicines, including vitamins, minerals and over-the-counter drugs. It is safest to leave personal items at home.  Be sure to tell your doctor:   If you have any allergies.   If there s any chance you are pregnant.   If you are breastfeeding.   If you have any special needs.  You may have contrast for this exam. To prepare:   Do not eat or drink for 2 hours before your exam. If you need to take medicine, you may take it with small sips of water. (We may ask you to take liquid medicine as well.)   The day before your exam, drink extra fluids at least six 8-ounce glasses (unless your doctor tells you to restrict your fluids).  Patients over 70 or patients with diabetes or kidney problems:   If you haven t had a blood test (creatinine test) within the last 30 days, go to your clinic or Diagnostic Imaging Department for this test.  If you have diabetes:   If your  kidney function is normal, continue taking your metformin (Avandamet, Glucophage, Glucovance, Metaglip) on the day of your exam.   If your kidney function is abnormal, wait 48 hours before restarting this medicine.  You will have oral contrast for this exam:   You will drink the contrast at home. Get this from your clinic or Diagnostic Imaging Department. Please follow the directions given.  Please wear loose clothing, such as a sweat suit or jogging clothes. Avoid snaps, zippers and other metal. We may ask you to undress and put on a hospital gown.  If you have any questions, please call the Imaging Department where you will have your exam.            Dec 27, 2017  7:30 AM CST   Return Visit with [facility]SE [facility]   [facility])    [address] [place] [id number]   [phone]            Dec 27, 2017  8:15 AM CST   Return Visit with Yakov Shaffer MD   [facility])    [address] [place] [id number]   [phone]            Dec 27, 2017  9:00 AM CST   Level 2 with BAY 3 INFUSION   [facility])    [address] [place] [address]   [phone]              Who to contact     If you have questions or need follow up information about today's clinic visit or your schedule please contact [facility] directly at [phone].  Normal or non-critical lab and imaging results will be communicated to you by MyChart, letter or phone within 4 business days after the clinic has received the results. If you do not hear from us within 7 days, please contact the clinic through DerbyJackpothart or phone. If you have a critical or abnormal lab result, we will notify you by phone as soon as possible.  Submit refill requests through Chobani or call your pharmacy and they will forward the refill request to us. Please allow 3 business  days for your refill to be completed.          Additional Information About Your Visit        SoundCloudhart Information     B2B-Center gives you secure access to your electronic health record. If you see a primary care provider, you can also send messages to your care team and make appointments. If you have questions, please call your primary care clinic.  If you do not have a primary care provider, please call [phone] and they will assist you.      B2B-Center is an electronic gateway that provides easy, online access to your medical records. With B2B-Center, you can request a clinic appointment, read your test results, renew a prescription or communicate with your care team.     To access your existing account, please contact your [facility] or call [phone] for assistance.        Care EveryWhere ID     This is your Care EveryWhere ID. This could be used by other organizations to access your [place] medical records  [id number]        Your Vitals Were     Pulse Temperature Respirations Pulse Oximetry BMI (Body Mass Index)       63 97  F (36.1  C) (Oral) 18 94% 29.58 kg/m2        Blood Pressure from Last 3 Encounters:   12/13/17 114/75   11/29/17 128/80   11/15/17 128/81    Weight from Last 3 Encounters:   12/13/17 95.8 kg (211 lb 4.8 oz)   11/29/17 95.2 kg (209 lb 12.8 oz)   11/15/17 94.8 kg (209 lb)              Today, you had the following     No orders found for display       Primary Care Provider Office Phone # Fax #    Sandra Dickerson [phone]       [facility]  [address]  [facility] [address]        Equal Access to Services     [facility]: Hadii aad ku hadasho Soomaali, waaxda luqadaha, qaybta kaalmada adeegyada, claire browne. So [facility] [phone].    ATENCIÓN: Si habla español, tiene a cuevas disposición servicios gratuitos de asistencia lingüística. Llame al [phone].    We comply with applicable federal civil rights  laws and [place] laws. We do not discriminate on the basis of race, color, national origin, age, disability, sex, sexual orientation, or gender identity.            Thank you!     Thank you for choosing [facility]  for your care. Our goal is always to provide you with excellent care. Hearing back from our patients is one way we can continue to improve our services. Please take a few minutes to complete the written survey that you may receive in the mail after your visit with us. Thank you!             Your Updated Medication List - Protect others around you: Learn how to safely use, store and throw away your medicines at www.disposemymeds.org.          This list is accurate as of: 12/13/17 11:42 AM.  Always use your most recent med list.                   Brand Name Dispense Instructions for use Diagnosis    allopurinol 100 MG tablet    ZYLOPRIM     as needed (when eating shrimp)        aspirin 81 MG chewable tablet      Take 81 mg by mouth daily        lidocaine-prilocaine cream    EMLA          LORazepam 0.5 MG tablet    ATIVAN    30 tablet    Take 1 tablet (0.5 mg) by mouth every 4 hours as needed (Anxiety, Nausea/Vomiting or Sleep)    Malignant neoplasm of overlapping sites of stomach (H)       ondansetron 8 MG tablet    ZOFRAN    10 tablet    Take 1 tablet (8 mg) by mouth every 8 hours as needed (Nausea/Vomiting)    Malignant neoplasm of overlapping sites of stomach (H)       prochlorperazine 10 MG tablet    COMPAZINE    30 tablet    Take 1 tablet (10 mg) by mouth every 6 hours as needed (Nausea/Vomiting)    Malignant neoplasm of overlapping sites of stomach (H)

## 2017-12-22 ENCOUNTER — ANCILLARY PROCEDURE (OUTPATIENT)
Dept: GENERAL RADIOLOGY | Facility: CLINIC | Age: 52
End: 2017-12-22
Payer: COMMERCIAL

## 2017-12-22 ENCOUNTER — RADIANT APPOINTMENT (OUTPATIENT)
Dept: CT IMAGING | Facility: CLINIC | Age: 52
End: 2017-12-22
Attending: INTERNAL MEDICINE
Payer: COMMERCIAL

## 2017-12-22 DIAGNOSIS — G62.9 NEUROPATHY: ICD-10-CM

## 2017-12-22 DIAGNOSIS — C16.8 MALIGNANT NEOPLASM OF OVERLAPPING SITES OF STOMACH (H): Primary | ICD-10-CM

## 2017-12-22 DIAGNOSIS — C16.8 MALIGNANT NEOPLASM OF OVERLAPPING SITES OF STOMACH (H): ICD-10-CM

## 2017-12-22 LAB
CREAT BLD-MCNC: 0.9 MG/DL (ref 0.66–1.25)
GFR SERPL CREATININE-BSD FRML MDRD: 88 ML/MIN/1.7M2

## 2017-12-22 PROCEDURE — 71260 CT THORAX DX C+: CPT | Performed by: RADIOLOGY

## 2017-12-22 PROCEDURE — 82565 ASSAY OF CREATININE: CPT | Performed by: INTERNAL MEDICINE

## 2017-12-22 PROCEDURE — 74177 CT ABD & PELVIS W/CONTRAST: CPT | Performed by: RADIOLOGY

## 2017-12-22 RX ORDER — IOPAMIDOL 755 MG/ML
130 INJECTION, SOLUTION INTRAVASCULAR ONCE
Status: COMPLETED | OUTPATIENT
Start: 2017-12-22 | End: 2017-12-22

## 2017-12-22 RX ADMIN — IOPAMIDOL 130 ML: 755 INJECTION, SOLUTION INTRAVASCULAR at 08:20

## 2017-12-22 NOTE — MR AVS SNAPSHOT
MRN:[id number]                      After Visit Summary   12/22/2017    Terry Yi    MRN: [id number]           Visit Information        Provider Department      12/22/2017 8:00 AM MG IMAGING NURSE; MG BREAST NURSE [facility]        Your next 10 appointments already scheduled     Dec 27, 2017  7:30 AM CST   Return Visit with NURSE ONLY CANCER CENTER   [facility] ([facility])    [address] [place] [id number] [phone]            Dec 27, 2017  8:15 AM CST   Return Visit with Yakov Shaffer MD   [facility] ([facility])    [address] [place] [address] [phone]            Dec 27, 2017  9:00 AM CST   Level 2 with BAY 3 INFUSION   [facility] ([facility])    [address] [place] [phone]              COZerohart Information     Skuid gives you secure access to your electronic health record. If you see a primary care provider, you can also send messages to your care team and make appointments. If you have questions, please call your primary care clinic.  If you do not have a primary care provider, please call [phone] and they will assist you.      Skuid is an electronic gateway that provides easy, online access to your medical records. With Skuid, you can request a clinic appointment, read your test results, renew a prescription or communicate with your care team.     To access your existing account, please contact your [facility] or call [phone] for assistance.        Care EveryWhere ID     This is your Care EveryWhere ID. This could be used by other organizations to access your [place] medical records  [id number]        Equal Access to Services     CHANDAN GALLO AH: matt Henao, claire whitmore  charley cowart ah. So [facility] [phone].    ATENCIÓN: Si habla español, tiene a cuevas disposición servicios gratuitos de asistencia lingüística. Llame al [phone].    We comply with applicable federal civil rights laws and [place] laws. We do not discriminate on the basis of race, color, national origin, age, disability, sex, sexual orientation, or gender identity.

## 2017-12-22 NOTE — PROGRESS NOTES
IVAD accessed with 20 gauge 3/4 inch matamoros gripper plus needle.  Flushed with 10 ml Sterile 0.9% NaCl (NDC [address]-10) and dressed with sterile Tegaderm.  Blood return positive: Yes.  Site without redness, tenderness or swelling: Yes.  Flushed with 20 cc NS (NDC [address]-37)and 5 cc 100 unit/ml Heparin (NDC [address]-14)  Needle: d/c'd intact  Comments: 1mL of blood drawn for CT technician.

## 2017-12-26 LAB — COPATH REPORT: NORMAL

## 2017-12-27 ENCOUNTER — ONCOLOGY VISIT (OUTPATIENT)
Dept: ONCOLOGY | Facility: CLINIC | Age: 52
End: 2017-12-27
Payer: COMMERCIAL

## 2017-12-27 ENCOUNTER — INFUSION THERAPY VISIT (OUTPATIENT)
Dept: INFUSION THERAPY | Facility: CLINIC | Age: 52
End: 2017-12-27
Payer: COMMERCIAL

## 2017-12-27 VITALS
DIASTOLIC BLOOD PRESSURE: 79 MMHG | OXYGEN SATURATION: 98 % | BODY MASS INDEX: 30.46 KG/M2 | HEART RATE: 64 BPM | WEIGHT: 217.6 LBS | SYSTOLIC BLOOD PRESSURE: 129 MMHG | TEMPERATURE: 97.5 F | RESPIRATION RATE: 18 BRPM

## 2017-12-27 DIAGNOSIS — C16.8 MALIGNANT NEOPLASM OF OVERLAPPING SITES OF STOMACH (H): Primary | ICD-10-CM

## 2017-12-27 LAB
ALBUMIN SERPL-MCNC: 3.7 G/DL (ref 3.4–5)
ALP SERPL-CCNC: 74 U/L (ref 40–150)
ALT SERPL W P-5'-P-CCNC: 66 U/L (ref 0–70)
ANION GAP SERPL CALCULATED.3IONS-SCNC: 5 MMOL/L (ref 3–14)
AST SERPL W P-5'-P-CCNC: 39 U/L (ref 0–45)
BASOPHILS # BLD AUTO: 0 10E9/L (ref 0–0.2)
BASOPHILS NFR BLD AUTO: 0.8 %
BILIRUB SERPL-MCNC: 0.4 MG/DL (ref 0.2–1.3)
BUN SERPL-MCNC: 12 MG/DL (ref 7–30)
CALCIUM SERPL-MCNC: 8.9 MG/DL (ref 8.5–10.1)
CHLORIDE SERPL-SCNC: 106 MMOL/L (ref 94–109)
CO2 SERPL-SCNC: 29 MMOL/L (ref 20–32)
CREAT SERPL-MCNC: 0.83 MG/DL (ref 0.66–1.25)
DIFFERENTIAL METHOD BLD: ABNORMAL
EOSINOPHIL # BLD AUTO: 0.1 10E9/L (ref 0–0.7)
EOSINOPHIL NFR BLD AUTO: 2.6 %
ERYTHROCYTE [DISTWIDTH] IN BLOOD BY AUTOMATED COUNT: 14.6 % (ref 10–15)
GFR SERPL CREATININE-BSD FRML MDRD: >90 ML/MIN/1.7M2
GLUCOSE SERPL-MCNC: 86 MG/DL (ref 70–99)
HCT VFR BLD AUTO: 39.2 % (ref 40–53)
HGB BLD-MCNC: 12.8 G/DL (ref 13.3–17.7)
IMM GRANULOCYTES # BLD: 0 10E9/L (ref 0–0.4)
IMM GRANULOCYTES NFR BLD: 0.4 %
LYMPHOCYTES # BLD AUTO: 1.7 10E9/L (ref 0.8–5.3)
LYMPHOCYTES NFR BLD AUTO: 32.1 %
MCH RBC QN AUTO: 31.1 PG (ref 26.5–33)
MCHC RBC AUTO-ENTMCNC: 32.7 G/DL (ref 31.5–36.5)
MCV RBC AUTO: 95 FL (ref 78–100)
MONOCYTES # BLD AUTO: 0.6 10E9/L (ref 0–1.3)
MONOCYTES NFR BLD AUTO: 11.7 %
NEUTROPHILS # BLD AUTO: 2.8 10E9/L (ref 1.6–8.3)
NEUTROPHILS NFR BLD AUTO: 52.4 %
PLATELET # BLD AUTO: 147 10E9/L (ref 150–450)
POTASSIUM SERPL-SCNC: 4.3 MMOL/L (ref 3.4–5.3)
PROT SERPL-MCNC: 6.7 G/DL (ref 6.8–8.8)
RBC # BLD AUTO: 4.12 10E12/L (ref 4.4–5.9)
SODIUM SERPL-SCNC: 140 MMOL/L (ref 133–144)
WBC # BLD AUTO: 5.3 10E9/L (ref 4–11)

## 2017-12-27 PROCEDURE — 80053 COMPREHEN METABOLIC PANEL: CPT | Performed by: INTERNAL MEDICINE

## 2017-12-27 PROCEDURE — 85025 COMPLETE CBC W/AUTO DIFF WBC: CPT | Performed by: INTERNAL MEDICINE

## 2017-12-27 PROCEDURE — 96416 CHEMO PROLONG INFUSE W/PUMP: CPT | Performed by: INTERNAL MEDICINE

## 2017-12-27 PROCEDURE — 96367 TX/PROPH/DG ADDL SEQ IV INF: CPT | Performed by: INTERNAL MEDICINE

## 2017-12-27 PROCEDURE — 99207 ZZC NO CHARGE NURSE ONLY: CPT

## 2017-12-27 PROCEDURE — 96409 CHEMO IV PUSH SNGL DRUG: CPT | Performed by: INTERNAL MEDICINE

## 2017-12-27 PROCEDURE — 99214 OFFICE O/P EST MOD 30 MIN: CPT | Mod: 25 | Performed by: INTERNAL MEDICINE

## 2017-12-27 PROCEDURE — 90471 IMMUNIZATION ADMIN: CPT | Performed by: INTERNAL MEDICINE

## 2017-12-27 PROCEDURE — 90686 IIV4 VACC NO PRSV 0.5 ML IM: CPT | Performed by: INTERNAL MEDICINE

## 2017-12-27 RX ORDER — ALBUTEROL SULFATE 90 UG/1
1-2 AEROSOL, METERED RESPIRATORY (INHALATION)
Status: CANCELLED
Start: 2017-12-27

## 2017-12-27 RX ORDER — LORAZEPAM 2 MG/ML
0.5 INJECTION INTRAMUSCULAR EVERY 4 HOURS PRN
Status: CANCELLED
Start: 2017-12-27

## 2017-12-27 RX ORDER — DIPHENHYDRAMINE HYDROCHLORIDE 50 MG/ML
50 INJECTION INTRAMUSCULAR; INTRAVENOUS
Status: CANCELLED
Start: 2017-12-27

## 2017-12-27 RX ORDER — SODIUM CHLORIDE 9 MG/ML
1000 INJECTION, SOLUTION INTRAVENOUS CONTINUOUS PRN
Status: CANCELLED
Start: 2017-12-27

## 2017-12-27 RX ORDER — EPINEPHRINE 0.3 MG/.3ML
0.3 INJECTION SUBCUTANEOUS EVERY 5 MIN PRN
Status: CANCELLED | OUTPATIENT
Start: 2017-12-27

## 2017-12-27 RX ORDER — METHYLPREDNISOLONE SODIUM SUCCINATE 125 MG/2ML
125 INJECTION, POWDER, LYOPHILIZED, FOR SOLUTION INTRAMUSCULAR; INTRAVENOUS
Status: CANCELLED
Start: 2017-12-27

## 2017-12-27 RX ORDER — FLUOROURACIL 50 MG/ML
400 INJECTION, SOLUTION INTRAVENOUS ONCE
Status: COMPLETED | OUTPATIENT
Start: 2017-12-27 | End: 2017-12-27

## 2017-12-27 RX ORDER — FLUOROURACIL 50 MG/ML
400 INJECTION, SOLUTION INTRAVENOUS ONCE
Status: CANCELLED | OUTPATIENT
Start: 2017-12-27

## 2017-12-27 RX ORDER — HEPARIN SODIUM (PORCINE) LOCK FLUSH IV SOLN 100 UNIT/ML 100 UNIT/ML
5 SOLUTION INTRAVENOUS
Status: DISCONTINUED | OUTPATIENT
Start: 2017-12-27 | End: 2017-12-27 | Stop reason: HOSPADM

## 2017-12-27 RX ORDER — ALBUTEROL SULFATE 0.83 MG/ML
2.5 SOLUTION RESPIRATORY (INHALATION)
Status: CANCELLED | OUTPATIENT
Start: 2017-12-27

## 2017-12-27 RX ORDER — MEPERIDINE HYDROCHLORIDE 50 MG/ML
25 INJECTION INTRAMUSCULAR; INTRAVENOUS; SUBCUTANEOUS EVERY 30 MIN PRN
Status: CANCELLED | OUTPATIENT
Start: 2017-12-27

## 2017-12-27 RX ORDER — EPINEPHRINE 1 MG/ML
0.3 INJECTION, SOLUTION INTRAMUSCULAR; SUBCUTANEOUS EVERY 5 MIN PRN
Status: CANCELLED | OUTPATIENT
Start: 2017-12-27

## 2017-12-27 RX ADMIN — FLUOROURACIL 830 MG: 50 INJECTION, SOLUTION INTRAVENOUS at 10:02

## 2017-12-27 RX ADMIN — HEPARIN SODIUM (PORCINE) LOCK FLUSH IV SOLN 100 UNIT/ML 5 ML: 100 SOLUTION at 07:35

## 2017-12-27 ASSESSMENT — PAIN SCALES - GENERAL: PAINLEVEL: NO PAIN (0)

## 2017-12-27 NOTE — MR AVS SNAPSHOT
After Visit Summary   12/27/2017    Terry Yi    MRN: [id number]           Patient Information     Date Of Birth          1965        Visit Information        Provider Department      12/27/2017 8:15 AM Yakov Shaffer MD [facility]        Today's Diagnoses     Malignant neoplasm of overlapping sites of stomach (H)    -  1      Care Instructions    Chemo today  Flu shot today    In 2 weeks, RTC to see me and labs and next chemo            Follow-ups after your visit        Your next 10 appointments already scheduled     Jovanny 10, 2018  7:30 AM CST   Return Visit with NURSE ONLY CANCER CENTER   [facility] ([facility])    [address] [place] [phone]            Jovanny 10, 2018  8:15 AM CST   Return Visit with Yakov Shaffer MD   [facility])    [address] [place] [address] [phone]            Jovanny 10, 2018  8:45 AM CST   Level 2 with BAY 4 INFUSION   [facility])    [address] [place] [phone]              Who to contact     If you have questions or need follow up information about today's clinic visit or your schedule please contact [facility] directly at [phone].  Normal or non-critical lab and imaging results will be communicated to you by MyChart, letter or phone within 4 business days after the clinic has received the results. If you do not hear from us within 7 days, please contact the clinic through MyChart or phone. If you have a critical or abnormal lab result, we will notify you by phone as soon as possible.  Submit refill requests through Jaspersoft or call your pharmacy and they will forward the refill request to us. Please allow 3 business days for your refill to be completed.          Additional Information About Your Visit         Pinwine.cnhart Information     Sciences-U gives you secure access to your electronic health record. If you see a primary care provider, you can also send messages to your care team and make appointments. If you have questions, please call your primary care clinic.  If you do not have a primary care provider, please call [phone] and they will assist you.      Sciences-U is an electronic gateway that provides easy, online access to your medical records. With Sciences-U, you can request a clinic appointment, read your test results, renew a prescription or communicate with your care team.     To access your existing account, please contact your [facility] or call [phone] for assistance.        Care EveryWhere ID     This is your Care EveryWhere ID. This could be used by other organizations to access your [place] medical records  [id number]        Your Vitals Were     Pulse Temperature Respirations Pulse Oximetry BMI (Body Mass Index)       64 97.5  F (36.4  C) (Oral) 18 98% 30.46 kg/m2        Blood Pressure from Last 3 Encounters:   12/27/17 129/79   12/13/17 114/75   11/29/17 128/80    Weight from Last 3 Encounters:   12/27/17 98.7 kg (217 lb 9.6 oz)   12/13/17 95.8 kg (211 lb 4.8 oz)   11/29/17 95.2 kg (209 lb 12.8 oz)              Today, you had the following     No orders found for display       Primary Care Provider Office Phone # Fax #    Sandra Dickerson [phone]       [facility]  [address]  [facility] [address]        Equal Access to Services     [facility]: Hadii aad ku hadasho Soomaali, waaxda luqadaha, qaybta kaalmada adeegyada, claire mcdaniel hayguillermo cowart . So [facility] [phone].    ATENCIÓN: Si habla español, tiene a cuevas disposición servicios gratuitos de asistencia lingüística. Llame al [phone].    We comply with applicable federal civil rights laws and [place] laws. We do not discriminate on the basis of race, color,  national origin, age, disability, sex, sexual orientation, or gender identity.            Thank you!     Thank you for choosing [facility]  for your care. Our goal is always to provide you with excellent care. Hearing back from our patients is one way we can continue to improve our services. Please take a few minutes to complete the written survey that you may receive in the mail after your visit with us. Thank you!             Your Updated Medication List - Protect others around you: Learn how to safely use, store and throw away your medicines at www.disposemymeds.org.          This list is accurate as of: 12/27/17  9:46 AM.  Always use your most recent med list.                   Brand Name Dispense Instructions for use Diagnosis    allopurinol 100 MG tablet    ZYLOPRIM     as needed (when eating shrimp)        aspirin 81 MG chewable tablet      Take 81 mg by mouth daily        lidocaine-prilocaine cream    EMLA          LORazepam 0.5 MG tablet    ATIVAN    30 tablet    Take 1 tablet (0.5 mg) by mouth every 4 hours as needed (Anxiety, Nausea/Vomiting or Sleep)    Malignant neoplasm of overlapping sites of stomach (H)       ondansetron 8 MG tablet    ZOFRAN    10 tablet    Take 1 tablet (8 mg) by mouth every 8 hours as needed (Nausea/Vomiting)    Malignant neoplasm of overlapping sites of stomach (H)       prochlorperazine 10 MG tablet    COMPAZINE    30 tablet    Take 1 tablet (10 mg) by mouth every 6 hours as needed (Nausea/Vomiting)    Malignant neoplasm of overlapping sites of stomach (H)

## 2017-12-27 NOTE — PROGRESS NOTES
"Infusion Nursing Note:  Terry Mckee presents today for Leucovorin/5FU pump  Patient seen by provider today: Yes: Edmund   present during visit today: Not Applicable.    Note: Dr Shaffer ordered the flu vaccine for pt today.  Injectable Influenza Immunization Documentation    1.  Has the patient received the information for the injectable influenza vaccine? YES     2. Is the patient 6 months of age or older? YES     3. Does the patient have any of the following contraindications?         Severe allergy to eggs?  No     Severe allergic reaction to previous influenza vaccines?  No   Severe allergy to latex?  No       History of Guillain-[place] syndrome?  No     Currently have a temperature greater than 100.4F?  No        4.  Severely egg allergic patients should have flu vaccine eligibility assessed by an MD, RN, or pharmacist, and those who received flu vaccine should be observed for 15 min by an MD, RN, Pharmacist, Medical Technician, or member of clinic staff.\": NO    5. Latex-allergic patients should be given latex-free influenza vaccine No.  Per pharmacy - the vaccine is latex free **Please reference the Vaccine latex table to determine if your clinic s product is latex-containing.       Vaccination given by Matilda Ziegler RN        Intravenous Access:  Implanted Port.    Treatment Conditions:  Lab Results   Component Value Date    HGB 12.8 12/27/2017     Lab Results   Component Value Date    WBC 5.3 12/27/2017      Lab Results   Component Value Date    ANEU 2.8 12/27/2017     Lab Results   Component Value Date     12/27/2017      Lab Results   Component Value Date     12/27/2017                   Lab Results   Component Value Date    POTASSIUM 4.3 12/27/2017           No results found for: MAG         Lab Results   Component Value Date    CR 0.83 12/27/2017                   Lab Results   Component Value Date    BRITTNEY 8.9 12/27/2017                Lab Results   Component Value Date    BILITOTAL " "0.4 12/27/2017           Lab Results   Component Value Date    ALBUMIN 3.7 12/27/2017                    Lab Results   Component Value Date    ALT 66 12/27/2017           Lab Results   Component Value Date    AST 39 12/27/2017     Results reviewed, labs MET treatment parameters, ok to proceed with treatment.          Post Infusion Assessment:  Patient tolerated infusion without incident.  Blood return noted pre and post infusion.  No evidence of extravasations.    Prior to discharge: Port is secured in place with tegaderm and flushed with 10cc NS with positive blood return noted.  Continuous home infusion Dosi-Fuser pump connected.    All connectors secured in place and clamps taped open.    Pump started, \"running\" noted on display (CADD): YES and Not Applicable.  Patient instructed to call our clinic or [facility] with any questions or concerns at home.  Patient verbalized understanding.    Patient set up for pump disconnect at home at 0810 on 12/29/17 - pt states he lives 90 minutes away and calls for the disconnect time at clinic closer to home.      Discharge Plan:   Discharge instructions reviewed with: Patient and Family.  Patient and/or family verbalized understanding of discharge instructions and all questions answered.  Patient discharged in stable condition accompanied by: self and wife.  Departure Mode: Ambulatory.    Matilda Ziegler RN                    "

## 2017-12-27 NOTE — PROGRESS NOTES
Needle left accessed for treatment. Tolerated lab draw without complaint. Crane drawn-Red/Green/Purple tubes. See documentation flowsheet. Arleth Brandon, RN, BSN, OCN

## 2017-12-27 NOTE — NURSING NOTE
"Oncology Rooming Note    December 27, 2017 8:08 AM   Terry Yi is a 52 year old male who presents for:    Chief Complaint   Patient presents with     Oncology Clinic Visit     Return     Initial Vitals: /79 (BP Location: Left arm, Patient Position: Sitting, Cuff Size: Adult Regular)  Pulse 64  Temp 97.5  F (36.4  C) (Oral)  Resp 18  Wt 98.7 kg (217 lb 9.6 oz)  SpO2 98%  BMI 30.46 kg/m2 Estimated body mass index is 30.46 kg/(m^2) as calculated from the following:    Height as of 10/31/17: 1.8 m (5' 10.87\").    Weight as of this encounter: 98.7 kg (217 lb 9.6 oz). Body surface area is 2.22 meters squared.  No Pain (0) Comment: Data Unavailable   No LMP for male patient.  Allergies reviewed: Yes  Medications reviewed: Yes    Medications: Medication refills not needed today.  Pharmacy name entered into EPIC: Data Unavailable    Clinical concerns: No concerns today, states he is doing well. Edmund was notified.    7 minutes for nursing intake (face to face time)     Matilda Ziegler RN            "

## 2017-12-27 NOTE — LETTER
12/27/2017         RE: Terry Yi  [address] [place] [address]        Dear Colleague,    Thank you for referring your patient, Terry Yi, to the [facility]. Please see a copy of my visit note below.    12/27/2017     REASON FOR VISIT:  Follow-up for unresectable metastatic gastric carcinoma, diffuse type, grade 3, HER-2 negative, MSI-Intact.      HISTORY OF PRESENT ILLNESS:  Please see my previous note for details.  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      CT CAP after C#6 shows stable disease    C# 9 FOLFOX 8/2/2017 ( Oxaliplatin dose reduced to 70mg/m2 due to neuropathy )  C#10 8/22/2017 ( Oxaliplatin dose reduced to 60mg/m2 due to neuropathy )  Delayed by 1 week- patient preference  C#11 9/6/17 Only 5FU/LV  C#12 9/20/2017 5FU/LV    Repeat CT scan after C#12 shows stable to slightly improved disease.    C#13 10/4/2017 5FU/LV  C#14 10/18/2017 5FU/LV  C#15 10/31/2017 5FU/LV  C#16 11/15/2017 5FU/LV  C#17 11/29/2017 5FU/LV   C#18 12/13/2017 5FU/LV    Repeat CT CAP 12/22/17 is stable    C#19 12/27/2017 5FU/LV     INTERVAL HISTORY:   He is doing well. He is tolerating chemotherapy nicely without any nausea or vomiting diarrhea or constipation. He been able to eat and drink well. He has gained some more weight. Denies any new swellings. Energy is good. No infections. No trouble breathing. He continues to have neuropathy which he describes as numbness and tingling of fingers and feet. There are days when he can button his shirts but then there are other days when it is more difficult for him to do so. Otherwise he continues to be fully functional and is working full-time. He denies any new neurological problems    ROS:  A comprehensive ROS was otherwise  neg      MEDICATIONS:   Current Outpatient Prescriptions   Medication     allopurinol (ZYLOPRIM) 100 MG tablet     aspirin 81 MG chewable tablet     LORazepam (ATIVAN) 0.5 MG tablet     prochlorperazine (COMPAZINE) 10 MG tablet     ondansetron (ZOFRAN) 8 MG tablet     lidocaine-prilocaine (EMLA) cream     No current facility-administered medications for this visit.      Facility-Administered Medications Ordered in Other Visits   Medication     heparin 100 UNIT/ML injection 5 mL     sodium chloride (PF) 0.9% PF flush 10-20 mL            PHYSICAL EXAMINATION:   /79 (BP Location: Left arm, Patient Position: Sitting, Cuff Size: Adult Regular)  Pulse 64  Temp 97.5  F (36.4  C) (Oral)  Resp 18  Wt 98.7 kg (217 lb 9.6 oz)  SpO2 98%  BMI 30.46 kg/m2  CONSTITUTIONAL: No apparent distress  EYES: PERRLA, without pallor or jaundice  ENT/MOUTH: Ears unremarkable. No oral lesions  CVS: s1s2 normal  RESPIRATORY: Chest is clear  GI: Abdomen is benign  NEURO: He is alert and oriented ×3. Subjective numbness of fingers and feet  INTEGUMENT: no concerning his skin rashes   LYMPHATIC: no palpable lymphadenopathy  MUSCULOSKELETAL: Unremarkable. No bony tenderness.   EXTREMITIES: no pedal edema  PSYCH: Mentation, mood and affect are appropriate        LABS:   Results for LEONOR RALPH (MRN [id number]) as of 12/27/2017 08:08   Ref. Range 12/27/2017 07:34   Sodium Latest Ref Range: 133 - 144 mmol/L 140   Potassium Latest Ref Range: 3.4 - 5.3 mmol/L 4.3   Chloride Latest Ref Range: 94 - 109 mmol/L 106   Carbon Dioxide Latest Ref Range: 20 - 32 mmol/L 29   Urea Nitrogen Latest Ref Range: 7 - 30 mg/dL 12   Creatinine Latest Ref Range: 0.66 - 1.25 mg/dL 0.83   GFR Estimate Latest Ref Range: >60 mL/min/1.7m2 >90   GFR Estimate If Black Latest Ref Range: >60 mL/min/1.7m2 >90   Calcium Latest Ref Range: 8.5 - 10.1 mg/dL 8.9   Anion Gap Latest Ref Range: 3 - 14 mmol/L 5   Albumin Latest Ref Range: 3.4 - 5.0 g/dL 3.7   Protein Total  Latest Ref Range: 6.8 - 8.8 g/dL 6.7 (L)   Bilirubin Total Latest Ref Range: 0.2 - 1.3 mg/dL 0.4   Alkaline Phosphatase Latest Ref Range: 40 - 150 U/L 74   ALT Latest Ref Range: 0 - 70 U/L 66   AST Latest Ref Range: 0 - 45 U/L 39   Glucose Latest Ref Range: 70 - 99 mg/dL 86   WBC Latest Ref Range: 4.0 - 11.0 10e9/L 5.3   Hemoglobin Latest Ref Range: 13.3 - 17.7 g/dL 12.8 (L)   Hematocrit Latest Ref Range: 40.0 - 53.0 % 39.2 (L)   Platelet Count Latest Ref Range: 150 - 450 10e9/L 147 (L)   RBC Count Latest Ref Range: 4.4 - 5.9 10e12/L 4.12 (L)   MCV Latest Ref Range: 78 - 100 fl 95   MCH Latest Ref Range: 26.5 - 33.0 pg 31.1   MCHC Latest Ref Range: 31.5 - 36.5 g/dL 32.7   RDW Latest Ref Range: 10.0 - 15.0 % 14.6   Diff Method Unknown Automated Method   % Neutrophils Latest Units: % 52.4   % Lymphocytes Latest Units: % 32.1   % Monocytes Latest Units: % 11.7   % Eosinophils Latest Units: % 2.6   % Basophils Latest Units: % 0.8   % Immature Granulocytes Latest Units: % 0.4   Absolute Neutrophil Latest Ref Range: 1.6 - 8.3 10e9/L 2.8   Absolute Lymphocytes Latest Ref Range: 0.8 - 5.3 10e9/L 1.7   Absolute Monocytes Latest Ref Range: 0.0 - 1.3 10e9/L 0.6       IMAGING  EXAMINATION: CT CHEST/ABDOMEN/PELVIS W CONTRAST, 12/22/2017 8:23 AM     TECHNIQUE:  Helical CT images from the thoracic inlet through the  symphysis pubis were obtained  with contrast. Contrast dose: 130 ml  isovue 370     COMPARISON: Chest abdomen pelvis on 9/29/2017     HISTORY: follow up for gastric ca; Neuropathy; Malignant neoplasm of  overlapping sites of stomach (H)     FINDINGS:     Chest: Right IJ Port-A-Cath tip is in the low SVC. There is an  unchanged 0.9 cm hypodense lesion in the posterior right thyroid lobe  (series 3 image 8). Heart size is within normal limits. No pericardial  effusion. The calibers of the main pulmonary artery and ascending  aorta are within normal limits. Multiple calcified mediastinal and  hilar lymph nodes. Mildly  prominent retrocardiac lymph node measuring  1.3 x 0.9 cm (series 3 image 121), unchanged as compared to prior  exams.     No evidence of pleural effusion or pneumothorax. Mild bibasilar  dependent atelectasis. Numerous calcified pulmonary granulomas, for  example 3 mm left lower lobe calcified granuloma (series 8 image 81).  Another example is 4 mm right lower lobe calcified pulmonary granuloma  (series 8 image 88).     Abdomen and pelvis: There is 1.3 x 1.4 cm subcapsular hypodense lesion  in the right hepatic lobe (series 3 image 138), not significantly  changed as compared to 3/9/2017 exam. Smaller hypodense lesion along  the fissure for ligamentum teres (Series 3 Image 144), too small to  characterize. The liver, spleen, adrenal glands, kidneys and  gallbladder are unremarkable. Fatty infiltration of the pancreas.     Redemonstration of mild gastric wall thickening, not significantly  changed as compared to 9/29/2017 exam. Multiple prominent mesenteric  and retroperitoneal lymph nodes, for example 1.8 x 1.2 cm along the  anterior aspect of the IVC (series 3 image 162). Redemonstration of  periaortic haziness with mildly prominent but not enlarged para-aortic  lymph nodes. Scattered atherosclerotic calcification of the abdominal  aorta. Scattered colon diverticulosis without CT evidence of acute  diverticular disease.     Bones and soft tissues: Mild multilevel degenerative changes of the  thoracal lumbar spine. No worrisome osseous lesion.         IMPRESSION:   1. Redemonstration of the mild diffuse gastric wall thickening,  consistent with patient's history of diffuse gastric cancer. Mild  haziness in the perigastric fat with prominent lymph nodes again seen,  similar to previous exams.  2. Again noted is indeterminant 1.4 cm subcapsular hypodense lesion in  the right hepatic lobe, not significantly changed as compared to  3/9/2017 exam.   3. Scattered colonic diverticulosis without CT evidence of  acute  diverticulitis.  4. Numerous calcified pulmonary, hilar and mediastinal granulomas.       ASSESSMENT AND PLAN:  Metastatic gastric cancer, HER-2/catrachita negative, MSI intact, presenting with early satiety and significant weight loss and epigastric discomfort upon eating with some vomiting.  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  There is an indeterminate right liver lobe lesion.      We started palliative chemotherapy with FOLFOX on 03/12/2017.     He is receiving 5-FU/leucovorin from cycle #11 onwards as we have dropped oxaliplatin due to worsening neuropathy after 10 cycles    Scans after C#18 also show stable disease  He is doing really well and is relatively asymptomatic apart from the neuropathy which is mentioned below. 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 This has slightly improved with time. We previously tried Cymbalta which she did not like. We had discussed about trying gabapentin but he wants to wait for now. Vitamin B12 level was also normal. At this time he continues to be on observation with regards to this.     Cytopenias. Mild anemia and mild thrombocytopenia- asymptomatic and due to chemotherapy. Needs continued observation     We did not address the following today  Right hip pain. Due to degenerative disease. He takes Tylenol as needed. He will follow with chiropractor as needed.      His genetic testing did not show any identifiable mutation not reveal any identifiable mutation     Return to clinic in 2 weeks for next chemo and see me    I answered all of his and his family's questions to their satisfaction and they are all comfortable and in agreement with this plan     LANEY HERNANDEZ MD              Again,  thank you for allowing me to participate in the care of your patient.        Sincerely,        Yakov Shaffer MD

## 2017-12-27 NOTE — PATIENT INSTRUCTIONS
Preventive Care:    Breast Cancer Screening: During our visit today, we discussed that it is recommended you receive breast cancer screening. Please call or make an appointment with your primary care provider to discuss this with them. You may also call the  [facility] scheduling line ([phone]) to set up a mammography appointment at the Breast Center within the [facility].    Colorectal Cancer Screening: During our visit today, we discussed that it is recommended you receive colorectal cancer screening. Please call or make an appointment with your primary care provider to discuss this. You may also call the  [facility] scheduling line ([phone]) to set up a colonoscopy appointment.    Diabetic Eye Exam Screening: During our visit today, we discussed that it is recommended you receive diabetic eye exam screening. Please call or make an appointment with your primary care provider to discuss this with them. You may also call the  [facility] scheduling line ([phone]) to set up an eye exam at one of the [facility].

## 2017-12-27 NOTE — MR AVS SNAPSHOT
After Visit Summary   12/27/2017    Terry Yi    MRN: [id number]           Patient Information     Date Of Birth          1965        Visit Information        Provider Department      12/27/2017 9:00 AM BAY 3 INFUSION [facility]        Today's Diagnoses     Malignant neoplasm of overlapping sites of stomach (H)    -  1      Care Instructions    Preventive Care:    Breast Cancer Screening: During our visit today, we discussed that it is recommended you receive breast cancer screening. Please call or make an appointment with your primary care provider to discuss this with them. You may also call the [facility] scheduling line ([phone]) to set up a mammography appointment at the Breast Center within the [facility].    Colorectal Cancer Screening: During our visit today, we discussed that it is recommended you receive colorectal cancer screening. Please call or make an appointment with your primary care provider to discuss this. You may also call the [facility] scheduling line ([phone]) to set up a colonoscopy appointment.    Diabetic Eye Exam Screening: During our visit today, we discussed that it is recommended you receive diabetic eye exam screening. Please call or make an appointment with your primary care provider to discuss this with them. You may also call the [facility] scheduling line ([phone]) to set up an eye exam at one of the [facility].              Follow-ups after your visit        Your next 10 appointments already scheduled     Jovanny 10, 2018  7:30 AM CST   Return Visit with [facility]   [facility] [place] [facility]    [id number] [place] [address] [phone]            Jovanny 10, 2018  8:15 AM CST   Return Visit with Yakov Shaffer MD   [facility]    [address] [place] [address] [phone]             Jovanny 10, 2018  8:45 AM CST   Level 2 with BAY 4 INFUSION   [facility] ([facility])    [address] [place] [address] [phone]              Who to contact     If you have questions or need follow up information about today's clinic visit or your schedule please contact [facility] directly at [phone].  Normal or non-critical lab and imaging results will be communicated to you by [employer], letter or phone within 4 business days after the clinic has received the results. If you do not hear from us within 7 days, please contact the clinic through [employer] or phone. If you have a critical or abnormal lab result, we will notify you by phone as soon as possible.  Submit refill requests through [employer] or call your pharmacy and they will forward the refill request to us. Please allow 3 business days for your refill to be completed.          Additional Information About Your Visit        [employer] Information     [employer] gives you secure access to your electronic health record. If you see a primary care provider, you can also send messages to your care team and make appointments. If you have questions, please call your primary care clinic.  If you do not have a primary care provider, please call [phone] and they will assist you.      [employer] is an electronic gateway that provides easy, online access to your medical records. With [employer], you can request a clinic appointment, read your test results, renew a prescription or communicate with your care team.     To access your existing account, please contact your [facility] or call [phone] for assistance.        Care EveryWhere ID     This is your Care EveryWhere ID. This could be used by other organizations to access your [facility] medical records  [id number]         Blood Pressure from Last 3 Encounters:   12/27/17 129/79   12/13/17 114/75   11/29/17  128/80    Weight from Last 3 Encounters:   12/27/17 98.7 kg (217 lb 9.6 oz)   12/13/17 95.8 kg (211 lb 4.8 oz)   11/29/17 95.2 kg (209 lb 12.8 oz)              Today, you had the following     No orders found for display       Primary Care Provider Office Phone # Fax #    Sandra Dickerson, SARAI [phone]       [facility]  [address]  [facility] [address]        Equal Access to Services     [facility]: Hadii aad ku hadasho Soomaali, waaxda luqadaha, qaybta kaalmada adeegyada, waxay violeta hayguillermo cowart . [facility] [phone].    ATENCIÓN: Si habla español, tiene a cuevas disposición servicios gratuitos de asistencia lingüística. [facility] [phone].    We comply with applicable federal civil rights laws and [place] laws. We do not discriminate on the basis of race, color, national origin, age, disability, sex, sexual orientation, or gender identity.            Thank you!     Thank you for choosing [facility]  for your care. Our goal is always to provide you with excellent care. Hearing back from our patients is one way we can continue to improve our services. Please take a few minutes to complete the written survey that you may receive in the mail after your visit with us. Thank you!             Your Updated Medication List - Protect others around you: Learn how to safely use, store and throw away your medicines at www.disposemymeds.org.          This list is accurate as of: 12/27/17  1:58 PM.  Always use your most recent med list.                   Brand Name Dispense Instructions for use Diagnosis    allopurinol 100 MG tablet    ZYLOPRIM     as needed (when eating shrimp)        aspirin 81 MG chewable tablet      Take 81 mg by mouth daily        lidocaine-prilocaine cream    EMLA          LORazepam 0.5 MG tablet    ATIVAN    30 tablet    Take 1 tablet (0.5 mg) by mouth every 4 hours as needed (Anxiety, Nausea/Vomiting or Sleep)    Malignant neoplasm of  overlapping sites of stomach (H)       ondansetron 8 MG tablet    ZOFRAN    10 tablet    Take 1 tablet (8 mg) by mouth every 8 hours as needed (Nausea/Vomiting)    Malignant neoplasm of overlapping sites of stomach (H)       prochlorperazine 10 MG tablet    COMPAZINE    30 tablet    Take 1 tablet (10 mg) by mouth every 6 hours as needed (Nausea/Vomiting)    Malignant neoplasm of overlapping sites of stomach (H)

## 2017-12-27 NOTE — PROGRESS NOTES
12/27/2017     REASON FOR VISIT:  Follow-up for unresectable metastatic gastric carcinoma, diffuse type, grade 3, HER-2 negative, MSI-Intact.      HISTORY OF PRESENT ILLNESS:  Please see my previous note for details.  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      CT CAP after C#6 shows stable disease    C# 9 FOLFOX 8/2/2017 ( Oxaliplatin dose reduced to 70mg/m2 due to neuropathy )  C#10 8/22/2017 ( Oxaliplatin dose reduced to 60mg/m2 due to neuropathy )  Delayed by 1 week- patient preference  C#11 9/6/17 Only 5FU/LV  C#12 9/20/2017 5FU/LV    Repeat CT scan after C#12 shows stable to slightly improved disease.    C#13 10/4/2017 5FU/LV  C#14 10/18/2017 5FU/LV  C#15 10/31/2017 5FU/LV  C#16 11/15/2017 5FU/LV  C#17 11/29/2017 5FU/LV   C#18 12/13/2017 5FU/LV    Repeat CT CAP 12/22/17 is stable    C#19 12/27/2017 5FU/LV     INTERVAL HISTORY:   He is doing well. He is tolerating chemotherapy nicely without any nausea or vomiting diarrhea or constipation. He been able to eat and drink well. He has gained some more weight. Denies any new swellings. Energy is good. No infections. No trouble breathing. He continues to have neuropathy which he describes as numbness and tingling of fingers and feet. There are days when he can button his shirts but then there are other days when it is more difficult for him to do so. Otherwise he continues to be fully functional and is working full-time. He denies any new neurological problems    ROS:  A comprehensive ROS was otherwise neg      MEDICATIONS:   Current Outpatient Prescriptions   Medication     allopurinol (ZYLOPRIM) 100 MG tablet     aspirin 81 MG chewable tablet     LORazepam (ATIVAN) 0.5 MG tablet     prochlorperazine (COMPAZINE) 10 MG tablet     ondansetron (ZOFRAN) 8  MG tablet     lidocaine-prilocaine (EMLA) cream     No current facility-administered medications for this visit.      Facility-Administered Medications Ordered in Other Visits   Medication     heparin 100 UNIT/ML injection 5 mL     sodium chloride (PF) 0.9% PF flush 10-20 mL            PHYSICAL EXAMINATION:   /79 (BP Location: Left arm, Patient Position: Sitting, Cuff Size: Adult Regular)  Pulse 64  Temp 97.5  F (36.4  C) (Oral)  Resp 18  Wt 98.7 kg (217 lb 9.6 oz)  SpO2 98%  BMI 30.46 kg/m2  CONSTITUTIONAL: No apparent distress  EYES: PERRLA, without pallor or jaundice  ENT/MOUTH: Ears unremarkable. No oral lesions  CVS: s1s2 normal  RESPIRATORY: Chest is clear  GI: Abdomen is benign  NEURO: He is alert and oriented ×3. Subjective numbness of fingers and feet  INTEGUMENT: no concerning his skin rashes   LYMPHATIC: no palpable lymphadenopathy  MUSCULOSKELETAL: Unremarkable. No bony tenderness.   EXTREMITIES: no pedal edema  PSYCH: Mentation, mood and affect are appropriate        LABS:   Results for LEONOR RALPH (MRN [id number]) as of 12/27/2017 08:08   Ref. Range 12/27/2017 07:34   Sodium Latest Ref Range: 133 - 144 mmol/L 140   Potassium Latest Ref Range: 3.4 - 5.3 mmol/L 4.3   Chloride Latest Ref Range: 94 - 109 mmol/L 106   Carbon Dioxide Latest Ref Range: 20 - 32 mmol/L 29   Urea Nitrogen Latest Ref Range: 7 - 30 mg/dL 12   Creatinine Latest Ref Range: 0.66 - 1.25 mg/dL 0.83   GFR Estimate Latest Ref Range: >60 mL/min/1.7m2 >90   GFR Estimate If Black Latest Ref Range: >60 mL/min/1.7m2 >90   Calcium Latest Ref Range: 8.5 - 10.1 mg/dL 8.9   Anion Gap Latest Ref Range: 3 - 14 mmol/L 5   Albumin Latest Ref Range: 3.4 - 5.0 g/dL 3.7   Protein Total Latest Ref Range: 6.8 - 8.8 g/dL 6.7 (L)   Bilirubin Total Latest Ref Range: 0.2 - 1.3 mg/dL 0.4   Alkaline Phosphatase Latest Ref Range: 40 - 150 U/L 74   ALT Latest Ref Range: 0 - 70 U/L 66   AST Latest Ref Range: 0 - 45 U/L 39   Glucose Latest Ref Range:  70 - 99 mg/dL 86   WBC Latest Ref Range: 4.0 - 11.0 10e9/L 5.3   Hemoglobin Latest Ref Range: 13.3 - 17.7 g/dL 12.8 (L)   Hematocrit Latest Ref Range: 40.0 - 53.0 % 39.2 (L)   Platelet Count Latest Ref Range: 150 - 450 10e9/L 147 (L)   RBC Count Latest Ref Range: 4.4 - 5.9 10e12/L 4.12 (L)   MCV Latest Ref Range: 78 - 100 fl 95   MCH Latest Ref Range: 26.5 - 33.0 pg 31.1   MCHC Latest Ref Range: 31.5 - 36.5 g/dL 32.7   RDW Latest Ref Range: 10.0 - 15.0 % 14.6   Diff Method Unknown Automated Method   % Neutrophils Latest Units: % 52.4   % Lymphocytes Latest Units: % 32.1   % Monocytes Latest Units: % 11.7   % Eosinophils Latest Units: % 2.6   % Basophils Latest Units: % 0.8   % Immature Granulocytes Latest Units: % 0.4   Absolute Neutrophil Latest Ref Range: 1.6 - 8.3 10e9/L 2.8   Absolute Lymphocytes Latest Ref Range: 0.8 - 5.3 10e9/L 1.7   Absolute Monocytes Latest Ref Range: 0.0 - 1.3 10e9/L 0.6       IMAGING  EXAMINATION: CT CHEST/ABDOMEN/PELVIS W CONTRAST, 12/22/2017 8:23 AM     TECHNIQUE:  Helical CT images from the thoracic inlet through the  symphysis pubis were obtained  with contrast. Contrast dose: 130 ml  isovue 370     COMPARISON: Chest abdomen pelvis on 9/29/2017     HISTORY: follow up for gastric ca; Neuropathy; Malignant neoplasm of  overlapping sites of stomach (H)     FINDINGS:     Chest: Right IJ Port-A-Cath tip is in the low SVC. There is an  unchanged 0.9 cm hypodense lesion in the posterior right thyroid lobe  (series 3 image 8). Heart size is within normal limits. No pericardial  effusion. The calibers of the main pulmonary artery and ascending  aorta are within normal limits. Multiple calcified mediastinal and  hilar lymph nodes. Mildly prominent retrocardiac lymph node measuring  1.3 x 0.9 cm (series 3 image 121), unchanged as compared to prior  exams.     No evidence of pleural effusion or pneumothorax. Mild bibasilar  dependent atelectasis. Numerous calcified pulmonary granulomas,  for  example 3 mm left lower lobe calcified granuloma (series 8 image 81).  Another example is 4 mm right lower lobe calcified pulmonary granuloma  (series 8 image 88).     Abdomen and pelvis: There is 1.3 x 1.4 cm subcapsular hypodense lesion  in the right hepatic lobe (series 3 image 138), not significantly  changed as compared to 3/9/2017 exam. Smaller hypodense lesion along  the fissure for ligamentum teres (Series 3 Image 144), too small to  characterize. The liver, spleen, adrenal glands, kidneys and  gallbladder are unremarkable. Fatty infiltration of the pancreas.     Redemonstration of mild gastric wall thickening, not significantly  changed as compared to 9/29/2017 exam. Multiple prominent mesenteric  and retroperitoneal lymph nodes, for example 1.8 x 1.2 cm along the  anterior aspect of the IVC (series 3 image 162). Redemonstration of  periaortic haziness with mildly prominent but not enlarged para-aortic  lymph nodes. Scattered atherosclerotic calcification of the abdominal  aorta. Scattered colon diverticulosis without CT evidence of acute  diverticular disease.     Bones and soft tissues: Mild multilevel degenerative changes of the  thoracal lumbar spine. No worrisome osseous lesion.         IMPRESSION:   1. Redemonstration of the mild diffuse gastric wall thickening,  consistent with patient's history of diffuse gastric cancer. Mild  haziness in the perigastric fat with prominent lymph nodes again seen,  similar to previous exams.  2. Again noted is indeterminant 1.4 cm subcapsular hypodense lesion in  the right hepatic lobe, not significantly changed as compared to  3/9/2017 exam.   3. Scattered colonic diverticulosis without CT evidence of acute  diverticulitis.  4. Numerous calcified pulmonary, hilar and mediastinal granulomas.       ASSESSMENT AND PLAN:  Metastatic gastric cancer, HER-2/catrachita negative, MSI intact, presenting with early satiety and significant weight loss and epigastric discomfort  upon eating with some vomiting.  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  There is an indeterminate right liver lobe lesion.      We started palliative chemotherapy with FOLFOX on 03/12/2017.     He is receiving 5-FU/leucovorin from cycle #11 onwards as we have dropped oxaliplatin due to worsening neuropathy after 10 cycles    Scans after C#18 also show stable disease  He is doing really well and is relatively asymptomatic apart from the neuropathy which is mentioned below. 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 This has slightly improved with time. We previously tried Cymbalta which she did not like. We had discussed about trying gabapentin but he wants to wait for now. Vitamin B12 level was also normal. At this time he continues to be on observation with regards to this.     Cytopenias. Mild anemia and mild thrombocytopenia- asymptomatic and due to chemotherapy. Needs continued observation     Decision regarding immunization. He will receive flu shot today.    We did not address the following today  Right hip pain. Due to degenerative disease. He takes Tylenol as needed. He will follow with chiropractor as needed.      His genetic testing did not show any identifiable mutation not reveal any identifiable mutation     Return to clinic in 2 weeks for next chemo and see me    I answered all of his and his family's questions to their satisfaction and they are all comfortable and in agreement with this plan     LANEY HERNANDEZ MD

## 2017-12-27 NOTE — MR AVS SNAPSHOT
After Visit Summary   12/27/2017    Terry Yi    MRN: [id number]           Patient Information     Date Of Birth          1965        Visit Information        Provider Department      12/27/2017 7:30 AM NURSE ONLY CANCER CENTER [facility]        Today's Diagnoses     Malignant neoplasm of overlapping sites of stomach (H)    -  1       Follow-ups after your visit        Your next 10 appointments already scheduled     Dec 27, 2017  9:00 AM CST   Level 2 with BAY 3 INFUSION   [facility] ([facility])    [id number] [place] [id number] [phone]              Who to contact     If you have questions or need follow up information about today's clinic visit or your schedule please contact [facility] directly at [phone].  Normal or non-critical lab and imaging results will be communicated to you by [employer], letter or phone within 4 business days after the clinic has received the results. If you do not hear from us within 7 days, please contact the clinic through [employer] or phone. If you have a critical or abnormal lab result, we will notify you by phone as soon as possible.  Submit refill requests through [employer] or call your pharmacy and they will forward the refill request to us. Please allow 3 business days for your refill to be completed.          Additional Information About Your Visit        [employer] Information     [facility] [facility] gives you secure access to your electronic health record. If you see a primary care provider, you can also send messages to your care team and make appointments. If you have questions, please call your primary care clinic.  If you do not have a primary care provider, please call [phone] and they will assist you.      [employer] [facility] is an electronic gateway that provides easy, online access to your medical records. With [employer], you can request a clinic appointment, read your test  results, renew a prescription or communicate with your care team.     To access your existing account, please contact your [facility] or call [phone] for assistance.        Care EveryWhere ID     This is your Care EveryWhere ID. This could be used by other organizations to access your [facility] medical records  [id number]         Blood Pressure from Last 3 Encounters:   12/27/17 129/79   12/13/17 114/75   11/29/17 128/80    Weight from Last 3 Encounters:   12/27/17 98.7 kg (217 lb 9.6 oz)   12/13/17 95.8 kg (211 lb 4.8 oz)   11/29/17 95.2 kg (209 lb 12.8 oz)              We Performed the Following     CBC with platelets differential     Comprehensive metabolic panel        Primary Care Provider Office Phone # Fax #    Sandra Dickerson [phone]       [facility]  [address]  [facility] [address]        Equal Access to Services     [facility] HospitalDID[facility] : Hadii aad ku hadasho Soomaali, waaxda luqadaha, qaybta kaalmada adeegyada, waxay idiin hayaan charley cowart . So [facility] [phone].    ATENCIÓN: Si habla español, tiene a cuevas disposición servicios gratuitos de asistencia lingüística. Llame al [phone].    We comply with applicable federal civil rights laws and [place] laws. We do not discriminate on the basis of race, color, national origin, age, disability, sex, sexual orientation, or gender identity.            Thank you!     Thank you for choosing [facility]  for your care. Our goal is always to provide you with excellent care. Hearing back from our patients is one way we can continue to improve our services. Please take a few minutes to complete the written survey that you may receive in the mail after your visit with us. Thank you!             Your Updated Medication List - Protect others around you: Learn how to safely use, store and throw away your medicines at www.disposemymeds.org.          This list is accurate as of:  12/27/17  8:45 AM.  Always use your most recent med list.                   Brand Name Dispense Instructions for use Diagnosis    allopurinol 100 MG tablet    ZYLOPRIM     as needed (when eating shrimp)        aspirin 81 MG chewable tablet      Take 81 mg by mouth daily        lidocaine-prilocaine cream    EMLA          LORazepam 0.5 MG tablet    ATIVAN    30 tablet    Take 1 tablet (0.5 mg) by mouth every 4 hours as needed (Anxiety, Nausea/Vomiting or Sleep)    Malignant neoplasm of overlapping sites of stomach (H)       ondansetron 8 MG tablet    ZOFRAN    10 tablet    Take 1 tablet (8 mg) by mouth every 8 hours as needed (Nausea/Vomiting)    Malignant neoplasm of overlapping sites of stomach (H)       prochlorperazine 10 MG tablet    COMPAZINE    30 tablet    Take 1 tablet (10 mg) by mouth every 6 hours as needed (Nausea/Vomiting)    Malignant neoplasm of overlapping sites of stomach (H)

## 2018-01-01 ENCOUNTER — CARE COORDINATION (OUTPATIENT)
Dept: ONCOLOGY | Facility: CLINIC | Age: 53
End: 2018-01-01

## 2018-01-01 ENCOUNTER — ONCOLOGY VISIT (OUTPATIENT)
Dept: ONCOLOGY | Facility: CLINIC | Age: 53
End: 2018-01-01
Payer: COMMERCIAL

## 2018-01-01 ENCOUNTER — TRANSFERRED RECORDS (OUTPATIENT)
Dept: HEALTH INFORMATION MANAGEMENT | Facility: CLINIC | Age: 53
End: 2018-01-01

## 2018-01-01 ENCOUNTER — INFUSION THERAPY VISIT (OUTPATIENT)
Dept: INFUSION THERAPY | Facility: CLINIC | Age: 53
End: 2018-01-01
Payer: COMMERCIAL

## 2018-01-01 ENCOUNTER — RADIANT APPOINTMENT (OUTPATIENT)
Dept: CT IMAGING | Facility: CLINIC | Age: 53
End: 2018-01-01
Attending: INTERNAL MEDICINE
Payer: COMMERCIAL

## 2018-01-01 ENCOUNTER — TELEPHONE (OUTPATIENT)
Dept: GASTROENTEROLOGY | Facility: CLINIC | Age: 53
End: 2018-01-01

## 2018-01-01 ENCOUNTER — HOSPITAL ENCOUNTER (OUTPATIENT)
Facility: CLINIC | Age: 53
Discharge: HOME | End: 2018-12-06
Attending: INTERNAL MEDICINE | Admitting: INTERNAL MEDICINE
Payer: COMMERCIAL

## 2018-01-01 ENCOUNTER — APPOINTMENT (OUTPATIENT)
Dept: CT IMAGING | Facility: CLINIC | Age: 53
End: 2018-01-01
Attending: EMERGENCY MEDICINE
Payer: COMMERCIAL

## 2018-01-01 ENCOUNTER — NURSE TRIAGE (OUTPATIENT)
Dept: NURSING | Facility: CLINIC | Age: 53
End: 2018-01-01

## 2018-01-01 ENCOUNTER — APPOINTMENT (OUTPATIENT)
Dept: INTERVENTIONAL RADIOLOGY/VASCULAR | Facility: CLINIC | Age: 53
End: 2018-01-01
Attending: NURSE PRACTITIONER
Payer: COMMERCIAL

## 2018-01-01 ENCOUNTER — APPOINTMENT (OUTPATIENT)
Dept: GENERAL RADIOLOGY | Facility: CLINIC | Age: 53
End: 2018-01-01
Attending: INTERNAL MEDICINE
Payer: COMMERCIAL

## 2018-01-01 ENCOUNTER — PATIENT OUTREACH (OUTPATIENT)
Dept: GASTROENTEROLOGY | Facility: CLINIC | Age: 53
End: 2018-01-01

## 2018-01-01 ENCOUNTER — RADIANT APPOINTMENT (OUTPATIENT)
Dept: ULTRASOUND IMAGING | Facility: CLINIC | Age: 53
End: 2018-01-01
Attending: INTERNAL MEDICINE
Payer: COMMERCIAL

## 2018-01-01 ENCOUNTER — ANESTHESIA EVENT (OUTPATIENT)
Dept: SURGERY | Facility: CLINIC | Age: 53
End: 2018-01-01
Payer: COMMERCIAL

## 2018-01-01 ENCOUNTER — TELEPHONE (OUTPATIENT)
Dept: CALL CENTER | Age: 53
End: 2018-01-01

## 2018-01-01 ENCOUNTER — HOSPITAL ENCOUNTER (OUTPATIENT)
Facility: CLINIC | Age: 53
Discharge: HOME | End: 2018-11-26
Attending: INTERNAL MEDICINE | Admitting: INTERNAL MEDICINE
Payer: COMMERCIAL

## 2018-01-01 ENCOUNTER — HOME INFUSION (PRE-WILLOW HOME INFUSION) (OUTPATIENT)
Dept: PHARMACY | Facility: CLINIC | Age: 53
End: 2018-01-01

## 2018-01-01 ENCOUNTER — DOCUMENTATION ONLY (OUTPATIENT)
Dept: PHARMACY | Facility: CLINIC | Age: 53
End: 2018-01-01

## 2018-01-01 ENCOUNTER — HOSPITAL ENCOUNTER (OUTPATIENT)
Facility: AMBULATORY SURGERY CENTER | Age: 53
Discharge: HOME | End: 2018-04-27
Attending: INTERNAL MEDICINE | Admitting: INTERNAL MEDICINE
Payer: COMMERCIAL

## 2018-01-01 ENCOUNTER — CARE COORDINATION (OUTPATIENT)
Dept: GASTROENTEROLOGY | Facility: CLINIC | Age: 53
End: 2018-01-01

## 2018-01-01 ENCOUNTER — APPOINTMENT (OUTPATIENT)
Dept: CT IMAGING | Facility: CLINIC | Age: 53
End: 2018-01-01
Attending: PHYSICIAN ASSISTANT
Payer: COMMERCIAL

## 2018-01-01 ENCOUNTER — TELEPHONE (OUTPATIENT)
Dept: ONCOLOGY | Facility: CLINIC | Age: 53
End: 2018-01-01

## 2018-01-01 ENCOUNTER — ANCILLARY PROCEDURE (OUTPATIENT)
Dept: GENERAL RADIOLOGY | Facility: CLINIC | Age: 53
End: 2018-01-01
Payer: COMMERCIAL

## 2018-01-01 ENCOUNTER — MYC MEDICAL ADVICE (OUTPATIENT)
Dept: ONCOLOGY | Facility: CLINIC | Age: 53
End: 2018-01-01

## 2018-01-01 ENCOUNTER — ANESTHESIA (OUTPATIENT)
Dept: SURGERY | Facility: CLINIC | Age: 53
End: 2018-01-01
Payer: COMMERCIAL

## 2018-01-01 ENCOUNTER — HOSPITAL ENCOUNTER (OUTPATIENT)
Facility: AMBULATORY SURGERY CENTER | Age: 53
End: 2018-01-01
Attending: INTERNAL MEDICINE | Admitting: INTERNAL MEDICINE
Payer: COMMERCIAL

## 2018-01-01 ENCOUNTER — APPOINTMENT (OUTPATIENT)
Dept: INTERVENTIONAL RADIOLOGY/VASCULAR | Facility: CLINIC | Age: 53
End: 2018-01-01
Attending: PHYSICIAN ASSISTANT
Payer: COMMERCIAL

## 2018-01-01 ENCOUNTER — SURGERY (OUTPATIENT)
Age: 53
End: 2018-01-01

## 2018-01-01 ENCOUNTER — HOSPITAL ENCOUNTER (INPATIENT)
Facility: CLINIC | Age: 53
LOS: 8 days | Discharge: HOME HEALTH | End: 2018-12-20
Attending: EMERGENCY MEDICINE | Admitting: INTERNAL MEDICINE
Payer: COMMERCIAL

## 2018-01-01 VITALS
SYSTOLIC BLOOD PRESSURE: 126 MMHG | WEIGHT: 211 LBS | DIASTOLIC BLOOD PRESSURE: 86 MMHG | TEMPERATURE: 98.8 F | OXYGEN SATURATION: 97 % | RESPIRATION RATE: 15 BRPM | BODY MASS INDEX: 29.54 KG/M2 | HEART RATE: 86 BPM | HEIGHT: 71 IN

## 2018-01-01 VITALS
TEMPERATURE: 98.2 F | SYSTOLIC BLOOD PRESSURE: 111 MMHG | OXYGEN SATURATION: 99 % | DIASTOLIC BLOOD PRESSURE: 73 MMHG | RESPIRATION RATE: 15 BRPM | HEIGHT: 70 IN | WEIGHT: 218 LBS | HEART RATE: 60 BPM | BODY MASS INDEX: 31.21 KG/M2

## 2018-01-01 VITALS
SYSTOLIC BLOOD PRESSURE: 138 MMHG | OXYGEN SATURATION: 98 % | TEMPERATURE: 97.6 F | HEART RATE: 67 BPM | RESPIRATION RATE: 18 BRPM | WEIGHT: 218.1 LBS | DIASTOLIC BLOOD PRESSURE: 96 MMHG | BODY MASS INDEX: 31.29 KG/M2

## 2018-01-01 VITALS
TEMPERATURE: 96.6 F | WEIGHT: 191.4 LBS | DIASTOLIC BLOOD PRESSURE: 90 MMHG | RESPIRATION RATE: 16 BRPM | BODY MASS INDEX: 26.8 KG/M2 | SYSTOLIC BLOOD PRESSURE: 131 MMHG | HEART RATE: 92 BPM | HEIGHT: 71 IN | OXYGEN SATURATION: 96 %

## 2018-01-01 VITALS
TEMPERATURE: 97.9 F | WEIGHT: 220.4 LBS | SYSTOLIC BLOOD PRESSURE: 128 MMHG | DIASTOLIC BLOOD PRESSURE: 72 MMHG | BODY MASS INDEX: 30.74 KG/M2 | RESPIRATION RATE: 18 BRPM | OXYGEN SATURATION: 99 % | HEART RATE: 71 BPM

## 2018-01-01 VITALS
DIASTOLIC BLOOD PRESSURE: 76 MMHG | SYSTOLIC BLOOD PRESSURE: 118 MMHG | BODY MASS INDEX: 30.1 KG/M2 | WEIGHT: 215 LBS | OXYGEN SATURATION: 96 % | TEMPERATURE: 97.8 F | RESPIRATION RATE: 15 BRPM | HEART RATE: 53 BPM | HEIGHT: 71 IN

## 2018-01-01 VITALS
SYSTOLIC BLOOD PRESSURE: 112 MMHG | TEMPERATURE: 97.7 F | BODY MASS INDEX: 30.71 KG/M2 | DIASTOLIC BLOOD PRESSURE: 76 MMHG | HEART RATE: 76 BPM | WEIGHT: 219.38 LBS | HEIGHT: 71 IN

## 2018-01-01 VITALS
WEIGHT: 221 LBS | DIASTOLIC BLOOD PRESSURE: 74 MMHG | BODY MASS INDEX: 31.64 KG/M2 | OXYGEN SATURATION: 98 % | RESPIRATION RATE: 18 BRPM | HEIGHT: 70 IN | HEART RATE: 75 BPM | TEMPERATURE: 98 F | SYSTOLIC BLOOD PRESSURE: 115 MMHG

## 2018-01-01 VITALS
OXYGEN SATURATION: 97 % | HEART RATE: 61 BPM | SYSTOLIC BLOOD PRESSURE: 122 MMHG | RESPIRATION RATE: 16 BRPM | TEMPERATURE: 98.5 F | DIASTOLIC BLOOD PRESSURE: 86 MMHG

## 2018-01-01 VITALS
RESPIRATION RATE: 20 BRPM | TEMPERATURE: 98.4 F | OXYGEN SATURATION: 96 % | HEART RATE: 105 BPM | SYSTOLIC BLOOD PRESSURE: 128 MMHG | DIASTOLIC BLOOD PRESSURE: 91 MMHG

## 2018-01-01 VITALS
WEIGHT: 210.38 LBS | TEMPERATURE: 97.7 F | DIASTOLIC BLOOD PRESSURE: 89 MMHG | BODY MASS INDEX: 30.12 KG/M2 | SYSTOLIC BLOOD PRESSURE: 137 MMHG | HEIGHT: 70 IN | OXYGEN SATURATION: 97 % | HEART RATE: 90 BPM | RESPIRATION RATE: 18 BRPM

## 2018-01-01 VITALS
BODY MASS INDEX: 28.94 KG/M2 | TEMPERATURE: 97 F | HEART RATE: 82 BPM | HEIGHT: 70 IN | DIASTOLIC BLOOD PRESSURE: 92 MMHG | RESPIRATION RATE: 18 BRPM | OXYGEN SATURATION: 98 % | WEIGHT: 202.13 LBS | SYSTOLIC BLOOD PRESSURE: 142 MMHG

## 2018-01-01 VITALS
DIASTOLIC BLOOD PRESSURE: 95 MMHG | BODY MASS INDEX: 31.32 KG/M2 | RESPIRATION RATE: 18 BRPM | SYSTOLIC BLOOD PRESSURE: 142 MMHG | WEIGHT: 218.3 LBS | TEMPERATURE: 97.4 F | HEART RATE: 66 BPM | OXYGEN SATURATION: 99 %

## 2018-01-01 VITALS
SYSTOLIC BLOOD PRESSURE: 138 MMHG | DIASTOLIC BLOOD PRESSURE: 87 MMHG | SYSTOLIC BLOOD PRESSURE: 135 MMHG | RESPIRATION RATE: 18 BRPM | TEMPERATURE: 97.6 F | DIASTOLIC BLOOD PRESSURE: 86 MMHG | HEIGHT: 70 IN | BODY MASS INDEX: 30.51 KG/M2 | HEART RATE: 79 BPM | WEIGHT: 213.13 LBS | OXYGEN SATURATION: 98 % | HEART RATE: 76 BPM | TEMPERATURE: 97.3 F | OXYGEN SATURATION: 97 %

## 2018-01-01 VITALS — RESPIRATION RATE: 18 BRPM | SYSTOLIC BLOOD PRESSURE: 109 MMHG | DIASTOLIC BLOOD PRESSURE: 65 MMHG | TEMPERATURE: 97.3 F

## 2018-01-01 VITALS
OXYGEN SATURATION: 98 % | HEART RATE: 67 BPM | DIASTOLIC BLOOD PRESSURE: 78 MMHG | WEIGHT: 216 LBS | TEMPERATURE: 98.4 F | BODY MASS INDEX: 30.24 KG/M2 | RESPIRATION RATE: 15 BRPM | SYSTOLIC BLOOD PRESSURE: 122 MMHG | HEIGHT: 71 IN

## 2018-01-01 VITALS
SYSTOLIC BLOOD PRESSURE: 156 MMHG | BODY MASS INDEX: 27.93 KG/M2 | HEIGHT: 70 IN | WEIGHT: 195.11 LBS | RESPIRATION RATE: 16 BRPM | DIASTOLIC BLOOD PRESSURE: 110 MMHG | TEMPERATURE: 97.5 F | OXYGEN SATURATION: 97 %

## 2018-01-01 VITALS
DIASTOLIC BLOOD PRESSURE: 79 MMHG | WEIGHT: 220.2 LBS | BODY MASS INDEX: 30.71 KG/M2 | HEIGHT: 72 IN | HEART RATE: 77 BPM | SYSTOLIC BLOOD PRESSURE: 115 MMHG | SYSTOLIC BLOOD PRESSURE: 117 MMHG | OXYGEN SATURATION: 99 % | WEIGHT: 219.56 LBS | TEMPERATURE: 98.5 F | HEART RATE: 66 BPM | TEMPERATURE: 98 F | OXYGEN SATURATION: 97 % | BODY MASS INDEX: 29.74 KG/M2 | RESPIRATION RATE: 16 BRPM | DIASTOLIC BLOOD PRESSURE: 76 MMHG

## 2018-01-01 VITALS
OXYGEN SATURATION: 97 % | TEMPERATURE: 97.6 F | HEIGHT: 70 IN | SYSTOLIC BLOOD PRESSURE: 136 MMHG | WEIGHT: 221 LBS | BODY MASS INDEX: 31.64 KG/M2 | HEART RATE: 53 BPM | DIASTOLIC BLOOD PRESSURE: 79 MMHG

## 2018-01-01 VITALS
TEMPERATURE: 98.3 F | SYSTOLIC BLOOD PRESSURE: 114 MMHG | OXYGEN SATURATION: 96 % | BODY MASS INDEX: 27.07 KG/M2 | HEIGHT: 71 IN | RESPIRATION RATE: 28 BRPM | DIASTOLIC BLOOD PRESSURE: 75 MMHG | HEART RATE: 115 BPM

## 2018-01-01 VITALS
WEIGHT: 216.6 LBS | BODY MASS INDEX: 31.08 KG/M2 | RESPIRATION RATE: 16 BRPM | OXYGEN SATURATION: 98 % | SYSTOLIC BLOOD PRESSURE: 121 MMHG | TEMPERATURE: 98.2 F | HEART RATE: 64 BPM | DIASTOLIC BLOOD PRESSURE: 79 MMHG

## 2018-01-01 VITALS
DIASTOLIC BLOOD PRESSURE: 94 MMHG | RESPIRATION RATE: 18 BRPM | SYSTOLIC BLOOD PRESSURE: 133 MMHG | OXYGEN SATURATION: 95 % | HEART RATE: 85 BPM

## 2018-01-01 VITALS
HEART RATE: 79 BPM | SYSTOLIC BLOOD PRESSURE: 130 MMHG | DIASTOLIC BLOOD PRESSURE: 87 MMHG | OXYGEN SATURATION: 96 % | WEIGHT: 217.4 LBS | TEMPERATURE: 98.3 F | BODY MASS INDEX: 30.44 KG/M2

## 2018-01-01 VITALS
DIASTOLIC BLOOD PRESSURE: 86 MMHG | WEIGHT: 217.6 LBS | TEMPERATURE: 97.7 F | OXYGEN SATURATION: 99 % | BODY MASS INDEX: 31.22 KG/M2 | HEART RATE: 73 BPM | SYSTOLIC BLOOD PRESSURE: 144 MMHG

## 2018-01-01 VITALS
RESPIRATION RATE: 16 BRPM | DIASTOLIC BLOOD PRESSURE: 64 MMHG | SYSTOLIC BLOOD PRESSURE: 110 MMHG | WEIGHT: 217.5 LBS | HEART RATE: 75 BPM | OXYGEN SATURATION: 98 % | BODY MASS INDEX: 30.45 KG/M2

## 2018-01-01 VITALS
OXYGEN SATURATION: 96 % | TEMPERATURE: 98.4 F | SYSTOLIC BLOOD PRESSURE: 128 MMHG | DIASTOLIC BLOOD PRESSURE: 91 MMHG | HEART RATE: 105 BPM | RESPIRATION RATE: 20 BRPM

## 2018-01-01 VITALS
HEART RATE: 72 BPM | SYSTOLIC BLOOD PRESSURE: 135 MMHG | DIASTOLIC BLOOD PRESSURE: 82 MMHG | TEMPERATURE: 97.6 F | HEIGHT: 70 IN | BODY MASS INDEX: 31.5 KG/M2 | OXYGEN SATURATION: 95 % | RESPIRATION RATE: 18 BRPM | WEIGHT: 220 LBS

## 2018-01-01 VITALS
SYSTOLIC BLOOD PRESSURE: 123 MMHG | DIASTOLIC BLOOD PRESSURE: 81 MMHG | HEART RATE: 72 BPM | WEIGHT: 220 LBS | HEIGHT: 70 IN | BODY MASS INDEX: 31.5 KG/M2 | RESPIRATION RATE: 18 BRPM | OXYGEN SATURATION: 98 % | TEMPERATURE: 97.9 F

## 2018-01-01 VITALS — BODY MASS INDEX: 31.84 KG/M2 | WEIGHT: 221.9 LBS

## 2018-01-01 VITALS
BODY MASS INDEX: 31.21 KG/M2 | DIASTOLIC BLOOD PRESSURE: 76 MMHG | HEIGHT: 70 IN | HEART RATE: 69 BPM | WEIGHT: 218 LBS | OXYGEN SATURATION: 96 % | TEMPERATURE: 97.7 F | SYSTOLIC BLOOD PRESSURE: 112 MMHG

## 2018-01-01 VITALS
TEMPERATURE: 97 F | HEART RATE: 88 BPM | DIASTOLIC BLOOD PRESSURE: 93 MMHG | SYSTOLIC BLOOD PRESSURE: 134 MMHG | OXYGEN SATURATION: 97 % | RESPIRATION RATE: 18 BRPM

## 2018-01-01 VITALS — DIASTOLIC BLOOD PRESSURE: 82 MMHG | SYSTOLIC BLOOD PRESSURE: 132 MMHG

## 2018-01-01 DIAGNOSIS — C16.8 MALIGNANT NEOPLASM OF OVERLAPPING SITES OF STOMACH (H): Primary | ICD-10-CM

## 2018-01-01 DIAGNOSIS — R10.84 ABDOMINAL PAIN, GENERALIZED: ICD-10-CM

## 2018-01-01 DIAGNOSIS — I10 BENIGN ESSENTIAL HYPERTENSION: ICD-10-CM

## 2018-01-01 DIAGNOSIS — Z03.89 OBSERVATION FOR SUSPECTED CANCER: Primary | ICD-10-CM

## 2018-01-01 DIAGNOSIS — R11.2 NON-INTRACTABLE VOMITING WITH NAUSEA, UNSPECIFIED VOMITING TYPE: Primary | ICD-10-CM

## 2018-01-01 DIAGNOSIS — M10.9 ACUTE GOUT, UNSPECIFIED CAUSE, UNSPECIFIED SITE: ICD-10-CM

## 2018-01-01 DIAGNOSIS — R80.9 PROTEINURIA, UNSPECIFIED TYPE: ICD-10-CM

## 2018-01-01 DIAGNOSIS — C16.8 MALIGNANT NEOPLASM OF OVERLAPPING SITES OF STOMACH (H): ICD-10-CM

## 2018-01-01 DIAGNOSIS — K22.2 ESOPHAGEAL STRICTURE: ICD-10-CM

## 2018-01-01 DIAGNOSIS — T82.868A THROMBOSIS COMPLICATING VENOUS ACCESS DEVICE (H): ICD-10-CM

## 2018-01-01 DIAGNOSIS — K59.00 CONSTIPATION, UNSPECIFIED CONSTIPATION TYPE: ICD-10-CM

## 2018-01-01 DIAGNOSIS — E86.0 DEHYDRATION: ICD-10-CM

## 2018-01-01 DIAGNOSIS — F43.22 ADJUSTMENT DISORDER WITH ANXIOUS MOOD: ICD-10-CM

## 2018-01-01 DIAGNOSIS — R63.4 LOSS OF WEIGHT: ICD-10-CM

## 2018-01-01 DIAGNOSIS — A60.01 HERPES SIMPLEX INFECTION OF PENIS: ICD-10-CM

## 2018-01-01 DIAGNOSIS — T85.528A MIGRATION OF ESOPHAGEAL STENT, INITIAL ENCOUNTER: ICD-10-CM

## 2018-01-01 DIAGNOSIS — R10.31 RIGHT GROIN PAIN: ICD-10-CM

## 2018-01-01 DIAGNOSIS — J18.9 PNEUMONIA DUE TO INFECTIOUS ORGANISM, UNSPECIFIED LATERALITY, UNSPECIFIED PART OF LUNG: ICD-10-CM

## 2018-01-01 DIAGNOSIS — R13.10 DYSPHAGIA: ICD-10-CM

## 2018-01-01 DIAGNOSIS — R11.2 NON-INTRACTABLE VOMITING WITH NAUSEA, UNSPECIFIED VOMITING TYPE: ICD-10-CM

## 2018-01-01 DIAGNOSIS — C16.9 METASTASIS FROM GASTRIC CANCER (H): Primary | ICD-10-CM

## 2018-01-01 DIAGNOSIS — R13.10 DYSPHAGIA, UNSPECIFIED TYPE: ICD-10-CM

## 2018-01-01 DIAGNOSIS — G89.18 POSTOPERATIVE PAIN: Primary | ICD-10-CM

## 2018-01-01 DIAGNOSIS — K59.00 CONSTIPATION, UNSPECIFIED CONSTIPATION TYPE: Primary | ICD-10-CM

## 2018-01-01 DIAGNOSIS — N50.811 RIGHT TESTICULAR PAIN: ICD-10-CM

## 2018-01-01 DIAGNOSIS — G89.3 CANCER RELATED PAIN: ICD-10-CM

## 2018-01-01 DIAGNOSIS — R06.6 HICCUPS: Primary | ICD-10-CM

## 2018-01-01 DIAGNOSIS — R13.19 ESOPHAGEAL DYSPHAGIA: ICD-10-CM

## 2018-01-01 DIAGNOSIS — Z23 NEED FOR PROPHYLACTIC VACCINATION AND INOCULATION AGAINST INFLUENZA: Primary | ICD-10-CM

## 2018-01-01 DIAGNOSIS — K21.9 GASTROESOPHAGEAL REFLUX DISEASE WITHOUT ESOPHAGITIS: ICD-10-CM

## 2018-01-01 DIAGNOSIS — M10.9 ACUTE GOUT, UNSPECIFIED CAUSE, UNSPECIFIED SITE: Primary | ICD-10-CM

## 2018-01-01 DIAGNOSIS — E86.0 DEHYDRATION: Primary | ICD-10-CM

## 2018-01-01 DIAGNOSIS — R10.13 EPIGASTRIC PAIN: ICD-10-CM

## 2018-01-01 DIAGNOSIS — C79.9 METASTASIS FROM GASTRIC CANCER (H): Primary | ICD-10-CM

## 2018-01-01 LAB
ALBUMIN FLD-MCNC: 2 G/DL
ALBUMIN SERPL-MCNC: 1.9 G/DL (ref 3.4–5)
ALBUMIN SERPL-MCNC: 2.2 G/DL (ref 3.4–5)
ALBUMIN SERPL-MCNC: 2.4 G/DL (ref 3.4–5)
ALBUMIN SERPL-MCNC: 2.5 G/DL (ref 3.4–5)
ALBUMIN SERPL-MCNC: 3.1 G/DL (ref 3.4–5)
ALBUMIN SERPL-MCNC: 3.3 G/DL (ref 3.4–5)
ALBUMIN SERPL-MCNC: 3.4 G/DL (ref 3.4–5)
ALBUMIN SERPL-MCNC: 3.5 G/DL (ref 3.4–5)
ALBUMIN SERPL-MCNC: 3.6 G/DL (ref 3.4–5)
ALBUMIN SERPL-MCNC: 3.6 G/DL (ref 3.4–5)
ALBUMIN SERPL-MCNC: 3.7 G/DL (ref 3.4–5)
ALBUMIN SERPL-MCNC: 3.8 G/DL (ref 3.4–5)
ALBUMIN SERPL-MCNC: 3.9 G/DL (ref 3.4–5)
ALBUMIN SERPL-MCNC: 3.9 G/DL (ref 3.4–5)
ALBUMIN SERPL-MCNC: 4 G/DL (ref 3.4–5)
ALBUMIN UR-MCNC: 10 MG/DL
ALBUMIN UR-MCNC: 30 MG/DL
ALP SERPL-CCNC: 109 U/L (ref 40–150)
ALP SERPL-CCNC: 112 U/L (ref 40–150)
ALP SERPL-CCNC: 113 U/L (ref 40–150)
ALP SERPL-CCNC: 114 U/L (ref 40–150)
ALP SERPL-CCNC: 125 U/L (ref 40–150)
ALP SERPL-CCNC: 128 U/L (ref 40–150)
ALP SERPL-CCNC: 139 U/L (ref 40–150)
ALP SERPL-CCNC: 69 U/L (ref 40–150)
ALP SERPL-CCNC: 71 U/L (ref 40–150)
ALP SERPL-CCNC: 72 U/L (ref 40–150)
ALP SERPL-CCNC: 73 U/L (ref 40–150)
ALP SERPL-CCNC: 74 U/L (ref 40–150)
ALP SERPL-CCNC: 75 U/L (ref 40–150)
ALP SERPL-CCNC: 75 U/L (ref 40–150)
ALP SERPL-CCNC: 76 U/L (ref 40–150)
ALP SERPL-CCNC: 76 U/L (ref 40–150)
ALP SERPL-CCNC: 77 U/L (ref 40–150)
ALP SERPL-CCNC: 77 U/L (ref 40–150)
ALP SERPL-CCNC: 78 U/L (ref 40–150)
ALP SERPL-CCNC: 79 U/L (ref 40–150)
ALP SERPL-CCNC: 79 U/L (ref 40–150)
ALP SERPL-CCNC: 80 U/L (ref 40–150)
ALP SERPL-CCNC: 82 U/L (ref 40–150)
ALP SERPL-CCNC: 85 U/L (ref 40–150)
ALP SERPL-CCNC: 85 U/L (ref 40–150)
ALP SERPL-CCNC: 87 U/L (ref 40–150)
ALP SERPL-CCNC: 91 U/L (ref 40–150)
ALP SERPL-CCNC: 97 U/L (ref 40–150)
ALT SERPL W P-5'-P-CCNC: 27 U/L (ref 0–70)
ALT SERPL W P-5'-P-CCNC: 28 U/L (ref 0–70)
ALT SERPL W P-5'-P-CCNC: 29 U/L (ref 0–70)
ALT SERPL W P-5'-P-CCNC: 30 U/L (ref 0–70)
ALT SERPL W P-5'-P-CCNC: 32 U/L (ref 0–70)
ALT SERPL W P-5'-P-CCNC: 33 U/L (ref 0–70)
ALT SERPL W P-5'-P-CCNC: 33 U/L (ref 0–70)
ALT SERPL W P-5'-P-CCNC: 35 U/L (ref 0–70)
ALT SERPL W P-5'-P-CCNC: 36 U/L (ref 0–70)
ALT SERPL W P-5'-P-CCNC: 40 U/L (ref 0–70)
ALT SERPL W P-5'-P-CCNC: 43 U/L (ref 0–70)
ALT SERPL W P-5'-P-CCNC: 43 U/L (ref 0–70)
ALT SERPL W P-5'-P-CCNC: 45 U/L (ref 0–70)
ALT SERPL W P-5'-P-CCNC: 48 U/L (ref 0–70)
ALT SERPL W P-5'-P-CCNC: 48 U/L (ref 0–70)
ALT SERPL W P-5'-P-CCNC: 49 U/L (ref 0–70)
ALT SERPL W P-5'-P-CCNC: 51 U/L (ref 0–70)
ALT SERPL W P-5'-P-CCNC: 53 U/L (ref 0–70)
ALT SERPL W P-5'-P-CCNC: 53 U/L (ref 0–70)
ALT SERPL W P-5'-P-CCNC: 56 U/L (ref 0–70)
ALT SERPL W P-5'-P-CCNC: 57 U/L (ref 0–70)
ALT SERPL W P-5'-P-CCNC: 59 U/L (ref 0–70)
ALT SERPL W P-5'-P-CCNC: 60 U/L (ref 0–70)
ALT SERPL W P-5'-P-CCNC: 62 U/L (ref 0–70)
ALT SERPL W P-5'-P-CCNC: 63 U/L (ref 0–70)
ALT SERPL W P-5'-P-CCNC: 75 U/L (ref 0–70)
ALT SERPL-CCNC: 31 U/L (ref 0–55)
ANION GAP SERPL CALCULATED.3IONS-SCNC: 10 MMOL/L (ref 3–14)
ANION GAP SERPL CALCULATED.3IONS-SCNC: 10 MMOL/L (ref 3–14)
ANION GAP SERPL CALCULATED.3IONS-SCNC: 11 MMOL/L (ref 3–14)
ANION GAP SERPL CALCULATED.3IONS-SCNC: 12 MMOL/L (ref 3–14)
ANION GAP SERPL CALCULATED.3IONS-SCNC: 12 MMOL/L (ref 3–14)
ANION GAP SERPL CALCULATED.3IONS-SCNC: 3 MMOL/L (ref 3–14)
ANION GAP SERPL CALCULATED.3IONS-SCNC: 4 MMOL/L (ref 3–14)
ANION GAP SERPL CALCULATED.3IONS-SCNC: 5 MMOL/L (ref 3–14)
ANION GAP SERPL CALCULATED.3IONS-SCNC: 6 MMOL/L (ref 3–14)
ANION GAP SERPL CALCULATED.3IONS-SCNC: 7 MMOL/L (ref 3–14)
ANION GAP SERPL CALCULATED.3IONS-SCNC: 8 MMOL/L (ref 3–14)
ANION GAP SERPL CALCULATED.3IONS-SCNC: 8 MMOL/L (ref 3–14)
ANION GAP SERPL CALCULATED.3IONS-SCNC: 9 MMOL/L (ref 3–14)
ANISOCYTOSIS BLD QL SMEAR: SLIGHT
APPEARANCE FLD: NORMAL
APPEARANCE UR: CLEAR
APTT PPP: 37 SEC (ref 22–37)
AST SERPL W P-5'-P-CCNC: 20 U/L (ref 0–45)
AST SERPL W P-5'-P-CCNC: 22 U/L (ref 0–45)
AST SERPL W P-5'-P-CCNC: 24 U/L (ref 0–45)
AST SERPL W P-5'-P-CCNC: 25 U/L (ref 0–45)
AST SERPL W P-5'-P-CCNC: 26 U/L (ref 0–45)
AST SERPL W P-5'-P-CCNC: 31 U/L (ref 0–45)
AST SERPL W P-5'-P-CCNC: 34 U/L (ref 0–45)
AST SERPL W P-5'-P-CCNC: 35 U/L (ref 0–45)
AST SERPL W P-5'-P-CCNC: 35 U/L (ref 0–45)
AST SERPL W P-5'-P-CCNC: 36 U/L (ref 0–45)
AST SERPL W P-5'-P-CCNC: 36 U/L (ref 0–45)
AST SERPL W P-5'-P-CCNC: 37 U/L (ref 0–45)
AST SERPL W P-5'-P-CCNC: 38 U/L (ref 0–45)
AST SERPL W P-5'-P-CCNC: 38 U/L (ref 0–45)
AST SERPL W P-5'-P-CCNC: 39 U/L (ref 0–45)
AST SERPL W P-5'-P-CCNC: 40 U/L (ref 0–45)
AST SERPL W P-5'-P-CCNC: 41 U/L (ref 0–45)
AST SERPL W P-5'-P-CCNC: 43 U/L (ref 0–45)
AST SERPL W P-5'-P-CCNC: 50 U/L (ref 0–45)
AST SERPL-CCNC: 37 U/L (ref 5–34)
BACTERIA SPEC CULT: NO GROWTH
BASOPHILS # BLD AUTO: 0 10E9/L (ref 0–0.2)
BASOPHILS # BLD AUTO: 0.1 10E9/L (ref 0–0.2)
BASOPHILS # BLD AUTO: 0.2 10E9/L (ref 0–0.2)
BASOPHILS NFR BLD AUTO: 0.2 %
BASOPHILS NFR BLD AUTO: 0.3 %
BASOPHILS NFR BLD AUTO: 0.3 %
BASOPHILS NFR BLD AUTO: 0.5 %
BASOPHILS NFR BLD AUTO: 0.7 %
BASOPHILS NFR BLD AUTO: 0.8 %
BASOPHILS NFR BLD AUTO: 0.9 %
BASOPHILS NFR BLD AUTO: 1 %
BASOPHILS NFR BLD AUTO: 1.1 %
BASOPHILS NFR BLD AUTO: 1.1 %
BASOPHILS NFR BLD AUTO: 1.3 %
BASOPHILS NFR BLD AUTO: 1.7 %
BASOPHILS NFR BLD AUTO: 2 %
BASOPHILS NFR FLD MANUAL: 1 %
BILIRUB DIRECT SERPL-MCNC: 0.2 MG/DL (ref 0–0.2)
BILIRUB DIRECT SERPL-MCNC: 0.4 MG/DL (ref 0–0.2)
BILIRUB SERPL-MCNC: 0.3 MG/DL (ref 0.2–1.3)
BILIRUB SERPL-MCNC: 0.3 MG/DL (ref 0.2–1.3)
BILIRUB SERPL-MCNC: 0.4 MG/DL (ref 0.2–1.3)
BILIRUB SERPL-MCNC: 0.5 MG/DL (ref 0.2–1.3)
BILIRUB SERPL-MCNC: 0.6 MG/DL (ref 0.2–1.3)
BILIRUB SERPL-MCNC: 0.7 MG/DL (ref 0.2–1.3)
BILIRUB SERPL-MCNC: 0.8 MG/DL (ref 0.2–1.3)
BILIRUB SERPL-MCNC: 0.9 MG/DL (ref 0.2–1.3)
BILIRUB UR QL STRIP: NEGATIVE
BUN SERPL-MCNC: 11 MG/DL (ref 7–30)
BUN SERPL-MCNC: 12 MG/DL (ref 7–30)
BUN SERPL-MCNC: 13 MG/DL (ref 7–30)
BUN SERPL-MCNC: 14 MG/DL (ref 7–30)
BUN SERPL-MCNC: 15 MG/DL (ref 7–30)
BUN SERPL-MCNC: 16 MG/DL (ref 7–30)
BUN SERPL-MCNC: 17 MG/DL (ref 7–30)
BUN SERPL-MCNC: 39 MG/DL (ref 7–30)
BUN SERPL-MCNC: 8 MG/DL (ref 7–30)
BUN SERPL-MCNC: 8 MG/DL (ref 7–30)
BUN SERPL-MCNC: 9 MG/DL (ref 7–30)
CALCIUM SERPL-MCNC: 8.1 MG/DL (ref 8.5–10.1)
CALCIUM SERPL-MCNC: 8.2 MG/DL (ref 8.5–10.1)
CALCIUM SERPL-MCNC: 8.3 MG/DL (ref 8.5–10.1)
CALCIUM SERPL-MCNC: 8.3 MG/DL (ref 8.5–10.1)
CALCIUM SERPL-MCNC: 8.4 MG/DL (ref 8.5–10.1)
CALCIUM SERPL-MCNC: 8.5 MG/DL (ref 8.5–10.1)
CALCIUM SERPL-MCNC: 8.6 MG/DL (ref 8.5–10.1)
CALCIUM SERPL-MCNC: 8.7 MG/DL (ref 8.5–10.1)
CALCIUM SERPL-MCNC: 8.8 MG/DL (ref 8.5–10.1)
CALCIUM SERPL-MCNC: 8.9 MG/DL (ref 8.5–10.1)
CALCIUM SERPL-MCNC: 9 MG/DL (ref 8.5–10.1)
CALCIUM SERPL-MCNC: 9.1 MG/DL (ref 8.5–10.1)
CALCIUM SERPL-MCNC: 9.2 MG/DL (ref 8.5–10.1)
CHLORIDE SERPL-SCNC: 100 MMOL/L (ref 94–109)
CHLORIDE SERPL-SCNC: 101 MMOL/L (ref 94–109)
CHLORIDE SERPL-SCNC: 103 MMOL/L (ref 94–109)
CHLORIDE SERPL-SCNC: 104 MMOL/L (ref 94–109)
CHLORIDE SERPL-SCNC: 104 MMOL/L (ref 94–109)
CHLORIDE SERPL-SCNC: 106 MMOL/L (ref 94–109)
CHLORIDE SERPL-SCNC: 107 MMOL/L (ref 94–109)
CHLORIDE SERPL-SCNC: 108 MMOL/L (ref 94–109)
CHLORIDE SERPL-SCNC: 108 MMOL/L (ref 94–109)
CHLORIDE SERPL-SCNC: 94 MMOL/L (ref 94–109)
CHLORIDE SERPL-SCNC: 96 MMOL/L (ref 94–109)
CHLORIDE SERPL-SCNC: 96 MMOL/L (ref 94–109)
CHLORIDE SERPL-SCNC: 97 MMOL/L (ref 94–109)
CHLORIDE SERPL-SCNC: 97 MMOL/L (ref 94–109)
CHLORIDE SERPL-SCNC: 98 MMOL/L (ref 94–109)
CO2 SERPL-SCNC: 22 MMOL/L (ref 20–32)
CO2 SERPL-SCNC: 24 MMOL/L (ref 20–32)
CO2 SERPL-SCNC: 24 MMOL/L (ref 20–32)
CO2 SERPL-SCNC: 25 MMOL/L (ref 20–32)
CO2 SERPL-SCNC: 26 MMOL/L (ref 20–32)
CO2 SERPL-SCNC: 27 MMOL/L (ref 20–32)
CO2 SERPL-SCNC: 28 MMOL/L (ref 20–32)
CO2 SERPL-SCNC: 29 MMOL/L (ref 20–32)
CO2 SERPL-SCNC: 30 MMOL/L (ref 20–32)
CO2 SERPL-SCNC: 31 MMOL/L (ref 20–32)
CO2 SERPL-SCNC: 31 MMOL/L (ref 20–32)
CO2 SERPL-SCNC: 32 MMOL/L (ref 20–32)
CO2 SERPL-SCNC: 32 MMOL/L (ref 20–32)
COLOR FLD: YELLOW
COLOR UR AUTO: YELLOW
COPATH REPORT: NORMAL
CREAT BLD-MCNC: 0.8 MG/DL (ref 0.66–1.25)
CREAT BLD-MCNC: 0.9 MG/DL (ref 0.66–1.25)
CREAT SERPL-MCNC: 0.5 MG/DL (ref 0.66–1.25)
CREAT SERPL-MCNC: 0.51 MG/DL (ref 0.66–1.25)
CREAT SERPL-MCNC: 0.53 MG/DL (ref 0.66–1.25)
CREAT SERPL-MCNC: 0.54 MG/DL (ref 0.66–1.25)
CREAT SERPL-MCNC: 0.57 MG/DL (ref 0.66–1.25)
CREAT SERPL-MCNC: 0.58 MG/DL (ref 0.66–1.25)
CREAT SERPL-MCNC: 0.6 MG/DL (ref 0.66–1.25)
CREAT SERPL-MCNC: 0.62 MG/DL (ref 0.66–1.25)
CREAT SERPL-MCNC: 0.62 MG/DL (ref 0.66–1.25)
CREAT SERPL-MCNC: 0.69 MG/DL (ref 0.66–1.25)
CREAT SERPL-MCNC: 0.7 MG/DL (ref 0.66–1.25)
CREAT SERPL-MCNC: 0.73 MG/DL (ref 0.66–1.25)
CREAT SERPL-MCNC: 0.73 MG/DL (ref 0.73–1.18)
CREAT SERPL-MCNC: 0.76 MG/DL (ref 0.66–1.25)
CREAT SERPL-MCNC: 0.76 MG/DL (ref 0.66–1.25)
CREAT SERPL-MCNC: 0.78 MG/DL (ref 0.66–1.25)
CREAT SERPL-MCNC: 0.78 MG/DL (ref 0.66–1.25)
CREAT SERPL-MCNC: 0.79 MG/DL (ref 0.66–1.25)
CREAT SERPL-MCNC: 0.8 MG/DL (ref 0.66–1.25)
CREAT SERPL-MCNC: 0.81 MG/DL (ref 0.66–1.25)
CREAT SERPL-MCNC: 0.82 MG/DL (ref 0.66–1.25)
CREAT SERPL-MCNC: 0.83 MG/DL (ref 0.66–1.25)
CREAT SERPL-MCNC: 0.83 MG/DL (ref 0.66–1.25)
CREAT SERPL-MCNC: 0.84 MG/DL (ref 0.66–1.25)
CREAT SERPL-MCNC: 0.85 MG/DL (ref 0.66–1.25)
CREAT SERPL-MCNC: 0.85 MG/DL (ref 0.66–1.25)
CREAT SERPL-MCNC: 0.89 MG/DL (ref 0.66–1.25)
CREAT SERPL-MCNC: 0.89 MG/DL (ref 0.66–1.25)
CREAT SERPL-MCNC: 0.98 MG/DL (ref 0.66–1.25)
CREAT SERPL-MCNC: 1.22 MG/DL (ref 0.66–1.25)
DIFFERENTIAL METHOD BLD: ABNORMAL
DIFFERENTIAL METHOD BLD: NORMAL
EJECTION FRACTION: 50
EOSINOPHIL # BLD AUTO: 0 10E9/L (ref 0–0.7)
EOSINOPHIL # BLD AUTO: 0 10E9/L (ref 0–0.7)
EOSINOPHIL # BLD AUTO: 0.1 10E9/L (ref 0–0.7)
EOSINOPHIL # BLD AUTO: 0.2 10E9/L (ref 0–0.7)
EOSINOPHIL NFR BLD AUTO: 0.1 %
EOSINOPHIL NFR BLD AUTO: 0.3 %
EOSINOPHIL NFR BLD AUTO: 0.5 %
EOSINOPHIL NFR BLD AUTO: 0.6 %
EOSINOPHIL NFR BLD AUTO: 0.9 %
EOSINOPHIL NFR BLD AUTO: 1 %
EOSINOPHIL NFR BLD AUTO: 1.2 %
EOSINOPHIL NFR BLD AUTO: 1.6 %
EOSINOPHIL NFR BLD AUTO: 2 %
EOSINOPHIL NFR BLD AUTO: 2.2 %
EOSINOPHIL NFR BLD AUTO: 2.4 %
EOSINOPHIL NFR BLD AUTO: 2.5 %
EOSINOPHIL NFR BLD AUTO: 2.5 %
EOSINOPHIL NFR BLD AUTO: 2.6 %
EOSINOPHIL NFR BLD AUTO: 2.6 %
EOSINOPHIL NFR BLD AUTO: 2.7 %
EOSINOPHIL NFR BLD AUTO: 2.7 %
EOSINOPHIL NFR BLD AUTO: 2.8 %
EOSINOPHIL NFR BLD AUTO: 2.9 %
EOSINOPHIL NFR BLD AUTO: 2.9 %
EOSINOPHIL NFR BLD AUTO: 3 %
EOSINOPHIL NFR BLD AUTO: 3.1 %
EOSINOPHIL NFR BLD AUTO: 3.1 %
EOSINOPHIL NFR BLD AUTO: 3.2 %
EOSINOPHIL NFR BLD AUTO: 3.3 %
EOSINOPHIL NFR BLD AUTO: 3.4 %
EOSINOPHIL NFR BLD AUTO: 3.5 %
ERYTHROCYTE [DISTWIDTH] IN BLOOD BY AUTOMATED COUNT: 13.7 % (ref 10–15)
ERYTHROCYTE [DISTWIDTH] IN BLOOD BY AUTOMATED COUNT: 13.7 % (ref 10–15)
ERYTHROCYTE [DISTWIDTH] IN BLOOD BY AUTOMATED COUNT: 14.2 % (ref 10–15)
ERYTHROCYTE [DISTWIDTH] IN BLOOD BY AUTOMATED COUNT: 14.3 % (ref 10–15)
ERYTHROCYTE [DISTWIDTH] IN BLOOD BY AUTOMATED COUNT: 14.4 % (ref 10–15)
ERYTHROCYTE [DISTWIDTH] IN BLOOD BY AUTOMATED COUNT: 14.5 % (ref 10–15)
ERYTHROCYTE [DISTWIDTH] IN BLOOD BY AUTOMATED COUNT: 14.6 % (ref 10–15)
ERYTHROCYTE [DISTWIDTH] IN BLOOD BY AUTOMATED COUNT: 14.8 % (ref 10–15)
ERYTHROCYTE [DISTWIDTH] IN BLOOD BY AUTOMATED COUNT: 14.8 % (ref 10–15)
ERYTHROCYTE [DISTWIDTH] IN BLOOD BY AUTOMATED COUNT: 14.9 % (ref 10–15)
ERYTHROCYTE [DISTWIDTH] IN BLOOD BY AUTOMATED COUNT: 15 % (ref 10–15)
ERYTHROCYTE [DISTWIDTH] IN BLOOD BY AUTOMATED COUNT: 15.1 % (ref 10–15)
ERYTHROCYTE [DISTWIDTH] IN BLOOD BY AUTOMATED COUNT: 15.5 % (ref 10–15)
GFR SERPL CREATININE-BSD FRML MDRD: 66 ML/MIN/{1.73_M2}
GFR SERPL CREATININE-BSD FRML MDRD: 80 ML/MIN/1.7M2
GFR SERPL CREATININE-BSD FRML MDRD: 88 ML/MIN/1.7M2
GFR SERPL CREATININE-BSD FRML MDRD: 89 ML/MIN/1.7M2
GFR SERPL CREATININE-BSD FRML MDRD: 89 ML/MIN/1.7M2
GFR SERPL CREATININE-BSD FRML MDRD: >60 ML/MIN/1.73M2
GFR SERPL CREATININE-BSD FRML MDRD: >90 ML/MIN/1.7M2
GFR SERPL CREATININE-BSD FRML MDRD: >90 ML/MIN/{1.73_M2}
GLUCOSE BLDC GLUCOMTR-MCNC: 102 MG/DL (ref 70–99)
GLUCOSE BLDC GLUCOMTR-MCNC: 95 MG/DL (ref 70–99)
GLUCOSE FLD-MCNC: 76 MG/DL
GLUCOSE SERPL-MCNC: 100 MG/DL (ref 70–105)
GLUCOSE SERPL-MCNC: 100 MG/DL (ref 70–99)
GLUCOSE SERPL-MCNC: 100 MG/DL (ref 70–99)
GLUCOSE SERPL-MCNC: 101 MG/DL (ref 70–99)
GLUCOSE SERPL-MCNC: 103 MG/DL (ref 70–99)
GLUCOSE SERPL-MCNC: 105 MG/DL (ref 70–99)
GLUCOSE SERPL-MCNC: 105 MG/DL (ref 70–99)
GLUCOSE SERPL-MCNC: 106 MG/DL (ref 70–99)
GLUCOSE SERPL-MCNC: 106 MG/DL (ref 70–99)
GLUCOSE SERPL-MCNC: 107 MG/DL (ref 70–99)
GLUCOSE SERPL-MCNC: 108 MG/DL (ref 70–99)
GLUCOSE SERPL-MCNC: 108 MG/DL (ref 70–99)
GLUCOSE SERPL-MCNC: 110 MG/DL (ref 70–99)
GLUCOSE SERPL-MCNC: 114 MG/DL (ref 70–99)
GLUCOSE SERPL-MCNC: 116 MG/DL (ref 70–99)
GLUCOSE SERPL-MCNC: 118 MG/DL (ref 70–99)
GLUCOSE SERPL-MCNC: 124 MG/DL (ref 70–99)
GLUCOSE SERPL-MCNC: 135 MG/DL (ref 70–99)
GLUCOSE SERPL-MCNC: 135 MG/DL (ref 70–99)
GLUCOSE SERPL-MCNC: 77 MG/DL (ref 70–99)
GLUCOSE SERPL-MCNC: 82 MG/DL (ref 70–99)
GLUCOSE SERPL-MCNC: 82 MG/DL (ref 70–99)
GLUCOSE SERPL-MCNC: 83 MG/DL (ref 70–99)
GLUCOSE SERPL-MCNC: 85 MG/DL (ref 70–99)
GLUCOSE SERPL-MCNC: 92 MG/DL (ref 70–99)
GLUCOSE SERPL-MCNC: 92 MG/DL (ref 70–99)
GLUCOSE SERPL-MCNC: 93 MG/DL (ref 70–99)
GLUCOSE SERPL-MCNC: 94 MG/DL (ref 70–99)
GLUCOSE SERPL-MCNC: 95 MG/DL (ref 70–99)
GLUCOSE SERPL-MCNC: 95 MG/DL (ref 70–99)
GLUCOSE SERPL-MCNC: 96 MG/DL (ref 70–99)
GLUCOSE SERPL-MCNC: 97 MG/DL (ref 70–99)
GLUCOSE SERPL-MCNC: 98 MG/DL (ref 70–99)
GLUCOSE SERPL-MCNC: 98 MG/DL (ref 70–99)
GLUCOSE UR STRIP-MCNC: NEGATIVE MG/DL
GRAM STN SPEC: NORMAL
HCT VFR BLD AUTO: 38.3 % (ref 40–53)
HCT VFR BLD AUTO: 38.8 % (ref 40–53)
HCT VFR BLD AUTO: 38.9 % (ref 40–53)
HCT VFR BLD AUTO: 38.9 % (ref 40–53)
HCT VFR BLD AUTO: 39 % (ref 40–53)
HCT VFR BLD AUTO: 39.1 % (ref 40–53)
HCT VFR BLD AUTO: 39.2 % (ref 40–53)
HCT VFR BLD AUTO: 39.4 % (ref 40–53)
HCT VFR BLD AUTO: 39.5 % (ref 40–53)
HCT VFR BLD AUTO: 39.7 % (ref 40–53)
HCT VFR BLD AUTO: 39.7 % (ref 40–53)
HCT VFR BLD AUTO: 40 % (ref 40–53)
HCT VFR BLD AUTO: 40.2 % (ref 40–53)
HCT VFR BLD AUTO: 40.3 % (ref 40–53)
HCT VFR BLD AUTO: 40.4 % (ref 40–53)
HCT VFR BLD AUTO: 40.5 % (ref 40–53)
HCT VFR BLD AUTO: 40.8 % (ref 40–53)
HCT VFR BLD AUTO: 40.9 % (ref 40–53)
HCT VFR BLD AUTO: 40.9 % (ref 40–53)
HCT VFR BLD AUTO: 41.4 % (ref 40–53)
HCT VFR BLD AUTO: 41.4 % (ref 40–53)
HCT VFR BLD AUTO: 41.5 % (ref 40–53)
HCT VFR BLD AUTO: 41.6 % (ref 40–53)
HCT VFR BLD AUTO: 41.8 % (ref 40–53)
HCT VFR BLD AUTO: 42.1 % (ref 40–53)
HCT VFR BLD AUTO: 42.3 % (ref 40–53)
HCT VFR BLD AUTO: 43 % (ref 40–53)
HCT VFR BLD AUTO: 43.2 % (ref 40–53)
HCT VFR BLD AUTO: 43.6 % (ref 40–53)
HCT VFR BLD AUTO: 43.7 % (ref 40–53)
HCT VFR BLD AUTO: 44.3 % (ref 40–53)
HCT VFR BLD AUTO: 44.4 % (ref 40–53)
HCT VFR BLD AUTO: 44.9 % (ref 40–53)
HCT VFR BLD AUTO: 45 % (ref 40–53)
HGB BLD-MCNC: 12.6 G/DL (ref 13.3–17.7)
HGB BLD-MCNC: 12.9 G/DL (ref 13.3–17.7)
HGB BLD-MCNC: 13 G/DL (ref 13.3–17.7)
HGB BLD-MCNC: 13.1 G/DL (ref 13.3–17.7)
HGB BLD-MCNC: 13.2 G/DL (ref 13.3–17.7)
HGB BLD-MCNC: 13.3 G/DL (ref 13.3–17.7)
HGB BLD-MCNC: 13.3 G/DL (ref 13.3–17.7)
HGB BLD-MCNC: 13.4 G/DL (ref 13.3–17.7)
HGB BLD-MCNC: 13.5 G/DL (ref 13.3–17.7)
HGB BLD-MCNC: 13.6 G/DL (ref 13.3–17.7)
HGB BLD-MCNC: 13.7 G/DL (ref 13.3–17.7)
HGB BLD-MCNC: 13.7 G/DL (ref 13.3–17.7)
HGB BLD-MCNC: 13.8 G/DL (ref 13.3–17.7)
HGB BLD-MCNC: 13.9 G/DL (ref 13.3–17.7)
HGB BLD-MCNC: 14 G/DL (ref 13.3–17.7)
HGB BLD-MCNC: 14.2 G/DL (ref 13.3–17.7)
HGB BLD-MCNC: 14.3 G/DL (ref 13.3–17.7)
HGB BLD-MCNC: 14.4 G/DL (ref 13.3–17.7)
HGB BLD-MCNC: 14.4 G/DL (ref 13.3–17.7)
HGB BLD-MCNC: 14.5 G/DL (ref 13.3–17.7)
HGB BLD-MCNC: 14.7 G/DL (ref 13.3–17.7)
HGB BLD-MCNC: 14.9 G/DL (ref 13.3–17.7)
HGB BLD-MCNC: 15.1 G/DL (ref 13.3–17.7)
HGB UR QL STRIP: NEGATIVE
IMM GRANULOCYTES # BLD: 0 10E9/L (ref 0–0.4)
IMM GRANULOCYTES # BLD: 0.1 10E9/L (ref 0–0.4)
IMM GRANULOCYTES NFR BLD: 0 %
IMM GRANULOCYTES NFR BLD: 0.2 %
IMM GRANULOCYTES NFR BLD: 0.3 %
IMM GRANULOCYTES NFR BLD: 0.4 %
IMM GRANULOCYTES NFR BLD: 0.4 %
IMM GRANULOCYTES NFR BLD: 0.5 %
IMM GRANULOCYTES NFR BLD: 0.6 %
IMM GRANULOCYTES NFR BLD: 0.6 %
IMM GRANULOCYTES NFR BLD: 0.7 %
IMM GRANULOCYTES NFR BLD: 0.7 %
INR PPP: 1.37 (ref 0.86–1.14)
INR PPP: 1.42 (ref 0.86–1.14)
INR PPP: 1.53 (ref 0.86–1.14)
INR PPP: 1.59 (ref 0.86–1.14)
INTERPRETATION ECG - MUSE: NORMAL
KETONES UR STRIP-MCNC: 5 MG/DL
LAB SCANNED RESULT: NORMAL
LAB SCANNED RESULT: NORMAL
LACTATE BLD-SCNC: 1.1 MMOL/L (ref 0.7–2)
LACTATE BLD-SCNC: 1.3 MMOL/L (ref 0.7–2)
LACTATE BLD-SCNC: 1.5 MMOL/L (ref 0.7–2)
LACTATE BLD-SCNC: 1.6 MMOL/L (ref 0.7–2)
LDH FLD L TO P-CCNC: 341 U/L
LEUKOCYTE ESTERASE UR QL STRIP: NEGATIVE
LIPASE SERPL-CCNC: 83 U/L (ref 73–393)
LYMPHOCYTES # BLD AUTO: 1 10E9/L (ref 0.8–5.3)
LYMPHOCYTES # BLD AUTO: 1.2 10E9/L (ref 0.8–5.3)
LYMPHOCYTES # BLD AUTO: 1.2 10E9/L (ref 0.8–5.3)
LYMPHOCYTES # BLD AUTO: 1.4 10E9/L (ref 0.8–5.3)
LYMPHOCYTES # BLD AUTO: 1.5 10E9/L (ref 0.8–5.3)
LYMPHOCYTES # BLD AUTO: 1.6 10E9/L (ref 0.8–5.3)
LYMPHOCYTES # BLD AUTO: 1.7 10E9/L (ref 0.8–5.3)
LYMPHOCYTES # BLD AUTO: 1.8 10E9/L (ref 0.8–5.3)
LYMPHOCYTES # BLD AUTO: 1.9 10E9/L (ref 0.8–5.3)
LYMPHOCYTES # BLD AUTO: 1.9 10E9/L (ref 0.8–5.3)
LYMPHOCYTES # BLD AUTO: 2 10E9/L (ref 0.8–5.3)
LYMPHOCYTES # BLD AUTO: 2.1 10E9/L (ref 0.8–5.3)
LYMPHOCYTES NFR BLD AUTO: 11.1 %
LYMPHOCYTES NFR BLD AUTO: 11.5 %
LYMPHOCYTES NFR BLD AUTO: 12.4 %
LYMPHOCYTES NFR BLD AUTO: 13.2 %
LYMPHOCYTES NFR BLD AUTO: 13.4 %
LYMPHOCYTES NFR BLD AUTO: 14 %
LYMPHOCYTES NFR BLD AUTO: 16.4 %
LYMPHOCYTES NFR BLD AUTO: 17.4 %
LYMPHOCYTES NFR BLD AUTO: 19.4 %
LYMPHOCYTES NFR BLD AUTO: 19.9 %
LYMPHOCYTES NFR BLD AUTO: 22.7 %
LYMPHOCYTES NFR BLD AUTO: 22.9 %
LYMPHOCYTES NFR BLD AUTO: 24.9 %
LYMPHOCYTES NFR BLD AUTO: 25.2 %
LYMPHOCYTES NFR BLD AUTO: 25.9 %
LYMPHOCYTES NFR BLD AUTO: 27.4 %
LYMPHOCYTES NFR BLD AUTO: 28.6 %
LYMPHOCYTES NFR BLD AUTO: 28.7 %
LYMPHOCYTES NFR BLD AUTO: 28.7 %
LYMPHOCYTES NFR BLD AUTO: 29.3 %
LYMPHOCYTES NFR BLD AUTO: 30.5 %
LYMPHOCYTES NFR BLD AUTO: 31 %
LYMPHOCYTES NFR BLD AUTO: 31.5 %
LYMPHOCYTES NFR BLD AUTO: 32.3 %
LYMPHOCYTES NFR BLD AUTO: 32.8 %
LYMPHOCYTES NFR BLD AUTO: 32.9 %
LYMPHOCYTES NFR BLD AUTO: 33.2 %
LYMPHOCYTES NFR BLD AUTO: 33.4 %
LYMPHOCYTES NFR BLD AUTO: 34.3 %
LYMPHOCYTES NFR BLD AUTO: 8.4 %
LYMPHOCYTES NFR BLD AUTO: 9.6 %
LYMPHOCYTES NFR FLD MANUAL: 19 %
Lab: NORMAL
Lab: NORMAL
MAGNESIUM SERPL-MCNC: 1.7 MG/DL (ref 1.6–2.3)
MAGNESIUM SERPL-MCNC: 1.9 MG/DL (ref 1.6–2.3)
MAGNESIUM SERPL-MCNC: 2 MG/DL (ref 1.6–2.3)
MCH RBC QN AUTO: 28.8 PG (ref 26.5–33)
MCH RBC QN AUTO: 29.4 PG (ref 26.5–33)
MCH RBC QN AUTO: 29.5 PG (ref 26.5–33)
MCH RBC QN AUTO: 29.6 PG (ref 26.5–33)
MCH RBC QN AUTO: 29.6 PG (ref 26.5–33)
MCH RBC QN AUTO: 29.7 PG (ref 26.5–33)
MCH RBC QN AUTO: 29.8 PG (ref 26.5–33)
MCH RBC QN AUTO: 29.9 PG (ref 26.5–33)
MCH RBC QN AUTO: 30.1 PG (ref 26.5–33)
MCH RBC QN AUTO: 30.3 PG (ref 26.5–33)
MCH RBC QN AUTO: 30.4 PG (ref 26.5–33)
MCH RBC QN AUTO: 30.8 PG (ref 26.5–33)
MCH RBC QN AUTO: 30.9 PG (ref 26.5–33)
MCH RBC QN AUTO: 31.2 PG (ref 26.5–33)
MCH RBC QN AUTO: 31.3 PG (ref 26.5–33)
MCH RBC QN AUTO: 31.3 PG (ref 26.5–33)
MCH RBC QN AUTO: 31.4 PG (ref 26.5–33)
MCH RBC QN AUTO: 31.4 PG (ref 26.5–33)
MCH RBC QN AUTO: 31.5 PG (ref 26.5–33)
MCH RBC QN AUTO: 31.7 PG (ref 26.5–33)
MCH RBC QN AUTO: 31.7 PG (ref 26.5–33)
MCH RBC QN AUTO: 31.8 PG (ref 26.5–33)
MCH RBC QN AUTO: 31.8 PG (ref 26.5–33)
MCH RBC QN AUTO: 31.9 PG (ref 26.5–33)
MCH RBC QN AUTO: 32 PG (ref 26.5–33)
MCH RBC QN AUTO: 32 PG (ref 26.5–33)
MCH RBC QN AUTO: 32.2 PG (ref 26.5–33)
MCHC RBC AUTO-ENTMCNC: 32.3 G/DL (ref 31.5–36.5)
MCHC RBC AUTO-ENTMCNC: 32.4 G/DL (ref 31.5–36.5)
MCHC RBC AUTO-ENTMCNC: 32.7 G/DL (ref 31.5–36.5)
MCHC RBC AUTO-ENTMCNC: 32.7 G/DL (ref 31.5–36.5)
MCHC RBC AUTO-ENTMCNC: 32.9 G/DL (ref 31.5–36.5)
MCHC RBC AUTO-ENTMCNC: 33 G/DL (ref 31.5–36.5)
MCHC RBC AUTO-ENTMCNC: 33.1 G/DL (ref 31.5–36.5)
MCHC RBC AUTO-ENTMCNC: 33.1 G/DL (ref 31.5–36.5)
MCHC RBC AUTO-ENTMCNC: 33.2 G/DL (ref 31.5–36.5)
MCHC RBC AUTO-ENTMCNC: 33.3 G/DL (ref 31.5–36.5)
MCHC RBC AUTO-ENTMCNC: 33.4 G/DL (ref 31.5–36.5)
MCHC RBC AUTO-ENTMCNC: 33.5 G/DL (ref 31.5–36.5)
MCHC RBC AUTO-ENTMCNC: 33.6 G/DL (ref 31.5–36.5)
MCHC RBC AUTO-ENTMCNC: 33.7 G/DL (ref 31.5–36.5)
MCV RBC AUTO: 88 FL (ref 78–100)
MCV RBC AUTO: 89 FL (ref 78–100)
MCV RBC AUTO: 90 FL (ref 78–100)
MCV RBC AUTO: 91 FL (ref 78–100)
MCV RBC AUTO: 92 FL (ref 78–100)
MCV RBC AUTO: 93 FL (ref 78–100)
MCV RBC AUTO: 93 FL (ref 78–100)
MCV RBC AUTO: 94 FL (ref 78–100)
MCV RBC AUTO: 95 FL (ref 78–100)
MCV RBC AUTO: 96 FL (ref 78–100)
MONOCYTES # BLD AUTO: 0.6 10E9/L (ref 0–1.3)
MONOCYTES # BLD AUTO: 0.7 10E9/L (ref 0–1.3)
MONOCYTES # BLD AUTO: 0.8 10E9/L (ref 0–1.3)
MONOCYTES # BLD AUTO: 0.9 10E9/L (ref 0–1.3)
MONOCYTES # BLD AUTO: 1 10E9/L (ref 0–1.3)
MONOCYTES # BLD AUTO: 1.1 10E9/L (ref 0–1.3)
MONOCYTES # BLD AUTO: 1.6 10E9/L (ref 0–1.3)
MONOCYTES # BLD AUTO: 1.9 10E9/L (ref 0–1.3)
MONOCYTES # BLD AUTO: 2 10E9/L (ref 0–1.3)
MONOCYTES # BLD AUTO: 2 10E9/L (ref 0–1.3)
MONOCYTES # BLD AUTO: 2.1 10E9/L (ref 0–1.3)
MONOCYTES # BLD AUTO: 2.8 10E9/L (ref 0–1.3)
MONOCYTES NFR BLD AUTO: 10.3 %
MONOCYTES NFR BLD AUTO: 10.4 %
MONOCYTES NFR BLD AUTO: 10.7 %
MONOCYTES NFR BLD AUTO: 11 %
MONOCYTES NFR BLD AUTO: 11.2 %
MONOCYTES NFR BLD AUTO: 11.3 %
MONOCYTES NFR BLD AUTO: 11.4 %
MONOCYTES NFR BLD AUTO: 11.5 %
MONOCYTES NFR BLD AUTO: 11.6 %
MONOCYTES NFR BLD AUTO: 11.7 %
MONOCYTES NFR BLD AUTO: 11.9 %
MONOCYTES NFR BLD AUTO: 11.9 %
MONOCYTES NFR BLD AUTO: 12.1 %
MONOCYTES NFR BLD AUTO: 12.2 %
MONOCYTES NFR BLD AUTO: 12.4 %
MONOCYTES NFR BLD AUTO: 12.7 %
MONOCYTES NFR BLD AUTO: 13.3 %
MONOCYTES NFR BLD AUTO: 13.6 %
MONOCYTES NFR BLD AUTO: 13.7 %
MONOCYTES NFR BLD AUTO: 14.5 %
MONOCYTES NFR BLD AUTO: 15.3 %
MONOCYTES NFR BLD AUTO: 15.9 %
MONOCYTES NFR BLD AUTO: 15.9 %
MONOCYTES NFR BLD AUTO: 16.1 %
MONOCYTES NFR BLD AUTO: 23.6 %
MONOCYTES NFR BLD AUTO: 26 %
MONOCYTES NFR BLD AUTO: 8.3 %
MONOCYTES NFR BLD AUTO: 8.4 %
MONOCYTES NFR BLD AUTO: 9.7 %
MONOCYTES NFR BLD AUTO: 9.8 %
MONOS+MACROS NFR FLD MANUAL: 79 %
MUCOUS THREADS #/AREA URNS LPF: PRESENT /LPF
NEUTROPHILS # BLD AUTO: 14 10E9/L (ref 1.6–8.3)
NEUTROPHILS # BLD AUTO: 2.5 10E9/L (ref 1.6–8.3)
NEUTROPHILS # BLD AUTO: 2.7 10E9/L (ref 1.6–8.3)
NEUTROPHILS # BLD AUTO: 2.7 10E9/L (ref 1.6–8.3)
NEUTROPHILS # BLD AUTO: 2.9 10E9/L (ref 1.6–8.3)
NEUTROPHILS # BLD AUTO: 3 10E9/L (ref 1.6–8.3)
NEUTROPHILS # BLD AUTO: 3 10E9/L (ref 1.6–8.3)
NEUTROPHILS # BLD AUTO: 3.1 10E9/L (ref 1.6–8.3)
NEUTROPHILS # BLD AUTO: 3.1 10E9/L (ref 1.6–8.3)
NEUTROPHILS # BLD AUTO: 3.2 10E9/L (ref 1.6–8.3)
NEUTROPHILS # BLD AUTO: 3.3 10E9/L (ref 1.6–8.3)
NEUTROPHILS # BLD AUTO: 3.4 10E9/L (ref 1.6–8.3)
NEUTROPHILS # BLD AUTO: 3.4 10E9/L (ref 1.6–8.3)
NEUTROPHILS # BLD AUTO: 3.5 10E9/L (ref 1.6–8.3)
NEUTROPHILS # BLD AUTO: 3.5 10E9/L (ref 1.6–8.3)
NEUTROPHILS # BLD AUTO: 3.9 10E9/L (ref 1.6–8.3)
NEUTROPHILS # BLD AUTO: 4.2 10E9/L (ref 1.6–8.3)
NEUTROPHILS # BLD AUTO: 4.3 10E9/L (ref 1.6–8.3)
NEUTROPHILS # BLD AUTO: 4.5 10E9/L (ref 1.6–8.3)
NEUTROPHILS # BLD AUTO: 4.6 10E9/L (ref 1.6–8.3)
NEUTROPHILS # BLD AUTO: 4.6 10E9/L (ref 1.6–8.3)
NEUTROPHILS # BLD AUTO: 4.7 10E9/L (ref 1.6–8.3)
NEUTROPHILS # BLD AUTO: 5.3 10E9/L (ref 1.6–8.3)
NEUTROPHILS # BLD AUTO: 7.1 10E9/L (ref 1.6–8.3)
NEUTROPHILS # BLD AUTO: 7.4 10E9/L (ref 1.6–8.3)
NEUTROPHILS # BLD AUTO: 7.9 10E9/L (ref 1.6–8.3)
NEUTROPHILS # BLD AUTO: 8.1 10E9/L (ref 1.6–8.3)
NEUTROPHILS # BLD AUTO: 8.6 10E9/L (ref 1.6–8.3)
NEUTROPHILS # BLD AUTO: 9.1 10E9/L (ref 1.6–8.3)
NEUTROPHILS # BLD AUTO: 9.2 10E9/L (ref 1.6–8.3)
NEUTROPHILS # BLD AUTO: 9.2 10E9/L (ref 1.6–8.3)
NEUTROPHILS NFR BLD AUTO: 47.7 %
NEUTROPHILS NFR BLD AUTO: 49.7 %
NEUTROPHILS NFR BLD AUTO: 50.4 %
NEUTROPHILS NFR BLD AUTO: 50.6 %
NEUTROPHILS NFR BLD AUTO: 51.9 %
NEUTROPHILS NFR BLD AUTO: 51.9 %
NEUTROPHILS NFR BLD AUTO: 52.8 %
NEUTROPHILS NFR BLD AUTO: 53 %
NEUTROPHILS NFR BLD AUTO: 53.8 %
NEUTROPHILS NFR BLD AUTO: 54.3 %
NEUTROPHILS NFR BLD AUTO: 54.4 %
NEUTROPHILS NFR BLD AUTO: 55.2 %
NEUTROPHILS NFR BLD AUTO: 55.4 %
NEUTROPHILS NFR BLD AUTO: 56.1 %
NEUTROPHILS NFR BLD AUTO: 56.8 %
NEUTROPHILS NFR BLD AUTO: 57 %
NEUTROPHILS NFR BLD AUTO: 57.6 %
NEUTROPHILS NFR BLD AUTO: 57.9 %
NEUTROPHILS NFR BLD AUTO: 59.4 %
NEUTROPHILS NFR BLD AUTO: 61 %
NEUTROPHILS NFR BLD AUTO: 61.3 %
NEUTROPHILS NFR BLD AUTO: 62.1 %
NEUTROPHILS NFR BLD AUTO: 62.1 %
NEUTROPHILS NFR BLD AUTO: 66.1 %
NEUTROPHILS NFR BLD AUTO: 67.1 %
NEUTROPHILS NFR BLD AUTO: 68.7 %
NEUTROPHILS NFR BLD AUTO: 70.3 %
NEUTROPHILS NFR BLD AUTO: 71.5 %
NEUTROPHILS NFR BLD AUTO: 71.8 %
NEUTROPHILS NFR BLD AUTO: 72.2 %
NEUTROPHILS NFR BLD AUTO: 73.5 %
NEUTROPHILS NFR BLD AUTO: 76 %
NEUTROPHILS NFR BLD AUTO: 78.6 %
NEUTS BAND NFR FLD MANUAL: 1 %
NITRATE UR QL: NEGATIVE
NRBC # BLD AUTO: 0 10*3/UL
NRBC # BLD AUTO: 0.1 10*3/UL
NRBC BLD AUTO-RTO: 0 /100
NRBC BLD AUTO-RTO: 1 /100
NT-PROBNP SERPL-MCNC: 107 PG/ML (ref 0–900)
PH UR STRIP: 5.5 PH (ref 5–7)
PHOSPHATE SERPL-MCNC: 2.7 MG/DL (ref 2.5–4.5)
PHOSPHATE SERPL-MCNC: 2.9 MG/DL (ref 2.5–4.5)
PHOSPHATE SERPL-MCNC: 3 MG/DL (ref 2.5–4.5)
PHOSPHATE SERPL-MCNC: 3.1 MG/DL (ref 2.5–4.5)
PHOSPHATE SERPL-MCNC: 3.4 MG/DL (ref 2.5–4.5)
PLATELET # BLD AUTO: 138 10E9/L (ref 150–450)
PLATELET # BLD AUTO: 139 10E9/L (ref 150–450)
PLATELET # BLD AUTO: 145 10E9/L (ref 150–450)
PLATELET # BLD AUTO: 150 10E9/L (ref 150–450)
PLATELET # BLD AUTO: 155 10E9/L (ref 150–450)
PLATELET # BLD AUTO: 155 10E9/L (ref 150–450)
PLATELET # BLD AUTO: 164 10E9/L (ref 150–450)
PLATELET # BLD AUTO: 169 10E9/L (ref 150–450)
PLATELET # BLD AUTO: 169 10E9/L (ref 150–450)
PLATELET # BLD AUTO: 171 10E9/L (ref 150–450)
PLATELET # BLD AUTO: 174 10E9/L (ref 150–450)
PLATELET # BLD AUTO: 175 10E9/L (ref 150–450)
PLATELET # BLD AUTO: 177 10E9/L (ref 150–450)
PLATELET # BLD AUTO: 180 10E9/L (ref 150–450)
PLATELET # BLD AUTO: 186 10E9/L (ref 150–450)
PLATELET # BLD AUTO: 187 10E9/L (ref 150–450)
PLATELET # BLD AUTO: 190 10E9/L (ref 150–450)
PLATELET # BLD AUTO: 196 10E9/L (ref 150–450)
PLATELET # BLD AUTO: 197 10E9/L (ref 150–450)
PLATELET # BLD AUTO: 197 10E9/L (ref 150–450)
PLATELET # BLD AUTO: 222 10E9/L (ref 150–450)
PLATELET # BLD AUTO: 231 10E9/L (ref 150–450)
PLATELET # BLD AUTO: 235 10E9/L (ref 150–450)
PLATELET # BLD AUTO: 238 10E9/L (ref 150–450)
PLATELET # BLD AUTO: 242 10E9/L (ref 150–450)
PLATELET # BLD AUTO: 242 10E9/L (ref 150–450)
PLATELET # BLD AUTO: 263 10E9/L (ref 150–450)
PLATELET # BLD AUTO: 269 10E9/L (ref 150–450)
PLATELET # BLD AUTO: 270 10E9/L (ref 150–450)
PLATELET # BLD AUTO: 297 10E9/L (ref 150–450)
PLATELET # BLD AUTO: 300 10E9/L (ref 150–450)
PLATELET # BLD AUTO: 452 10E9/L (ref 150–450)
PLATELET # BLD EST: ABNORMAL 10*3/UL
POTASSIUM SERPL-SCNC: 3.2 MMOL/L (ref 3.4–5.3)
POTASSIUM SERPL-SCNC: 3.3 MMOL/L (ref 3.4–5.3)
POTASSIUM SERPL-SCNC: 3.4 MMOL/L (ref 3.4–5.3)
POTASSIUM SERPL-SCNC: 3.4 MMOL/L (ref 3.4–5.3)
POTASSIUM SERPL-SCNC: 3.5 MMOL/L (ref 3.4–5.3)
POTASSIUM SERPL-SCNC: 3.5 MMOL/L (ref 3.4–5.3)
POTASSIUM SERPL-SCNC: 3.5 MMOL/L (ref 3.5–5.1)
POTASSIUM SERPL-SCNC: 3.6 MMOL/L (ref 3.4–5.3)
POTASSIUM SERPL-SCNC: 3.6 MMOL/L (ref 3.4–5.3)
POTASSIUM SERPL-SCNC: 3.8 MMOL/L (ref 3.4–5.3)
POTASSIUM SERPL-SCNC: 3.8 MMOL/L (ref 3.4–5.3)
POTASSIUM SERPL-SCNC: 3.9 MMOL/L (ref 3.4–5.3)
POTASSIUM SERPL-SCNC: 4 MMOL/L (ref 3.4–5.3)
POTASSIUM SERPL-SCNC: 4 MMOL/L (ref 3.4–5.3)
POTASSIUM SERPL-SCNC: 4.1 MMOL/L (ref 3.4–5.3)
POTASSIUM SERPL-SCNC: 4.2 MMOL/L (ref 3.4–5.3)
POTASSIUM SERPL-SCNC: 4.3 MMOL/L (ref 3.4–5.3)
POTASSIUM SERPL-SCNC: 4.4 MMOL/L (ref 3.4–5.3)
POTASSIUM SERPL-SCNC: 4.5 MMOL/L (ref 3.4–5.3)
POTASSIUM SERPL-SCNC: 4.6 MMOL/L (ref 3.4–5.3)
POTASSIUM SERPL-SCNC: 4.7 MMOL/L (ref 3.4–5.3)
PROT FLD-MCNC: 3.1 G/DL
PROT SERPL-MCNC: 5.7 G/DL (ref 6.8–8.8)
PROT SERPL-MCNC: 6 G/DL (ref 6.8–8.8)
PROT SERPL-MCNC: 6.3 G/DL (ref 6.8–8.8)
PROT SERPL-MCNC: 6.3 G/DL (ref 6.8–8.8)
PROT SERPL-MCNC: 6.4 G/DL (ref 6.8–8.8)
PROT SERPL-MCNC: 6.5 G/DL (ref 6.8–8.8)
PROT SERPL-MCNC: 6.6 G/DL (ref 6.8–8.8)
PROT SERPL-MCNC: 6.7 G/DL (ref 6.8–8.8)
PROT SERPL-MCNC: 6.8 G/DL (ref 6.8–8.8)
PROT SERPL-MCNC: 6.8 G/DL (ref 6.8–8.8)
PROT SERPL-MCNC: 6.9 G/DL (ref 6.8–8.8)
PROT SERPL-MCNC: 7 G/DL (ref 6.8–8.8)
PROT SERPL-MCNC: 7.1 G/DL (ref 6.8–8.8)
PROT SERPL-MCNC: 7.1 G/DL (ref 6.8–8.8)
RADIOLOGIST FLAGS: ABNORMAL
RBC # BLD AUTO: 4.08 10E12/L (ref 4.4–5.9)
RBC # BLD AUTO: 4.09 10E12/L (ref 4.4–5.9)
RBC # BLD AUTO: 4.1 10E12/L (ref 4.4–5.9)
RBC # BLD AUTO: 4.11 10E12/L (ref 4.4–5.9)
RBC # BLD AUTO: 4.12 10E12/L (ref 4.4–5.9)
RBC # BLD AUTO: 4.15 10E12/L (ref 4.4–5.9)
RBC # BLD AUTO: 4.17 10E12/L (ref 4.4–5.9)
RBC # BLD AUTO: 4.22 10E12/L (ref 4.4–5.9)
RBC # BLD AUTO: 4.26 10E12/L (ref 4.4–5.9)
RBC # BLD AUTO: 4.26 10E12/L (ref 4.4–5.9)
RBC # BLD AUTO: 4.31 10E12/L (ref 4.4–5.9)
RBC # BLD AUTO: 4.32 10E12/L (ref 4.4–5.9)
RBC # BLD AUTO: 4.33 10E12/L (ref 4.4–5.9)
RBC # BLD AUTO: 4.35 10E12/L (ref 4.4–5.9)
RBC # BLD AUTO: 4.36 10E12/L (ref 4.4–5.9)
RBC # BLD AUTO: 4.38 10E12/L (ref 4.4–5.9)
RBC # BLD AUTO: 4.41 10E12/L (ref 4.4–5.9)
RBC # BLD AUTO: 4.5 10E12/L (ref 4.4–5.9)
RBC # BLD AUTO: 4.52 10E12/L (ref 4.4–5.9)
RBC # BLD AUTO: 4.59 10E12/L (ref 4.4–5.9)
RBC # BLD AUTO: 4.6 10E12/L (ref 4.4–5.9)
RBC # BLD AUTO: 4.68 10E12/L (ref 4.4–5.9)
RBC # BLD AUTO: 4.74 10E12/L (ref 4.4–5.9)
RBC # BLD AUTO: 4.78 10E12/L (ref 4.4–5.9)
RBC # BLD AUTO: 4.79 10E12/L (ref 4.4–5.9)
RBC # BLD AUTO: 4.83 10E12/L (ref 4.4–5.9)
RBC # BLD AUTO: 4.89 10E12/L (ref 4.4–5.9)
RBC # BLD AUTO: 4.98 10E12/L (ref 4.4–5.9)
RBC # BLD AUTO: 5.04 10E12/L (ref 4.4–5.9)
RBC # BLD AUTO: 5.06 10E12/L (ref 4.4–5.9)
RBC #/AREA URNS AUTO: ABNORMAL /HPF
RBC MORPH BLD: NORMAL
SODIUM SERPL-SCNC: 133 MMOL/L (ref 133–144)
SODIUM SERPL-SCNC: 134 MMOL/L (ref 133–144)
SODIUM SERPL-SCNC: 135 MMOL/L (ref 133–144)
SODIUM SERPL-SCNC: 136 MMOL/L (ref 133–144)
SODIUM SERPL-SCNC: 136 MMOL/L (ref 133–144)
SODIUM SERPL-SCNC: 139 MMOL/L (ref 133–144)
SODIUM SERPL-SCNC: 140 MMOL/L (ref 133–144)
SODIUM SERPL-SCNC: 141 MMOL/L (ref 133–144)
SODIUM SERPL-SCNC: 142 MMOL/L (ref 133–144)
SODIUM SERPL-SCNC: 142 MMOL/L (ref 133–144)
SODIUM SERPL-SCNC: 143 MMOL/L (ref 133–144)
SODIUM SERPL-SCNC: 143 MMOL/L (ref 133–144)
SOURCE: ABNORMAL
SP GR UR STRIP: 1.02 (ref 1–1.03)
SPECIMEN SOURCE FLD: NORMAL
SPECIMEN SOURCE: NORMAL
TROPONIN I SERPL-MCNC: <0.015 UG/L (ref 0–0.04)
UPPER GI ENDOSCOPY: NORMAL
UROBILINOGEN UR STRIP-MCNC: NORMAL MG/DL (ref 0–2)
WBC # BLD AUTO: 10.7 10E9/L (ref 4–11)
WBC # BLD AUTO: 11 10E9/L (ref 4–11)
WBC # BLD AUTO: 12.1 10E9/L (ref 4–11)
WBC # BLD AUTO: 12.5 10E9/L (ref 4–11)
WBC # BLD AUTO: 12.7 10E9/L (ref 4–11)
WBC # BLD AUTO: 13 10E9/L (ref 4–11)
WBC # BLD AUTO: 13 10E9/L (ref 4–11)
WBC # BLD AUTO: 17.8 10E9/L (ref 4–11)
WBC # BLD AUTO: 5.3 10E9/L (ref 4–11)
WBC # BLD AUTO: 5.3 10E9/L (ref 4–11)
WBC # BLD AUTO: 5.4 10E9/L (ref 4–11)
WBC # BLD AUTO: 5.4 10E9/L (ref 4–11)
WBC # BLD AUTO: 5.5 10E9/L (ref 4–11)
WBC # BLD AUTO: 5.6 10E9/L (ref 4–11)
WBC # BLD AUTO: 5.8 10E9/L (ref 4–11)
WBC # BLD AUTO: 5.9 10E9/L (ref 4–11)
WBC # BLD AUTO: 6 10E9/L (ref 4–11)
WBC # BLD AUTO: 6.1 10E9/L (ref 4–11)
WBC # BLD AUTO: 6.1 10E9/L (ref 4–11)
WBC # BLD AUTO: 6.2 10E9/L (ref 4–11)
WBC # BLD AUTO: 6.3 10E9/L (ref 4–11)
WBC # BLD AUTO: 6.4 10E9/L (ref 4–11)
WBC # BLD AUTO: 6.5 10E9/L (ref 4–11)
WBC # BLD AUTO: 6.8 10E9/L (ref 4–11)
WBC # BLD AUTO: 6.8 10E9/L (ref 4–11)
WBC # BLD AUTO: 7.4 10E9/L (ref 4–11)
WBC # BLD AUTO: 7.5 10E9/L (ref 4–11)
WBC # BLD AUTO: 7.6 10E9/L (ref 4–11)
WBC # BLD AUTO: 7.6 10E9/L (ref 4–11)
WBC # BLD AUTO: 7.7 10E9/L (ref 4–11)
WBC # BLD AUTO: 7.9 10E9/L (ref 4–11)
WBC # BLD AUTO: 9.9 10E9/L (ref 4–11)
WBC # FLD AUTO: 1877 /UL
WBC #/AREA URNS AUTO: ABNORMAL /HPF

## 2018-01-01 PROCEDURE — 84157 ASSAY OF PROTEIN OTHER: CPT | Performed by: PHYSICIAN ASSISTANT

## 2018-01-01 PROCEDURE — 36592 COLLECT BLOOD FROM PICC: CPT | Performed by: INTERNAL MEDICINE

## 2018-01-01 PROCEDURE — 85025 COMPLETE CBC W/AUTO DIFF WBC: CPT | Performed by: INTERNAL MEDICINE

## 2018-01-01 PROCEDURE — 36000053 ZZH SURGERY LEVEL 2 EA 15 ADDTL MIN - UMMC: Performed by: INTERNAL MEDICINE

## 2018-01-01 PROCEDURE — 87205 SMEAR GRAM STAIN: CPT | Performed by: PHYSICIAN ASSISTANT

## 2018-01-01 PROCEDURE — 27210794 ZZH OR GENERAL SUPPLY STERILE: Performed by: INTERNAL MEDICINE

## 2018-01-01 PROCEDURE — 99214 OFFICE O/P EST MOD 30 MIN: CPT | Mod: 25 | Performed by: INTERNAL MEDICINE

## 2018-01-01 PROCEDURE — 25000132 ZZH RX MED GY IP 250 OP 250 PS 637: Performed by: INTERNAL MEDICINE

## 2018-01-01 PROCEDURE — 85025 COMPLETE CBC W/AUTO DIFF WBC: CPT | Performed by: PHYSICIAN ASSISTANT

## 2018-01-01 PROCEDURE — 99285 EMERGENCY DEPT VISIT HI MDM: CPT | Mod: 25

## 2018-01-01 PROCEDURE — 99207 ZZC NO CHARGE LOS: CPT

## 2018-01-01 PROCEDURE — 25000125 ZZHC RX 250: Performed by: INTERNAL MEDICINE

## 2018-01-01 PROCEDURE — 25000128 H RX IP 250 OP 636: Performed by: PHYSICIAN ASSISTANT

## 2018-01-01 PROCEDURE — 85610 PROTHROMBIN TIME: CPT | Performed by: EMERGENCY MEDICINE

## 2018-01-01 PROCEDURE — 43235 EGD DIAGNOSTIC BRUSH WASH: CPT | Performed by: INTERNAL MEDICINE

## 2018-01-01 PROCEDURE — 27210429 ZZH NUTRITION PRODUCT INTERMEDIATE LITER

## 2018-01-01 PROCEDURE — 80053 COMPREHEN METABOLIC PANEL: CPT | Performed by: INTERNAL MEDICINE

## 2018-01-01 PROCEDURE — 84100 ASSAY OF PHOSPHORUS: CPT | Performed by: INTERNAL MEDICINE

## 2018-01-01 PROCEDURE — 88305 TISSUE EXAM BY PATHOLOGIST: CPT | Performed by: INTERNAL MEDICINE

## 2018-01-01 PROCEDURE — 99207 ZZC NO CHARGE NURSE ONLY: CPT

## 2018-01-01 PROCEDURE — 80048 BASIC METABOLIC PNL TOTAL CA: CPT | Performed by: PHYSICIAN ASSISTANT

## 2018-01-01 PROCEDURE — 96367 TX/PROPH/DG ADDL SEQ IV INF: CPT | Performed by: INTERNAL MEDICINE

## 2018-01-01 PROCEDURE — 25000128 H RX IP 250 OP 636: Performed by: INTERNAL MEDICINE

## 2018-01-01 PROCEDURE — 25000132 ZZH RX MED GY IP 250 OP 250 PS 637: Performed by: PHYSICIAN ASSISTANT

## 2018-01-01 PROCEDURE — C1874 STENT, COATED/COV W/DEL SYS: HCPCS | Performed by: INTERNAL MEDICINE

## 2018-01-01 PROCEDURE — 99232 SBSQ HOSP IP/OBS MODERATE 35: CPT | Mod: GC | Performed by: INTERNAL MEDICINE

## 2018-01-01 PROCEDURE — 99233 SBSQ HOSP IP/OBS HIGH 50: CPT | Mod: GC | Performed by: INTERNAL MEDICINE

## 2018-01-01 PROCEDURE — 96416 CHEMO PROLONG INFUSE W/PUMP: CPT | Performed by: INTERNAL MEDICINE

## 2018-01-01 PROCEDURE — 36592 COLLECT BLOOD FROM PICC: CPT | Performed by: PHYSICIAN ASSISTANT

## 2018-01-01 PROCEDURE — C9113 INJ PANTOPRAZOLE SODIUM, VIA: HCPCS | Performed by: INTERNAL MEDICINE

## 2018-01-01 PROCEDURE — 83605 ASSAY OF LACTIC ACID: CPT | Performed by: INTERNAL MEDICINE

## 2018-01-01 PROCEDURE — 82565 ASSAY OF CREATININE: CPT | Performed by: RADIOLOGY

## 2018-01-01 PROCEDURE — 25500064 ZZH RX 255 OP 636: Performed by: INTERNAL MEDICINE

## 2018-01-01 PROCEDURE — 25000125 ZZHC RX 250: Performed by: STUDENT IN AN ORGANIZED HEALTH CARE EDUCATION/TRAINING PROGRAM

## 2018-01-01 PROCEDURE — 84132 ASSAY OF SERUM POTASSIUM: CPT | Performed by: INTERNAL MEDICINE

## 2018-01-01 PROCEDURE — 96416 CHEMO PROLONG INFUSE W/PUMP: CPT | Performed by: NURSE PRACTITIONER

## 2018-01-01 PROCEDURE — 36415 COLL VENOUS BLD VENIPUNCTURE: CPT | Performed by: RADIOLOGY

## 2018-01-01 PROCEDURE — 88112 CYTOPATH CELL ENHANCE TECH: CPT | Performed by: PHYSICIAN ASSISTANT

## 2018-01-01 PROCEDURE — 83735 ASSAY OF MAGNESIUM: CPT | Performed by: INTERNAL MEDICINE

## 2018-01-01 PROCEDURE — 12000008 ZZH R&B INTERMEDIATE UMMC

## 2018-01-01 PROCEDURE — 81003 URINALYSIS AUTO W/O SCOPE: CPT | Performed by: NURSE PRACTITIONER

## 2018-01-01 PROCEDURE — 96367 TX/PROPH/DG ADDL SEQ IV INF: CPT | Performed by: NURSE PRACTITIONER

## 2018-01-01 PROCEDURE — 96409 CHEMO IV PUSH SNGL DRUG: CPT | Performed by: INTERNAL MEDICINE

## 2018-01-01 PROCEDURE — 93976 VASCULAR STUDY: CPT | Performed by: RADIOLOGY

## 2018-01-01 PROCEDURE — 74177 CT ABD & PELVIS W/CONTRAST: CPT | Performed by: RADIOLOGY

## 2018-01-01 PROCEDURE — S0028 INJECTION, FAMOTIDINE, 20 MG: HCPCS | Performed by: NURSE PRACTITIONER

## 2018-01-01 PROCEDURE — 25000125 ZZHC RX 250: Performed by: PHYSICIAN ASSISTANT

## 2018-01-01 PROCEDURE — 99215 OFFICE O/P EST HI 40 MIN: CPT | Performed by: INTERNAL MEDICINE

## 2018-01-01 PROCEDURE — 25000132 ZZH RX MED GY IP 250 OP 250 PS 637: Performed by: ANESTHESIOLOGY

## 2018-01-01 PROCEDURE — 96375 TX/PRO/DX INJ NEW DRUG ADDON: CPT | Performed by: INTERNAL MEDICINE

## 2018-01-01 PROCEDURE — 99214 OFFICE O/P EST MOD 30 MIN: CPT | Mod: 25 | Performed by: NURSE PRACTITIONER

## 2018-01-01 PROCEDURE — 80076 HEPATIC FUNCTION PANEL: CPT | Performed by: EMERGENCY MEDICINE

## 2018-01-01 PROCEDURE — 37000008 ZZH ANESTHESIA TECHNICAL FEE, 1ST 30 MIN: Performed by: INTERNAL MEDICINE

## 2018-01-01 PROCEDURE — 00000155 ZZHCL STATISTIC H-CELL BLOCK W/STAIN: Performed by: PHYSICIAN ASSISTANT

## 2018-01-01 PROCEDURE — 96361 HYDRATE IV INFUSION ADD-ON: CPT | Mod: 59 | Performed by: INTERNAL MEDICINE

## 2018-01-01 PROCEDURE — 71000015 ZZH RECOVERY PHASE 1 LEVEL 2 EA ADDTL HR: Performed by: INTERNAL MEDICINE

## 2018-01-01 PROCEDURE — 40000170 ZZH STATISTIC PRE-PROCEDURE ASSESSMENT II: Performed by: INTERNAL MEDICINE

## 2018-01-01 PROCEDURE — 36000051 ZZH SURGERY LEVEL 2 1ST 30 MIN - UMMC: Performed by: INTERNAL MEDICINE

## 2018-01-01 PROCEDURE — 74177 CT ABD & PELVIS W/CONTRAST: CPT

## 2018-01-01 PROCEDURE — 71000014 ZZH RECOVERY PHASE 1 LEVEL 2 FIRST HR: Performed by: INTERNAL MEDICINE

## 2018-01-01 PROCEDURE — 85027 COMPLETE CBC AUTOMATED: CPT | Performed by: INTERNAL MEDICINE

## 2018-01-01 PROCEDURE — 40000929 ZZHCL STATISTIC FOUNDATION ONE GENE PANEL: Performed by: INTERNAL MEDICINE

## 2018-01-01 PROCEDURE — 87040 BLOOD CULTURE FOR BACTERIA: CPT | Mod: 59 | Performed by: INTERNAL MEDICINE

## 2018-01-01 PROCEDURE — 49083 ABD PARACENTESIS W/IMAGING: CPT

## 2018-01-01 PROCEDURE — C1726 CATH, BAL DIL, NON-VASCULAR: HCPCS | Performed by: INTERNAL MEDICINE

## 2018-01-01 PROCEDURE — 27210903 ZZH KIT CR5

## 2018-01-01 PROCEDURE — S0028 INJECTION, FAMOTIDINE, 20 MG: HCPCS | Performed by: INTERNAL MEDICINE

## 2018-01-01 PROCEDURE — 27210732 ZZH ACCESSORY CR1

## 2018-01-01 PROCEDURE — 81001 URINALYSIS AUTO W/SCOPE: CPT | Performed by: INTERNAL MEDICINE

## 2018-01-01 PROCEDURE — 40000277 XR SURGERY CARM FLUORO LESS THAN 5 MIN W STILLS: Mod: TC

## 2018-01-01 PROCEDURE — 90471 IMMUNIZATION ADMIN: CPT | Performed by: INTERNAL MEDICINE

## 2018-01-01 PROCEDURE — 85025 COMPLETE CBC W/AUTO DIFF WBC: CPT | Performed by: NURSE PRACTITIONER

## 2018-01-01 PROCEDURE — 87015 SPECIMEN INFECT AGNT CONCNTJ: CPT | Performed by: PHYSICIAN ASSISTANT

## 2018-01-01 PROCEDURE — C1769 GUIDE WIRE: HCPCS | Performed by: INTERNAL MEDICINE

## 2018-01-01 PROCEDURE — 87040 BLOOD CULTURE FOR BACTERIA: CPT | Performed by: INTERNAL MEDICINE

## 2018-01-01 PROCEDURE — 96413 CHEMO IV INFUSION 1 HR: CPT | Performed by: INTERNAL MEDICINE

## 2018-01-01 PROCEDURE — 71260 CT THORAX DX C+: CPT | Performed by: RADIOLOGY

## 2018-01-01 PROCEDURE — 25000128 H RX IP 250 OP 636: Performed by: ANESTHESIOLOGY

## 2018-01-01 PROCEDURE — 93005 ELECTROCARDIOGRAM TRACING: CPT

## 2018-01-01 PROCEDURE — 25000566 ZZH SEVOFLURANE, EA 15 MIN: Performed by: INTERNAL MEDICINE

## 2018-01-01 PROCEDURE — C9113 INJ PANTOPRAZOLE SODIUM, VIA: HCPCS | Performed by: EMERGENCY MEDICINE

## 2018-01-01 PROCEDURE — 36593 DECLOT VASCULAR DEVICE: CPT

## 2018-01-01 PROCEDURE — 88342 IMHCHEM/IMCYTCHM 1ST ANTB: CPT | Performed by: INTERNAL MEDICINE

## 2018-01-01 PROCEDURE — 83690 ASSAY OF LIPASE: CPT | Performed by: EMERGENCY MEDICINE

## 2018-01-01 PROCEDURE — 87070 CULTURE OTHR SPECIMN AEROBIC: CPT | Performed by: PHYSICIAN ASSISTANT

## 2018-01-01 PROCEDURE — 80048 BASIC METABOLIC PNL TOTAL CA: CPT | Performed by: INTERNAL MEDICINE

## 2018-01-01 PROCEDURE — 82962 GLUCOSE BLOOD TEST: CPT

## 2018-01-01 PROCEDURE — 71000027 ZZH RECOVERY PHASE 2 EACH 15 MINS: Performed by: INTERNAL MEDICINE

## 2018-01-01 PROCEDURE — 84100 ASSAY OF PHOSPHORUS: CPT | Performed by: PHYSICIAN ASSISTANT

## 2018-01-01 PROCEDURE — 96368 THER/DIAG CONCURRENT INF: CPT | Performed by: INTERNAL MEDICINE

## 2018-01-01 PROCEDURE — 37000009 ZZH ANESTHESIA TECHNICAL FEE, EACH ADDTL 15 MIN: Performed by: INTERNAL MEDICINE

## 2018-01-01 PROCEDURE — 99285 EMERGENCY DEPT VISIT HI MDM: CPT | Mod: Z6 | Performed by: EMERGENCY MEDICINE

## 2018-01-01 PROCEDURE — 96374 THER/PROPH/DIAG INJ IV PUSH: CPT

## 2018-01-01 PROCEDURE — 85610 PROTHROMBIN TIME: CPT | Performed by: PHYSICIAN ASSISTANT

## 2018-01-01 PROCEDURE — 25000128 H RX IP 250 OP 636: Performed by: NURSE ANESTHETIST, CERTIFIED REGISTERED

## 2018-01-01 PROCEDURE — 0W9G3ZZ DRAINAGE OF PERITONEAL CAVITY, PERCUTANEOUS APPROACH: ICD-10-PCS | Performed by: RADIOLOGY

## 2018-01-01 PROCEDURE — 25000125 ZZHC RX 250: Performed by: NURSE ANESTHETIST, CERTIFIED REGISTERED

## 2018-01-01 PROCEDURE — 96417 CHEMO IV INFUS EACH ADDL SEQ: CPT | Performed by: INTERNAL MEDICINE

## 2018-01-01 PROCEDURE — 82042 OTHER SOURCE ALBUMIN QUAN EA: CPT | Performed by: PHYSICIAN ASSISTANT

## 2018-01-01 PROCEDURE — 74018 RADEX ABDOMEN 1 VIEW: CPT

## 2018-01-01 PROCEDURE — 36000055 ZZH SURGERY LEVEL 2 W FLUORO 1ST 30 MIN - UMMC: Performed by: INTERNAL MEDICINE

## 2018-01-01 PROCEDURE — 76870 US EXAM SCROTUM: CPT | Performed by: RADIOLOGY

## 2018-01-01 PROCEDURE — 83880 ASSAY OF NATRIURETIC PEPTIDE: CPT | Performed by: EMERGENCY MEDICINE

## 2018-01-01 PROCEDURE — G8918 PT W/O PREOP ORDER IV AB PRO: HCPCS

## 2018-01-01 PROCEDURE — 36415 COLL VENOUS BLD VENIPUNCTURE: CPT | Performed by: INTERNAL MEDICINE

## 2018-01-01 PROCEDURE — 89051 BODY FLUID CELL COUNT: CPT | Performed by: PHYSICIAN ASSISTANT

## 2018-01-01 PROCEDURE — 90686 IIV4 VACC NO PRSV 0.5 ML IM: CPT | Performed by: INTERNAL MEDICINE

## 2018-01-01 PROCEDURE — 85730 THROMBOPLASTIN TIME PARTIAL: CPT | Performed by: PHYSICIAN ASSISTANT

## 2018-01-01 PROCEDURE — 96413 CHEMO IV INFUSION 1 HR: CPT | Performed by: NURSE PRACTITIONER

## 2018-01-01 PROCEDURE — 25000128 H RX IP 250 OP 636: Performed by: RADIOLOGY

## 2018-01-01 PROCEDURE — 80053 COMPREHEN METABOLIC PANEL: CPT | Performed by: NURSE PRACTITIONER

## 2018-01-01 PROCEDURE — 71260 CT THORAX DX C+: CPT

## 2018-01-01 PROCEDURE — 0DP58DZ REMOVAL OF INTRALUMINAL DEVICE FROM ESOPHAGUS, VIA NATURAL OR ARTIFICIAL OPENING ENDOSCOPIC: ICD-10-PCS | Performed by: INTERNAL MEDICINE

## 2018-01-01 PROCEDURE — 88342 IMHCHEM/IMCYTCHM 1ST ANTB: CPT | Performed by: PHYSICIAN ASSISTANT

## 2018-01-01 PROCEDURE — 00000102 ZZHCL STATISTIC CYTO WRIGHT STAIN TC: Performed by: PHYSICIAN ASSISTANT

## 2018-01-01 PROCEDURE — 99233 SBSQ HOSP IP/OBS HIGH 50: CPT | Performed by: INTERNAL MEDICINE

## 2018-01-01 PROCEDURE — 83735 ASSAY OF MAGNESIUM: CPT | Performed by: PHYSICIAN ASSISTANT

## 2018-01-01 PROCEDURE — 25000565 ZZH ISOFLURANE, EA 15 MIN: Performed by: INTERNAL MEDICINE

## 2018-01-01 PROCEDURE — 00000146 ZZHCL STATISTIC GLUCOSE BY METER IP

## 2018-01-01 PROCEDURE — 96409 CHEMO IV PUSH SNGL DRUG: CPT | Performed by: NURSE PRACTITIONER

## 2018-01-01 PROCEDURE — 25000131 ZZH RX MED GY IP 250 OP 636 PS 637: Performed by: INTERNAL MEDICINE

## 2018-01-01 PROCEDURE — 84484 ASSAY OF TROPONIN QUANT: CPT | Performed by: EMERGENCY MEDICINE

## 2018-01-01 PROCEDURE — 43239 EGD BIOPSY SINGLE/MULTIPLE: CPT

## 2018-01-01 PROCEDURE — 88305 TISSUE EXAM BY PATHOLOGIST: CPT | Performed by: PHYSICIAN ASSISTANT

## 2018-01-01 PROCEDURE — 93010 ELECTROCARDIOGRAM REPORT: CPT | Performed by: INTERNAL MEDICINE

## 2018-01-01 PROCEDURE — 0D738DZ DILATION OF LOWER ESOPHAGUS WITH INTRALUMINAL DEVICE, VIA NATURAL OR ARTIFICIAL OPENING ENDOSCOPIC: ICD-10-PCS | Performed by: INTERNAL MEDICINE

## 2018-01-01 PROCEDURE — C1894 INTRO/SHEATH, NON-LASER: HCPCS | Performed by: INTERNAL MEDICINE

## 2018-01-01 PROCEDURE — 96374 THER/PROPH/DIAG INJ IV PUSH: CPT | Performed by: INTERNAL MEDICINE

## 2018-01-01 PROCEDURE — 0W9G3ZZ DRAINAGE OF PERITONEAL CAVITY, PERCUTANEOUS APPROACH: ICD-10-PCS | Performed by: PHYSICIAN ASSISTANT

## 2018-01-01 PROCEDURE — 99232 SBSQ HOSP IP/OBS MODERATE 35: CPT | Performed by: INTERNAL MEDICINE

## 2018-01-01 PROCEDURE — 82945 GLUCOSE OTHER FLUID: CPT | Performed by: PHYSICIAN ASSISTANT

## 2018-01-01 PROCEDURE — 27211039 IR PARACENTESIS

## 2018-01-01 PROCEDURE — 25000125 ZZHC RX 250: Performed by: RADIOLOGY

## 2018-01-01 PROCEDURE — 99215 OFFICE O/P EST HI 40 MIN: CPT | Mod: 25 | Performed by: INTERNAL MEDICINE

## 2018-01-01 PROCEDURE — 96375 TX/PRO/DX INJ NEW DRUG ADDON: CPT | Performed by: NURSE PRACTITIONER

## 2018-01-01 PROCEDURE — G0500 MOD SEDAT ENDO SERVICE >5YRS: HCPCS | Performed by: INTERNAL MEDICINE

## 2018-01-01 PROCEDURE — 25000128 H RX IP 250 OP 636: Performed by: STUDENT IN AN ORGANIZED HEALTH CARE EDUCATION/TRAINING PROGRAM

## 2018-01-01 PROCEDURE — G8907 PT DOC NO EVENTS ON DISCHARG: HCPCS

## 2018-01-01 PROCEDURE — 80076 HEPATIC FUNCTION PANEL: CPT | Performed by: INTERNAL MEDICINE

## 2018-01-01 PROCEDURE — 85610 PROTHROMBIN TIME: CPT | Performed by: STUDENT IN AN ORGANIZED HEALTH CARE EDUCATION/TRAINING PROGRAM

## 2018-01-01 PROCEDURE — 88341 IMHCHEM/IMCYTCHM EA ADD ANTB: CPT | Performed by: PHYSICIAN ASSISTANT

## 2018-01-01 PROCEDURE — 96375 TX/PRO/DX INJ NEW DRUG ADDON: CPT

## 2018-01-01 PROCEDURE — 97802 MEDICAL NUTRITION INDIV IN: CPT | Performed by: DIETITIAN, REGISTERED

## 2018-01-01 PROCEDURE — 96417 CHEMO IV INFUS EACH ADDL SEQ: CPT | Performed by: NURSE PRACTITIONER

## 2018-01-01 PROCEDURE — 0DHA3UZ INSERTION OF FEEDING DEVICE INTO JEJUNUM, PERCUTANEOUS APPROACH: ICD-10-PCS | Performed by: INTERNAL MEDICINE

## 2018-01-01 PROCEDURE — 27211039 ZZH NEEDLE CR2

## 2018-01-01 PROCEDURE — 36592 COLLECT BLOOD FROM PICC: CPT | Performed by: STUDENT IN AN ORGANIZED HEALTH CARE EDUCATION/TRAINING PROGRAM

## 2018-01-01 PROCEDURE — 99239 HOSP IP/OBS DSCHRG MGMT >30: CPT | Performed by: INTERNAL MEDICINE

## 2018-01-01 PROCEDURE — 83615 LACTATE (LD) (LDH) ENZYME: CPT | Performed by: PHYSICIAN ASSISTANT

## 2018-01-01 PROCEDURE — 25000128 H RX IP 250 OP 636: Performed by: EMERGENCY MEDICINE

## 2018-01-01 DEVICE — STENT ESOPHAGEAL ENDOMAXX 23X120MM MAXX-2312
Type: IMPLANTABLE DEVICE | Site: ESOPHAGUS | Status: NON-FUNCTIONAL
Removed: 2018-12-12

## 2018-01-01 DEVICE — STENT ESOPHAGEAL ENDOMAXX 23X150MM MAXX-2315: Type: IMPLANTABLE DEVICE | Site: ESOPHAGUS | Status: FUNCTIONAL

## 2018-01-01 RX ORDER — DIPHENHYDRAMINE HYDROCHLORIDE 50 MG/ML
50 INJECTION INTRAMUSCULAR; INTRAVENOUS
Status: CANCELLED
Start: 2018-01-01

## 2018-01-01 RX ORDER — MEPERIDINE HYDROCHLORIDE 50 MG/ML
25 INJECTION INTRAMUSCULAR; INTRAVENOUS; SUBCUTANEOUS EVERY 30 MIN PRN
Status: CANCELLED | OUTPATIENT
Start: 2018-01-01

## 2018-01-01 RX ORDER — HEPARIN SODIUM (PORCINE) LOCK FLUSH IV SOLN 100 UNIT/ML 100 UNIT/ML
5 SOLUTION INTRAVENOUS
Status: DISCONTINUED | OUTPATIENT
Start: 2018-01-01 | End: 2018-01-01 | Stop reason: HOSPADM

## 2018-01-01 RX ORDER — ONDANSETRON 4 MG/1
4 TABLET, ORALLY DISINTEGRATING ORAL EVERY 30 MIN PRN
Status: DISCONTINUED | OUTPATIENT
Start: 2018-01-01 | End: 2018-01-01 | Stop reason: HOSPADM

## 2018-01-01 RX ORDER — METOCLOPRAMIDE 10 MG/1
10 TABLET ORAL EVERY 6 HOURS PRN
Status: DISCONTINUED | OUTPATIENT
Start: 2018-01-01 | End: 2018-01-01

## 2018-01-01 RX ORDER — AMOXICILLIN AND CLAVULANATE POTASSIUM 400; 57 MG/5ML; MG/5ML
875 POWDER, FOR SUSPENSION ORAL 2 TIMES DAILY
Status: DISCONTINUED | OUTPATIENT
Start: 2018-01-01 | End: 2018-01-01 | Stop reason: HOSPADM

## 2018-01-01 RX ORDER — ALBUTEROL SULFATE 90 UG/1
1-2 AEROSOL, METERED RESPIRATORY (INHALATION)
Status: CANCELLED
Start: 2018-01-01

## 2018-01-01 RX ORDER — EPINEPHRINE 0.3 MG/.3ML
0.3 INJECTION SUBCUTANEOUS EVERY 5 MIN PRN
Status: CANCELLED | OUTPATIENT
Start: 2018-01-01

## 2018-01-01 RX ORDER — LORAZEPAM 0.5 MG/1
0.5 TABLET ORAL EVERY 4 HOURS PRN
Qty: 30 TABLET | Refills: 2 | Status: SHIPPED | OUTPATIENT
Start: 2018-01-01

## 2018-01-01 RX ORDER — MEPERIDINE HYDROCHLORIDE 25 MG/ML
25 INJECTION INTRAMUSCULAR; INTRAVENOUS; SUBCUTANEOUS EVERY 30 MIN PRN
Status: CANCELLED | OUTPATIENT
Start: 2018-01-01

## 2018-01-01 RX ORDER — SODIUM CHLORIDE 9 MG/ML
1000 INJECTION, SOLUTION INTRAVENOUS CONTINUOUS PRN
Status: CANCELLED
Start: 2018-01-01

## 2018-01-01 RX ORDER — ALBUTEROL SULFATE 0.83 MG/ML
2.5 SOLUTION RESPIRATORY (INHALATION)
Status: CANCELLED | OUTPATIENT
Start: 2018-01-01

## 2018-01-01 RX ORDER — PROPOFOL 10 MG/ML
INJECTION, EMULSION INTRAVENOUS PRN
Status: DISCONTINUED | OUTPATIENT
Start: 2018-01-01 | End: 2018-01-01

## 2018-01-01 RX ORDER — LORAZEPAM 2 MG/ML
0.5 INJECTION INTRAMUSCULAR EVERY 4 HOURS PRN
Status: CANCELLED
Start: 2018-01-01

## 2018-01-01 RX ORDER — BACLOFEN 10 MG/1
5 TABLET ORAL ONCE
Status: COMPLETED | OUTPATIENT
Start: 2018-01-01 | End: 2018-01-01

## 2018-01-01 RX ORDER — FLUOROURACIL 50 MG/ML
400 INJECTION, SOLUTION INTRAVENOUS ONCE
Status: CANCELLED | OUTPATIENT
Start: 2018-01-01

## 2018-01-01 RX ORDER — IOPAMIDOL 755 MG/ML
134 INJECTION, SOLUTION INTRAVASCULAR ONCE
Status: COMPLETED | OUTPATIENT
Start: 2018-01-01 | End: 2018-01-01

## 2018-01-01 RX ORDER — METHYLPREDNISOLONE SODIUM SUCCINATE 125 MG/2ML
125 INJECTION, POWDER, LYOPHILIZED, FOR SOLUTION INTRAMUSCULAR; INTRAVENOUS
Status: CANCELLED
Start: 2018-01-01

## 2018-01-01 RX ORDER — EPINEPHRINE 1 MG/ML
0.3 INJECTION, SOLUTION INTRAMUSCULAR; SUBCUTANEOUS EVERY 5 MIN PRN
Status: CANCELLED | OUTPATIENT
Start: 2018-01-01

## 2018-01-01 RX ORDER — FLUOROURACIL 50 MG/ML
400 INJECTION, SOLUTION INTRAVENOUS ONCE
Status: COMPLETED | OUTPATIENT
Start: 2018-01-01 | End: 2018-01-01

## 2018-01-01 RX ORDER — HYDRALAZINE HYDROCHLORIDE 20 MG/ML
10 INJECTION INTRAMUSCULAR; INTRAVENOUS EVERY 10 MIN PRN
Status: DISCONTINUED | OUTPATIENT
Start: 2018-01-01 | End: 2018-01-01 | Stop reason: HOSPADM

## 2018-01-01 RX ORDER — LIDOCAINE 40 MG/G
CREAM TOPICAL
Status: DISCONTINUED | OUTPATIENT
Start: 2018-01-01 | End: 2018-01-01 | Stop reason: HOSPADM

## 2018-01-01 RX ORDER — ONDANSETRON 2 MG/ML
INJECTION INTRAMUSCULAR; INTRAVENOUS PRN
Status: DISCONTINUED | OUTPATIENT
Start: 2018-01-01 | End: 2018-01-01

## 2018-01-01 RX ORDER — NALOXONE HYDROCHLORIDE 0.4 MG/ML
.1-.4 INJECTION, SOLUTION INTRAMUSCULAR; INTRAVENOUS; SUBCUTANEOUS
Status: DISCONTINUED | OUTPATIENT
Start: 2018-01-01 | End: 2018-01-01

## 2018-01-01 RX ORDER — SODIUM CHLORIDE, SODIUM LACTATE, POTASSIUM CHLORIDE, CALCIUM CHLORIDE 600; 310; 30; 20 MG/100ML; MG/100ML; MG/100ML; MG/100ML
INJECTION, SOLUTION INTRAVENOUS CONTINUOUS
Status: DISCONTINUED | OUTPATIENT
Start: 2018-01-01 | End: 2018-01-01

## 2018-01-01 RX ORDER — SODIUM CHLORIDE, SODIUM LACTATE, POTASSIUM CHLORIDE, CALCIUM CHLORIDE 600; 310; 30; 20 MG/100ML; MG/100ML; MG/100ML; MG/100ML
INJECTION, SOLUTION INTRAVENOUS CONTINUOUS
Status: DISCONTINUED | OUTPATIENT
Start: 2018-01-01 | End: 2018-01-01 | Stop reason: HOSPADM

## 2018-01-01 RX ORDER — IOPAMIDOL 755 MG/ML
131 INJECTION, SOLUTION INTRAVASCULAR ONCE
Status: COMPLETED | OUTPATIENT
Start: 2018-01-01 | End: 2018-01-01

## 2018-01-01 RX ORDER — FENTANYL CITRATE 50 UG/ML
INJECTION, SOLUTION INTRAMUSCULAR; INTRAVENOUS PRN
Status: DISCONTINUED | OUTPATIENT
Start: 2018-01-01 | End: 2018-01-01 | Stop reason: HOSPADM

## 2018-01-01 RX ORDER — IOPAMIDOL 510 MG/ML
INJECTION, SOLUTION INTRAVASCULAR PRN
Status: DISCONTINUED | OUTPATIENT
Start: 2018-01-01 | End: 2018-01-01 | Stop reason: HOSPADM

## 2018-01-01 RX ORDER — LORAZEPAM 2 MG/ML
0.5 INJECTION INTRAMUSCULAR EVERY 6 HOURS PRN
Status: DISCONTINUED | OUTPATIENT
Start: 2018-01-01 | End: 2018-01-01 | Stop reason: HOSPADM

## 2018-01-01 RX ORDER — AMOXICILLIN 250 MG
2 CAPSULE ORAL 2 TIMES DAILY PRN
Status: DISCONTINUED | OUTPATIENT
Start: 2018-01-01 | End: 2018-01-01 | Stop reason: HOSPADM

## 2018-01-01 RX ORDER — HYDROMORPHONE HYDROCHLORIDE 1 MG/ML
0.5 INJECTION, SOLUTION INTRAMUSCULAR; INTRAVENOUS; SUBCUTANEOUS ONCE
Status: COMPLETED | OUTPATIENT
Start: 2018-01-01 | End: 2018-01-01

## 2018-01-01 RX ORDER — OXYCODONE HCL 5 MG/5 ML
5 SOLUTION, ORAL ORAL
Status: COMPLETED | OUTPATIENT
Start: 2018-01-01 | End: 2018-01-01

## 2018-01-01 RX ORDER — ONDANSETRON 2 MG/ML
4 INJECTION INTRAMUSCULAR; INTRAVENOUS EVERY 30 MIN PRN
Status: DISCONTINUED | OUTPATIENT
Start: 2018-01-01 | End: 2018-01-01 | Stop reason: HOSPADM

## 2018-01-01 RX ORDER — HYDROMORPHONE HYDROCHLORIDE 1 MG/ML
.3-.5 INJECTION, SOLUTION INTRAMUSCULAR; INTRAVENOUS; SUBCUTANEOUS EVERY 5 MIN PRN
Status: DISCONTINUED | OUTPATIENT
Start: 2018-01-01 | End: 2018-01-01 | Stop reason: HOSPADM

## 2018-01-01 RX ORDER — FENTANYL CITRATE 50 UG/ML
INJECTION, SOLUTION INTRAMUSCULAR; INTRAVENOUS PRN
Status: DISCONTINUED | OUTPATIENT
Start: 2018-01-01 | End: 2018-01-01

## 2018-01-01 RX ORDER — ALLOPURINOL 100 MG/1
100 TABLET ORAL DAILY
Qty: 30 TABLET | Refills: 3 | Status: SHIPPED | OUTPATIENT
Start: 2018-01-01

## 2018-01-01 RX ORDER — HYDROMORPHONE HYDROCHLORIDE 1 MG/ML
0.5 INJECTION, SOLUTION INTRAMUSCULAR; INTRAVENOUS; SUBCUTANEOUS
Status: DISCONTINUED | OUTPATIENT
Start: 2018-01-01 | End: 2018-01-01

## 2018-01-01 RX ORDER — GABAPENTIN 250 MG/5ML
300 SOLUTION ORAL EVERY 12 HOURS SCHEDULED
Status: DISCONTINUED | OUTPATIENT
Start: 2018-01-01 | End: 2018-01-01 | Stop reason: HOSPADM

## 2018-01-01 RX ORDER — OLANZAPINE 5 MG/1
5 TABLET, ORALLY DISINTEGRATING ORAL AT BEDTIME
Qty: 30 TABLET | Refills: 0 | Status: SHIPPED | OUTPATIENT
Start: 2018-01-01 | End: 2019-01-19

## 2018-01-01 RX ORDER — IOPAMIDOL 755 MG/ML
124 INJECTION, SOLUTION INTRAVASCULAR ONCE
Status: COMPLETED | OUTPATIENT
Start: 2018-01-01 | End: 2018-01-01

## 2018-01-01 RX ORDER — DIPHENHYDRAMINE HCL 25 MG
50 CAPSULE ORAL ONCE
Status: CANCELLED
Start: 2018-01-01

## 2018-01-01 RX ORDER — ACYCLOVIR 200 MG/5ML
400 SUSPENSION ORAL 3 TIMES DAILY
Qty: 210 ML | Refills: 0 | Status: SHIPPED | OUTPATIENT
Start: 2018-01-01 | End: 2018-01-01

## 2018-01-01 RX ORDER — METOPROLOL TARTRATE 1 MG/ML
INJECTION, SOLUTION INTRAVENOUS PRN
Status: DISCONTINUED | OUTPATIENT
Start: 2018-01-01 | End: 2018-01-01

## 2018-01-01 RX ORDER — HYDROMORPHONE HYDROCHLORIDE 1 MG/ML
.5-1 INJECTION, SOLUTION INTRAMUSCULAR; INTRAVENOUS; SUBCUTANEOUS EVERY 4 HOURS PRN
Status: DISCONTINUED | OUTPATIENT
Start: 2018-01-01 | End: 2018-01-01

## 2018-01-01 RX ORDER — HEPARIN SODIUM (PORCINE) LOCK FLUSH IV SOLN 100 UNIT/ML 100 UNIT/ML
500 SOLUTION INTRAVENOUS DAILY PRN
Status: DISCONTINUED | OUTPATIENT
Start: 2018-01-01 | End: 2018-01-01 | Stop reason: HOSPADM

## 2018-01-01 RX ORDER — DEXAMETHASONE SODIUM PHOSPHATE 4 MG/ML
INJECTION, SOLUTION INTRA-ARTICULAR; INTRALESIONAL; INTRAMUSCULAR; INTRAVENOUS; SOFT TISSUE PRN
Status: DISCONTINUED | OUTPATIENT
Start: 2018-01-01 | End: 2018-01-01

## 2018-01-01 RX ORDER — NALOXONE HYDROCHLORIDE 0.4 MG/ML
.1-.4 INJECTION, SOLUTION INTRAMUSCULAR; INTRAVENOUS; SUBCUTANEOUS
Status: DISCONTINUED | OUTPATIENT
Start: 2018-01-01 | End: 2018-01-01 | Stop reason: HOSPADM

## 2018-01-01 RX ORDER — POTASSIUM CHLORIDE 750 MG/1
20-40 TABLET, EXTENDED RELEASE ORAL
Status: DISCONTINUED | OUTPATIENT
Start: 2018-01-01 | End: 2018-01-01 | Stop reason: HOSPADM

## 2018-01-01 RX ORDER — GABAPENTIN 250 MG/5ML
SOLUTION ORAL
Refills: 1 | COMMUNITY
Start: 2018-01-01

## 2018-01-01 RX ORDER — FENTANYL CITRATE 50 UG/ML
25-50 INJECTION, SOLUTION INTRAMUSCULAR; INTRAVENOUS EVERY 5 MIN PRN
Status: DISCONTINUED | OUTPATIENT
Start: 2018-01-01 | End: 2018-01-01 | Stop reason: HOSPADM

## 2018-01-01 RX ORDER — SUCRALFATE ORAL 1 G/10ML
1 SUSPENSION ORAL
Status: DISCONTINUED | OUTPATIENT
Start: 2018-01-01 | End: 2018-01-01 | Stop reason: HOSPADM

## 2018-01-01 RX ORDER — ALLOPURINOL 100 MG/1
100 TABLET ORAL DAILY
Qty: 30 TABLET | Refills: 0 | Status: SHIPPED | OUTPATIENT
Start: 2018-01-01 | End: 2018-01-01

## 2018-01-01 RX ORDER — ESMOLOL HYDROCHLORIDE 10 MG/ML
INJECTION INTRAVENOUS PRN
Status: DISCONTINUED | OUTPATIENT
Start: 2018-01-01 | End: 2018-01-01

## 2018-01-01 RX ORDER — ALLOPURINOL 100 MG/1
100 TABLET ORAL DAILY
Status: DISCONTINUED | OUTPATIENT
Start: 2018-01-01 | End: 2018-01-01

## 2018-01-01 RX ORDER — OXYCODONE HCL 5 MG/5 ML
5 SOLUTION, ORAL ORAL EVERY 6 HOURS PRN
Qty: 300 ML | Refills: 0 | Status: ON HOLD | OUTPATIENT
Start: 2018-01-01 | End: 2018-01-01

## 2018-01-01 RX ORDER — PROCHLORPERAZINE 25 MG
25 SUPPOSITORY, RECTAL RECTAL EVERY 12 HOURS PRN
Status: DISCONTINUED | OUTPATIENT
Start: 2018-01-01 | End: 2018-01-01

## 2018-01-01 RX ORDER — ONDANSETRON 2 MG/ML
8 INJECTION INTRAMUSCULAR; INTRAVENOUS ONCE
Status: CANCELLED
Start: 2018-01-01 | End: 2018-01-01

## 2018-01-01 RX ORDER — ACYCLOVIR 200 MG/5ML
400 SUSPENSION ORAL 3 TIMES DAILY
Status: DISCONTINUED | OUTPATIENT
Start: 2018-01-01 | End: 2018-01-01 | Stop reason: HOSPADM

## 2018-01-01 RX ORDER — AMOXICILLIN 250 MG
1 CAPSULE ORAL 2 TIMES DAILY PRN
Status: DISCONTINUED | OUTPATIENT
Start: 2018-01-01 | End: 2018-01-01 | Stop reason: HOSPADM

## 2018-01-01 RX ORDER — POTASSIUM CHLORIDE 29.8 MG/ML
20 INJECTION INTRAVENOUS
Status: DISCONTINUED | OUTPATIENT
Start: 2018-01-01 | End: 2018-01-01 | Stop reason: HOSPADM

## 2018-01-01 RX ORDER — LABETALOL HYDROCHLORIDE 5 MG/ML
10 INJECTION, SOLUTION INTRAVENOUS ONCE
Status: COMPLETED | OUTPATIENT
Start: 2018-01-01 | End: 2018-01-01

## 2018-01-01 RX ORDER — OMEPRAZOLE 40 MG/1
40 CAPSULE, DELAYED RELEASE ORAL DAILY
Qty: 90 CAPSULE | Refills: 3 | Status: ON HOLD | OUTPATIENT
Start: 2018-01-01 | End: 2018-01-01

## 2018-01-01 RX ORDER — HYDRALAZINE HYDROCHLORIDE 20 MG/ML
10 INJECTION INTRAMUSCULAR; INTRAVENOUS EVERY 6 HOURS PRN
Status: DISCONTINUED | OUTPATIENT
Start: 2018-01-01 | End: 2018-01-01 | Stop reason: HOSPADM

## 2018-01-01 RX ORDER — FLUMAZENIL 0.1 MG/ML
0.2 INJECTION, SOLUTION INTRAVENOUS
Status: ACTIVE | OUTPATIENT
Start: 2018-01-01 | End: 2018-01-01

## 2018-01-01 RX ORDER — POTASSIUM CL/LIDO/0.9 % NACL 10MEQ/0.1L
10 INTRAVENOUS SOLUTION, PIGGYBACK (ML) INTRAVENOUS
Status: DISCONTINUED | OUTPATIENT
Start: 2018-01-01 | End: 2018-01-01 | Stop reason: HOSPADM

## 2018-01-01 RX ORDER — POTASSIUM CHLORIDE 1.5 G/1.58G
20-40 POWDER, FOR SOLUTION ORAL
Status: DISCONTINUED | OUTPATIENT
Start: 2018-01-01 | End: 2018-01-01 | Stop reason: HOSPADM

## 2018-01-01 RX ORDER — ONDANSETRON 2 MG/ML
4 INJECTION INTRAMUSCULAR; INTRAVENOUS
Status: DISCONTINUED | OUTPATIENT
Start: 2018-01-01 | End: 2018-01-01 | Stop reason: HOSPADM

## 2018-01-01 RX ORDER — NALOXONE HYDROCHLORIDE 0.4 MG/ML
.1-.4 INJECTION, SOLUTION INTRAMUSCULAR; INTRAVENOUS; SUBCUTANEOUS
Status: ACTIVE | OUTPATIENT
Start: 2018-01-01 | End: 2018-01-01

## 2018-01-01 RX ORDER — PROCHLORPERAZINE MALEATE 10 MG
10 TABLET ORAL EVERY 6 HOURS PRN
Qty: 30 TABLET | Refills: 2 | COMMUNITY
Start: 2018-01-01

## 2018-01-01 RX ORDER — ONDANSETRON 4 MG/1
4 TABLET, ORALLY DISINTEGRATING ORAL EVERY 6 HOURS PRN
Status: DISCONTINUED | OUTPATIENT
Start: 2018-01-01 | End: 2018-01-01

## 2018-01-01 RX ORDER — AMOXICILLIN AND CLAVULANATE POTASSIUM 400; 57 MG/5ML; MG/5ML
875 POWDER, FOR SUSPENSION ORAL 2 TIMES DAILY
Qty: 87.2 ML | Refills: 0 | Status: SHIPPED | OUTPATIENT
Start: 2018-01-01 | End: 2018-01-01

## 2018-01-01 RX ORDER — SODIUM CHLORIDE, SODIUM LACTATE, POTASSIUM CHLORIDE, CALCIUM CHLORIDE 600; 310; 30; 20 MG/100ML; MG/100ML; MG/100ML; MG/100ML
INJECTION, SOLUTION INTRAVENOUS CONTINUOUS PRN
Status: DISCONTINUED | OUTPATIENT
Start: 2018-01-01 | End: 2018-01-01

## 2018-01-01 RX ORDER — ONDANSETRON 2 MG/ML
4 INJECTION INTRAMUSCULAR; INTRAVENOUS EVERY 6 HOURS PRN
Status: DISCONTINUED | OUTPATIENT
Start: 2018-01-01 | End: 2018-01-01

## 2018-01-01 RX ORDER — GABAPENTIN 250 MG/5ML
300 SOLUTION ORAL EVERY 12 HOURS SCHEDULED
Status: DISCONTINUED | OUTPATIENT
Start: 2018-01-01 | End: 2018-01-01

## 2018-01-01 RX ORDER — FENTANYL CITRATE 50 UG/ML
25-50 INJECTION, SOLUTION INTRAMUSCULAR; INTRAVENOUS
Status: DISCONTINUED | OUTPATIENT
Start: 2018-01-01 | End: 2018-01-01 | Stop reason: HOSPADM

## 2018-01-01 RX ORDER — LIDOCAINE HYDROCHLORIDE 10 MG/ML
1-30 INJECTION, SOLUTION EPIDURAL; INFILTRATION; INTRACAUDAL; PERINEURAL ONCE
Status: COMPLETED | OUTPATIENT
Start: 2018-01-01 | End: 2018-01-01

## 2018-01-01 RX ORDER — IOPAMIDOL 510 MG/ML
INJECTION, SOLUTION INTRAVASCULAR PRN
Status: DISCONTINUED | OUTPATIENT
Start: 2018-01-01 | End: 2018-01-01

## 2018-01-01 RX ORDER — OXYCODONE HCL 5 MG/5 ML
5 SOLUTION, ORAL ORAL EVERY 4 HOURS PRN
Status: DISCONTINUED | OUTPATIENT
Start: 2018-01-01 | End: 2018-01-01

## 2018-01-01 RX ORDER — PROCHLORPERAZINE MALEATE 10 MG
10 TABLET ORAL EVERY 6 HOURS PRN
Qty: 30 TABLET | Refills: 2 | Status: ON HOLD | OUTPATIENT
Start: 2018-01-01 | End: 2018-01-01

## 2018-01-01 RX ORDER — INDOMETHACIN 25 MG/1
25 CAPSULE ORAL
Qty: 42 CAPSULE | Refills: 1 | Status: SHIPPED | OUTPATIENT
Start: 2018-01-01

## 2018-01-01 RX ORDER — HEPARIN SODIUM (PORCINE) LOCK FLUSH IV SOLN 100 UNIT/ML 100 UNIT/ML
5 SOLUTION INTRAVENOUS ONCE
Status: COMPLETED | OUTPATIENT
Start: 2018-01-01 | End: 2018-01-01

## 2018-01-01 RX ORDER — LIDOCAINE HYDROCHLORIDE 20 MG/ML
INJECTION, SOLUTION INFILTRATION; PERINEURAL PRN
Status: DISCONTINUED | OUTPATIENT
Start: 2018-01-01 | End: 2018-01-01

## 2018-01-01 RX ORDER — LABETALOL HYDROCHLORIDE 5 MG/ML
INJECTION, SOLUTION INTRAVENOUS PRN
Status: DISCONTINUED | OUTPATIENT
Start: 2018-01-01 | End: 2018-01-01

## 2018-01-01 RX ORDER — IOPAMIDOL 755 MG/ML
119 INJECTION, SOLUTION INTRAVASCULAR ONCE
Status: COMPLETED | OUTPATIENT
Start: 2018-01-01 | End: 2018-01-01

## 2018-01-01 RX ORDER — CHLORPROMAZINE HYDROCHLORIDE 10 MG/1
10 TABLET, FILM COATED ORAL 3 TIMES DAILY PRN
Status: DISCONTINUED | OUTPATIENT
Start: 2018-01-01 | End: 2018-01-01

## 2018-01-01 RX ORDER — HYDROMORPHONE HYDROCHLORIDE 1 MG/ML
.5-1 INJECTION, SOLUTION INTRAMUSCULAR; INTRAVENOUS; SUBCUTANEOUS
Status: DISCONTINUED | OUTPATIENT
Start: 2018-01-01 | End: 2018-01-01 | Stop reason: HOSPADM

## 2018-01-01 RX ORDER — CIPROFLOXACIN 500 MG/5ML
500 KIT ORAL EVERY 12 HOURS SCHEDULED
Status: DISCONTINUED | OUTPATIENT
Start: 2018-01-01 | End: 2018-01-01

## 2018-01-01 RX ORDER — ALLOPURINOL 100 MG/1
100 TABLET ORAL DAILY
Qty: 30 TABLET | Refills: 3 | Status: SHIPPED | OUTPATIENT
Start: 2018-01-01 | End: 2018-01-01

## 2018-01-01 RX ORDER — ONDANSETRON 2 MG/ML
8 INJECTION INTRAMUSCULAR; INTRAVENOUS
Status: DISCONTINUED | OUTPATIENT
Start: 2018-01-01 | End: 2018-01-01

## 2018-01-01 RX ORDER — POTASSIUM CHLORIDE 7.45 MG/ML
10 INJECTION INTRAVENOUS
Status: DISCONTINUED | OUTPATIENT
Start: 2018-01-01 | End: 2018-01-01 | Stop reason: HOSPADM

## 2018-01-01 RX ORDER — CIPROFLOXACIN 500 MG/1
500 TABLET, FILM COATED ORAL EVERY 12 HOURS SCHEDULED
Status: DISCONTINUED | OUTPATIENT
Start: 2018-01-01 | End: 2018-01-01

## 2018-01-01 RX ORDER — HYDROMORPHONE HCL/0.9% NACL/PF 0.2MG/0.2
.2-.4 SYRINGE (ML) INTRAVENOUS
Status: COMPLETED | OUTPATIENT
Start: 2018-01-01 | End: 2018-01-01

## 2018-01-01 RX ORDER — ONDANSETRON 2 MG/ML
8 INJECTION INTRAMUSCULAR; INTRAVENOUS ONCE
Status: COMPLETED | OUTPATIENT
Start: 2018-01-01 | End: 2018-01-01

## 2018-01-01 RX ORDER — SIMETHICONE
LIQUID (ML) MISCELLANEOUS PRN
Status: DISCONTINUED | OUTPATIENT
Start: 2018-01-01 | End: 2018-01-01 | Stop reason: HOSPADM

## 2018-01-01 RX ORDER — HYDRALAZINE HYDROCHLORIDE 20 MG/ML
2.5-5 INJECTION INTRAMUSCULAR; INTRAVENOUS EVERY 10 MIN PRN
Status: DISCONTINUED | OUTPATIENT
Start: 2018-01-01 | End: 2018-01-01 | Stop reason: HOSPADM

## 2018-01-01 RX ORDER — GABAPENTIN 250 MG/5ML
300 SOLUTION ORAL DAILY
Status: DISCONTINUED | OUTPATIENT
Start: 2018-01-01 | End: 2018-01-01

## 2018-01-01 RX ORDER — HEPARIN SODIUM,PORCINE 10 UNIT/ML
5-10 VIAL (ML) INTRAVENOUS EVERY 24 HOURS
Status: DISCONTINUED | OUTPATIENT
Start: 2018-01-01 | End: 2018-01-01 | Stop reason: HOSPADM

## 2018-01-01 RX ORDER — SUCRALFATE ORAL 1 G/10ML
1 SUSPENSION ORAL
Qty: 1200 ML | Refills: 0 | Status: SHIPPED | OUTPATIENT
Start: 2018-01-01 | End: 2019-01-19

## 2018-01-01 RX ORDER — OXYCODONE HCL 5 MG/5 ML
5-10 SOLUTION, ORAL ORAL EVERY 4 HOURS PRN
Status: DISCONTINUED | OUTPATIENT
Start: 2018-01-01 | End: 2018-01-01

## 2018-01-01 RX ORDER — ONDANSETRON 8 MG/1
8 TABLET, ORALLY DISINTEGRATING ORAL 3 TIMES DAILY
Status: DISCONTINUED | OUTPATIENT
Start: 2018-01-01 | End: 2018-01-01 | Stop reason: HOSPADM

## 2018-01-01 RX ORDER — GLYCOPYRROLATE 0.2 MG/ML
INJECTION, SOLUTION INTRAMUSCULAR; INTRAVENOUS PRN
Status: DISCONTINUED | OUTPATIENT
Start: 2018-01-01 | End: 2018-01-01

## 2018-01-01 RX ORDER — LABETALOL HYDROCHLORIDE 5 MG/ML
10 INJECTION, SOLUTION INTRAVENOUS
Status: COMPLETED | OUTPATIENT
Start: 2018-01-01 | End: 2018-01-01

## 2018-01-01 RX ORDER — SODIUM CHLORIDE 9 MG/ML
INJECTION, SOLUTION INTRAVENOUS CONTINUOUS
Status: DISCONTINUED | OUTPATIENT
Start: 2018-01-01 | End: 2018-01-01

## 2018-01-01 RX ORDER — ONDANSETRON HYDROCHLORIDE 4 MG/5ML
8 SOLUTION ORAL 3 TIMES DAILY
Status: DISCONTINUED | OUTPATIENT
Start: 2018-01-01 | End: 2018-01-01 | Stop reason: HOSPADM

## 2018-01-01 RX ORDER — HYDROMORPHONE HYDROCHLORIDE 1 MG/ML
.5-1 INJECTION, SOLUTION INTRAMUSCULAR; INTRAVENOUS; SUBCUTANEOUS
Status: DISCONTINUED | OUTPATIENT
Start: 2018-01-01 | End: 2018-01-01

## 2018-01-01 RX ORDER — METOCLOPRAMIDE HYDROCHLORIDE 5 MG/ML
10 INJECTION INTRAMUSCULAR; INTRAVENOUS EVERY 6 HOURS PRN
Status: DISCONTINUED | OUTPATIENT
Start: 2018-01-01 | End: 2018-01-01

## 2018-01-01 RX ORDER — SODIUM CHLORIDE 9 MG/ML
INJECTION, SOLUTION INTRAVENOUS CONTINUOUS PRN
Status: DISCONTINUED | OUTPATIENT
Start: 2018-01-01 | End: 2018-01-01

## 2018-01-01 RX ORDER — ONDANSETRON 2 MG/ML
8 INJECTION INTRAMUSCULAR; INTRAVENOUS 3 TIMES DAILY
Status: DISCONTINUED | OUTPATIENT
Start: 2018-01-01 | End: 2018-01-01 | Stop reason: HOSPADM

## 2018-01-01 RX ORDER — HYDROMORPHONE HYDROCHLORIDE 5 MG/5ML
3-4 SOLUTION ORAL
Status: DISCONTINUED | OUTPATIENT
Start: 2018-01-01 | End: 2018-01-01 | Stop reason: HOSPADM

## 2018-01-01 RX ORDER — HYDROMORPHONE HYDROCHLORIDE 1 MG/ML
4-6 SOLUTION ORAL
Qty: 960 ML | Refills: 0 | Status: SHIPPED | OUTPATIENT
Start: 2018-01-01 | End: 2019-01-19

## 2018-01-01 RX ORDER — HEPARIN SODIUM,PORCINE 10 UNIT/ML
5-10 VIAL (ML) INTRAVENOUS
Status: DISCONTINUED | OUTPATIENT
Start: 2018-01-01 | End: 2018-01-01 | Stop reason: HOSPADM

## 2018-01-01 RX ORDER — INDOMETHACIN 25 MG/1
25 CAPSULE ORAL 2 TIMES DAILY WITH MEALS
Qty: 42 CAPSULE | Refills: 1 | Status: SHIPPED | OUTPATIENT
Start: 2018-01-01 | End: 2018-01-01

## 2018-01-01 RX ORDER — PROCHLORPERAZINE MALEATE 10 MG
10 TABLET ORAL EVERY 6 HOURS PRN
Status: DISCONTINUED | OUTPATIENT
Start: 2018-01-01 | End: 2018-01-01

## 2018-01-01 RX ORDER — BACLOFEN 5 MG/1
5 TABLET ORAL 3 TIMES DAILY
Qty: 120 TABLET | Refills: 0 | Status: ON HOLD | OUTPATIENT
Start: 2018-01-01 | End: 2018-01-01

## 2018-01-01 RX ORDER — FLUMAZENIL 0.1 MG/ML
0.2 INJECTION, SOLUTION INTRAVENOUS
Status: DISCONTINUED | OUTPATIENT
Start: 2018-01-01 | End: 2018-01-01 | Stop reason: HOSPADM

## 2018-01-01 RX ORDER — OLANZAPINE 5 MG/1
5 TABLET, ORALLY DISINTEGRATING ORAL AT BEDTIME
Status: DISCONTINUED | OUTPATIENT
Start: 2018-01-01 | End: 2018-01-01 | Stop reason: HOSPADM

## 2018-01-01 RX ORDER — IOPAMIDOL 755 MG/ML
135 INJECTION, SOLUTION INTRAVASCULAR ONCE
Status: COMPLETED | OUTPATIENT
Start: 2018-01-01 | End: 2018-01-01

## 2018-01-01 RX ORDER — ONDANSETRON HYDROCHLORIDE 4 MG/5ML
8 SOLUTION ORAL 3 TIMES DAILY
Qty: 900 ML | Refills: 0 | Status: SHIPPED | OUTPATIENT
Start: 2018-01-01 | End: 2019-01-19

## 2018-01-01 RX ADMIN — SODIUM CHLORIDE, PRESERVATIVE FREE: 5 INJECTION INTRAVENOUS at 18:21

## 2018-01-01 RX ADMIN — HYDROMORPHONE HYDROCHLORIDE 4 MG: 1 SOLUTION ORAL at 13:19

## 2018-01-01 RX ADMIN — FLUOROURACIL 830 MG: 50 INJECTION, SOLUTION INTRAVENOUS at 10:50

## 2018-01-01 RX ADMIN — FLUOROURACIL 830 MG: 50 INJECTION, SOLUTION INTRAVENOUS at 12:37

## 2018-01-01 RX ADMIN — GABAPENTIN 300 MG: 250 SUSPENSION ORAL at 08:26

## 2018-01-01 RX ADMIN — ACYCLOVIR 400 MG: 200 SUSPENSION ORAL at 14:04

## 2018-01-01 RX ADMIN — AMLODIPINE BESYLATE 5 MG: 10 TABLET ORAL at 08:18

## 2018-01-01 RX ADMIN — SUCRALFATE 1 G: 1 SUSPENSION ORAL at 15:54

## 2018-01-01 RX ADMIN — FENTANYL CITRATE 25 MCG: 50 INJECTION, SOLUTION INTRAMUSCULAR; INTRAVENOUS at 17:15

## 2018-01-01 RX ADMIN — ONDANSETRON HYDROCHLORIDE 8 MG: 2 INJECTION, SOLUTION INTRAMUSCULAR; INTRAVENOUS at 20:20

## 2018-01-01 RX ADMIN — Medication 100 MG: at 16:53

## 2018-01-01 RX ADMIN — SUCRALFATE 1 G: 1 SUSPENSION ORAL at 18:12

## 2018-01-01 RX ADMIN — ONDANSETRON HYDROCHLORIDE 4 MG: 2 INJECTION, SOLUTION INTRAMUSCULAR; INTRAVENOUS at 17:46

## 2018-01-01 RX ADMIN — METOPROLOL TARTRATE 1 MG: 1 INJECTION, SOLUTION INTRAVENOUS at 16:53

## 2018-01-01 RX ADMIN — HEPARIN SODIUM (PORCINE) LOCK FLUSH IV SOLN 100 UNIT/ML 5 ML: 100 SOLUTION at 07:35

## 2018-01-01 RX ADMIN — Medication 1 MG: at 04:36

## 2018-01-01 RX ADMIN — Medication 1 MG: at 13:28

## 2018-01-01 RX ADMIN — Medication 1 MG: at 12:38

## 2018-01-01 RX ADMIN — Medication 1 MG: at 20:21

## 2018-01-01 RX ADMIN — FENTANYL CITRATE 50 MCG: 50 INJECTION INTRAMUSCULAR; INTRAVENOUS at 19:19

## 2018-01-01 RX ADMIN — Medication 1 MG: at 12:06

## 2018-01-01 RX ADMIN — PROPOFOL 30 MG: 10 INJECTION, EMULSION INTRAVENOUS at 17:20

## 2018-01-01 RX ADMIN — LIDOCAINE HYDROCHLORIDE 100 MG: 20 INJECTION, SOLUTION INFILTRATION; PERINEURAL at 16:53

## 2018-01-01 RX ADMIN — HEPARIN SODIUM (PORCINE) LOCK FLUSH IV SOLN 100 UNIT/ML 5 ML: 100 SOLUTION at 08:07

## 2018-01-01 RX ADMIN — HEPARIN SODIUM (PORCINE) LOCK FLUSH IV SOLN 100 UNIT/ML 5 ML: 100 SOLUTION at 12:13

## 2018-01-01 RX ADMIN — SODIUM CHLORIDE, PRESERVATIVE FREE: 5 INJECTION INTRAVENOUS at 21:30

## 2018-01-01 RX ADMIN — ACYCLOVIR 400 MG: 200 SUSPENSION ORAL at 20:20

## 2018-01-01 RX ADMIN — Medication 15 MG: at 14:02

## 2018-01-01 RX ADMIN — DEXAMETHASONE SODIUM PHOSPHATE 4 MG: 4 INJECTION, SOLUTION INTRA-ARTICULAR; INTRALESIONAL; INTRAMUSCULAR; INTRAVENOUS; SOFT TISSUE at 07:40

## 2018-01-01 RX ADMIN — HEPARIN SODIUM (PORCINE) LOCK FLUSH IV SOLN 100 UNIT/ML 5 ML: 100 SOLUTION at 08:01

## 2018-01-01 RX ADMIN — AMOXICILLIN AND CLAVULANATE POTASSIUM 1 TABLET: 875; 125 TABLET, FILM COATED ORAL at 09:29

## 2018-01-01 RX ADMIN — HEPARIN SODIUM (PORCINE) LOCK FLUSH IV SOLN 100 UNIT/ML 5 ML: 100 SOLUTION at 15:07

## 2018-01-01 RX ADMIN — Medication 15 MG: at 22:24

## 2018-01-01 RX ADMIN — GABAPENTIN 300 MG: 250 SUSPENSION ORAL at 09:53

## 2018-01-01 RX ADMIN — HEPARIN SODIUM (PORCINE) LOCK FLUSH IV SOLN 100 UNIT/ML 5 ML: 100 SOLUTION at 07:39

## 2018-01-01 RX ADMIN — SUCRALFATE 1 G: 1 SUSPENSION ORAL at 12:46

## 2018-01-01 RX ADMIN — DOCUSATE SODIUM 100 MG: 50 LIQUID ORAL at 08:17

## 2018-01-01 RX ADMIN — IOPAMIDOL 134 ML: 755 INJECTION, SOLUTION INTRAVASCULAR at 08:47

## 2018-01-01 RX ADMIN — ONDANSETRON HYDROCHLORIDE 8 MG: 4 SOLUTION ORAL at 19:18

## 2018-01-01 RX ADMIN — FENTANYL CITRATE 25 MCG: 50 INJECTION, SOLUTION INTRAMUSCULAR; INTRAVENOUS at 09:05

## 2018-01-01 RX ADMIN — OLANZAPINE 5 MG: 5 TABLET, ORALLY DISINTEGRATING ORAL at 22:21

## 2018-01-01 RX ADMIN — HYDROMORPHONE HYDROCHLORIDE 3 MG: 1 SOLUTION ORAL at 16:33

## 2018-01-01 RX ADMIN — ACYCLOVIR 400 MG: 200 SUSPENSION ORAL at 08:17

## 2018-01-01 RX ADMIN — PROPOFOL 150 MG: 10 INJECTION, EMULSION INTRAVENOUS at 15:48

## 2018-01-01 RX ADMIN — SUGAMMADEX 200 MG: 100 INJECTION, SOLUTION INTRAVENOUS at 08:09

## 2018-01-01 RX ADMIN — GABAPENTIN 300 MG: 250 SUSPENSION ORAL at 10:47

## 2018-01-01 RX ADMIN — PANTOPRAZOLE SODIUM 40 MG: 40 TABLET, DELAYED RELEASE ORAL at 09:46

## 2018-01-01 RX ADMIN — ACYCLOVIR 400 MG: 200 SUSPENSION ORAL at 20:06

## 2018-01-01 RX ADMIN — FENTANYL CITRATE 50 MCG: 50 INJECTION, SOLUTION INTRAMUSCULAR; INTRAVENOUS at 08:58

## 2018-01-01 RX ADMIN — ONDANSETRON HYDROCHLORIDE 8 MG: 2 INJECTION, SOLUTION INTRAMUSCULAR; INTRAVENOUS at 09:31

## 2018-01-01 RX ADMIN — ONDANSETRON 8 MG: 2 INJECTION INTRAMUSCULAR; INTRAVENOUS at 19:56

## 2018-01-01 RX ADMIN — ESMOLOL HYDROCHLORIDE 25 MG: 10 INJECTION, SOLUTION INTRAVENOUS at 17:22

## 2018-01-01 RX ADMIN — PHENYLEPHRINE HYDROCHLORIDE 200 MCG: 10 INJECTION, SOLUTION INTRAMUSCULAR; INTRAVENOUS; SUBCUTANEOUS at 07:47

## 2018-01-01 RX ADMIN — PANTOPRAZOLE SODIUM 40 MG: 40 TABLET, DELAYED RELEASE ORAL at 08:18

## 2018-01-01 RX ADMIN — Medication 200 MG: at 15:48

## 2018-01-01 RX ADMIN — ONDANSETRON 8 MG: 2 INJECTION INTRAMUSCULAR; INTRAVENOUS at 08:28

## 2018-01-01 RX ADMIN — SUCRALFATE 1 G: 1 SUSPENSION ORAL at 13:02

## 2018-01-01 RX ADMIN — HEPARIN SODIUM (PORCINE) LOCK FLUSH IV SOLN 100 UNIT/ML 5 ML: 100 SOLUTION at 07:48

## 2018-01-01 RX ADMIN — FLUOROURACIL 830 MG: 50 INJECTION, SOLUTION INTRAVENOUS at 10:21

## 2018-01-01 RX ADMIN — Medication 1 MG: at 01:03

## 2018-01-01 RX ADMIN — Medication 0.3 MG: at 18:12

## 2018-01-01 RX ADMIN — POTASSIUM CHLORIDE 20 MEQ: 29.8 INJECTION, SOLUTION INTRAVENOUS at 13:03

## 2018-01-01 RX ADMIN — PHENYLEPHRINE HYDROCHLORIDE 100 MCG: 10 INJECTION, SOLUTION INTRAMUSCULAR; INTRAVENOUS; SUBCUTANEOUS at 15:50

## 2018-01-01 RX ADMIN — FENTANYL CITRATE 100 MCG: 50 INJECTION, SOLUTION INTRAMUSCULAR; INTRAVENOUS at 07:34

## 2018-01-01 RX ADMIN — OXYCODONE HYDROCHLORIDE 10 MG: 5 SOLUTION ORAL at 16:40

## 2018-01-01 RX ADMIN — HEPARIN SODIUM (PORCINE) LOCK FLUSH IV SOLN 100 UNIT/ML 5 ML: 100 SOLUTION at 08:44

## 2018-01-01 RX ADMIN — HYDROMORPHONE HYDROCHLORIDE 3 MG: 1 SOLUTION ORAL at 19:54

## 2018-01-01 RX ADMIN — HEPARIN SODIUM (PORCINE) LOCK FLUSH IV SOLN 100 UNIT/ML 5 ML: 100 SOLUTION at 08:46

## 2018-01-01 RX ADMIN — Medication 5 ML: at 11:47

## 2018-01-01 RX ADMIN — HEPARIN SODIUM (PORCINE) LOCK FLUSH IV SOLN 100 UNIT/ML 5 ML: 100 SOLUTION at 07:50

## 2018-01-01 RX ADMIN — PROCHLORPERAZINE EDISYLATE 10 MG: 5 INJECTION INTRAMUSCULAR; INTRAVENOUS at 01:44

## 2018-01-01 RX ADMIN — Medication 1 MG: at 15:15

## 2018-01-01 RX ADMIN — HEPARIN SODIUM (PORCINE) LOCK FLUSH IV SOLN 100 UNIT/ML 5 ML: 100 SOLUTION at 08:41

## 2018-01-01 RX ADMIN — ESMOLOL HYDROCHLORIDE 15 MG: 10 INJECTION, SOLUTION INTRAVENOUS at 17:33

## 2018-01-01 RX ADMIN — ONDANSETRON 8 MG: 8 TABLET, ORALLY DISINTEGRATING ORAL at 13:31

## 2018-01-01 RX ADMIN — PROCHLORPERAZINE EDISYLATE 10 MG: 5 INJECTION INTRAMUSCULAR; INTRAVENOUS at 10:30

## 2018-01-01 RX ADMIN — IOPAMIDOL 134 ML: 755 INJECTION, SOLUTION INTRAVASCULAR at 08:41

## 2018-01-01 RX ADMIN — Medication 0.5 MG: at 19:12

## 2018-01-01 RX ADMIN — FLUOROURACIL 830 MG: 50 INJECTION, SOLUTION INTRAVENOUS at 13:50

## 2018-01-01 RX ADMIN — ACYCLOVIR 400 MG: 200 SUSPENSION ORAL at 09:45

## 2018-01-01 RX ADMIN — GABAPENTIN 300 MG: 250 SUSPENSION ORAL at 19:18

## 2018-01-01 RX ADMIN — Medication 0.2 MG: at 20:12

## 2018-01-01 RX ADMIN — HEPARIN SODIUM (PORCINE) LOCK FLUSH IV SOLN 100 UNIT/ML 5 ML: 100 SOLUTION at 12:25

## 2018-01-01 RX ADMIN — PROPOFOL 30 MG: 10 INJECTION, EMULSION INTRAVENOUS at 07:59

## 2018-01-01 RX ADMIN — Medication 1 MG: at 18:20

## 2018-01-01 RX ADMIN — AMLODIPINE BESYLATE 10 MG: 10 TABLET ORAL at 08:45

## 2018-01-01 RX ADMIN — HEPARIN SODIUM (PORCINE) LOCK FLUSH IV SOLN 100 UNIT/ML 5 ML: 100 SOLUTION at 09:42

## 2018-01-01 RX ADMIN — SUCRALFATE 1 G: 1 SUSPENSION ORAL at 21:15

## 2018-01-01 RX ADMIN — ACYCLOVIR 400 MG: 200 SUSPENSION ORAL at 08:47

## 2018-01-01 RX ADMIN — AMLODIPINE BESYLATE 5 MG: 10 TABLET ORAL at 09:30

## 2018-01-01 RX ADMIN — FLUOROURACIL 830 MG: 50 INJECTION, SOLUTION INTRAVENOUS at 12:53

## 2018-01-01 RX ADMIN — FLUOROURACIL 830 MG: 50 INJECTION, SOLUTION INTRAVENOUS at 11:13

## 2018-01-01 RX ADMIN — PANTOPRAZOLE SODIUM 40 MG: 40 INJECTION, POWDER, FOR SOLUTION INTRAVENOUS at 20:44

## 2018-01-01 RX ADMIN — ONDANSETRON 4 MG: 2 INJECTION INTRAMUSCULAR; INTRAVENOUS at 15:37

## 2018-01-01 RX ADMIN — ONDANSETRON 8 MG: 2 INJECTION INTRAMUSCULAR; INTRAVENOUS at 20:19

## 2018-01-01 RX ADMIN — ONDANSETRON 8 MG: 2 INJECTION INTRAMUSCULAR; INTRAVENOUS at 20:24

## 2018-01-01 RX ADMIN — ACYCLOVIR 400 MG: 200 SUSPENSION ORAL at 13:19

## 2018-01-01 RX ADMIN — MIDAZOLAM 1 MG: 1 INJECTION INTRAMUSCULAR; INTRAVENOUS at 15:37

## 2018-01-01 RX ADMIN — ONDANSETRON 8 MG: 2 INJECTION INTRAMUSCULAR; INTRAVENOUS at 14:04

## 2018-01-01 RX ADMIN — HEPARIN SODIUM (PORCINE) LOCK FLUSH IV SOLN 100 UNIT/ML 5 ML: 100 SOLUTION at 08:36

## 2018-01-01 RX ADMIN — AMLODIPINE BESYLATE 5 MG: 10 TABLET ORAL at 09:11

## 2018-01-01 RX ADMIN — Medication 0.5 MG: at 21:33

## 2018-01-01 RX ADMIN — AMLODIPINE BESYLATE 5 MG: 10 TABLET ORAL at 12:46

## 2018-01-01 RX ADMIN — CIPROFLOXACIN 500 MG: KIT at 09:43

## 2018-01-01 RX ADMIN — FENTANYL CITRATE 100 MCG: 50 INJECTION, SOLUTION INTRAMUSCULAR; INTRAVENOUS at 08:34

## 2018-01-01 RX ADMIN — PANTOPRAZOLE SODIUM 40 MG: 40 INJECTION, POWDER, FOR SOLUTION INTRAVENOUS at 20:20

## 2018-01-01 RX ADMIN — ACYCLOVIR 400 MG: 200 SUSPENSION ORAL at 13:34

## 2018-01-01 RX ADMIN — Medication 0.5 MG: at 03:49

## 2018-01-01 RX ADMIN — PANTOPRAZOLE SODIUM 40 MG: 40 INJECTION, POWDER, FOR SOLUTION INTRAVENOUS at 08:27

## 2018-01-01 RX ADMIN — HEPARIN SODIUM (PORCINE) LOCK FLUSH IV SOLN 100 UNIT/ML 5 ML: 100 SOLUTION at 07:37

## 2018-01-01 RX ADMIN — LIDOCAINE HYDROCHLORIDE 100 MG: 20 INJECTION, SOLUTION INFILTRATION; PERINEURAL at 15:48

## 2018-01-01 RX ADMIN — Medication 0.5 MG: at 12:59

## 2018-01-01 RX ADMIN — Medication 0.5 MG: at 10:05

## 2018-01-01 RX ADMIN — FENTANYL CITRATE 50 MCG: 50 INJECTION, SOLUTION INTRAMUSCULAR; INTRAVENOUS at 08:45

## 2018-01-01 RX ADMIN — HEPARIN SODIUM (PORCINE) LOCK FLUSH IV SOLN 100 UNIT/ML 5 ML: 100 SOLUTION at 08:00

## 2018-01-01 RX ADMIN — Medication 1 MG: at 09:02

## 2018-01-01 RX ADMIN — LABETALOL HYDROCHLORIDE 5 MG: 5 INJECTION INTRAVENOUS at 08:20

## 2018-01-01 RX ADMIN — Medication 10 MG: at 15:15

## 2018-01-01 RX ADMIN — FENTANYL CITRATE 75 MCG: 50 INJECTION, SOLUTION INTRAMUSCULAR; INTRAVENOUS at 16:53

## 2018-01-01 RX ADMIN — Medication 250 ML: at 09:10

## 2018-01-01 RX ADMIN — ONDANSETRON HYDROCHLORIDE 8 MG: 2 INJECTION, SOLUTION INTRAMUSCULAR; INTRAVENOUS at 15:15

## 2018-01-01 RX ADMIN — ESMOLOL HYDROCHLORIDE 30 MG: 10 INJECTION, SOLUTION INTRAVENOUS at 08:02

## 2018-01-01 RX ADMIN — ACYCLOVIR 400 MG: 200 SUSPENSION ORAL at 09:30

## 2018-01-01 RX ADMIN — GABAPENTIN 300 MG: 250 SUSPENSION ORAL at 12:46

## 2018-01-01 RX ADMIN — Medication 2 MG: at 09:48

## 2018-01-01 RX ADMIN — HEPARIN SODIUM (PORCINE) LOCK FLUSH IV SOLN 100 UNIT/ML 5 ML: 100 SOLUTION at 12:03

## 2018-01-01 RX ADMIN — Medication 1 MG: at 15:59

## 2018-01-01 RX ADMIN — PANTOPRAZOLE SODIUM 40 MG: 40 TABLET, DELAYED RELEASE ORAL at 19:56

## 2018-01-01 RX ADMIN — HYDROMORPHONE HYDROCHLORIDE 4 MG: 1 SOLUTION ORAL at 08:37

## 2018-01-01 RX ADMIN — ONDANSETRON 8 MG: 8 TABLET, ORALLY DISINTEGRATING ORAL at 13:19

## 2018-01-01 RX ADMIN — Medication 10 MG: at 07:02

## 2018-01-01 RX ADMIN — GABAPENTIN 300 MG: 250 SUSPENSION ORAL at 09:29

## 2018-01-01 RX ADMIN — HYDRALAZINE HYDROCHLORIDE 10 MG: 20 INJECTION INTRAMUSCULAR; INTRAVENOUS at 11:44

## 2018-01-01 RX ADMIN — Medication 1 MG: at 03:49

## 2018-01-01 RX ADMIN — SODIUM CHLORIDE, POTASSIUM CHLORIDE, SODIUM LACTATE AND CALCIUM CHLORIDE: 600; 310; 30; 20 INJECTION, SOLUTION INTRAVENOUS at 07:25

## 2018-01-01 RX ADMIN — Medication 10 MG: at 20:44

## 2018-01-01 RX ADMIN — PANTOPRAZOLE SODIUM 40 MG: 40 TABLET, DELAYED RELEASE ORAL at 09:45

## 2018-01-01 RX ADMIN — ACYCLOVIR 400 MG: 200 SUSPENSION ORAL at 15:03

## 2018-01-01 RX ADMIN — METOPROLOL TARTRATE 1 MG: 1 INJECTION, SOLUTION INTRAVENOUS at 16:27

## 2018-01-01 RX ADMIN — Medication 0.4 MG: at 09:14

## 2018-01-01 RX ADMIN — Medication 0.5 MG: at 05:49

## 2018-01-01 RX ADMIN — SUCRALFATE 1 G: 1 SUSPENSION ORAL at 22:21

## 2018-01-01 RX ADMIN — SUCRALFATE 1 G: 1 SUSPENSION ORAL at 11:47

## 2018-01-01 RX ADMIN — GABAPENTIN 300 MG: 250 SUSPENSION ORAL at 20:20

## 2018-01-01 RX ADMIN — Medication 1 MG: at 21:21

## 2018-01-01 RX ADMIN — Medication 0.5 MG: at 23:30

## 2018-01-01 RX ADMIN — GABAPENTIN 300 MG: 250 SUSPENSION ORAL at 19:56

## 2018-01-01 RX ADMIN — FENTANYL CITRATE 50 MCG: 50 INJECTION, SOLUTION INTRAMUSCULAR; INTRAVENOUS at 08:05

## 2018-01-01 RX ADMIN — Medication 2 MG: at 07:55

## 2018-01-01 RX ADMIN — Medication 5 ML: at 19:12

## 2018-01-01 RX ADMIN — Medication 1 MG: at 22:14

## 2018-01-01 RX ADMIN — ACYCLOVIR 400 MG: 200 SUSPENSION ORAL at 20:21

## 2018-01-01 RX ADMIN — Medication 15 MG: at 15:54

## 2018-01-01 RX ADMIN — HEPARIN SODIUM (PORCINE) LOCK FLUSH IV SOLN 100 UNIT/ML 5 ML: 100 SOLUTION at 10:37

## 2018-01-01 RX ADMIN — CIPROFLOXACIN 500 MG: KIT at 04:58

## 2018-01-01 RX ADMIN — AMLODIPINE BESYLATE 10 MG: 10 TABLET ORAL at 08:00

## 2018-01-01 RX ADMIN — ONDANSETRON HYDROCHLORIDE 8 MG: 2 INJECTION, SOLUTION INTRAMUSCULAR; INTRAVENOUS at 20:44

## 2018-01-01 RX ADMIN — SUCRALFATE 1 G: 1 SUSPENSION ORAL at 09:03

## 2018-01-01 RX ADMIN — Medication 0.5 MG: at 08:12

## 2018-01-01 RX ADMIN — HYDROMORPHONE HYDROCHLORIDE 4 MG: 1 SOLUTION ORAL at 10:32

## 2018-01-01 RX ADMIN — ONDANSETRON 8 MG: 2 INJECTION INTRAMUSCULAR; INTRAVENOUS at 08:53

## 2018-01-01 RX ADMIN — AMOXICILLIN AND CLAVULANATE POTASSIUM 875 MG: 400; 57 POWDER, FOR SUSPENSION ORAL at 09:44

## 2018-01-01 RX ADMIN — PROPOFOL 150 MG: 10 INJECTION, EMULSION INTRAVENOUS at 07:35

## 2018-01-01 RX ADMIN — FLUOROURACIL 830 MG: 50 INJECTION, SOLUTION INTRAVENOUS at 09:34

## 2018-01-01 RX ADMIN — AMOXICILLIN AND CLAVULANATE POTASSIUM 875 MG: 400; 57 POWDER, FOR SUSPENSION ORAL at 08:47

## 2018-01-01 RX ADMIN — LIDOCAINE HYDROCHLORIDE 80 MG: 20 INJECTION, SOLUTION INFILTRATION; PERINEURAL at 07:34

## 2018-01-01 RX ADMIN — OXYCODONE HYDROCHLORIDE 5 MG: 5 SOLUTION ORAL at 12:26

## 2018-01-01 RX ADMIN — Medication 1 MG: at 20:19

## 2018-01-01 RX ADMIN — AMOXICILLIN AND CLAVULANATE POTASSIUM 875 MG: 400; 57 POWDER, FOR SUSPENSION ORAL at 08:17

## 2018-01-01 RX ADMIN — ONDANSETRON 8 MG: 2 INJECTION INTRAMUSCULAR; INTRAVENOUS at 08:01

## 2018-01-01 RX ADMIN — AMOXICILLIN AND CLAVULANATE POTASSIUM 875 MG: 400; 57 POWDER, FOR SUSPENSION ORAL at 19:56

## 2018-01-01 RX ADMIN — Medication 1 MG: at 08:57

## 2018-01-01 RX ADMIN — OLANZAPINE 5 MG: 5 TABLET, ORALLY DISINTEGRATING ORAL at 22:28

## 2018-01-01 RX ADMIN — PANTOPRAZOLE SODIUM 40 MG: 40 TABLET, DELAYED RELEASE ORAL at 21:51

## 2018-01-01 RX ADMIN — OXYCODONE HYDROCHLORIDE 5 MG: 5 SOLUTION ORAL at 12:28

## 2018-01-01 RX ADMIN — IOPAMIDOL 124 ML: 755 INJECTION, SOLUTION INTRAVENOUS at 10:53

## 2018-01-01 RX ADMIN — PROPOFOL 130 MG: 10 INJECTION, EMULSION INTRAVENOUS at 16:53

## 2018-01-01 RX ADMIN — Medication 5 ML: at 01:42

## 2018-01-01 RX ADMIN — Medication 1 MG: at 21:09

## 2018-01-01 RX ADMIN — PANTOPRAZOLE SODIUM 40 MG: 40 TABLET, DELAYED RELEASE ORAL at 21:18

## 2018-01-01 RX ADMIN — ONDANSETRON HYDROCHLORIDE 8 MG: 2 INJECTION, SOLUTION INTRAMUSCULAR; INTRAVENOUS at 08:27

## 2018-01-01 RX ADMIN — POTASSIUM CHLORIDE 20 MEQ: 29.8 INJECTION, SOLUTION INTRAVENOUS at 11:33

## 2018-01-01 RX ADMIN — Medication 1 MG: at 17:52

## 2018-01-01 RX ADMIN — CIPROFLOXACIN 500 MG: KIT at 08:18

## 2018-01-01 RX ADMIN — SUCRALFATE 1 G: 1 SUSPENSION ORAL at 08:27

## 2018-01-01 RX ADMIN — ONDANSETRON 8 MG: 2 INJECTION INTRAMUSCULAR; INTRAVENOUS at 13:17

## 2018-01-01 RX ADMIN — Medication 0.5 MG: at 01:44

## 2018-01-01 RX ADMIN — Medication 10 MG: at 08:18

## 2018-01-01 RX ADMIN — GABAPENTIN 300 MG: 250 SUSPENSION ORAL at 08:20

## 2018-01-01 RX ADMIN — Medication 1 MG: at 07:21

## 2018-01-01 RX ADMIN — FENTANYL CITRATE 50 MCG: 50 INJECTION, SOLUTION INTRAMUSCULAR; INTRAVENOUS at 08:20

## 2018-01-01 RX ADMIN — ONDANSETRON HYDROCHLORIDE 8 MG: 2 INJECTION, SOLUTION INTRAMUSCULAR; INTRAVENOUS at 03:05

## 2018-01-01 RX ADMIN — Medication 10 MG: at 01:45

## 2018-01-01 RX ADMIN — MULTIVITAMIN 15 ML: LIQUID ORAL at 08:51

## 2018-01-01 RX ADMIN — Medication 10 MG: at 18:10

## 2018-01-01 RX ADMIN — OXYCODONE HYDROCHLORIDE 5 MG: 5 SOLUTION ORAL at 09:33

## 2018-01-01 RX ADMIN — HEPARIN SODIUM (PORCINE) LOCK FLUSH IV SOLN 100 UNIT/ML 5 ML: 100 SOLUTION at 10:51

## 2018-01-01 RX ADMIN — Medication 250 ML: at 11:54

## 2018-01-01 RX ADMIN — LIDOCAINE HYDROCHLORIDE 30 ML: 20 SOLUTION ORAL; TOPICAL at 16:02

## 2018-01-01 RX ADMIN — Medication 5 ML: at 14:02

## 2018-01-01 RX ADMIN — HEPARIN SODIUM (PORCINE) LOCK FLUSH IV SOLN 100 UNIT/ML 500 UNITS: 100 SOLUTION at 14:56

## 2018-01-01 RX ADMIN — ONDANSETRON HYDROCHLORIDE 8 MG: 2 INJECTION, SOLUTION INTRAMUSCULAR; INTRAVENOUS at 13:18

## 2018-01-01 RX ADMIN — Medication 1 MG: at 05:05

## 2018-01-01 RX ADMIN — AMOXICILLIN AND CLAVULANATE POTASSIUM 875 MG: 400; 57 POWDER, FOR SUSPENSION ORAL at 21:18

## 2018-01-01 RX ADMIN — POTASSIUM CHLORIDE 20 MEQ: 29.8 INJECTION, SOLUTION INTRAVENOUS at 12:48

## 2018-01-01 RX ADMIN — Medication 1000 ML: at 08:57

## 2018-01-01 RX ADMIN — HEPARIN SODIUM (PORCINE) LOCK FLUSH IV SOLN 100 UNIT/ML 5 ML: 100 SOLUTION at 08:04

## 2018-01-01 RX ADMIN — Medication 1 MG: at 00:25

## 2018-01-01 RX ADMIN — Medication 1 MG: at 04:13

## 2018-01-01 RX ADMIN — FLUOROURACIL 830 MG: 50 INJECTION, SOLUTION INTRAVENOUS at 10:44

## 2018-01-01 RX ADMIN — FENTANYL CITRATE 50 MCG: 50 INJECTION INTRAMUSCULAR; INTRAVENOUS at 18:02

## 2018-01-01 RX ADMIN — SUCRALFATE 1 G: 1 SUSPENSION ORAL at 16:07

## 2018-01-01 RX ADMIN — HEPARIN SODIUM (PORCINE) LOCK FLUSH IV SOLN 100 UNIT/ML 5 ML: 100 SOLUTION at 09:01

## 2018-01-01 RX ADMIN — ONDANSETRON HYDROCHLORIDE 8 MG: 4 SOLUTION ORAL at 08:46

## 2018-01-01 RX ADMIN — FENTANYL CITRATE 50 MCG: 50 INJECTION, SOLUTION INTRAMUSCULAR; INTRAVENOUS at 16:54

## 2018-01-01 RX ADMIN — ONDANSETRON HYDROCHLORIDE 4 MG: 2 INJECTION, SOLUTION INTRAMUSCULAR; INTRAVENOUS at 17:58

## 2018-01-01 RX ADMIN — ONDANSETRON HYDROCHLORIDE 8 MG: 2 INJECTION, SOLUTION INTRAMUSCULAR; INTRAVENOUS at 01:45

## 2018-01-01 RX ADMIN — FLUOROURACIL 830 MG: 50 INJECTION, SOLUTION INTRAVENOUS at 11:43

## 2018-01-01 RX ADMIN — METOPROLOL TARTRATE 1 MG: 1 INJECTION, SOLUTION INTRAVENOUS at 16:25

## 2018-01-01 RX ADMIN — MULTIVITAMIN 15 ML: LIQUID ORAL at 14:03

## 2018-01-01 RX ADMIN — Medication 1 MG: at 02:06

## 2018-01-01 RX ADMIN — FLUOROURACIL 830 MG: 50 INJECTION, SOLUTION INTRAVENOUS at 09:55

## 2018-01-01 RX ADMIN — HYDROMORPHONE HYDROCHLORIDE 4 MG: 1 SOLUTION ORAL at 17:24

## 2018-01-01 RX ADMIN — FLUOROURACIL 830 MG: 50 INJECTION, SOLUTION INTRAVENOUS at 10:26

## 2018-01-01 RX ADMIN — SODIUM CHLORIDE 500 ML: 9 INJECTION, SOLUTION INTRAVENOUS at 15:55

## 2018-01-01 RX ADMIN — Medication 5 ML: at 05:34

## 2018-01-01 RX ADMIN — DOCUSATE SODIUM 100 MG: 50 LIQUID ORAL at 10:42

## 2018-01-01 RX ADMIN — ONDANSETRON 8 MG: 8 TABLET, ORALLY DISINTEGRATING ORAL at 13:52

## 2018-01-01 RX ADMIN — SUCRALFATE 1 G: 1 SUSPENSION ORAL at 22:36

## 2018-01-01 RX ADMIN — FENTANYL CITRATE 25 MCG: 50 INJECTION, SOLUTION INTRAMUSCULAR; INTRAVENOUS at 08:36

## 2018-01-01 RX ADMIN — GABAPENTIN 300 MG: 250 SUSPENSION ORAL at 20:35

## 2018-01-01 RX ADMIN — PANTOPRAZOLE SODIUM 40 MG: 40 TABLET, DELAYED RELEASE ORAL at 08:49

## 2018-01-01 RX ADMIN — SODIUM CHLORIDE, PRESERVATIVE FREE: 5 INJECTION INTRAVENOUS at 08:25

## 2018-01-01 RX ADMIN — Medication 1 MG: at 08:00

## 2018-01-01 RX ADMIN — FLUOROURACIL 830 MG: 50 INJECTION, SOLUTION INTRAVENOUS at 12:54

## 2018-01-01 RX ADMIN — LABETALOL HYDROCHLORIDE 10 MG: 5 INJECTION INTRAVENOUS at 08:24

## 2018-01-01 RX ADMIN — SUGAMMADEX 200 MG: 100 INJECTION, SOLUTION INTRAVENOUS at 17:31

## 2018-01-01 RX ADMIN — CIPROFLOXACIN 500 MG: KIT at 16:43

## 2018-01-01 RX ADMIN — PROCHLORPERAZINE EDISYLATE 10 MG: 5 INJECTION INTRAMUSCULAR; INTRAVENOUS at 00:47

## 2018-01-01 RX ADMIN — Medication 5 ML: at 09:29

## 2018-01-01 RX ADMIN — SUCRALFATE 1 G: 1 SUSPENSION ORAL at 07:58

## 2018-01-01 RX ADMIN — ONDANSETRON 4 MG: 2 INJECTION INTRAMUSCULAR; INTRAVENOUS at 08:00

## 2018-01-01 RX ADMIN — SODIUM CHLORIDE, POTASSIUM CHLORIDE, SODIUM LACTATE AND CALCIUM CHLORIDE 1000 ML: 600; 310; 30; 20 INJECTION, SOLUTION INTRAVENOUS at 13:27

## 2018-01-01 RX ADMIN — AMLODIPINE BESYLATE 5 MG: 10 TABLET ORAL at 09:42

## 2018-01-01 RX ADMIN — HEPARIN SODIUM (PORCINE) LOCK FLUSH IV SOLN 100 UNIT/ML 5 ML: 100 SOLUTION at 07:38

## 2018-01-01 RX ADMIN — PHENYLEPHRINE HYDROCHLORIDE 200 MCG: 10 INJECTION, SOLUTION INTRAMUSCULAR; INTRAVENOUS; SUBCUTANEOUS at 07:45

## 2018-01-01 RX ADMIN — HEPARIN 5 ML: 100 SYRINGE at 15:18

## 2018-01-01 RX ADMIN — SUCRALFATE 1 G: 1 SUSPENSION ORAL at 22:24

## 2018-01-01 RX ADMIN — HYDROMORPHONE HYDROCHLORIDE 4 MG: 1 SOLUTION ORAL at 04:31

## 2018-01-01 RX ADMIN — DOCUSATE SODIUM 100 MG: 50 LIQUID ORAL at 08:00

## 2018-01-01 RX ADMIN — FLUOROURACIL 830 MG: 50 INJECTION, SOLUTION INTRAVENOUS at 09:47

## 2018-01-01 RX ADMIN — PHENYLEPHRINE HYDROCHLORIDE 100 MCG: 10 INJECTION, SOLUTION INTRAMUSCULAR; INTRAVENOUS; SUBCUTANEOUS at 07:42

## 2018-01-01 RX ADMIN — MAGNESIUM HYDROXIDE 30 ML: 400 SUSPENSION ORAL at 09:27

## 2018-01-01 RX ADMIN — DEXAMETHASONE SODIUM PHOSPHATE 4 MG: 4 INJECTION, SOLUTION INTRA-ARTICULAR; INTRALESIONAL; INTRAMUSCULAR; INTRAVENOUS; SOFT TISSUE at 15:37

## 2018-01-01 RX ADMIN — SODIUM CHLORIDE, PRESERVATIVE FREE: 5 INJECTION INTRAVENOUS at 22:26

## 2018-01-01 RX ADMIN — Medication 1 MG: at 23:15

## 2018-01-01 RX ADMIN — DOCUSATE SODIUM 100 MG: 50 LIQUID ORAL at 09:49

## 2018-01-01 RX ADMIN — ACYCLOVIR 400 MG: 200 SUSPENSION ORAL at 08:07

## 2018-01-01 RX ADMIN — Medication 1 MG: at 20:06

## 2018-01-01 RX ADMIN — ACYCLOVIR 400 MG: 200 SUSPENSION ORAL at 13:01

## 2018-01-01 RX ADMIN — Medication 1 MG: at 03:22

## 2018-01-01 RX ADMIN — Medication 1 MG: at 14:06

## 2018-01-01 RX ADMIN — PANTOPRAZOLE SODIUM 40 MG: 40 INJECTION, POWDER, FOR SOLUTION INTRAVENOUS at 13:21

## 2018-01-01 RX ADMIN — Medication 5 ML: at 13:28

## 2018-01-01 RX ADMIN — Medication 100 MG: at 07:35

## 2018-01-01 RX ADMIN — SODIUM CHLORIDE, POTASSIUM CHLORIDE, SODIUM LACTATE AND CALCIUM CHLORIDE: 600; 310; 30; 20 INJECTION, SOLUTION INTRAVENOUS at 16:34

## 2018-01-01 RX ADMIN — ROCURONIUM BROMIDE 20 MG: 10 INJECTION INTRAVENOUS at 16:07

## 2018-01-01 RX ADMIN — SUCRALFATE 1 G: 1 SUSPENSION ORAL at 12:17

## 2018-01-01 RX ADMIN — Medication 1 MG: at 06:03

## 2018-01-01 RX ADMIN — ROCURONIUM BROMIDE 15 MG: 10 INJECTION INTRAVENOUS at 16:55

## 2018-01-01 RX ADMIN — AMOXICILLIN AND CLAVULANATE POTASSIUM 875 MG: 400; 57 POWDER, FOR SUSPENSION ORAL at 19:18

## 2018-01-01 RX ADMIN — GABAPENTIN 300 MG: 250 SUSPENSION ORAL at 11:47

## 2018-01-01 RX ADMIN — ACYCLOVIR 400 MG: 200 SUSPENSION ORAL at 21:51

## 2018-01-01 RX ADMIN — FLUOROURACIL 830 MG: 50 INJECTION, SOLUTION INTRAVENOUS at 09:27

## 2018-01-01 RX ADMIN — MULTIVITAMIN 15 ML: LIQUID ORAL at 08:00

## 2018-01-01 RX ADMIN — OLANZAPINE 5 MG: 5 TABLET, ORALLY DISINTEGRATING ORAL at 22:13

## 2018-01-01 RX ADMIN — POTASSIUM CHLORIDE 20 MEQ: 29.8 INJECTION, SOLUTION INTRAVENOUS at 16:38

## 2018-01-01 RX ADMIN — FLUOROURACIL 830 MG: 50 INJECTION, SOLUTION INTRAVENOUS at 10:10

## 2018-01-01 RX ADMIN — SUCRALFATE 1 G: 1 SUSPENSION ORAL at 09:49

## 2018-01-01 RX ADMIN — Medication 5 ML: at 08:19

## 2018-01-01 RX ADMIN — MAGNESIUM HYDROXIDE 30 ML: 400 SUSPENSION ORAL at 17:06

## 2018-01-01 RX ADMIN — IOPAMIDOL 135 ML: 755 INJECTION, SOLUTION INTRAVASCULAR at 08:28

## 2018-01-01 RX ADMIN — Medication 0.5 MG: at 15:54

## 2018-01-01 RX ADMIN — SUCRALFATE 1 G: 1 SUSPENSION ORAL at 21:51

## 2018-01-01 RX ADMIN — ONDANSETRON 8 MG: 2 INJECTION INTRAMUSCULAR; INTRAVENOUS at 13:55

## 2018-01-01 RX ADMIN — PANTOPRAZOLE SODIUM 40 MG: 40 INJECTION, POWDER, FOR SOLUTION INTRAVENOUS at 08:17

## 2018-01-01 RX ADMIN — GABAPENTIN 300 MG: 250 SUSPENSION ORAL at 22:27

## 2018-01-01 RX ADMIN — Medication 0.5 MG: at 13:13

## 2018-01-01 RX ADMIN — SUCRALFATE 1 G: 1 SUSPENSION ORAL at 15:59

## 2018-01-01 RX ADMIN — ONDANSETRON HYDROCHLORIDE 8 MG: 2 INJECTION, SOLUTION INTRAMUSCULAR; INTRAVENOUS at 22:31

## 2018-01-01 RX ADMIN — Medication 1 MG: at 02:51

## 2018-01-01 RX ADMIN — Medication 1 MG: at 01:49

## 2018-01-01 RX ADMIN — HEPARIN SODIUM (PORCINE) LOCK FLUSH IV SOLN 100 UNIT/ML 5 ML: 100 SOLUTION at 10:10

## 2018-01-01 RX ADMIN — METOPROLOL TARTRATE 1 MG: 1 INJECTION, SOLUTION INTRAVENOUS at 16:50

## 2018-01-01 RX ADMIN — HYDRALAZINE HYDROCHLORIDE 10 MG: 20 INJECTION INTRAMUSCULAR; INTRAVENOUS at 10:34

## 2018-01-01 RX ADMIN — SUCRALFATE 1 G: 1 SUSPENSION ORAL at 22:27

## 2018-01-01 RX ADMIN — Medication 5 MG: at 12:59

## 2018-01-01 RX ADMIN — ACYCLOVIR 400 MG: 200 SUSPENSION ORAL at 19:18

## 2018-01-01 RX ADMIN — GABAPENTIN 300 MG: 250 SUSPENSION ORAL at 10:42

## 2018-01-01 RX ADMIN — AMOXICILLIN AND CLAVULANATE POTASSIUM 875 MG: 400; 57 POWDER, FOR SUSPENSION ORAL at 21:51

## 2018-01-01 RX ADMIN — SUCRALFATE 1 G: 1 SUSPENSION ORAL at 08:18

## 2018-01-01 RX ADMIN — ROCURONIUM BROMIDE 15 MG: 10 INJECTION INTRAVENOUS at 17:07

## 2018-01-01 RX ADMIN — GLYCOPYRROLATE 0.3 MG: 0.2 INJECTION, SOLUTION INTRAMUSCULAR; INTRAVENOUS at 15:37

## 2018-01-01 RX ADMIN — Medication 2 MG: at 08:35

## 2018-01-01 RX ADMIN — AMOXICILLIN AND CLAVULANATE POTASSIUM 875 MG: 400; 57 POWDER, FOR SUSPENSION ORAL at 08:00

## 2018-01-01 RX ADMIN — SUCRALFATE 1 G: 1 SUSPENSION ORAL at 11:44

## 2018-01-01 RX ADMIN — GABAPENTIN 300 MG: 250 SUSPENSION ORAL at 21:51

## 2018-01-01 RX ADMIN — IOPAMIDOL 131 ML: 755 INJECTION, SOLUTION INTRAVASCULAR at 09:45

## 2018-01-01 RX ADMIN — FLUOROURACIL 830 MG: 50 INJECTION, SOLUTION INTRAVENOUS at 10:27

## 2018-01-01 RX ADMIN — Medication 5 ML: at 17:07

## 2018-01-01 RX ADMIN — SUCRALFATE 1 G: 1 SUSPENSION ORAL at 12:40

## 2018-01-01 RX ADMIN — PANTOPRAZOLE SODIUM 40 MG: 40 INJECTION, POWDER, FOR SOLUTION INTRAVENOUS at 09:30

## 2018-01-01 RX ADMIN — HEPARIN SODIUM (PORCINE) LOCK FLUSH IV SOLN 100 UNIT/ML 5 ML: 100 SOLUTION at 08:15

## 2018-01-01 RX ADMIN — PHENYLEPHRINE HYDROCHLORIDE 100 MCG: 10 INJECTION, SOLUTION INTRAMUSCULAR; INTRAVENOUS; SUBCUTANEOUS at 15:49

## 2018-01-01 RX ADMIN — ACYCLOVIR 400 MG: 200 SUSPENSION ORAL at 13:53

## 2018-01-01 RX ADMIN — FLUOROURACIL 830 MG: 50 INJECTION, SOLUTION INTRAVENOUS at 10:54

## 2018-01-01 RX ADMIN — FENTANYL CITRATE 50 MCG: 50 INJECTION, SOLUTION INTRAMUSCULAR; INTRAVENOUS at 08:41

## 2018-01-01 RX ADMIN — OXYCODONE HYDROCHLORIDE 10 MG: 5 SOLUTION ORAL at 01:27

## 2018-01-01 RX ADMIN — FENTANYL CITRATE 50 MCG: 50 INJECTION, SOLUTION INTRAMUSCULAR; INTRAVENOUS at 16:07

## 2018-01-01 RX ADMIN — SODIUM CHLORIDE, PRESERVATIVE FREE: 5 INJECTION INTRAVENOUS at 10:53

## 2018-01-01 RX ADMIN — Medication 1 MG: at 22:28

## 2018-01-01 RX ADMIN — HEPARIN SODIUM (PORCINE) LOCK FLUSH IV SOLN 100 UNIT/ML 5 ML: 100 SOLUTION at 08:02

## 2018-01-01 RX ADMIN — SUGAMMADEX 200 MG: 100 INJECTION, SOLUTION INTRAVENOUS at 16:58

## 2018-01-01 RX ADMIN — MIDAZOLAM 2 MG: 1 INJECTION INTRAMUSCULAR; INTRAVENOUS at 07:25

## 2018-01-01 RX ADMIN — HYDROMORPHONE HYDROCHLORIDE 4 MG: 1 SOLUTION ORAL at 15:37

## 2018-01-01 RX ADMIN — PANTOPRAZOLE SODIUM 40 MG: 40 TABLET, DELAYED RELEASE ORAL at 19:18

## 2018-01-01 RX ADMIN — ONDANSETRON 8 MG: 2 INJECTION INTRAMUSCULAR; INTRAVENOUS at 09:51

## 2018-01-01 RX ADMIN — ACYCLOVIR 400 MG: 200 SUSPENSION ORAL at 13:52

## 2018-01-01 RX ADMIN — OLANZAPINE 5 MG: 5 TABLET, ORALLY DISINTEGRATING ORAL at 21:15

## 2018-01-01 RX ADMIN — Medication 15 MG: at 09:27

## 2018-01-01 RX ADMIN — ONDANSETRON HYDROCHLORIDE 8 MG: 4 SOLUTION ORAL at 20:20

## 2018-01-01 RX ADMIN — ACYCLOVIR 400 MG: 200 SUSPENSION ORAL at 09:27

## 2018-01-01 RX ADMIN — ACYCLOVIR 400 MG: 200 SUSPENSION ORAL at 19:56

## 2018-01-01 RX ADMIN — PANTOPRAZOLE SODIUM 40 MG: 40 INJECTION, POWDER, FOR SOLUTION INTRAVENOUS at 22:28

## 2018-01-01 RX ADMIN — SUCRALFATE 1 G: 1 SUSPENSION ORAL at 11:33

## 2018-01-01 RX ADMIN — ONDANSETRON 8 MG: 2 INJECTION INTRAMUSCULAR; INTRAVENOUS at 09:45

## 2018-01-01 RX ADMIN — IOPAMIDOL 119 ML: 755 INJECTION, SOLUTION INTRAVENOUS at 12:32

## 2018-01-01 RX ADMIN — SUCRALFATE 1 G: 1 SUSPENSION ORAL at 08:53

## 2018-01-01 RX ADMIN — Medication 1 MG: at 11:29

## 2018-01-01 RX ADMIN — Medication 1 MG: at 07:54

## 2018-01-01 RX ADMIN — Medication 2 MG: at 08:03

## 2018-01-01 RX ADMIN — AMOXICILLIN AND CLAVULANATE POTASSIUM 875 MG: 400; 57 POWDER, FOR SUSPENSION ORAL at 20:20

## 2018-01-01 RX ADMIN — SODIUM CHLORIDE, POTASSIUM CHLORIDE, SODIUM LACTATE AND CALCIUM CHLORIDE: 600; 310; 30; 20 INJECTION, SOLUTION INTRAVENOUS at 08:28

## 2018-01-01 RX ADMIN — AMLODIPINE BESYLATE 5 MG: 10 TABLET ORAL at 13:01

## 2018-01-01 RX ADMIN — AMOXICILLIN AND CLAVULANATE POTASSIUM 1 TABLET: 875; 125 TABLET, FILM COATED ORAL at 20:35

## 2018-01-01 RX ADMIN — FLUOROURACIL 830 MG: 50 INJECTION, SOLUTION INTRAVENOUS at 10:15

## 2018-01-01 RX ADMIN — DOCUSATE SODIUM 100 MG: 50 LIQUID ORAL at 09:31

## 2018-01-01 RX ADMIN — Medication 5 ML: at 10:41

## 2018-01-01 RX ADMIN — ROCURONIUM BROMIDE 30 MG: 10 INJECTION INTRAVENOUS at 07:45

## 2018-01-01 RX ADMIN — Medication 1 MG: at 05:53

## 2018-01-01 RX ADMIN — SUCRALFATE 1 G: 1 SUSPENSION ORAL at 15:37

## 2018-01-01 RX ADMIN — Medication 0.5 MG: at 01:42

## 2018-01-01 RX ADMIN — Medication 0.5 MG: at 06:35

## 2018-01-01 RX ADMIN — SUCRALFATE 1 G: 1 SUSPENSION ORAL at 08:00

## 2018-01-01 RX ADMIN — ONDANSETRON HYDROCHLORIDE 8 MG: 2 INJECTION, SOLUTION INTRAMUSCULAR; INTRAVENOUS at 08:17

## 2018-01-01 RX ADMIN — LIDOCAINE HYDROCHLORIDE 9 ML: 10 INJECTION, SOLUTION INFILTRATION; PERINEURAL at 19:32

## 2018-01-01 RX ADMIN — CIPROFLOXACIN 500 MG: KIT at 17:07

## 2018-01-01 RX ADMIN — HYDROMORPHONE HYDROCHLORIDE 4 MG: 1 SOLUTION ORAL at 22:33

## 2018-01-01 RX ADMIN — HYDROMORPHONE HYDROCHLORIDE 4 MG: 1 SOLUTION ORAL at 01:20

## 2018-01-01 RX ADMIN — Medication 5 ML: at 10:32

## 2018-01-01 RX ADMIN — PROPOFOL 20 MG: 10 INJECTION, EMULSION INTRAVENOUS at 08:00

## 2018-01-01 RX ADMIN — SODIUM CHLORIDE: 9 INJECTION, SOLUTION INTRAVENOUS at 15:37

## 2018-01-01 RX ADMIN — Medication 0.5 MG: at 13:54

## 2018-01-01 RX ADMIN — Medication 0.5 MG: at 11:00

## 2018-01-01 RX ADMIN — DOCUSATE SODIUM 100 MG: 50 LIQUID ORAL at 08:26

## 2018-01-01 RX ADMIN — Medication 0.5 MG: at 07:59

## 2018-01-01 RX ADMIN — PANTOPRAZOLE SODIUM 40 MG: 40 TABLET, DELAYED RELEASE ORAL at 08:01

## 2018-01-01 RX ADMIN — FENTANYL CITRATE 50 MCG: 50 INJECTION, SOLUTION INTRAMUSCULAR; INTRAVENOUS at 08:39

## 2018-01-01 RX ADMIN — HYDROMORPHONE HYDROCHLORIDE 4 MG: 1 SOLUTION ORAL at 11:46

## 2018-01-01 RX ADMIN — ONDANSETRON HYDROCHLORIDE 8 MG: 2 INJECTION, SOLUTION INTRAMUSCULAR; INTRAVENOUS at 02:30

## 2018-01-01 RX ADMIN — PANTOPRAZOLE SODIUM 40 MG: 40 TABLET, DELAYED RELEASE ORAL at 20:20

## 2018-01-01 RX ADMIN — SUCRALFATE 1 G: 1 SUSPENSION ORAL at 10:47

## 2018-01-01 RX ADMIN — Medication 5 ML: at 06:41

## 2018-01-01 RX ADMIN — HEPARIN SODIUM (PORCINE) LOCK FLUSH IV SOLN 100 UNIT/ML 5 ML: 100 SOLUTION at 09:29

## 2018-01-01 RX ADMIN — Medication 0.5 MG: at 14:14

## 2018-01-01 RX ADMIN — Medication 10 MG: at 10:03

## 2018-01-01 RX ADMIN — Medication 1 MG: at 08:10

## 2018-01-01 RX ADMIN — LIDOCAINE HYDROCHLORIDE 10 ML: 10 INJECTION, SOLUTION EPIDURAL; INFILTRATION; INTRACAUDAL; PERINEURAL at 10:44

## 2018-01-01 RX ADMIN — HEPARIN SODIUM (PORCINE) LOCK FLUSH IV SOLN 100 UNIT/ML 5 ML: 100 SOLUTION at 08:05

## 2018-01-01 ASSESSMENT — PAIN DESCRIPTION - DESCRIPTORS
DESCRIPTORS: PRESSURE
DESCRIPTORS: DISCOMFORT
DESCRIPTORS: CONSTANT
DESCRIPTORS: STABBING
DESCRIPTORS: ACHING
DESCRIPTORS: CONSTANT
DESCRIPTORS: ACHING;CONSTANT;DISCOMFORT
DESCRIPTORS: ACHING
DESCRIPTORS: CONSTANT
DESCRIPTORS: ACHING
DESCRIPTORS: ACHING
DESCRIPTORS: CONSTANT
DESCRIPTORS: OTHER (COMMENT)
DESCRIPTORS: ACHING
DESCRIPTORS: ACHING;CONSTANT
DESCRIPTORS: DISCOMFORT
DESCRIPTORS: ACHING
DESCRIPTORS: ACHING;CONSTANT;DISCOMFORT
DESCRIPTORS: ACHING;CONSTANT;DISCOMFORT
DESCRIPTORS: TIGHTNESS
DESCRIPTORS: ACHING
DESCRIPTORS: CONSTANT
DESCRIPTORS: ACHING
DESCRIPTORS: CONSTANT
DESCRIPTORS: CONSTANT
DESCRIPTORS: OTHER (COMMENT)
DESCRIPTORS: CONSTANT
DESCRIPTORS: DISCOMFORT
DESCRIPTORS: ACHING;BURNING
DESCRIPTORS: ACHING
DESCRIPTORS: ACHING
DESCRIPTORS: CONSTANT
DESCRIPTORS: PRESSURE
DESCRIPTORS: ACHING
DESCRIPTORS: CONSTANT
DESCRIPTORS: ACHING
DESCRIPTORS: ACHING

## 2018-01-01 ASSESSMENT — ACTIVITIES OF DAILY LIVING (ADL)
ADLS_ACUITY_SCORE: 14
ADLS_ACUITY_SCORE: 18
ADLS_ACUITY_SCORE: 14
ADLS_ACUITY_SCORE: 18
ADLS_ACUITY_SCORE: 14
ADLS_ACUITY_SCORE: 19
ADLS_ACUITY_SCORE: 18
ADLS_ACUITY_SCORE: 14
ADLS_ACUITY_SCORE: 18
ADLS_ACUITY_SCORE: 12
ADLS_ACUITY_SCORE: 18
ADLS_ACUITY_SCORE: 14
ADLS_ACUITY_SCORE: 18
ADLS_ACUITY_SCORE: 14
ADLS_ACUITY_SCORE: 18
ADLS_ACUITY_SCORE: 14
ADLS_ACUITY_SCORE: 18
ADLS_ACUITY_SCORE: 14
ADLS_ACUITY_SCORE: 18
ADLS_ACUITY_SCORE: 18
ADLS_ACUITY_SCORE: 14
ADLS_ACUITY_SCORE: 18
ADLS_ACUITY_SCORE: 14
ADLS_ACUITY_SCORE: 18
ADLS_ACUITY_SCORE: 14
ADLS_ACUITY_SCORE: 14
ADLS_ACUITY_SCORE: 18
ADLS_ACUITY_SCORE: 14
ADLS_ACUITY_SCORE: 18
ADLS_ACUITY_SCORE: 14

## 2018-01-01 ASSESSMENT — PAIN SCALES - GENERAL
PAINLEVEL: NO PAIN (0)
PAINLEVEL: EXTREME PAIN (8)
PAINLEVEL: SEVERE PAIN (7)
PAINLEVEL: EXTREME PAIN (8)
PAINLEVEL: NO PAIN (0)
PAINLEVEL: NO PAIN (0)
PAINLEVEL: SEVERE PAIN (6)
PAINLEVEL: MODERATE PAIN (5)
PAINLEVEL: EXTREME PAIN (9)
PAINLEVEL: NO PAIN (0)
PAINLEVEL: MILD PAIN (3)
PAINLEVEL: MILD PAIN (3)
PAINLEVEL: NO PAIN (0)
PAINLEVEL: SEVERE PAIN (7)
PAINLEVEL: EXTREME PAIN (9)
PAINLEVEL: NO PAIN (0)
PAINLEVEL: NO PAIN (0)
PAINLEVEL: MILD PAIN (3)
PAINLEVEL: MODERATE PAIN (5)
PAINLEVEL: NO PAIN (0)
PAINLEVEL: EXTREME PAIN (9)
PAINLEVEL: NO PAIN (0)
PAINLEVEL: NO PAIN (0)

## 2018-01-01 ASSESSMENT — MIFFLIN-ST. JEOR
SCORE: 1597.64
SCORE: 1600.81
SCORE: 1724.64
SCORE: 1765.47
SCORE: 1760.02
SCORE: 1772.72
SCORE: 1765.01
SCORE: 1751.4
SCORE: 1722.37

## 2018-01-04 NOTE — TELEPHONE ENCOUNTER
"Pt's wife Harika calling to discuss new rash. Started 3 days ago. Noticed red,dry looking patch on the back of his knee. More patches have appeared on his legs and one on upper buttock. Also has pruritis on legs, not necessarily the rash. Applied coconut oil to legs and this relieved pruritis. Also has noticed his toes are a darker color. Harika is wondering if there is something she could apply to the rash? Instructed Harika, writer would discuss with Virgen Garcia CNP for recommendations and call her back  Amanda Licea  RN, BSN, OCN    Virgen advised patient have rash assessed by a provider. Because of the distance, advised pt to be seen by PCP.  Harika will try to make an appointment.  Amanda Licea  RN, BSN, OCN    Received vm from wife 1-5-18. States rash is \"100 times\" better today.  "

## 2018-01-10 NOTE — PROGRESS NOTES
1/10/2018      REASON FOR VISIT:  Follow-up for unresectable metastatic gastric carcinoma, diffuse type, grade 3, HER-2 negative, MSI-Intact.      HISTORY OF PRESENT ILLNESS:  Please see my previous note for details.  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      CT CAP after C#6 shows stable disease    C# 9 FOLFOX 8/2/2017 ( Oxaliplatin dose reduced to 70mg/m2 due to neuropathy )  C#10 8/22/2017 ( Oxaliplatin dose reduced to 60mg/m2 due to neuropathy )  Delayed by 1 week- patient preference  C#11 9/6/17 Only 5FU/LV  C#12 9/20/2017 5FU/LV    Repeat CT scan after C#12 shows stable to slightly improved disease.    C#13 10/4/2017 5FU/LV  C#14 10/18/2017 5FU/LV  C#15 10/31/2017 5FU/LV  C#16 11/15/2017 5FU/LV  C#17 11/29/2017 5FU/LV   C#18 12/13/2017 5FU/LV    Repeat CT CAP 12/22/17 is stable    C#19 12/27/2017 5FU/LV  C#20 1/10/2018 5FU/LV     INTERVAL HISTORY:   He tolerated the last chemotherapy well. He is overall feeling good. He is been able to eat and drink well without any GI problems. No bleeding. No infections. No trouble breathing. No new swellings. He notices some dry patches on the skin as well has some dryness of the hands. He is applying moisturizing creams. He thinks neuropathy has somewhat improved over the last several months and now he has a little bit more feeling and sensation in his hands and feet. He still feels that the feet are more sensitive. His energy is good but he does not exercise regularly. He denies any pain. No bleeding.    ROS:  Rest of the review of system was unremarkable      MEDICATIONS:   Current Outpatient Prescriptions   Medication     Multiple Vitamins-Minerals (MULTIVITAMIN ADULT PO)     aspirin 81 MG chewable tablet     LORazepam (ATIVAN) 0.5 MG tablet      "prochlorperazine (COMPAZINE) 10 MG tablet     ondansetron (ZOFRAN) 8 MG tablet     allopurinol (ZYLOPRIM) 100 MG tablet     lidocaine-prilocaine (EMLA) cream     No current facility-administered medications for this visit.      Facility-Administered Medications Ordered in Other Visits   Medication     heparin 100 UNIT/ML injection 5 mL     sodium chloride (PF) 0.9% PF flush 10-20 mL     alteplase 2 mg/2 mL (in 10 mL syringe)            PHYSICAL EXAMINATION:   /86  Pulse 86  Temp 98.8  F (37.1  C)  Resp 15  Ht 1.8 m (5' 10.87\")  Wt 95.7 kg (211 lb)  SpO2 97%  BMI 29.54 kg/m2  CONSTITUTIONAL: no acute distress  EYES: PERRLA, no palor or icterus.   ENT/MOUTH: no mouth sores. Ears normal  CVS: s1s2 no m r g .   RESPIRATORY: clear to auscultation b/l  GI: soft non tender no hepatosplenomegaly  NEURO: AAOX3  Grossly non focal neuro exam apart from numbness of fingers and feet  INTEGUMENT: some dryness in the thighs and hands  LYMPHATIC: no palpable LAD  MUSCULOSKELETAL: Unremarkable. No bony tenderness.   EXTREMITIES: no edema  PSYCH: Mentation, mood and affect are normal. Decision making capacity is intact        LABS:   Reviewed      ASSESSMENT AND PLAN:  Metastatic gastric cancer, HER-2/catrachita negative, MSI intact, presenting with early satiety and significant weight loss and epigastric discomfort upon eating with some vomiting.  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  There is an indeterminate right liver lobe lesion which has remained stable and likely is not a metastasis.      We started palliative FOLFOX on 03/12/2017.     Oxaliplatin was dropped from cycle #11 onwards because of neuropathy     Scans after C#18 show stable disease    He is tolerating chemotherapy well. 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 At this time we " have decided to observe it. It is slowly getting better. He thinks he has more sensation in his fingers and feet as compared to a few months ago. He had tried Cymbalta but he did not like it. He is not interested in trying gabapentin. Previously vitamin B12 level was normal. He is taking an OTC MV and I told him that he can continue to do so    Anemia and thrombocytopenia. These are mild and stable and there are due to the chemotherapy. He is asymptomatic and this time. We will repeat it in 2 weeks before next chemotherapy    Discussion regarding exercise. He has decent energy but he is not exercising and I strongly encouraged him to exercise regularly to maintain good health and keep himself in a better shape    Dry skin- I recommended applying moisturizing lotions to it especially to hands and feet    We did not address the following today  Right hip pain. Due to degenerative disease. He takes Tylenol as needed. He will follow with chiropractor as needed.    His genetic testing did not show any identifiable mutation not reveal any identifiable mutation     I will see him back in 2 weeks before next chemotherapy     All of his questions were answered to his satisfaction and he is agreeable with the plan     LANEY HERNANDEZ MD

## 2018-01-10 NOTE — LETTER
1/10/2018         RE: Terry Yi  [place] [address]        Dear Colleague,    Thank you for referring your patient, Terry Yi, to the [facility]. Please see a copy of my visit note below.    1/10/2018      REASON FOR VISIT:  Follow-up for unresectable metastatic gastric carcinoma, diffuse type, grade 3, HER-2 negative, MSI-Intact.      HISTORY OF PRESENT ILLNESS:  Please see my previous note for details.  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      CT CAP after C#6 shows stable disease    C# 9 FOLFOX 8/2/2017 ( Oxaliplatin dose reduced to 70mg/m2 due to neuropathy )  C#10 8/22/2017 ( Oxaliplatin dose reduced to 60mg/m2 due to neuropathy )  Delayed by 1 week- patient preference  C#11 9/6/17 Only 5FU/LV  C#12 9/20/2017 5FU/LV    Repeat CT scan after C#12 shows stable to slightly improved disease.    C#13 10/4/2017 5FU/LV  C#14 10/18/2017 5FU/LV  C#15 10/31/2017 5FU/LV  C#16 11/15/2017 5FU/LV  C#17 11/29/2017 5FU/LV   C#18 12/13/2017 5FU/LV    Repeat CT CAP 12/22/17 is stable    C#19 12/27/2017 5FU/LV  C#20 1/10/2018 5FU/LV     INTERVAL HISTORY:   He tolerated the last chemotherapy well. He is overall feeling good. He is been able to eat and drink well without any GI problems. No bleeding. No infections. No trouble breathing. No new swellings. He notices some dry patches on the skin as well has some dryness of the hands. He is applying moisturizing creams. He thinks neuropathy has somewhat improved over the last several months and now he has a little bit more feeling and sensation in his hands and feet. He still feels that the feet are more sensitive. His energy is good but he does not exercise regularly. He denies any pain. No bleeding.    ROS:  Rest  "of the review of system was unremarkable      MEDICATIONS:   Current Outpatient Prescriptions   Medication     Multiple Vitamins-Minerals (MULTIVITAMIN ADULT PO)     aspirin 81 MG chewable tablet     LORazepam (ATIVAN) 0.5 MG tablet     prochlorperazine (COMPAZINE) 10 MG tablet     ondansetron (ZOFRAN) 8 MG tablet     allopurinol (ZYLOPRIM) 100 MG tablet     lidocaine-prilocaine (EMLA) cream     No current facility-administered medications for this visit.      Facility-Administered Medications Ordered in Other Visits   Medication     heparin 100 UNIT/ML injection 5 mL     sodium chloride (PF) 0.9% PF flush 10-20 mL     alteplase 2 mg/2 mL (in 10 mL syringe)            PHYSICAL EXAMINATION:   /86  Pulse 86  Temp 98.8  F (37.1  C)  Resp 15  Ht 1.8 m (5' 10.87\")  Wt 95.7 kg (211 lb)  SpO2 97%  BMI 29.54 kg/m2  CONSTITUTIONAL: no acute distress  EYES: PERRLA, no palor or icterus.   ENT/MOUTH: no mouth sores. Ears normal  CVS: s1s2 no m r g .   RESPIRATORY: clear to auscultation b/l  GI: soft non tender no hepatosplenomegaly  NEURO: AAOX3  Grossly non focal neuro exam apart from numbness of fingers and feet  INTEGUMENT: some dryness in the thighs and hands  LYMPHATIC: no palpable LAD  MUSCULOSKELETAL: Unremarkable. No bony tenderness.   EXTREMITIES: no edema  PSYCH: Mentation, mood and affect are normal. Decision making capacity is intact        LABS:   Reviewed      ASSESSMENT AND PLAN:  Metastatic gastric cancer, HER-2/catrachita negative, MSI intact, presenting with early satiety and significant weight loss and epigastric discomfort upon eating with some vomiting.  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  There is an indeterminate right liver lobe lesion which has remained stable and likely is not a metastasis.      We started palliative FOLFOX on 03/12/2017.     Oxaliplatin was dropped from cycle #11 onwards " because of neuropathy     Scans after C#18 show stable disease    He is tolerating chemotherapy well. 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 At this time we have decided to observe it. It is slowly getting better. He thinks he has more sensation in his fingers and feet as compared to a few months ago. He had tried Cymbalta but he did not like it. He is not interested in trying gabapentin. Previously vitamin B12 level was normal. He is taking an OTC MV and I told him that he can continue to do so    Anemia and thrombocytopenia. These are mild and stable and there are due to the chemotherapy. He is asymptomatic and this time. We will repeat it in 2 weeks before next chemotherapy    Discussion regarding exercise. He has decent energy but he is not exercising and I strongly encouraged him to exercise regularly to maintain good health and keep himself in a better shape    Dry skin- I recommended applying moisturizing lotions to it especially to hands and feet    We did not address the following today  Right hip pain. Due to degenerative disease. He takes Tylenol as needed. He will follow with chiropractor as needed.    His genetic testing did not show any identifiable mutation not reveal any identifiable mutation     I will see him back in 2 weeks before next chemotherapy     All of his questions were answered to his satisfaction and he is agreeable with the plan     LANEY SHAFFER MD              Again, thank you for allowing me to participate in the care of your patient.        Sincerely,        Laney Shaffer MD

## 2018-01-10 NOTE — MR AVS SNAPSHOT
After Visit Summary   1/10/2018    Terry Yi    MRN: [id number]           Patient Information     Date Of Birth          1965        Visit Information        Provider Department      1/10/2018 8:15 AM Yakov Shaffer MD [facility]        Today's Diagnoses     Malignant neoplasm of overlapping sites of stomach (H)          Care Instructions    Chemo today    RTC 2 weeks with labs, see me and chemo to follow            Follow-ups after your visit        Your next 10 appointments already scheduled     Jan 24, 2018 10:30 AM CST   Return Visit with NURSE ONLY CANCER CENTER   [facility])    [address] [place] [id number] [phone]            Jan 24, 2018 11:15 AM CST   Return Visit with Yakov Shaffer MD   [facility])    [address] [place] [id number] [phone]            Jan 24, 2018 12:00 PM CST   Level 2 with BAY 8 INFUSION   [facility])    [address] [place] [address] [phone]              Who to contact     If you have questions or need follow up information about today's clinic visit or your schedule please contact [facility] directly at [phone].  Normal or non-critical lab and imaging results will be communicated to you by MyChart, letter or phone within 4 business days after the clinic has received the results. If you do not hear from us within 7 days, please contact the clinic through MyChart or phone. If you have a critical or abnormal lab result, we will notify you by phone as soon as possible.  Submit refill requests through [employer] or call your pharmacy and they will forward the refill request to us. Please allow 3 business days for your refill to be completed.          Additional Information About Your Visit        MyChart Information      "[employer] gives you secure access to your electronic health record. If you see a primary care provider, you can also send messages to your care team and make appointments. If you have questions, please call your primary care clinic.  If you do not have a primary care provider, please call [phone] and they will assist you.      [employer] is an electronic gateway that provides easy, online access to your medical records. With [employer], you can request a clinic appointment, read your test results, renew a prescription or communicate with your care team.     To access your existing account, please contact your [facility] or call [phone] for assistance.        Care EveryWhere ID     This is your Care EveryWhere ID. This could be used by other organizations to access your [place] medical records  [id number]        Your Vitals Were     Pulse Temperature Respirations Height Pulse Oximetry BMI (Body Mass Index)    86 98.8  F (37.1  C) 15 1.8 m (5' 10.87\") 97% 29.54 kg/m2       Blood Pressure from Last 3 Encounters:   01/10/18 126/86   12/27/17 129/79   12/13/17 114/75    Weight from Last 3 Encounters:   01/10/18 95.7 kg (211 lb)   12/27/17 98.7 kg (217 lb 9.6 oz)   12/13/17 95.8 kg (211 lb 4.8 oz)              Today, you had the following     No orders found for display       Primary Care Provider Office Phone # Fax #    Sandra Dickerson [phone]       [facility]  [address]  [facility] [address]        Equal Access to Services     [facility]: Hadii aad ku hadasho Soomaali, waaxda luqadaha, qaybta kaalmada adeegyaladonna, claire cowart . So [facility] [phone].    ATENCIÓN: Si habla español, tiene a cuevas disposición servicios gratuitos de asistencia lingüística. Llame al [phone].    We comply with applicable federal civil rights laws and [place] laws. We do not discriminate on the basis of race, color, national origin, age, " disability, sex, sexual orientation, or gender identity.            Thank you!     Thank you for choosing [facility]  for your care. Our goal is always to provide you with excellent care. Hearing back from our patients is one way we can continue to improve our services. Please take a few minutes to complete the written survey that you may receive in the mail after your visit with us. Thank you!             Your Updated Medication List - Protect others around you: Learn how to safely use, store and throw away your medicines at www.disposemymeds.org.          This list is accurate as of: 1/10/18  8:51 AM.  Always use your most recent med list.                   Brand Name Dispense Instructions for use Diagnosis    allopurinol 100 MG tablet    ZYLOPRIM     as needed (when eating shrimp)        aspirin 81 MG chewable tablet      Take 81 mg by mouth daily        lidocaine-prilocaine cream    EMLA          LORazepam 0.5 MG tablet    ATIVAN    30 tablet    Take 1 tablet (0.5 mg) by mouth every 4 hours as needed (Anxiety, Nausea/Vomiting or Sleep)    Malignant neoplasm of overlapping sites of stomach (H)       MULTIVITAMIN ADULT PO           ondansetron 8 MG tablet    ZOFRAN    10 tablet    Take 1 tablet (8 mg) by mouth every 8 hours as needed (Nausea/Vomiting)    Malignant neoplasm of overlapping sites of stomach (H)       prochlorperazine 10 MG tablet    COMPAZINE    30 tablet    Take 1 tablet (10 mg) by mouth every 6 hours as needed (Nausea/Vomiting)    Malignant neoplasm of overlapping sites of stomach (H)

## 2018-01-10 NOTE — PROGRESS NOTES
Flash of blood noted after port accessed. Unable to obtain enough blood for lab draw. Pt will have labs drawn peripherally. Alteplase ordered and instilled at 08:03. Pt tolerated well without complaint.   Arleth Brandon RN

## 2018-01-10 NOTE — PROGRESS NOTES
"Infusion Nursing Note:  Terry Yi presents today for C20D1 Leucovorin/5-FU bolus/pump connect.    Patient seen by provider today: Yes: Dr. Shaffer   present during visit today: Not Applicable.    Note: Prior to discharge: Port is secured in place with tegaderm and flushed with 10cc NS with positive blood return noted.  Continuous home infusion Dosi-Fuser pump connected.    All connectors secured in place and clamps taped open.    Pump started, \"running\" noted on display (CADD): Not Applicable.  Patient instructed to call our clinic or [facility] with any questions or concerns at home.  Patient verbalized understanding.    Patient will set up pump disconnect in [place] on Friday 1/12/18.      Intravenous Access:  Implanted Port.     Treatment Conditions:  Lab Results   Component Value Date    HGB 13.1 01/10/2018     Lab Results   Component Value Date    WBC 5.8 01/10/2018      Lab Results   Component Value Date    ANEU 3.0 01/10/2018     Lab Results   Component Value Date     01/10/2018      Lab Results   Component Value Date     01/10/2018                   Lab Results   Component Value Date    POTASSIUM 3.8 01/10/2018           No results found for: MAG         Lab Results   Component Value Date    CR 0.98 01/10/2018                   Lab Results   Component Value Date    BRITTNEY 9.0 01/10/2018                Lab Results   Component Value Date    BILITOTAL 0.4 01/10/2018           Lab Results   Component Value Date    ALBUMIN 3.8 01/10/2018                    Lab Results   Component Value Date    ALT 62 01/10/2018           Lab Results   Component Value Date    AST 43 01/10/2018     Results reviewed, labs MET treatment parameters, ok to proceed with treatment.    Post Infusion Assessment:  Patient tolerated infusion without incident.  Blood return noted pre and post infusion.  Site patent and intact, free from redness, edema or discomfort.    Discharge Plan:   AVS to patient via " SUNNY.  Patient will return 1/24/18 for next appointment.   Patient discharged in stable condition accompanied by: wife, son and friend.  Departure Mode: Ambulatory.    Mayte Carbone RN

## 2018-01-10 NOTE — MR AVS SNAPSHOT
After Visit Summary   1/10/2018    Terry Yi    MRN: [id number]           Patient Information     Date Of Birth          1965        Visit Information        Provider Department      1/10/2018 8:45 AM BAY 4 INFUSION [facility]        Today's Diagnoses     Malignant neoplasm of overlapping sites of stomach (H)    -  1       Follow-ups after your visit        Your next 10 appointments already scheduled     Jan 24, 2018 10:30 AM CST   Return Visit with NURSE ONLY CANCER CENTER   [facility] ([facility])    [address] [place] [id number] [phone]            Jan 24, 2018 11:15 AM CST   Return Visit with Yakov Shaffer MD   [facility])    [address] [place] [id number] [phone]            Jan 24, 2018 12:00 PM CST   Level 2 with [place] [facility])    [address] [place] [id number]   [phone]              Who to contact     If you have questions or need follow up information about today's clinic visit or your schedule please contact [facility] directly at [phone].  Normal or non-critical lab and imaging results will be communicated to you by MyChart, letter or phone within 4 business days after the clinic has received the results. If you do not hear from us within 7 days, please contact the clinic through MyChart or phone. If you have a critical or abnormal lab result, we will notify you by phone as soon as possible.  Submit refill requests through [employer] or call your pharmacy and they will forward the refill request to us. Please allow 3 business days for your refill to be completed.          Additional Information About Your Visit        [employer] Information     [employer] gives you secure access to your electronic health record. If you see a primary care  provider, you can also send messages to your care team and make appointments. If you have questions, please call your primary care clinic.  If you do not have a primary care provider, please call [phone] and they will assist you.      [employer] is an electronic gateway that provides easy, online access to your medical records. With [employer], you can request a clinic appointment, read your test results, renew a prescription or communicate with your care team.     To access your existing account, please contact your [facility] or call [phone] for assistance.        Care EveryWhere ID     This is your Care EveryWhere ID. This could be used by other organizations to access your [place] medical records  [id number]         Blood Pressure from Last 3 Encounters:   01/10/18 126/86   12/27/17 129/79   12/13/17 114/75    Weight from Last 3 Encounters:   01/10/18 95.7 kg (211 lb)   12/27/17 98.7 kg (217 lb 9.6 oz)   12/13/17 95.8 kg (211 lb 4.8 oz)              Today, you had the following     No orders found for display       Primary Care Provider Office Phone # Fax #    Sandra Dickerson [phone]       [facility]  [address]  [facility] [address]        Equal Access to Services     CHANDAN GALLO : Hadii aad ku hadasho Soomaali, waaxda luqadaha, qaybta kaalmada adeegyada, waxay idiin hayaan charley khbret cowart . So [facility] [phone].    ATENCIÓN: Si habla español, tiene a cuevas disposición servicios gratuitos de asistencia lingüística. Llame al [phone].    We comply with applicable federal civil rights laws and [place] laws. We do not discriminate on the basis of race, color, national origin, age, disability, sex, sexual orientation, or gender identity.            Thank you!     Thank you for choosing [facility]  for your care. Our goal is always to provide you with excellent care. Hearing back from our patients is one way we can  continue to improve our services. Please take a few minutes to complete the written survey that you may receive in the mail after your visit with us. Thank you!             Your Updated Medication List - Protect others around you: Learn how to safely use, store and throw away your medicines at www.disposemymeds.org.          This list is accurate as of: 1/10/18 10:14 AM.  Always use your most recent med list.                   Brand Name Dispense Instructions for use Diagnosis    allopurinol 100 MG tablet    ZYLOPRIM     as needed (when eating shrimp)        aspirin 81 MG chewable tablet      Take 81 mg by mouth daily        lidocaine-prilocaine cream    EMLA          LORazepam 0.5 MG tablet    ATIVAN    30 tablet    Take 1 tablet (0.5 mg) by mouth every 4 hours as needed (Anxiety, Nausea/Vomiting or Sleep)    Malignant neoplasm of overlapping sites of stomach (H)       MULTIVITAMIN ADULT PO           ondansetron 8 MG tablet    ZOFRAN    10 tablet    Take 1 tablet (8 mg) by mouth every 8 hours as needed (Nausea/Vomiting)    Malignant neoplasm of overlapping sites of stomach (H)       prochlorperazine 10 MG tablet    COMPAZINE    30 tablet    Take 1 tablet (10 mg) by mouth every 6 hours as needed (Nausea/Vomiting)    Malignant neoplasm of overlapping sites of stomach (H)

## 2018-01-10 NOTE — MR AVS SNAPSHOT
After Visit Summary   1/10/2018    Terry Yi    MRN: [id number]           Patient Information     Date Of Birth          1965        Visit Information        Provider Department      1/10/2018 7:30 AM NURSE ONLY CANCER CENTER [facility]        Today's Diagnoses     Malignant neoplasm of overlapping sites of stomach (H)    -  1    Thrombosis complicating venous access device (H)           Follow-ups after your visit        Your next 10 appointments already scheduled     Jovanny 10, 2018  8:15 AM CST   Return Visit with Yakov Shaffer MD   [facility])    [id number] [place] [address] [phone]            Jovanny 10, 2018  8:45 AM CST   Level 2 with BAY 4 INFUSION   [facility])    [address] [place] [address] [phone]              Who to contact     If you have questions or need follow up information about today's clinic visit or your schedule please contact [facility] directly at [phone].  Normal or non-critical lab and imaging results will be communicated to you by [employer], letter or phone within 4 business days after the clinic has received the results. If you do not hear from us within 7 days, please contact the clinic through [employer] or phone. If you have a critical or abnormal lab result, we will notify you by phone as soon as possible.  Submit refill requests through [employer] or call your pharmacy and they will forward the refill request to us. Please allow 3 business days for your refill to be completed.          Additional Information About Your Visit        [employer] Information     [employer] gives you secure access to your electronic health record. If you see a primary care provider, you can also send messages to your care team and make appointments. If you have questions, please call your primary care clinic.  If you  do not have a primary care provider, please call [phone] and they will assist you.      [employer] is an electronic gateway that provides easy, online access to your medical records. With [employer], you can request a clinic appointment, read your test results, renew a prescription or communicate with your care team.     To access your existing account, please contact your [facility] or call [phone] for assistance.        Care EveryWhere ID     This is your Care EveryWhere ID. This could be used by other organizations to access your [place] medical records  [id number]         Blood Pressure from Last 3 Encounters:   12/27/17 129/79   12/13/17 114/75   11/29/17 128/80    Weight from Last 3 Encounters:   12/27/17 98.7 kg (217 lb 9.6 oz)   12/13/17 95.8 kg (211 lb 4.8 oz)   11/29/17 95.2 kg (209 lb 12.8 oz)              We Performed the Following     CBC with platelets differential     Comprehensive metabolic panel        Primary Care Provider Office Phone # Fax #    Sandra Dickerson, SARAI [phone]       [facility]  [address]  [facility] [address]        Equal Access to Services     CHANDAN GALLO : Hadii aad ku hadasho Soomaali, waaxda luqadaha, qaybta kaalmada adeegyada, waxay prabhain hayrodrigon charley cowart . So [facility] [phone].    ATENCIÓN: Si habla español, tiene a cuevas disposición servicios gratuitos de asistencia lingüística. Llame al [phone].    We comply with applicable federal civil rights laws and [place] laws. We do not discriminate on the basis of race, color, national origin, age, disability, sex, sexual orientation, or gender identity.            Thank you!     Thank you for choosing [facility]  for your care. Our goal is always to provide you with excellent care. Hearing back from our patients is one way we can continue to improve our services. Please take a few minutes to complete the written survey that you may  receive in the mail after your visit with us. Thank you!             Your Updated Medication List - Protect others around you: Learn how to safely use, store and throw away your medicines at www.disposemymeds.org.          This list is accurate as of: 1/10/18  8:04 AM.  Always use your most recent med list.                   Brand Name Dispense Instructions for use Diagnosis    allopurinol 100 MG tablet    ZYLOPRIM     as needed (when eating shrimp)        aspirin 81 MG chewable tablet      Take 81 mg by mouth daily        lidocaine-prilocaine cream    EMLA          LORazepam 0.5 MG tablet    ATIVAN    30 tablet    Take 1 tablet (0.5 mg) by mouth every 4 hours as needed (Anxiety, Nausea/Vomiting or Sleep)    Malignant neoplasm of overlapping sites of stomach (H)       ondansetron 8 MG tablet    ZOFRAN    10 tablet    Take 1 tablet (8 mg) by mouth every 8 hours as needed (Nausea/Vomiting)    Malignant neoplasm of overlapping sites of stomach (H)       prochlorperazine 10 MG tablet    COMPAZINE    30 tablet    Take 1 tablet (10 mg) by mouth every 6 hours as needed (Nausea/Vomiting)    Malignant neoplasm of overlapping sites of stomach (H)

## 2018-01-24 NOTE — MR AVS SNAPSHOT
After Visit Summary   1/24/2018    Terry Yi    MRN: [id number]           Patient Information     Date Of Birth          1965        Visit Information        Provider Department      1/24/2018 12:00 PM BAY 8 INFUSION [facility]        Today's Diagnoses     Malignant neoplasm of overlapping sites of stomach (H)    -  1       Follow-ups after your visit        Your next 10 appointments already scheduled     Feb 07, 2018 10:30 AM CST   Return Visit with [facility]   [facility] ([facility]    [address] [facility] [address]   [phone]            Feb 07, 2018 11:15 AM CST   Return Visit with Yakov Shaffer MD   [facility] ([facility]    [address] [facility] [address]   [phone]            Feb 07, 2018 12:30 PM CST   Level 2 with BAY 9 INFUSION   [facility] ([facility]    [address] [facility] [address]   [phone]            Feb 21, 2018  8:00 AM CST   Return Visit with [facility]   [facility] ([facility]    [address] [facility] [address]   [phone]            Feb 21, 2018  8:45 AM CST   Return Visit with Yakov Shaffer MD   [facility] ([facility])    [address] [facility] [address]   [phone]            Feb 21, 2018  9:15 AM CST   Level 2 with BAY 7 [facility] ([facility])    [address] [facility] [address]   [phone]            Mar 07, 2018  8:30 AM CST   Return Visit with [facility]   [facility] ([facility])    [address] [facility] [address]   [phone]            Mar 07, 2018  9:15 AM CST   Return Visit with Yakov Shaffer MD   [facility]  [facility] ([facility])    [address] [place] [address] [phone]            Mar 07, 2018 10:00 AM CST   Level 2 with BAY 8 INFUSION   [facility] ([facility])    [address] [place] [address] [phone]              Who to contact     If you have questions or need follow up information about today's clinic visit or your schedule please contact [facility] directly at [phone].  Normal or non-critical lab and imaging results will be communicated to you by Expandlyhart, letter or phone within 4 business days after the clinic has received the results. If you do not hear from us within 7 days, please contact the clinic through [employer] or phone. If you have a critical or abnormal lab result, we will notify you by phone as soon as possible.  Submit refill requests through Buyoo or call your pharmacy and they will forward the refill request to us. Please allow 3 business days for your refill to be completed.          Additional Information About Your Visit        Expandlyhart Information     Buyoo gives you secure access to your electronic health record. If you see a primary care provider, you can also send messages to your care team and make appointments. If you have questions, please call your primary care clinic.  If you do not have a primary care provider, please call [phone] and they will assist you.      Buyoo is an electronic gateway that provides easy, online access to your medical records. With Buyoo, you can request a clinic appointment, read your test results, renew a prescription or communicate with your care team.     To access your existing account, please contact your [facility] or call [phone] for assistance.        Care EveryWhere ID     This is your Care EveryWhere ID. This could be used by other organizations to access your [facility]  records  [id number]         Blood Pressure from Last 3 Encounters:   01/24/18 118/76   01/10/18 126/86   12/27/17 129/79    Weight from Last 3 Encounters:   01/24/18 97.5 kg (215 lb)   01/10/18 95.7 kg (211 lb)   12/27/17 98.7 kg (217 lb 9.6 oz)              Today, you had the following     No orders found for display       Primary Care Provider Office Phone # Fax #    Sandra Dickerson [phone]       [facility]  [address]  [facility] [address]        Equal Access to Services     [facility] Center[facility] : Hadii aad ku hadasho Soomaali, waaxda luqadaha, qaybta kaalmada adeegyada, waxtejinder cowart . So [facility] [phone].    ATENCIÓN: Si habla español, tiene a cuevas disposición servicios gratuitos de asistencia lingüística. Llame al [phone].    We comply with applicable federal civil rights laws and [place] laws. We do not discriminate on the basis of race, color, national origin, age, disability, sex, sexual orientation, or gender identity.            Thank you!     Thank you for choosing [facility]  for your care. Our goal is always to provide you with excellent care. Hearing back from our patients is one way we can continue to improve our services. Please take a few minutes to complete the written survey that you may receive in the mail after your visit with us. Thank you!             Your Updated Medication List - Protect others around you: Learn how to safely use, store and throw away your medicines at www.disposemymeds.org.          This list is accurate as of 1/24/18  4:16 PM.  Always use your most recent med list.                   Brand Name Dispense Instructions for use Diagnosis    allopurinol 100 MG tablet    ZYLOPRIM     as needed (when eating shrimp)        aspirin 81 MG chewable tablet      Take 81 mg by mouth daily        lidocaine-prilocaine cream    EMLA          LORazepam 0.5 MG tablet    ATIVAN    30 tablet    Take 1 tablet (0.5 mg)  by mouth every 4 hours as needed (Anxiety, Nausea/Vomiting or Sleep)    Malignant neoplasm of overlapping sites of stomach (H)       MULTIVITAMIN ADULT PO           ondansetron 8 MG tablet    ZOFRAN    10 tablet    Take 1 tablet (8 mg) by mouth every 8 hours as needed (Nausea/Vomiting)    Malignant neoplasm of overlapping sites of stomach (H)       prochlorperazine 10 MG tablet    COMPAZINE    30 tablet    Take 1 tablet (10 mg) by mouth every 6 hours as needed (Nausea/Vomiting)    Malignant neoplasm of overlapping sites of stomach (H)

## 2018-01-24 NOTE — PROGRESS NOTES
Power port needle left accessed for treatment. Tolerated lab draw without complaint. [place] drawn-Red/Green/Purple tubes. Double signed by patient and RN. See documentation flowsheet. Arleth Brandon, RN, BSN, OCN

## 2018-01-24 NOTE — PROGRESS NOTES
1/24/2018       REASON FOR VISIT:  Follow-up for unresectable metastatic gastric carcinoma, diffuse type, grade 3, HER-2 negative, MSI-Intact.      HISTORY OF PRESENT ILLNESS:  Please see my previous note for details.  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      CT CAP after C#6 shows stable disease    C# 9 FOLFOX 8/2/2017 ( Oxaliplatin dose reduced to 70mg/m2 due to neuropathy )  C#10 8/22/2017 ( Oxaliplatin dose reduced to 60mg/m2 due to neuropathy )  Delayed by 1 week- patient preference  C#11 9/6/17 Only 5FU/LV  C#12 9/20/2017 5FU/LV    Repeat CT scan after C#12 shows stable to slightly improved disease.    C#13 10/4/2017 5FU/LV  C#14 10/18/2017 5FU/LV  C#15 10/31/2017 5FU/LV  C#16 11/15/2017 5FU/LV  C#17 11/29/2017 5FU/LV   C#18 12/13/2017 5FU/LV    Repeat CT CAP 12/22/17 is stable    C#19 12/27/2017 5FU/LV  C#20 1/10/2018 5FU/LV  C# 21 1/24/2018 5FU/LV     INTERVAL HISTORY:   She continues to tolerate chemotherapy well. She continues to eat and drink well. Weight has remained stable to slightly improved. No nausea or vomiting. No diarrhea or constipation. No bleeding. No acid reflux symptoms. No infections. He has mild peeling of the skin of some of the fingers and he is applying moisturizing lotions several times a day. No peeling of the skin in the feet. The hands and feet are not painful. Neuropathy is slightly improving with more sensation and feelings in the fingers and toes. Energy is good although he thinks he has been a little tired. He continues to work full-time.     ROS:  Other review of system is negative      MEDICATIONS:   Current Outpatient Prescriptions   Medication     Multiple Vitamins-Minerals (MULTIVITAMIN ADULT PO)     aspirin 81 MG chewable tablet     LORazepam (ATIVAN) 0.5 MG  "tablet     prochlorperazine (COMPAZINE) 10 MG tablet     ondansetron (ZOFRAN) 8 MG tablet     allopurinol (ZYLOPRIM) 100 MG tablet     lidocaine-prilocaine (EMLA) cream     No current facility-administered medications for this visit.      Facility-Administered Medications Ordered in Other Visits   Medication     heparin 100 UNIT/ML injection 5 mL     sodium chloride (PF) 0.9% PF flush 10-20 mL            PHYSICAL EXAMINATION:   /76  Pulse 53  Temp 97.8  F (36.6  C)  Resp 15  Ht 1.8 m (5' 10.87\")  Wt 97.5 kg (215 lb)  SpO2 96%  BMI 30.1 kg/m2  CONSTITUTIONAL: No apparent distress  EYES: PERRLA, without pallor or jaundice  ENT/MOUTH: Ears unremarkable. No oral lesions  CVS: s1s2 normal  RESPIRATORY: Chest is clear  GI: Abdomen is benign  NEURO: He is alert and oriented ×3. He has subjective tingling/numbness of fingers/feet  INTEGUMENT: He has minimal peeling of the skin of fingers but otherwise no concerning his skin rashes.  LYMPHATIC: no palpable lymphadenopathy  MUSCULOSKELETAL: Unremarkable. No bony tenderness.   EXTREMITIES: no pedal edema  PSYCH: Mentation, mood and affect are appropriate      LABS:   Reviewed  Results for LEONOR RALPH (MRN [id number]) as of 1/24/2018 10:59   Ref. Range 1/24/2018 10:40   WBC Latest Ref Range: 4.0 - 11.0 10e9/L 5.6   Hemoglobin Latest Ref Range: 13.3 - 17.7 g/dL 13.6   Hematocrit Latest Ref Range: 40.0 - 53.0 % 40.9   Platelet Count Latest Ref Range: 150 - 450 10e9/L 145 (L)   RBC Count Latest Ref Range: 4.4 - 5.9 10e12/L 4.33 (L)   MCV Latest Ref Range: 78 - 100 fl 95   MCH Latest Ref Range: 26.5 - 33.0 pg 31.4   MCHC Latest Ref Range: 31.5 - 36.5 g/dL 33.3   RDW Latest Ref Range: 10.0 - 15.0 % 14.2   Diff Method Unknown Automated Method   % Neutrophils Latest Units: % 53.0   % Lymphocytes Latest Units: % 32.3   % Monocytes Latest Units: % 10.4   % Eosinophils Latest Units: % 3.2   % Basophils Latest Units: % 0.9   % Immature Granulocytes Latest Units: % 0.2 "   Absolute Neutrophil Latest Ref Range: 1.6 - 8.3 10e9/L 3.0   Absolute Lymphocytes Latest Ref Range: 0.8 - 5.3 10e9/L 1.8     Results for LEONOR RALPH (MRN [id number]) as of 1/24/2018 11:37   Ref. Range 1/24/2018 10:40   Sodium Latest Ref Range: 133 - 144 mmol/L 139   Potassium Latest Ref Range: 3.4 - 5.3 mmol/L 4.3   Chloride Latest Ref Range: 94 - 109 mmol/L 104   Carbon Dioxide Latest Ref Range: 20 - 32 mmol/L 29   Urea Nitrogen Latest Ref Range: 7 - 30 mg/dL 14   Creatinine Latest Ref Range: 0.66 - 1.25 mg/dL 0.80   GFR Estimate Latest Ref Range: >60 mL/min/1.7m2 >90   GFR Estimate If Black Latest Ref Range: >60 mL/min/1.7m2 >90   Calcium Latest Ref Range: 8.5 - 10.1 mg/dL 9.1   Anion Gap Latest Ref Range: 3 - 14 mmol/L 6   Albumin Latest Ref Range: 3.4 - 5.0 g/dL 3.9   Protein Total Latest Ref Range: 6.8 - 8.8 g/dL 7.1   Bilirubin Total Latest Ref Range: 0.2 - 1.3 mg/dL 0.4   Alkaline Phosphatase Latest Ref Range: 40 - 150 U/L 79   ALT Latest Ref Range: 0 - 70 U/L 56   AST Latest Ref Range: 0 - 45 U/L 34   Glucose Latest Ref Range: 70 - 99 mg/dL 95       ASSESSMENT AND PLAN:  Metastatic gastric cancer, HER-2/catrachita negative, MSI intact, presenting with early satiety and significant weight loss and epigastric discomfort upon eating with some vomiting.  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  There is an indeterminate right liver lobe lesion which has remained stable and likely is not a metastasis.      We started palliative FOLFOX on 03/12/2017.     Oxaliplatin was dropped from cycle #11 onwards because of neuropathy     Scans after C#18 show stable disease    He is doing well    He will get C#21 today    We will cont chemo every 2 weeks    Plan to repeat scans after C#24    Peeling of the skin of the fingers. This is due to 5-FU. 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 Very slowly it is improving. He is has more sensations in his fingers and feet. At this time we will continue with observation     Mild thrombocytopenia is stable and asymptomatic. No dose adjustments to chemotherapy is needed at this time.     Mild fatigue. We again discussed that he should take time out to exercise on a regular basis.     Nutrition. Over time he has gained significant amount of weight. He was asking if he should go on a diet. I told him that he should not be dieting at this point but should eat healthy       We did not address the following today    His genetic testing did not show any identifiable mutation not reveal any identifiable mutation     Return to clinic in 2 weeks     I answered all of his and his family's questions to their satisfaction and are agreeable and comfortable with the plan      LANEY HERNANDEZ MD

## 2018-01-24 NOTE — NURSING NOTE
"Oncology Rooming Note    January 24, 2018 11:22 AM   Terry Yi is a 52 year old male who presents for:    Chief Complaint   Patient presents with     Oncology Clinic Visit     prior to treatment     Initial Vitals: /76  Pulse 53  Temp 97.8  F (36.6  C)  Resp 15  Ht 1.8 m (5' 10.87\")  Wt 97.5 kg (215 lb)  SpO2 96%  BMI 30.1 kg/m2 Estimated body mass index is 30.1 kg/(m^2) as calculated from the following:    Height as of this encounter: 1.8 m (5' 10.87\").    Weight as of this encounter: 97.5 kg (215 lb). Body surface area is 2.21 meters squared.  No Pain (0) Comment: Data Unavailable   No LMP for male patient.  Allergies reviewed: Yes  Medications reviewed: Yes    Medications: Medication refills not needed today.  Pharmacy name entered into    Katja Lopez LPN              "

## 2018-01-24 NOTE — LETTER
1/24/2018         RE: Terry Yi  [address] [place] [address]        Dear Colleague,    Thank you for referring your patient, Terry Yi, to the [facility]. Please see a copy of my visit note below.    1/24/2018       REASON FOR VISIT:  Follow-up for unresectable metastatic gastric carcinoma, diffuse type, grade 3, HER-2 negative, MSI-Intact.      HISTORY OF PRESENT ILLNESS:  Please see my previous note for details.  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      CT CAP after C#6 shows stable disease    C# 9 FOLFOX 8/2/2017 ( Oxaliplatin dose reduced to 70mg/m2 due to neuropathy )  C#10 8/22/2017 ( Oxaliplatin dose reduced to 60mg/m2 due to neuropathy )  Delayed by 1 week- patient preference  C#11 9/6/17 Only 5FU/LV  C#12 9/20/2017 5FU/LV    Repeat CT scan after C#12 shows stable to slightly improved disease.    C#13 10/4/2017 5FU/LV  C#14 10/18/2017 5FU/LV  C#15 10/31/2017 5FU/LV  C#16 11/15/2017 5FU/LV  C#17 11/29/2017 5FU/LV   C#18 12/13/2017 5FU/LV    Repeat CT CAP 12/22/17 is stable    C#19 12/27/2017 5FU/LV  C#20 1/10/2018 5FU/LV  C# 21 1/24/2018 5FU/LV     INTERVAL HISTORY:   She continues to tolerate chemotherapy well. She continues to eat and drink well. Weight has remained stable to slightly improved. No nausea or vomiting. No diarrhea or constipation. No bleeding. No acid reflux symptoms. No infections. He has mild peeling of the skin of some of the fingers and he is applying moisturizing lotions several times a day. No peeling of the skin in the feet. The hands and feet are not painful. Neuropathy is slightly improving with more sensation and feelings in the fingers and toes. Energy is good although he thinks he has been a little tired. He continues  "to work full-time.     ROS:  Other review of system is negative      MEDICATIONS:   Current Outpatient Prescriptions   Medication     Multiple Vitamins-Minerals (MULTIVITAMIN ADULT PO)     aspirin 81 MG chewable tablet     LORazepam (ATIVAN) 0.5 MG tablet     prochlorperazine (COMPAZINE) 10 MG tablet     ondansetron (ZOFRAN) 8 MG tablet     allopurinol (ZYLOPRIM) 100 MG tablet     lidocaine-prilocaine (EMLA) cream     No current facility-administered medications for this visit.      Facility-Administered Medications Ordered in Other Visits   Medication     heparin 100 UNIT/ML injection 5 mL     sodium chloride (PF) 0.9% PF flush 10-20 mL            PHYSICAL EXAMINATION:   /76  Pulse 53  Temp 97.8  F (36.6  C)  Resp 15  Ht 1.8 m (5' 10.87\")  Wt 97.5 kg (215 lb)  SpO2 96%  BMI 30.1 kg/m2  CONSTITUTIONAL: No apparent distress  EYES: PERRLA, without pallor or jaundice  ENT/MOUTH: Ears unremarkable. No oral lesions  CVS: s1s2 normal  RESPIRATORY: Chest is clear  GI: Abdomen is benign  NEURO: He is alert and oriented ×3. He has subjective tingling/numbness of fingers/feet  INTEGUMENT: He has minimal peeling of the skin of fingers but otherwise no concerning his skin rashes.  LYMPHATIC: no palpable lymphadenopathy  MUSCULOSKELETAL: Unremarkable. No bony tenderness.   EXTREMITIES: no pedal edema  PSYCH: Mentation, mood and affect are appropriate      LABS:   Reviewed  Results for LEONOR RALPH (MRN [id number]) as of 1/24/2018 10:59   Ref. Range 1/24/2018 10:40   WBC Latest Ref Range: 4.0 - 11.0 10e9/L 5.6   Hemoglobin Latest Ref Range: 13.3 - 17.7 g/dL 13.6   Hematocrit Latest Ref Range: 40.0 - 53.0 % 40.9   Platelet Count Latest Ref Range: 150 - 450 10e9/L 145 (L)   RBC Count Latest Ref Range: 4.4 - 5.9 10e12/L 4.33 (L)   MCV Latest Ref Range: 78 - 100 fl 95   MCH Latest Ref Range: 26.5 - 33.0 pg 31.4   MCHC Latest Ref Range: 31.5 - 36.5 g/dL 33.3   RDW Latest Ref Range: 10.0 - 15.0 % 14.2   Diff Method " Unknown Automated Method   % Neutrophils Latest Units: % 53.0   % Lymphocytes Latest Units: % 32.3   % Monocytes Latest Units: % 10.4   % Eosinophils Latest Units: % 3.2   % Basophils Latest Units: % 0.9   % Immature Granulocytes Latest Units: % 0.2   Absolute Neutrophil Latest Ref Range: 1.6 - 8.3 10e9/L 3.0   Absolute Lymphocytes Latest Ref Range: 0.8 - 5.3 10e9/L 1.8     Results for LEONOR RALPH (MRN [id number]) as of 1/24/2018 11:37   Ref. Range 1/24/2018 10:40   Sodium Latest Ref Range: 133 - 144 mmol/L 139   Potassium Latest Ref Range: 3.4 - 5.3 mmol/L 4.3   Chloride Latest Ref Range: 94 - 109 mmol/L 104   Carbon Dioxide Latest Ref Range: 20 - 32 mmol/L 29   Urea Nitrogen Latest Ref Range: 7 - 30 mg/dL 14   Creatinine Latest Ref Range: 0.66 - 1.25 mg/dL 0.80   GFR Estimate Latest Ref Range: >60 mL/min/1.7m2 >90   GFR Estimate If Black Latest Ref Range: >60 mL/min/1.7m2 >90   Calcium Latest Ref Range: 8.5 - 10.1 mg/dL 9.1   Anion Gap Latest Ref Range: 3 - 14 mmol/L 6   Albumin Latest Ref Range: 3.4 - 5.0 g/dL 3.9   Protein Total Latest Ref Range: 6.8 - 8.8 g/dL 7.1   Bilirubin Total Latest Ref Range: 0.2 - 1.3 mg/dL 0.4   Alkaline Phosphatase Latest Ref Range: 40 - 150 U/L 79   ALT Latest Ref Range: 0 - 70 U/L 56   AST Latest Ref Range: 0 - 45 U/L 34   Glucose Latest Ref Range: 70 - 99 mg/dL 95       ASSESSMENT AND PLAN:  Metastatic gastric cancer, HER-2/catrachita negative, MSI intact, presenting with early satiety and significant weight loss and epigastric discomfort upon eating with some vomiting.  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  There is an indeterminate right liver lobe lesion which has remained stable and likely is not a metastasis.      We started palliative FOLFOX on 03/12/2017.     Oxaliplatin was dropped from cycle #11 onwards because of neuropathy     Scans after C#18 show stable disease    He is  doing well    He will get C#21 today    We will cont chemo every 2 weeks    Plan to repeat scans after C#24    Peeling of the skin of the fingers. This is due to 5-FU. 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 Very slowly it is improving. He is has more sensations in his fingers and feet. At this time we will continue with observation     Mild thrombocytopenia is stable and asymptomatic. No dose adjustments to chemotherapy is needed at this time.     Mild fatigue. We again discussed that he should take time out to exercise on a regular basis.     Nutrition. Over time he has gained significant amount of weight. He was asking if he should go on a diet. I told him that he should not be dieting at this point but should eat healthy       We did not address the following today    His genetic testing did not show any identifiable mutation not reveal any identifiable mutation     Return to clinic in 2 weeks     I answered all of his and his family's questions to their satisfaction and are agreeable and comfortable with the plan      LANEY SHAFFER MD              Again, thank you for allowing me to participate in the care of your patient.        Sincerely,        Laney Shaffer MD

## 2018-01-24 NOTE — PROGRESS NOTES
Infusion Nursing Note:  Terry Yi presents today for C21 D1 modified folfox.  Patient seen by provider today: Yes: Dr Shaffer   present during visit today: Not Applicable.      Intravenous Access:  Implanted Port.    Treatment Conditions:  Lab Results   Component Value Date    HGB 13.6 01/24/2018     Lab Results   Component Value Date    WBC 5.6 01/24/2018      Lab Results   Component Value Date    ANEU 3.0 01/24/2018     Lab Results   Component Value Date     01/24/2018      Lab Results   Component Value Date     01/24/2018                   Lab Results   Component Value Date    POTASSIUM 4.3 01/24/2018           No results found for: MAG         Lab Results   Component Value Date    CR 0.80 01/24/2018                   Lab Results   Component Value Date    BRITTNEY 9.1 01/24/2018                Lab Results   Component Value Date    BILITOTAL 0.4 01/24/2018           Lab Results   Component Value Date    ALBUMIN 3.9 01/24/2018                    Lab Results   Component Value Date    ALT 56 01/24/2018           Lab Results   Component Value Date    AST 34 01/24/2018       Results reviewed, labs MET treatment parameters, ok to proceed with treatment.      Post Infusion Assessment:  Patient tolerated infusion without incident.  Blood return noted pre and post infusion.  Site patent and intact, free from redness, edema or discomfort.  No evidence of extravasations.  IVAD needle left in place for 46 hours chemotherapy.    Discharge Plan:   Patient discharged in stable condition accompanied by: wife and other family members.  Departure Mode: Ambulatory.  Verbally reviewed appointments on 2/7/18 prior to next chemotherapy. Patient to get her chemotherapy pump discontinued 1/26/18 at home hospital.    Camille Parada RN

## 2018-01-24 NOTE — MR AVS SNAPSHOT
After Visit Summary   1/24/2018    Terry Yi    MRN: [id number]           Patient Information     Date Of Birth          1965        Visit Information        Provider Department      1/24/2018 10:30 AM NURSE ONLY CANCER CENTER [facility]        Today's Diagnoses     Malignant neoplasm of overlapping sites of stomach (H)    -  1       Follow-ups after your visit        Your next 10 appointments already scheduled     Jan 24, 2018 11:15 AM CST   Return Visit with Yakov Shaffer MD   [facility] ([facility])    [address] [place] [address] [phone]            Jan 24, 2018 12:00 PM CST   Level 2 with BAY 8 INFUSION   [facility]    [address] [place] [address] [phone]              Who to contact     If you have questions or need follow up information about today's clinic visit or your schedule please contact [facility] directly at [phone].  Normal or non-critical lab and imaging results will be communicated to you by MyChart, letter or phone within 4 business days after the clinic has received the results. If you do not hear from us within 7 days, please contact the clinic through [email or web address] or phone. If you have a critical or abnormal lab result, we will notify you by phone as soon as possible.  Submit refill requests through [employer] or call your pharmacy and they will forward the refill request to us. Please allow 3 business days for your refill to be completed.          Additional Information About Your Visit        MyChart Information     [employer] gives you secure access to your electronic health record. If you see a primary care provider, you can also send messages to your care team and make appointments. If you have questions, please call your primary care clinic.  If you do not have a primary care provider, please call  [phone] and they will assist you.      [employer] is an electronic gateway that provides easy, online access to your medical records. With [employer], you can request a clinic appointment, read your test results, renew a prescription or communicate with your care team.     To access your existing account, please contact your [facility] or call [phone] for assistance.        Care EveryWhere ID     This is your Care EveryWhere ID. This could be used by other organizations to access your [place] medical records  [id number]         Blood Pressure from Last 3 Encounters:   01/10/18 126/86   12/27/17 129/79   12/13/17 114/75    Weight from Last 3 Encounters:   01/10/18 95.7 kg (211 lb)   12/27/17 98.7 kg (217 lb 9.6 oz)   12/13/17 95.8 kg (211 lb 4.8 oz)              We Performed the Following     CBC with platelets differential     Comprehensive metabolic panel        Primary Care Provider Office Phone # Fax #    Sandra Dickerson [phone]       [facility]  [address]  [facility] [address]        Equal Access to Services     [facility]: Hadii aad ku hadasho Soomaali, waaxda luqadaha, qaybta kaalmada adeegyada, waxay idiin hayaan adejennifer cowart . So [facility] [phone].    ATENCIÓN: Si habla español, tiene a cuevas disposición servicios gratuitos de asistencia lingüística. Llame al [phone].    We comply with applicable federal civil rights laws and [place] laws. We do not discriminate on the basis of race, color, national origin, age, disability, sex, sexual orientation, or gender identity.            Thank you!     Thank you for choosing [facility]  for your care. Our goal is always to provide you with excellent care. Hearing back from our patients is one way we can continue to improve our services. Please take a few minutes to complete the written survey that you may receive in the mail after your visit with us. Thank you!              Your Updated Medication List - Protect others around you: Learn how to safely use, store and throw away your medicines at www.disposemymeds.org.          This list is accurate as of 1/24/18 11:08 AM.  Always use your most recent med list.                   Brand Name Dispense Instructions for use Diagnosis    allopurinol 100 MG tablet    ZYLOPRIM     as needed (when eating shrimp)        aspirin 81 MG chewable tablet      Take 81 mg by mouth daily        lidocaine-prilocaine cream    EMLA          LORazepam 0.5 MG tablet    ATIVAN    30 tablet    Take 1 tablet (0.5 mg) by mouth every 4 hours as needed (Anxiety, Nausea/Vomiting or Sleep)    Malignant neoplasm of overlapping sites of stomach (H)       MULTIVITAMIN ADULT PO           ondansetron 8 MG tablet    ZOFRAN    10 tablet    Take 1 tablet (8 mg) by mouth every 8 hours as needed (Nausea/Vomiting)    Malignant neoplasm of overlapping sites of stomach (H)       prochlorperazine 10 MG tablet    COMPAZINE    30 tablet    Take 1 tablet (10 mg) by mouth every 6 hours as needed (Nausea/Vomiting)    Malignant neoplasm of overlapping sites of stomach (H)

## 2018-01-24 NOTE — MR AVS SNAPSHOT
After Visit Summary   1/24/2018    Terry Yi    MRN: [id number]           Patient Information     Date Of Birth          1965        Visit Information        Provider Department      1/24/2018 11:15 AM Yakov Shaffer MD [facility]        Today's Diagnoses     Malignant neoplasm of overlapping sites of stomach (H)    -  1      Care Instructions    Cont chemo    See me back in 2 weeks with labs and chemo          Follow-ups after your visit        Your next 10 appointments already scheduled     Feb 21, 2018  8:00 AM CST   Return Visit with NURSE ONLY CANCER CENTER   [facility] ([facility])    [address] [place] [address]   [phone]            Feb 21, 2018  8:45 AM CST   Return Visit with Yakov Shaffer MD   [facility] ([facility])    [address] [place] [address]   [phone]            Feb 21, 2018  9:15 AM CST   Level 2 with BAY 7 INFUSION   [facility] ([facility])    [address] [place] [address]   [phone]            Mar 07, 2018  8:30 AM CST   Return Visit with NURSE ONLY CANCER CENTER   [facility] ([facility])    [address] [place] [address]   [phone]            Mar 07, 2018  9:15 AM CST   Return Visit with Yakov Shaffer MD   [facility])    [address] [place] [address]   [phone]            Mar 07, 2018 10:00 AM CST   Level 2 with BAY 8 INFUSION   [facility] ([facility])    [address] [place] [address] [phone]              Who to contact     If you have questions or need follow up information about today's clinic visit or your schedule please contact [facility] directly at [phone].  Normal or  "non-critical lab and imaging results will be communicated to you by MyChart, letter or phone within 4 business days after the clinic has received the results. If you do not hear from us within 7 days, please contact the clinic through [employer] or phone. If you have a critical or abnormal lab result, we will notify you by phone as soon as possible.  Submit refill requests through [employer] or call your pharmacy and they will forward the refill request to us. Please allow 3 business days for your refill to be completed.          Additional Information About Your Visit        [employer] Information     [employer] gives you secure access to your electronic health record. If you see a primary care provider, you can also send messages to your care team and make appointments. If you have questions, please call your primary care clinic.  If you do not have a primary care provider, please call [phone] and they will assist you.      [employer] is an electronic gateway that provides easy, online access to your medical records. With [employer], you can request a clinic appointment, read your test results, renew a prescription or communicate with your care team.     To access your existing account, please contact your [facility] or call [phone] for assistance.        Care EveryWhere ID     This is your Care EveryWhere ID. This could be used by other organizations to access your [place] medical records  [id number]        Your Vitals Were     Pulse Temperature Respirations Height Pulse Oximetry BMI (Body Mass Index)    53 97.8  F (36.6  C) 15 1.8 m (5' 10.87\") 96% 30.1 kg/m2       Blood Pressure from Last 3 Encounters:   01/24/18 118/76   01/10/18 126/86   12/27/17 129/79    Weight from Last 3 Encounters:   01/24/18 97.5 kg (215 lb)   01/10/18 95.7 kg (211 lb)   12/27/17 98.7 kg (217 lb 9.6 oz)              Today, you had the following     No orders found for display       Primary Care Provider Office " Phone # Fax #    Sandra Dickerson [phone]       [facility]  [address]  [facility] [address]        Equal Access to Services     CHANDAN GALLO : Hadii tk ku hadsohano Soomaali, waaxda luqadaha, qaybta kaalmada adeegyada, claire hawley mayurjennifer june sofya browne. So [facility] [phone].    ATENCIÓN: Si habla español, tiene a cuevas disposición servicios gratuitos de asistencia lingüística. Llame al [phone].    We comply with applicable federal civil rights laws and [place] laws. We do not discriminate on the basis of race, color, national origin, age, disability, sex, sexual orientation, or gender identity.            Thank you!     Thank you for choosing [facility]  for your care. Our goal is always to provide you with excellent care. Hearing back from our patients is one way we can continue to improve our services. Please take a few minutes to complete the written survey that you may receive in the mail after your visit with us. Thank you!             Your Updated Medication List - Protect others around you: Learn how to safely use, store and throw away your medicines at www.disposemymeds.org.          This list is accurate as of 1/24/18 12:05 PM.  Always use your most recent med list.                   Brand Name Dispense Instructions for use Diagnosis    allopurinol 100 MG tablet    ZYLOPRIM     as needed (when eating shrimp)        aspirin 81 MG chewable tablet      Take 81 mg by mouth daily        lidocaine-prilocaine cream    EMLA          LORazepam 0.5 MG tablet    ATIVAN    30 tablet    Take 1 tablet (0.5 mg) by mouth every 4 hours as needed (Anxiety, Nausea/Vomiting or Sleep)    Malignant neoplasm of overlapping sites of stomach (H)       MULTIVITAMIN ADULT PO           ondansetron 8 MG tablet    ZOFRAN    10 tablet    Take 1 tablet (8 mg) by mouth every 8 hours as needed (Nausea/Vomiting)    Malignant neoplasm of overlapping sites of stomach (H)        prochlorperazine 10 MG tablet    COMPAZINE    30 tablet    Take 1 tablet (10 mg) by mouth every 6 hours as needed (Nausea/Vomiting)    Malignant neoplasm of overlapping sites of stomach (H)

## 2018-02-07 NOTE — MR AVS SNAPSHOT
After Visit Summary   2/7/2018    Terry Yi    MRN: [id number]           Patient Information     Date Of Birth          1965        Visit Information        Provider Department      2/7/2018 10:30 AM NURSE ONLY CANCER CENTER [facility]        Today's Diagnoses     Malignant neoplasm of overlapping sites of stomach (H)    -  1       Follow-ups after your visit        Your next 10 appointments already scheduled     Feb 07, 2018 11:15 AM CST   Return Visit with Yakov Shaffer MD   [facility] ([facility])    [address] [facility] [address]   [phone]            Feb 07, 2018 12:30 PM CST   Level 2 with BAY 9 INFUSION   [facility] ([facility])    [address] [facility] [address]   [phone]            Feb 21, 2018  8:00 AM CST   Return Visit with NURSE ONLY CANCER CENTER   [facility] ([facility])    [address] [facility] [address]   [phone]            Feb 21, 2018  8:45 AM CST   Return Visit with Yakov Shaffer MD   [facility] ([facility])    [address] [facility] [address]   [phone]            Feb 21, 2018  9:15 AM CST   Level 2 with BAY 7 INFUSION   [facility] ([facility])    [address] [facility] [address]   [phone]            Mar 07, 2018  8:30 AM CST   Return Visit with NURSE ONLY CANCER CENTER   [facility] ([facility])    [address] [facility] [address]   [phone]            Mar 07, 2018  9:15 AM CST   Return Visit with Yakov Shaffer MD   [facility] ([facility])    [address] [facility] [address]   [phone]            Mar 07, 2018 10:00 AM CST   Level 2 with BAY 8 INFUSION   [facility] ([facility])    [address] [place] [address] [phone]              Who to contact     If you have questions or need follow up information about today's clinic visit or your schedule please contact [facility] directly at [phone].  Normal or non-critical lab and imaging results will be communicated to you by MyChart, letter or phone within 4 business days after the clinic has received the results. If you do not hear from us within 7 days, please contact the clinic through [employer] or phone. If you have a critical or abnormal lab result, we will notify you by phone as soon as possible.  Submit refill requests through [employer] or call your pharmacy and they will forward the refill request to us. Please allow 3 business days for your refill to be completed.          Additional Information About Your Visit        [employer] Information     [employer] gives you secure access to your electronic health record. If you see a primary care provider, you can also send messages to your care team and make appointments. If you have questions, please call your primary care clinic.  If you do not have a primary care provider, please call [phone] and they will assist you.      [employer] is an electronic gateway that provides easy, online access to your medical records. With [employer], you can request a clinic appointment, read your test results, renew a prescription or communicate with your care team.     To access your existing account, please contact your [facility] or call [phone] for assistance.        Care EveryWhere ID     This is your Care EveryWhere ID. This could be used by other organizations to access your [place] medical records  [id number]         Blood Pressure from Last 3 Encounters:   01/24/18 118/76   01/10/18 126/86   12/27/17 129/79    Weight from Last 3 Encounters:   01/24/18 97.5 kg (215 lb)   01/10/18 95.7 kg  (211 lb)   12/27/17 98.7 kg (217 lb 9.6 oz)              We Performed the Following     CBC with platelets differential     Comprehensive metabolic panel        Primary Care Provider Office Phone # Fax #    Sandra TOLBERT SARAI Dickerson [phone]       [facility]  [address]  [facility] [address]        Equal Access to Services     [facility]: Hadii aad ku hadasho Soomaali, waaxda luqadaha, qaybta kaalmada adeegyada, waxay idiin hayaan adeeg kharash la'aan . So [facility] [phone].    ATENCIÓN: Si habla español, tiene a cuevas disposición servicios gratuitos de asistencia lingüística. Llame al [phone].    We comply with applicable federal civil rights laws and [place] laws. We do not discriminate on the basis of race, color, national origin, age, disability, sex, sexual orientation, or gender identity.            Thank you!     Thank you for choosing [facility]  for your care. Our goal is always to provide you with excellent care. Hearing back from our patients is one way we can continue to improve our services. Please take a few minutes to complete the written survey that you may receive in the mail after your visit with us. Thank you!             Your Updated Medication List - Protect others around you: Learn how to safely use, store and throw away your medicines at www.disposemymeds.org.          This list is accurate as of 2/7/18 10:49 AM.  Always use your most recent med list.                   Brand Name Dispense Instructions for use Diagnosis    allopurinol 100 MG tablet    ZYLOPRIM     as needed (when eating shrimp)        aspirin 81 MG chewable tablet      Take 81 mg by mouth daily        lidocaine-prilocaine cream    EMLA          LORazepam 0.5 MG tablet    ATIVAN    30 tablet    Take 1 tablet (0.5 mg) by mouth every 4 hours as needed (Anxiety, Nausea/Vomiting or Sleep)    Malignant neoplasm of overlapping sites of stomach (H)       MULTIVITAMIN ADULT PO            ondansetron 8 MG tablet    ZOFRAN    10 tablet    Take 1 tablet (8 mg) by mouth every 8 hours as needed (Nausea/Vomiting)    Malignant neoplasm of overlapping sites of stomach (H)       prochlorperazine 10 MG tablet    COMPAZINE    30 tablet    Take 1 tablet (10 mg) by mouth every 6 hours as needed (Nausea/Vomiting)    Malignant neoplasm of overlapping sites of stomach (H)

## 2018-02-07 NOTE — PROGRESS NOTES
Power port needle left accessed for treatment. Tolerated lab draw without complaint. [facility] drawn-Red/Green/Purple tubes. Double signed by patient and RN. See documentation flowsheet. Arleth Brandon, RN, BSN, OCN

## 2018-02-07 NOTE — PROGRESS NOTES
"Oncology Rooming Note    February 7, 2018 11:19 AM   Terry Yi is a 52 year old male who presents for:    Chief Complaint   Patient presents with     Oncology Clinic Visit     Initial Vitals: There were no vitals taken for this visit. Estimated body mass index is 30.1 kg/(m^2) as calculated from the following:    Height as of 1/24/18: 1.8 m (5' 10.87\").    Weight as of 1/24/18: 97.5 kg (215 lb). There is no height or weight on file to calculate BSA.  Data Unavailable Comment: Data Unavailable   No LMP for male patient.  Allergies reviewed: Yes  Medications reviewed: Yes    Medications: Medication refills not needed today.  Pharmacy name entered into EPIC: Data Unavailable    Clinical concerns: Numbness/tingling to feet/toes.    March 2017 [facility] had colonoscopy.    Arleth Brandon RN              "

## 2018-02-07 NOTE — LETTER
2/7/2018         RE: Terry Yi  [place] [address]        Dear Colleague,    Thank you for referring your patient, Terry Yi, to the [facility]. Please see a copy of my visit note below.    2/7/2018       REASON FOR VISIT:  Follow-up for unresectable metastatic gastric carcinoma, diffuse type, grade 3, HER-2 negative, MSI-Intact.      HISTORY OF PRESENT ILLNESS:  Please see my previous note for details.  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      CT CAP after C#6 shows stable disease    C# 9 FOLFOX 8/2/2017 ( Oxaliplatin dose reduced to 70mg/m2 due to neuropathy )  C#10 8/22/2017 ( Oxaliplatin dose reduced to 60mg/m2 due to neuropathy )  Delayed by 1 week- patient preference  C#11 9/6/17 Only 5FU/LV  C#12 9/20/2017 5FU/LV    Repeat CT scan after C#12 shows stable to slightly improved disease.    C#13 10/4/2017 5FU/LV  C#14 10/18/2017 5FU/LV  C#15 10/31/2017 5FU/LV  C#16 11/15/2017 5FU/LV  C#17 11/29/2017 5FU/LV   C#18 12/13/2017 5FU/LV    Repeat CT CAP 12/22/17 is stable    C#19 12/27/2017 5FU/LV  C#20 1/10/2018 5FU/LV  C# 21 1/24/2018 5FU/LV  C# 22 2/7/2018  5FU/LV     INTERVAL HISTORY:   He continues to do well. He denies any GI symptoms and is eating and drinking well. Energy seems to be good but he is not exercising regularly. He tells me that he is active at work. No infections. No new swellings. No trouble breathing. No new skin problems. He continues to apply moisturizing lotions to his hands several times a day and has not noticed worsening of the peeling of the skin. He continues to have neuropathy involving his hands and feet but he thinks that generally it is a little better as compared to previously as now he can button his shirt as  well as diaphoresis on his boots. Denies any bleeding.    ROS:  Rest of the review of system is unremarkable      MEDICATIONS:   Current Outpatient Prescriptions   Medication     allopurinol (ZYLOPRIM) 100 MG tablet     lidocaine-prilocaine (EMLA) cream     Multiple Vitamins-Minerals (MULTIVITAMIN ADULT PO)     aspirin 81 MG chewable tablet     LORazepam (ATIVAN) 0.5 MG tablet     prochlorperazine (COMPAZINE) 10 MG tablet     ondansetron (ZOFRAN) 8 MG tablet     No current facility-administered medications for this visit.             PHYSICAL EXAMINATION:   /64 (BP Location: Left arm)  Pulse 75  Resp 16  Wt 98.7 kg (217 lb 8 oz)  SpO2 98%  BMI 30.45 kg/m2  CONSTITUTIONAL: no acute distress  EYES: PERRLA, no palor or icterus.   ENT/MOUTH: no mouth lesions. Oropharynx normal  CVS: s1s2 no m r g .   RESPIRATORY: clear to auscultation b/l  GI: soft non tender no hepatosplenomegaly  NEURO: AAO×3. Grossly non focal exam apart from subjective tingling/numbness of fingers/feet  INTEGUMENT: He continues to have minimal peeling on the Parada side of the fingers   LYMPHATIC: no palpable cervical, supraclavicular, axillary LAD  MUSCULOSKELETAL: Unremarkable. No bony tenderness.   EXTREMITIES: no edema  PSYCH: Mentation, mood and affect are normal. Decision making capacity is intact        LABS:   Reviewed and stable      ASSESSMENT AND PLAN:  Metastatic gastric cancer, HER-2/catrachita negative, MSI intact, presenting with early satiety and significant weight loss and epigastric discomfort upon eating with some vomiting.  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  There is an indeterminate right liver lobe lesion which has remained stable and likely is not a metastasis.      We started palliative FOLFOX on 03/12/2017.     Oxaliplatin was dropped from cycle #11 onwards because of neuropathy     Scans after C#18 show stable  "disease    He continues to tolerate chemotherapy well and we'll proceed to cycle #22 today.    He will continue to get chemotherapy every 2 weeks as before.    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 In the past he tried Cymbalta but he did not like it and at this time he continues to be on observation    Skin peeling. This is minimal and is due to 5-FU. We discussed that he must continue to apply moisturizing creams many times a day    Thrombocytopenia. This is stable and mild and remains asymptomatic. At this time we will keep a watch on this with repeat CBC with next chemotherapy cycle and we will not make any changes to chemotherapy regimen at this time    Discussion regarding exercise. We again discussed the importance of regular exercise and I encouraged him that he should take extra time out and have her regular pattern of daily exercise    We did not address the following today    His genetic testing did not show any identifiable mutation not reveal any identifiable mutation     Return to clinic as a scheduled    All questions are answered and he is agreeable with the plan      LANEY HERNANDEZ MD            Oncology Rooming Note    February 7, 2018 11:19 AM   Terry Yi is a 52 year old male who presents for:    Chief Complaint   Patient presents with     Oncology Clinic Visit     Initial Vitals: There were no vitals taken for this visit. Estimated body mass index is 30.1 kg/(m^2) as calculated from the following:    Height as of 1/24/18: 1.8 m (5' 10.87\").    Weight as of 1/24/18: 97.5 kg (215 lb). There is no height or weight on file to calculate BSA.  Data Unavailable Comment: Data Unavailable   No LMP for male patient.  Allergies reviewed: Yes  Medications reviewed: Yes    Medications: Medication refills not needed today.  Pharmacy name entered into EPIC: Data Unavailable    Clinical concerns: Numbness/tingling to feet/toes.    March 2017 Ayaka " [facility] had colonoscopy.    Arleth Brandon, [place]                Again, thank you for allowing me to participate in the care of your patient.        Sincerely,        Yakov Shaffer MD

## 2018-02-07 NOTE — PROGRESS NOTES
"Infusion Nursing Note:  Terry Yi presents today for C22D1 Leucovorin/Fluorouricil bolus/pump connect.    Patient seen by provider today: Yes: Dr. Shaffer   present during visit today: Not Applicable.    Note: Prior to discharge: Port is secured in place with tegaderm and flushed with 10cc NS with positive blood return noted.  Continuous home infusion Dosi-Fuser pump connected.    All connectors secured in place and clamps taped open.    Pump started, \"running\" noted on display (CADD): Not Applicable.  Patient instructed to call our clinic or [facility] with any questions or concerns at home.  Patient verbalized understanding.    Patient set up for pump disconnect 2/9/18 at 1100 in [place].      Intravenous Access:  Implanted Port.    Treatment Conditions:  Lab Results   Component Value Date    HGB 13.5 02/07/2018     Lab Results   Component Value Date    WBC 7.5 02/07/2018      Lab Results   Component Value Date    ANEU 4.6 02/07/2018     Lab Results   Component Value Date     02/07/2018      Lab Results   Component Value Date     02/07/2018                   Lab Results   Component Value Date    POTASSIUM 4.1 02/07/2018           No results found for: MAG         Lab Results   Component Value Date    CR 0.84 02/07/2018                   Lab Results   Component Value Date    BRITTNEY 8.8 02/07/2018                Lab Results   Component Value Date    BILITOTAL 0.4 02/07/2018           Lab Results   Component Value Date    ALBUMIN 3.7 02/07/2018                    Lab Results   Component Value Date    ALT 60 02/07/2018           Lab Results   Component Value Date    AST 38 02/07/2018       Results reviewed, labs MET treatment parameters, ok to proceed with treatment.      Post Infusion Assessment:  Patient tolerated infusion without incident.  Blood return noted pre and post infusion.  Site patent and intact, free from redness, edema or discomfort.    Discharge Plan:   Discharge " instructions reviewed with: Patient.  Patient and/or family verbalized understanding of discharge instructions and all questions answered.  Patient will return 2/21/18 for next appointment.  Patient discharged in stable condition accompanied by: wife.  Departure Mode: Ambulatory.    Mayte Carbone RN

## 2018-02-07 NOTE — PROGRESS NOTES
2/7/2018       REASON FOR VISIT:  Follow-up for unresectable metastatic gastric carcinoma, diffuse type, grade 3, HER-2 negative, MSI-Intact.      HISTORY OF PRESENT ILLNESS:  Please see my previous note for details.  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      CT CAP after C#6 shows stable disease    C# 9 FOLFOX 8/2/2017 ( Oxaliplatin dose reduced to 70mg/m2 due to neuropathy )  C#10 8/22/2017 ( Oxaliplatin dose reduced to 60mg/m2 due to neuropathy )  Delayed by 1 week- patient preference  C#11 9/6/17 Only 5FU/LV  C#12 9/20/2017 5FU/LV    Repeat CT scan after C#12 shows stable to slightly improved disease.    C#13 10/4/2017 5FU/LV  C#14 10/18/2017 5FU/LV  C#15 10/31/2017 5FU/LV  C#16 11/15/2017 5FU/LV  C#17 11/29/2017 5FU/LV   C#18 12/13/2017 5FU/LV    Repeat CT CAP 12/22/17 is stable    C#19 12/27/2017 5FU/LV  C#20 1/10/2018 5FU/LV  C# 21 1/24/2018 5FU/LV  C# 22 2/7/2018  5FU/LV     INTERVAL HISTORY:   He continues to do well. He denies any GI symptoms and is eating and drinking well. Energy seems to be good but he is not exercising regularly. He tells me that he is active at work. No infections. No new swellings. No trouble breathing. No new skin problems. He continues to apply moisturizing lotions to his hands several times a day and has not noticed worsening of the peeling of the skin. He continues to have neuropathy involving his hands and feet but he thinks that generally it is a little better as compared to previously as now he can button his shirt as well as diaphoresis on his boots. Denies any bleeding.    ROS:  Rest of the review of system is unremarkable      MEDICATIONS:   Current Outpatient Prescriptions   Medication     allopurinol (ZYLOPRIM) 100 MG tablet     lidocaine-prilocaine (EMLA)  cream     Multiple Vitamins-Minerals (MULTIVITAMIN ADULT PO)     aspirin 81 MG chewable tablet     LORazepam (ATIVAN) 0.5 MG tablet     prochlorperazine (COMPAZINE) 10 MG tablet     ondansetron (ZOFRAN) 8 MG tablet     No current facility-administered medications for this visit.             PHYSICAL EXAMINATION:   /64 (BP Location: Left arm)  Pulse 75  Resp 16  Wt 98.7 kg (217 lb 8 oz)  SpO2 98%  BMI 30.45 kg/m2  CONSTITUTIONAL: no acute distress  EYES: PERRLA, no palor or icterus.   ENT/MOUTH: no mouth lesions. Oropharynx normal  CVS: s1s2 no m r g .   RESPIRATORY: clear to auscultation b/l  GI: soft non tender no hepatosplenomegaly  NEURO: AAO×3. Grossly non focal exam apart from subjective tingling/numbness of fingers/feet  INTEGUMENT: He continues to have minimal peeling on the Parada side of the fingers   LYMPHATIC: no palpable cervical, supraclavicular, axillary LAD  MUSCULOSKELETAL: Unremarkable. No bony tenderness.   EXTREMITIES: no edema  PSYCH: Mentation, mood and affect are normal. Decision making capacity is intact        LABS:   Reviewed and stable      ASSESSMENT AND PLAN:  Metastatic gastric cancer, HER-2/catrachita negative, MSI intact, presenting with early satiety and significant weight loss and epigastric discomfort upon eating with some vomiting.  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  There is an indeterminate right liver lobe lesion which has remained stable and likely is not a metastasis.      We started palliative FOLFOX on 03/12/2017.     Oxaliplatin was dropped from cycle #11 onwards because of neuropathy     Scans after C#18 show stable disease    He continues to tolerate chemotherapy well and we'll proceed to cycle #22 today.    He will continue to get chemotherapy every 2 weeks as before.    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 In the past he tried Cymbalta but he did not like it and at this time he continues to be on observation    Skin peeling. This is minimal and is due to 5-FU. We discussed that he must continue to apply moisturizing creams many times a day    Thrombocytopenia. This is stable and mild and remains asymptomatic. At this time we will keep a watch on this with repeat CBC with next chemotherapy cycle and we will not make any changes to chemotherapy regimen at this time    Discussion regarding exercise. We again discussed the importance of regular exercise and I encouraged him that he should take extra time out and have her regular pattern of daily exercise    We did not address the following today    His genetic testing did not show any identifiable mutation not reveal any identifiable mutation     Return to clinic as a scheduled    All questions are answered and he is agreeable with the plan      LANEY HERNANDEZ MD

## 2018-02-07 NOTE — MR AVS SNAPSHOT
After Visit Summary   2/7/2018    Terry Yi    MRN: [id number]           Patient Information     Date Of Birth          1965        Visit Information        Provider Department      2/7/2018 11:15 AM Yakov Shaffer MD [facility] [place]        Today's Diagnoses     Malignant neoplasm of overlapping sites of stomach (H)    -  1      Care Instructions    Proceed with chemo    See me back as scheduled              Follow-ups after your visit        Your next 10 appointments already scheduled     Feb 07, 2018 12:30 PM CST   Level 2 with BAY 9 INFUSION   [facility] [place] ([facility] [place]    [address] [facility] [address]   [phone]            Feb 21, 2018  8:00 AM CST   Return Visit with [facility]   [facility] [place] ([facility] [place])    [address] [facility] [address]   [phone]            Feb 21, 2018  8:45 AM CST   Return Visit with Yakov Shaffer MD   [facility] [place] ([facility] [place]    [address] [facility] [address]   [phone]            Feb 21, 2018  9:15 AM CST   Level 2 with BAY 7 INFUSION   [facility] [place] ([facility] [place]    [address] [facility] [address]   [phone]            Mar 07, 2018  8:30 AM CST   Return Visit with [facility]   [facility] [place] ([facility] [place]    [address] [facility] [address]   [phone]            Mar 07, 2018  9:15 AM CST   Return Visit with Yakov Shaffer MD   [facility] [place] ([facility] [place])    [address] [facility] [address]   [phone]            Mar 07, 2018 10:00 AM CST   Level 2 with BAY 8 INFUSION   [facility] [place] ([facility] [place])    [address] [facility] [address]   [phone]              Who to contact      If you have questions or need follow up information about today's clinic visit or your schedule please contact [facility] directly at [phone].  Normal or non-critical lab and imaging results will be communicated to you by [employer], letter or phone within 4 business days after the clinic has received the results. If you do not hear from us within 7 days, please contact the clinic through [employer] or phone. If you have a critical or abnormal lab result, we will notify you by phone as soon as possible.  Submit refill requests through [employer] or call your pharmacy and they will forward the refill request to us. Please allow 3 business days for your refill to be completed.          Additional Information About Your Visit        Traveler [employer] VIP Information     [employer] gives you secure access to your electronic health record. If you see a primary care provider, you can also send messages to your care team and make appointments. If you have questions, please call your primary care clinic.  If you do not have a primary care provider, please call [phone] and they will assist you.      [employer] is an electronic gateway that provides easy, online access to your medical records. With [employer], you can request a clinic appointment, read your test results, renew a prescription or communicate with your care team.     To access your existing account, please contact your [facility] or call [phone] for assistance.        Care EveryWhere ID     This is your Care EveryWhere ID. This could be used by other organizations to access your [facility] medical records  [id number]        Your Vitals Were     Pulse Respirations Pulse Oximetry BMI (Body Mass Index)          75 16 98% 30.45 kg/m2         Blood Pressure from Last 3 Encounters:   02/07/18 110/64   01/24/18 118/76   01/10/18 126/86    Weight from Last 3 Encounters:   02/07/18 98.7 kg (217 lb 8 oz)   01/24/18 97.5 kg (215 lb)    01/10/18 95.7 kg (211 lb)              Today, you had the following     No orders found for display       Primary Care Provider Office Phone # Fax #    Sandra EasonSARAI dye [phone]       [facility]  [address]  [facility] [address]        Equal Access to Services     DONOVAN GALLO : Hadii aad ku hadasho Soomaali, waaxda luqadaha, qaybta kaalmada adeegyada, waxay idiin hayaan adeeg kharash la'aan . So [place] -[phone].    ATENCIÓN: Si habla español, tiene a cuevas disposición servicios gratuitos de asistencia lingüística. Britneyame al [phone].    We comply with applicable federal civil rights laws and [place] laws. We do not discriminate on the basis of race, color, national origin, age, disability, sex, sexual orientation, or gender identity.            Thank you!     Thank you for choosing [facility]  for your care. Our goal is always to provide you with excellent care. Hearing back from our patients is one way we can continue to improve our services. Please take a few minutes to complete the written survey that you may receive in the mail after your visit with us. Thank you!             Your Updated Medication List - Protect others around you: Learn how to safely use, store and throw away your medicines at www.disposemymeds.org.          This list is accurate as of 2/7/18 11:44 AM.  Always use your most recent med list.                   Brand Name Dispense Instructions for use Diagnosis    allopurinol 100 MG tablet    ZYLOPRIM     as needed (when eating shrimp)        aspirin 81 MG chewable tablet      Take 81 mg by mouth daily        lidocaine-prilocaine cream    EMLA          LORazepam 0.5 MG tablet    ATIVAN    30 tablet    Take 1 tablet (0.5 mg) by mouth every 4 hours as needed (Anxiety, Nausea/Vomiting or Sleep)    Malignant neoplasm of overlapping sites of stomach (H)       MULTIVITAMIN ADULT PO           ondansetron 8 MG tablet    ZOFRAN    10 tablet    Take 1  tablet (8 mg) by mouth every 8 hours as needed (Nausea/Vomiting)    Malignant neoplasm of overlapping sites of stomach (H)       prochlorperazine 10 MG tablet    COMPAZINE    30 tablet    Take 1 tablet (10 mg) by mouth every 6 hours as needed (Nausea/Vomiting)    Malignant neoplasm of overlapping sites of stomach (H)

## 2018-02-07 NOTE — MR AVS SNAPSHOT
After Visit Summary   2/7/2018    Terry Yi    MRN: [id number]           Patient Information     Date Of Birth          1965        Visit Information        Provider Department      2/7/2018 12:30 PM BAY 9 INFUSION [facility]        Today's Diagnoses     Malignant neoplasm of overlapping sites of stomach (H)    -  1       Follow-ups after your visit        Your next 10 appointments already scheduled     Feb 21, 2018  8:00 AM CST   Return Visit with NURSE ONLY CANCER CENTER   [facility] ([facility])    [address] [facility] [address]   [phone]            Feb 21, 2018  8:45 AM CST   Return Visit with Yakov Shaffer MD   [facility] ([facility])    [address] [facility] [address] [phone]            Feb 21, 2018  9:15 AM CST   Level 2 with BAY 7 INFUSION   [facility] ([facility])    [address] [facility] [address] [phone]            Mar 07, 2018  8:30 AM CST   Return Visit with NURSE ONLY CANCER CENTER   [facility] ([facility])    [address] [facility] [address]   [phone]            Mar 07, 2018  9:15 AM CST   Return Visit with Yakov Shaffer MD   [facility])    [address] [facility] [address]   [phone]            Mar 07, 2018 10:00 AM CST   Level 2 with BAY 8 INFUSION   [facility] ([facility])    [address] [facility] [address]   [phone]              Who to contact     If you have questions or need follow up information about today's clinic visit or your schedule please contact [facility] directly at [phone].  Normal or non-critical lab and imaging results will be communicated to you by MyChart, letter or phone  within 4 business days after the clinic has received the results. If you do not hear from us within 7 days, please contact the clinic through iodine or phone. If you have a critical or abnormal lab result, we will notify you by phone as soon as possible.  Submit refill requests through iodine or call your pharmacy and they will forward the refill request to us. Please allow 3 business days for your refill to be completed.          Additional Information About Your Visit        iodine Information     iodine gives you secure access to your electronic health record. If you see a primary care provider, you can also send messages to your care team and make appointments. If you have questions, please call your primary care clinic.  If you do not have a primary care provider, please call [phone] and they will assist you.      iodine is an electronic gateway that provides easy, online access to your medical records. With iodine, you can request a clinic appointment, read your test results, renew a prescription or communicate with your care team.     To access your existing account, please contact your [facility] or call [phone] for assistance.        Care EveryWhere ID     This is your Care EveryWhere ID. This could be used by other organizations to access your [place] medical records  [id number]         Blood Pressure from Last 3 Encounters:   02/07/18 110/64   01/24/18 118/76   01/10/18 126/86    Weight from Last 3 Encounters:   02/07/18 98.7 kg (217 lb 8 oz)   01/24/18 97.5 kg (215 lb)   01/10/18 95.7 kg (211 lb)              Today, you had the following     No orders found for display       Primary Care Provider Office Phone # Fax #    Sandra Dickerson [phone]       [facility]  [address]  [facility] [address]        Equal Access to Services     CHANDAN GALLO : Jemal Casillas, matt rincon, claire whitmore  violeta zapienbret la'aan ah. So [facility] [phone].    ATENCIÓN: Si arabella luke, tiene a cuevas disposición servicios gratuitos de asistencia lingüística. Ciara al [phone].    We comply with applicable federal civil rights laws and [place] laws. We do not discriminate on the basis of race, color, national origin, age, disability, sex, sexual orientation, or gender identity.            Thank you!     Thank you for choosing [facility]  for your care. Our goal is always to provide you with excellent care. Hearing back from our patients is one way we can continue to improve our services. Please take a few minutes to complete the written survey that you may receive in the mail after your visit with us. Thank you!             Your Updated Medication List - Protect others around you: Learn how to safely use, store and throw away your medicines at www.disposemymeds.org.          This list is accurate as of 2/7/18  4:09 PM.  Always use your most recent med list.                   Brand Name Dispense Instructions for use Diagnosis    allopurinol 100 MG tablet    ZYLOPRIM     as needed (when eating shrimp)        aspirin 81 MG chewable tablet      Take 81 mg by mouth daily        lidocaine-prilocaine cream    EMLA          LORazepam 0.5 MG tablet    ATIVAN    30 tablet    Take 1 tablet (0.5 mg) by mouth every 4 hours as needed (Anxiety, Nausea/Vomiting or Sleep)    Malignant neoplasm of overlapping sites of stomach (H)       MULTIVITAMIN ADULT PO           ondansetron 8 MG tablet    ZOFRAN    10 tablet    Take 1 tablet (8 mg) by mouth every 8 hours as needed (Nausea/Vomiting)    Malignant neoplasm of overlapping sites of stomach (H)       prochlorperazine 10 MG tablet    COMPAZINE    30 tablet    Take 1 tablet (10 mg) by mouth every 6 hours as needed (Nausea/Vomiting)    Malignant neoplasm of overlapping sites of stomach (H)

## 2018-02-21 NOTE — PROGRESS NOTES
"Infusion Nursing Note:  Terry Yi presents today for Leucovorin + fluorouracil pump connect.    Patient seen by provider today: Yes: Dr. Shaffer   present during visit today: Not Applicable.    Note: N/A.    Intravenous Access:  Implanted Port.    Treatment Conditions:  Lab Results   Component Value Date    HGB 13.3 02/21/2018     Lab Results   Component Value Date    WBC 5.3 02/21/2018      Lab Results   Component Value Date    ANEU 2.7 02/21/2018     Lab Results   Component Value Date     02/21/2018      Lab Results   Component Value Date     02/21/2018                   Lab Results   Component Value Date    POTASSIUM 4.3 02/21/2018           No results found for: MAG         Lab Results   Component Value Date    CR 0.81 02/21/2018                   Lab Results   Component Value Date    BRITTNEY 8.9 02/21/2018                Lab Results   Component Value Date    BILITOTAL 0.4 02/21/2018           Lab Results   Component Value Date    ALBUMIN 3.7 02/21/2018                    Lab Results   Component Value Date    ALT 63 02/21/2018           Lab Results   Component Value Date    AST 38 02/21/2018       Results reviewed, labs MET treatment parameters, ok to proceed with treatment.      Post Infusion Assessment:  Patient tolerated infusion without incident.  Site patent and intact, free from redness, edema or discomfort.  No evidence of extravasations.    Discharge Plan:   Discharge instructions reviewed with: Patient.  Patient and/or family verbalized understanding of discharge instructions and all questions answered.  Patient discharged in stable condition accompanied by: son.  Departure Mode: Ambulatory.    Michelle Castro RN      Prior to discharge: Port is secured in place with tegaderm and flushed with 10cc NS with positive blood return noted.  Continuous home infusion Dosi-Fuser pump connected.    All connectors secured in place and clamps taped open.    Pump started, \"running\" noted on " display (CADD): Not Applicable.  Patient instructed to call our clinic or [facility] with any questions or concerns at home.  Patient verbalized understanding.    Patient set up for pump disconnect at [facility] on Friday 2/23/2018 @ 0830am.

## 2018-02-21 NOTE — MR AVS SNAPSHOT
After Visit Summary   2/21/2018    Terry Yi    MRN: [id number]           Patient Information     Date Of Birth          1965        Visit Information        Provider Department      2/21/2018 8:00 AM NURSE ONLY CANCER CENTER [facility]        Today's Diagnoses     Malignant neoplasm of overlapping sites of stomach (H)    -  1       Follow-ups after your visit        Your next 10 appointments already scheduled     Feb 21, 2018  8:45 AM CST   Return Visit with Yakov Shaffer MD   [facility] ([facility])    [address] [place] [address]   [phone]            Feb 21, 2018  9:15 AM CST   Level 2 with BAY 7 INFUSION   [facility] ([facility])    [address] [place] [address] [phone]            Mar 07, 2018  8:30 AM CST   Return Visit with NURSE ONLY CANCER CENTER   [facility] ([facility])    [address] [place] [address] [phone]            Mar 07, 2018  9:15 AM CST   Return Visit with Yakov Shaffer MD   [facility] ([facility])    [address] [facility] [address] [phone]            Mar 07, 2018 10:00 AM CST   Level 2 with BAY 8 INFUSION   [facility] ([facility])    [address] [place] [address] [phone]              Who to contact     If you have questions or need follow up information about today's clinic visit or your schedule please contact [facility] directly at [phone].  Normal or non-critical lab and imaging results will be communicated to you by MyChart, letter or phone within 4 business days after the clinic has received the results. If you do not hear from us within 7 days, please contact the clinic through MyChart or phone. If you have a critical or abnormal lab  result, we will notify you by phone as soon as possible.  Submit refill requests through Sossee or call your pharmacy and they will forward the refill request to us. Please allow 3 business days for your refill to be completed.          Additional Information About Your Visit        [employer] Information     Sossee gives you secure access to your electronic health record. If you see a primary care provider, you can also send messages to your care team and make appointments. If you have questions, please call your primary care clinic.  If you do not have a primary care provider, please call [phone] and they will assist you.      Sossee is an electronic gateway that provides easy, online access to your medical records. With Sossee, you can request a clinic appointment, read your test results, renew a prescription or communicate with your care team.     To access your existing account, please contact your [facility] or call [phone] for assistance.        Care EveryWhere ID     This is your Care EveryWhere ID. This could be used by other organizations to access your [place] medical records  [id number]         Blood Pressure from Last 3 Encounters:   02/07/18 110/64   01/24/18 118/76   01/10/18 126/86    Weight from Last 3 Encounters:   02/07/18 98.7 kg (217 lb 8 oz)   01/24/18 97.5 kg (215 lb)   01/10/18 95.7 kg (211 lb)              We Performed the Following     CBC with platelets differential     Comprehensive metabolic panel        Primary Care Provider Office Phone # Fax #    Sandra Dickerson [phone]       [facility]  [address]  [facility] [address]        Equal Access to Services     [facility]: Hadii aad ku hadasho Soomaali, waaxda luqadaha, qaybta kaalmada adeegyada, claire cowart . So [facility] [phone].    ATENCIÓN: Si habla español, tiene a cuevas disposición servicios gratuitos de asistencia lingüística.  Ciara ji [phone].    We comply with applicable federal civil rights laws and [place] laws. We do not discriminate on the basis of race, color, national origin, age, disability, sex, sexual orientation, or gender identity.            Thank you!     Thank you for choosing [facility]  for your care. Our goal is always to provide you with excellent care. Hearing back from our patients is one way we can continue to improve our services. Please take a few minutes to complete the written survey that you may receive in the mail after your visit with us. Thank you!             Your Updated Medication List - Protect others around you: Learn how to safely use, store and throw away your medicines at www.disposemymeds.org.          This list is accurate as of 2/21/18  8:37 AM.  Always use your most recent med list.                   Brand Name Dispense Instructions for use Diagnosis    allopurinol 100 MG tablet    ZYLOPRIM     as needed (when eating shrimp)        aspirin 81 MG chewable tablet      Take 81 mg by mouth daily        lidocaine-prilocaine cream    EMLA          LORazepam 0.5 MG tablet    ATIVAN    30 tablet    Take 1 tablet (0.5 mg) by mouth every 4 hours as needed (Anxiety, Nausea/Vomiting or Sleep)    Malignant neoplasm of overlapping sites of stomach (H)       MULTIVITAMIN ADULT PO           ondansetron 8 MG tablet    ZOFRAN    10 tablet    Take 1 tablet (8 mg) by mouth every 8 hours as needed (Nausea/Vomiting)    Malignant neoplasm of overlapping sites of stomach (H)       prochlorperazine 10 MG tablet    COMPAZINE    30 tablet    Take 1 tablet (10 mg) by mouth every 6 hours as needed (Nausea/Vomiting)    Malignant neoplasm of overlapping sites of stomach (H)

## 2018-02-21 NOTE — MR AVS SNAPSHOT
After Visit Summary   2/21/2018    Terry Yi    MRN: [id number]           Patient Information     Date Of Birth          1965        Visit Information        Provider Department      2/21/2018 9:15 AM [place] [facility]        Today's Diagnoses     Malignant neoplasm of overlapping sites of stomach (H)    -  1       Follow-ups after your visit        Your next 10 appointments already scheduled     Mar 07, 2018  8:30 AM CST   Return Visit with NURSE ONLY CANCER CENTER   [facility] ([facility])    [address] [facility] [address]   [phone]            Mar 07, 2018  9:15 AM CST   Return Visit with Yakov Shaffer MD   [facility])    [address] [facility] [address]   [phone]            Mar 07, 2018 10:00 AM CST   Level 2 with [address] ([facility])    [address] [facility] [address]   [phone]            Mar 16, 2018  8:00 AM CDT   Return Visit with NURSE ONLY CANCER CENTER   [facility] ([facility])    [address] [facility] [address]   [phone]            Mar 16, 2018  8:45 AM CDT   (Arrive by 8:30 AM)   CT CHEST ABDOMEN PELVIS W/O & W CONTRAST with MGCT1   [facility])    [address] [facility] [address]   [phone]           Please bring any scans or X-rays taken at other hospitals, if similar tests were done. Also bring a list of your medicines, including vitamins, minerals and over-the-counter drugs. It is safest to leave personal items at home.  Be sure to tell your doctor:   If you have any allergies.   If there s any chance you are pregnant.   If you are breastfeeding.  You may have contrast for this exam. To prepare:   Do not eat or drink for 2 hours before  your exam. If you need to take medicine, you may take it with small sips of water. (We may ask you to take liquid medicine as well.)   The day before your exam, drink extra fluids at least six 8-ounce glasses (unless your doctor tells you to restrict your fluids).   You will be given instructions on how to drink the contrast.  Patients over 70 or patients with diabetes or kidney problems:   If you haven t had a blood test (creatinine test) within the last 30 days, the Cardiologist/Radiologist may require you to get this test prior to your exam.  If you have diabetes:   Continue to take your metformin medication on the day of your exam  Please wear loose clothing, such as a sweat suit or jogging clothes. Avoid snaps, zippers and other metal. We may ask you to undress and put on a hospital gown.  If you have any questions, please call the Imaging Department where you will have your exam.            Mar 21, 2018  8:00 AM CDT   Return Visit with NURSE [place] [facility] CENTER   [facility]    [address] [place] [id number] [phone]            Mar 21, 2018  8:45 AM CDT   Return Visit with Yakov Shaffer MD   [facility] ([facility])    [address] [place] [address] [phone]            Mar 21, 2018  9:30 AM CDT   Level 2 with BAY 5 INFUSION   [facility])    [address] [place] [id number] [phone]              Future tests that were ordered for you today     Open Future Orders        Priority Expected Expires Ordered    CT Chest abdomen pelvis w & w/o contrast Routine 3/16/2018 2/21/2019 2/21/2018            Who to contact     If you have questions or need follow up information about today's clinic visit or your schedule please contact [facility] directly at [phone].  Normal or non-critical lab and imaging  results will be communicated to you by MyChart, letter or phone within 4 business days after the clinic has received the results. If you do not hear from us within 7 days, please contact the clinic through Livescribe or phone. If you have a critical or abnormal lab result, we will notify you by phone as soon as possible.  Submit refill requests through Livescribe or call your pharmacy and they will forward the refill request to us. Please allow 3 business days for your refill to be completed.          Additional Information About Your Visit        Livescribe Information     Livescribe gives you secure access to your electronic health record. If you see a primary care provider, you can also send messages to your care team and make appointments. If you have questions, please call your primary care clinic.  If you do not have a primary care provider, please call [phone] and they will assist you.      Livescribe is an electronic gateway that provides easy, online access to your medical records. With Livescribe, you can request a clinic appointment, read your test results, renew a prescription or communicate with your care team.     To access your existing account, please contact your [facility] or call [phone] for assistance.        Care EveryWhere ID     This is your Care EveryWhere ID. This could be used by other organizations to access your [place] medical records  [id number]         Blood Pressure from Last 3 Encounters:   02/21/18 130/87   02/07/18 110/64   01/24/18 118/76    Weight from Last 3 Encounters:   02/21/18 98.6 kg (217 lb 6.4 oz)   02/07/18 98.7 kg (217 lb 8 oz)   01/24/18 97.5 kg (215 lb)              Today, you had the following     No orders found for display       Primary Care Provider Office Phone # Fax #    Sandra Dickerson [phone]       [facility]  [address]  [facility] [address]        Equal Access to Services     CHANDAN GALLO AH: Jemal frey  latanya Casillas, washarifda luqadaha, qaybta kaalmada tim, claire prabhain hayaalukasz merchantjennifer currybret sofya pavan. So [facility] [phone].    ATENCIÓN: Si habla marly, tiene a cuevas disposición servicios gratuitos de asistencia lingüística. Ciara al [phone].    We comply with applicable federal civil rights laws and [place] laws. We do not discriminate on the basis of race, color, national origin, age, disability, sex, sexual orientation, or gender identity.            Thank you!     Thank you for choosing [facility]  for your care. Our goal is always to provide you with excellent care. Hearing back from our patients is one way we can continue to improve our services. Please take a few minutes to complete the written survey that you may receive in the mail after your visit with us. Thank you!             Your Updated Medication List - Protect others around you: Learn how to safely use, store and throw away your medicines at www.disposemymeds.org.          This list is accurate as of 2/21/18 11:19 AM.  Always use your most recent med list.                   Brand Name Dispense Instructions for use Diagnosis    allopurinol 100 MG tablet    ZYLOPRIM     as needed (when eating shrimp)        aspirin 81 MG chewable tablet      Take 81 mg by mouth daily        lidocaine-prilocaine cream    EMLA          LORazepam 0.5 MG tablet    ATIVAN    30 tablet    Take 1 tablet (0.5 mg) by mouth every 4 hours as needed (Anxiety, Nausea/Vomiting or Sleep)    Malignant neoplasm of overlapping sites of stomach (H)       MULTIVITAMIN ADULT PO           ondansetron 8 MG tablet    ZOFRAN    10 tablet    Take 1 tablet (8 mg) by mouth every 8 hours as needed (Nausea/Vomiting)    Malignant neoplasm of overlapping sites of stomach (H)       prochlorperazine 10 MG tablet    COMPAZINE    30 tablet    Take 1 tablet (10 mg) by mouth every 6 hours as needed (Nausea/Vomiting)    Malignant neoplasm of overlapping sites of stomach (H)

## 2018-02-21 NOTE — NURSING NOTE
"Oncology Rooming Note    February 21, 2018 8:49 AM   Terry Yi is a 52 year old male who presents for:    Chief Complaint   Patient presents with     Oncology Clinic Visit     F/U     Initial Vitals: /87 (BP Location: Right arm, Cuff Size: Adult Regular)  Pulse 79  Temp 98.3  F (36.8  C) (Oral)  Wt 98.6 kg (217 lb 6.4 oz)  SpO2 96%  BMI 30.44 kg/m2 Estimated body mass index is 30.44 kg/(m^2) as calculated from the following:    Height as of 1/24/18: 1.8 m (5' 10.87\").    Weight as of this encounter: 98.6 kg (217 lb 6.4 oz). Body surface area is 2.22 meters squared.  Severe Pain (6) Comment: takes marijuana for pain   No LMP for male patient.  Allergies reviewed: Yes  Medications reviewed: Yes    Medications: Medication refills not needed today.  Pharmacy name entered into EPIC: Data Unavailable    Clinical concerns: none Dr. Shaffer was notified.    8 minutes for nursing intake (face to face time)     Marcos Corley RN              "

## 2018-02-21 NOTE — PATIENT INSTRUCTIONS
Chemo today    RTC 3/7 as scheduled    Schedule CT CAP on 3/16    See me back with labs and next chemo on 3/21

## 2018-02-21 NOTE — PROGRESS NOTES
2/21/2018       REASON FOR VISIT:  Follow-up for unresectable metastatic gastric carcinoma, diffuse type, grade 3, HER-2 negative, MSI-Intact.      HISTORY OF PRESENT ILLNESS:  Please see my previous note for details.  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      CT CAP after C#6 shows stable disease    C# 9 FOLFOX 8/2/2017 ( Oxaliplatin dose reduced to 70mg/m2 due to neuropathy )  C#10 8/22/2017 ( Oxaliplatin dose reduced to 60mg/m2 due to neuropathy )  Delayed by 1 week- patient preference  C#11 9/6/17 5FU/LV ( Stopped Oxaliplatin )  C#12 9/20/2017 5FU/LV    Repeat CT scan after C#12 shows stable to slightly improved disease.    C#13 10/4/2017 5FU/LV  C#14 10/18/2017 5FU/LV  C#15 10/31/2017 5FU/LV  C#16 11/15/2017 5FU/LV  C#17 11/29/2017 5FU/LV   C#18 12/13/2017 5FU/LV    Repeat CT CAP 12/22/17 is stable    C#19 12/27/2017 5FU/LV  C#20 1/10/2018 5FU/LV  C# 21 1/24/2018 5FU/LV  C# 22 2/7/2018  5FU/LV  C# 23 2/21/2018      INTERVAL HISTORY:   He is tolerating chemotherapy well. He denies nausea and vomiting. No diarrhea or constipation. He is eating well and maintaining his weight. No infections. He continues to have neuropathy manifested as tingling and numbness of fingers and feet. Now he can button his shirts. It is very slowly improving. Denies pain. No infections. No new swellings. No trouble breathing. No bleeding. He still has peeling of the skin of the hands and he is applying moisturizing lotion several times a day. It has not worsened. Skin of the feet is normal. No other new skin changes. Although his energy is good but he is not exercising regularly although his job at times is physically demanding. He and his wife tell me that they are going to join a gym from tomorrow.    ALTAGRACIA:  ALLEN  comprehensive ROS was otherwise neg        MEDICATIONS:   Current Outpatient Prescriptions   Medication     Multiple Vitamins-Minerals (MULTIVITAMIN ADULT PO)     allopurinol (ZYLOPRIM) 100 MG tablet     lidocaine-prilocaine (EMLA) cream     aspirin 81 MG chewable tablet     LORazepam (ATIVAN) 0.5 MG tablet     prochlorperazine (COMPAZINE) 10 MG tablet     ondansetron (ZOFRAN) 8 MG tablet     No current facility-administered medications for this visit.      Facility-Administered Medications Ordered in Other Visits   Medication     heparin 100 UNIT/ML injection 5 mL     sodium chloride (PF) 0.9% PF flush 10-20 mL            PHYSICAL EXAMINATION:   /87 (BP Location: Right arm, Cuff Size: Adult Regular)  Pulse 79  Temp 98.3  F (36.8  C) (Oral)  Wt 98.6 kg (217 lb 6.4 oz)  SpO2 96%  BMI 30.44 kg/m2  CONSTITUTIONAL: No apparent distress  EYES: PERRLA, without pallor or jaundice  ENT/MOUTH: Ears unremarkable. No oral lesions  CVS: s1s2 normal  RESPIRATORY: Chest is clear  GI: Abdomen is benign  NEURO: He is alert and oriented ×3. Numbness and of hands and feet  INTEGUMENT: no concerning his skin rashes. There is peeling of skin of palms which is same as before. Port site is clean.  LYMPHATIC: no palpable lymphadenopathy  MUSCULOSKELETAL: Unremarkable. No bony tenderness.   EXTREMITIES: no pedal edema  PSYCH: Mentation, mood and affect are appropriate          LABS:   Results for LEONOR RALPH (MRN [id number]) as of 2/21/2018 08:42   Ref. Range 2/21/2018 08:06   Sodium Latest Ref Range: 133 - 144 mmol/L 141   Potassium Latest Ref Range: 3.4 - 5.3 mmol/L 4.3   Chloride Latest Ref Range: 94 - 109 mmol/L 107   Carbon Dioxide Latest Ref Range: 20 - 32 mmol/L 28   Urea Nitrogen Latest Ref Range: 7 - 30 mg/dL 17   Creatinine Latest Ref Range: 0.66 - 1.25 mg/dL 0.81   GFR Estimate Latest Ref Range: >60 mL/min/1.7m2 >90   GFR Estimate If Black Latest Ref Range: >60 mL/min/1.7m2 >90   Calcium Latest Ref Range: 8.5 - 10.1  mg/dL 8.9   Anion Gap Latest Ref Range: 3 - 14 mmol/L 6   Albumin Latest Ref Range: 3.4 - 5.0 g/dL 3.7   Protein Total Latest Ref Range: 6.8 - 8.8 g/dL 6.7 (L)   Bilirubin Total Latest Ref Range: 0.2 - 1.3 mg/dL 0.4   Alkaline Phosphatase Latest Ref Range: 40 - 150 U/L 75   ALT Latest Ref Range: 0 - 70 U/L 63   AST Latest Ref Range: 0 - 45 U/L 38   Glucose Latest Ref Range: 70 - 99 mg/dL 92   WBC Latest Ref Range: 4.0 - 11.0 10e9/L 5.3   Hemoglobin Latest Ref Range: 13.3 - 17.7 g/dL 13.3   Hematocrit Latest Ref Range: 40.0 - 53.0 % 40.0   Platelet Count Latest Ref Range: 150 - 450 10e9/L 150   RBC Count Latest Ref Range: 4.4 - 5.9 10e12/L 4.22 (L)   MCV Latest Ref Range: 78 - 100 fl 95   MCH Latest Ref Range: 26.5 - 33.0 pg 31.5   MCHC Latest Ref Range: 31.5 - 36.5 g/dL 33.3   RDW Latest Ref Range: 10.0 - 15.0 % 14.4   Diff Method Unknown Automated Method   % Neutrophils Latest Units: % 51.9   % Lymphocytes Latest Units: % 32.8   % Monocytes Latest Units: % 11.9   % Eosinophils Latest Units: % 2.5   % Basophils Latest Units: % 0.9   % Immature Granulocytes Latest Units: % 0.0   Absolute Neutrophil Latest Ref Range: 1.6 - 8.3 10e9/L 2.7   Absolute Lymphocytes Latest Ref Range: 0.8 - 5.3 10e9/L 1.7   Absolute Monocytes Latest Ref Range: 0.0 - 1.3 10e9/L 0.6   Absolute Eosinophils Latest Ref Range: 0.0 - 0.7 10e9/L 0.1         ASSESSMENT AND PLAN:  Metastatic gastric cancer, HER-2/catrachita negative, MSI intact, presenting with early satiety and significant weight loss and epigastric discomfort upon eating with some vomiting.  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  There is an indeterminate right liver lobe lesion which has remained stable and likely is not a metastasis.      We started palliative FOLFOX on 03/12/2017.     Oxaliplatin was dropped from cycle #11 onwards because of neuropathy     Scans after C#18 show stable  disease    He is doing well. 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 Over time this has very slowly improved. We will continue with observation. He tried Cymbalta in the past but did not like it. He is not willing to try gabapentin at this time      Skin peeling due to 5-FU. He still has at and he is applying moisturizing lotions several times a day which she will continue to do so.       Thrombocytopenia. Today platelets are 150. Cont to observe for cytopenias while receiving chemotherapy     Discussion regarding exercise. We again discussed about it and he tells me that from tomorrow he is going to join a gym and go there for regular exercise     We did not address the following today    His genetic testing did not show any identifiable mutation not reveal any identifiable mutation     Return to clinic as a scheduled    All questions are answered and he is agreeable with the plan      LANEY HERNANDEZ MD

## 2018-02-21 NOTE — LETTER
2/21/2018         RE: Terry Yi  [place] [address]        Dear Colleague,    Thank you for referring your patient, Terry Yi, to the [facility]. Please see a copy of my visit note below.    2/21/2018       REASON FOR VISIT:  Follow-up for unresectable metastatic gastric carcinoma, diffuse type, grade 3, HER-2 negative, MSI-Intact.      HISTORY OF PRESENT ILLNESS:  Please see my previous note for details.  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      CT CAP after C#6 shows stable disease    C# 9 FOLFOX 8/2/2017 ( Oxaliplatin dose reduced to 70mg/m2 due to neuropathy )  C#10 8/22/2017 ( Oxaliplatin dose reduced to 60mg/m2 due to neuropathy )  Delayed by 1 week- patient preference  C#11 9/6/17 5FU/LV ( Stopped Oxaliplatin )  C#12 9/20/2017 5FU/LV    Repeat CT scan after C#12 shows stable to slightly improved disease.    C#13 10/4/2017 5FU/LV  C#14 10/18/2017 5FU/LV  C#15 10/31/2017 5FU/LV  C#16 11/15/2017 5FU/LV  C#17 11/29/2017 5FU/LV   C#18 12/13/2017 5FU/LV    Repeat CT CAP 12/22/17 is stable    C#19 12/27/2017 5FU/LV  C#20 1/10/2018 5FU/LV  C# 21 1/24/2018 5FU/LV  C# 22 2/7/2018  5FU/LV  C# 23 2/21/2018      INTERVAL HISTORY:   He is tolerating chemotherapy well. He denies nausea and vomiting. No diarrhea or constipation. He is eating well and maintaining his weight. No infections. He continues to have neuropathy manifested as tingling and numbness of fingers and feet. Now he can button his shirts. It is very slowly improving. Denies pain. No infections. No new swellings. No trouble breathing. No bleeding. He still has peeling of the skin of the hands and he is applying moisturizing lotion several times a day. It has not worsened. Skin of the feet  34 Met with mother in nbn, infant woke eagerly, mother nursed x 5 minutes, following with formula. Recommend having pump at bedside for ac pump to prep short nipple/has Lansinoh contact shield if needed. Anticipates inncare today and will continue to work on breast feeding/breast pumping for expressed milk. Breasts are large/venous distention noted. Anticipate milk production/engorgement day 3-5. Reviewed support/empty well/ice packs and recline. Breasts may become engorged when milk \"comes in\". How milk is made / normal phases of milk production, supply and demand discussed. Taught care of engorged breasts - frequent breastfeeding encouraged, warm compresses and breast massage ac. Then nurse the baby or pump. Apply cold compresses pc x 15 minutes a few times a day for swelling or discomfort. May need to do this care for a couple of days. Family has purchased mother the Evenflo double closed system pump. Will practice and fit to flange size while Inncare but continue to use symphony for initiation of supply. Praised for due diligence in providing her milk to baby. [facility] # provided.  Call prn.     is normal. No other new skin changes. Although his energy is good but he is not exercising regularly although his job at times is physically demanding. He and his wife tell me that they are going to join a gym from tomorrow.    ROS:  A comprehensive ROS was otherwise neg        MEDICATIONS:   Current Outpatient Prescriptions   Medication     Multiple Vitamins-Minerals (MULTIVITAMIN ADULT PO)     allopurinol (ZYLOPRIM) 100 MG tablet     lidocaine-prilocaine (EMLA) cream     aspirin 81 MG chewable tablet     LORazepam (ATIVAN) 0.5 MG tablet     prochlorperazine (COMPAZINE) 10 MG tablet     ondansetron (ZOFRAN) 8 MG tablet     No current facility-administered medications for this visit.      Facility-Administered Medications Ordered in Other Visits   Medication     heparin 100 UNIT/ML injection 5 mL     sodium chloride (PF) 0.9% PF flush 10-20 mL            PHYSICAL EXAMINATION:   /87 (BP Location: Right arm, Cuff Size: Adult Regular)  Pulse 79  Temp 98.3  F (36.8  C) (Oral)  Wt 98.6 kg (217 lb 6.4 oz)  SpO2 96%  BMI 30.44 kg/m2  CONSTITUTIONAL: No apparent distress  EYES: PERRLA, without pallor or jaundice  ENT/MOUTH: Ears unremarkable. No oral lesions  CVS: s1s2 normal  RESPIRATORY: Chest is clear  GI: Abdomen is benign  NEURO: He is alert and oriented ×3. Numbness and of hands and feet  INTEGUMENT: no concerning his skin rashes. There is peeling of skin of palms which is same as before. Port site is clean.  LYMPHATIC: no palpable lymphadenopathy  MUSCULOSKELETAL: Unremarkable. No bony tenderness.   EXTREMITIES: no pedal edema  PSYCH: Mentation, mood and affect are appropriate          LABS:   Results for LEONOR RALPH (MRN [id number]) as of 2/21/2018 08:42   Ref. Range 2/21/2018 08:06   Sodium Latest Ref Range: 133 - 144 mmol/L 141   Potassium Latest Ref Range: 3.4 - 5.3 mmol/L 4.3   Chloride Latest Ref Range: 94 - 109 mmol/L 107   Carbon Dioxide Latest Ref Range: 20 - 32 mmol/L 28   Urea Nitrogen Latest  Ref Range: 7 - 30 mg/dL 17   Creatinine Latest Ref Range: 0.66 - 1.25 mg/dL 0.81   GFR Estimate Latest Ref Range: >60 mL/min/1.7m2 >90   GFR Estimate If Black Latest Ref Range: >60 mL/min/1.7m2 >90   Calcium Latest Ref Range: 8.5 - 10.1 mg/dL 8.9   Anion Gap Latest Ref Range: 3 - 14 mmol/L 6   Albumin Latest Ref Range: 3.4 - 5.0 g/dL 3.7   Protein Total Latest Ref Range: 6.8 - 8.8 g/dL 6.7 (L)   Bilirubin Total Latest Ref Range: 0.2 - 1.3 mg/dL 0.4   Alkaline Phosphatase Latest Ref Range: 40 - 150 U/L 75   ALT Latest Ref Range: 0 - 70 U/L 63   AST Latest Ref Range: 0 - 45 U/L 38   Glucose Latest Ref Range: 70 - 99 mg/dL 92   WBC Latest Ref Range: 4.0 - 11.0 10e9/L 5.3   Hemoglobin Latest Ref Range: 13.3 - 17.7 g/dL 13.3   Hematocrit Latest Ref Range: 40.0 - 53.0 % 40.0   Platelet Count Latest Ref Range: 150 - 450 10e9/L 150   RBC Count Latest Ref Range: 4.4 - 5.9 10e12/L 4.22 (L)   MCV Latest Ref Range: 78 - 100 fl 95   MCH Latest Ref Range: 26.5 - 33.0 pg 31.5   MCHC Latest Ref Range: 31.5 - 36.5 g/dL 33.3   RDW Latest Ref Range: 10.0 - 15.0 % 14.4   Diff Method Unknown Automated Method   % Neutrophils Latest Units: % 51.9   % Lymphocytes Latest Units: % 32.8   % Monocytes Latest Units: % 11.9   % Eosinophils Latest Units: % 2.5   % Basophils Latest Units: % 0.9   % Immature Granulocytes Latest Units: % 0.0   Absolute Neutrophil Latest Ref Range: 1.6 - 8.3 10e9/L 2.7   Absolute Lymphocytes Latest Ref Range: 0.8 - 5.3 10e9/L 1.7   Absolute Monocytes Latest Ref Range: 0.0 - 1.3 10e9/L 0.6   Absolute Eosinophils Latest Ref Range: 0.0 - 0.7 10e9/L 0.1         ASSESSMENT AND PLAN:  Metastatic gastric cancer, HER-2/catrachita negative, MSI intact, presenting with early satiety and significant weight loss and epigastric discomfort upon eating with some vomiting.  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  There is an indeterminate  right liver lobe lesion which has remained stable and likely is not a metastasis.      We started palliative FOLFOX on 03/12/2017.     Oxaliplatin was dropped from cycle #11 onwards because of neuropathy     Scans after C#18 show stable disease    He is doing well. 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 Over time this has very slowly improved. We will continue with observation. He tried Cymbalta in the past but did not like it. He is not willing to try gabapentin at this time      Skin peeling due to 5-FU. He still has at and he is applying moisturizing lotions several times a day which she will continue to do so.       Thrombocytopenia. Today platelets are 150. Cont to observe for cytopenias while receiving chemotherapy     Discussion regarding exercise. We again discussed about it and he tells me that from tomorrow he is going to join a gym and go there for regular exercise     We did not address the following today    His genetic testing did not show any identifiable mutation not reveal any identifiable mutation     Return to clinic as a scheduled    All questions are answered and he is agreeable with the plan      LANEY SHAFFER MD              Again, thank you for allowing me to participate in the care of your patient.        Sincerely,        Laney Shaffer MD

## 2018-02-21 NOTE — MR AVS SNAPSHOT
After Visit Summary   2/21/2018    Terry Yi    MRN: [id number]           Patient Information     Date Of Birth          1965        Visit Information        Provider Department      2/21/2018 8:45 AM Yakov Shaffer MD [facility]        Today's Diagnoses     Malignant neoplasm of overlapping sites of stomach (H)    -  1      Care Instructions    Chemo today    RTC 3/7 as scheduled    Schedule CT CAP on 3/16    See me back with labs and next chemo on 3/21                Follow-ups after your visit        Your next 10 appointments already scheduled     Mar 07, 2018  8:30 AM CST   [facility] Visit with [facility]   [facility] ([facility])    [address] [place] [address]   [phone]            Mar 07, 2018  9:15 AM CST   Return Visit with Yakov Shaffer MD   [facility])    [address] [place] [address]   [phone]            Mar 07, 2018 10:00 AM CST   Level 2 with BAY 8 INFUSION   [facility] ([facility])    [address] [place] [address]   [phone]            Mar 16, 2018  8:00 AM CDT   Return Visit with [facility]   [facility] ([facility])    [address] [place] [address]   [phone]            Mar 16, 2018  8:45 AM CDT   (Arrive by 8:30 AM)   CT CHEST ABDOMEN PELVIS W/O & W CONTRAST with MGCT1   [facility])    [address] [place] [address]   [phone]           Please bring any scans or X-rays taken at other hospitals, if similar tests were done. Also bring a list of your medicines, including vitamins, minerals and over-the-counter drugs. It is safest to leave personal items at home.  Be sure to tell your doctor:   If you have any allergies.   If there s  any chance you are pregnant.   If you are breastfeeding.  You may have contrast for this exam. To prepare:   Do not eat or drink for 2 hours before your exam. If you need to take medicine, you may take it with small sips of water. (We may ask you to take liquid medicine as well.)   The day before your exam, drink extra fluids at least six 8-ounce glasses (unless your doctor tells you to restrict your fluids).   You will be given instructions on how to drink the contrast.  Patients over 70 or patients with diabetes or kidney problems:   If you haven t had a blood test (creatinine test) within the last 30 days, the Cardiologist/Radiologist may require you to get this test prior to your exam.  If you have diabetes:   Continue to take your metformin medication on the day of your exam  Please wear loose clothing, such as a sweat suit or jogging clothes. Avoid snaps, zippers and other metal. We may ask you to undress and put on a hospital gown.  If you have any questions, please call the Imaging Department where you will have your exam.            Mar 21, 2018  8:00 AM CDT   Return Visit with [facility]   [facility])    [address] [place] [address]   [phone]            Mar 21, 2018  8:45 AM CDT   Return Visit with Yakov Shaffer MD   [facility])    [address] [place] [address]   [phone]            Mar 21, 2018  9:30 AM CDT   Level 2 with [address])    [address] [place] [address]   [phone]              Future tests that were ordered for you today     Open Future Orders        Priority Expected Expires Ordered    CT Chest abdomen pelvis w & w/o contrast Routine 3/16/2018 2/21/2019 2/21/2018            Who to contact     If you have questions or need follow up information about  today's clinic visit or your schedule please contact [facility] directly at [phone].  Normal or non-critical lab and imaging results will be communicated to you by [employer], letter or phone within 4 business days after the clinic has received the results. If you do not hear from us within 7 days, please contact the clinic through [employer] or phone. If you have a critical or abnormal lab result, we will notify you by phone as soon as possible.  Submit refill requests through [employer] or call your pharmacy and they will forward the refill request to us. Please allow 3 business days for your refill to be completed.          Additional Information About Your Visit        [employer] Information     [employer] gives you secure access to your electronic health record. If you see a primary care provider, you can also send messages to your care team and make appointments. If you have questions, please call your primary care clinic.  If you do not have a primary care provider, please call [phone] and they will assist you.      [employer] is an electronic gateway that provides easy, online access to your medical records. With [employer], you can request a clinic appointment, read your test results, renew a prescription or communicate with your care team.     To access your existing account, please contact your [facility] or call [phone] for assistance.        Care EveryWhere ID     This is your Care EveryWhere ID. This could be used by other organizations to access your [facility] medical records  [id number]        Your Vitals Were     Pulse Temperature Pulse Oximetry BMI (Body Mass Index)          79 98.3  F (36.8  C) (Oral) 96% 30.44 kg/m2         Blood Pressure from Last 3 Encounters:   02/21/18 130/87   02/07/18 110/64   01/24/18 118/76    Weight from Last 3 Encounters:   02/21/18 98.6 kg (217 lb 6.4 oz)   02/07/18 98.7 kg (217 lb 8 oz)   01/24/18 97.5 kg (215 lb)                Primary Care Provider Office Phone # Fax #    Sandra Easonhardik, SARAI [phone]       [facility]  [address]  [facility] [address]        Equal Access to Services     CHANDAN GALLO : Hadii tk ku conradoo Sojacquelinali, waaxda luqadaha, qaybta kaalmada adeegyada, waxtejinder idiin hayaan charley june sofya [place]. So [facility] [phone].    ATENCIÓN: Si habla español, tiene a cuevas disposición servicios gratuitos de asistencia lingüística. Llame al [phone].    We comply with applicable federal civil rights laws and [place] laws. We do not discriminate on the basis of race, color, national origin, age, disability, sex, sexual orientation, or gender identity.            Thank you!     Thank you for choosing [facility]  for your care. Our goal is always to provide you with excellent care. Hearing back from our patients is one way we can continue to improve our services. Please take a few minutes to complete the written survey that you may receive in the mail after your visit with us. Thank you!             Your Updated Medication List - Protect others around you: Learn how to safely use, store and throw away your medicines at www.disposemymeds.org.          This list is accurate as of 2/21/18 10:41 AM.  Always use your most recent med list.                   Brand Name Dispense Instructions for use Diagnosis    allopurinol 100 MG tablet    ZYLOPRIM     as needed (when eating shrimp)        aspirin 81 MG chewable tablet      Take 81 mg by mouth daily        lidocaine-prilocaine cream    EMLA          LORazepam 0.5 MG tablet    ATIVAN    30 tablet    Take 1 tablet (0.5 mg) by mouth every 4 hours as needed (Anxiety, Nausea/Vomiting or Sleep)    Malignant neoplasm of overlapping sites of stomach (H)       MULTIVITAMIN ADULT PO           ondansetron 8 MG tablet    ZOFRAN    10 tablet    Take 1 tablet (8 mg) by mouth every 8 hours as needed (Nausea/Vomiting)    Malignant neoplasm of overlapping  sites of stomach (H)       prochlorperazine 10 MG tablet    COMPAZINE    30 tablet    Take 1 tablet (10 mg) by mouth every 6 hours as needed (Nausea/Vomiting)    Malignant neoplasm of overlapping sites of stomach (H)

## 2018-02-21 NOTE — Clinical Note
Please abstract the following data from this visit with this patient into the appropriate field in Epic:  Colonoscopy done on this date: 3/17 (approximately), by this group: Carl Clemons, results were good.

## 2018-02-21 NOTE — PROGRESS NOTES
Power port needle left accessed for treatment. Tolerated lab draw without complaint. [place] drawn-Red/Green/Purple tubes. Double signed by patient and RN. See documentation flowsheet. Arleth Brandon, RN, BSN, OCN

## 2018-03-07 NOTE — NURSING NOTE
"Oncology Rooming Note    March 7, 2018 9:04 AM   Terry Yi is a 52 year old male who presents for:    Chief Complaint   Patient presents with     Oncology Clinic Visit     return prior to treatment     Initial Vitals: /76  Pulse 66  Temp 98.5  F (36.9  C)  Ht 1.822 m (5' 11.75\")  Wt 99.6 kg (219 lb 9 oz)  SpO2 97%  BMI 29.99 kg/m2 Estimated body mass index is 29.99 kg/(m^2) as calculated from the following:    Height as of this encounter: 1.822 m (5' 11.75\").    Weight as of this encounter: 99.6 kg (219 lb 9 oz). Body surface area is 2.25 meters squared.  Severe Pain (7) Comment: hands and feet   No LMP for male patient.  Allergies reviewed: Yes  Medications reviewed: Yes    Medications: Medication refills not needed today.  Pharmacy name entered into EPIC: Data Unavailable      5 minutes for nursing intake (face to face time)     Sayra Pelaez LPN              "

## 2018-03-07 NOTE — MR AVS SNAPSHOT
After Visit Summary   3/7/2018    Terry Yi    MRN: [id number]           Patient Information     Date Of Birth          1965        Visit Information        Provider Department      3/7/2018 9:15 AM Yakov Shaffer MD [facility]        Today's Diagnoses     Malignant neoplasm of overlapping sites of stomach (H)    -  1      Care Instructions    Chemo today    RTC as scheduled            Follow-ups after your visit        Your next 10 appointments already scheduled     Mar 07, 2018 10:00 AM CST   Level 2 with BAY 8 INFUSION   [facility])    [address] [place] [id number]   [phone]            Mar 16, 2018  8:00 AM CDT   Return Visit with NURSE [facility]CER C[facility]   [facility])    [address] [place] [address]   [phone]            Mar 16, 2018  8:45 AM CDT   (Arrive by 8:30 AM)   CT CHEST ABDOMEN PELVIS W/O & W CONTRAST with MGCT1   [facility])    [address] [place] [address]   [phone]           Please bring any scans or X-rays taken at other hospitals, if similar tests were done. Also bring a list of your medicines, including vitamins, minerals and over-the-counter drugs. It is safest to leave personal items at home.  Be sure to tell your doctor:   If you have any allergies.   If there s any chance you are pregnant.   If you are breastfeeding.  You may have contrast for this exam. To prepare:   Do not eat or drink for 2 hours before your exam. If you need to take medicine, you may take it with small sips of water. (We may ask you to take liquid medicine as well.)   The day before your exam, drink extra fluids at least six 8-ounce glasses (unless your doctor tells you to restrict your fluids).   You will be given instructions on how to drink the contrast.  Patients over 70  or patients with diabetes or kidney problems:   If you haven t had a blood test (creatinine test) within the last 30 days, the Cardiologist/Radiologist may require you to get this test prior to your exam.  If you have diabetes:   Continue to take your metformin medication on the day of your exam  Please wear loose clothing, such as a sweat suit or jogging clothes. Avoid snaps, zippers and other metal. We may ask you to undress and put on a hospital gown.  If you have any questions, please call the Imaging Department where you will have your exam.            Mar 21, 2018  8:00 AM CDT   Return Visit with [facility] [place] [facility]   [facility] ([facility])    [address] [place] [id number] [phone]            Mar 21, 2018  8:45 AM CDT   Return Visit with Yakov Shaffer MD   [facility])    [address] [place] [id number] [phone]            Mar 21, 2018  9:30 AM CDT   Level 2 with BAY 5 INFUSION   [facility] ([facility])    [address] [place] [phone]              Who to contact     If you have questions or need follow up information about today's clinic visit or your schedule please contact [facility] directly at [phone].  Normal or non-critical lab and imaging results will be communicated to you by MyChart, letter or phone within 4 business days after the clinic has received the results. If you do not hear from us within 7 days, please contact the clinic through MyChart or phone. If you have a critical or abnormal lab result, we will notify you by phone as soon as possible.  Submit refill requests through [employer] or call your pharmacy and they will forward the refill request to us. Please allow 3 business days for your refill to be completed.          Additional Information About Your Visit       "  MyChart Information     [employer] gives you secure access to your electronic health record. If you see a primary care provider, you can also send messages to your care team and make appointments. If you have questions, please call your primary care clinic.  If you do not have a primary care provider, please call [phone] and they will assist you.      [employer] is an electronic gateway that provides easy, online access to your medical records. With [employer], you can request a clinic appointment, read your test results, renew a prescription or communicate with your care team.     To access your existing account, please contact your [facility] or call [phone] for assistance.        Care EveryWhere ID     This is your Care EveryWhere ID. This could be used by other organizations to access your [facility] medical records  [id number]        Your Vitals Were     Pulse Temperature Height Pulse Oximetry BMI (Body Mass Index)       66 98.5  F (36.9  C) 1.822 m (5' 11.75\") 97% 29.99 kg/m2        Blood Pressure from Last 3 Encounters:   03/07/18 115/76   02/21/18 130/87   02/07/18 110/64    Weight from Last 3 Encounters:   03/07/18 99.6 kg (219 lb 9 oz)   02/21/18 98.6 kg (217 lb 6.4 oz)   02/07/18 98.7 kg (217 lb 8 oz)              Today, you had the following     No orders found for display       Primary Care Provider Office Phone # Fax #    Sandra Dickerson [phone]       [facility]  [address]  [facility] [address]        Equal Access to Services     [facility] : Hadii aad ku hadasho Soomaali, waaxda luqadaha, qaybta kaalmada adeegyada, claire cowart . So [facility] [phone].    ATENCIÓN: Si habla español, tiene a cuevas disposición servicios gratuitos de asistencia lingüística. Llame al [phone].    We comply with applicable federal civil rights laws and [place] laws. We do not discriminate on the basis of race, color, national " origin, age, disability, sex, sexual orientation, or gender identity.            Thank you!     Thank you for choosing [facility]  for your care. Our goal is always to provide you with excellent care. Hearing back from our patients is one way we can continue to improve our services. Please take a few minutes to complete the written survey that you may receive in the mail after your visit with us. Thank you!             Your Updated Medication List - Protect others around you: Learn how to safely use, store and throw away your medicines at www.disposemymeds.org.          This list is accurate as of 3/7/18  9:38 AM.  Always use your most recent med list.                   Brand Name Dispense Instructions for use Diagnosis    allopurinol 100 MG tablet    ZYLOPRIM     as needed (when eating shrimp)        aspirin 81 MG chewable tablet      Take 81 mg by mouth daily        lidocaine-prilocaine cream    EMLA          LORazepam 0.5 MG tablet    ATIVAN    30 tablet    Take 1 tablet (0.5 mg) by mouth every 4 hours as needed (Anxiety, Nausea/Vomiting or Sleep)    Malignant neoplasm of overlapping sites of stomach (H)       MULTIVITAMIN ADULT PO           ondansetron 8 MG tablet    ZOFRAN    10 tablet    Take 1 tablet (8 mg) by mouth every 8 hours as needed (Nausea/Vomiting)    Malignant neoplasm of overlapping sites of stomach (H)       prochlorperazine 10 MG tablet    COMPAZINE    30 tablet    Take 1 tablet (10 mg) by mouth every 6 hours as needed (Nausea/Vomiting)    Malignant neoplasm of overlapping sites of stomach (H)       TYLENOL PO      Take by mouth as needed for mild pain or fever

## 2018-03-07 NOTE — LETTER
3/7/2018         RE: Terry iY  [place] [address]        Dear Colleague,    Thank you for referring your patient, Terry Yi, to the [facility]. Please see a copy of my visit note below.    3/7/2018     REASON FOR VISIT:  Follow-up for unresectable metastatic gastric carcinoma, diffuse type, grade 3, HER-2 negative, MSI-Intact.      HISTORY OF PRESENT ILLNESS:  Please see my previous note for details.  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      CT CAP after C#6 shows stable disease    C# 9 FOLFOX 8/2/2017 ( Oxaliplatin dose reduced to 70mg/m2 due to neuropathy )  C#10 8/22/2017 ( Oxaliplatin dose reduced to 60mg/m2 due to neuropathy )  Delayed by 1 week- patient preference  C#11 9/6/17 5FU/LV ( Stopped Oxaliplatin )  C#12 9/20/2017 5FU/LV    Repeat CT scan after C#12 shows stable to slightly improved disease.    C#13 10/4/2017 5FU/LV  C#14 10/18/2017 5FU/LV  C#15 10/31/2017 5FU/LV  C#16 11/15/2017 5FU/LV  C#17 11/29/2017 5FU/LV   C#18 12/13/2017 5FU/LV    Repeat CT CAP 12/22/17 is stable    C#19 12/27/2017 5FU/LV  C#20 1/10/2018 5FU/LV  C# 21 1/24/2018 5FU/LV  C# 22 2/7/2018  5FU/LV  C# 23 2/21/2018   C# 24 3/7/2018      INTERVAL HISTORY:   He continues to feel good. Denies any pain. No nausea or vomiting. He is eating well. Energy is good. He has now joined a gym and he is going than 4 times a week. No infections. No new swellings. No new skin problems apart from dryness of the palms and some skin peeling. He is applying moisturizing cream several times a day. Neuropathy is about the same as before. Denies shortness of breath    ROS:  Other review of system is negative        MEDICATIONS:   Current Outpatient Prescriptions   Medication      "Acetaminophen (TYLENOL PO)     Multiple Vitamins-Minerals (MULTIVITAMIN ADULT PO)     lidocaine-prilocaine (EMLA) cream     aspirin 81 MG chewable tablet     LORazepam (ATIVAN) 0.5 MG tablet     prochlorperazine (COMPAZINE) 10 MG tablet     ondansetron (ZOFRAN) 8 MG tablet     allopurinol (ZYLOPRIM) 100 MG tablet     No current facility-administered medications for this visit.      Facility-Administered Medications Ordered in Other Visits   Medication     heparin 100 UNIT/ML injection 5 mL     sodium chloride (PF) 0.9% PF flush 10-20 mL            PHYSICAL EXAMINATION:   /76  Pulse 66  Temp 98.5  F (36.9  C)  Ht 1.822 m (5' 11.75\")  Wt 99.6 kg (219 lb 9 oz)  SpO2 97%  BMI 29.99 kg/m2  CONSTITUTIONAL: no acute distress  EYES: PERRLA, no palor or icterus.   ENT/MOUTH: no mouth lesions. Ears normal  CVS: s1s2 no m r g .   RESPIRATORY: clear to auscultation b/l  GI: soft non tender no hepatosplenomegaly  NEURO: AAOX3  Grossly non focal neuro exam apart from subjective numbness of the feet and fingers  INTEGUMENT: no obvious rashes  LYMPHATIC: no palpable cervical, supraclavicular, axillary LAD  MUSCULOSKELETAL: Unremarkable. No bony tenderness.   EXTREMITIES: no pedal edema. The skin off the palms is dry and there is some skin peeling especially of the right index and right thumb.  PSYCH: Mentation, mood and affect are normal. Decision making capacity is intact          LABS:   Stable    ASSESSMENT AND PLAN:  Metastatic gastric cancer, HER-2/catrachita negative, MSI intact, presenting with early satiety and significant weight loss and epigastric discomfort upon eating with some vomiting.  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  There is an indeterminate right liver lobe lesion which has remained stable and likely is not a metastasis.      We started palliative FOLFOX on 03/12/2017.     Oxaliplatin was dropped from " cycle #11 onwards because of neuropathy     Scans after C#18 show stable disease    He is doing well. Will proceed with C#24 today and repeat scans before next cycle. They are already scheduled for 3/16/2018      Skin peeling due to 5-FU. His skin is also dry. He will continue applying moisturizing creams many times a day.     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 Continue to observe. This is very slowly improved over time. Previously he did not like Cymbalta and he does not want to try gabapentin.     Anemia. Mild. This is due to chemotherapy. Continue to observe    Description regarding exercise. He has now joined a gym and is going than 4 times a week to exercise and I encouraged him to continue to do so and slowly build his endurance. Overall his energy has been pretty decent      We did not address the following today    His genetic testing did not show any identifiable mutation not reveal any identifiable mutation     Return to clinic as a scheduled    I answered all of his and his family's questions to their satisfaction and they are agreeable and comfortable with the plan    LANEY SHAFFER MD              Again, thank you for allowing me to participate in the care of your patient.        Sincerely,        Laney Shaffer MD

## 2018-03-07 NOTE — MR AVS SNAPSHOT
After Visit Summary   3/7/2018    Terry Yi    MRN: [id number]           Patient Information     Date Of Birth          1965        Visit Information        Provider Department      3/7/2018 8:30 AM NURSE [facility]        Today's Diagnoses     Malignant neoplasm of overlapping sites of stomach (H)    -  1       Follow-ups after your visit        Your next 10 appointments already scheduled     Mar 07, 2018  9:15 AM CST   Return Visit with Yakov Shaffer MD   [facility])    [id number] [place] [id number]   [phone]            Mar 07, 2018 10:00 AM CST   Level 2 with BAY 8 INFUSION   [facility]    [id number] [place] [phone]   [phone]            Mar 16, 2018  8:00 AM CDT   Return Visit with NURSE ONLY CANCER CENTER   [facility] ([facility])    [id number] [place] [address]   [phone]            Mar 16, 2018  8:45 AM CDT   (Arrive by 8:30 AM)   CT CHEST ABDOMEN PELVIS W/O & W CONTRAST with MGCT1   [facility])    [id number] [place] [phone]   [phone]           Please bring any scans or X-rays taken at other hospitals, if similar tests were done. Also bring a list of your medicines, including vitamins, minerals and over-the-counter drugs. It is safest to leave personal items at home.  Be sure to tell your doctor:   If you have any allergies.   If there s any chance you are pregnant.   If you are breastfeeding.  You may have contrast for this exam. To prepare:   Do not eat or drink for 2 hours before your exam. If you need to take medicine, you may take it with small sips of water. (We may ask you to take liquid medicine as well.)   The day before your exam, drink extra fluids at least six 8-ounce  glasses (unless your doctor tells you to restrict your fluids).   You will be given instructions on how to drink the contrast.  Patients over 70 or patients with diabetes or kidney problems:   If you haven t had a blood test (creatinine test) within the last 30 days, the Cardiologist/Radiologist may require you to get this test prior to your exam.  If you have diabetes:   Continue to take your metformin medication on the day of your exam  Please wear loose clothing, such as a sweat suit or jogging clothes. Avoid snaps, zippers and other metal. We may ask you to undress and put on a hospital gown.  If you have any questions, please call the Imaging Department where you will have your exam.            Mar 21, 2018  8:00 AM CDT   Return Visit with NURSE [place] [facility]   [facility])    [address] [place] [address]   [phone]            Mar 21, 2018  8:45 AM CDT   Return Visit with Yakov Shaffer MD   [facility])    [address] [place] [phone]   [phone]            Mar 21, 2018  9:30 AM CDT   Level 2 with [address])    [address] [place] [address]   [phone]              Who to contact     If you have questions or need follow up information about today's clinic visit or your schedule please contact [facility] directly at [phone].  Normal or non-critical lab and imaging results will be communicated to you by MyChart, letter or phone within 4 business days after the clinic has received the results. If you do not hear from us within 7 days, please contact the clinic through MyChart or phone. If you have a critical or abnormal lab result, we will notify you by phone as soon as possible.  Submit refill requests through [employer] or call your pharmacy and they will forward the  refill request to us. Please allow 3 business days for your refill to be completed.          Additional Information About Your Visit        [employer] Information     [employer] gives you secure access to your electronic health record. If you see a primary care provider, you can also send messages to your care team and make appointments. If you have questions, please call your primary care clinic.  If you do not have a primary care provider, please call [phone] and they will assist you.      [employer] is an electronic gateway that provides easy, online access to your medical records. With [employer], you can request a clinic appointment, read your test results, renew a prescription or communicate with your care team.     To access your existing account, please contact your [facility] or call [phone] for assistance.        Care EveryWhere ID     This is your Care EveryWhere ID. This could be used by other organizations to access your [facility] medical records  [id number]         Blood Pressure from Last 3 Encounters:   02/21/18 130/87   02/07/18 110/64   01/24/18 118/76    Weight from Last 3 Encounters:   02/21/18 98.6 kg (217 lb 6.4 oz)   02/07/18 98.7 kg (217 lb 8 oz)   01/24/18 97.5 kg (215 lb)              We Performed the Following     CBC with platelets differential     Comprehensive metabolic panel        Primary Care Provider Office Phone # Fax #    Sandra Dickerson [phone]       [facility]  [address]  [facility] [address]        Equal Access to Services     DONOVAN GALLO : Hadii aad ku hadasho Soomaali, waaxda luqadaha, qaybta kaalmada adeegyada, waxay violeta hayrodrigon charley lopes'guillermo . So [facility] [phone].    ATENCIÓN: Si habla español, tiene a cuevas disposición servicios gratuitos de asistencia lingüística. Llame al [phone].    We comply with applicable federal civil rights laws and [place] laws. We do not discriminate on the basis of  race, color, national origin, age, disability, sex, sexual orientation, or gender identity.            Thank you!     Thank you for choosing [facility]  for your care. Our goal is always to provide you with excellent care. Hearing back from our patients is one way we can continue to improve our services. Please take a few minutes to complete the written survey that you may receive in the mail after your visit with us. Thank you!             Your Updated Medication List - Protect others around you: Learn how to safely use, store and throw away your medicines at www.disposemymeds.org.          This list is accurate as of 3/7/18  8:46 AM.  Always use your most recent med list.                   Brand Name Dispense Instructions for use Diagnosis    allopurinol 100 MG tablet    ZYLOPRIM     as needed (when eating shrimp)        aspirin 81 MG chewable tablet      Take 81 mg by mouth daily        lidocaine-prilocaine cream    EMLA          LORazepam 0.5 MG tablet    ATIVAN    30 tablet    Take 1 tablet (0.5 mg) by mouth every 4 hours as needed (Anxiety, Nausea/Vomiting or Sleep)    Malignant neoplasm of overlapping sites of stomach (H)       MULTIVITAMIN ADULT PO           ondansetron 8 MG tablet    ZOFRAN    10 tablet    Take 1 tablet (8 mg) by mouth every 8 hours as needed (Nausea/Vomiting)    Malignant neoplasm of overlapping sites of stomach (H)       prochlorperazine 10 MG tablet    COMPAZINE    30 tablet    Take 1 tablet (10 mg) by mouth every 6 hours as needed (Nausea/Vomiting)    Malignant neoplasm of overlapping sites of stomach (H)

## 2018-03-07 NOTE — MR AVS SNAPSHOT
After Visit Summary   3/7/2018    Terry Yi    MRN: [id number]           Patient Information     Date Of Birth          1965        Visit Information        Provider Department      3/7/2018 10:00 AM [place] 8 INFUSION [facility]        Today's Diagnoses     Malignant neoplasm of overlapping sites of stomach (H)    -  1       Follow-ups after your visit        Your next 10 appointments already scheduled     Mar 16, 2018  8:00 AM CDT   Return Visit with NURSE ONLY CANCER CENTER   [facility])    [id number] [place] [id number]   [phone]            Mar 16, 2018  8:45 AM CDT   (Arrive by 8:30 AM)   CT CHEST ABDOMEN PELVIS W/O & W CONTRAST with MGCT1   [facility])    [address] [place] [phone]   [phone]           Please bring any scans or X-rays taken at other hospitals, if similar tests were done. Also bring a list of your medicines, including vitamins, minerals and over-the-counter drugs. It is safest to leave personal items at home.  Be sure to tell your doctor:   If you have any allergies.   If there s any chance you are pregnant.   If you are breastfeeding.  You may have contrast for this exam. To prepare:   Do not eat or drink for 2 hours before your exam. If you need to take medicine, you may take it with small sips of water. (We may ask you to take liquid medicine as well.)   The day before your exam, drink extra fluids at least six 8-ounce glasses (unless your doctor tells you to restrict your fluids).   You will be given instructions on how to drink the contrast.  Patients over 70 or patients with diabetes or kidney problems:   If you haven t had a blood test (creatinine test) within the last 30 days, the Cardiologist/Radiologist may require you to get this test prior to your exam.  If you have diabetes:   Continue to take your  metformin medication on the day of your exam  Please wear loose clothing, such as a sweat suit or jogging clothes. Avoid snaps, zippers and other metal. We may ask you to undress and put on a hospital gown.  If you have any questions, please call the Imaging Department where you will have your exam.            Mar 21, 2018  8:00 AM CDT   [facility] Visit with [facility] [place] [facility]   [facility]    [address] [place] [phone]            Mar 21, 2018  8:45 AM CDT   Return Visit with Yakov Shaffer MD   [facility] ([facility])    [address] [place] [phone]            Mar 21, 2018  9:30 AM CDT   Level 2 with [address] ([facility])    [address] [place] [phone] [phone]              Who to contact     If you have questions or need follow up information about today's clinic visit or your schedule please contact [facility] directly at [phone].  Normal or non-critical lab and imaging results will be communicated to you by [employer], letter or phone within 4 business days after the clinic has received the results. If you do not hear from us within 7 days, please contact the clinic through [employer] or phone. If you have a critical or abnormal lab result, we will notify you by phone as soon as possible.  Submit refill requests through [employer] or call your pharmacy and they will forward the refill request to us. Please allow 3 business days for your refill to be completed.          Additional Information About Your Visit        [employer] Information     [employer] gives you secure access to your electronic health record. If you see a primary care provider, you can also send messages to your care team and make appointments. If you have questions, please call your primary care clinic.  If  you do not have a primary care provider, please call [phone] and they will assist you.      [employer] is an electronic gateway that provides easy, online access to your medical records. With [employer], you can request a clinic appointment, read your test results, renew a prescription or communicate with your care team.     To access your existing account, please contact your [facility] or call [phone] for assistance.        Care EveryWhere ID     This is your Care EveryWhere ID. This could be used by other organizations to access your [place] medical records  [id number]         Blood Pressure from Last 3 Encounters:   03/07/18 115/76   02/21/18 130/87   02/07/18 110/64    Weight from Last 3 Encounters:   03/07/18 99.6 kg (219 lb 9 oz)   02/21/18 98.6 kg (217 lb 6.4 oz)   02/07/18 98.7 kg (217 lb 8 oz)              Today, you had the following     No orders found for display       Primary Care Provider Office Phone # Fax #    Sandra Dickerson [phone]       [facility]  [address]  [facility] [address]        Equal Access to Services     CHANDAN GALLO : Hadii aad ku hadasho Soomaali, waaxda luqadaha, qaybta kaalmada adeegyada, waxay prabhain hayguillermo cowart . So [facility] [phone].    ATENCIÓN: Si habla español, tiene a cuevas disposición servicios gratuitos de asistencia lingüística. Llame al [phone].    We comply with applicable federal civil rights laws and [place] laws. We do not discriminate on the basis of race, color, national origin, age, disability, sex, sexual orientation, or gender identity.            Thank you!     Thank you for choosing [facility]  for your care. Our goal is always to provide you with excellent care. Hearing back from our patients is one way we can continue to improve our services. Please take a few minutes to complete the written survey that you may receive in the mail after your visit  with us. Thank you!             Your Updated Medication List - Protect others around you: Learn how to safely use, store and throw away your medicines at www.disposemymeds.org.          This list is accurate as of 3/7/18 11:09 AM.  Always use your most recent med list.                   Brand Name Dispense Instructions for use Diagnosis    allopurinol 100 MG tablet    ZYLOPRIM     as needed (when eating shrimp)        aspirin 81 MG chewable tablet      Take 81 mg by mouth daily        lidocaine-prilocaine cream    EMLA          LORazepam 0.5 MG tablet    ATIVAN    30 tablet    Take 1 tablet (0.5 mg) by mouth every 4 hours as needed (Anxiety, Nausea/Vomiting or Sleep)    Malignant neoplasm of overlapping sites of stomach (H)       MULTIVITAMIN ADULT PO           ondansetron 8 MG tablet    ZOFRAN    10 tablet    Take 1 tablet (8 mg) by mouth every 8 hours as needed (Nausea/Vomiting)    Malignant neoplasm of overlapping sites of stomach (H)       prochlorperazine 10 MG tablet    COMPAZINE    30 tablet    Take 1 tablet (10 mg) by mouth every 6 hours as needed (Nausea/Vomiting)    Malignant neoplasm of overlapping sites of stomach (H)       TYLENOL PO      Take by mouth as needed for mild pain or fever

## 2018-03-07 NOTE — PROGRESS NOTES
3/7/2018     REASON FOR VISIT:  Follow-up for unresectable metastatic gastric carcinoma, diffuse type, grade 3, HER-2 negative, MSI-Intact.      HISTORY OF PRESENT ILLNESS:  Please see my previous note for details.  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      CT CAP after C#6 shows stable disease    C# 9 FOLFOX 8/2/2017 ( Oxaliplatin dose reduced to 70mg/m2 due to neuropathy )  C#10 8/22/2017 ( Oxaliplatin dose reduced to 60mg/m2 due to neuropathy )  Delayed by 1 week- patient preference  C#11 9/6/17 5FU/LV ( Stopped Oxaliplatin )  C#12 9/20/2017 5FU/LV    Repeat CT scan after C#12 shows stable to slightly improved disease.    C#13 10/4/2017 5FU/LV  C#14 10/18/2017 5FU/LV  C#15 10/31/2017 5FU/LV  C#16 11/15/2017 5FU/LV  C#17 11/29/2017 5FU/LV   C#18 12/13/2017 5FU/LV    Repeat CT CAP 12/22/17 is stable    C#19 12/27/2017 5FU/LV  C#20 1/10/2018 5FU/LV  C# 21 1/24/2018 5FU/LV  C# 22 2/7/2018  5FU/LV  C# 23 2/21/2018   C# 24 3/7/2018      INTERVAL HISTORY:   He continues to feel good. Denies any pain. No nausea or vomiting. He is eating well. Energy is good. He has now joined a gym and he is going than 4 times a week. No infections. No new swellings. No new skin problems apart from dryness of the palms and some skin peeling. He is applying moisturizing cream several times a day. Neuropathy is about the same as before. Denies shortness of breath    ECOG 0     ROS:  Other review of system is negative        MEDICATIONS:   Current Outpatient Prescriptions   Medication     Acetaminophen (TYLENOL PO)     Multiple Vitamins-Minerals (MULTIVITAMIN ADULT PO)     lidocaine-prilocaine (EMLA) cream     aspirin 81 MG chewable tablet     LORazepam (ATIVAN) 0.5 MG tablet     prochlorperazine (COMPAZINE) 10 MG tablet      "ondansetron (ZOFRAN) 8 MG tablet     allopurinol (ZYLOPRIM) 100 MG tablet     No current facility-administered medications for this visit.      Facility-Administered Medications Ordered in Other Visits   Medication     heparin 100 UNIT/ML injection 5 mL     sodium chloride (PF) 0.9% PF flush 10-20 mL            PHYSICAL EXAMINATION:   /76  Pulse 66  Temp 98.5  F (36.9  C)  Ht 1.822 m (5' 11.75\")  Wt 99.6 kg (219 lb 9 oz)  SpO2 97%  BMI 29.99 kg/m2  CONSTITUTIONAL: no acute distress  EYES: PERRLA, no palor or icterus.   ENT/MOUTH: no mouth lesions. Ears normal  CVS: s1s2 no m r g .   RESPIRATORY: clear to auscultation b/l  GI: soft non tender no hepatosplenomegaly  NEURO: AAOX3  Grossly non focal neuro exam apart from subjective numbness of the feet and fingers  INTEGUMENT: no obvious rashes  LYMPHATIC: no palpable cervical, supraclavicular, axillary LAD  MUSCULOSKELETAL: Unremarkable. No bony tenderness.   EXTREMITIES: no pedal edema. The skin off the palms is dry and there is some skin peeling especially of the right index and right thumb.  PSYCH: Mentation, mood and affect are normal. Decision making capacity is intact          LABS:   Stable    ASSESSMENT AND PLAN:  Metastatic gastric cancer, HER-2/catrachita negative, MSI intact, presenting with early satiety and significant weight loss and epigastric discomfort upon eating with some vomiting.  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  There is an indeterminate right liver lobe lesion which has remained stable and likely is not a metastasis.      We started palliative FOLFOX on 03/12/2017.     Oxaliplatin was dropped from cycle #11 onwards because of neuropathy     Scans after C#18 show stable disease    He is doing well. Will proceed with C#24 today and repeat scans before next cycle. They are already scheduled for 3/16/2018      Skin peeling due to 5-FU. His " skin is also dry. He will continue applying moisturizing creams many times a day.     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 Continue to observe. This is very slowly improved over time. Previously he did not like Cymbalta and he does not want to try gabapentin.     Anemia. Mild. This is due to chemotherapy. Continue to observe    Description regarding exercise. He has now joined a gym and is going than 4 times a week to exercise and I encouraged him to continue to do so and slowly build his endurance. Overall his energy has been pretty decent      We did not address the following today    His genetic testing did not show any identifiable mutation not reveal any identifiable mutation     Return to clinic as a scheduled    I answered all of his and his family's questions to their satisfaction and they are agreeable and comfortable with the plan    LANEY HERNANDEZ MD

## 2018-03-07 NOTE — PROGRESS NOTES
"Infusion Nursing Note:  Terry Yi presents today for C24 G5Feffuzoiul/5Fu push and 5FU pump connect .    Patient seen by provider today: Yes: Dr. Shaffer   present during visit today: Not Applicable.    Note:N/A    Intravenous Access:  Implanted Port.    Treatment Conditions:  Lab Results   Component Value Date    HGB 13.2 03/07/2018     Lab Results   Component Value Date    WBC 6.1 03/07/2018      Lab Results   Component Value Date    ANEU 3.1 03/07/2018     Lab Results   Component Value Date     03/07/2018      Lab Results   Component Value Date     03/07/2018                   Lab Results   Component Value Date    POTASSIUM 4.4 03/07/2018           No results found for: MAG         Lab Results   Component Value Date    CR 0.83 03/07/2018                   Lab Results   Component Value Date    BRITTNEY 8.9 03/07/2018                Lab Results   Component Value Date    BILITOTAL 0.4 03/07/2018           Lab Results   Component Value Date    ALBUMIN 3.8 03/07/2018                    Lab Results   Component Value Date    ALT 56 03/07/2018           Lab Results   Component Value Date    AST 39 03/07/2018       Results reviewed, labs MET treatment parameters, ok to proceed with treatment.      Post Infusion Assessment:  Patient tolerated infusion without incident.  Blood return noted pre and post infusion.  Site patent and intact, free from redness, edema or discomfort.  No evidence of extravasations.  Prior to discharge: Port is secured in place with tegaderm and flushed with 10cc NS with positive blood return noted. Continuous home infusion Dosi-Fuser pump connected.    All connectors secured in place and clamps taped open.    Pump started, \"running\" noted on display (CADD): Not Applicable.    Patient set up for pump disconnect in [facility] sets up his own disconnect on 3/9/18 at 9am.            Discharge Plan:    Patient will return 3/21/18 for next appointment.   Patient discharged in " stable condition accompanied by: wife and son.  Departure Mode: Ambulatory.    Marcos Corley RN

## 2018-03-07 NOTE — PROGRESS NOTES
Power port needle left accessed for treatment. Tolerated lab draw without complaint. Hume drawn-Red/Green/Purple tubes. Double signed by patient and RN. See documentation flowsheet. Arleth Brandon, RN, BSN, OCN

## 2018-03-16 NOTE — NURSING NOTE
IVAD accessed with 20 gauge 3/4inch matamoros gripper plus needle.  Flushed with 10 ml Sterile 0.9% NaCl (NDC [address]-10) and dressed with sterile Tegaderm.  Blood return positive: yes  Site without redness, tenderness or swelling: yes  Flushed with 20 cc NS (NDC [address]-01)and 5 cc 100 unit/ml Heparin (NDC [address]-47)  Needle: d/c'd intact

## 2018-03-16 NOTE — MR AVS SNAPSHOT
MRN:[id number]                      After Visit Summary   3/16/2018    Terry Yi    MRN: [id number]           Visit Information        Provider Department      3/16/2018 8:30 AM [facility] IMAGING NURSE [facility]        Your next 10 appointments already scheduled     Mar 21, 2018  8:00 AM CDT   Return Visit with NURSE ONLY CANCER CENTER   [facility] ([facility])    [address] [place] [phone]            Mar 21, 2018  8:45 AM CDT   Return Visit with Yakov Shaffer MD   [facility] ([facility])    [id number] [place] [phone]            Mar 21, 2018  9:30 AM CDT   Level 2 with [place] [address] INFUSION   [facility] ([facility])    [address] [place] [address] [phone]              MyChart Information     [employer] gives you secure access to your electronic health record. If you see a primary care provider, you can also send messages to your care team and make appointments. If you have questions, please call your primary care clinic.  If you do not have a primary care provider, please call [phone] and they will assist you.      [employer] is an electronic gateway that provides easy, online access to your medical records. With [employer], you can request a clinic appointment, read your test results, renew a prescription or communicate with your care team.     To access your existing account, please contact your [facility] or call [phone] for assistance.        Care EveryWhere ID     This is your Care EveryWhere ID. This could be used by other organizations to access your [place] medical records  [id number]        Equal Access to Services     CHANDAN GALLO AH: Jemal Casillas, matt rincon, claire whitmore  ah. So [facility] [phone].    ATENCIÓN: Si habla español, tiene a cuevas disposición servicios gratuitos de asistencia lingüística. Llame al [phone].    We comply with applicable federal civil rights laws and [place] laws. We do not discriminate on the basis of race, color, national origin, age, disability, sex, sexual orientation, or gender identity.

## 2018-03-21 NOTE — PROGRESS NOTES
3/21/2018     REASON FOR VISIT:  Follow-up for unresectable metastatic gastric carcinoma, diffuse type, grade 3, HER-2 negative, MSI-Intact.      HISTORY OF PRESENT ILLNESS:  Please see my previous note for details.  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      CT CAP after C#6 shows stable disease    C# 9 FOLFOX 8/2/2017 ( Oxaliplatin dose reduced to 70mg/m2 due to neuropathy )  C#10 8/22/2017 ( Oxaliplatin dose reduced to 60mg/m2 due to neuropathy )  Delayed by 1 week- patient preference  C#11 9/6/17 5FU/LV ( Stopped Oxaliplatin )  C#12 9/20/2017 5FU/LV    Repeat CT scan after C#12 shows stable to slightly improved disease.    C#13 10/4/2017 5FU/LV  C#14 10/18/2017 5FU/LV  C#15 10/31/2017 5FU/LV  C#16 11/15/2017 5FU/LV  C#17 11/29/2017 5FU/LV   C#18 12/13/2017 5FU/LV    Repeat CT CAP 12/22/17 is stable    C#19 12/27/2017 5FU/LV  C#20 1/10/2018 5FU/LV  C# 21 1/24/2018 5FU/LV  C# 22 2/7/2018  5FU/LV  C# 23 2/21/2018   C# 24 3/7/2018     Repeat CAP on 3/16/18 shows stable disease.    C# 25 3/21/2018 5FU/LV     INTERVAL HISTORY:   He is doing well. Neuropathy is about the same as before. He is eating well. Denies nausea vomiting diarrhea or constipation. Energy is good. Denies pain. No new swellings. No infections. No troubles breathing. No new skin problems but continues to have dry skin of the palms and occasional peeling especially of the right index finger and thumb. He is applying moisturizing lotion several times a day. He continues to exercise regularly and goes to the gym early in the morning    ECOG 0     ROS:  Otherwise a comprehensive review of the system was negative        MEDICATIONS:   Current Outpatient Prescriptions   Medication     Multiple Vitamins-Minerals (MULTIVITAMIN ADULT PO)      "lidocaine-prilocaine (EMLA) cream     Acetaminophen (TYLENOL PO)     aspirin 81 MG chewable tablet     LORazepam (ATIVAN) 0.5 MG tablet     prochlorperazine (COMPAZINE) 10 MG tablet     ondansetron (ZOFRAN) 8 MG tablet     allopurinol (ZYLOPRIM) 100 MG tablet     No current facility-administered medications for this visit.      Facility-Administered Medications Ordered in Other Visits   Medication     heparin 100 UNIT/ML injection 5 mL     sodium chloride (PF) 0.9% PF flush 10-20 mL            PHYSICAL EXAMINATION:   /76  Pulse 76  Temp 97.7  F (36.5  C)  Ht 1.803 m (5' 11\")  Wt 99.5 kg (219 lb 6 oz)  SpO2 (!) 76%  BMI 30.6 kg/m2  CONSTITUTIONAL: No apparent distress  EYES: PERRLA, no pallor or jaundice  ENT/MOUTH: Ears unremarkable. No oral lesions  CVS: s1s2 normal  RESPIRATORY: Chest is clear  GI: Abdomen is benign without tenderness  NEURO: He is alert and oriented ×3. Numbness of fingers and feet is same as before  INTEGUMENT: The skin off palms is less dry today and there is some peeling of the right index finger and right thumb   LYMPHATIC: no palpable lymphadenopathy  MUSCULOSKELETAL: Unremarkable. No bony tenderness.   EXTREMITIES: no pedal edema.   PSYCH: Mentation, mood and affect are appropriate        LABS:   Reviewed and unremarkable    IMAGING:  Reviewed  EXAM: CT of the Chest, Abdomen and Pelvis, 3/16/2018     COMPARISON: CTs dated 12/22/2017, 9/29/2017, 6/30/2017 and PET CT  dated 3/24/2017      CLINICAL HISTORY: Metastatic gastric carcinoma.      TECHNIQUE: Axial images of the chest, abdomen and pelvis were obtained  with 134 mL IV contrast. Coronal reconstructions were provided. Images  were reviewed in bone, lung, and soft tissue windows.     Dose: 1108 mGy*cm     FINDINGS:     Chest: 0.9 cm right thyroid nodule is unchanged since at least  3/24/2017. The thyroid gland is otherwise unremarkable. Great vessels,  aorta and pulmonary artery normal in caliber and configuration. The  heart " is not enlarged. There is no pericardial effusion. Stable  appearance of partially calcified mediastinal and hilar lymph nodes.  No new or enlarging lymph nodes.     Lungs: Stringy inspissated secretions in the trachea, the central  airways are otherwise clear. No focal consolidation, pleural effusion  or pneumothorax. Multiple calcified granulomas are stable. No new or  enlarging pulmonary nodules.     Abdomen and Pelvis: Redemonstration of mild diffuse gastric wall  thickening, not significantly changed when compared with exam dated  12/22/2017. Prominent mesenteric and retroperitoneal lymph nodes are  also stable when compared with prior exam. For example, gastrohepatic  lymph node measures 1.1 x 1.4 cm (series 2 image 61), unchanged from  prior exam. Portacaval lymph node measures 1.6 x 1 cm (series 2 image  66), previously 1.8 x 1.2 cm. Mild haziness of the central mesentery  with infiltration along the lesser sac, and extending to the  retroperitoneum is also not significantly changed from prior exam. No  new or enlarging lymph nodes.     1.3 subcapsular lesion along the right lobe of the liver is not  significantly changed in size or morphology since at least 3/24/2017  and appears to have peripheral globular enhancement on the current  examination. Stable appearance of subcentimeter hepatic hypodensities  along the falciform ligament (series 2 image 60) and near the hepatic  dome (series 2 image 49). No new focal hepatic mass.      The gallbladder, spleen, pancreas and adrenal glands are unremarkable.  Symmetric nephrographic enhancement of the kidneys. No hydronephrosis  or hydroureter. The urinary bladder is partially distended without  focal mucosal thickening. The bowel is nondilated. Colonic  diverticulosis without diverticulitis. Fluid/thickening along the  posterior peritoneum, similar to prior exam. Major visualized vessels  of the abdomen are patent.     Bones and soft tissues: Multilevel  degenerative change of the spine.  Advanced degenerative changes about the right hip, unchanged from  prior exam. No acute osseous abnormality or suspicious osseous lesion.         Impression:   1. In this patient with known gastric carcinoma, there is stable mild  diffuse gastric wall thickening, prominent local lymph nodes,  infiltrative mesenteric and retroperitoneal haziness with  fluid/thickening along the peritoneum. These findings are not  significantly changed from prior exam. No evidence for new or  progressive disease in the chest, abdomen or pelvis.  2. 1.3 cm hypodensity in the right lobe of the liver currently  demonstrates characteristics suggestive of a hepatic hemangioma.  3. Colonic diverticulosis without diverticulitis.  4. Sequelae of granulomatous disease.    ASSESSMENT AND PLAN:  Metastatic gastric cancer, HER-2/catrachita negative, MSI intact, presenting with early satiety and significant weight loss and epigastric discomfort upon eating with some vomiting.  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  There is an indeterminate right liver lobe lesion which has remained stable and likely is not a metastasis.      We started palliative FOLFOX on 03/12/2017.     Oxaliplatin was dropped from cycle #11 onwards because of neuropathy     Scans after C#18 show stable disease    Repeat CT CAP after 24 cycles also show stable disease so we will continue the same chemotherapy with 5FU. He is tolerating it well    I would like to give him 6 additional cycles before repeating imaging, so after C#30 ( if he continues to do well )      Skin peeling due to 5-FU. This is mild and stable and it is not worsening. 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 We will continue to observe  for now.    We did not address the following today  Description regarding exercise. He has now joined a gym and is going than 4 times a week to exercise and I encouraged him to continue to do so and slowly build his endurance. Overall his energy has been pretty decent      His genetic testing did not show any identifiable mutation not reveal any identifiable mutation     Return to clinic in 2 weeks to get chemo and then see me in 4 weeks and chemo to follow    I answered all of his and his family's questions to their satisfaction and they are agreeable and comfortable with the plan    LANEY HERNANDEZ MD

## 2018-03-21 NOTE — LETTER
3/21/2018         RE: Terry Yi  [place] [address]        Dear Colleague,    Thank you for referring your patient, Terry Yi, to the [facility]. Please see a copy of my visit note below.    3/21/2018     REASON FOR VISIT:  Follow-up for unresectable metastatic gastric carcinoma, diffuse type, grade 3, HER-2 negative, MSI-Intact.      HISTORY OF PRESENT ILLNESS:  Please see my previous note for details.  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      CT CAP after C#6 shows stable disease    C# 9 FOLFOX 8/2/2017 ( Oxaliplatin dose reduced to 70mg/m2 due to neuropathy )  C#10 8/22/2017 ( Oxaliplatin dose reduced to 60mg/m2 due to neuropathy )  Delayed by 1 week- patient preference  C#11 9/6/17 5FU/LV ( Stopped Oxaliplatin )  C#12 9/20/2017 5FU/LV    Repeat CT scan after C#12 shows stable to slightly improved disease.    C#13 10/4/2017 5FU/LV  C#14 10/18/2017 5FU/LV  C#15 10/31/2017 5FU/LV  C#16 11/15/2017 5FU/LV  C#17 11/29/2017 5FU/LV   C#18 12/13/2017 5FU/LV    Repeat CT CAP 12/22/17 is stable    C#19 12/27/2017 5FU/LV  C#20 1/10/2018 5FU/LV  C# 21 1/24/2018 5FU/LV  C# 22 2/7/2018  5FU/LV  C# 23 2/21/2018   C# 24 3/7/2018     Repeat CAP on 3/16/18 shows stable disease.    C# 25 3/21/2018 5FU/LV     INTERVAL HISTORY:   He is doing well. Neuropathy is about the same as before. He is eating well. Denies nausea vomiting diarrhea or constipation. Energy is good. Denies pain. No new swellings. No infections. No troubles breathing. No new skin problems but continues to have dry skin of the palms and occasional peeling especially of the right index finger and thumb. He is applying moisturizing lotion several times a day. He continues to exercise regularly and  "goes to the gym early in the morning    ECOG 0     ROS:  Otherwise a comprehensive review of the system was negative        MEDICATIONS:   Current Outpatient Prescriptions   Medication     Multiple Vitamins-Minerals (MULTIVITAMIN ADULT PO)     lidocaine-prilocaine (EMLA) cream     Acetaminophen (TYLENOL PO)     aspirin 81 MG chewable tablet     LORazepam (ATIVAN) 0.5 MG tablet     prochlorperazine (COMPAZINE) 10 MG tablet     ondansetron (ZOFRAN) 8 MG tablet     allopurinol (ZYLOPRIM) 100 MG tablet     No current facility-administered medications for this visit.      Facility-Administered Medications Ordered in Other Visits   Medication     heparin 100 UNIT/ML injection 5 mL     sodium chloride (PF) 0.9% PF flush 10-20 mL            PHYSICAL EXAMINATION:   /76  Pulse 76  Temp 97.7  F (36.5  C)  Ht 1.803 m (5' 11\")  Wt 99.5 kg (219 lb 6 oz)  SpO2 (!) 76%  BMI 30.6 kg/m2  CONSTITUTIONAL: No apparent distress  EYES: PERRLA, no pallor or jaundice  ENT/MOUTH: Ears unremarkable. No oral lesions  CVS: s1s2 normal  RESPIRATORY: Chest is clear  GI: Abdomen is benign without tenderness  NEURO: He is alert and oriented ×3. Numbness of fingers and feet is same as before  INTEGUMENT: The skin off palms is less dry today and there is some peeling of the right index finger and right thumb   LYMPHATIC: no palpable lymphadenopathy  MUSCULOSKELETAL: Unremarkable. No bony tenderness.   EXTREMITIES: no pedal edema.   PSYCH: Mentation, mood and affect are appropriate        LABS:   Reviewed and unremarkable    IMAGING:  Reviewed  EXAM: CT of the Chest, Abdomen and Pelvis, 3/16/2018     COMPARISON: CTs dated 12/22/2017, 9/29/2017, 6/30/2017 and PET CT  dated 3/24/2017      CLINICAL HISTORY: Metastatic gastric carcinoma.      TECHNIQUE: Axial images of the chest, abdomen and pelvis were obtained  with 134 mL IV contrast. Coronal reconstructions were provided. Images  were reviewed in bone, lung, and soft tissue " windows.     Dose: 1108 mGy*cm     FINDINGS:     Chest: 0.9 cm right thyroid nodule is unchanged since at least  3/24/2017. The thyroid gland is otherwise unremarkable. Great vessels,  aorta and pulmonary artery normal in caliber and configuration. The  heart is not enlarged. There is no pericardial effusion. Stable  appearance of partially calcified mediastinal and hilar lymph nodes.  No new or enlarging lymph nodes.     Lungs: Stringy inspissated secretions in the trachea, the central  airways are otherwise clear. No focal consolidation, pleural effusion  or pneumothorax. Multiple calcified granulomas are stable. No new or  enlarging pulmonary nodules.     Abdomen and Pelvis: Redemonstration of mild diffuse gastric wall  thickening, not significantly changed when compared with exam dated  12/22/2017. Prominent mesenteric and retroperitoneal lymph nodes are  also stable when compared with prior exam. For example, gastrohepatic  lymph node measures 1.1 x 1.4 cm (series 2 image 61), unchanged from  prior exam. Portacaval lymph node measures 1.6 x 1 cm (series 2 image  66), previously 1.8 x 1.2 cm. Mild haziness of the central mesentery  with infiltration along the lesser sac, and extending to the  retroperitoneum is also not significantly changed from prior exam. No  new or enlarging lymph nodes.     1.3 subcapsular lesion along the right lobe of the liver is not  significantly changed in size or morphology since at least 3/24/2017  and appears to have peripheral globular enhancement on the current  examination. Stable appearance of subcentimeter hepatic hypodensities  along the falciform ligament (series 2 image 60) and near the hepatic  dome (series 2 image 49). No new focal hepatic mass.      The gallbladder, spleen, pancreas and adrenal glands are unremarkable.  Symmetric nephrographic enhancement of the kidneys. No hydronephrosis  or hydroureter. The urinary bladder is partially distended without  focal mucosal  thickening. The bowel is nondilated. Colonic  diverticulosis without diverticulitis. Fluid/thickening along the  posterior peritoneum, similar to prior exam. Major visualized vessels  of the abdomen are patent.     Bones and soft tissues: Multilevel degenerative change of the spine.  Advanced degenerative changes about the right hip, unchanged from  prior exam. No acute osseous abnormality or suspicious osseous lesion.         Impression:   1. In this patient with known gastric carcinoma, there is stable mild  diffuse gastric wall thickening, prominent local lymph nodes,  infiltrative mesenteric and retroperitoneal haziness with  fluid/thickening along the peritoneum. These findings are not  significantly changed from prior exam. No evidence for new or  progressive disease in the chest, abdomen or pelvis.  2. 1.3 cm hypodensity in the right lobe of the liver currently  demonstrates characteristics suggestive of a hepatic hemangioma.  3. Colonic diverticulosis without diverticulitis.  4. Sequelae of granulomatous disease.    ASSESSMENT AND PLAN:  Metastatic gastric cancer, HER-2/catrachita negative, MSI intact, presenting with early satiety and significant weight loss and epigastric discomfort upon eating with some vomiting.  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  There is an indeterminate right liver lobe lesion which has remained stable and likely is not a metastasis.      We started palliative FOLFOX on 03/12/2017.     Oxaliplatin was dropped from cycle #11 onwards because of neuropathy     Scans after C#18 show stable disease    Repeat CT CAP after 24 cycles also show stable disease so we will continue the same chemotherapy with 5FU. He is tolerating it well    I would like to give him 6 additional cycles before repeating imaging, so after C#30 ( if he continues to do well )      Skin peeling due to 5-FU. This is mild  and stable and it is not worsening. 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 We will continue to observe for now.    We did not address the following today  Description regarding exercise. He has now joined a gym and is going than 4 times a week to exercise and I encouraged him to continue to do so and slowly build his endurance. Overall his energy has been pretty decent      His genetic testing did not show any identifiable mutation not reveal any identifiable mutation     Return to clinic in 2 weeks to get chemo and then see me in 4 weeks and chemo to follow    I answered all of his and his family's questions to their satisfaction and they are agreeable and comfortable with the plan    LANEY SHAFFER MD              Again, thank you for allowing me to participate in the care of your patient.        Sincerely,        Laney Shaffer MD

## 2018-03-21 NOTE — MR AVS SNAPSHOT
After Visit Summary   3/21/2018    Terry Yi    MRN: [id number]           Patient Information     Date Of Birth          1965        Visit Information        Provider Department      3/21/2018 9:30 AM BAY 5 INFUSION [facility]        Today's Diagnoses     Malignant neoplasm of overlapping sites of stomach (H)    -  1       Follow-ups after your visit        Your next 10 appointments already scheduled     Apr 04, 2018 12:00 PM CDT   Return Visit with [facility]   [facility] ([facility])    [address] [facility] [address]   [phone]            Apr 04, 2018 12:30 PM CDT   Level 2 with BAY 6 INFUSION   [facility] ([facility])    [address] [facility] [address]   [phone]            Apr 18, 2018  8:30 AM CDT   Return Visit with [facility]   [facility] ([facility])    [address] [facility] [address]   [phone]            Apr 18, 2018  9:15 AM CDT   Return Visit with Yakov Shaffer MD   [facility] ([facility])    [address] [facility] [address] [phone]            Apr 18, 2018 10:00 AM CDT   Level 2 with BAY 6 INFUSION   [facility] ([facility])    [address] [facility] [address] [phone]              Who to contact     If you have questions or need follow up information about today's clinic visit or your schedule please contact [facility] directly at [phone].  Normal or non-critical lab and imaging results will be communicated to you by MyChart, letter or phone within 4 business days after the clinic has received the results. If you do not hear from us within 7 days, please contact the clinic through MyChart or phone. If you have a critical or abnormal lab  result, we will notify you by phone as soon as possible.  Submit refill requests through [employer] or call your pharmacy and they will forward the refill request to us. Please allow 3 business days for your refill to be completed.          Additional Information About Your Visit        [employer] Information     [employer] gives you secure access to your electronic health record. If you see a primary care provider, you can also send messages to your care team and make appointments. If you have questions, please call your primary care clinic.  If you do not have a primary care provider, please call [phone] and they will assist you.      [employer] is an electronic gateway that provides easy, online access to your medical records. With [employer], you can request a clinic appointment, read your test results, renew a prescription or communicate with your care team.     To access your existing account, please contact your [facility] or call [phone] for assistance.        Care EveryWhere ID     This is your Care EveryWhere ID. This could be used by other organizations to access your [facility] medical records  [id number]         Blood Pressure from Last 3 Encounters:   03/21/18 112/76   03/07/18 115/76   02/21/18 130/87    Weight from Last 3 Encounters:   03/21/18 99.5 kg (219 lb 6 oz)   03/07/18 99.6 kg (219 lb 9 oz)   02/21/18 98.6 kg (217 lb 6.4 oz)              Today, you had the following     No orders found for display       Primary Care Provider Office Phone # Fax #    Sandra Dickerson [phone]       [facility]  [address]  [facility] [address]        Equal Access to Services     [facility]: Hadii aad ku hadasho Soomaali, waaxda luqadaha, qaybta kaalmada adejenniferyaladonna, claire browne. So [facility] [phone].    ATENCIÓN: Si habla español, tiene a cuevas disposición servicios gratuitos de asistencia lingüística. Llame al  [phone].    We comply with applicable federal civil rights laws and [place] laws. We do not discriminate on the basis of race, color, national origin, age, disability, sex, sexual orientation, or gender identity.            Thank you!     Thank you for choosing [facility]  for your care. Our goal is always to provide you with excellent care. Hearing back from our patients is one way we can continue to improve our services. Please take a few minutes to complete the written survey that you may receive in the mail after your visit with us. Thank you!             Your Updated Medication List - Protect others around you: Learn how to safely use, store and throw away your medicines at www.disposemymeds.org.          This list is accurate as of 3/21/18 10:38 AM.  Always use your most recent med list.                   Brand Name Dispense Instructions for use Diagnosis    allopurinol 100 MG tablet    ZYLOPRIM     as needed (when eating shrimp)        aspirin 81 MG chewable tablet      Take 81 mg by mouth daily        lidocaine-prilocaine cream    EMLA          LORazepam 0.5 MG tablet    ATIVAN    30 tablet    Take 1 tablet (0.5 mg) by mouth every 4 hours as needed (Anxiety, Nausea/Vomiting or Sleep)    Malignant neoplasm of overlapping sites of stomach (H)       MULTIVITAMIN ADULT PO           ondansetron 8 MG tablet    ZOFRAN    10 tablet    Take 1 tablet (8 mg) by mouth every 8 hours as needed (Nausea/Vomiting)    Malignant neoplasm of overlapping sites of stomach (H)       prochlorperazine 10 MG tablet    COMPAZINE    30 tablet    Take 1 tablet (10 mg) by mouth every 6 hours as needed (Nausea/Vomiting)    Malignant neoplasm of overlapping sites of stomach (H)       TYLENOL PO      Take by mouth as needed for mild pain or fever

## 2018-03-21 NOTE — NURSING NOTE
"Oncology Rooming Note    March 21, 2018 8:26 AM   Terry Yi is a 52 year old male who presents for:    Chief Complaint   Patient presents with     Oncology Clinic Visit     return prior to treatment     Initial Vitals: /76  Pulse 76  Temp 97.7  F (36.5  C)  Ht 1.803 m (5' 11\")  Wt 99.5 kg (219 lb 6 oz)  BMI 30.6 kg/m2 Estimated body mass index is 30.6 kg/(m^2) as calculated from the following:    Height as of this encounter: 1.803 m (5' 11\").    Weight as of this encounter: 99.5 kg (219 lb 6 oz). Body surface area is 2.23 meters squared.  Severe Pain (7) Comment: fingers and toes, occasional tylenol otc lidocaine cream   No LMP for male patient.  Allergies reviewed: Yes  Medications reviewed: Yes    Medications: Medication refills not needed today.  Pharmacy name entered into EPIC: Data Unavailable        5 minutes for nursing intake (face to face time)     Sayra Pelaez LPN              "

## 2018-03-21 NOTE — PROGRESS NOTES
Power port needle left accessed for treatment. Tolerated lab draw without complaint. [employer] drawn-Red/Green/Purple tubes. Double signed by patient and RN. See documentation flowsheet. Arleth Brandon, RN, BSN, OCN

## 2018-03-21 NOTE — PROGRESS NOTES
"Infusion Nursing Note:  Terry Yi presents today for leucovorin + pump connect.    Patient seen by provider today: Yes: Dr. Shaffer   present during visit today: Not Applicable.    Note: N/A.    Intravenous Access:  Implanted Port.    Treatment Conditions:  Lab Results   Component Value Date    HGB 13.8 03/21/2018     Lab Results   Component Value Date    WBC 6.3 03/21/2018      Lab Results   Component Value Date    ANEU 3.5 03/21/2018     Lab Results   Component Value Date     03/21/2018      Lab Results   Component Value Date     03/21/2018                   Lab Results   Component Value Date    POTASSIUM 4.2 03/21/2018           No results found for: MAG         Lab Results   Component Value Date    CR 0.84 03/21/2018                   Lab Results   Component Value Date    BRITTNEY 9.2 03/21/2018                Lab Results   Component Value Date    BILITOTAL 0.5 03/21/2018           Lab Results   Component Value Date    ALBUMIN 3.9 03/21/2018                    Lab Results   Component Value Date    ALT 59 03/21/2018           Lab Results   Component Value Date    AST 39 03/21/2018       Results reviewed, labs MET treatment parameters, ok to proceed with treatment.      Post Infusion Assessment:  Patient tolerated infusion without incident.  Site patent and intact, free from redness, edema or discomfort.  No evidence of extravasations.    Discharge Plan:   Discharge instructions reviewed with: Family.  Patient and/or family verbalized understanding of discharge instructions and all questions answered.  Patient discharged in stable condition accompanied by: wife and brother.  Departure Mode: Ambulatory.    Michelle Castro RN    Prior to discharge: Port is secured in place with tegaderm and flushed with 10cc NS with positive blood return noted.  Continuous home infusion Dosi-Fuser pump connected.    All connectors secured in place and clamps taped open.    Pump started, \"running\" noted on " display (CADD): Not Applicable.  Patient instructed to call our clinic or [facility] with any questions or concerns at home.  Patient verbalized understanding.    Patient set up for pump disconnect  on Friday at 0830 Am in [place] at the [facility].

## 2018-03-21 NOTE — PATIENT INSTRUCTIONS
Chemo today    In 2 weeks, labs and chemo, no need to see a provider    In 4 weeks, labs and see me and chemo

## 2018-03-21 NOTE — MR AVS SNAPSHOT
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)    -  1       Follow-ups after your visit        Your next 10 appointments already scheduled     Mar 21, 2018  8:45 AM CDT   Return Visit with Yakov Shaffer MD   [facility] ([facility])    [address] [place] [address] [phone]            Mar 21, 2018  9:30 AM CDT   Level 2 with BAY 5 INFUSION   [facility])    [address] [place] [address] [phone]              Who to contact     If you have questions or need follow up information about today's clinic visit or your schedule please contact [facility] directly at [phone].  Normal or non-critical lab and imaging results will be communicated to you by Sumoinghart, letter or phone within 4 business days after the clinic has received the results. If you do not hear from us within 7 days, please contact the clinic through Kiva Systemst or phone. If you have a critical or abnormal lab result, we will notify you by phone as soon as possible.  Submit refill requests through Total Prestige or call your pharmacy and they will forward the refill request to us. Please allow 3 business days for your refill to be completed.          Additional Information About Your Visit        Sumoinghart Information     Total Prestige gives you secure access to your electronic health record. If you see a primary care provider, you can also send messages to your care team and make appointments. If you have questions, please call your primary care clinic.  If you do not have a primary care provider, please call  [phone] and they will assist you.      creads is an electronic gateway that provides easy, online access to your medical records. With creads, you can request a clinic appointment, read your test results, renew a prescription or communicate with your care team.     To access your existing account, please contact your [facility] or call [phone] for assistance.        Care EveryWhere ID     This is your Care EveryWhere ID. This could be used by other organizations to access your [facility] medical records  [id number]         Blood Pressure from Last 3 Encounters:   03/07/18 115/76   02/21/18 130/87   02/07/18 110/64    Weight from Last 3 Encounters:   03/07/18 99.6 kg (219 lb 9 oz)   02/21/18 98.6 kg (217 lb 6.4 oz)   02/07/18 98.7 kg (217 lb 8 oz)              We Performed the Following     CBC with platelets differential     Comprehensive metabolic panel        Primary Care Provider Office Phone # Fax #    Sandra Dickerson [phone]       [facility]  [address]  [facility] [address]        Equal Access to Services     [facility]: Hadii aad ku hadasho Soomaali, waaxda luqadaha, qaybta kaalmada adeegyada, claire mcdaniel hayrodrigon charley cowart . So [facility] [phone].    ATENCIÓN: Si habla español, tiene a cuevas disposición servicios gratuitos de asistencia lingüística. Llame al [phone].    We comply with applicable federal civil rights laws and [place] laws. We do not discriminate on the basis of race, color, national origin, age, disability, sex, sexual orientation, or gender identity.            Thank you!     Thank you for choosing [facility]  for your care. Our goal is always to provide you with excellent care. Hearing back from our patients is one way we can continue to improve our services. Please take a few minutes to complete the written survey that you may receive in the mail after your visit with us. Thank  you!             Your Updated Medication List - Protect others around you: Learn how to safely use, store and throw away your medicines at www.disposemymeds.org.          This list is accurate as of 3/21/18  8:12 AM.  Always use your most recent med list.                   Brand Name Dispense Instructions for use Diagnosis    allopurinol 100 MG tablet    ZYLOPRIM     as needed (when eating shrimp)        aspirin 81 MG chewable tablet      Take 81 mg by mouth daily        lidocaine-prilocaine cream    EMLA          LORazepam 0.5 MG tablet    ATIVAN    30 tablet    Take 1 tablet (0.5 mg) by mouth every 4 hours as needed (Anxiety, Nausea/Vomiting or Sleep)    Malignant neoplasm of overlapping sites of stomach (H)       MULTIVITAMIN ADULT PO           ondansetron 8 MG tablet    ZOFRAN    10 tablet    Take 1 tablet (8 mg) by mouth every 8 hours as needed (Nausea/Vomiting)    Malignant neoplasm of overlapping sites of stomach (H)       prochlorperazine 10 MG tablet    COMPAZINE    30 tablet    Take 1 tablet (10 mg) by mouth every 6 hours as needed (Nausea/Vomiting)    Malignant neoplasm of overlapping sites of stomach (H)       TYLENOL PO      Take by mouth as needed for mild pain or fever

## 2018-03-21 NOTE — MR AVS SNAPSHOT
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)    -  1      Care Instructions    Chemo today    In 2 weeks, labs and chemo, no need to see a provider    In 4 weeks, labs and see me and chemo          Follow-ups after your visit        Your next 10 appointments already scheduled     Mar 21, 2018  9:30 AM CDT   Level 2 with BAY 5 INFUSION   [facility] ([facility])    [address] [place] [address]   [phone]            Apr 04, 2018 12:00 PM CDT   Return Visit with NURSE ONLY CANCER CENTER   [facility] ([facility])    [address] [place] [address]   [phone]            Apr 04, 2018 12:30 PM CDT   Level 2 with BAY 6 INFUSION   [facility] ([facility])    [address] [place] [address]   [phone]            Apr 18, 2018  8:30 AM CDT   Return Visit with NURSE ONLY CANCER CENTER   [facility] ([facility])    [address] [place] [address]   [phone]            Apr 18, 2018  9:15 AM CDT   Return Visit with Yakov Shaffer MD   [facility])    [address] [place] [address]   [phone]            Apr 18, 2018 10:00 AM CDT   Level 2 with BAY 6 INFUSION   [facility])    [address] [facility] [address]   [phone]              Who to contact     If you have questions or need follow up information about today's clinic visit or your schedule please contact [facility]  "CLINICS directly at [phone].  Normal or non-critical lab and imaging results will be communicated to you by Savingspoint Corporationhart, letter or phone within 4 business days after the clinic has received the results. If you do not hear from us within 7 days, please contact the clinic through Savingspoint Corporationhart or phone. If you have a critical or abnormal lab result, we will notify you by phone as soon as possible.  Submit refill requests through PiCloud or call your pharmacy and they will forward the refill request to us. Please allow 3 business days for your refill to be completed.          Additional Information About Your Visit        Savings[employer] CorporationharMesuro Information     PiCloud gives you secure access to your electronic health record. If you see a primary care provider, you can also send messages to your care team and make appointments. If you have questions, please call your primary care clinic.  If you do not have a primary care provider, please call [phone] and they will assist you.      PiCloud is an electronic gateway that provides easy, online access to your medical records. With PiCloud, you can request a clinic appointment, read your test results, renew a prescription or communicate with your care team.     To access your existing account, please contact your [facility] or call [phone] for assistance.        Care EveryWhere ID     This is your Care EveryWhere ID. This could be used by other organizations to access your [facility] medical records  [id number]        Your Vitals Were     Pulse Temperature Height Pulse Oximetry BMI (Body Mass Index)       76 97.7  F (36.5  C) 1.803 m (5' 11\") 76% 30.6 kg/m2        Blood Pressure from Last 3 Encounters:   03/21/18 112/76   03/07/18 115/76   02/21/18 130/87    Weight from Last 3 Encounters:   03/21/18 99.5 kg (219 lb 6 oz)   03/07/18 99.6 kg (219 lb 9 oz)   02/21/18 98.6 kg (217 lb 6.4 oz)              Today, you had the following     No orders found for " display       Primary Care Provider Office Phone # Fax #    Sandra Dickerson, SARAI [phone]       [facility]  [address]  [facility] [address]        Equal Access to Services     [facility] : Hadii tk ku conradoo Sojacquelinali, waaxda luqadaha, qaybta kaalmada adeegyada, waxtejinder idiin hayaan charley june sofya browne. So [facility] [place] [phone].    ATENCIÓN: Si habla español, tiene a cuevas disposición servicios gratuitos de asistencia lingüística. Llame al [phone].    We comply with applicable federal civil rights laws and [place] laws. We do not discriminate on the basis of race, color, national origin, age, disability, sex, sexual orientation, or gender identity.            Thank you!     Thank you for choosing [facility]  for your care. Our goal is always to provide you with excellent care. Hearing back from our patients is one way we can continue to improve our services. Please take a few minutes to complete the written survey that you may receive in the mail after your visit with us. Thank you!             Your Updated Medication List - Protect others around you: Learn how to safely use, store and throw away your medicines at www.disposemymeds.org.          This list is accurate as of 3/21/18  9:16 AM.  Always use your most recent med list.                   Brand Name Dispense Instructions for use Diagnosis    allopurinol 100 MG tablet    ZYLOPRIM     as needed (when eating shrimp)        aspirin 81 MG chewable tablet      Take 81 mg by mouth daily        lidocaine-prilocaine cream    EMLA          LORazepam 0.5 MG tablet    ATIVAN    30 tablet    Take 1 tablet (0.5 mg) by mouth every 4 hours as needed (Anxiety, Nausea/Vomiting or Sleep)    Malignant neoplasm of overlapping sites of stomach (H)       MULTIVITAMIN ADULT PO           ondansetron 8 MG tablet    ZOFRAN    10 tablet    Take 1 tablet (8 mg) by mouth every 8 hours as needed (Nausea/Vomiting)    Malignant neoplasm of  overlapping sites of stomach (H)       prochlorperazine 10 MG tablet    COMPAZINE    30 tablet    Take 1 tablet (10 mg) by mouth every 6 hours as needed (Nausea/Vomiting)    Malignant neoplasm of overlapping sites of stomach (H)       TYLENOL PO      Take by mouth as needed for mild pain or fever

## 2018-04-04 NOTE — PROGRESS NOTES
"Infusion Nursing Note:  Terry Yi presents today for leucovorin/ 5fu push and pump connect.    Patient seen by provider today: No   present during visit today: Not Applicable.    Note: N/A.    Intravenous Access:  Implanted Port.    Treatment Conditions:  Lab Results   Component Value Date    HGB 14.2 04/04/2018     Lab Results   Component Value Date    WBC 6.4 04/04/2018      Lab Results   Component Value Date    ANEU 3.2 04/04/2018     Lab Results   Component Value Date     04/04/2018      Lab Results   Component Value Date     04/04/2018                   Lab Results   Component Value Date    POTASSIUM 4.1 04/04/2018           No results found for: MAG         Lab Results   Component Value Date    CR 0.89 04/04/2018                   Lab Results   Component Value Date    BRITTNEY 9.0 04/04/2018                Lab Results   Component Value Date    BILITOTAL 0.6 04/04/2018           Lab Results   Component Value Date    ALBUMIN 4.0 04/04/2018                    Lab Results   Component Value Date    ALT 75 04/04/2018           Lab Results   Component Value Date    AST 50 04/04/2018       Results reviewed, labs MET treatment parameters, ok to proceed with treatment.      Post Infusion Assessment:  Patient tolerated infusion without incident.  Blood return noted pre and post infusion.  Prior to discharge: Port is secured in place with tegaderm and flushed with 10cc NS with positive blood return noted.  Continuous home infusion Dosi-Fuser pump connected.    All connectors secured in place and clamps taped open.    Pump started, \"running\" noted on display (CADD): Not Applicable.  Patient instructed to call our clinic or [facility] with any questions or concerns at home.  Patient verbalized understanding.    Patient set up for pump disconnect in [place], [place] on 4/6/18 at 1215pm.          Discharge Plan:   Patient will return 4/18/18 for next appointment.   Patient discharged in stable " condition accompanied by: self, wife and son.  Departure Mode: Ambulatory.    Michelle Fermin RN

## 2018-04-04 NOTE — MR AVS SNAPSHOT
After Visit Summary   4/4/2018    Terry Yi    MRN: [id number]           Patient Information     Date Of Birth          1965        Visit Information        Provider Department      4/4/2018 12:30 PM BAY  INFUSION [facility]        Today's Diagnoses     Malignant neoplasm of overlapping sites of stomach (H)    -  1       Follow-ups after your visit        Your next 10 appointments already scheduled     Apr 18, 2018  8:30 AM CDT   Return Visit with NURSE ONLY CANCER CENTER   [facility] ([facility])    [address] [place] [id number] [phone]            Apr 18, 2018  9:15 AM CDT   Return Visit with Yakov Shaffer MD   [facility] ([facility])    [address] [place] [id number] [phone]            Apr 18, 2018 10:00 AM CDT   Level 2 with [address])    [address] [place] [id number] [phone]              Who to contact     If you have questions or need follow up information about today's clinic visit or your schedule please contact [facility] directly at [phone].  Normal or non-critical lab and imaging results will be communicated to you by MyChart, letter or phone within 4 business days after the clinic has received the results. If you do not hear from us within 7 days, please contact the clinic through Doocumentshart or phone. If you have a critical or abnormal lab result, we will notify you by phone as soon as possible.  Submit refill requests through DocuSign or call your pharmacy and they will forward the refill request to us. Please allow 3 business days for your refill to be completed.          Additional Information About Your Visit        DoocumentsharAlta Devices Information     DocuSign gives you secure access to your electronic health record. If you see a primary care  provider, you can also send messages to your care team and make appointments. If you have questions, please call your primary care clinic.  If you do not have a primary care provider, please call [phone] and they will assist you.      Ambarella is an electronic gateway that provides easy, online access to your medical records. With Ambarella, you can request a clinic appointment, read your test results, renew a prescription or communicate with your care team.     To access your existing account, please contact your [facility] or call [phone] for assistance.        Care EveryWhere ID     This is your Care EveryWhere ID. This could be used by other organizations to access your [facility] medical records  [id number]        Your Vitals Were     Pulse Temperature Respirations Pulse Oximetry BMI (Body Mass Index)       71 97.9  F (36.6  C) (Oral) 18 99% 30.74 kg/m2        Blood Pressure from Last 3 Encounters:   04/04/18 128/72   03/21/18 112/76   03/07/18 115/76    Weight from Last 3 Encounters:   04/04/18 100 kg (220 lb 6.4 oz)   03/21/18 99.5 kg (219 lb 6 oz)   03/07/18 99.6 kg (219 lb 9 oz)              Today, you had the following     No orders found for display       Primary Care Provider Office Phone # Fax #    Sandra Dickerson [phone]       [facility]  [address]  [facility] [address]        Equal Access to Services     CHANDAN GALLO : Hadii aad ku hadasho Soomaali, waaxda luqadaha, qaybta kaalmada adeegyada, waxay violeta cowart . So [facility] [phone].    ATENCIÓN: Si habla español, tiene a cuevas disposición servicios gratuitos de asistencia lingüística. Ciara al [phone].    We comply with applicable federal civil rights laws and [place] laws. We do not discriminate on the basis of race, color, national origin, age, disability, sex, sexual orientation, or gender identity.            Thank you!     Thank you for choosing [facility]  for your care. Our goal is always to provide you with excellent care. Hearing back from our patients is one way we can continue to improve our services. Please take a few minutes to complete the written survey that you may receive in the mail after your visit with us. Thank you!             Your Updated Medication List - Protect others around you: Learn how to safely use, store and throw away your medicines at www.disposemymeds.org.          This list is accurate as of 4/4/18  2:26 PM.  Always use your most recent med list.                   Brand Name Dispense Instructions for use Diagnosis    allopurinol 100 MG tablet    ZYLOPRIM     as needed (when eating shrimp)        aspirin 81 MG chewable tablet      Take 81 mg by mouth daily        lidocaine-prilocaine cream    EMLA          LORazepam 0.5 MG tablet    ATIVAN    30 tablet    Take 1 tablet (0.5 mg) by mouth every 4 hours as needed (Anxiety, Nausea/Vomiting or Sleep)    Malignant neoplasm of overlapping sites of stomach (H)       MULTIVITAMIN ADULT PO           ondansetron 8 MG tablet    ZOFRAN    10 tablet    Take 1 tablet (8 mg) by mouth every 8 hours as needed (Nausea/Vomiting)    Malignant neoplasm of overlapping sites of stomach (H)       prochlorperazine 10 MG tablet    COMPAZINE    30 tablet    Take 1 tablet (10 mg) by mouth every 6 hours as needed (Nausea/Vomiting)    Malignant neoplasm of overlapping sites of stomach (H)       TYLENOL PO      Take by mouth as needed for mild pain or fever

## 2018-04-18 NOTE — PATIENT INSTRUCTIONS
Cont  Aleve twice daily with food and keep yourself well hydrated    I will refill Allopurinol and follow with PCP but in the future, you should not start it during an acute gout attack    Cont with chemo today    Schedule EGD for difficulty swallowing asap    See me back in 2 weeks with chemo

## 2018-04-18 NOTE — MR AVS SNAPSHOT
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)          Care Instructions    Cont  Aleve twice daily with food and keep yourself well hydrated    I will refill Allopurinol and follow with PCP but in the future, you should not start it during an acute gout attack    Cont with chemo today    Schedule EGD for difficulty swallowing asap    See me back in 2 weeks with chemo              Follow-ups after your visit        Additional Services     GASTROENTEROLOGY ADULT REF PROCEDURE ONLY       Last Lab Result: Creatinine (mg/dL)       Date                     Value                 04/18/2018               0.78             ----------  Body mass index is 30.71 kg/(m^2).     Needed:  No  Language:  English    Patient will be contacted to schedule procedure.     Please be aware that coverage of these services is subject to the terms and limitations of your health insurance plan.  Call member services at your health plan with any benefit or coverage questions.  Any procedures must be performed at a [facility] facility OR coordinated by your clinic's referral office.    Please bring the following with you to your appointment:    (1) Any X-Rays, CTs or MRIs which have been performed.  Contact the facility where they were done to arrange for  prior to your scheduled appointment.    (2) List of current medications   (3) This referral request   (4) Any documents/labs given to you for this referral                  Your next 10 appointments already scheduled     Apr 27, 2018   Procedure with William Charles Duane, MD    [facility] (--)    [address] [place] [address]   [phone]            May 02, 2018  8:30 AM CDT   Return Visit with [facility] ONLY CANCER CENTER   [facility]    [address] [facility] [address]   [phone]            May 02, 2018  9:15 AM CDT   Return Visit with Yakov Shaffer MD   [facility]    [address] [facility] [address]   [phone]            May 02, 2018 10:00 AM CDT   Level 2 with BAY 3 INFUSION   [facility]    [address] [facility] [address]   [phone]              Who to contact     If you have questions or need follow up information about today's clinic visit or your schedule please contact [facility] directly at [phone].  Normal or non-critical lab and imaging results will be communicated to you by [employer], letter or phone within 4 business days after the clinic has received the results. If you do not hear from us within 7 days, please contact the clinic through [employer] or phone. If you have a critical or abnormal lab result, we will notify you by phone as soon as possible.  Submit refill requests through [employer] or call your pharmacy and they will forward the refill request to us. Please allow 3 business days for your refill to be completed.          Additional Information About Your Visit        Terviuhart Information     [employer] gives you secure access to your electronic health record. If you see a primary care provider, you can also send messages to your care team and make appointments. If you have questions, please call your primary care clinic.  If you do not have a primary care provider, please call [phone] and they will assist you.      [employer] is an electronic gateway that provides easy, online access to your medical records. With [employer],  you can request a clinic appointment, read your test results, renew a prescription or communicate with your care team.     To access your existing account, please contact your [facility] or call [phone] for assistance.        Care EveryWhere ID     This is your Care EveryWhere ID. This could be used by other organizations to access your [facility] medical records  [id number]        Your Vitals Were     Pulse Temperature Respirations Pulse Oximetry BMI (Body Mass Index)       77 98  F (36.7  C) (Oral) 16 99% 30.71 kg/m2        Blood Pressure from Last 3 Encounters:   04/18/18 117/79   04/04/18 128/72   03/21/18 112/76    Weight from Last 3 Encounters:   04/18/18 99.9 kg (220 lb 3.2 oz)   04/04/18 100 kg (220 lb 6.4 oz)   03/21/18 99.5 kg (219 lb 6 oz)              We Performed the Following     GASTROENTEROLOGY ADULT REF PROCEDURE ONLY          Today's Medication Changes          These changes are accurate as of 4/18/18 10:05 AM.  If you have any questions, ask your nurse or doctor.               These medicines have changed or have updated prescriptions.        Dose/Directions    allopurinol 100 MG tablet   Commonly known as:  ZYLOPRIM   This may have changed:    - how much to take  - how to take this  - when to take this  - reasons to take this   Used for:  Acute gout, unspecified cause, unspecified site   Changed by:  Yakov Shaffer MD        Dose:  100 mg   Take 1 tablet (100 mg) by mouth daily   Quantity:  30 tablet   Refills:  0            Where to get your medicines      These medications were sent to [facility] - [place], [place] - [address]  [address], [facility] [address]     Phone:  [phone]     allopurinol 100 MG tablet                Primary Care Provider Office Phone # Fax #    Sandra Dickerson [phone]       [facility]  [address]  [facility] [address]        Equal Access to  Services     [facility]: Hadii aad ku hadsohancastro Shayyleeanne, waaxda luqadaha, qaybta kaalmada charleyjohnladonna, claire cowart . So [facility] [phone].    ATENCIÓN: Si arabella luke, tiene a cuevas disposición servicios gratuitos de asistencia lingüística. Llame al [phone].    We comply with applicable federal civil rights laws and [place] laws. We do not discriminate on the basis of race, color, national origin, age, disability, sex, sexual orientation, or gender identity.            Thank you!     Thank you for choosing [facility]  for your care. Our goal is always to provide you with excellent care. Hearing back from our patients is one way we can continue to improve our services. Please take a few minutes to complete the written survey that you may receive in the mail after your visit with us. Thank you!             Your Updated Medication List - Protect others around you: Learn how to safely use, store and throw away your medicines at www.disposemymeds.org.          This list is accurate as of 4/18/18 10:05 AM.  Always use your most recent med list.                   Brand Name Dispense Instructions for use Diagnosis    ALEVE PO       Acute gout, unspecified cause, unspecified site       allopurinol 100 MG tablet    ZYLOPRIM    30 tablet    Take 1 tablet (100 mg) by mouth daily    Acute gout, unspecified cause, unspecified site       aspirin 81 MG chewable tablet      Take 81 mg by mouth daily        lidocaine-prilocaine cream    EMLA          LORazepam 0.5 MG tablet    ATIVAN    30 tablet    Take 1 tablet (0.5 mg) by mouth every 4 hours as needed (Anxiety, Nausea/Vomiting or Sleep)    Malignant neoplasm of overlapping sites of stomach (H)       MULTIVITAMIN ADULT PO           ondansetron 8 MG tablet    ZOFRAN    10 tablet    Take 1 tablet (8 mg) by mouth every 8 hours as needed (Nausea/Vomiting)    Malignant neoplasm of overlapping sites of stomach (H)        prochlorperazine 10 MG tablet    COMPAZINE    30 tablet    Take 1 tablet (10 mg) by mouth every 6 hours as needed (Nausea/Vomiting)    Malignant neoplasm of overlapping sites of stomach (H)       TYLENOL PO      Take by mouth as needed for mild pain or fever

## 2018-04-18 NOTE — NURSING NOTE
"Oncology Rooming Note    April 18, 2018 9:05 AM   Terry Yi is a 52 year old male who presents for:    Chief Complaint   Patient presents with     Oncology Clinic Visit     f/u prior to treatment     Initial Vitals: /79  Pulse 77  Temp 98  F (36.7  C) (Oral)  Resp 16  Wt 99.9 kg (220 lb 3.2 oz)  SpO2 99%  BMI 30.71 kg/m2 Estimated body mass index is 30.71 kg/(m^2) as calculated from the following:    Height as of 3/21/18: 1.803 m (5' 11\").    Weight as of this encounter: 99.9 kg (220 lb 3.2 oz). Body surface area is 2.24 meters squared.  Mild Pain (3) Comment: right. Increases with movement   No LMP for male patient.  Allergies reviewed: Yes  Medications reviewed: Yes    Medications: MEDICATION REFILLS NEEDED TODAY. Provider was notified. Allopurinol  Pharmacy name entered into EPIC: Data Unavailable         10 minutes for nursing intake (face to face time)     ARAM GIBSON RN            "

## 2018-04-18 NOTE — MR AVS SNAPSHOT
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)    -  1       Follow-ups after your visit        Your next 10 appointments already scheduled     Apr 18, 2018  9:15 AM CDT   Return Visit with Yakov Shaffer MD   [facility]    [address] [place] [address] [phone]            Apr 18, 2018 10:00 AM CDT   Level 2 with BAY 6 INFUSION   [facility])    [address] [place] [address] [phone]              Who to contact     If you have questions or need follow up information about today's clinic visit or your schedule please contact [facility] directly at [phone].  Normal or non-critical lab and imaging results will be communicated to you by nPickerhart, letter or phone within 4 business days after the clinic has received the results. If you do not hear from us within 7 days, please contact the clinic through [employer] or phone. If you have a critical or abnormal lab result, we will notify you by phone as soon as possible.  Submit refill requests through [employer] or call your pharmacy and they will forward the refill request to us. Please allow 3 business days for your refill to be completed.          Additional Information About Your Visit        MyChart Information     [employer] gives you secure access to your electronic health record. If you see a primary care provider, you can also send messages to your care team and make appointments. If you have questions, please call your primary care clinic.  If you do not have a primary care provider, please call  [phone] and they will assist you.      [employer] is an electronic gateway that provides easy, online access to your medical records. With [employer] you can request a clinic appointment, read your test results, renew a prescription or communicate with your care team.     To access your existing account, please contact your [facility] or call [phone] for assistance.        Care EveryWhere ID     This is your Care EveryWhere ID. This could be used by other organizations to access your [facility] medical records  [id number]         Blood Pressure from Last 3 Encounters:   04/04/18 128/72   03/21/18 112/76   03/07/18 115/76    Weight from Last 3 Encounters:   04/04/18 100 kg (220 lb 6.4 oz)   03/21/18 99.5 kg (219 lb 6 oz)   03/07/18 99.6 kg (219 lb 9 oz)              We Performed the Following     CBC with platelets differential     Comprehensive metabolic panel        Primary Care Provider Office Phone # Fax #    Sandra Dickerson [phone]       [facility]  [address]  [facility] [address]        Equal Access to Services     [facility]: Hadii aad ku hadasho Soomaali, waaxda luqadaha, qaybta kaalmada adeegyada, claire mcdaniel hayrodrigon charley cowart . So [facility] [phone].    ATENCIÓN: Si habla español, tiene a cuevas disposición servicios gratuitos de asistencia lingüística. Llame al [phone].    We comply with applicable federal civil rights laws and [place] laws. We do not discriminate on the basis of race, color, national origin, age, disability, sex, sexual orientation, or gender identity.            Thank you!     Thank you for choosing [facility]  for your care. Our goal is always to provide you with excellent care. Hearing back from our patients is one way we can continue to improve our services. Please take a few minutes to complete the written survey that you may receive in the mail after your visit with us. Thank  you!             Your Updated Medication List - Protect others around you: Learn how to safely use, store and throw away your medicines at www.disposemymeds.org.          This list is accurate as of 4/18/18  8:59 AM.  Always use your most recent med list.                   Brand Name Dispense Instructions for use Diagnosis    allopurinol 100 MG tablet    ZYLOPRIM     as needed (when eating shrimp)        aspirin 81 MG chewable tablet      Take 81 mg by mouth daily        lidocaine-prilocaine cream    EMLA          LORazepam 0.5 MG tablet    ATIVAN    30 tablet    Take 1 tablet (0.5 mg) by mouth every 4 hours as needed (Anxiety, Nausea/Vomiting or Sleep)    Malignant neoplasm of overlapping sites of stomach (H)       MULTIVITAMIN ADULT PO           ondansetron 8 MG tablet    ZOFRAN    10 tablet    Take 1 tablet (8 mg) by mouth every 8 hours as needed (Nausea/Vomiting)    Malignant neoplasm of overlapping sites of stomach (H)       prochlorperazine 10 MG tablet    COMPAZINE    30 tablet    Take 1 tablet (10 mg) by mouth every 6 hours as needed (Nausea/Vomiting)    Malignant neoplasm of overlapping sites of stomach (H)       TYLENOL PO      Take by mouth as needed for mild pain or fever

## 2018-04-18 NOTE — PROGRESS NOTES
"Infusion Nursing Note:  Terry Yi presents today for 5FU bolus/pump.  Patient seen by provider today: Yes: Dr Shaffer   present during visit today: Not Applicable.    Note:Pt states he ate shrimp last week, has had a flare up of Gout to his right foot/ankle since last Thursday.  Peripheral neuropathy to the hands/feet is \"the same\".    Intravenous Access:  Implanted Port.    Treatment Conditions:  Lab Results   Component Value Date    HGB 13.6 04/18/2018     Lab Results   Component Value Date    WBC 7.7 04/18/2018      Lab Results   Component Value Date    ANEU 4.5 04/18/2018     Lab Results   Component Value Date     04/18/2018      Lab Results   Component Value Date     04/18/2018                   Lab Results   Component Value Date    POTASSIUM 4.5 04/18/2018           No results found for: MAG         Lab Results   Component Value Date    CR 0.78 04/18/2018                   Lab Results   Component Value Date    BRITTNEY 9.1 04/18/2018                Lab Results   Component Value Date    BILITOTAL 0.4 04/18/2018           Lab Results   Component Value Date    ALBUMIN 3.8 04/18/2018                    Lab Results   Component Value Date    ALT 45 04/18/2018           Lab Results   Component Value Date    AST 31 04/18/2018       Results reviewed, labs MET treatment parameters, ok to proceed with treatment.      Post Infusion Assessment:  Patient tolerated infusion without incident.  Blood return noted pre and post infusion.  Site patent and intact, free from redness, edema or discomfort.  No evidence of extravasations.  Access discontinued per protocol.    Prior to discharge: Port is secured in place with tegaderm and flushed with 10cc NS with positive blood return noted.  Continuous home infusion Dosi-Fuser pump connected.    All connectors secured in place and clamps taped open.    Pump started, \"running\" noted on display (CADD): Not Applicable.  Patient instructed to call our clinic or " [facility] with any questions or concerns at home.  Patient verbalized understanding.    Patient set up for pump disconnect at 0930 on 04/20/18 in [facility] states he calls [facility] for his time for disconnect.          Discharge Plan:   Return May 2 with Dr Shaffer and C28 5FU bolus/pump.  Discharge instructions reviewed with: Patient.  Patient discharged in stable condition accompanied by: self.  Departure Mode: Ambulatory.    Matilda Ziegler RN

## 2018-04-18 NOTE — PROGRESS NOTES
4/18/2018     REASON FOR VISIT:  Follow-up for unresectable metastatic gastric carcinoma, diffuse type, grade 3, HER-2 negative, MSI-Intact.      HISTORY OF PRESENT ILLNESS:  Please see my previous note for details.  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      CT CAP after C#6 shows stable disease    C# 9 FOLFOX 8/2/2017 ( Oxaliplatin dose reduced to 70mg/m2 due to neuropathy )  C#10 8/22/2017 ( Oxaliplatin dose reduced to 60mg/m2 due to neuropathy )  Delayed by 1 week- patient preference  C#11 9/6/17 5FU/LV ( Stopped Oxaliplatin )  C#12 9/20/2017 5FU/LV    Repeat CT scan after C#12 shows stable to slightly improved disease.    C#13 10/4/2017 5FU/LV  C#14 10/18/2017 5FU/LV  C#15 10/31/2017 5FU/LV  C#16 11/15/2017 5FU/LV  C#17 11/29/2017 5FU/LV   C#18 12/13/2017 5FU/LV    Repeat CT CAP 12/22/17 is stable    C#19 12/27/2017 5FU/LV  C#20 1/10/2018 5FU/LV  C# 21 1/24/2018 5FU/LV  C# 22 2/7/2018  5FU/LV  C# 23 2/21/2018   C# 24 3/7/2018     Repeat CAP on 3/16/18 shows stable disease.    C# 25 3/21/2018 5FU/LV  C# 26 4/4/2018 5FU/LV  C# 27 4/18/2018 5FU/LV       INTERVAL HISTORY:   He tells me that on Friday he started to develop pain and swelling in the right ankle similar to his previous gout episodes. This happened after he ate shrimp and in the past he had developed gout attacks after eating shrimp. He started taking naproxen twice a day on Sunday and he also started taking allopurinol which she had left from prior and over time his pain and swelling have significantly improved although it is still swollen and painful. Previously it was extremely difficult for him to walk but now he can walk with some discomfort. Denies any other swellings. No fevers. He tells me that prior to that he was doing well and he  was tolerating chemotherapy well without any nausea or vomiting. He is doing well. Neuropathy is about the same as before. He is eating well. Denies nausea vomiting diarrhea or constipation. He was mentioning that over the last 3 weeks or so he has noticed that at times the food gets stuck when he is swallowing and he has to stand up and wait for it to go down before he can resume eating. This is not the case every time. There are certain foods which go down easily for example eggs. He denies pain. No bleeding. Energy is good. Neuropathy is about the same. No trouble breathing. He continues to have mild peeling of the skin with dryness and he is applying moisturizing creams      ECOG 0     ROS:  The rest of the comprehensive review of systems was essentially unremarkable        MEDICATIONS:   Current Outpatient Prescriptions   Medication     Naproxen Sodium (ALEVE PO)     Acetaminophen (TYLENOL PO)     allopurinol (ZYLOPRIM) 100 MG tablet     aspirin 81 MG chewable tablet     lidocaine-prilocaine (EMLA) cream     LORazepam (ATIVAN) 0.5 MG tablet     Multiple Vitamins-Minerals (MULTIVITAMIN ADULT PO)     ondansetron (ZOFRAN) 8 MG tablet     prochlorperazine (COMPAZINE) 10 MG tablet     No current facility-administered medications for this visit.             PHYSICAL EXAMINATION:   /79  Pulse 77  Temp 98  F (36.7  C) (Oral)  Resp 16  Wt 99.9 kg (220 lb 3.2 oz)  SpO2 99%  BMI 30.71 kg/m2  CONSTITUTIONAL: no acute distress  EYES: PERRLA, no palor or icterus.   ENT/MOUTH: no mouth lesions. Oropharynx normal  CVS: s1s2 no m r g .   RESPIRATORY: clear to auscultation b/l  GI: soft non tender no hepatosplenomegaly  NEURO: AAOX3  Grossly non focal neuro exam  INTEGUMENT: no obvious rashes  LYMPHATIC: no palpable cervical, supraclavicular, axillary or inguinal LAD  MUSCULOSKELETAL: Unremarkable. No bony tenderness.   EXTREMITIES: Right ankle is swollen and mildly tender but it is not warm or red. The skin of the  palm is dry with mild peeling  PSYCH: Mentation, mood and affect are normal. Decision making capacity is intact        LABS:   Reviewed and stable    IMAGING:  Reviewed  EXAM: CT of the Chest, Abdomen and Pelvis, 3/16/2018     COMPARISON: CTs dated 12/22/2017, 9/29/2017, 6/30/2017 and PET CT  dated 3/24/2017      CLINICAL HISTORY: Metastatic gastric carcinoma.      TECHNIQUE: Axial images of the chest, abdomen and pelvis were obtained  with 134 mL IV contrast. Coronal reconstructions were provided. Images  were reviewed in bone, lung, and soft tissue windows.     Dose: 1108 mGy*cm     FINDINGS:     Chest: 0.9 cm right thyroid nodule is unchanged since at least  3/24/2017. The thyroid gland is otherwise unremarkable. Great vessels,  aorta and pulmonary artery normal in caliber and configuration. The  heart is not enlarged. There is no pericardial effusion. Stable  appearance of partially calcified mediastinal and hilar lymph nodes.  No new or enlarging lymph nodes.     Lungs: Stringy inspissated secretions in the trachea, the central  airways are otherwise clear. No focal consolidation, pleural effusion  or pneumothorax. Multiple calcified granulomas are stable. No new or  enlarging pulmonary nodules.     Abdomen and Pelvis: Redemonstration of mild diffuse gastric wall  thickening, not significantly changed when compared with exam dated  12/22/2017. Prominent mesenteric and retroperitoneal lymph nodes are  also stable when compared with prior exam. For example, gastrohepatic  lymph node measures 1.1 x 1.4 cm (series 2 image 61), unchanged from  prior exam. Portacaval lymph node measures 1.6 x 1 cm (series 2 image  66), previously 1.8 x 1.2 cm. Mild haziness of the central mesentery  with infiltration along the lesser sac, and extending to the  retroperitoneum is also not significantly changed from prior exam. No  new or enlarging lymph nodes.     1.3 subcapsular lesion along the right lobe of the liver is  not  significantly changed in size or morphology since at least 3/24/2017  and appears to have peripheral globular enhancement on the current  examination. Stable appearance of subcentimeter hepatic hypodensities  along the falciform ligament (series 2 image 60) and near the hepatic  dome (series 2 image 49). No new focal hepatic mass.      The gallbladder, spleen, pancreas and adrenal glands are unremarkable.  Symmetric nephrographic enhancement of the kidneys. No hydronephrosis  or hydroureter. The urinary bladder is partially distended without  focal mucosal thickening. The bowel is nondilated. Colonic  diverticulosis without diverticulitis. Fluid/thickening along the  posterior peritoneum, similar to prior exam. Major visualized vessels  of the abdomen are patent.     Bones and soft tissues: Multilevel degenerative change of the spine.  Advanced degenerative changes about the right hip, unchanged from  prior exam. No acute osseous abnormality or suspicious osseous lesion.         Impression:   1. In this patient with known gastric carcinoma, there is stable mild  diffuse gastric wall thickening, prominent local lymph nodes,  infiltrative mesenteric and retroperitoneal haziness with  fluid/thickening along the peritoneum. These findings are not  significantly changed from prior exam. No evidence for new or  progressive disease in the chest, abdomen or pelvis.  2. 1.3 cm hypodensity in the right lobe of the liver currently  demonstrates characteristics suggestive of a hepatic hemangioma.  3. Colonic diverticulosis without diverticulitis.  4. Sequelae of granulomatous disease.    ASSESSMENT AND PLAN:  Metastatic gastric cancer, HER-2/catrachita negative, MSI intact, presenting with early satiety and significant weight loss and epigastric discomfort upon eating with some vomiting.  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   There is an indeterminate right liver lobe lesion which has remained stable and likely is not a metastasis.      We started palliative FOLFOX on 03/12/2017.     Oxaliplatin was dropped from cycle #11 onwards because of neuropathy     Scans after C#18 show stable disease    Repeat CT CAP after 24 cycles also show stable disease so we will continue the same chemotherapy with 5FU. He is tolerating it well    We will proceed with cycle #27 today.    Trouble swallowing. Off and on he has had issues with difficulty swallowing with food getting stuck. Off-and-on he has had hiccups. I advised him to make sure that he chews the food very well and takes time to eat his food. I also would like him to be evaluated by GI with EGD. I gave him a referral for that    Acute gout. This is better. I advised him to continue taking naproxen as he is doing. I told him to take it with food and keep himself well-hydrated. Although he started allopurinol during the acute gout attack and has been taking it for the last few days I would recommend that at this time he should continue taking allopurinol and I refilled it for him. I told him that going forward allopurinol is not something which should be started in an acute gout attack. Whether he needs long-term maintenance allopurinol or not, I believe this needs to be addressed by his primary care physician and I advised him to follow with his PCP    Neuropathy. Due to prior oxaliplatin. This is stable and we will continue to watch it.    His skin peeling due to 5-FU. He will continue applying moisturizing cream several times a day      We did not address the following today  Description regarding exercise. He has now joined a gym and is going than 4 times a week to exercise and I encouraged him to continue to do so and slowly build his endurance. Overall his energy has been pretty decent      His genetic testing did not show any identifiable mutation not reveal any identifiable mutation      I will see him back in 2 weeks    I answered all of his and his family's questions to their satisfaction and they are agreeable and comfortable with the plan    LANEY HERNANDEZ MD

## 2018-04-18 NOTE — MR AVS SNAPSHOT
After Visit Summary   4/18/2018    Terry Yi    MRN: [id number]           Patient Information     Date Of Birth          1965        Visit Information        Provider Department      4/18/2018 10:00 AM BAY [facility]        Today's Diagnoses     Malignant neoplasm of overlapping sites of stomach (H)    -  1       Follow-ups after your visit        Your next 10 appointments already scheduled     Apr 27, 2018   Pro[place]   [facility] [place]    [address] [facility] [id number] [phone]            May 02, 2018  8:30 AM CDT   [facility]    [address] [place] [id number] [phone]            May 02, 2018  9:15 AM CDT   Return Visit with Yakov Shaffer MD   [facility]    [address] [facility] [id number] [phone]            May 02, 2018 10:00 AM CDT   Level 2 with [facility] ([facility])    [address] [place] [address] [address] [facility] [id number] [phone]              Who to contact     If you have questions or need follow up information about today's clinic visit or your schedule please contact [facility] directly at [phone].  Normal or non-critical lab and imaging results will be communicated to you by MyChart, letter or phone within 4 business days after the clinic has received the results. If you do not hear from us within 7 days, please contact the clinic through MyChart or phone. If you have a critical or abnormal lab result, we will notify you by phone as soon as possible.  Submit refill requests through [employer] or call your pharmacy and they will forward the refill request to us. Please allow 3 business days for your refill to be completed.           Additional Information About Your Visit        [employer] Information     [employer] gives you secure access to your electronic health record. If you see a primary care provider, you can also send messages to your care team and make appointments. If you have questions, please call your primary care clinic.  If you do not have a primary care provider, please call [phone] and they will assist you.      [employer] is an electronic gateway that provides easy, online access to your medical records. With [employer], you can request a clinic appointment, read your test results, renew a prescription or communicate with your care team.     To access your existing account, please contact your [facility] or call [phone] for assistance.        Care EveryWhere ID     This is your Care EveryWhere ID. This could be used by other organizations to access your [facility] medical records  [id number]         Blood Pressure from Last 3 Encounters:   04/18/18 117/79   04/04/18 128/72   03/21/18 112/76    Weight from Last 3 Encounters:   04/18/18 99.9 kg (220 lb 3.2 oz)   04/04/18 100 kg (220 lb 6.4 oz)   03/21/18 99.5 kg (219 lb 6 oz)              Today, you had the following     No orders found for display         Today's Medication Changes          These changes are accurate as of 4/18/18 12:31 PM.  If you have any questions, ask your nurse or doctor.               These medicines have changed or have updated prescriptions.        Dose/Directions    allopurinol 100 MG tablet   Commonly known as:  ZYLOPRIM   This may have changed:    - how much to take  - how to take this  - when to take this  - reasons to take this   Used for:  Acute gout, unspecified cause, unspecified site   Changed by:  Yakov Shaffer MD        Dose:  100 mg   Take 1 tablet (100 mg) by mouth daily   Quantity:  30 tablet   Refills:  0            Where to get your medicines      These medications were sent to [facility]  - [place], [place] - [address], [address]  [address], [facility] [address]     Phone:  [phone]     allopurinol 100 MG tablet                Primary Care Provider Office Phone # Fax #    Sandra Dickerson [phone]       [facility] [place] [facility]  [address]  [facility] [address]        Equal Access to Services     [facility] : Hadii aad ku hadasho Soomaali, waaxda luqadaha, qaybta kaalmada adeegyada, waxay idiin hayaan adeeg kharash la'aan . So [facility] [phone].    ATENCIÓN: Si habla español, tiene a cuevas disposición servicios gratuitos de asistencia lingüística. [facility] [phone].    We comply with applicable federal civil rights laws and [place] laws. We do not discriminate on the basis of race, color, national origin, age, disability, sex, sexual orientation, or gender identity.            Thank you!     Thank you for choosing [facility]  for your care. Our goal is always to provide you with excellent care. Hearing back from our patients is one way we can continue to improve our services. Please take a few minutes to complete the written survey that you may receive in the mail after your visit with us. Thank you!             Your Updated Medication List - Protect others around you: Learn how to safely use, store and throw away your medicines at www.disposemymeds.org.          This list is accurate as of 4/18/18 12:31 PM.  Always use your most recent med list.                   Brand Name Dispense Instructions for use Diagnosis    ALEVE PO       Acute gout, unspecified cause, unspecified site       allopurinol 100 MG tablet    ZYLOPRIM    30 tablet    Take 1 tablet (100 mg) by mouth daily    Acute gout, unspecified cause, unspecified site       aspirin 81 MG chewable tablet      Take 81 mg by mouth daily        lidocaine-prilocaine cream    EMLA          LORazepam 0.5 MG tablet    ATIVAN    30 tablet    Take 1 tablet (0.5 mg) by mouth every 4  hours as needed (Anxiety, Nausea/Vomiting or Sleep)    Malignant neoplasm of overlapping sites of stomach (H)       MULTIVITAMIN ADULT PO           ondansetron 8 MG tablet    ZOFRAN    10 tablet    Take 1 tablet (8 mg) by mouth every 8 hours as needed (Nausea/Vomiting)    Malignant neoplasm of overlapping sites of stomach (H)       prochlorperazine 10 MG tablet    COMPAZINE    30 tablet    Take 1 tablet (10 mg) by mouth every 6 hours as needed (Nausea/Vomiting)    Malignant neoplasm of overlapping sites of stomach (H)       TYLENOL PO      Take by mouth as needed for mild pain or fever

## 2018-04-18 NOTE — LETTER
4/18/2018         RE: Terry Yi  [place] [address]        Dear Colleague,    Thank you for referring your patient, Terry Yi, to the [facility]. Please see a copy of my visit note below.    4/18/2018     REASON FOR VISIT:  Follow-up for unresectable metastatic gastric carcinoma, diffuse type, grade 3, HER-2 negative, MSI-Intact.      HISTORY OF PRESENT ILLNESS:  Please see my previous note for details.  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      CT CAP after C#6 shows stable disease    C# 9 FOLFOX 8/2/2017 ( Oxaliplatin dose reduced to 70mg/m2 due to neuropathy )  C#10 8/22/2017 ( Oxaliplatin dose reduced to 60mg/m2 due to neuropathy )  Delayed by 1 week- patient preference  C#11 9/6/17 5FU/LV ( Stopped Oxaliplatin )  C#12 9/20/2017 5FU/LV    Repeat CT scan after C#12 shows stable to slightly improved disease.    C#13 10/4/2017 5FU/LV  C#14 10/18/2017 5FU/LV  C#15 10/31/2017 5FU/LV  C#16 11/15/2017 5FU/LV  C#17 11/29/2017 5FU/LV   C#18 12/13/2017 5FU/LV    Repeat CT CAP 12/22/17 is stable    C#19 12/27/2017 5FU/LV  C#20 1/10/2018 5FU/LV  C# 21 1/24/2018 5FU/LV  C# 22 2/7/2018  5FU/LV  C# 23 2/21/2018   C# 24 3/7/2018     Repeat CAP on 3/16/18 shows stable disease.    C# 25 3/21/2018 5FU/LV  C# 26 4/4/2018 5FU/LV  C# 27 4/18/2018 5FU/LV       INTERVAL HISTORY:   He tells me that on Friday he started to develop pain and swelling in the right ankle similar to his previous gout episodes. This happened after he ate shrimp and in the past he had developed gout attacks after eating shrimp. He started taking naproxen twice a day on Sunday and he also started taking allopurinol which she had left from prior and over time his pain and swelling have  significantly improved although it is still swollen and painful. Previously it was extremely difficult for him to walk but now he can walk with some discomfort. Denies any other swellings. No fevers. He tells me that prior to that he was doing well and he was tolerating chemotherapy well without any nausea or vomiting. He is doing well. Neuropathy is about the same as before. He is eating well. Denies nausea vomiting diarrhea or constipation. He was mentioning that over the last 3 weeks or so he has noticed that at times the food gets stuck when he is swallowing and he has to stand up and wait for it to go down before he can resume eating. This is not the case every time. There are certain foods which go down easily for example eggs. He denies pain. No bleeding. Energy is good. Neuropathy is about the same. No trouble breathing. He continues to have mild peeling of the skin with dryness and he is applying moisturizing creams      ECOG 0     ROS:  The rest of the comprehensive review of systems was essentially unremarkable        MEDICATIONS:   Current Outpatient Prescriptions   Medication     Naproxen Sodium (ALEVE PO)     Acetaminophen (TYLENOL PO)     allopurinol (ZYLOPRIM) 100 MG tablet     aspirin 81 MG chewable tablet     lidocaine-prilocaine (EMLA) cream     LORazepam (ATIVAN) 0.5 MG tablet     Multiple Vitamins-Minerals (MULTIVITAMIN ADULT PO)     ondansetron (ZOFRAN) 8 MG tablet     prochlorperazine (COMPAZINE) 10 MG tablet     No current facility-administered medications for this visit.             PHYSICAL EXAMINATION:   /79  Pulse 77  Temp 98  F (36.7  C) (Oral)  Resp 16  Wt 99.9 kg (220 lb 3.2 oz)  SpO2 99%  BMI 30.71 kg/m2  CONSTITUTIONAL: no acute distress  EYES: PERRLA, no palor or icterus.   ENT/MOUTH: no mouth lesions. Oropharynx normal  CVS: s1s2 no m r g .   RESPIRATORY: clear to auscultation b/l  GI: soft non tender no hepatosplenomegaly  NEURO: AAOX3  Grossly non focal neuro  exam  INTEGUMENT: no obvious rashes  LYMPHATIC: no palpable cervical, supraclavicular, axillary or inguinal LAD  MUSCULOSKELETAL: Unremarkable. No bony tenderness.   EXTREMITIES: Right ankle is swollen and mildly tender but it is not warm or red. The skin of the palm is dry with mild peeling  PSYCH: Mentation, mood and affect are normal. Decision making capacity is intact        LABS:   Reviewed and stable    IMAGING:  Reviewed  EXAM: CT of the Chest, Abdomen and Pelvis, 3/16/2018     COMPARISON: CTs dated 12/22/2017, 9/29/2017, 6/30/2017 and PET CT  dated 3/24/2017      CLINICAL HISTORY: Metastatic gastric carcinoma.      TECHNIQUE: Axial images of the chest, abdomen and pelvis were obtained  with 134 mL IV contrast. Coronal reconstructions were provided. Images  were reviewed in bone, lung, and soft tissue windows.     Dose: 1108 mGy*cm     FINDINGS:     Chest: 0.9 cm right thyroid nodule is unchanged since at least  3/24/2017. The thyroid gland is otherwise unremarkable. Great vessels,  aorta and pulmonary artery normal in caliber and configuration. The  heart is not enlarged. There is no pericardial effusion. Stable  appearance of partially calcified mediastinal and hilar lymph nodes.  No new or enlarging lymph nodes.     Lungs: Stringy inspissated secretions in the trachea, the central  airways are otherwise clear. No focal consolidation, pleural effusion  or pneumothorax. Multiple calcified granulomas are stable. No new or  enlarging pulmonary nodules.     Abdomen and Pelvis: Redemonstration of mild diffuse gastric wall  thickening, not significantly changed when compared with exam dated  12/22/2017. Prominent mesenteric and retroperitoneal lymph nodes are  also stable when compared with prior exam. For example, gastrohepatic  lymph node measures 1.1 x 1.4 cm (series 2 image 61), unchanged from  prior exam. Portacaval lymph node measures 1.6 x 1 cm (series 2 image  66), previously 1.8 x 1.2 cm. Mild haziness  of the central mesentery  with infiltration along the lesser sac, and extending to the  retroperitoneum is also not significantly changed from prior exam. No  new or enlarging lymph nodes.     1.3 subcapsular lesion along the right lobe of the liver is not  significantly changed in size or morphology since at least 3/24/2017  and appears to have peripheral globular enhancement on the current  examination. Stable appearance of subcentimeter hepatic hypodensities  along the falciform ligament (series 2 image 60) and near the hepatic  dome (series 2 image 49). No new focal hepatic mass.      The gallbladder, spleen, pancreas and adrenal glands are unremarkable.  Symmetric nephrographic enhancement of the kidneys. No hydronephrosis  or hydroureter. The urinary bladder is partially distended without  focal mucosal thickening. The bowel is nondilated. Colonic  diverticulosis without diverticulitis. Fluid/thickening along the  posterior peritoneum, similar to prior exam. Major visualized vessels  of the abdomen are patent.     Bones and soft tissues: Multilevel degenerative change of the spine.  Advanced degenerative changes about the right hip, unchanged from  prior exam. No acute osseous abnormality or suspicious osseous lesion.         Impression:   1. In this patient with known gastric carcinoma, there is stable mild  diffuse gastric wall thickening, prominent local lymph nodes,  infiltrative mesenteric and retroperitoneal haziness with  fluid/thickening along the peritoneum. These findings are not  significantly changed from prior exam. No evidence for new or  progressive disease in the chest, abdomen or pelvis.  2. 1.3 cm hypodensity in the right lobe of the liver currently  demonstrates characteristics suggestive of a hepatic hemangioma.  3. Colonic diverticulosis without diverticulitis.  4. Sequelae of granulomatous disease.    ASSESSMENT AND PLAN:  Metastatic gastric cancer, HER-2/catrachita negative, MSI intact,  presenting with early satiety and significant weight loss and epigastric discomfort upon eating with some vomiting.  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  There is an indeterminate right liver lobe lesion which has remained stable and likely is not a metastasis.      We started palliative FOLFOX on 03/12/2017.     Oxaliplatin was dropped from cycle #11 onwards because of neuropathy     Scans after C#18 show stable disease    Repeat CT CAP after 24 cycles also show stable disease so we will continue the same chemotherapy with 5FU. He is tolerating it well    We will proceed with cycle #27 today.    Trouble swallowing. Off and on he has had issues with difficulty swallowing with food getting stuck. Off-and-on he has had hiccups. I advised him to make sure that he chews the food very well and takes time to eat his food. I also would like him to be evaluated by GI with EGD. I gave him a referral for that    Acute gout. This is better. I advised him to continue taking naproxen as he is doing. I told him to take it with food and keep himself well-hydrated. Although he started allopurinol during the acute gout attack and has been taking it for the last few days I would recommend that at this time he should continue taking allopurinol and I refilled it for him. I told him that going forward allopurinol is not something which should be started in an acute gout attack. Whether he needs long-term maintenance allopurinol or not, I believe this needs to be addressed by his primary care physician and I advised him to follow with his PCP    Neuropathy. Due to prior oxaliplatin. This is stable and we will continue to watch it.    His skin peeling due to 5-FU. He will continue applying moisturizing cream several times a day      We did not address the following today  Description regarding exercise. He has now joined a gym and is  going than 4 times a week to exercise and I encouraged him to continue to do so and slowly build his endurance. Overall his energy has been pretty decent      His genetic testing did not show any identifiable mutation not reveal any identifiable mutation     I will see him back in 2 weeks    I answered all of his and his family's questions to their satisfaction and they are agreeable and comfortable with the plan    LANEY SHAFFER MD              Again, thank you for allowing me to participate in the care of your patient.        Sincerely,        Laney Shaffer MD

## 2018-05-01 NOTE — OR NURSING
Terry was given 1 mg versed at 8:39am per Dr Duane instructions, for a total of 3mg for procedure.

## 2018-05-02 NOTE — MR AVS SNAPSHOT
After Visit Summary   5/2/2018    Terry Yi    MRN: [id number]           Patient Information     Date Of Birth          1965        Visit Information        Provider Department      5/2/2018 9:15 AM Yakov Shaffer MD [facility]        Today's Diagnoses     Malignant neoplasm of overlapping sites of stomach (H)    -  1      Care Instructions    Chemo today IF labs are fine    See me back in 2 weeks with labs and chemo    Follow with GI          Follow-ups after your visit        Your next 10 appointments already scheduled     May 16, 2018  7:30 AM CDT   Return Visit with NURSE ONLY CANCER CENTER   [facility] ([facility])    [address] [place] [id number] [phone]            May 16, 2018  8:15 AM CDT   Return Visit with Yakov Shaffer MD   [facility])    [address] [place] [id number] [phone]            May 16, 2018  9:00 AM CDT   Level 2 with BAY 2 INFUSION   [facility])    [address] [place] [id number] [phone]              Who to contact     If you have questions or need follow up information about today's clinic visit or your schedule please contact [facility] directly at [phone].  Normal or non-critical lab and imaging results will be communicated to you by MyChart, letter or phone within 4 business days after the clinic has received the results. If you do not hear from us within 7 days, please contact the clinic through MyChart or phone. If you have a critical or abnormal lab result, we will notify you by phone as soon as possible.  Submit refill requests through [employer] or call your pharmacy and they will forward the refill request to us. Please allow 3 business days for your refill to be completed.          Additional Information About Your Visit       "  MyChart Information     [employer] gives you secure access to your electronic health record. If you see a primary care provider, you can also send messages to your care team and make appointments. If you have questions, please call your primary care clinic.  If you do not have a primary care provider, please call [phone] and they will assist you.      [employer] is an electronic gateway that provides easy, online access to your medical records. With [employer], you can request a clinic appointment, read your test results, renew a prescription or communicate with your care team.     To access your existing account, please contact your [facility] or call [phone] for assistance.        Care EveryWhere ID     This is your Care EveryWhere ID. This could be used by other organizations to access your [place] medical records  [id number]        Your Vitals Were     Pulse Temperature Respirations Height Pulse Oximetry BMI (Body Mass Index)    67 98.4  F (36.9  C) 15 1.803 m (5' 10.98\") 98% 30.14 kg/m2       Blood Pressure from Last 3 Encounters:   05/02/18 122/78   04/27/18 122/86   04/18/18 117/79    Weight from Last 3 Encounters:   05/02/18 98 kg (216 lb)   04/18/18 99.9 kg (220 lb 3.2 oz)   04/04/18 100 kg (220 lb 6.4 oz)              Today, you had the following     No orders found for display       Primary Care Provider Office Phone # Fax #    Sandra Dickerson [phone]       [facility]  [address]  [facility] [address]        Equal Access to Services     [facility] HospitalDIDI : Hadii aad ku hadasho Soomaali, waaxda luqadaha, qaybta kaalmada adeegyada, claire cowart . So [facility] [phone].    ATENCIÓN: Si habla español, tiene a cuevas disposición servicios gratuitos de asistencia lingüística. Llame al [phone].    We comply with applicable federal civil rights laws and [place] laws. We do not discriminate on the basis of race, color, " national origin, age, disability, sex, sexual orientation, or gender identity.            Thank you!     Thank you for choosing [facility]  for your care. Our goal is always to provide you with excellent care. Hearing back from our patients is one way we can continue to improve our services. Please take a few minutes to complete the written survey that you may receive in the mail after your visit with us. Thank you!             Your Updated Medication List - Protect others around you: Learn how to safely use, store and throw away your medicines at www.disposemymeds.org.          This list is accurate as of 5/2/18 10:50 AM.  Always use your most recent med list.                   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

## 2018-05-02 NOTE — MR AVS SNAPSHOT
After Visit Summary   5/2/2018    Terry Yi    MRN: [id number]           Patient Information     Date Of Birth          1965        Visit Information        Provider Department      5/2/2018 10:00 AM BAY 5 INFUSION [facility]        Today's Diagnoses     Malignant neoplasm of overlapping sites of stomach (H)    -  1       Follow-ups after your visit        Your next 10 appointments already scheduled     May 16, 2018  7:30 AM CDT   Return Visit with [facility]URSE ONLY CANCER CENTER   [facility] ([facility]    [address] [place] [address] [phone]            May 16, 2018  8:15 AM CDT   Return Visit with Yakov Shaffer MD   [facility] ([facility])    [address] [place] [address] [phone]            May 16, 2018  9:00 AM CDT   Level 2 with [address])    [address] [place] [address] [phone]              Who to contact     If you have questions or need follow up information about today's clinic visit or your schedule please contact [facility] directly at [phone].  Normal or non-critical lab and imaging results will be communicated to you by MyChart, letter or phone within 4 business days after the clinic has received the results. If you do not hear from us within 7 days, please contact the clinic through [employer] or phone. If you have a critical or abnormal lab result, we will notify you by phone as soon as possible.  Submit refill requests through [employer] or call your pharmacy and they will forward the refill request to us. Please allow 3 business days for your refill to be completed.          Additional Information About Your Visit        [employer] Information     [employer] gives you secure access to your electronic health record. If you see a primary care  provider, you can also send messages to your care team and make appointments. If you have questions, please call your primary care clinic.  If you do not have a primary care provider, please call [phone] and they will assist you.      [employer] is an electronic gateway that provides easy, online access to your medical records. With [employer], you can request a clinic appointment, read your test results, renew a prescription or communicate with your care team.     To access your existing account, please contact your [facility] or call [phone] for assistance.        Care EveryWhere ID     This is your Care EveryWhere ID. This could be used by other organizations to access your [place] medical records  [id number]         Blood Pressure from Last 3 Encounters:   05/02/18 122/78   04/27/18 122/86   04/18/18 117/79    Weight from Last 3 Encounters:   05/02/18 98 kg (216 lb)   04/18/18 99.9 kg (220 lb 3.2 oz)   04/04/18 100 kg (220 lb 6.4 oz)              Today, you had the following     No orders found for display       Primary Care Provider Office Phone # Fax #    Sandra Dickerson [phone]       [facility]  [address]  [facility] [address]        Equal Access to Services     CHANDAN GALLO : Hadii aad ku hadasho Soomaali, waaxda luqadaha, qaybta kaalmada adeegyada, waxay idiin hayaan charley kharalia cowart . So [facility] [phone].    ATENCIÓN: Si habla español, tiene a cuevas disposición servicios gratuitos de asistencia lingüística. Llame al [phone].    We comply with applicable federal civil rights laws and [place] laws. We do not discriminate on the basis of race, color, national origin, age, disability, sex, sexual orientation, or gender identity.            Thank you!     Thank you for choosing [facility]  for your care. Our goal is always to provide you with excellent care. Hearing back from our patients is one way we can  continue to improve our services. Please take a few minutes to complete the written survey that you may receive in the mail after your visit with us. Thank you!             Your Updated Medication List - Protect others around you: Learn how to safely use, store and throw away your medicines at www.disposemymeds.org.          This list is accurate as of 5/2/18 11:19 AM.  Always use your most recent med list.                   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

## 2018-05-02 NOTE — PROGRESS NOTES
5/2/2018      REASON FOR VISIT:  Follow-up for unresectable metastatic gastric carcinoma, diffuse type, grade 3, HER-2 negative, MSI-Intact. PD-L1 negative (<1%)     HISTORY OF PRESENT ILLNESS:  Please see my previous note for details.  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      CT CAP after C#6 shows stable disease    C# 9 FOLFOX 8/2/2017 ( Oxaliplatin dose reduced to 70mg/m2 due to neuropathy )  C#10 8/22/2017 ( Oxaliplatin dose reduced to 60mg/m2 due to neuropathy )  Delayed by 1 week- patient preference  C#11 9/6/17 5FU/LV ( Stopped Oxaliplatin )  C#12 9/20/2017 5FU/LV    Repeat CT scan after C#12 shows stable to slightly improved disease.    C#13 10/4/2017 5FU/LV  C#14 10/18/2017 5FU/LV  C#15 10/31/2017 5FU/LV  C#16 11/15/2017 5FU/LV  C#17 11/29/2017 5FU/LV   C#18 12/13/2017 5FU/LV    Repeat CT CAP 12/22/17 is stable    C#19 12/27/2017 5FU/LV  C#20 1/10/2018 5FU/LV  C# 21 1/24/2018 5FU/LV  C# 22 2/7/2018  5FU/LV  C# 23 2/21/2018   C# 24 3/7/2018     Repeat CAP on 3/16/18 shows stable disease.    C# 25 3/21/2018 5FU/LV  C# 26 4/4/2018 5FU/LV  C# 27 4/18/2018 5FU/LV    As he was complaining of off and on trouble swallowing with food getting stuck. Repeated EGD which was done on 4/27/2018 which showed a nodular area with possible shallow ulceration in the stomach. Otherwise esophagus and stomach and duodenum were normal.  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  He was referred to gastroenterologist for manometry    The biopsies which were taken from the nodular shallow ulcer are not back as of yet    C#28 5/2/2018 5FU/LV       INTERVAL HISTORY:   He is doing well. Gout has resolved. He is not taking any  "medications for it anymore. He continues to have issues with swallowing and he cannot eat bread and if the need is not properly chewed then he has difficulty swallowing and the food gets stuck. Otherwise he is able to eat and drink well. No nausea no vomiting. No diarrhea or constipation. He continues to have same neuropathy. Energy is good. No infections. No trouble breathing. He continues to apply moisturizing cream to his hands several times a day and although the skin is peeling a little bit but it is somewhat better    ECOG 0    ROS:  A comprehensive ROS was otherwise neg          MEDICATIONS:   Current Outpatient Prescriptions   Medication     Acetaminophen (TYLENOL PO)     allopurinol (ZYLOPRIM) 100 MG tablet     aspirin 81 MG chewable tablet     lidocaine-prilocaine (EMLA) cream     Multiple Vitamins-Minerals (MULTIVITAMIN ADULT PO)     Naproxen Sodium (ALEVE PO)     LORazepam (ATIVAN) 0.5 MG tablet     ondansetron (ZOFRAN) 8 MG tablet     prochlorperazine (COMPAZINE) 10 MG tablet     No current facility-administered medications for this visit.      Facility-Administered Medications Ordered in Other Visits   Medication     heparin 100 UNIT/ML injection 5 mL     sodium chloride (PF) 0.9% PF flush 10-20 mL            PHYSICAL EXAMINATION:   /78  Pulse 67  Temp 98.4  F (36.9  C)  Resp 15  Ht 1.803 m (5' 10.98\")  Wt 98 kg (216 lb)  SpO2 98%  BMI 30.14 kg/m2  CONSTITUTIONAL: No apparent distress  EYES: PERRLA, without pallor or jaundice  ENT/MOUTH: Ears unremarkable. No oral lesions  CVS: s1s2 normal  RESPIRATORY: Chest is clear  GI: Abdomen is benign  NEURO: He is alert and oriented ×3. He has subjective neuropathy of hands and feet  INTEGUMENT: no concerning his skin rashes   LYMPHATIC: no palpable lymphadenopathy  MUSCULOSKELETAL: Unremarkable. No bony tenderness.   EXTREMITIES: no pedal edema.. The skin of the palms is less dry today and there is some peeling but it is better than last " time  PSYCH: Mentation, mood and affect are appropriate          LABS:   Reviewed and stable  Results for LEONOR RALPH (MRN [id number]) as of 5/2/2018 09:48   Ref. Range 5/2/2018 08:40   Sodium Latest Ref Range: 133 - 144 mmol/L 139   Potassium Latest Ref Range: 3.4 - 5.3 mmol/L 4.3   Chloride Latest Ref Range: 94 - 109 mmol/L 107   Carbon Dioxide Latest Ref Range: 20 - 32 mmol/L 28   Urea Nitrogen Latest Ref Range: 7 - 30 mg/dL 13   Creatinine Latest Ref Range: 0.66 - 1.25 mg/dL 0.82   GFR Estimate Latest Ref Range: >60 mL/min/1.7m2 >90   GFR Estimate If Black Latest Ref Range: >60 mL/min/1.7m2 >90   Calcium Latest Ref Range: 8.5 - 10.1 mg/dL 9.1   Anion Gap Latest Ref Range: 3 - 14 mmol/L 4   Albumin Latest Ref Range: 3.4 - 5.0 g/dL 3.7   Protein Total Latest Ref Range: 6.8 - 8.8 g/dL 6.7 (L)   Bilirubin Total Latest Ref Range: 0.2 - 1.3 mg/dL 0.5   Alkaline Phosphatase Latest Ref Range: 40 - 150 U/L 72   ALT Latest Ref Range: 0 - 70 U/L 51   AST Latest Ref Range: 0 - 45 U/L 34   Glucose Latest Ref Range: 70 - 99 mg/dL 105 (H)   WBC Latest Ref Range: 4.0 - 11.0 10e9/L 6.0   Hemoglobin Latest Ref Range: 13.3 - 17.7 g/dL 13.1 (L)   Hematocrit Latest Ref Range: 40.0 - 53.0 % 39.4 (L)   Platelet Count Latest Ref Range: 150 - 450 10e9/L 180   RBC Count Latest Ref Range: 4.4 - 5.9 10e12/L 4.09 (L)   MCV Latest Ref Range: 78 - 100 fl 96   MCH Latest Ref Range: 26.5 - 33.0 pg 32.0   MCHC Latest Ref Range: 31.5 - 36.5 g/dL 33.2   RDW Latest Ref Range: 10.0 - 15.0 % 14.5   Diff Method Unknown Automated Method   % Neutrophils Latest Units: % 53.8   % Lymphocytes Latest Units: % 30.5   % Monocytes Latest Units: % 11.3   % Eosinophils Latest Units: % 3.2   % Basophils Latest Units: % 1.0   % Immature Granulocytes Latest Units: % 0.2   Absolute Neutrophil Latest Ref Range: 1.6 - 8.3 10e9/L 3.3   Absolute Lymphocytes Latest Ref Range: 0.8 - 5.3 10e9/L 1.8   Absolute Monocytes Latest Ref Range: 0.0 - 1.3 10e9/L 0.7   Absolute  Eosinophils Latest Ref Range: 0.0 - 0.7 10e9/L 0.2     IMAGING:  Reviewed  EXAM: CT of the Chest, Abdomen and Pelvis, 3/16/2018     COMPARISON: CTs dated 12/22/2017, 9/29/2017, 6/30/2017 and PET CT  dated 3/24/2017      CLINICAL HISTORY: Metastatic gastric carcinoma.      TECHNIQUE: Axial images of the chest, abdomen and pelvis were obtained  with 134 mL IV contrast. Coronal reconstructions were provided. Images  were reviewed in bone, lung, and soft tissue windows.     Dose: 1108 mGy*cm     FINDINGS:     Chest: 0.9 cm right thyroid nodule is unchanged since at least  3/24/2017. The thyroid gland is otherwise unremarkable. Great vessels,  aorta and pulmonary artery normal in caliber and configuration. The  heart is not enlarged. There is no pericardial effusion. Stable  appearance of partially calcified mediastinal and hilar lymph nodes.  No new or enlarging lymph nodes.     Lungs: Stringy inspissated secretions in the trachea, the central  airways are otherwise clear. No focal consolidation, pleural effusion  or pneumothorax. Multiple calcified granulomas are stable. No new or  enlarging pulmonary nodules.     Abdomen and Pelvis: Redemonstration of mild diffuse gastric wall  thickening, not significantly changed when compared with exam dated  12/22/2017. Prominent mesenteric and retroperitoneal lymph nodes are  also stable when compared with prior exam. For example, gastrohepatic  lymph node measures 1.1 x 1.4 cm (series 2 image 61), unchanged from  prior exam. Portacaval lymph node measures 1.6 x 1 cm (series 2 image  66), previously 1.8 x 1.2 cm. Mild haziness of the central mesentery  with infiltration along the lesser sac, and extending to the  retroperitoneum is also not significantly changed from prior exam. No  new or enlarging lymph nodes.     1.3 subcapsular lesion along the right lobe of the liver is not  significantly changed in size or morphology since at least 3/24/2017  and appears to have peripheral  globular enhancement on the current  examination. Stable appearance of subcentimeter hepatic hypodensities  along the falciform ligament (series 2 image 60) and near the hepatic  dome (series 2 image 49). No new focal hepatic mass.      The gallbladder, spleen, pancreas and adrenal glands are unremarkable.  Symmetric nephrographic enhancement of the kidneys. No hydronephrosis  or hydroureter. The urinary bladder is partially distended without  focal mucosal thickening. The bowel is nondilated. Colonic  diverticulosis without diverticulitis. Fluid/thickening along the  posterior peritoneum, similar to prior exam. Major visualized vessels  of the abdomen are patent.     Bones and soft tissues: Multilevel degenerative change of the spine.  Advanced degenerative changes about the right hip, unchanged from  prior exam. No acute osseous abnormality or suspicious osseous lesion.         Impression:   1. In this patient with known gastric carcinoma, there is stable mild  diffuse gastric wall thickening, prominent local lymph nodes,  infiltrative mesenteric and retroperitoneal haziness with  fluid/thickening along the peritoneum. These findings are not  significantly changed from prior exam. No evidence for new or  progressive disease in the chest, abdomen or pelvis.  2. 1.3 cm hypodensity in the right lobe of the liver currently  demonstrates characteristics suggestive of a hepatic hemangioma.  3. Colonic diverticulosis without diverticulitis.  4. Sequelae of granulomatous disease.    ASSESSMENT AND PLAN:  Metastatic gastric cancer, HER-2/catrachita negative, MSI intact, PD-L1 neg (<1%), presenting with early satiety and significant weight loss and epigastric discomfort upon eating with some vomiting.  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  There is an indeterminate right liver lobe lesion which has remained stable and likely is  not a metastasis.      We started palliative FOLFOX on 03/12/2017.     Oxaliplatin was dropped from cycle #11 onwards because of neuropathy     Scans after C#18 show stable disease    Repeat CT CAP after 24 cycles also show stable disease so we will continue the same chemotherapy with 5FU. He is tolerating it well    He will get cycle #28 today.  The repeat EGD which was done a few days ago was generally better as compared to EGD which was done prior to treatment and now only 1.2 cm shallow ulcer was found in the stomach. I will follow the final biopsy report from this.  For now I will plan to repeat his scans after cycle #30    Trouble swallowing. He is going to see GI tomorrow to discuss possibility of manometry       Acute gout. This has resolved.    Anemia. This is mild and due to chemotherapy. We will continue to observe and repeat labs next time    Neuropathy. This is a stable. This is due to oxaliplatin. At this time we will continue to observe    Skin peeling. Due to 5-FU. This is somewhat better and at this time he will continue to apply the moisturizing lotions several times a day. At this and there is no need to change the dose of the medication       We did not address the following today  Discussion regarding exercise. He has now joined a gym and is going than 4 times a week to exercise and I encouraged him to continue to do so and slowly build his endurance. Overall his energy has been pretty decent      His genetic testing did not show any identifiable mutation not reveal any identifiable mutation     RTC in 2 weeks    All of his and his family's questions were answered to their satisfaction and they are agreeable and comfortable with the plan     LANEY HERNANDEZ MD       Addendum  Results from Biopsy noted. No change in plan  SPECIMEN(S):   Stomach biopsy, fundus     FINAL DIAGNOSIS:   STOMACH, FUNDUS, BIOPSY:   - Poorly differentiated carcinoma (diffuse type) with signet ring features   - Biopsy is  limited to mucosa     Yakov Shaffer

## 2018-05-02 NOTE — LETTER
5/2/2018         RE: Terry Yi  [place] [address]        Dear Colleague,    Thank you for referring your patient, Terry Yi, to the [facility]. Please see a copy of my visit note below.    5/2/2018      REASON FOR VISIT:  Follow-up for unresectable metastatic gastric carcinoma, diffuse type, grade 3, HER-2 negative, MSI-Intact.      HISTORY OF PRESENT ILLNESS:  Please see my previous note for details.  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      CT CAP after C#6 shows stable disease    C# 9 FOLFOX 8/2/2017 ( Oxaliplatin dose reduced to 70mg/m2 due to neuropathy )  C#10 8/22/2017 ( Oxaliplatin dose reduced to 60mg/m2 due to neuropathy )  Delayed by 1 week- patient preference  C#11 9/6/17 5FU/LV ( Stopped Oxaliplatin )  C#12 9/20/2017 5FU/LV    Repeat CT scan after C#12 shows stable to slightly improved disease.    C#13 10/4/2017 5FU/LV  C#14 10/18/2017 5FU/LV  C#15 10/31/2017 5FU/LV  C#16 11/15/2017 5FU/LV  C#17 11/29/2017 5FU/LV   C#18 12/13/2017 5FU/LV    Repeat CT CAP 12/22/17 is stable    C#19 12/27/2017 5FU/LV  C#20 1/10/2018 5FU/LV  C# 21 1/24/2018 5FU/LV  C# 22 2/7/2018  5FU/LV  C# 23 2/21/2018   C# 24 3/7/2018     Repeat CAP on 3/16/18 shows stable disease.    C# 25 3/21/2018 5FU/LV  C# 26 4/4/2018 5FU/LV  C# 27 4/18/2018 5FU/LV    As he was complaining of off and on trouble swallowing with food getting stuck. Repeated EGD which was done on 4/27/2018 which showed a nodular area with possible shallow ulceration in the stomach. Otherwise esophagus and stomach and duodenum were normal.  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  He was referred to  "gastroenterologist for manometry    The biopsies which were taken from the nodular shallow ulcer are not back as of yet    C#28 5/2/2018 5FU/LV       INTERVAL HISTORY:   He is doing well. Gout has resolved. He is not taking any medications for it anymore. He continues to have issues with swallowing and he cannot eat bread and if the need is not properly chewed then he has difficulty swallowing and the food gets stuck. Otherwise he is able to eat and drink well. No nausea no vomiting. No diarrhea or constipation. He continues to have same neuropathy. Energy is good. No infections. No trouble breathing. He continues to apply moisturizing cream to his hands several times a day and although the skin is peeling a little bit but it is somewhat better    ECOG 0    ROS:  A comprehensive ROS was otherwise neg          MEDICATIONS:   Current Outpatient Prescriptions   Medication     Acetaminophen (TYLENOL PO)     allopurinol (ZYLOPRIM) 100 MG tablet     aspirin 81 MG chewable tablet     lidocaine-prilocaine (EMLA) cream     Multiple Vitamins-Minerals (MULTIVITAMIN ADULT PO)     Naproxen Sodium (ALEVE PO)     LORazepam (ATIVAN) 0.5 MG tablet     ondansetron (ZOFRAN) 8 MG tablet     prochlorperazine (COMPAZINE) 10 MG tablet     No current facility-administered medications for this visit.      Facility-Administered Medications Ordered in Other Visits   Medication     heparin 100 UNIT/ML injection 5 mL     sodium chloride (PF) 0.9% PF flush 10-20 mL            PHYSICAL EXAMINATION:   /78  Pulse 67  Temp 98.4  F (36.9  C)  Resp 15  Ht 1.803 m (5' 10.98\")  Wt 98 kg (216 lb)  SpO2 98%  BMI 30.14 kg/m2  CONSTITUTIONAL: No apparent distress  EYES: PERRLA, without pallor or jaundice  ENT/MOUTH: Ears unremarkable. No oral lesions  CVS: s1s2 normal  RESPIRATORY: Chest is clear  GI: Abdomen is benign  NEURO: He is alert and oriented ×3. He has subjective neuropathy of hands and feet  INTEGUMENT: no concerning his skin rashes "   LYMPHATIC: no palpable lymphadenopathy  MUSCULOSKELETAL: Unremarkable. No bony tenderness.   EXTREMITIES: no pedal edema.. The skin of the palms is less dry today and there is some peeling but it is better than last time  PSYCH: Mentation, mood and affect are appropriate          LABS:   Reviewed and stable  Results for LEONOR RALPH (MRN [id number]) as of 5/2/2018 09:48   Ref. Range 5/2/2018 08:40   Sodium Latest Ref Range: 133 - 144 mmol/L 139   Potassium Latest Ref Range: 3.4 - 5.3 mmol/L 4.3   Chloride Latest Ref Range: 94 - 109 mmol/L 107   Carbon Dioxide Latest Ref Range: 20 - 32 mmol/L 28   Urea Nitrogen Latest Ref Range: 7 - 30 mg/dL 13   Creatinine Latest Ref Range: 0.66 - 1.25 mg/dL 0.82   GFR Estimate Latest Ref Range: >60 mL/min/1.7m2 >90   GFR Estimate If Black Latest Ref Range: >60 mL/min/1.7m2 >90   Calcium Latest Ref Range: 8.5 - 10.1 mg/dL 9.1   Anion Gap Latest Ref Range: 3 - 14 mmol/L 4   Albumin Latest Ref Range: 3.4 - 5.0 g/dL 3.7   Protein Total Latest Ref Range: 6.8 - 8.8 g/dL 6.7 (L)   Bilirubin Total Latest Ref Range: 0.2 - 1.3 mg/dL 0.5   Alkaline Phosphatase Latest Ref Range: 40 - 150 U/L 72   ALT Latest Ref Range: 0 - 70 U/L 51   AST Latest Ref Range: 0 - 45 U/L 34   Glucose Latest Ref Range: 70 - 99 mg/dL 105 (H)   WBC Latest Ref Range: 4.0 - 11.0 10e9/L 6.0   Hemoglobin Latest Ref Range: 13.3 - 17.7 g/dL 13.1 (L)   Hematocrit Latest Ref Range: 40.0 - 53.0 % 39.4 (L)   Platelet Count Latest Ref Range: 150 - 450 10e9/L 180   RBC Count Latest Ref Range: 4.4 - 5.9 10e12/L 4.09 (L)   MCV Latest Ref Range: 78 - 100 fl 96   MCH Latest Ref Range: 26.5 - 33.0 pg 32.0   MCHC Latest Ref Range: 31.5 - 36.5 g/dL 33.2   RDW Latest Ref Range: 10.0 - 15.0 % 14.5   Diff Method Unknown Automated Method   % Neutrophils Latest Units: % 53.8   % Lymphocytes Latest Units: % 30.5   % Monocytes Latest Units: % 11.3   % Eosinophils Latest Units: % 3.2   % Basophils Latest Units: % 1.0   % Immature  Granulocytes Latest Units: % 0.2   Absolute Neutrophil Latest Ref Range: 1.6 - 8.3 10e9/L 3.3   Absolute Lymphocytes Latest Ref Range: 0.8 - 5.3 10e9/L 1.8   Absolute Monocytes Latest Ref Range: 0.0 - 1.3 10e9/L 0.7   Absolute Eosinophils Latest Ref Range: 0.0 - 0.7 10e9/L 0.2     IMAGING:  Reviewed  EXAM: CT of the Chest, Abdomen and Pelvis, 3/16/2018     COMPARISON: CTs dated 12/22/2017, 9/29/2017, 6/30/2017 and PET CT  dated 3/24/2017      CLINICAL HISTORY: Metastatic gastric carcinoma.      TECHNIQUE: Axial images of the chest, abdomen and pelvis were obtained  with 134 mL IV contrast. Coronal reconstructions were provided. Images  were reviewed in bone, lung, and soft tissue windows.     Dose: 1108 mGy*cm     FINDINGS:     Chest: 0.9 cm right thyroid nodule is unchanged since at least  3/24/2017. The thyroid gland is otherwise unremarkable. Great vessels,  aorta and pulmonary artery normal in caliber and configuration. The  heart is not enlarged. There is no pericardial effusion. Stable  appearance of partially calcified mediastinal and hilar lymph nodes.  No new or enlarging lymph nodes.     Lungs: Stringy inspissated secretions in the trachea, the central  airways are otherwise clear. No focal consolidation, pleural effusion  or pneumothorax. Multiple calcified granulomas are stable. No new or  enlarging pulmonary nodules.     Abdomen and Pelvis: Redemonstration of mild diffuse gastric wall  thickening, not significantly changed when compared with exam dated  12/22/2017. Prominent mesenteric and retroperitoneal lymph nodes are  also stable when compared with prior exam. For example, gastrohepatic  lymph node measures 1.1 x 1.4 cm (series 2 image 61), unchanged from  prior exam. Portacaval lymph node measures 1.6 x 1 cm (series 2 image  66), previously 1.8 x 1.2 cm. Mild haziness of the central mesentery  with infiltration along the lesser sac, and extending to the  retroperitoneum is also not significantly  changed from prior exam. No  new or enlarging lymph nodes.     1.3 subcapsular lesion along the right lobe of the liver is not  significantly changed in size or morphology since at least 3/24/2017  and appears to have peripheral globular enhancement on the current  examination. Stable appearance of subcentimeter hepatic hypodensities  along the falciform ligament (series 2 image 60) and near the hepatic  dome (series 2 image 49). No new focal hepatic mass.      The gallbladder, spleen, pancreas and adrenal glands are unremarkable.  Symmetric nephrographic enhancement of the kidneys. No hydronephrosis  or hydroureter. The urinary bladder is partially distended without  focal mucosal thickening. The bowel is nondilated. Colonic  diverticulosis without diverticulitis. Fluid/thickening along the  posterior peritoneum, similar to prior exam. Major visualized vessels  of the abdomen are patent.     Bones and soft tissues: Multilevel degenerative change of the spine.  Advanced degenerative changes about the right hip, unchanged from  prior exam. No acute osseous abnormality or suspicious osseous lesion.         Impression:   1. In this patient with known gastric carcinoma, there is stable mild  diffuse gastric wall thickening, prominent local lymph nodes,  infiltrative mesenteric and retroperitoneal haziness with  fluid/thickening along the peritoneum. These findings are not  significantly changed from prior exam. No evidence for new or  progressive disease in the chest, abdomen or pelvis.  2. 1.3 cm hypodensity in the right lobe of the liver currently  demonstrates characteristics suggestive of a hepatic hemangioma.  3. Colonic diverticulosis without diverticulitis.  4. Sequelae of granulomatous disease.    ASSESSMENT AND PLAN:  Metastatic gastric cancer, HER-2/catrachita negative, MSI intact, presenting with early satiety and significant weight loss and epigastric discomfort upon eating with some vomiting.  There is linitis  plastica with involvement of periaortic lymph node in the aortocaval region as well as extensive mesenteric infiltration.  There is an indeterminate right liver lobe lesion which has remained stable and likely is not a metastasis.      We started palliative FOLFOX on 03/12/2017.     Oxaliplatin was dropped from cycle #11 onwards because of neuropathy     Scans after C#18 show stable disease    Repeat CT CAP after 24 cycles also show stable disease so we will continue the same chemotherapy with 5FU. He is tolerating it well    He will get cycle #28 today.  The repeat EGD which was done a few days ago was generally better as compared to EGD which was done prior to treatment and now only 1.2 cm shallow ulcer was found in the stomach. I will follow the final biopsy report from this.  For now I will plan to repeat his scans after cycle #30    Trouble swallowing. He is going to see GI tomorrow to discuss possibility of manometry       Acute gout. This has resolved.    Anemia. This is mild and due to chemotherapy. We will continue to observe and repeat labs next time    Neuropathy. This is a stable. This is due to oxaliplatin. At this time we will continue to observe    Skin peeling. Due to 5-FU. This is somewhat better and at this time he will continue to apply the moisturizing lotions several times a day. At this and there is no need to change the dose of the medication       We did not address the following today  Discussion regarding exercise. He has now joined a gym and is going than 4 times a week to exercise and I encouraged him to continue to do so and slowly build his endurance. Overall his energy has been pretty decent      His genetic testing did not show any identifiable mutation not reveal any identifiable mutation     RTC in 2 weeks    All of his and his family's questions were answered to their satisfaction and they are agreeable and comfortable with the plan     LANEY HERNANDEZ MD           Again, thank you  for allowing me to participate in the care of your patient.        Sincerely,        Yakov Shaffer MD

## 2018-05-02 NOTE — NURSING NOTE
"Oncology Rooming Note    May 2, 2018 9:12 AM   Terry Yi is a 52 year old male who presents for:    Chief Complaint   Patient presents with     Oncology Clinic Visit     prior to tx     Initial Vitals: /78  Pulse 67  Temp 98.4  F (36.9  C)  Resp 15  Ht 1.803 m (5' 10.98\")  Wt 98 kg (216 lb)  SpO2 98%  BMI 30.14 kg/m2 Estimated body mass index is 30.14 kg/(m^2) as calculated from the following:    Height as of this encounter: 1.803 m (5' 10.98\").    Weight as of this encounter: 98 kg (216 lb). Body surface area is 2.22 meters squared.  Moderate Pain (5) Comment: Hand and feet pain. marijuana   No LMP for male patient.  Allergies reviewed: Yes  Medications reviewed: Yes    Medications: Medication refills not needed today.  Pharmacy name entered into EPIC: Data Unavailable        5 minutes for nursing intake (face to face time)     Katja Lopez LPN              "

## 2018-05-02 NOTE — MR AVS SNAPSHOT
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)    -  1       Follow-ups after your visit        Your next 10 appointments already scheduled     May 02, 2018  9:15 AM CDT   [facility] with Yakov [facility]affer MD   [facility]    [address] [place] [id number] [phone]            May 02, 2018 10:00 AM CDT   Level 2 with [facility] INFUSION   [facility]    [address] [place] [address] [phone]              Who to contact     If you have questions or need follow up information about today's clinic visit or your schedule please contact [facility] directly at [phone].  Normal or non-critical lab and imaging results will be communicated to you by Honestly.comhart, letter or phone within 4 business days after the clinic has received the results. If you do not hear from us within 7 days, please contact the clinic through [employer] or phone. If you have a critical or abnormal lab result, we will notify you by phone as soon as possible.  Submit refill requests through [employer] or call your pharmacy and they will forward the refill request to us. Please allow 3 business days for your refill to be completed.          Additional Information About Your Visit        MyChart Information     [employer] gives you secure access to your electronic health record. If you see a primary care provider, you can also send messages to your care team and make appointments. If you have questions, please call your primary care clinic.  If you do not have a primary care provider, please call  [phone] and they will assist you.      PeopleJar is an electronic gateway that provides easy, online access to your medical records. With PeopleJar, you can request a clinic appointment, read your test results, renew a prescription or communicate with your care team.     To access your existing account, please contact your [facility] or call [phone] for assistance.        Care EveryWhere ID     This is your Care EveryWhere ID. This could be used by other organizations to access your [place] medical records  [id number]         Blood Pressure from Last 3 Encounters:   04/27/18 122/86   04/18/18 117/79   04/04/18 128/72    Weight from Last 3 Encounters:   04/18/18 99.9 kg (220 lb 3.2 oz)   04/04/18 100 kg (220 lb 6.4 oz)   03/21/18 99.5 kg (219 lb 6 oz)              We Performed the Following     CBC with platelets differential     Comprehensive metabolic panel        Primary Care Provider Office Phone # Fax #    Sandra Dickerson [phone]       [facility]  [address]  [facility] [address]        Equal Access to Services     [facility]: Hadii aad ku hadasho Soomaali, waaxda luqadaha, qaybta kaalmada adeegyada, waxay violeta hayrodrigon charley cowart . So [facility] [phone].    ATENCIÓN: Si habla español, tiene a cuevas disposición servicios gratuitos de asistencia lingüística. Llame al [phone].    We comply with applicable federal civil rights laws and [place] laws. We do not discriminate on the basis of race, color, national origin, age, disability, sex, sexual orientation, or gender identity.            Thank you!     Thank you for choosing [facility]  for your care. Our goal is always to provide you with excellent care. Hearing back from our patients is one way we can continue to improve our services. Please take a few minutes to complete the written survey that you may receive in the mail after your visit with us. Thank  you!             Your Updated Medication List - Protect others around you: Learn how to safely use, store and throw away your medicines at www.disposemymeds.org.          This list is accurate as of 5/2/18  8:50 AM.  Always use your most recent med list.                   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

## 2018-05-16 NOTE — MR AVS SNAPSHOT
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)    -  1       Follow-ups after your visit        Your next 10 appointments already scheduled     May 16, 2018  8:15 AM CDT   Return Visit with Yakov Shaffer MD   [facility]    [address] [address] [place] [address] [phone]            May 16, 2018  9:00 AM CDT   Level 2 with BAY 2 INFUSION   [facility] ([facility])    [address] [place] [address] [phone]            May 16, 2018  1:30 PM CDT   XR ESOPHAGRAM with UCXR2, UC GIGU RAD   [facility] ([facility])    [address] [place] [address] [phone]           Please bring a list of your current medicines to your exam. (Include vitamins, minerals and over-the-counter medicines.) Leave your valuables at home.  Tell the doctor if there is a chance you could be pregnant.  Do not eat for 4 hours before the exam. Keep drinking clear liquids until 2 hours before the exam.  You may take pain medicine (with a sip of water) up to 4 hours before the exam.  Do not swallow any other medicines unless your doctor tells you to. Talk to your doctor to be sure it s safe to stop your medicines.  Please call the Imaging Department at your exam site with any questions.              Who to contact     If you have questions or need follow up information about today's clinic visit or your schedule please contact [facility] directly at [phone].  Normal or non-critical lab and imaging results will be communicated to  you by MyChart, letter or phone within 4 business days after the clinic has received the results. If you do not hear from us within 7 days, please contact the clinic through JoKnot or phone. If you have a critical or abnormal lab result, we will notify you by phone as soon as possible.  Submit refill requests through Clutch or call your pharmacy and they will forward the refill request to us. Please allow 3 business days for your refill to be completed.          Additional Information About Your Visit        WedWuharFiber Options Information     Clutch gives you secure access to your electronic health record. If you see a primary care provider, you can also send messages to your care team and make appointments. If you have questions, please call your primary care clinic.  If you do not have a primary care provider, please call [phone] and they will assist you.      Clutch is an electronic gateway that provides easy, online access to your medical records. With Clutch, you can request a clinic appointment, read your test results, renew a prescription or communicate with your care team.     To access your existing account, please contact your [facility] or call [phone] for assistance.        Care EveryWhere ID     This is your Care EveryWhere ID. This could be used by other organizations to access your [place] medical records  [id number]         Blood Pressure from Last 3 Encounters:   05/02/18 122/78   04/27/18 122/86   04/18/18 117/79    Weight from Last 3 Encounters:   05/02/18 98 kg (216 lb)   04/18/18 99.9 kg (220 lb 3.2 oz)   04/04/18 100 kg (220 lb 6.4 oz)              We Performed the Following     CBC with platelets differential     Comprehensive metabolic panel        Primary Care Provider Office Phone # Fax #    Sandra Dickerson [phone]       [facility]  [address]  [facility] [address]        Equal Access to Services     CHANDAN QUINTANA: Jemal  tk Casillas, waaxda luqadaha, qaybta kaalmada tim, claire prabhain hayaalukasz merchantjennifer currybret laSyedguillermo pavan. So [facility] [phone].    ATENCIÓN: Si habla marly, tiene a cuevas disposición servicios gratuitos de asistencia lingüística. Ciara al [phone].    We comply with applicable federal civil rights laws and [place] laws. We do not discriminate on the basis of race, color, national origin, age, disability, sex, sexual orientation, or gender identity.            Thank you!     Thank you for choosing [facility]  for your care. Our goal is always to provide you with excellent care. Hearing back from our patients is one way we can continue to improve our services. Please take a few minutes to complete the written survey that you may receive in the mail after your visit with us. Thank you!             Your Updated Medication List - Protect others around you: Learn how to safely use, store and throw away your medicines at www.disposemymeds.org.          This list is accurate as of 5/16/18  7:48 AM.  Always use your most recent med list.                   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

## 2018-05-16 NOTE — LETTER
5/16/2018         RE: Terry Yi  [place] [address]        Dear Colleague,    Thank you for referring your patient, Terry Yi, to the [facility]. Please see a copy of my visit note below.    5/16/2018     REASON FOR VISIT:  Follow-up for unresectable metastatic gastric carcinoma, diffuse type, grade 3, HER-2 negative, MSI-Intact. PD-L1 negative (<1%)     HISTORY OF PRESENT ILLNESS:  Please see my previous note for details.  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      CT CAP after C#6 shows stable disease    C# 9 FOLFOX 8/2/2017 ( Oxaliplatin dose reduced to 70mg/m2 due to neuropathy )  C#10 8/22/2017 ( Oxaliplatin dose reduced to 60mg/m2 due to neuropathy )  Delayed by 1 week- patient preference  C#11 9/6/17 5FU/LV ( Stopped Oxaliplatin )  C#12 9/20/2017 5FU/LV    Repeat CT scan after C#12 shows stable to slightly improved disease.    C#13 10/4/2017 5FU/LV  C#14 10/18/2017 5FU/LV  C#15 10/31/2017 5FU/LV  C#16 11/15/2017 5FU/LV  C#17 11/29/2017 5FU/LV   C#18 12/13/2017 5FU/LV    Repeat CT CAP 12/22/17 is stable    C#19 12/27/2017 5FU/LV  C#20 1/10/2018 5FU/LV  C# 21 1/24/2018 5FU/LV  C# 22 2/7/2018  5FU/LV  C# 23 2/21/2018   C# 24 3/7/2018     Repeat CAP on 3/16/18 shows stable disease.    C# 25 3/21/2018 5FU/LV  C# 26 4/4/2018 5FU/LV  C# 27 4/18/2018 5FU/LV    As he was complaining of off and on trouble swallowing with food getting stuck, we repeated EGD on 4/27/2018 which showed a nodular area with possible shallow ulceration in the stomach. Otherwise esophagus and stomach and duodenum were normal.  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  He was referred to  "gastroenterologist for manometry    The biopsies which were taken from the nodular shallow ulcer aren't consistent with poorly differentiated diffuse type adenocarcinoma with signet ring features      C#28 5/2/2018 5FU/LV  C#29 5/16/2018 5FU/LV       INTERVAL HISTORY:   Overall he continues to do well and tolerating chemotherapy well. He still notices that at times the food gets stuck in the esophagus and he especially notices this with meat and bread. He is otherwise able to eat normally without nausea and vomiting and he is maintaining his weight. Energy is good. He saw GI and is supposed to get esophogram today. Denies infections. No nausea or vomiting. No pain. Neuropathy is the same. His skin of the palms is better with some peeling and dryness of one of the fingers but overall it is better. He noticed a rash on his forearms when he went out in the sun and he applied hydrocortisone cream which helps and now he has started to wear long sleeves. It is getting better.     ECOG 0    ROS:  Otherwise her comprehensive review of the system was unremarkable          MEDICATIONS:   Current Outpatient Prescriptions   Medication     Acetaminophen (TYLENOL PO)     lidocaine-prilocaine (EMLA) cream     LORazepam (ATIVAN) 0.5 MG tablet     Multiple Vitamins-Minerals (MULTIVITAMIN ADULT PO)     Naproxen Sodium (ALEVE PO)     ondansetron (ZOFRAN) 8 MG tablet     prochlorperazine (COMPAZINE) 10 MG tablet     allopurinol (ZYLOPRIM) 100 MG tablet     aspirin 81 MG chewable tablet     No current facility-administered medications for this visit.             PHYSICAL EXAMINATION:   /76  Pulse 69  Temp 97.7  F (36.5  C)  Ht 1.778 m (5' 10\")  Wt 98.9 kg (218 lb)  SpO2 96%  BMI 31.28 kg/m2  CONSTITUTIONAL: no acute distress  EYES: PERRLA, no palor or icterus.   ENT/MOUTH: no mouth lesions. Ears normal  CVS: s1s2 no m r g .   RESPIRATORY: clear to auscultation b/l  GI: soft non tender no hepatosplenomegaly  NEURO: AAOX3  " Grossly non focal neuro exam apart from numbness of the feet and fingers  INTEGUMENT: There is a skin burn rash on the forearms which is minimal and by his description it is better. The skin of the palms has also improved and now he has peeling in only one index finger on the right side.  LYMPHATIC: no palpable cervical, supraclavicular, axillary LAD  MUSCULOSKELETAL: Unremarkable. No bony tenderness.   EXTREMITIES: no edema  PSYCH: Mentation, mood and affect are normal. Decision making capacity is intact          LABS:   Reviewed   Results for LEONOR RALPH (MRN [id number]) as of 5/16/2018 08:09   Ref. Range 5/16/2018 07:36   WBC Latest Ref Range: 4.0 - 11.0 10e9/L 5.8   Hemoglobin Latest Ref Range: 13.3 - 17.7 g/dL 13.3   Hematocrit Latest Ref Range: 40.0 - 53.0 % 39.5 (L)   Platelet Count Latest Ref Range: 150 - 450 10e9/L 169   RBC Count Latest Ref Range: 4.4 - 5.9 10e12/L 4.15 (L)   MCV Latest Ref Range: 78 - 100 fl 95   MCH Latest Ref Range: 26.5 - 33.0 pg 32.0   MCHC Latest Ref Range: 31.5 - 36.5 g/dL 33.7   RDW Latest Ref Range: 10.0 - 15.0 % 14.5   Diff Method Unknown Automated Method   % Neutrophils Latest Units: % 54.3   % Lymphocytes Latest Units: % 30.5   % Monocytes Latest Units: % 11.4   % Eosinophils Latest Units: % 3.1   % Basophils Latest Units: % 0.7   Absolute Neutrophil Latest Ref Range: 1.6 - 8.3 10e9/L 3.2   Results for LEONOR RALPH (MRN [id number]) as of 5/16/2018 08:30   Ref. Range 5/16/2018 07:36   Sodium Latest Ref Range: 133 - 144 mmol/L 140   Potassium Latest Ref Range: 3.4 - 5.3 mmol/L 4.3   Chloride Latest Ref Range: 94 - 109 mmol/L 106   Carbon Dioxide Latest Ref Range: 20 - 32 mmol/L 30   Urea Nitrogen Latest Ref Range: 7 - 30 mg/dL 11   Creatinine Latest Ref Range: 0.66 - 1.25 mg/dL 0.84   GFR Estimate Latest Ref Range: >60 mL/min/1.7m2 >90   GFR Estimate If Black Latest Ref Range: >60 mL/min/1.7m2 >90   Calcium Latest Ref Range: 8.5 - 10.1 mg/dL 9.2   Anion Gap Latest Ref Range: 3  - 14 mmol/L 4   Albumin Latest Ref Range: 3.4 - 5.0 g/dL 3.7   Protein Total Latest Ref Range: 6.8 - 8.8 g/dL 6.7 (L)   Bilirubin Total Latest Ref Range: 0.2 - 1.3 mg/dL 0.4   Alkaline Phosphatase Latest Ref Range: 40 - 150 U/L 75   ALT Latest Ref Range: 0 - 70 U/L 57   AST Latest Ref Range: 0 - 45 U/L 39   Glucose Latest Ref Range: 70 - 99 mg/dL 106 (H)     IMAGING:  Reviewed  EXAM: CT of the Chest, Abdomen and Pelvis, 3/16/2018     COMPARISON: CTs dated 12/22/2017, 9/29/2017, 6/30/2017 and PET CT  dated 3/24/2017      CLINICAL HISTORY: Metastatic gastric carcinoma.      TECHNIQUE: Axial images of the chest, abdomen and pelvis were obtained  with 134 mL IV contrast. Coronal reconstructions were provided. Images  were reviewed in bone, lung, and soft tissue windows.     Dose: 1108 mGy*cm     FINDINGS:     Chest: 0.9 cm right thyroid nodule is unchanged since at least  3/24/2017. The thyroid gland is otherwise unremarkable. Great vessels,  aorta and pulmonary artery normal in caliber and configuration. The  heart is not enlarged. There is no pericardial effusion. Stable  appearance of partially calcified mediastinal and hilar lymph nodes.  No new or enlarging lymph nodes.     Lungs: Stringy inspissated secretions in the trachea, the central  airways are otherwise clear. No focal consolidation, pleural effusion  or pneumothorax. Multiple calcified granulomas are stable. No new or  enlarging pulmonary nodules.     Abdomen and Pelvis: Redemonstration of mild diffuse gastric wall  thickening, not significantly changed when compared with exam dated  12/22/2017. Prominent mesenteric and retroperitoneal lymph nodes are  also stable when compared with prior exam. For example, gastrohepatic  lymph node measures 1.1 x 1.4 cm (series 2 image 61), unchanged from  prior exam. Portacaval lymph node measures 1.6 x 1 cm (series 2 image  66), previously 1.8 x 1.2 cm. Mild haziness of the central mesentery  with infiltration along the  lesser sac, and extending to the  retroperitoneum is also not significantly changed from prior exam. No  new or enlarging lymph nodes.     1.3 subcapsular lesion along the right lobe of the liver is not  significantly changed in size or morphology since at least 3/24/2017  and appears to have peripheral globular enhancement on the current  examination. Stable appearance of subcentimeter hepatic hypodensities  along the falciform ligament (series 2 image 60) and near the hepatic  dome (series 2 image 49). No new focal hepatic mass.      The gallbladder, spleen, pancreas and adrenal glands are unremarkable.  Symmetric nephrographic enhancement of the kidneys. No hydronephrosis  or hydroureter. The urinary bladder is partially distended without  focal mucosal thickening. The bowel is nondilated. Colonic  diverticulosis without diverticulitis. Fluid/thickening along the  posterior peritoneum, similar to prior exam. Major visualized vessels  of the abdomen are patent.     Bones and soft tissues: Multilevel degenerative change of the spine.  Advanced degenerative changes about the right hip, unchanged from  prior exam. No acute osseous abnormality or suspicious osseous lesion.         Impression:   1. In this patient with known gastric carcinoma, there is stable mild  diffuse gastric wall thickening, prominent local lymph nodes,  infiltrative mesenteric and retroperitoneal haziness with  fluid/thickening along the peritoneum. These findings are not  significantly changed from prior exam. No evidence for new or  progressive disease in the chest, abdomen or pelvis.  2. 1.3 cm hypodensity in the right lobe of the liver currently  demonstrates characteristics suggestive of a hepatic hemangioma.  3. Colonic diverticulosis without diverticulitis.  4. Sequelae of granulomatous disease.      EGD 4/27/18  Findings:        One 12 mm nodular area with possible shallow uleration was found in the        gastric fundus. Biopsies were  taken with a cold forceps for histology.        The esophagus was normal.        The stomach was normal except as noted above..        The examined duodenum was normal.       ORIGINAL REPORT:     SPECIMEN(S):   Stomach biopsy, fundus     FINAL DIAGNOSIS:   STOMACH, FUNDUS, BIOPSY:   - Poorly differentiated carcinoma (diffuse type) with signet ring features   - Biopsy is limited to mucosa     ASSESSMENT AND PLAN:  Metastatic gastric cancer, HER-2/catrachita negative, MSI intact, PD-L1 neg (<1%), presenting with early satiety and significant weight loss and epigastric discomfort upon eating with some vomiting.  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  There is an indeterminate right liver lobe lesion which has remained stable and likely is not a metastasis.      We started palliative FOLFOX on 03/12/2017.     Oxaliplatin was dropped from cycle #11 onwards because of neuropathy     Scans after C#18 show stable disease    Repeat CT CAP after 24 cycles also show stable disease so we continued with 5FU/LV. He is tolerating it well  We will proceed with cycle #29 today. In 2 weeks he will receive cycle #30 and then we will repeat his scans after that    His repeat EGD which was done because he was complaining of dysphagia short improved findings as compared to the initial EGD and overall the stomach looked normal apart from 1.2 cm malignant shallow ulcer       Trouble swallowing.  it does not seem that he has a mechanical option reduction and I believe this is likely due to abnormal contractility of the esophagus. He is being followed by GI and is supposed to get esophogram today     Skin burn on the forearms. I advised him that when he goes out in the sun he should apply sunscreen. He says that it is easier for him to clear long sleeves and we will do that. The skin rash is getting better    Peeling of the skin of the palms due to  5-FU. Overall this is better. 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 Continue to observe.     He tells me that the last month he only went to the gym 4 times and I encouraged him to continue regular exercise as previously he was going to the gym much more frequently.    We did not address the following today    His genetic testing did not show any identifiable mutation not reveal any identifiable mutation     RTC in 2 weeks with labs and chemo    All of his and his family's questions were answered to their satisfaction and they are agreeable and comfortable with the plan     LANEY SHAFFER MD             Again, thank you for allowing me to participate in the care of your patient.        Sincerely,        Laney Shaffer MD

## 2018-05-16 NOTE — NURSING NOTE
"Oncology Rooming Note    May 16, 2018 8:09 AM   Terry Yi is a 52 year old male who presents for:    Chief Complaint   Patient presents with     Oncology Clinic Visit     prior to t reatment     Initial Vitals: /76  Pulse 69  Temp 97.7  F (36.5  C)  Ht 1.778 m (5' 10\")  Wt 98.9 kg (218 lb)  SpO2 96%  BMI 31.28 kg/m2 Estimated body mass index is 31.28 kg/(m^2) as calculated from the following:    Height as of this encounter: 1.778 m (5' 10\").    Weight as of this encounter: 98.9 kg (218 lb). Body surface area is 2.21 meters squared.  No Pain (0) Comment: Data Unavailable   No LMP for male patient.  Allergies reviewed: Yes  Medications reviewed: Yes    Medications: Medication refills not needed today.  Pharmacy name entered into EPIC: Data Unavailable      5 minutes for nursing intake (face to face time)     Sayra Pelaez LPN              "

## 2018-05-16 NOTE — PROGRESS NOTES
Power port needle left accessed for treatment. Tolerated lab draw without complaint. Lyndon drawn-Red/Green/Purple tubes. Double signed by patient and RN. See documentation flowsheet. Arleth Brandon, RN, BSN, OCN

## 2018-05-16 NOTE — MR AVS SNAPSHOT
After Visit Summary   5/16/2018    Terry Yi    MRN: [id number]           Patient Information     Date Of Birth          1965        Visit Information        Provider Department      5/16/2018 8:15 AM Yakov Shaffer MD [facility]        Today's Diagnoses     Malignant neoplasm of overlapping sites of stomach (H)    -  1      Care Instructions    Chemo today    Follow with GI    See me back in 2 weeks with labs and chemo              Follow-ups after your visit        Your next 10 appointments already scheduled     May 16, 2018  1:30 PM CDT   XR ESOPHAGRAM with UCXR2, UC GIGU RAD   [facility] ([facility])    [address] [address] [phone]           Please bring a list of your current medicines to your exam. (Include vitamins, minerals and over-the-counter medicines.) Leave your valuables at home.  Tell the doctor if there is a chance you could be pregnant.  Do not eat for 4 hours before the exam. Keep drinking clear liquids until 2 hours before the exam.  You may take pain medicine (with a sip of water) up to 4 hours before the exam.  Do not swallow any other medicines unless your doctor tells you to. Talk to your doctor to be sure it s safe to stop your medicines.  Please call the Imaging Department at your exam site with any questions.            May 29, 2018 10:00 AM CDT   Return Visit with NURSE ONLY CANCER CENTER   [facility] ([facility])    [address] [place] [address] [phone]            May 29, 2018 10:45 AM CDT   Return Visit with Yakov Shaffer MD   [facility] ([facility])    [address] [facility] [address] [phone]            May 29, 2018 12:00 PM CDT   Level 2 with BAY 9 INFUSION   [facility] ([facility])    [address] [place] [address]  [facility] [address]   [phone]            Jun 13, 2018  8:30 AM CDT   Return Visit with NURSE ONLY CANCER CENTER   [facility] ([facility])    [address] [place] [address]   [phone]            Jun 13, 2018  9:15 AM CDT   Return Visit with Yakov Shaffer MD   [facility] ([facility])    [address] [place] [address]   [phone]            Jun 13, 2018 10:00 AM CDT   Level 2 with BAY 1 INFUSION   [facility] ([facility])    [address] [place] [address] [phone]              Who to contact     If you have questions or need follow up information about today's clinic visit or your schedule please contact [facility] directly at [phone].  Normal or non-critical lab and imaging results will be communicated to you by Zapposhart, letter or phone within 4 business days after the clinic has received the results. If you do not hear from us within 7 days, please contact the clinic through WideOrbitt or phone. If you have a critical or abnormal lab result, we will notify you by phone as soon as possible.  Submit refill requests through [email or web address] or call your pharmacy and they will forward the refill request to us. Please allow 3 business days for your refill to be completed.          Additional Information About Your Visit        [email or web address] Information     [email or web address] gives you secure access to your electronic health record. If you see a primary care provider, you can also send messages to your care team and make appointments. If you have questions, please call your primary care clinic.  If you do not have a primary care provider, please call [phone] and they will assist you.      [email or web address] is an electronic gateway that provides easy, online access to your medical records. With [email or web address], you can request a clinic appointment, read your test  "results, renew a prescription or communicate with your care team.     To access your existing account, please contact your [facility] or call [phone] for assistance.        Care EveryWhere ID     This is your Care EveryWhere ID. This could be used by other organizations to access your [place] medical records  [id number]        Your Vitals Were     Pulse Temperature Height Pulse Oximetry BMI (Body Mass Index)       69 97.7  F (36.5  C) 1.778 m (5' 10\") 96% 31.28 kg/m2        Blood Pressure from Last 3 Encounters:   05/16/18 112/76   05/02/18 122/78   04/27/18 122/86    Weight from Last 3 Encounters:   05/16/18 98.9 kg (218 lb)   05/02/18 98 kg (216 lb)   04/18/18 99.9 kg (220 lb 3.2 oz)              Today, you had the following     No orders found for display       Primary Care Provider Office Phone # Fax #    Sandra Dickerson [phone]       [facility]  [address]  [facility] [address]        Equal Access to Services     [facility] : Hadii aad ku hadasho Soomaali, waaxda luqadaha, qaybta kaalmada adeegyada, waxay prabhain hayguillermo cowart . So [facility] [phone].    ATENCIÓN: Si habla español, tiene a cuevas disposición servicios gratuitos de asistencia lingüística. [facility] [phone].    We comply with applicable federal civil rights laws and [place] laws. We do not discriminate on the basis of race, color, national origin, age, disability, sex, sexual orientation, or gender identity.            Thank you!     Thank you for choosing [facility]  for your care. Our goal is always to provide you with excellent care. Hearing back from our patients is one way we can continue to improve our services. Please take a few minutes to complete the written survey that you may receive in the mail after your visit with us. Thank you!             Your Updated Medication List - Protect others around you: Learn how to safely use, store " and throw away your medicines at www.disposemymeds.org.          This list is accurate as of 5/16/18 11:13 AM.  Always use your most recent med list.                   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

## 2018-05-16 NOTE — PROGRESS NOTES
5/16/2018     REASON FOR VISIT:  Follow-up for unresectable metastatic gastric carcinoma, diffuse type, grade 3, HER-2 negative, MSI-Intact. PD-L1 negative (<1%)     HISTORY OF PRESENT ILLNESS:  Please see my previous note for details.  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      CT CAP after C#6 shows stable disease    C# 9 FOLFOX 8/2/2017 ( Oxaliplatin dose reduced to 70mg/m2 due to neuropathy )  C#10 8/22/2017 ( Oxaliplatin dose reduced to 60mg/m2 due to neuropathy )  Delayed by 1 week- patient preference  C#11 9/6/17 5FU/LV ( Stopped Oxaliplatin )  C#12 9/20/2017 5FU/LV    Repeat CT scan after C#12 shows stable to slightly improved disease.    C#13 10/4/2017 5FU/LV  C#14 10/18/2017 5FU/LV  C#15 10/31/2017 5FU/LV  C#16 11/15/2017 5FU/LV  C#17 11/29/2017 5FU/LV   C#18 12/13/2017 5FU/LV    Repeat CT CAP 12/22/17 is stable    C#19 12/27/2017 5FU/LV  C#20 1/10/2018 5FU/LV  C# 21 1/24/2018 5FU/LV  C# 22 2/7/2018  5FU/LV  C# 23 2/21/2018   C# 24 3/7/2018     Repeat CAP on 3/16/18 shows stable disease.    C# 25 3/21/2018 5FU/LV  C# 26 4/4/2018 5FU/LV  C# 27 4/18/2018 5FU/LV    As he was complaining of off and on trouble swallowing with food getting stuck, we repeated EGD on 4/27/2018 which showed a nodular area with possible shallow ulceration in the stomach. Otherwise esophagus and stomach and duodenum were normal.  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  He was referred to gastroenterologist for manometry    The biopsies which were taken from the nodular shallow ulcer aren't consistent with poorly differentiated diffuse type adenocarcinoma with signet ring features      C#28 5/2/2018 5FU/LV  C#29 5/16/2018 5FU/LV        "INTERVAL HISTORY:   Overall he continues to do well and tolerating chemotherapy well. He still notices that at times the food gets stuck in the esophagus and he especially notices this with meat and bread. He is otherwise able to eat normally without nausea and vomiting and he is maintaining his weight. Energy is good. He saw GI and is supposed to get esophogram today. Denies infections. No nausea or vomiting. No pain. Neuropathy is the same. His skin of the palms is better with some peeling and dryness of one of the fingers but overall it is better. He noticed a rash on his forearms when he went out in the sun and he applied hydrocortisone cream which helps and now he has started to wear long sleeves. It is getting better.     ECOG 0    ROS:  Otherwise her comprehensive review of the system was unremarkable          MEDICATIONS:   Current Outpatient Prescriptions   Medication     Acetaminophen (TYLENOL PO)     lidocaine-prilocaine (EMLA) cream     LORazepam (ATIVAN) 0.5 MG tablet     Multiple Vitamins-Minerals (MULTIVITAMIN ADULT PO)     Naproxen Sodium (ALEVE PO)     ondansetron (ZOFRAN) 8 MG tablet     prochlorperazine (COMPAZINE) 10 MG tablet     allopurinol (ZYLOPRIM) 100 MG tablet     aspirin 81 MG chewable tablet     No current facility-administered medications for this visit.             PHYSICAL EXAMINATION:   /76  Pulse 69  Temp 97.7  F (36.5  C)  Ht 1.778 m (5' 10\")  Wt 98.9 kg (218 lb)  SpO2 96%  BMI 31.28 kg/m2  CONSTITUTIONAL: no acute distress  EYES: PERRLA, no palor or icterus.   ENT/MOUTH: no mouth lesions. Ears normal  CVS: s1s2 no m r g .   RESPIRATORY: clear to auscultation b/l  GI: soft non tender no hepatosplenomegaly  NEURO: AAOX3  Grossly non focal neuro exam apart from numbness of the feet and fingers  INTEGUMENT: There is a skin burn rash on the forearms which is minimal and by his description it is better. The skin of the palms has also improved and now he has peeling in only " one index finger on the right side.  LYMPHATIC: no palpable cervical, supraclavicular, axillary LAD  MUSCULOSKELETAL: Unremarkable. No bony tenderness.   EXTREMITIES: no edema  PSYCH: Mentation, mood and affect are normal. Decision making capacity is intact          LABS:   Reviewed   Results for LEONOR RALPH (MRN [id number]) as of 5/16/2018 08:09   Ref. Range 5/16/2018 07:36   WBC Latest Ref Range: 4.0 - 11.0 10e9/L 5.8   Hemoglobin Latest Ref Range: 13.3 - 17.7 g/dL 13.3   Hematocrit Latest Ref Range: 40.0 - 53.0 % 39.5 (L)   Platelet Count Latest Ref Range: 150 - 450 10e9/L 169   RBC Count Latest Ref Range: 4.4 - 5.9 10e12/L 4.15 (L)   MCV Latest Ref Range: 78 - 100 fl 95   MCH Latest Ref Range: 26.5 - 33.0 pg 32.0   MCHC Latest Ref Range: 31.5 - 36.5 g/dL 33.7   RDW Latest Ref Range: 10.0 - 15.0 % 14.5   Diff Method Unknown Automated Method   % Neutrophils Latest Units: % 54.3   % Lymphocytes Latest Units: % 30.5   % Monocytes Latest Units: % 11.4   % Eosinophils Latest Units: % 3.1   % Basophils Latest Units: % 0.7   Absolute Neutrophil Latest Ref Range: 1.6 - 8.3 10e9/L 3.2   Results for LEONOR RALPH (MRN [id number]) as of 5/16/2018 08:30   Ref. Range 5/16/2018 07:36   Sodium Latest Ref Range: 133 - 144 mmol/L 140   Potassium Latest Ref Range: 3.4 - 5.3 mmol/L 4.3   Chloride Latest Ref Range: 94 - 109 mmol/L 106   Carbon Dioxide Latest Ref Range: 20 - 32 mmol/L 30   Urea Nitrogen Latest Ref Range: 7 - 30 mg/dL 11   Creatinine Latest Ref Range: 0.66 - 1.25 mg/dL 0.84   GFR Estimate Latest Ref Range: >60 mL/min/1.7m2 >90   GFR Estimate If Black Latest Ref Range: >60 mL/min/1.7m2 >90   Calcium Latest Ref Range: 8.5 - 10.1 mg/dL 9.2   Anion Gap Latest Ref Range: 3 - 14 mmol/L 4   Albumin Latest Ref Range: 3.4 - 5.0 g/dL 3.7   Protein Total Latest Ref Range: 6.8 - 8.8 g/dL 6.7 (L)   Bilirubin Total Latest Ref Range: 0.2 - 1.3 mg/dL 0.4   Alkaline Phosphatase Latest Ref Range: 40 - 150 U/L 75   ALT Latest Ref  Range: 0 - 70 U/L 57   AST Latest Ref Range: 0 - 45 U/L 39   Glucose Latest Ref Range: 70 - 99 mg/dL 106 (H)     IMAGING:  Reviewed  EXAM: CT of the Chest, Abdomen and Pelvis, 3/16/2018     COMPARISON: CTs dated 12/22/2017, 9/29/2017, 6/30/2017 and PET CT  dated 3/24/2017      CLINICAL HISTORY: Metastatic gastric carcinoma.      TECHNIQUE: Axial images of the chest, abdomen and pelvis were obtained  with 134 mL IV contrast. Coronal reconstructions were provided. Images  were reviewed in bone, lung, and soft tissue windows.     Dose: 1108 mGy*cm     FINDINGS:     Chest: 0.9 cm right thyroid nodule is unchanged since at least  3/24/2017. The thyroid gland is otherwise unremarkable. Great vessels,  aorta and pulmonary artery normal in caliber and configuration. The  heart is not enlarged. There is no pericardial effusion. Stable  appearance of partially calcified mediastinal and hilar lymph nodes.  No new or enlarging lymph nodes.     Lungs: Stringy inspissated secretions in the trachea, the central  airways are otherwise clear. No focal consolidation, pleural effusion  or pneumothorax. Multiple calcified granulomas are stable. No new or  enlarging pulmonary nodules.     Abdomen and Pelvis: Redemonstration of mild diffuse gastric wall  thickening, not significantly changed when compared with exam dated  12/22/2017. Prominent mesenteric and retroperitoneal lymph nodes are  also stable when compared with prior exam. For example, gastrohepatic  lymph node measures 1.1 x 1.4 cm (series 2 image 61), unchanged from  prior exam. Portacaval lymph node measures 1.6 x 1 cm (series 2 image  66), previously 1.8 x 1.2 cm. Mild haziness of the central mesentery  with infiltration along the lesser sac, and extending to the  retroperitoneum is also not significantly changed from prior exam. No  new or enlarging lymph nodes.     1.3 subcapsular lesion along the right lobe of the liver is not  significantly changed in size or morphology  since at least 3/24/2017  and appears to have peripheral globular enhancement on the current  examination. Stable appearance of subcentimeter hepatic hypodensities  along the falciform ligament (series 2 image 60) and near the hepatic  dome (series 2 image 49). No new focal hepatic mass.      The gallbladder, spleen, pancreas and adrenal glands are unremarkable.  Symmetric nephrographic enhancement of the kidneys. No hydronephrosis  or hydroureter. The urinary bladder is partially distended without  focal mucosal thickening. The bowel is nondilated. Colonic  diverticulosis without diverticulitis. Fluid/thickening along the  posterior peritoneum, similar to prior exam. Major visualized vessels  of the abdomen are patent.     Bones and soft tissues: Multilevel degenerative change of the spine.  Advanced degenerative changes about the right hip, unchanged from  prior exam. No acute osseous abnormality or suspicious osseous lesion.         Impression:   1. In this patient with known gastric carcinoma, there is stable mild  diffuse gastric wall thickening, prominent local lymph nodes,  infiltrative mesenteric and retroperitoneal haziness with  fluid/thickening along the peritoneum. These findings are not  significantly changed from prior exam. No evidence for new or  progressive disease in the chest, abdomen or pelvis.  2. 1.3 cm hypodensity in the right lobe of the liver currently  demonstrates characteristics suggestive of a hepatic hemangioma.  3. Colonic diverticulosis without diverticulitis.  4. Sequelae of granulomatous disease.      EGD 4/27/18  Findings:        One 12 mm nodular area with possible shallow uleration was found in the        gastric fundus. Biopsies were taken with a cold forceps for histology.        The esophagus was normal.        The stomach was normal except as noted above..        The examined duodenum was normal.       ORIGINAL REPORT:     SPECIMEN(S):   Stomach biopsy, fundus     FINAL  DIAGNOSIS:   STOMACH, FUNDUS, BIOPSY:   - Poorly differentiated carcinoma (diffuse type) with signet ring features   - Biopsy is limited to mucosa     ASSESSMENT AND PLAN:  Metastatic gastric cancer, HER-2/catrachita negative, MSI intact, PD-L1 neg (<1%), presenting with early satiety and significant weight loss and epigastric discomfort upon eating with some vomiting.  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  There is an indeterminate right liver lobe lesion which has remained stable and likely is not a metastasis.      We started palliative FOLFOX on 03/12/2017.     Oxaliplatin was dropped from cycle #11 onwards because of neuropathy     Scans after C#18 show stable disease    Repeat CT CAP after 24 cycles also show stable disease so we continued with 5FU/LV. He is tolerating it well  We will proceed with cycle #29 today. In 2 weeks he will receive cycle #30 and then we will repeat his scans after that    His repeat EGD which was done because he was complaining of dysphagia short improved findings as compared to the initial EGD and overall the stomach looked normal apart from 1.2 cm malignant shallow ulcer       Trouble swallowing.  it does not seem that he has a mechanical option reduction and I believe this is likely due to abnormal contractility of the esophagus. He is being followed by GI and is supposed to get esophogram today     Skin burn on the forearms. I advised him that when he goes out in the sun he should apply sunscreen. He says that it is easier for him to clear long sleeves and we will do that. The skin rash is getting better    Peeling of the skin of the palms due to 5-FU. Overall this is better. 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 Continue to observe.     He tells me that the last month he only went to the gym 4 times and I  encouraged him to continue regular exercise as previously he was going to the gym much more frequently.    We did not address the following today    His genetic testing did not show any identifiable mutation not reveal any identifiable mutation     RTC in 2 weeks with labs and chemo    All of his and his family's questions were answered to their satisfaction and they are agreeable and comfortable with the plan     LANEY HERNANDEZ MD

## 2018-05-16 NOTE — PROGRESS NOTES
"Infusion Nursing Note:  Terry Yi presents today for 5FU bolus/pump.    Patient seen by provider today: Yes, Dr Shaffer.   present during visit today: Not Applicable.    Note: N/A.    Intravenous Access:  Implanted Port.    Treatment Conditions:  Lab Results   Component Value Date    HGB 13.3 05/16/2018     Lab Results   Component Value Date    WBC 5.8 05/16/2018      Lab Results   Component Value Date    ANEU 3.2 05/16/2018     Lab Results   Component Value Date     05/16/2018      Lab Results   Component Value Date     05/16/2018                   Lab Results   Component Value Date    POTASSIUM 4.3 05/16/2018           No results found for: MAG         Lab Results   Component Value Date    CR 0.84 05/16/2018                   Lab Results   Component Value Date    BRITTNEY 9.2 05/16/2018                Lab Results   Component Value Date    BILITOTAL 0.4 05/16/2018           Lab Results   Component Value Date    ALBUMIN 3.7 05/16/2018                    Lab Results   Component Value Date    ALT 57 05/16/2018           Lab Results   Component Value Date    AST 39 05/16/2018       Results reviewed, labs MET treatment parameters, ok to proceed with treatment.      Post Infusion Assessment:  Patient tolerated infusion without incident.  Blood return noted pre and post infusion.  Site patent and intact, free from redness, edema or discomfort.  No evidence of extravasations.    Prior to discharge: Port is secured in place with tegaderm and flushed with 10cc NS with positive blood return noted.  Continuous home infusion Dosi-Fuser pump connected.    All connectors secured in place and clamps taped open.    Pump started, \"running\" noted on display (CADD): Not Applicable.  Patient instructed to call our clinic or [facility] with any questions or concerns at home.  Patient verbalized understanding.    Patient set up for pump disconnect 05/18/18 at 0800 closer to patient's home in  [facility] " hospital.      Discharge Plan:   Return in 2 weeks with lab/chemo - 05/29/18.  Discharge instructions reviewed with: Patient.  Patient discharged in stable condition accompanied by: self.  Departure Mode: Ambulatory.    Matilda Ziegler RN

## 2018-05-21 NOTE — TELEPHONE ENCOUNTER
"Terry informed that 5/16/18 esophagram showed narrowing that could be causing his dysphagia. Dr Shaffer would like patient to go back to Dr Duane, gastroenterologist who ordered esophagram.    Roberto states that \"as long as I can swallow, I am not doing any more testing\" , \"I do not want to go back downtown\", and \"if it gets any worse, you will be the first to know\".     Verbally reviewed next Dr Shaffer with chemotherapy to follow appointments on 5/29/18.    This note was routed to Dr Shaffer.    Camille Parada RN    "

## 2018-05-29 NOTE — MR AVS SNAPSHOT
After Visit Summary   5/29/2018    Terry Yi    MRN: [id number]           Patient Information     Date Of Birth          1965        Visit Information        Provider Department      5/29/2018 10:45 AM Yakov Shaffer MD [facility]        Today's Diagnoses     Malignant neoplasm of overlapping sites of stomach (H)    -  1      Care Instructions    Chemo today  CT scan 6/8  See me back 6/13 with labs and chemo              Follow-ups after your visit        Your next 10 appointments already scheduled     May 29, 2018 12:00 PM CDT   Level 2 with BAY [address] [place] [address] ([facility])    [address] [place] [address]   [phone]            May 29, 2018 12:00 PM CDT   New Visit with Ewa Worley RD   [facility])    [address] [place] [address]   [phone]            Jun 08, 2018  8:00 AM CDT   Nurse Only with MG IMAGING NURSE   [facility])    [address] [place] [address] [phone]            Jun 08, 2018  8:15 AM CDT   CT CHEST ABDOMEN PELVIS W/O & W CONTRAST with MGCT1   [facility])    [address] [place] [address] [phone]           Please bring any scans or X-rays taken at other hospitals, if similar tests were done. Also bring a list of your medicines, including vitamins, minerals and over-the-counter drugs. It is safest to leave personal items at home.  Be sure to tell your doctor:   If you have any allergies.   If there s any chance you are pregnant.   If you are breastfeeding.  How to prepare:   Do not eat or drink for 2 hours before your exam. If you need to take medicine, you may take it with small sips of water. (We may ask you to take liquid medicine as well.)   Please wear loose clothing,  such as a sweat suit or jogging clothes. Avoid snaps, zippers and other metal. We may ask you to undress and put on a hospital gown.  Please arrive 30 minutes early for your CT. Once in the department you might be asked to drink water 15-20 minutes prior to your exam.  If indicated you may be asked to drink an oral contrast in advance of your CT.  If this is the case, the imaging team will let you know or be in contact with you prior to your appointment  Patients over 70 or patients with diabetes or kidney problems:   If you haven t had a blood test (creatinine test) within the last 30 days, the Cardiologist/Radiologist may require you to get this test prior to your exam.  If you have diabetes:   Continue to take your metformin medication on the day of your exam  If you have any questions, please call the Imaging Department where you will have your exam.            Jun 13, 2018  8:30 AM CDT   [facility]    [address] [facility] [address]   [phone]            Jun 13, 2018  9:15 AM CDT   [facility]    [address] [facility] [address]   [phone]            Jun 13, 2018 10:00 AM CDT   Level 2 with BAY 1 INFUSION   [facility]    [address] [facility] [address]   [phone]            Jun 27, 2018  7:30 AM CDT   [facility] Visit [facility]   [facility]    [address] [facility] [address]   [phone]            Jun 27, 2018  8:15 AM CDT   [facility] with [facility]   [facility]    [address] [facility] [address]   [phone]            Jun 27, 2018  8:45 AM CDT   Level 2 with BAY 6 INFUSION    [facility]    [address] [place] [address] [phone]              Future tests that were ordered for you today     Open Future Orders        Priority Expected Expires Ordered    CT Chest abdomen pelvis w & w/o contrast Routine 6/8/2018 5/29/2019 5/29/2018            Who to contact     If you have questions or need follow up information about today's clinic visit or your schedule please contact [facility] directly at [phone].  Normal or non-critical lab and imaging results will be communicated to you by [employer], letter or phone within 4 business days after the clinic has received the results. If you do not hear from us within 7 days, please contact the clinic through [employer] or phone. If you have a critical or abnormal lab result, we will notify you by phone as soon as possible.  Submit refill requests through [employer] or call your pharmacy and they will forward the refill request to us. Please allow 3 business days for your refill to be completed.          Additional Information About Your Visit        [employer] [employer] Information     [employer] gives you secure access to your electronic health record. If you see a primary care provider, you can also send messages to your care team and make appointments. If you have questions, please call your primary care clinic.  If you do not have a primary care provider, please call [phone] and they will assist you.      [employer] is an electronic gateway that provides easy, online access to your medical records. With [employer], you can request a clinic appointment, read your test results, renew a prescription or communicate with your care team.     To access your existing account, please contact your [facility] or call [phone] for assistance.        Care EveryWhere ID     This is your Care EveryWhere ID. This could be used by other organizations to access  "your [place] medical records  [id number]        Your Vitals Were     Pulse Temperature Height Pulse Oximetry BMI (Body Mass Index)       53 97.6  F (36.4  C) 1.778 m (5' 10\") 97% 31.71 kg/m2        Blood Pressure from Last 3 Encounters:   05/29/18 136/79   05/16/18 112/76   05/02/18 122/78    Weight from Last 3 Encounters:   05/29/18 100.2 kg (221 lb)   05/16/18 98.9 kg (218 lb)   05/02/18 98 kg (216 lb)               Primary Care Provider Office Phone # Fax #    Sandra TOLBERT Tuan, [phone]       [facility]  [address]  [facility] [address]        Equal Access to Services     CHANDAN GALLO : Hadii tk pelaez hadasho Soomaali, waaxda luqadaha, qaybta kaalmada adeegyada, waxay violeta hayguillermo cowart . So [facility] [phone].    ATENCIÓN: Si habla español, tiene a cuevas disposición servicios gratuitos de asistencia lingüística. Ciara al [phone].    We comply with applicable federal civil rights laws and [place] laws. We do not discriminate on the basis of race, color, national origin, age, disability, sex, sexual orientation, or gender identity.            Thank you!     Thank you for choosing [facility]  for your care. Our goal is always to provide you with excellent care. Hearing back from our patients is one way we can continue to improve our services. Please take a few minutes to complete the written survey that you may receive in the mail after your visit with us. Thank you!             Your Updated Medication List - Protect others around you: Learn how to safely use, store and throw away your medicines at www.disposemymeds.org.          This list is accurate as of 5/29/18 11:20 AM.  Always use your most recent med list.                   Brand Name Dispense Instructions for use Diagnosis    ALEVE PO       Acute gout, unspecified cause, unspecified site       allopurinol 100 MG tablet    ZYLOPRIM    30 tablet    Take 1 tablet (100 mg) by mouth daily    " Acute gout, unspecified cause, unspecified site       aspirin 81 MG chewable tablet      Take 81 mg by mouth daily        lidocaine-prilocaine cream    EMLA          LORazepam 0.5 MG tablet    ATIVAN    30 tablet    Take 1 tablet (0.5 mg) by mouth every 4 hours as needed (Anxiety, Nausea/Vomiting or Sleep)    Malignant neoplasm of overlapping sites of stomach (H)       MULTIVITAMIN ADULT PO           ondansetron 8 MG tablet    ZOFRAN    10 tablet    Take 1 tablet (8 mg) by mouth every 8 hours as needed (Nausea/Vomiting)    Malignant neoplasm of overlapping sites of stomach (H)       prochlorperazine 10 MG tablet    COMPAZINE    30 tablet    Take 1 tablet (10 mg) by mouth every 6 hours as needed (Nausea/Vomiting)    Malignant neoplasm of overlapping sites of stomach (H)       TYLENOL PO      Take by mouth as needed for mild pain or fever

## 2018-05-29 NOTE — PROGRESS NOTES
"Infusion Nursing Note:  Terry Yi presents today for C30 D1 Leucovorin/Fluorouracil bolus and pump connect.    Patient seen by provider today: Yes: Dr Shaffer   present during visit today: Not Applicable.    Note: N/A.    Intravenous Access:  Implanted Port.    Treatment Conditions:  Lab Results   Component Value Date    HGB 13.0 05/29/2018     Lab Results   Component Value Date    WBC 5.3 05/29/2018      Lab Results   Component Value Date    ANEU 2.5 05/29/2018     Lab Results   Component Value Date     05/29/2018      Lab Results   Component Value Date     05/29/2018                   Lab Results   Component Value Date    POTASSIUM 4.4 05/29/2018           No results found for: MAG         Lab Results   Component Value Date    CR 0.73 05/29/2018                   Lab Results   Component Value Date    BRITTNEY 9.1 05/29/2018                Lab Results   Component Value Date    BILITOTAL 0.3 05/29/2018           Lab Results   Component Value Date    ALBUMIN 3.6 05/29/2018                    Lab Results   Component Value Date    ALT 53 05/29/2018           Lab Results   Component Value Date    AST 31 05/29/2018       Results reviewed, labs MET treatment parameters, ok to proceed with treatment.      Prior to discharge: Port is secured in place with tegaderm and flushed with 10cc NS with positive blood return noted.  Continuous home infusion Dosi-Fuser pump connected.    All connectors secured in place and clamps taped open.    Pump started, \"running\" noted on display (CADD): Not Applicable.  Patient instructed to call our clinic or [facility] with any questions or concerns at home.  Patient verbalized understanding.    Patient set up for pump disconnect the hospital in [place] on 5/31/18.          Discharge Plan:   Patient will return 6/13/18 for next appointment.   Patient discharged in stable condition accompanied by: wife and friend.  Departure Mode: Ambulatory.    Cheryl Osorio " RN

## 2018-05-29 NOTE — MR AVS SNAPSHOT
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)    -  1       Follow-ups after your visit        Your next 10 appointments already scheduled     May 29, 2018 12:00 PM CDT   Level 2 with BAY 9 INFUSION   [facility] ([facility])    [address] [place] [address]   [phone]            May 29, 2018 12:00 PM CDT   New Visit with Ewa Worley RD   [facility] ([facility])    [address] [place] [address]   [phone]            Jun 08, 2018  8:00 AM CDT   Nurse Only with MG IMAGING NURSE   [facility] ([facility])    [address] [place] [address] [phone]            Jun 08, 2018  8:15 AM CDT   CT CHEST ABDOMEN PELVIS W/O & W CONTRAST with MGCT1   [facility] ([facility])    [address] [place] [address] [phone]           Please bring any scans or X-rays taken at other hospitals, if similar tests were done. Also bring a list of your medicines, including vitamins, minerals and over-the-counter drugs. It is safest to leave personal items at home.  Be sure to tell your doctor:   If you have any allergies.   If there s any chance you are pregnant.   If you are breastfeeding.  How to prepare:   Do not eat or drink for 2 hours before your exam. If you need to take medicine, you may take it with small sips of water. (We may ask you to take liquid medicine as well.)   Please wear loose clothing, such as a sweat suit or jogging clothes. Avoid snaps, zippers and other metal. We may ask  you to undress and put on a hospital gown.  Please arrive 30 minutes early for your CT. Once in the department you might be asked to drink water 15-20 minutes prior to your exam.  If indicated you may be asked to drink an oral contrast in advance of your CT.  If this is the case, the imaging team will let you know or be in contact with you prior to your appointment  Patients over 70 or patients with diabetes or kidney problems:   If you haven t had a blood test (creatinine test) within the last 30 days, the Cardiologist/Radiologist may require you to get this test prior to your exam.  If you have diabetes:   Continue to take your metformin medication on the day of your exam  If you have any questions, please call the Imaging Department where you will have your exam.            Jun 13, 2018  8:30 AM CDT   Return Visit with NURSE ONLY CANCER CENTER   [facility]    [address] [facility] [address]   [phone]            Jun 13, 2018  9:15 AM CDT   Return Visit with Yakov Shaffer MD   [facility])    [address] [facility] [address]   [phone]            Jun 13, 2018 10:00 AM CDT   Level 2 with BAY 1 INFUSION   [facility])    [address] [facility] [address]   [phone]            Jun 27, 2018  7:30 AM CDT   Return Visit with NURSE ONLY CANCER CENTER   [facility]    [address] [facility] [address]   [phone]            Jun 27, 2018  8:15 AM CDT   Return Visit with Yakov Shaffer MD   [facility])    [address] [facility] [address]   [phone]            Jun 27, 2018  8:45 AM CDT   Level 2 with BAY 6 INFUSION   [facility])    [address] [facility] [address]   [phone]              Future tests that were ordered for you today     Open Future Orders        Priority Expected Expires Ordered    CT Chest abdomen pelvis w & w/o contrast Routine 6/8/2018 5/29/2019 5/29/2018            Who to contact     If you have questions or need follow up information about today's clinic visit or your schedule please contact [facility] directly at [phone].  Normal or non-critical lab and imaging results will be communicated to you by [employer], letter or phone within 4 business days after the clinic has received the results. If you do not hear from us within 7 days, please contact the clinic through [employer] or phone. If you have a critical or abnormal lab result, we will notify you by phone as soon as possible.  Submit refill requests through [employer] or call your pharmacy and they will forward the refill request to us. Please allow 3 business days for your refill to be completed.          Additional Information About Your Visit        P[employer] Information     [employer] gives you secure access to your electronic health record. If you see a primary care provider, you can also send messages to your care team and make appointments. If you have questions, please call your primary care clinic.  If you do not have a primary care provider, please call [phone] and they will assist you.      [employer] is an electronic gateway that provides easy, online access to your medical records. With [employer], you can request a clinic appointment, read your test results, renew a prescription or communicate with your care team.     To access your existing account, please contact your [facility] or call [phone] for assistance.        Care EveryWhere ID     This is your Care EveryWhere ID. This could be used by other organizations to access your [place] medical records  [id number]         Blood Pressure from Last 3 Encounters:    05/29/18 136/79   05/16/18 112/76   05/02/18 122/78    Weight from Last 3 Encounters:   05/29/18 100.2 kg (221 lb)   05/16/18 98.9 kg (218 lb)   05/02/18 98 kg (216 lb)              We Performed the Following     CBC with platelets differential     Comprehensive metabolic panel        Primary Care Provider Office Phone # Fax #    Sandra Dickerson, SARAI [phone]       [facility]  [address]  [facility] [address]        Equal Access to Services     [facility]: Hadii aad ku hadasho Soomaali, waaxda luqadaha, qaybta kaalmada adeegyada, waxtejinder mcdaniel hayguillermo cowart . So [facility] [phone].    ATENCIÓN: Si habla español, tiene a cuevas disposición servicios gratuitos de asistencia lingüística. [facility] [phone].    We comply with applicable federal civil rights laws and [place] laws. We do not discriminate on the basis of race, color, national origin, age, disability, sex, sexual orientation, or gender identity.            Thank you!     Thank you for choosing [facility]  for your care. Our goal is always to provide you with excellent care. Hearing back from our patients is one way we can continue to improve our services. Please take a few minutes to complete the written survey that you may receive in the mail after your visit with us. Thank you!             Your Updated Medication List - Protect others around you: Learn how to safely use, store and throw away your medicines at www.disposemymeds.org.          This list is accurate as of 5/29/18 11:59 AM.  Always use your most recent med list.                   Brand Name Dispense Instructions for use Diagnosis    ALEVE PO       Acute gout, unspecified cause, unspecified site       allopurinol 100 MG tablet    ZYLOPRIM    30 tablet    Take 1 tablet (100 mg) by mouth daily    Acute gout, unspecified cause, unspecified site       aspirin 81 MG chewable tablet      Take 81 mg by mouth daily         lidocaine-prilocaine cream    EMLA          LORazepam 0.5 MG tablet    ATIVAN    30 tablet    Take 1 tablet (0.5 mg) by mouth every 4 hours as needed (Anxiety, Nausea/Vomiting or Sleep)    Malignant neoplasm of overlapping sites of stomach (H)       MULTIVITAMIN ADULT PO           ondansetron 8 MG tablet    ZOFRAN    10 tablet    Take 1 tablet (8 mg) by mouth every 8 hours as needed (Nausea/Vomiting)    Malignant neoplasm of overlapping sites of stomach (H)       prochlorperazine 10 MG tablet    COMPAZINE    30 tablet    Take 1 tablet (10 mg) by mouth every 6 hours as needed (Nausea/Vomiting)    Malignant neoplasm of overlapping sites of stomach (H)       TYLENOL PO      Take by mouth as needed for mild pain or fever

## 2018-05-29 NOTE — PROGRESS NOTES
Power port needle left accessed for treatment. Tolerated lab draw without complaint. [place] drawn-Red/Green/Purple tubes. Double signed by patient and RN. See documentation flowsheet. Arleth Brandon, RN, BSN, OCN

## 2018-05-29 NOTE — MR AVS SNAPSHOT
After Visit Summary   5/29/2018    Terry Yi    MRN: [id number]           Patient Information     Date Of Birth          1965        Visit Information        Provider Department      5/29/2018 12:00 PM Ewa Tobar RD [facility]        Today's Diagnoses     Malignant neoplasm of overlapping sites of stomach (H)    -  1       Follow-ups after your visit        Your next 10 appointments already scheduled     Jun 08, 2018  8:00 AM CDT   Nurse Only with MG IMAGING NURSE   [facility])    [address] [place] [id number] [phone]            Jun 08, 2018  8:15 AM CDT   CT CHEST ABDOMEN PELVIS W/O & W CONTRAST with MGCT1   [facility])    [address] [place] [id number] [phone]           Please bring any scans or X-rays taken at other hospitals, if similar tests were done. Also bring a list of your medicines, including vitamins, minerals and over-the-counter drugs. It is safest to leave personal items at home.  Be sure to tell your doctor:   If you have any allergies.   If there s any chance you are pregnant.   If you are breastfeeding.  How to prepare:   Do not eat or drink for 2 hours before your exam. If you need to take medicine, you may take it with small sips of water. (We may ask you to take liquid medicine as well.)   Please wear loose clothing, such as a sweat suit or jogging clothes. Avoid snaps, zippers and other metal. We may ask you to undress and put on a hospital gown.  Please arrive 30 minutes early for your CT. Once in the department you might be asked to drink water 15-20 minutes prior to your exam.  If indicated you may be asked to drink an oral contrast in advance of your CT.  If this is the case, the imaging team will let you know or be in contact with you prior to your appointment  Patients over 70 or patients  with diabetes or kidney problems:   If you haven t had a blood test (creatinine test) within the last 30 days, the Cardiologist/Radiologist may require you to get this test prior to your exam.  If you have diabetes:   Continue to take your metformin medication on the day of your exam  If you have any questions, please call the Imaging Department where you will have your exam.            Jun 13, 2018  8:30 AM CDT   Return Visit with NURSE ONLY CANCER CENTER   [facility])    [address] [facility] [address]   [phone]            Jun 13, 2018  9:15 AM CDT   Return Visit with Yakov Shaffer MD   [facility] ([facility])    [address] [facility] [address]   [phone]            Jun 13, 2018 10:00 AM CDT   Level 2 with BAY 1 INFUSION   [facility] ([facility])    [address] [facility] [address]   [phone]            Jun 27, 2018  7:30 AM CDT   Return Visit with NURSE ONLY CANCER CENTER   [facility] ([facility])    [address] [facility] [address]   [phone]            Jun 27, 2018  8:15 AM CDT   Return Visit with Yakov Shaffer MD   [facility])    [address] [facility] [address]   [phone]            Jun 27, 2018  8:45 AM CDT   Level 2 with BAY 6 INFUSION   [facility] ([facility])    [address] [facility] [address]   [phone]              Future tests that were ordered for you today     Open Future Orders        Priority Expected Expires Ordered    CT Chest abdomen pelvis w & w/o contrast Routine 6/8/2018 5/29/2019 5/29/2018            Who to contact     If you have questions or need follow up information about today's clinic visit or your schedule please contact [facility] directly at [phone].  Normal or non-critical lab and imaging results will be communicated to you by [employer], letter or phone within 4 business days after the clinic has received the results. If you do not hear from us within 7 days, please contact the clinic through [employer] or phone. If you have a critical or abnormal lab result, we will notify you by phone as soon as possible.  Submit refill requests through [employer] or call your pharmacy and they will forward the refill request to us. Please allow 3 business days for your refill to be completed.          Additional Information About Your Visit        [employer] Information     [employer] gives you secure access to your electronic health record. If you see a primary care provider, you can also send messages to your care team and make appointments. If you have questions, please call your primary care clinic.  If you do not have a primary care provider, please call [phone] and they will assist you.      [employer] is an electronic gateway that provides easy, online access to your medical records. With [employer], you can request a clinic appointment, read your test results, renew a prescription or communicate with your care team.     To access your existing account, please contact your [facility] or call [phone] for assistance.        Care EveryWhere ID     This is your Care EveryWhere ID. This could be used by other organizations to access your [place] medical records  [id number]         Blood Pressure from Last 3 Encounters:   05/29/18 136/79   05/16/18 112/76   05/02/18 122/78    Weight from Last 3 Encounters:   05/29/18 100.2 kg (221 lb)   05/16/18 98.9 kg (218 lb)   05/02/18 98 kg (216 lb)              We Performed the Following     MNT INDIVIDUAL INITIAL EA 15 MIN        Primary Care Provider Office Phone # Fax #    Sandra Dickerson [phone]       [facility]  [address] [place]  [place] [address]        Equal Access to Services     [facility] : Hadii aad latanya karson [place], waaxda luqadaha, qaybta kaalmada mayurgaleladonna, waxtejinder violeta zapienrezalia browne. So [facility] [phone].    ATENCIÓN: Si habla español, tiene a cuevas disposición servicios gratuitos de asistencia lingüística. Britneyame al [phone].    We comply with applicable federal civil rights laws and [place] laws. We do not discriminate on the basis of race, color, national origin, age, disability, sex, sexual orientation, or gender identity.            Thank you!     Thank you for choosing [facility]  for your care. Our goal is always to provide you with excellent care. Hearing back from our patients is one way we can continue to improve our services. Please take a few minutes to complete the written survey that you may receive in the mail after your visit with us. Thank you!             Your Updated Medication List - Protect others around you: Learn how to safely use, store and throw away your medicines at www.disposemymeds.org.          This list is accurate as of 5/29/18  2:21 PM.  Always use your most recent med list.                   Brand Name Dispense Instructions for use Diagnosis    ALEVE PO       Acute gout, unspecified cause, unspecified site       allopurinol 100 MG tablet    ZYLOPRIM    30 tablet    Take 1 tablet (100 mg) by mouth daily    Acute gout, unspecified cause, unspecified site       aspirin 81 MG chewable tablet      Take 81 mg by mouth daily        lidocaine-prilocaine cream    EMLA          LORazepam 0.5 MG tablet    ATIVAN    30 tablet    Take 1 tablet (0.5 mg) by mouth every 4 hours as needed (Anxiety, Nausea/Vomiting or Sleep)    Malignant neoplasm of overlapping sites of stomach (H)       MULTIVITAMIN ADULT PO           ondansetron 8 MG tablet    ZOFRAN    10 tablet    Take 1 tablet (8 mg) by mouth every 8 hours as needed (Nausea/Vomiting)    Malignant neoplasm of overlapping sites of  stomach (H)       prochlorperazine 10 MG tablet    COMPAZINE    30 tablet    Take 1 tablet (10 mg) by mouth every 6 hours as needed (Nausea/Vomiting)    Malignant neoplasm of overlapping sites of stomach (H)       TYLENOL PO      Take by mouth as needed for mild pain or fever

## 2018-05-29 NOTE — PROGRESS NOTES
"CLINICAL NUTRITION SERVICES - ASSESSMENT NOTE    Terry Yi 52 year old referred for MNT related to stomach cancer    Time Spent: 30 minutes  Visit Type: initial  Referring Physician: Edmund  Pt accompanied by: his friend, Al    NUTRITION HISTORY  Factors affecting nutrition intake include:swallowing difficulties  Current diet: general, soft  Current appetite/intake: great  PEG Tube: No  Chemotherapy: FOLFOX   Radiation: No         Roberto reports that he focuses on soft foods, however, he is able to eat solid foods.    He has been eating 3 meals and 3 snacks daily.   He reports eating a lof of eggs, soft BBQ ribs, enchiladas (no wrap), toast with butter, ground chicken, potatoes with gravy, soft vegetables, 1% milk and ice cream.     Diet Recall  Breakfast 4-5 eggs with butter and cheese, 1 pc organic toast with butter, 2 cups 1% milk, 2 cups decaf coffee  Am snack: breakfast burrito from [employer]   Lunch BBQ ribs, potatoes/gravy and cooked veggies (peas, carrots)  Pm snack: soft serve ice cream from [facility]   Dinner Chicken enchilada, sauces, beans   Snacks Ice cream    Beverages 6 cups decaf coffee, 4 cups 1% milk, 2 cups water   Alcohol None   Dining out Almost daily     ANTHROPOMETRICS  Height: 70\"  Weight: 221 lb/100kg  BMI: 31.7  Weight Status:  Obesity Grade I BMI 30-34.9  IBW: 166 lbs  % IBW: 133%  Weight History: ongoing weight gain; per pt report he had lost ~70 lbs prior to diagnosis when he wasn't feeling well.  Baseline weight was 310 lb per pt report (30% wt loss)  Wt Readings from Last 15 Encounters:   05/29/18 100.2 kg (221 lb)   05/16/18 98.9 kg (218 lb)   05/02/18 98 kg (216 lb)   04/18/18 99.9 kg (220 lb 3.2 oz)   04/04/18 100 kg (220 lb 6.4 oz)   03/21/18 99.5 kg (219 lb 6 oz)   03/07/18 99.6 kg (219 lb 9 oz)   02/21/18 98.6 kg (217 lb 6.4 oz)   02/07/18 98.7 kg (217 lb 8 oz)   01/24/18 97.5 kg (215 lb)   01/10/18 95.7 kg (211 lb)   12/27/17 98.7 kg (217 lb 9.6 oz)   12/13/17 95.8 kg (211 lb " 4.8 oz)   11/29/17 95.2 kg (209 lb 12.8 oz)   11/15/17 94.8 kg (209 lb)     Dosing Weight: 178 lbs/81kg    Medications/vitamins/minerals/herbals:   Reviewed    LABS  Labs reviewed    ASSESSED NUTRITION NEEDS:  Estimated Energy Needs: 8528-0012 kcals (30-35 Kcal/Kg)  Justification: increased needs with chemotherapy  Estimated Protein Needs: 100 grams protein (1.2-1.5 g pro/Kg)  Justification: increased needs with chemotherapy  Estimated Fluid Needs: 1577-5541  mL   Justification: maintenance    MALNUTRITION:  % Weight Loss:  Weight loss does not meet criteria for malnutrition   % Intake:  No decreased intake noted  Subcutaneous Fat Loss:  None observed  Muscle Loss:  None observed  Fluid Retention:  None noted    Malnutrition Diagnosis: Patient does not meet two of the above criteria necessary for diagnosing malnutrition but is at risk.     NUTRITION DIAGNOSIS:  Predicted suboptimal nutrient intake related to dysphagia     INTERVENTIONS  Nutrition Education     Provided written & verbal education on:   - Ways to maximize kcal and protein intake via soft diet. Discussed calorie and protein needs for maintenance of weight and nutrition status.  Advised pt to aim for at least 2400kcal and 100g protein via 5-6 small frequent meals.   - Water intake - encouraged pt to increase water to eat least 8 cups/day, while trying to cut back on coffee as able.    Goals  1.  Aim for 5-6 small frequent meals  2.  Aim for 2400kcal and 100g protein/day  3. Weight maintenance through cancer treatment      Follow-Up Plans: Pt has RD contact information for questions.      Ewa Worley, COTY, LD

## 2018-05-29 NOTE — MR AVS SNAPSHOT
After Visit Summary   5/29/2018    Terry Yi    MRN: [id number]           Patient Information     Date Of Birth          1965        Visit Information        Provider Department      5/29/2018 12:00 PM BAY 9 INFUSION [facility]        Today's Diagnoses     Malignant neoplasm of overlapping sites of stomach (H)    -  1       Follow-ups after your visit        Your next 10 appointments already scheduled     Jun 08, 2018  8:00 AM CDT   Nurse Only with MG IMAGING NURSE   [facility])    [address] [place] [id number] [phone]            Jun 08, 2018  8:15 AM CDT   CT CHEST ABDOMEN PELVIS W/O & W CONTRAST with MGCT1   [facility])    [address] [place] [address] [phone]           Please bring any scans or X-rays taken at other hospitals, if similar tests were done. Also bring a list of your medicines, including vitamins, minerals and over-the-counter drugs. It is safest to leave personal items at home.  Be sure to tell your doctor:   If you have any allergies.   If there s any chance you are pregnant.   If you are breastfeeding.  How to prepare:   Do not eat or drink for 2 hours before your exam. If you need to take medicine, you may take it with small sips of water. (We may ask you to take liquid medicine as well.)   Please wear loose clothing, such as a sweat suit or jogging clothes. Avoid snaps, zippers and other metal. We may ask you to undress and put on a hospital gown.  Please arrive 30 minutes early for your CT. Once in the department you might be asked to drink water 15-20 minutes prior to your exam.  If indicated you may be asked to drink an oral contrast in advance of your CT.  If this is the case, the imaging team will let you know or be in contact with you prior to your appointment  Patients over 70 or patients with diabetes or  kidney problems:   If you haven t had a blood test (creatinine test) within the last 30 days, the Cardiologist/Radiologist may require you to get this test prior to your exam.  If you have diabetes:   Continue to take your metformin medication on the day of your exam  If you have any questions, please call the Imaging Department where you will have your exam.            Jun 13, 2018  8:30 AM CDT   Return Visit with NURSE ONLY CANCER CENTER   [facility]    [address] [facility] [address]   [phone]            Jun 13, 2018  9:15 AM CDT   Return Visit with Yakov Shaffer MD   [facility])    [address] [facility] [address]   [phone]            Jun 13, 2018 10:00 AM CDT   Level 2 with BAY 1 INFUSION   [facility]    [address] [facility] [address]   [phone]            Jun 27, 2018  7:30 AM CDT   Return Visit with NURSE ONLY CANCER CENTER   [facility] ([facility])    [address] [facility] [address]   [phone]            Jun 27, 2018  8:15 AM CDT   Return Visit with Yakov Shaffer MD   [facility])    [address] [facility] [address]   [phone]            Jun 27, 2018  8:45 AM CDT   Level 2 with BAY 6 INFUSION   [facility])    [address] [facility] [address]   [phone]              Future tests that were ordered for you today     Open Future Orders        Priority Expected Expires Ordered    CT Chest abdomen pelvis w & w/o contrast Routine 6/8/2018 5/29/2019 5/29/2018            Who to contact     If you have questions or need follow up information about today's clinic visit or your schedule please contact [facility]  directly at [phone].  Normal or non-critical lab and imaging results will be communicated to you by [employer], letter or phone within 4 business days after the clinic has received the results. If you do not hear from us within 7 days, please contact the clinic through [employer] or phone. If you have a critical or abnormal lab result, we will notify you by phone as soon as possible.  Submit refill requests through [employer] software or call your pharmacy and they will forward the refill request to us. Please allow 3 business days for your refill to be completed.          Additional Information About Your Visit        [employer] Information     [employer] software gives you secure access to your electronic health record. If you see a primary care provider, you can also send messages to your care team and make appointments. If you have questions, please call your primary care clinic.  If you do not have a primary care provider, please call [phone] and they will assist you.      [employer] software is an electronic gateway that provides easy, online access to your medical records. With [employer] software, you can request a clinic appointment, read your test results, renew a prescription or communicate with your care team.     To access your existing account, please contact your [facility] or call [phone] for assistance.        Care EveryWhere ID     This is your Care EveryWhere ID. This could be used by other organizations to access your [place] medical records  [id number]         Blood Pressure from Last 3 Encounters:   05/29/18 136/79   05/16/18 112/76   05/02/18 122/78    Weight from Last 3 Encounters:   05/29/18 100.2 kg (221 lb)   05/16/18 98.9 kg (218 lb)   05/02/18 98 kg (216 lb)              Today, you had the following     No orders found for display       Primary Care Provider Office Phone # Fax #    Sandra Dickerson [phone]       [facility]  [address]  [facility] [address]         Equal Access to Services     [facility] : Hadii aad ku hadsohancastro Kirstyali, waaxda luqadaha, qaybta kaalmaladonna dumont, claire browne. So [facility] [phone].    ATENCIÓN: Si shirinla marly, tiene a cuevas disposición servicios gratuitos de asistencia lingüística. Britneyame al [phone].    We comply with applicable federal civil rights laws and [place] laws. We do not discriminate on the basis of race, color, national origin, age, disability, sex, sexual orientation, or gender identity.            Thank you!     Thank you for choosing [facility]  for your care. Our goal is always to provide you with excellent care. Hearing back from our patients is one way we can continue to improve our services. Please take a few minutes to complete the written survey that you may receive in the mail after your visit with us. Thank you!             Your Updated Medication List - Protect others around you: Learn how to safely use, store and throw away your medicines at www.disposemymeds.org.          This list is accurate as of 5/29/18  3:12 PM.  Always use your most recent med list.                   Brand Name Dispense Instructions for use Diagnosis    ALEVE PO       Acute gout, unspecified cause, unspecified site       allopurinol 100 MG tablet    ZYLOPRIM    30 tablet    Take 1 tablet (100 mg) by mouth daily    Acute gout, unspecified cause, unspecified site       aspirin 81 MG chewable tablet      Take 81 mg by mouth daily        lidocaine-prilocaine cream    EMLA          LORazepam 0.5 MG tablet    ATIVAN    30 tablet    Take 1 tablet (0.5 mg) by mouth every 4 hours as needed (Anxiety, Nausea/Vomiting or Sleep)    Malignant neoplasm of overlapping sites of stomach (H)       MULTIVITAMIN ADULT PO           ondansetron 8 MG tablet    ZOFRAN    10 tablet    Take 1 tablet (8 mg) by mouth every 8 hours as needed (Nausea/Vomiting)    Malignant neoplasm of overlapping sites of stomach (H)        prochlorperazine 10 MG tablet    COMPAZINE    30 tablet    Take 1 tablet (10 mg) by mouth every 6 hours as needed (Nausea/Vomiting)    Malignant neoplasm of overlapping sites of stomach (H)       TYLENOL PO      Take by mouth as needed for mild pain or fever

## 2018-05-29 NOTE — LETTER
5/29/2018         RE: Terry Yi  [place] [address]        Dear Colleague,    Thank you for referring your patient, Terry Yi, to the [facility]. Please see a copy of my visit note below.    5/29/2018     REASON FOR VISIT:  Follow-up for unresectable metastatic gastric carcinoma, diffuse type, grade 3, HER-2 negative, MSI-Intact. PD-L1 negative (<1%)     HISTORY OF PRESENT ILLNESS:  Please see my previous note for details.  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      CT CAP after C#6 shows stable disease    C# 9 FOLFOX 8/2/2017 ( Oxaliplatin dose reduced to 70mg/m2 due to neuropathy )  C#10 8/22/2017 ( Oxaliplatin dose reduced to 60mg/m2 due to neuropathy )  Delayed by 1 week- patient preference  C#11 9/6/17 5FU/LV ( Stopped Oxaliplatin )  C#12 9/20/2017 5FU/LV    Repeat CT scan after C#12 shows stable to slightly improved disease.    C#13 10/4/2017 5FU/LV  C#14 10/18/2017 5FU/LV  C#15 10/31/2017 5FU/LV  C#16 11/15/2017 5FU/LV  C#17 11/29/2017 5FU/LV   C#18 12/13/2017 5FU/LV    Repeat CT CAP 12/22/17 is stable    C#19 12/27/2017 5FU/LV  C#20 1/10/2018 5FU/LV  C# 21 1/24/2018 5FU/LV  C# 22 2/7/2018  5FU/LV  C# 23 2/21/2018   C# 24 3/7/2018     Repeat CAP on 3/16/18 shows stable disease.    C# 25 3/21/2018 5FU/LV  C# 26 4/4/2018 5FU/LV  C# 27 4/18/2018 5FU/LV    As he was complaining of off and on trouble swallowing with food getting stuck, we repeated EGD on 4/27/2018 which showed a nodular area with possible shallow ulceration in the stomach. Otherwise esophagus and stomach and duodenum were normal.  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  He was referred to  "gastroenterologist for manometry    The biopsies which were taken from the nodular shallow ulcer aren't consistent with poorly differentiated diffuse type adenocarcinoma with signet ring features      C#28 5/2/2018 5FU/LV  C#29 5/16/2018 5FU/LV    Esophogram showed extrinsic compression on the GE junction resulting in luminal narrowing and associated extended retention of barium tablet. This likely corresponds with soft tissue thickening about the diaphragmatic hiatus as demonstrated on prior CT examination    C#30 5/29/2018 5FU/LV       INTERVAL HISTORY:   He is doing the same as before and is tolerating chemotherapy nicely. He has trouble swallowing bread and food which is not properly chewed but overall he is able to eat well and he has gained a few pounds over the last couple of weeks. Denies nausea or vomiting. No pain. He continues to have neuropathy of the fingers and feet. He is applying moisturizing creams to his hands and overall the skin of the palms is better. No new swellings. No bleeding. No infections. Energy is good.       ECOG 0    ROS:  A comprehensive ROS was otherwise neg      MEDICATIONS:   Current Outpatient Prescriptions   Medication     Acetaminophen (TYLENOL PO)     lidocaine-prilocaine (EMLA) cream     LORazepam (ATIVAN) 0.5 MG tablet     Multiple Vitamins-Minerals (MULTIVITAMIN ADULT PO)     ondansetron (ZOFRAN) 8 MG tablet     prochlorperazine (COMPAZINE) 10 MG tablet     allopurinol (ZYLOPRIM) 100 MG tablet     aspirin 81 MG chewable tablet     Naproxen Sodium (ALEVE PO)     No current facility-administered medications for this visit.      Facility-Administered Medications Ordered in Other Visits   Medication     heparin 100 UNIT/ML injection 5 mL     sodium chloride (PF) 0.9% PF flush 10-20 mL            PHYSICAL EXAMINATION:   /79  Pulse 53  Temp 97.6  F (36.4  C)  Ht 1.778 m (5' 10\")  Wt 100.2 kg (221 lb)  SpO2 97%  BMI 31.71 kg/m2  CONSTITUTIONAL: No apparent " distress  EYES: PERRLA, without pallor or jaundice  ENT/MOUTH: Ears unremarkable. No oral lesions  CVS: s1s2 normal  RESPIRATORY: Chest is clear  GI: Abdomen is benign  NEURO: He is alert and oriented ×3. He has tingling and numbness of the fingers and feet  INTEGUMENT: There is mild peeling and dryness of the palms but it is better than last time  LYMPHATIC: no palpable lymphadenopathy  MUSCULOSKELETAL: Unremarkable. No bony tenderness.   EXTREMITIES: no pedal edema  PSYCH: Mentation, mood and affect are appropriate      LABS:   Reviewed  and stable    IMAGING:  Reviewed    Esophagram dated 5/16/2018     COMPARISON:    CT dated 3/16/2018     HISTORY:    Esophageal dysphagia in a patient with history of gastric  carcinoma.     FINDINGS: Examination of the esophagus reveals adequate primary and  secondary peristalsis. There is distal esophageal narrowing at the  gastroesophageal junction without associated mucosal abnormality, with  apparent extrinsic compression. This may correspond with soft tissue  thickening seen at the diaphragmatic hiatus on CT dated 3/16/2018. The  gastroesophageal junction is patent. No hiatal hernia was seen on  examination. There was absence of gastroesophageal reflux.     Fluoroscopy time was 1.6 minutes.     A 12mm barium tablet was administered and did not initially pass  through the gastroesophageal junction. 15 minutes after initial  administration and following ingestion of pudding and thick barium,  the barium tablet did pass through the gastroesophageal junction.         IMPRESSION: Extrinsic compression on the gastroesophageal junction  results in luminal narrowing and associated extended retention of  barium tablet. This likely corresponds with soft tissue thickening  about the diaphragmatic hiatus as demonstrated on prior CT  examination.      EXAM: CT of the Chest, Abdomen and Pelvis, 3/16/2018     COMPARISON: CTs dated 12/22/2017, 9/29/2017, 6/30/2017 and PET CT  dated  3/24/2017      CLINICAL HISTORY: Metastatic gastric carcinoma.      TECHNIQUE: Axial images of the chest, abdomen and pelvis were obtained  with 134 mL IV contrast. Coronal reconstructions were provided. Images  were reviewed in bone, lung, and soft tissue windows.     Dose: 1108 mGy*cm     FINDINGS:     Chest: 0.9 cm right thyroid nodule is unchanged since at least  3/24/2017. The thyroid gland is otherwise unremarkable. Great vessels,  aorta and pulmonary artery normal in caliber and configuration. The  heart is not enlarged. There is no pericardial effusion. Stable  appearance of partially calcified mediastinal and hilar lymph nodes.  No new or enlarging lymph nodes.     Lungs: Stringy inspissated secretions in the trachea, the central  airways are otherwise clear. No focal consolidation, pleural effusion  or pneumothorax. Multiple calcified granulomas are stable. No new or  enlarging pulmonary nodules.     Abdomen and Pelvis: Redemonstration of mild diffuse gastric wall  thickening, not significantly changed when compared with exam dated  12/22/2017. Prominent mesenteric and retroperitoneal lymph nodes are  also stable when compared with prior exam. For example, gastrohepatic  lymph node measures 1.1 x 1.4 cm (series 2 image 61), unchanged from  prior exam. Portacaval lymph node measures 1.6 x 1 cm (series 2 image  66), previously 1.8 x 1.2 cm. Mild haziness of the central mesentery  with infiltration along the lesser sac, and extending to the  retroperitoneum is also not significantly changed from prior exam. No  new or enlarging lymph nodes.     1.3 subcapsular lesion along the right lobe of the liver is not  significantly changed in size or morphology since at least 3/24/2017  and appears to have peripheral globular enhancement on the current  examination. Stable appearance of subcentimeter hepatic hypodensities  along the falciform ligament (series 2 image 60) and near the hepatic  dome (series 2 image 49).  No new focal hepatic mass.      The gallbladder, spleen, pancreas and adrenal glands are unremarkable.  Symmetric nephrographic enhancement of the kidneys. No hydronephrosis  or hydroureter. The urinary bladder is partially distended without  focal mucosal thickening. The bowel is nondilated. Colonic  diverticulosis without diverticulitis. Fluid/thickening along the  posterior peritoneum, similar to prior exam. Major visualized vessels  of the abdomen are patent.     Bones and soft tissues: Multilevel degenerative change of the spine.  Advanced degenerative changes about the right hip, unchanged from  prior exam. No acute osseous abnormality or suspicious osseous lesion.         Impression:   1. In this patient with known gastric carcinoma, there is stable mild  diffuse gastric wall thickening, prominent local lymph nodes,  infiltrative mesenteric and retroperitoneal haziness with  fluid/thickening along the peritoneum. These findings are not  significantly changed from prior exam. No evidence for new or  progressive disease in the chest, abdomen or pelvis.  2. 1.3 cm hypodensity in the right lobe of the liver currently  demonstrates characteristics suggestive of a hepatic hemangioma.  3. Colonic diverticulosis without diverticulitis.  4. Sequelae of granulomatous disease.      EGD 4/27/18  Findings:        One 12 mm nodular area with possible shallow uleration was found in the        gastric fundus. Biopsies were taken with a cold forceps for histology.        The esophagus was normal.        The stomach was normal except as noted above..        The examined duodenum was normal.       ORIGINAL REPORT:     SPECIMEN(S):   Stomach biopsy, fundus     FINAL DIAGNOSIS:   STOMACH, FUNDUS, BIOPSY:   - Poorly differentiated carcinoma (diffuse type) with signet ring features   - Biopsy is limited to mucosa     ASSESSMENT AND PLAN:      Metastatic gastric cancer, HER-2/catrachita negative, MSI intact, PD-L1 neg (<1%), presenting  with early satiety and significant weight loss and epigastric discomfort upon eating with some vomiting.  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  There is an indeterminate right liver lobe lesion which has remained stable and likely is not a metastasis.      We started palliative FOLFOX on 03/12/2017.     Oxaliplatin was dropped from cycle #11 onwards because of neuropathy     Scans after C#18 show stable disease    Repeat CT CAP after 24 cycles also show stable disease so we continued with 5FU/LV. He is tolerating it well  He will get cycle #30 today and then we will repeat his scans after that        Trouble swallowing. His repeat EGD which was done because he was complaining of dysphagia showed improved findings as compared to the initial EGD and overall the stomach looked normal apart from 1.2 cm malignant shallow ulcer   Esophogram showed extrinsic compression on the GE junction resulting in luminal narrowing and associated extended retention of barium tablet. This likely corresponds with soft tissue thickening about the diaphragmatic hiatus as demonstrated on prior CT examination. We will repeat CT scan before next chemo    Peeling of the skin of the palms. This is due to 5-FU. Overall this is better. I again told him to keep on applying moisturizing creams many times a day.    Anemia. This is mild and due to chemotherapy. We'll repeat labs in 2 weeks.    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 At this time we are observing it without any active intervention.      We did not address the following today    His genetic testing did not show any identifiable mutation not reveal any identifiable mutation     Return to clinic as scheduled. He will get CT scan prior to next cycle    All of his and his family's questions were answered to their satisfaction and they are agreeable and comfortable with the plan     LANEY HERNANDEZ,  MD             Again, thank you for allowing me to participate in the care of your patient.        Sincerely,        Yakov Shaffer MD

## 2018-05-29 NOTE — PROGRESS NOTES
5/29/2018     REASON FOR VISIT:  Follow-up for unresectable metastatic gastric carcinoma, diffuse type, grade 3, HER-2 negative, MSI-Intact. PD-L1 negative (<1%)     HISTORY OF PRESENT ILLNESS:  Please see my previous note for details.  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      CT CAP after C#6 shows stable disease    C# 9 FOLFOX 8/2/2017 ( Oxaliplatin dose reduced to 70mg/m2 due to neuropathy )  C#10 8/22/2017 ( Oxaliplatin dose reduced to 60mg/m2 due to neuropathy )  Delayed by 1 week- patient preference  C#11 9/6/17 5FU/LV ( Stopped Oxaliplatin )  C#12 9/20/2017 5FU/LV    Repeat CT scan after C#12 shows stable to slightly improved disease.    C#13 10/4/2017 5FU/LV  C#14 10/18/2017 5FU/LV  C#15 10/31/2017 5FU/LV  C#16 11/15/2017 5FU/LV  C#17 11/29/2017 5FU/LV   C#18 12/13/2017 5FU/LV    Repeat CT CAP 12/22/17 is stable    C#19 12/27/2017 5FU/LV  C#20 1/10/2018 5FU/LV  C# 21 1/24/2018 5FU/LV  C# 22 2/7/2018  5FU/LV  C# 23 2/21/2018   C# 24 3/7/2018     Repeat CAP on 3/16/18 shows stable disease.    C# 25 3/21/2018 5FU/LV  C# 26 4/4/2018 5FU/LV  C# 27 4/18/2018 5FU/LV    As he was complaining of off and on trouble swallowing with food getting stuck, we repeated EGD on 4/27/2018 which showed a nodular area with possible shallow ulceration in the stomach. Otherwise esophagus and stomach and duodenum were normal.  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  He was referred to gastroenterologist for manometry    The biopsies which were taken from the nodular shallow ulcer aren't consistent with poorly differentiated diffuse type adenocarcinoma with signet ring features      C#28 5/2/2018 5FU/LV  C#29 5/16/2018  "5FU/LV    Esophogram showed extrinsic compression on the GE junction resulting in luminal narrowing and associated extended retention of barium tablet. This likely corresponds with soft tissue thickening about the diaphragmatic hiatus as demonstrated on prior CT examination    C#30 5/29/2018 5FU/LV       INTERVAL HISTORY:   He is doing the same as before and is tolerating chemotherapy nicely. He has trouble swallowing bread and food which is not properly chewed but overall he is able to eat well and he has gained a few pounds over the last couple of weeks. Denies nausea or vomiting. No pain. He continues to have neuropathy of the fingers and feet. He is applying moisturizing creams to his hands and overall the skin of the palms is better. No new swellings. No bleeding. No infections. Energy is good.       ECOG 0    ROS:  A comprehensive ROS was otherwise neg      MEDICATIONS:   Current Outpatient Prescriptions   Medication     Acetaminophen (TYLENOL PO)     lidocaine-prilocaine (EMLA) cream     LORazepam (ATIVAN) 0.5 MG tablet     Multiple Vitamins-Minerals (MULTIVITAMIN ADULT PO)     ondansetron (ZOFRAN) 8 MG tablet     prochlorperazine (COMPAZINE) 10 MG tablet     allopurinol (ZYLOPRIM) 100 MG tablet     aspirin 81 MG chewable tablet     Naproxen Sodium (ALEVE PO)     No current facility-administered medications for this visit.      Facility-Administered Medications Ordered in Other Visits   Medication     heparin 100 UNIT/ML injection 5 mL     sodium chloride (PF) 0.9% PF flush 10-20 mL            PHYSICAL EXAMINATION:   /79  Pulse 53  Temp 97.6  F (36.4  C)  Ht 1.778 m (5' 10\")  Wt 100.2 kg (221 lb)  SpO2 97%  BMI 31.71 kg/m2  CONSTITUTIONAL: No apparent distress  EYES: PERRLA, without pallor or jaundice  ENT/MOUTH: Ears unremarkable. No oral lesions  CVS: s1s2 normal  RESPIRATORY: Chest is clear  GI: Abdomen is benign  NEURO: He is alert and oriented ×3. He has tingling and numbness of the fingers " and feet  INTEGUMENT: There is mild peeling and dryness of the palms but it is better than last time  LYMPHATIC: no palpable lymphadenopathy  MUSCULOSKELETAL: Unremarkable. No bony tenderness.   EXTREMITIES: no pedal edema  PSYCH: Mentation, mood and affect are appropriate      LABS:   Reviewed  and stable    IMAGING:  Reviewed    Esophagram dated 5/16/2018     COMPARISON:    CT dated 3/16/2018     HISTORY:    Esophageal dysphagia in a patient with history of gastric  carcinoma.     FINDINGS: Examination of the esophagus reveals adequate primary and  secondary peristalsis. There is distal esophageal narrowing at the  gastroesophageal junction without associated mucosal abnormality, with  apparent extrinsic compression. This may correspond with soft tissue  thickening seen at the diaphragmatic hiatus on CT dated 3/16/2018. The  gastroesophageal junction is patent. No hiatal hernia was seen on  examination. There was absence of gastroesophageal reflux.     Fluoroscopy time was 1.6 minutes.     A 12mm barium tablet was administered and did not initially pass  through the gastroesophageal junction. 15 minutes after initial  administration and following ingestion of pudding and thick barium,  the barium tablet did pass through the gastroesophageal junction.         IMPRESSION: Extrinsic compression on the gastroesophageal junction  results in luminal narrowing and associated extended retention of  barium tablet. This likely corresponds with soft tissue thickening  about the diaphragmatic hiatus as demonstrated on prior CT  examination.      EXAM: CT of the Chest, Abdomen and Pelvis, 3/16/2018     COMPARISON: CTs dated 12/22/2017, 9/29/2017, 6/30/2017 and PET CT  dated 3/24/2017      CLINICAL HISTORY: Metastatic gastric carcinoma.      TECHNIQUE: Axial images of the chest, abdomen and pelvis were obtained  with 134 mL IV contrast. Coronal reconstructions were provided. Images  were reviewed in bone, lung, and soft tissue  windows.     Dose: 1108 mGy*cm     FINDINGS:     Chest: 0.9 cm right thyroid nodule is unchanged since at least  3/24/2017. The thyroid gland is otherwise unremarkable. Great vessels,  aorta and pulmonary artery normal in caliber and configuration. The  heart is not enlarged. There is no pericardial effusion. Stable  appearance of partially calcified mediastinal and hilar lymph nodes.  No new or enlarging lymph nodes.     Lungs: Stringy inspissated secretions in the trachea, the central  airways are otherwise clear. No focal consolidation, pleural effusion  or pneumothorax. Multiple calcified granulomas are stable. No new or  enlarging pulmonary nodules.     Abdomen and Pelvis: Redemonstration of mild diffuse gastric wall  thickening, not significantly changed when compared with exam dated  12/22/2017. Prominent mesenteric and retroperitoneal lymph nodes are  also stable when compared with prior exam. For example, gastrohepatic  lymph node measures 1.1 x 1.4 cm (series 2 image 61), unchanged from  prior exam. Portacaval lymph node measures 1.6 x 1 cm (series 2 image  66), previously 1.8 x 1.2 cm. Mild haziness of the central mesentery  with infiltration along the lesser sac, and extending to the  retroperitoneum is also not significantly changed from prior exam. No  new or enlarging lymph nodes.     1.3 subcapsular lesion along the right lobe of the liver is not  significantly changed in size or morphology since at least 3/24/2017  and appears to have peripheral globular enhancement on the current  examination. Stable appearance of subcentimeter hepatic hypodensities  along the falciform ligament (series 2 image 60) and near the hepatic  dome (series 2 image 49). No new focal hepatic mass.      The gallbladder, spleen, pancreas and adrenal glands are unremarkable.  Symmetric nephrographic enhancement of the kidneys. No hydronephrosis  or hydroureter. The urinary bladder is partially distended without  focal mucosal  thickening. The bowel is nondilated. Colonic  diverticulosis without diverticulitis. Fluid/thickening along the  posterior peritoneum, similar to prior exam. Major visualized vessels  of the abdomen are patent.     Bones and soft tissues: Multilevel degenerative change of the spine.  Advanced degenerative changes about the right hip, unchanged from  prior exam. No acute osseous abnormality or suspicious osseous lesion.         Impression:   1. In this patient with known gastric carcinoma, there is stable mild  diffuse gastric wall thickening, prominent local lymph nodes,  infiltrative mesenteric and retroperitoneal haziness with  fluid/thickening along the peritoneum. These findings are not  significantly changed from prior exam. No evidence for new or  progressive disease in the chest, abdomen or pelvis.  2. 1.3 cm hypodensity in the right lobe of the liver currently  demonstrates characteristics suggestive of a hepatic hemangioma.  3. Colonic diverticulosis without diverticulitis.  4. Sequelae of granulomatous disease.      EGD 4/27/18  Findings:        One 12 mm nodular area with possible shallow uleration was found in the        gastric fundus. Biopsies were taken with a cold forceps for histology.        The esophagus was normal.        The stomach was normal except as noted above..        The examined duodenum was normal.       ORIGINAL REPORT:     SPECIMEN(S):   Stomach biopsy, fundus     FINAL DIAGNOSIS:   STOMACH, FUNDUS, BIOPSY:   - Poorly differentiated carcinoma (diffuse type) with signet ring features   - Biopsy is limited to mucosa     ASSESSMENT AND PLAN:      Metastatic gastric cancer, HER-2/catrachita negative, MSI intact, PD-L1 neg (<1%), presenting with early satiety and significant weight loss and epigastric discomfort upon eating with some vomiting.  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  There is an  indeterminate right liver lobe lesion which has remained stable and likely is not a metastasis.      We started palliative FOLFOX on 03/12/2017.     Oxaliplatin was dropped from cycle #11 onwards because of neuropathy     Scans after C#18 show stable disease    Repeat CT CAP after 24 cycles also show stable disease so we continued with 5FU/LV. He is tolerating it well  He will get cycle #30 today and then we will repeat his scans after that        Trouble swallowing. His repeat EGD which was done because he was complaining of dysphagia showed improved findings as compared to the initial EGD and overall the stomach looked normal apart from 1.2 cm malignant shallow ulcer   Esophogram showed extrinsic compression on the GE junction resulting in luminal narrowing and associated extended retention of barium tablet. This likely corresponds with soft tissue thickening about the diaphragmatic hiatus as demonstrated on prior CT examination. We will repeat CT scan before next chemo    Peeling of the skin of the palms. This is due to 5-FU. Overall this is better. I again told him to keep on applying moisturizing creams many times a day.    Anemia. This is mild and due to chemotherapy. We'll repeat labs in 2 weeks.    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 At this time we are observing it without any active intervention.      We did not address the following today    His genetic testing did not show any identifiable mutation not reveal any identifiable mutation     Return to clinic as scheduled. He will get CT scan prior to next cycle    All of his and his family's questions were answered to their satisfaction and they are agreeable and comfortable with the plan     LANEY HERNANDEZ MD

## 2018-05-29 NOTE — LETTER
"    5/29/2018         RE: Terry Yi  [address] [place] [address]        Dear Colleague,    Thank you for referring your patient, Terry Yi, to the [facility]. Please see a copy of my visit note below.    CLINICAL NUTRITION SERVICES - ASSESSMENT NOTE    Terry Yi 52 year old referred for MNT related to stomach cancer    Time Spent: 30 minutes  Visit Type:  initial  Referring Physician:  Edmund  Pt accompanied by:  his friend, Al    NUTRITION HISTORY  Factors affecting nutrition intake include:swallowing difficulties  Current diet: general, soft  Current appetite/intake: great  PEG Tube: No  Chemotherapy: FOLFOX   Radiation: No         Roberto reports that he focuses on soft foods, however, he is able to eat solid foods.    He has been eating 3 meals and 3 snacks daily.   He reports eating a lof of eggs, soft BBQ ribs, enchiladas (no wrap), toast with butter, ground chicken, potatoes with gravy, soft vegetables, 1% milk and ice cream.     Diet Recall  Breakfast 4-5 eggs with butter and cheese, 1 pc organic toast with butter, 2 cups 1% milk, 2 cups decaf coffee  Am snack: breakfast burrito from [employer]   Lunch BBQ ribs, potatoes/gravy and cooked veggies (peas, carrots)  Pm snack: soft serve ice cream from [facility]   Dinner Chicken enchilada, sauces, beans   Snacks Ice cream    Beverages 6 cups decaf coffee, 4 cups 1% milk, 2 cups water   Alcohol None   Dining out Almost daily     ANTHROPOMETRICS  Height: 70\"  Weight: 221 lb/100kg  BMI: 31.7  Weight Status:  Obesity Grade I BMI 30-34.9  IBW: 166 lbs  % IBW: 133%  Weight History: ongoing weight gain; per pt report he had lost ~70 lbs prior to diagnosis when he wasn't feeling well.  Baseline weight was 310 lb per pt report (30% wt loss)  Wt Readings from Last 15 Encounters:   05/29/18 100.2 kg (221 lb)   05/16/18 98.9 kg (218 lb)   05/02/18 98 kg (216 lb)   04/18/18 99.9 kg (220 lb 3.2 oz)   04/04/18 100 kg (220 lb 6.4 oz) "   03/21/18 99.5 kg (219 lb 6 oz)   03/07/18 99.6 kg (219 lb 9 oz)   02/21/18 98.6 kg (217 lb 6.4 oz)   02/07/18 98.7 kg (217 lb 8 oz)   01/24/18 97.5 kg (215 lb)   01/10/18 95.7 kg (211 lb)   12/27/17 98.7 kg (217 lb 9.6 oz)   12/13/17 95.8 kg (211 lb 4.8 oz)   11/29/17 95.2 kg (209 lb 12.8 oz)   11/15/17 94.8 kg (209 lb)     Dosing Weight: 178 lbs/81kg    Medications/vitamins/minerals/herbals:   Reviewed    LABS  Labs reviewed    ASSESSED NUTRITION NEEDS:  Estimated Energy Needs: 1402-0385 kcals (30-35 Kcal/Kg)  Justification: increased needs with chemotherapy  Estimated Protein Needs: 100 grams protein (1.2-1.5 g pro/Kg)  Justification: increased needs with chemotherapy  Estimated Fluid Needs: 8448-5348  mL   Justification: maintenance    MALNUTRITION:  % Weight Loss:  Weight loss does not meet criteria for malnutrition   % Intake:  No decreased intake noted  Subcutaneous Fat Loss:  None observed  Muscle Loss:  None observed  Fluid Retention:  None noted    Malnutrition Diagnosis: Patient does not meet two of the above criteria necessary for diagnosing malnutrition but is at risk.     NUTRITION DIAGNOSIS:  Predicted suboptimal nutrient intake related to dysphagia     INTERVENTIONS  Nutrition Education     Provided written & verbal education on:   - Ways to maximize kcal and protein intake via soft diet. Discussed calorie and protein needs for maintenance of weight and nutrition status.  Advised pt to aim for at least 2400kcal and 100g protein via 5-6 small frequent meals.   - Water intake - encouraged pt to increase water to eat least 8 cups/day, while trying to cut back on coffee as able.    Goals  1.  Aim for 5-6 small frequent meals  2.  Aim for 2400kcal and 100g protein/day  3. Weight maintenance through cancer treatment      Follow-Up Plans: Pt has RD contact information for questions.      Ewa Worley, COTY, LD        Again, thank you for allowing me to participate in the care of your patient.         Sincerely,        Ewa Worley RD

## 2018-05-29 NOTE — NURSING NOTE
"Oncology Rooming Note    May 29, 2018 10:47 AM   Terry Yi is a 52 year old male who presents for:    Chief Complaint   Patient presents with     Oncology Clinic Visit     Prior to treatment     Initial Vitals: /79  Pulse 53  Temp 97.6  F (36.4  C)  Ht 1.778 m (5' 10\")  Wt 100.2 kg (221 lb)  SpO2 97%  BMI 31.71 kg/m2 Estimated body mass index is 31.71 kg/(m^2) as calculated from the following:    Height as of this encounter: 1.778 m (5' 10\").    Weight as of this encounter: 100.2 kg (221 lb). Body surface area is 2.22 meters squared.  Extreme Pain (8) Comment: hands and feet   No LMP for male patient.  Allergies reviewed: Yes  Medications reviewed: Yes    Medications: Medication refills not needed today.  Pharmacy name entered into EPIC: Data Unavailable        5 minutes for nursing intake (face to face time)     Sayra Pelaez LPN              "

## 2018-06-08 NOTE — PROGRESS NOTES
IVAD accessed with 20 gauge 3/4inch matamoros gripper plus needle.  Flushed with 10 ml Sterile 0.9% NaCl (NDC [address]-10) and dressed with sterile Tegaderm.  Blood return positive: yes  Site without redness, tenderness or swelling: yes  Flushed with 20 cc NS (NDC [address]-69)and 5 cc 100 unit/ml Heparin (NDC [address]-17)  Needle: d/c'd intact

## 2018-06-13 NOTE — MR AVS SNAPSHOT
After Visit Summary   6/13/2018    Terry Yi    MRN: [id number]           Patient Information     Date Of Birth          1965        Visit Information        Provider Department      6/13/2018 9:15 AM Yakov Shaffer MD [facility]        Care Instructions    Chemo today    In 2 weeks, RTC for chemo. NO NEED TO SEE ME    See me in 4 weeks with labs and chemo              Follow-ups after your visit        Your next 10 appointments already scheduled     Jun 27, 2018  8:15 AM CDT   Return Visit with [facility]   [facility]    [id number] [place] [address]   [phone]            Jun 27, 2018  8:45 AM CDT   Level 2 with BAY 6 INFUSION   [facility] ([facility])    [address] [place] [address]   [phone]            Jul 11, 2018  7:30 AM CDT   Return Visit with [facility]   [facility] ([facility])    [address] [place] [address]   [phone]            Jul 11, 2018  8:15 AM CDT   Return Visit with Yakov Shaffer MD   [facility] ([facility])    [address] [place] [address]   [phone]            Jul 11, 2018  9:00 AM CDT   Level 2 with [facility])    [address] [place] [address]   [phone]              Who to contact     If you have questions or need follow up information about today's clinic visit or your schedule please contact [facility] directly at [phone].  Normal or non-critical lab and imaging results will be communicated to you by MyChart, letter or phone within 4 business days after the clinic has received the results. If you do not hear from us within 7 days, please contact the clinic through MyChart or  "phone. If you have a critical or abnormal lab result, we will notify you by phone as soon as possible.  Submit refill requests through [employer] or call your pharmacy and they will forward the refill request to us. Please allow 3 business days for your refill to be completed.          Additional Information About Your Visit        [employer] Information     [employer] gives you secure access to your electronic health record. If you see a primary care provider, you can also send messages to your care team and make appointments. If you have questions, please call your primary care clinic.  If you do not have a primary care provider, please call [phone] and they will assist you.      [employer] is an electronic gateway that provides easy, online access to your medical records. With [employer], you can request a clinic appointment, read your test results, renew a prescription or communicate with your care team.     To access your existing account, please contact your [facility] or call [phone] for assistance.        Care EveryWhere ID     This is your Care EveryWhere ID. This could be used by other organizations to access your [place] medical records  [id number]        Your Vitals Were     Pulse Temperature Respirations Height Pulse Oximetry BMI (Body Mass Index)    60 98.2  F (36.8  C) 15 1.778 m (5' 10\") 99% 31.28 kg/m2       Blood Pressure from Last 3 Encounters:   06/13/18 111/73   05/29/18 136/79   05/16/18 112/76    Weight from Last 3 Encounters:   06/13/18 98.9 kg (218 lb)   05/29/18 100.2 kg (221 lb)   05/16/18 98.9 kg (218 lb)              Today, you had the following     No orders found for display       Primary Care Provider Office Phone # Fax #    Sandra Dickerson [phone]       [facility]  [address]  [facility] [address]        Equal Access to Services     CHANDAN GALLO AH: Hadii tk [place], washarifda sergey, qaybta gary dumont, " claire leelukasz lopes'aan ah. So [facility] [phone].    ATENCIÓN: Si arabella luke, tiene a cuevas disposición servicios gratuitos de asistencia lingüística. Ciara ji [phone].    We comply with applicable federal civil rights laws and [place] laws. We do not discriminate on the basis of race, color, national origin, age, disability, sex, sexual orientation, or gender identity.            Thank you!     Thank you for choosing [facility]  for your care. Our goal is always to provide you with excellent care. Hearing back from our patients is one way we can continue to improve our services. Please take a few minutes to complete the written survey that you may receive in the mail after your visit with us. Thank you!             Your Updated Medication List - Protect others around you: Learn how to safely use, store and throw away your medicines at www.disposemymeds.org.          This list is accurate as of 6/13/18 10:01 AM.  Always use your most recent med list.                   Brand Name Dispense Instructions for use Diagnosis    ALEVE PO       Acute gout, unspecified cause, unspecified site       allopurinol 100 MG tablet    ZYLOPRIM    30 tablet    Take 1 tablet (100 mg) by mouth daily    Acute gout, unspecified cause, unspecified site       aspirin 81 MG chewable tablet      Take 81 mg by mouth daily        lidocaine-prilocaine cream    EMLA          LORazepam 0.5 MG tablet    ATIVAN    30 tablet    Take 1 tablet (0.5 mg) by mouth every 4 hours as needed (Anxiety, Nausea/Vomiting or Sleep)    Malignant neoplasm of overlapping sites of stomach (H)       MULTIVITAMIN ADULT PO           ondansetron 8 MG tablet    ZOFRAN    10 tablet    Take 1 tablet (8 mg) by mouth every 8 hours as needed (Nausea/Vomiting)    Malignant neoplasm of overlapping sites of stomach (H)       prochlorperazine 10 MG tablet    COMPAZINE    30 tablet    Take 1 tablet (10 mg) by mouth every 6 hours as needed  (Nausea/Vomiting)    Malignant neoplasm of overlapping sites of stomach (H)       TYLENOL PO      Take by mouth as needed for mild pain or fever

## 2018-06-13 NOTE — PROGRESS NOTES
Power port needle left accessed for treatment. Tolerated lab draw without complaint. [facility] drawn-Red/Green/Purple tubes. Double signed by patient and RN. See documentation flowsheet. Arleth Brandon, RN, BSN, OCN

## 2018-06-13 NOTE — MR AVS SNAPSHOT
After Visit Summary   6/13/2018    Terry Yi    MRN: [id number]           Patient Information     Date Of Birth          1965        Visit Information        Provider Department      6/13/2018 8:30 AM NURSE ONLY CANCER CENTER [facility]        Today's Diagnoses     Malignant neoplasm of overlapping sites of stomach (H)    -  1       Follow-ups after your visit        Your next 10 appointments already scheduled     Jun 27, 2018  8:15 AM CDT   Return Visit with [facility] ONLY [facility]CER CENTER   [facility]    [address] [facility] [address]   [phone]            Jun 27, 2018  8:45 AM CDT   Level 2 with BAY 6 INFUSION   [facility]    [address] [address] [address]   [phone]            Jul 11, 2018  7:30 AM CDT   Return Visit with [facility]SE ONLY CANCER CENTER   [facility] ([facility]    [address] [facility] [address]   [phone]            Jul 11, 2018  8:15 AM CDT   Return Visit with Yakov Shaffer MD   [facility]    [address] [facility] [address] [phone]            Jul 11, 2018  9:00 AM CDT   Level 2 with BAY 5 INFUSION   [facility] ([facility]    [id number] [place] [address] [phone]              Who to contact     If you have questions or need follow up information about today's clinic visit or your schedule please contact [facility] directly at [phone].  Normal or non-critical lab and imaging results will be communicated to you by MyChart, letter or phone within 4 business days after the clinic has received the results. If you do not hear from us within 7 days, please contact the clinic through MyChart or phone. If you have a critical or  abnormal lab result, we will notify you by phone as soon as possible.  Submit refill requests through [employer] or call your pharmacy and they will forward the refill request to us. Please allow 3 business days for your refill to be completed.          Additional Information About Your Visit        Storybrickshart Information     [employer] gives you secure access to your electronic health record. If you see a primary care provider, you can also send messages to your care team and make appointments. If you have questions, please call your primary care clinic.  If you do not have a primary care provider, please call [phone] and they will assist you.      [employer] is an electronic gateway that provides easy, online access to your medical records. With [employer], you can request a clinic appointment, read your test results, renew a prescription or communicate with your care team.     To access your existing account, please contact your [facility] or call [phone] for assistance.        Care EveryWhere ID     This is your Care EveryWhere ID. This could be used by other organizations to access your [facility] medical records  [id number]         Blood Pressure from Last 3 Encounters:   06/13/18 111/73   05/29/18 136/79   05/16/18 112/76    Weight from Last 3 Encounters:   06/13/18 98.9 kg (218 lb)   05/29/18 100.2 kg (221 lb)   05/16/18 98.9 kg (218 lb)              We Performed the Following     CBC with platelets differential     Comprehensive metabolic panel        Primary Care Provider Office Phone # Fax #    Sandra Dickerson [phone]       [facility]  [address]  [facility] [address]        Equal Access to Services     [facility]: Hadii aad ku hadasho Soomaali, waaxda luqadaha, qaybta kaalmada adeegyada, claire cowart . So [facility] [phone].    ATENCIÓN: Si habla español, tiene a cuevas disposición servicios gratuitos de asistencia  lingüística. Ciara al [phone].    We comply with applicable federal civil rights laws and [place] laws. We do not discriminate on the basis of race, color, national origin, age, disability, sex, sexual orientation, or gender identity.            Thank you!     Thank you for choosing [facility]  for your care. Our goal is always to provide you with excellent care. Hearing back from our patients is one way we can continue to improve our services. Please take a few minutes to complete the written survey that you may receive in the mail after your visit with us. Thank you!             Your Updated Medication List - Protect others around you: Learn how to safely use, store and throw away your medicines at www.disposemymeds.org.          This list is accurate as of 6/13/18 11:58 AM.  Always use your most recent med list.                   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

## 2018-06-13 NOTE — PATIENT INSTRUCTIONS
Chemo today    In 2 weeks, RTC for chemo. NO NEED TO SEE ME    See me in 4 weeks with labs and chemo

## 2018-06-13 NOTE — PROGRESS NOTES
6/13/2018     REASON FOR VISIT:  Follow-up for unresectable metastatic gastric carcinoma, diffuse type, grade 3, HER-2 negative, MSI-Intact. PD-L1 negative (<1%)     HISTORY OF PRESENT ILLNESS:  Please see my previous note for details.  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      CT CAP after C#6 shows stable disease    C# 9 FOLFOX 8/2/2017 ( Oxaliplatin dose reduced to 70mg/m2 due to neuropathy )  C#10 8/22/2017 ( Oxaliplatin dose reduced to 60mg/m2 due to neuropathy )  Delayed by 1 week- patient preference  C#11 9/6/17 5FU/LV ( Stopped Oxaliplatin )  C#12 9/20/2017 5FU/LV    Repeat CT scan after C#12 shows stable to slightly improved disease.    C#13 10/4/2017 5FU/LV  C#14 10/18/2017 5FU/LV  C#15 10/31/2017 5FU/LV  C#16 11/15/2017 5FU/LV  C#17 11/29/2017 5FU/LV   C#18 12/13/2017 5FU/LV    Repeat CT CAP 12/22/17 is stable    C#19 12/27/2017 5FU/LV  C#20 1/10/2018 5FU/LV  C# 21 1/24/2018 5FU/LV  C# 22 2/7/2018  5FU/LV  C# 23 2/21/2018   C# 24 3/7/2018     Repeat CAP on 3/16/18 shows stable disease.    C# 25 3/21/2018 5FU/LV  C# 26 4/4/2018 5FU/LV  C# 27 4/18/2018 5FU/LV    As he was complaining of off and on trouble swallowing with food getting stuck, we repeated EGD on 4/27/2018 which showed a nodular area with possible shallow ulceration in the stomach. Otherwise esophagus and stomach and duodenum were normal.  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  He was referred to gastroenterologist for manometry    The biopsies which were taken from the nodular shallow ulcer aren't consistent with poorly differentiated diffuse type adenocarcinoma with signet ring features  Esophogram showed extrinsic compression on the GE  "junction resulting in luminal narrowing and associated extended retention of barium tablet. This likely corresponds with soft tissue thickening about the diaphragmatic hiatus as demonstrated on prior CT examination    C#28 5/2/2018 5FU/LV  C#29 5/16/2018 5FU/LV  C#30 5/29/2018 5FU/LV    Repeat CT scan on 6/8/18 is stable with stable circumferential soft tissue thickening at the GE Junction and stable thickness of stomach wall and peritoneal thickening    C#31 6/13/2018 5FU/LV       INTERVAL HISTORY:   He continues to do well. Overall he thinks his dysphagia is better with some modification of his diet. Occasionally he feels it especially when he eats bread. Denies nausea or vomiting. Energy is good. He is eating well. Maintaining weight. No infections. Neuropathy is the same. He continues to apply moisturizing creams to his hands and feet and although there is some peeling of the skin of the hands, overall his hands feel fine.  He is not going to the gym regularly but he tells me that he has been walking more.    ECOG 0    ROS:  Rest of the comprehensive review of system is essentially unremarkable      MEDICATIONS:   Current Outpatient Prescriptions   Medication     Acetaminophen (TYLENOL PO)     lidocaine-prilocaine (EMLA) cream     LORazepam (ATIVAN) 0.5 MG tablet     Multiple Vitamins-Minerals (MULTIVITAMIN ADULT PO)     Naproxen Sodium (ALEVE PO)     ondansetron (ZOFRAN) 8 MG tablet     prochlorperazine (COMPAZINE) 10 MG tablet     allopurinol (ZYLOPRIM) 100 MG tablet     aspirin 81 MG chewable tablet     No current facility-administered medications for this visit.      Facility-Administered Medications Ordered in Other Visits   Medication     heparin 100 UNIT/ML injection 5 mL     sodium chloride (PF) 0.9% PF flush 10-20 mL            PHYSICAL EXAMINATION:   /73  Pulse 60  Temp 98.2  F (36.8  C)  Resp 15  Ht 1.778 m (5' 10\")  Wt 98.9 kg (218 lb)  SpO2 99%  BMI 31.28 kg/m2  CONSTITUTIONAL: no acute " distress  EYES: PERRLA, no palor or icterus.   ENT/MOUTH: no mouth lesions. Ears normal  CVS: s1s2 no m r g .   RESPIRATORY: clear to auscultation b/l  GI: soft non tender no hepatosplenomegaly  NEURO: AAOX3  Grossly non focal neuro exam apart from neuropathy of the fingers and feet which is stable  INTEGUMENT: no obvious rashes  LYMPHATIC: no palpable cervical, supraclavicular, axillary LAD  MUSCULOSKELETAL: Unremarkable. No bony tenderness.   EXTREMITIES: no edema. The skin of the palms is not very dry and there is some peeling of the skin on the fingers  PSYCH: Mentation, mood and affect are normal. Decision making capacity is intact      LABS:   Reviewed   Results for LEONOR RALPH (MRN [id number]) as of 6/13/2018 09:34   Ref. Range 6/13/2018 08:45   Sodium Latest Ref Range: 133 - 144 mmol/L 140   Potassium Latest Ref Range: 3.4 - 5.3 mmol/L 4.5   Chloride Latest Ref Range: 94 - 109 mmol/L 106   Carbon Dioxide Latest Ref Range: 20 - 32 mmol/L 29   Urea Nitrogen Latest Ref Range: 7 - 30 mg/dL 15   Creatinine Latest Ref Range: 0.66 - 1.25 mg/dL 0.79   GFR Estimate Latest Ref Range: >60 mL/min/1.7m2 >90   GFR Estimate If Black Latest Ref Range: >60 mL/min/1.7m2 >90   Calcium Latest Ref Range: 8.5 - 10.1 mg/dL 8.9   Anion Gap Latest Ref Range: 3 - 14 mmol/L 5   Albumin Latest Ref Range: 3.4 - 5.0 g/dL 3.7   Protein Total Latest Ref Range: 6.8 - 8.8 g/dL 6.5 (L)   Bilirubin Total Latest Ref Range: 0.2 - 1.3 mg/dL 0.5   Alkaline Phosphatase Latest Ref Range: 40 - 150 U/L 73   ALT Latest Ref Range: 0 - 70 U/L 53   AST Latest Ref Range: 0 - 45 U/L 34   Glucose Latest Ref Range: 70 - 99 mg/dL 108 (H)   WBC Latest Ref Range: 4.0 - 11.0 10e9/L 5.4   Hemoglobin Latest Ref Range: 13.3 - 17.7 g/dL 13.1 (L)   Hematocrit Latest Ref Range: 40.0 - 53.0 % 39.1 (L)   Platelet Count Latest Ref Range: 150 - 450 10e9/L 155   RBC Count Latest Ref Range: 4.4 - 5.9 10e12/L 4.11 (L)   MCV Latest Ref Range: 78 - 100 fl 95   MCH Latest Ref  Range: 26.5 - 33.0 pg 31.9   MCHC Latest Ref Range: 31.5 - 36.5 g/dL 33.5   RDW Latest Ref Range: 10.0 - 15.0 % 14.6   Diff Method Unknown Automated Method   % Neutrophils Latest Units: % 50.4   % Lymphocytes Latest Units: % 32.9   % Monocytes Latest Units: % 11.9   % Eosinophils Latest Units: % 3.3   % Basophils Latest Units: % 1.3   % Immature Granulocytes Latest Units: % 0.2   Absolute Neutrophil Latest Ref Range: 1.6 - 8.3 10e9/L 2.7   Absolute Lymphocytes Latest Ref Range: 0.8 - 5.3 10e9/L 1.8   Absolute Monocytes Latest Ref Range: 0.0 - 1.3 10e9/L 0.6   Absolute Eosinophils Latest Ref Range: 0.0 - 0.7 10e9/L 0.2   Absolute Basophils Latest Ref Range: 0.0 - 0.2 10e9/L 0.1           IMAGING:  Reviewed  EXAMINATION: CT CHEST/ABDOMEN/PELVIS W CONTRAST, 6/8/2018 8:28 AM     TECHNIQUE:  Helical CT images from the thoracic inlet through the  symphysis pubis were obtained  with contrast. Contrast dose: 135 ml  isovue 370     COMPARISON: 3/16/2018, PET CT 3/24/2017     HISTORY: follow up for gastric ca; Malignant neoplasm of overlapping  sites of stomach (H). Status post chemotherapy.     FINDINGS:     Chest: Right IJ port tip within the low SVC. Heart size is normal.  Normal caliber of the aorta and pulmonary artery. No central pulmonary  embolus. Visualized thyroid is unremarkable. Multiple calcified  mediastinal and bilateral hilar lymph nodes, not significantly  changed. No new or enlarged lymph nodes in the chest or axilla. The  upper and mid esophagus appear normal. Findings at the GE junction see  separate dissection. Central airway is patent. Scattered calcified  granulomas. Scattered small indeterminate noncalcified nodules are  unchanged from at least 3/24/2017, for example 6 mm nodule along the  right minor fissure (series 7 image 81).     Abdomen and pelvis: Mild diffuse gastric wall thickening is not  significantly changed. Circumferential soft tissue thickening around  the gastroesophageal junction  (series 3 image 128), measuring up to  1.2 cm in thickness which is not significantly changed. Hazy fat  stranding and mildly prominent lymph nodes in the retroperitoneum and  lesser sac are not significantly changed. For example rounded 1.2 cm  short axis diameter marychuy hepatis node (series 2 image 155). Unchanged  peritoneal thickening along the left anterior pararenal fascia (series  3 image 195).     Unchanged cyst in hepatic segment 4. 1.4 cm subcapsular lesion in the  right hepatic lobe (series 3 image 149) is not significantly changed  and has enhancement characteristics consistent with the benign  cavernous hemangioma. The gallbladder, pancreas, spleen, and adrenals  are within normal limits. Few hypoattenuating foci in both kidneys are  too small to characterize. Bladder is unremarkable. Prostate is  enlarged. Few colonic diverticula without evidence of diverticulitis.  The appendix and small bowel are within normal limits. Major  vasculature is patent. Aorta is normal in caliber with mild-moderate  atherosclerosis. No free air or significant free fluid.     Bones and soft tissues: Unchanged mild compression deformity of the  T12 vertebral body. Unchanged degenerative cystic change and  fragmentation about the right acetabulum.         IMPRESSION:   1. Mild diffuse gastric wall thickening and circumferential soft  tissue thickening around the gastroesophageal junction are not  significantly changed.  2. Unchanged fat stranding and mildly prominent lymph nodes in the  lesser sac and retroperitoneum.  3. Unchanged peritoneal thickening most prominent along the left  anterior pararenal fascia.   4. Sequelae of granulomatous disease in the lungs. A few small  noncalcified pulmonary nodules are unchanged from 3/24/2017. Attention  on follow-up examination.      Esophagram dated 5/16/2018     COMPARISON:    CT dated 3/16/2018     HISTORY:    Esophageal dysphagia in a patient with history of  gastric  carcinoma.     FINDINGS: Examination of the esophagus reveals adequate primary and  secondary peristalsis. There is distal esophageal narrowing at the  gastroesophageal junction without associated mucosal abnormality, with  apparent extrinsic compression. This may correspond with soft tissue  thickening seen at the diaphragmatic hiatus on CT dated 3/16/2018. The  gastroesophageal junction is patent. No hiatal hernia was seen on  examination. There was absence of gastroesophageal reflux.     Fluoroscopy time was 1.6 minutes.     A 12mm barium tablet was administered and did not initially pass  through the gastroesophageal junction. 15 minutes after initial  administration and following ingestion of pudding and thick barium,  the barium tablet did pass through the gastroesophageal junction.         IMPRESSION: Extrinsic compression on the gastroesophageal junction  results in luminal narrowing and associated extended retention of  barium tablet. This likely corresponds with soft tissue thickening  about the diaphragmatic hiatus as demonstrated on prior CT  examination.            EGD 4/27/18  Findings:        One 12 mm nodular area with possible shallow uleration was found in the        gastric fundus. Biopsies were taken with a cold forceps for histology.        The esophagus was normal.        The stomach was normal except as noted above..        The examined duodenum was normal.       ORIGINAL REPORT:     SPECIMEN(S):   Stomach biopsy, fundus     FINAL DIAGNOSIS:   STOMACH, FUNDUS, BIOPSY:   - Poorly differentiated carcinoma (diffuse type) with signet ring features   - Biopsy is limited to mucosa     ASSESSMENT AND PLAN:      Metastatic gastric cancer, HER-2/catrachita negative, MSI intact, PD-L1 neg (<1%), presenting with early satiety and significant weight loss and epigastric discomfort upon eating with some vomiting.  There is linitis plastica with involvement of periaortic lymph node in the aortocaval region  as well as extensive mesenteric infiltration.  There is an indeterminate right liver lobe lesion which has remained stable and likely is not a metastasis.      We started palliative FOLFOX on 03/12/2017.     Oxaliplatin was dropped from cycle #11 onwards because of neuropathy     Scans after C#18 show stable disease    Repeat CT CAP after 24 cycles also show stable disease so we continued with 5FU/LV.     We repeated CT scan after cycle #3 which continues to show a stable disease.    Since he is tolerating chemotherapy well and overall his tumor is a stable, we plan to continue the same chemotherapy.  He will receive cycle #31 today      Dysphagia. Overall this has been better and over the last couple of weeks he has not had any significant problems apart from one day when he ate bread and noticed a mild dysphagia. I advised him to continue with the modified diet which she is taking as it seems to be working well  Repeat EGD which was done as part of its workup was much improved as mentioned above apart from 1.2 cm malignant shallow ulcer   Esophogram showed extrinsic compression on the GE junction resulting in luminal narrowing and associated extended retention of barium tablet. This likely corresponds with soft tissue thickening about the diaphragmatic hiatus as demonstrated on prior CT examination. As mentioned above the repeat CT scan shows stable soft tissue circumferential thickening around the GE junction       Skin toxicity from 5-FU. He has mild peeling of the skin of the palms. This is stable and is not really bothersome. He will continue to apply moisturizing creams several times a day       Anemia. This is stable and mild due to chemotherapy and is asymptomatic. We will continue to observe.       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 This is a stable and we will observe for now       We did not address the following today    His genetic testing did not show any identifiable mutation not reveal any  identifiable mutation     Since he is doing well and is a stable I believe it is reasonable that I see him every other cycle and the next chemotherapy which will be due in 2 weeks, he can get without seeing the provider. He knows to contact me if there are any concerns so that he can be evaluated promptly in the clinic    I explained all of this to him and his family and they are agreeable and comfortable with the plan        LANEY HERNANDEZ MD

## 2018-06-13 NOTE — PROGRESS NOTES
"Infusion Nursing Note:  Terry Yi presents today for C31D1 Leucovorin/Flurorouricil bolus/pump connect.    Patient seen by provider today: Yes: Dr. Shaffer   present during visit today: Not Applicable.    Note: Prior to discharge: Port is secured in place with tegaderm and flushed with 10cc NS with positive blood return noted.  Continuous home infusion Dosi-Fuser pump connected.    All connectors secured in place and clamps taped open.    Pump started, \"running\" noted on display (CADD): Not Applicable.  Patient instructed to call our clinic or [facility] with any questions or concerns at home.  Patient verbalized understanding.    Patient set up for pump disconnect 6/15/18 in [place]. Patient will schedule appointment.    Intravenous Access:  Implanted Port.    Treatment Conditions:  Lab Results   Component Value Date    HGB 13.1 06/13/2018     Lab Results   Component Value Date    WBC 5.4 06/13/2018      Lab Results   Component Value Date    ANEU 2.7 06/13/2018     Lab Results   Component Value Date     06/13/2018      Lab Results   Component Value Date     06/13/2018                   Lab Results   Component Value Date    POTASSIUM 4.5 06/13/2018           No results found for: MAG         Lab Results   Component Value Date    CR 0.79 06/13/2018                   Lab Results   Component Value Date    BRITTNEY 8.9 06/13/2018                Lab Results   Component Value Date    BILITOTAL 0.5 06/13/2018           Lab Results   Component Value Date    ALBUMIN 3.7 06/13/2018                    Lab Results   Component Value Date    ALT 53 06/13/2018           Lab Results   Component Value Date    AST 34 06/13/2018     Results reviewed, labs MET treatment parameters, ok to proceed with treatment.    Post Infusion Assessment:  Patient tolerated infusion without incident.  Blood return noted pre and post infusion.  Site patent and intact, free from redness, edema or " discomfort.    Discharge Plan:   Patient will return 6/27/18 for next appointment.   Patient discharged in stable condition accompanied by: Family.  Departure Mode: Ambulatory.    Mayte Carbone RN

## 2018-06-13 NOTE — LETTER
6/13/2018         RE: Terry Yi  [place] [address]        Dear Colleague,    Thank you for referring your patient, Terry Yi, to the [facility]. Please see a copy of my visit note below.    6/13/2018     REASON FOR VISIT:  Follow-up for unresectable metastatic gastric carcinoma, diffuse type, grade 3, HER-2 negative, MSI-Intact. PD-L1 negative (<1%)     HISTORY OF PRESENT ILLNESS:  Please see my previous note for details.  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      CT CAP after C#6 shows stable disease    C# 9 FOLFOX 8/2/2017 ( Oxaliplatin dose reduced to 70mg/m2 due to neuropathy )  C#10 8/22/2017 ( Oxaliplatin dose reduced to 60mg/m2 due to neuropathy )  Delayed by 1 week- patient preference  C#11 9/6/17 5FU/LV ( Stopped Oxaliplatin )  C#12 9/20/2017 5FU/LV    Repeat CT scan after C#12 shows stable to slightly improved disease.    C#13 10/4/2017 5FU/LV  C#14 10/18/2017 5FU/LV  C#15 10/31/2017 5FU/LV  C#16 11/15/2017 5FU/LV  C#17 11/29/2017 5FU/LV   C#18 12/13/2017 5FU/LV    Repeat CT CAP 12/22/17 is stable    C#19 12/27/2017 5FU/LV  C#20 1/10/2018 5FU/LV  C# 21 1/24/2018 5FU/LV  C# 22 2/7/2018  5FU/LV  C# 23 2/21/2018   C# 24 3/7/2018     Repeat CAP on 3/16/18 shows stable disease.    C# 25 3/21/2018 5FU/LV  C# 26 4/4/2018 5FU/LV  C# 27 4/18/2018 5FU/LV    As he was complaining of off and on trouble swallowing with food getting stuck, we repeated EGD on 4/27/2018 which showed a nodular area with possible shallow ulceration in the stomach. Otherwise esophagus and stomach and duodenum were normal.  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  He was referred to  gastroenterologist for manometry    The biopsies which were taken from the nodular shallow ulcer aren't consistent with poorly differentiated diffuse type adenocarcinoma with signet ring features  Esophogram showed extrinsic compression on the GE junction resulting in luminal narrowing and associated extended retention of barium tablet. This likely corresponds with soft tissue thickening about the diaphragmatic hiatus as demonstrated on prior CT examination    C#28 5/2/2018 5FU/LV  C#29 5/16/2018 5FU/LV  C#30 5/29/2018 5FU/LV    Repeat CT scan on 6/8/18 is stable with stable circumferential soft tissue thickening at the GE Junction and stable thickness of stomach wall and peritoneal thickening    C#31 6/13/2018 5FU/LV       INTERVAL HISTORY:   He continues to do well. Overall he thinks his dysphagia is better with some modification of his diet. Occasionally he feels it especially when he eats bread. Denies nausea or vomiting. Energy is good. He is eating well. Maintaining weight. No infections. Neuropathy is the same. He continues to apply moisturizing creams to his hands and feet and although there is some peeling of the skin of the hands, overall his hands feel fine.  He is not going to the gym regularly but he tells me that he has been walking more.    ECOG 0    ROS:  Rest of the comprehensive review of system is essentially unremarkable      MEDICATIONS:   Current Outpatient Prescriptions   Medication     Acetaminophen (TYLENOL PO)     lidocaine-prilocaine (EMLA) cream     LORazepam (ATIVAN) 0.5 MG tablet     Multiple Vitamins-Minerals (MULTIVITAMIN ADULT PO)     Naproxen Sodium (ALEVE PO)     ondansetron (ZOFRAN) 8 MG tablet     prochlorperazine (COMPAZINE) 10 MG tablet     allopurinol (ZYLOPRIM) 100 MG tablet     aspirin 81 MG chewable tablet     No current facility-administered medications for this visit.      Facility-Administered Medications Ordered in Other Visits   Medication     heparin 100 UNIT/ML  "injection 5 mL     sodium chloride (PF) 0.9% PF flush 10-20 mL            PHYSICAL EXAMINATION:   /73  Pulse 60  Temp 98.2  F (36.8  C)  Resp 15  Ht 1.778 m (5' 10\")  Wt 98.9 kg (218 lb)  SpO2 99%  BMI 31.28 kg/m2  CONSTITUTIONAL: no acute distress  EYES: PERRLA, no palor or icterus.   ENT/MOUTH: no mouth lesions. Ears normal  CVS: s1s2 no m r g .   RESPIRATORY: clear to auscultation b/l  GI: soft non tender no hepatosplenomegaly  NEURO: AAOX3  Grossly non focal neuro exam apart from neuropathy of the fingers and feet which is stable  INTEGUMENT: no obvious rashes  LYMPHATIC: no palpable cervical, supraclavicular, axillary LAD  MUSCULOSKELETAL: Unremarkable. No bony tenderness.   EXTREMITIES: no edema. The skin of the palms is not very dry and there is some peeling of the skin on the fingers  PSYCH: Mentation, mood and affect are normal. Decision making capacity is intact      LABS:   Reviewed   Results for LEONOR RALPH (MRN [id number]) as of 6/13/2018 09:34   Ref. Range 6/13/2018 08:45   Sodium Latest Ref Range: 133 - 144 mmol/L 140   Potassium Latest Ref Range: 3.4 - 5.3 mmol/L 4.5   Chloride Latest Ref Range: 94 - 109 mmol/L 106   Carbon Dioxide Latest Ref Range: 20 - 32 mmol/L 29   Urea Nitrogen Latest Ref Range: 7 - 30 mg/dL 15   Creatinine Latest Ref Range: 0.66 - 1.25 mg/dL 0.79   GFR Estimate Latest Ref Range: >60 mL/min/1.7m2 >90   GFR Estimate If Black Latest Ref Range: >60 mL/min/1.7m2 >90   Calcium Latest Ref Range: 8.5 - 10.1 mg/dL 8.9   Anion Gap Latest Ref Range: 3 - 14 mmol/L 5   Albumin Latest Ref Range: 3.4 - 5.0 g/dL 3.7   Protein Total Latest Ref Range: 6.8 - 8.8 g/dL 6.5 (L)   Bilirubin Total Latest Ref Range: 0.2 - 1.3 mg/dL 0.5   Alkaline Phosphatase Latest Ref Range: 40 - 150 U/L 73   ALT Latest Ref Range: 0 - 70 U/L 53   AST Latest Ref Range: 0 - 45 U/L 34   Glucose Latest Ref Range: 70 - 99 mg/dL 108 (H)   WBC Latest Ref Range: 4.0 - 11.0 10e9/L 5.4   Hemoglobin Latest Ref " Range: 13.3 - 17.7 g/dL 13.1 (L)   Hematocrit Latest Ref Range: 40.0 - 53.0 % 39.1 (L)   Platelet Count Latest Ref Range: 150 - 450 10e9/L 155   RBC Count Latest Ref Range: 4.4 - 5.9 10e12/L 4.11 (L)   MCV Latest Ref Range: 78 - 100 fl 95   MCH Latest Ref Range: 26.5 - 33.0 pg 31.9   MCHC Latest Ref Range: 31.5 - 36.5 g/dL 33.5   RDW Latest Ref Range: 10.0 - 15.0 % 14.6   Diff Method Unknown Automated Method   % Neutrophils Latest Units: % 50.4   % Lymphocytes Latest Units: % 32.9   % Monocytes Latest Units: % 11.9   % Eosinophils Latest Units: % 3.3   % Basophils Latest Units: % 1.3   % Immature Granulocytes Latest Units: % 0.2   Absolute Neutrophil Latest Ref Range: 1.6 - 8.3 10e9/L 2.7   Absolute Lymphocytes Latest Ref Range: 0.8 - 5.3 10e9/L 1.8   Absolute Monocytes Latest Ref Range: 0.0 - 1.3 10e9/L 0.6   Absolute Eosinophils Latest Ref Range: 0.0 - 0.7 10e9/L 0.2   Absolute Basophils Latest Ref Range: 0.0 - 0.2 10e9/L 0.1           IMAGING:  Reviewed  EXAMINATION: CT CHEST/ABDOMEN/PELVIS W CONTRAST, 6/8/2018 8:28 AM     TECHNIQUE:  Helical CT images from the thoracic inlet through the  symphysis pubis were obtained  with contrast. Contrast dose: 135 ml  isovue 370     COMPARISON: 3/16/2018, PET CT 3/24/2017     HISTORY: follow up for gastric ca; Malignant neoplasm of overlapping  sites of stomach (H). Status post chemotherapy.     FINDINGS:     Chest: Right IJ port tip within the low SVC. Heart size is normal.  Normal caliber of the aorta and pulmonary artery. No central pulmonary  embolus. Visualized thyroid is unremarkable. Multiple calcified  mediastinal and bilateral hilar lymph nodes, not significantly  changed. No new or enlarged lymph nodes in the chest or axilla. The  upper and mid esophagus appear normal. Findings at the GE junction see  separate dissection. Central airway is patent. Scattered calcified  granulomas. Scattered small indeterminate noncalcified nodules are  unchanged from at least  3/24/2017, for example 6 mm nodule along the  right minor fissure (series 7 image 81).     Abdomen and pelvis: Mild diffuse gastric wall thickening is not  significantly changed. Circumferential soft tissue thickening around  the gastroesophageal junction (series 3 image 128), measuring up to  1.2 cm in thickness which is not significantly changed. Hazy fat  stranding and mildly prominent lymph nodes in the retroperitoneum and  lesser sac are not significantly changed. For example rounded 1.2 cm  short axis diameter marychuy hepatis node (series 2 image 155). Unchanged  peritoneal thickening along the left anterior pararenal fascia (series  3 image 195).     Unchanged cyst in hepatic segment 4. 1.4 cm subcapsular lesion in the  right hepatic lobe (series 3 image 149) is not significantly changed  and has enhancement characteristics consistent with the benign  cavernous hemangioma. The gallbladder, pancreas, spleen, and adrenals  are within normal limits. Few hypoattenuating foci in both kidneys are  too small to characterize. Bladder is unremarkable. Prostate is  enlarged. Few colonic diverticula without evidence of diverticulitis.  The appendix and small bowel are within normal limits. Major  vasculature is patent. Aorta is normal in caliber with mild-moderate  atherosclerosis. No free air or significant free fluid.     Bones and soft tissues: Unchanged mild compression deformity of the  T12 vertebral body. Unchanged degenerative cystic change and  fragmentation about the right acetabulum.         IMPRESSION:   1. Mild diffuse gastric wall thickening and circumferential soft  tissue thickening around the gastroesophageal junction are not  significantly changed.  2. Unchanged fat stranding and mildly prominent lymph nodes in the  lesser sac and retroperitoneum.  3. Unchanged peritoneal thickening most prominent along the left  anterior pararenal fascia.   4. Sequelae of granulomatous disease in the lungs. A few  small  noncalcified pulmonary nodules are unchanged from 3/24/2017. Attention  on follow-up examination.      Esophagram dated 5/16/2018     COMPARISON:    CT dated 3/16/2018     HISTORY:    Esophageal dysphagia in a patient with history of gastric  carcinoma.     FINDINGS: Examination of the esophagus reveals adequate primary and  secondary peristalsis. There is distal esophageal narrowing at the  gastroesophageal junction without associated mucosal abnormality, with  apparent extrinsic compression. This may correspond with soft tissue  thickening seen at the diaphragmatic hiatus on CT dated 3/16/2018. The  gastroesophageal junction is patent. No hiatal hernia was seen on  examination. There was absence of gastroesophageal reflux.     Fluoroscopy time was 1.6 minutes.     A 12mm barium tablet was administered and did not initially pass  through the gastroesophageal junction. 15 minutes after initial  administration and following ingestion of pudding and thick barium,  the barium tablet did pass through the gastroesophageal junction.         IMPRESSION: Extrinsic compression on the gastroesophageal junction  results in luminal narrowing and associated extended retention of  barium tablet. This likely corresponds with soft tissue thickening  about the diaphragmatic hiatus as demonstrated on prior CT  examination.            EGD 4/27/18  Findings:        One 12 mm nodular area with possible shallow uleration was found in the        gastric fundus. Biopsies were taken with a cold forceps for histology.        The esophagus was normal.        The stomach was normal except as noted above..        The examined duodenum was normal.       ORIGINAL REPORT:     SPECIMEN(S):   Stomach biopsy, fundus     FINAL DIAGNOSIS:   STOMACH, FUNDUS, BIOPSY:   - Poorly differentiated carcinoma (diffuse type) with signet ring features   - Biopsy is limited to mucosa     ASSESSMENT AND PLAN:      Metastatic gastric cancer, HER-2/catrachita  negative, MSI intact, PD-L1 neg (<1%), presenting with early satiety and significant weight loss and epigastric discomfort upon eating with some vomiting.  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  There is an indeterminate right liver lobe lesion which has remained stable and likely is not a metastasis.      We started palliative FOLFOX on 03/12/2017.     Oxaliplatin was dropped from cycle #11 onwards because of neuropathy     Scans after C#18 show stable disease    Repeat CT CAP after 24 cycles also show stable disease so we continued with 5FU/LV.     We repeated CT scan after cycle #3 which continues to show a stable disease.    Since he is tolerating chemotherapy well and overall his tumor is a stable, we plan to continue the same chemotherapy.  He will receive cycle #31 today      Dysphagia. Overall this has been better and over the last couple of weeks he has not had any significant problems apart from one day when he ate bread and noticed a mild dysphagia. I advised him to continue with the modified diet which she is taking as it seems to be working well  Repeat EGD which was done as part of its workup was much improved as mentioned above apart from 1.2 cm malignant shallow ulcer   Esophogram showed extrinsic compression on the GE junction resulting in luminal narrowing and associated extended retention of barium tablet. This likely corresponds with soft tissue thickening about the diaphragmatic hiatus as demonstrated on prior CT examination. As mentioned above the repeat CT scan shows stable soft tissue circumferential thickening around the GE junction       Skin toxicity from 5-FU. He has mild peeling of the skin of the palms. This is stable and is not really bothersome. He will continue to apply moisturizing creams several times a day       Anemia. This is stable and mild due to chemotherapy and is  asymptomatic. We will continue to observe.       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 This is a stable and we will observe for now       We did not address the following today    His genetic testing did not show any identifiable mutation not reveal any identifiable mutation     Since he is doing well and is a stable I believe it is reasonable that I see him every other cycle and the next chemotherapy which will be due in 2 weeks, he can get without seeing the provider. He knows to contact me if there are any concerns so that he can be evaluated promptly in the clinic    I explained all of this to him and his family and they are agreeable and comfortable with the plan        LANEY SHAFFER MD             Again, thank you for allowing me to participate in the care of your patient.        Sincerely,        Laney Shaffer MD

## 2018-06-27 NOTE — PROGRESS NOTES
Infusion Nursing Note:  Terry Yi presents today for 5FU/leuc.    Patient seen by provider today: No   present during visit today: Not Applicable.    Note: Pt will contact hospital in [place] for pump DC at 8:00 Friday.    Intravenous Access:  Implanted Port.    Treatment Conditions:  Lab Results   Component Value Date    HGB 13.0 06/27/2018     Lab Results   Component Value Date    WBC 5.5 06/27/2018      Lab Results   Component Value Date    ANEU 2.9 06/27/2018     Lab Results   Component Value Date     06/27/2018      Lab Results   Component Value Date     06/27/2018                   Lab Results   Component Value Date    POTASSIUM 4.5 06/27/2018           No results found for: MAG         Lab Results   Component Value Date    CR 0.85 06/27/2018                   Lab Results   Component Value Date    BRITTNEY 9.1 06/27/2018                Lab Results   Component Value Date    BILITOTAL 0.4 06/27/2018           Lab Results   Component Value Date    ALBUMIN 3.7 06/27/2018                    Lab Results   Component Value Date    ALT 56 06/27/2018           Lab Results   Component Value Date    AST 36 06/27/2018       Results reviewed, labs MET treatment parameters, ok to proceed with treatment.      Post Infusion Assessment:  Patient tolerated infusion without incident.  Site patent and intact, free from redness, edema or discomfort.  No evidence of extravasations.    Discharge Plan:   Patient and/or family verbalized understanding of discharge instructions and all questions answered.    Selam Alvarenga RN

## 2018-06-27 NOTE — PROGRESS NOTES
Unable to obtain blood return from port. Alteplase ordered. Flushed easily without pt complaint. Arleth Brandon RN  TPA instilled at 0836. Informed infusion RN Selam. Arleth Brandon, RN

## 2018-06-27 NOTE — MR AVS SNAPSHOT
After Visit Summary   6/27/2018    Terry Yi    MRN: [id number]           Patient Information     Date Of Birth          1965        Visit Information        Provider Department      6/27/2018 8:15 AM NURSE ONLY CANCER CENTER [facility]        Today's Diagnoses     Malignant neoplasm of overlapping sites of stomach (H)    -  1    Thrombosis complicating venous access device (H)           Follow-ups after your visit        Your next 10 appointments already scheduled     Jun 27, 2018  8:45 AM CDT   Level 2 with BAY 6 INFUSION   [facility] ([facility]    [id number] [place] [id number]   [phone]            Jul 11, 2018  7:30 AM CDT   Return Visit with NURSE ONLY CANCER CENTER   [facility] ([facility])    [address] [place] [id number] [phone]            Jul 11, 2018  8:15 AM CDT   Return Visit with Yakov Shaffer MD   [facility] [place]    [id number] [place] [id number] [phone]            Jul 11, 2018  9:00 AM CDT   Level 2 with BAY 5 INFUSION   [facility] [place]    [id number] [place] [id number] [phone]              Who to contact     If you have questions or need follow up information about today's clinic visit or your schedule please contact [facility] directly at [phone].  Normal or non-critical lab and imaging results will be communicated to you by MyChart, letter or phone within 4 business days after the clinic has received the results. If you do not hear from us within 7 days, please contact the clinic through MyChart or phone. If you have a critical or abnormal lab result, we will notify you by phone as soon as possible.  Submit refill requests through [employer] or call your pharmacy and they will forward the  refill request to us. Please allow 3 business days for your refill to be completed.          Additional Information About Your Visit        [employer] Information     [employer] gives you secure access to your electronic health record. If you see a primary care provider, you can also send messages to your care team and make appointments. If you have questions, please call your primary care clinic.  If you do not have a primary care provider, please call [phone] and they will assist you.      [employer] is an electronic gateway that provides easy, online access to your medical records. With [employer], you can request a clinic appointment, read your test results, renew a prescription or communicate with your care team.     To access your existing account, please contact your [facility] or call [phone] for assistance.        Care EveryWhere ID     This is your Care EveryWhere ID. This could be used by other organizations to access your [place] medical records  [id number]         Blood Pressure from Last 3 Encounters:   06/13/18 111/73   05/29/18 136/79   05/16/18 112/76    Weight from Last 3 Encounters:   06/13/18 98.9 kg (218 lb)   05/29/18 100.2 kg (221 lb)   05/16/18 98.9 kg (218 lb)              We Performed the Following     CBC with platelets differential     Comprehensive metabolic panel        Primary Care Provider Office Phone # Fax #    Sandra Dickerson [phone]       [facility]  [address]  [facility] [address]        Equal Access to Services     CHANDAN GALLO : Hadii aad ku hadasho Soomaali, waaxda luqadaha, qaybta kaalmada adeegyada, waxay violeta cowart . So [facility] [phone].    ATENCIÓN: Si habla español, tiene a cuevas disposición servicios gratuitos de asistencia lingüística. Llame al [phone].    We comply with applicable federal civil rights laws and [place] laws. We do not discriminate on the basis of race, color,  national origin, age, disability, sex, sexual orientation, or gender identity.            Thank you!     Thank you for choosing [facility]  for your care. Our goal is always to provide you with excellent care. Hearing back from our patients is one way we can continue to improve our services. Please take a few minutes to complete the written survey that you may receive in the mail after your visit with us. Thank you!             Your Updated Medication List - Protect others around you: Learn how to safely use, store and throw away your medicines at www.disposemymeds.org.          This list is accurate as of 6/27/18  8:37 AM.  Always use your most recent med list.                   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

## 2018-06-27 NOTE — MR AVS SNAPSHOT
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)    -  1       Follow-ups after your visit        Your next 10 appointments already scheduled     Jul 11, 2018  7:30 AM CDT   Return Visit with NURSE ONLY CANCER CENTER   [facility] ([facility]    [address] [facility] [address]   [phone]            Jul 11, 2018  8:15 AM CDT   Return Visit with Yakov Shaffer MD   [facility] ([facility])    [address] [facility] [address]   [phone]            Jul 11, 2018  9:00 AM CDT   Level 2 with BAY 5 INFUSION   [facility] ([facility])    [address] [facility] [address]   [phone]            Jul 25, 2018  7:30 AM CDT   Return Visit with NURSE ONLY CANCER CENTER   [facility] ([facility])    [address] [facility] [address]   [phone]            Jul 25, 2018  8:00 AM CDT   Level 2 with BAY 2 INFUSION   [facility] ([facility])    [address] [facility] [address]   [phone]            Aug 08, 2018  8:00 AM CDT   Return Visit with NURSE ONLY CANCER CENTER   [facility] ([facility])    [address] [facility] [address]   [phone]            Aug 08, 2018  8:45 AM CDT   Return Visit with Yakov Shaffer MD   [facility] ([facility])    [address] [facility] [address]   [phone]            Aug 08, 2018  9:30 AM CDT   Level 2 with BAY 6 INFUSION   [facility] ([facility])    [address] [place] [address] [phone]              Who to contact     If you have questions or need follow up information about today's clinic visit or your schedule please contact [facility] directly at [phone].  Normal or non-critical lab and imaging results will be communicated to you by MyChart, letter or phone within 4 business days after the clinic has received the results. If you do not hear from us within 7 days, please contact the clinic through [facility] or phone. If you have a critical or abnormal lab result, we will notify you by phone as soon as possible.  Submit refill requests through atokore or call your pharmacy and they will forward the refill request to us. Please allow 3 business days for your refill to be completed.          Additional Information About Your Visit        [facility] Information     atokore gives you secure access to your electronic health record. If you see a primary care provider, you can also send messages to your care team and make appointments. If you have questions, please call your primary care clinic.  If you do not have a primary care provider, please call [phone] and they will assist you.      atokore is an electronic gateway that provides easy, online access to your medical records. With atokore, you can request a clinic appointment, read your test results, renew a prescription or communicate with your care team.     To access your existing account, please contact your [facility] or call [phone] for assistance.        Care EveryWhere ID     This is your Care EveryWhere ID. This could be used by other organizations to access your [place] medical records  [id number]        Your Vitals Were     Temperature Respirations                97.3  F (36.3  C) (Oral) 18           Blood Pressure from Last 3 Encounters:   06/27/18 109/65   06/13/18 111/73    05/29/18 136/79    Weight from Last 3 Encounters:   06/13/18 98.9 kg (218 lb)   05/29/18 100.2 kg (221 lb)   05/16/18 98.9 kg (218 lb)              Today, you had the following     No orders found for display       Primary Care Provider Office Phone # Fax #    Sandra Dickerson [phone]       [facility]  [address]  [facility] [address]        Equal Access to Services     CHANDAN GALLO : Hadii aad ku hadasho Soomaali, waaxda luqadaha, qaybta kaalmada adeegyada, waxay idiin hayaan adeeg kharash lavivi . So [facility] [phone].    ATENCIÓN: Si habla español, tiene a cuevas disposición servicios gratuitos de asistencia lingüística. [facility] [phone].    We comply with applicable federal civil rights laws and [place] laws. We do not discriminate on the basis of race, color, national origin, age, disability, sex, sexual orientation, or gender identity.            Thank you!     Thank you for choosing [facility]  for your care. Our goal is always to provide you with excellent care. Hearing back from our patients is one way we can continue to improve our services. Please take a few minutes to complete the written survey that you may receive in the mail after your visit with us. Thank you!             Your Updated Medication List - Protect others around you: Learn how to safely use, store and throw away your medicines at www.disposemymeds.org.          This list is accurate as of 6/27/18 10:31 AM.  Always use your most recent med list.                   Brand Name Dispense Instructions for use Diagnosis    ALEVE PO       Acute gout, unspecified cause, unspecified site       allopurinol 100 MG tablet    ZYLOPRIM    30 tablet    Take 1 tablet (100 mg) by mouth daily    Acute gout, unspecified cause, unspecified site       aspirin 81 MG chewable tablet      Take 81 mg by mouth daily        lidocaine-prilocaine cream    EMLA          LORazepam 0.5 MG tablet    ATIVAN    30 tablet     Take 1 tablet (0.5 mg) by mouth every 4 hours as needed (Anxiety, Nausea/Vomiting or Sleep)    Malignant neoplasm of overlapping sites of stomach (H)       MULTIVITAMIN ADULT PO           ondansetron 8 MG tablet    ZOFRAN    10 tablet    Take 1 tablet (8 mg) by mouth every 8 hours as needed (Nausea/Vomiting)    Malignant neoplasm of overlapping sites of stomach (H)       prochlorperazine 10 MG tablet    COMPAZINE    30 tablet    Take 1 tablet (10 mg) by mouth every 6 hours as needed (Nausea/Vomiting)    Malignant neoplasm of overlapping sites of stomach (H)       TYLENOL PO      Take by mouth as needed for mild pain or fever

## 2018-07-09 NOTE — TELEPHONE ENCOUNTER
TC to pt- left message I called at 12 noon. Stevie Perry  Reply:  Gloria gout is better today Dr Shaffer just wanted the patient to let you know and he may need more gout pills.     Thanks     Keisha MILLS

## 2018-07-11 NOTE — MR AVS SNAPSHOT
After Visit Summary   7/11/2018    Terry Yi    MRN: [id number]           Patient Information     Date Of Birth          1965        Visit Information        Provider Department      7/11/2018 8:15 AM Yakov Shaffer MD [facility]        Today's Diagnoses     Acute gout, unspecified cause, unspecified site        Malignant neoplasm of overlapping sites of stomach (H)          Care Instructions    Cont chemo  Cont allopurinol  Start indomethacin 3 times daily for gout for a few days  Also start Prilosec 20 mg twice daily for a few days when you are taking Indomethacin          Follow-ups after your visit        Your next 10 appointments already scheduled     Aug 08, 2018  8:00 AM CDT   Return Visit with NURSE ONLY CANCER CENTER   [facility] ([facility])    [address] [facility] [address]   [phone]            Aug 08, 2018  8:45 AM CDT   Return Visit with Yakov Shaffer MD   [facility] ([facility])    [address] [facility] [address]   [phone]            Aug 08, 2018  9:30 AM CDT   Level 2 with BAY 6 INFUSION   [facility] ([facility])    [address] [facility] [address]   [phone]            Aug 22, 2018  8:00 AM CDT   Return Visit with NURSE ONLY CANCER CENTER   [facility] ([facility])    [address] [facility] [address]   [phone]            Aug 22, 2018  8:30 AM CDT   Level 2 with BAY 4 INFUSION   [facility] ([facility])    [address] [facility] [address]   [phone]            Sep 05, 2018  9:30 AM CDT   Return Visit with NURSE ONLY CANCER CENTER   [facility] ([facility])    [address] [facility] [address]   [phone]            Sep 05, 2018 10:15 AM CDT    Return Visit with Yakov Shaffer MD   [facility] ([facility])    [address] [place] [address] [phone]            Sep 05, 2018 11:00 AM CDT   Level 2 with BAY 3 INFUSION   [facility] ([facility])    [address] [place] [address] [phone]              Who to contact     If you have questions or need follow up information about today's clinic visit or your schedule please contact [facility] directly at [phone].  Normal or non-critical lab and imaging results will be communicated to you by Jumpidohart, letter or phone within 4 business days after the clinic has received the results. If you do not hear from us within 7 days, please contact the clinic through [facility] or phone. If you have a critical or abnormal lab result, we will notify you by phone as soon as possible.  Submit refill requests through Tictail or call your pharmacy and they will forward the refill request to us. Please allow 3 business days for your refill to be completed.          Additional Information About Your Visit        Tictail Information     Tictail gives you secure access to your electronic health record. If you see a primary care provider, you can also send messages to your care team and make appointments. If you have questions, please call your primary care clinic.  If you do not have a primary care provider, please call [phone] and they will assist you.      Tictail is an electronic gateway that provides easy, online access to your medical records. With Tictail, you can request a clinic appointment, read your test results, renew a prescription or communicate with your care team.     To access your existing account, please contact your [facility] or call [phone] for assistance.        Care EveryWhere ID     This is your Care EveryWhere ID. This could be used by  other organizations to access your [place] medical records  [id number]        Your Vitals Were     Pulse Temperature Respirations Pulse Oximetry BMI (Body Mass Index)       64 98.2  F (36.8  C) (Oral) 16 98% 31.08 kg/m2        Blood Pressure from Last 3 Encounters:   07/25/18 144/86   07/11/18 121/79   06/27/18 109/65    Weight from Last 3 Encounters:   07/25/18 98.7 kg (217 lb 9.6 oz)   07/11/18 98.2 kg (216 lb 9.6 oz)   06/13/18 98.9 kg (218 lb)              Today, you had the following     No orders found for display         Today's Medication Changes          These changes are accurate as of 7/11/18 11:59 PM.  If you have any questions, ask your nurse or doctor.               Start taking these medicines.        Dose/Directions    indomethacin 25 MG capsule   Commonly known as:  INDOCIN   Used for:  Acute gout, unspecified cause, unspecified site   Started by:  Yakov Shaffer MD        Dose:  25 mg   Take 1 capsule (25 mg) by mouth 3 times daily (with meals)   Quantity:  42 capsule   Refills:  1            Where to get your medicines      These medications were sent to [facility] [place] [place] - [address]  [address], [facility] [address]     Phone:  [phone]     allopurinol 100 MG tablet    indomethacin 25 MG capsule                Primary Care Provider Office Phone # Fax #    Sandra Dickerson [phone]       [facility]  [address]  [facility] [address]        Equal Access to Services     [facility]: Hadii aad ku hadasho Soomaali, waaxda luqadaha, qaybta kaalmada adeegyada, claire browne. So [facility] [phone].    ATENCIÓN: Si habla español, tiene a cuevas disposición servicios gratuitos de asistencia lingüística. Llame al [phone].    We comply with applicable federal civil rights laws and [place] laws. We do not discriminate on the basis of race, color, national origin, age,  disability, sex, sexual orientation, or gender identity.            Thank you!     Thank you for choosing [facility]  for your care. Our goal is always to provide you with excellent care. Hearing back from our patients is one way we can continue to improve our services. Please take a few minutes to complete the written survey that you may receive in the mail after your visit with us. Thank you!             Your Updated Medication List - Protect others around you: Learn how to safely use, store and throw away your medicines at www.disposemymeds.org.          This list is accurate as of 7/11/18 11:59 PM.  Always use your most recent med list.                   Brand Name Dispense Instructions for use Diagnosis    ALEVE PO       Acute gout, unspecified cause, unspecified site       allopurinol 100 MG tablet    ZYLOPRIM    30 tablet    Take 1 tablet (100 mg) by mouth daily    Acute gout, unspecified cause, unspecified site       aspirin 81 MG chewable tablet      Take 81 mg by mouth daily        indomethacin 25 MG capsule    INDOCIN    42 capsule    Take 1 capsule (25 mg) by mouth 3 times daily (with meals)    Acute gout, unspecified cause, unspecified site       lidocaine-prilocaine cream    EMLA          LORazepam 0.5 MG tablet    ATIVAN    30 tablet    Take 1 tablet (0.5 mg) by mouth every 4 hours as needed (Anxiety, Nausea/Vomiting or Sleep)    Malignant neoplasm of overlapping sites of stomach (H)       MULTIVITAMIN ADULT PO           ondansetron 8 MG tablet    ZOFRAN    10 tablet    Take 1 tablet (8 mg) by mouth every 8 hours as needed (Nausea/Vomiting)    Malignant neoplasm of overlapping sites of stomach (H)       prochlorperazine 10 MG tablet    COMPAZINE    30 tablet    Take 1 tablet (10 mg) by mouth every 6 hours as needed (Nausea/Vomiting)    Malignant neoplasm of overlapping sites of stomach (H)       TYLENOL PO      Take by mouth as needed for mild pain or fever

## 2018-07-11 NOTE — MR AVS SNAPSHOT
After Visit Summary   7/11/2018    Terry Yi    MRN: [id number]           Patient Information     Date Of Birth          1965        Visit Information        Provider Department      7/11/2018 9:00 AM BAY 5 INFUSION [facility]        Today's Diagnoses     Malignant neoplasm of overlapping sites of stomach (H)    -  1       Follow-ups after your visit        Your next 10 appointments already scheduled     Jul 25, 2018  7:30 AM CDT   Return Visit with NURSE ONLY CANCER CENTER   [facility] ([facility])    [address] [facility] [address]   [phone]            Jul 25, 2018  8:00 AM CDT   Level 2 with BAY 2 INFUSION   [facility] ([facility])    [address] [facility] [address]   [phone]            Aug 08, 2018  8:00 AM CDT   Return Visit with NURSE ONLY CANCER CENTER   [facility] ([facility])    [address] [facility] [address]   [phone]            Aug 08, 2018  8:45 AM CDT   Return Visit with Yakov Shaffer MD   [facility] ([facility])    [address] [facility] [address] [phone]            Aug 08, 2018  9:30 AM CDT   Level 2 with BAY 6 INFUSION   [facility] ([facility])    [address] [facility] [address] [phone]              Who to contact     If you have questions or need follow up information about today's clinic visit or your schedule please contact [facility] directly at [phone].  Normal or non-critical lab and imaging results will be communicated to you by MyChart, letter or phone within 4 business days after the clinic has received the results. If you do not hear from us within 7 days, please contact the clinic through MyChart or phone. If you have a critical or abnormal lab  result, we will notify you by phone as soon as possible.  Submit refill requests through burrp! or call your pharmacy and they will forward the refill request to us. Please allow 3 business days for your refill to be completed.          Additional Information About Your Visit        [employer] Information     burrp! gives you secure access to your electronic health record. If you see a primary care provider, you can also send messages to your care team and make appointments. If you have questions, please call your primary care clinic.  If you do not have a primary care provider, please call [phone] and they will assist you.      burrp! is an electronic gateway that provides easy, online access to your medical records. With burrp!, you can request a clinic appointment, read your test results, renew a prescription or communicate with your care team.     To access your existing account, please contact your [facility] or call [phone] for assistance.        Care EveryWhere ID     This is your Care EveryWhere ID. This could be used by other organizations to access your [place] medical records  [id number]         Blood Pressure from Last 3 Encounters:   07/11/18 121/79   06/27/18 109/65   06/13/18 111/73    Weight from Last 3 Encounters:   07/11/18 98.2 kg (216 lb 9.6 oz)   06/13/18 98.9 kg (218 lb)   05/29/18 100.2 kg (221 lb)              Today, you had the following     No orders found for display         Today's Medication Changes          These changes are accurate as of 7/11/18  3:10 PM.  If you have any questions, ask your nurse or doctor.               Start taking these medicines.        Dose/Directions    indomethacin 25 MG capsule   Commonly known as:  INDOCIN   Used for:  Acute gout, unspecified cause, unspecified site   Started by:  Yakov Shaffer MD        Dose:  25 mg   Take 1 capsule (25 mg) by mouth 3 times daily (with meals)   Quantity:  42 capsule   Refills:   1            Where to get your medicines      These medications were sent to [facility] - [place], [place] - [address]  [address], [facility] [address]     Phone:  [phone]     allopurinol 100 MG tablet    indomethacin 25 MG capsule                Primary Care Provider Office Phone # Fax #    Sandra Dickerson, SARAI [phone]       [facility]  [address]  [facility] [address]        Equal Access to Services     [facility]: Hadii aad ku hadasho Soomaali, waaxda luqadaha, qaybta kaalmada adeegyada, waxay idiin hayaan adeeg kharalia lavivi . So [facility] [phone].    ATENCIÓN: Si habla español, tiene a cuevas disposición servicios gratuitos de asistencia lingüística. [facility] [phone].    We comply with applicable federal civil rights laws and [place] laws. We do not discriminate on the basis of race, color, national origin, age, disability, sex, sexual orientation, or gender identity.            Thank you!     Thank you for choosing [facility]  for your care. Our goal is always to provide you with excellent care. Hearing back from our patients is one way we can continue to improve our services. Please take a few minutes to complete the written survey that you may receive in the mail after your visit with us. Thank you!             Your Updated Medication List - Protect others around you: Learn how to safely use, store and throw away your medicines at www.disposemymeds.org.          This list is accurate as of 7/11/18  3:10 PM.  Always use your most recent med list.                   Brand Name Dispense Instructions for use Diagnosis    ALEVE PO       Acute gout, unspecified cause, unspecified site       allopurinol 100 MG tablet    ZYLOPRIM    30 tablet    Take 1 tablet (100 mg) by mouth daily    Acute gout, unspecified cause, unspecified site       aspirin 81 MG chewable tablet      Take 81 mg by mouth daily         indomethacin 25 MG capsule    INDOCIN    42 capsule    Take 1 capsule (25 mg) by mouth 3 times daily (with meals)    Acute gout, unspecified cause, unspecified site       lidocaine-prilocaine cream    EMLA          LORazepam 0.5 MG tablet    ATIVAN    30 tablet    Take 1 tablet (0.5 mg) by mouth every 4 hours as needed (Anxiety, Nausea/Vomiting or Sleep)    Malignant neoplasm of overlapping sites of stomach (H)       MULTIVITAMIN ADULT PO           ondansetron 8 MG tablet    ZOFRAN    10 tablet    Take 1 tablet (8 mg) by mouth every 8 hours as needed (Nausea/Vomiting)    Malignant neoplasm of overlapping sites of stomach (H)       prochlorperazine 10 MG tablet    COMPAZINE    30 tablet    Take 1 tablet (10 mg) by mouth every 6 hours as needed (Nausea/Vomiting)    Malignant neoplasm of overlapping sites of stomach (H)       TYLENOL PO      Take by mouth as needed for mild pain or fever

## 2018-07-11 NOTE — LETTER
7/11/2018         RE: Terry Yi  [address] [place] [address]        Dear Colleague,    Thank you for referring your patient, Terry Yi, to the [facility]. Please see a copy of my visit note below.    7/11/2018     REASON FOR VISIT:  Follow-up for unresectable metastatic gastric carcinoma, diffuse type, grade 3, HER-2 negative, MSI-Intact. PD-L1 negative (<1%)     HISTORY OF PRESENT ILLNESS:  Please see my previous note for details.  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      CT CAP after C#6 shows stable disease    C# 9 FOLFOX 8/2/2017 ( Oxaliplatin dose reduced to 70mg/m2 due to neuropathy )  C#10 8/22/2017 ( Oxaliplatin dose reduced to 60mg/m2 due to neuropathy )  Delayed by 1 week- patient preference  C#11 9/6/17 5FU/LV ( Stopped Oxaliplatin )  C#12 9/20/2017 5FU/LV    Repeat CT scan after C#12 shows stable to slightly improved disease.    Repeat CT CAP on 12/22/17 after C#18 was stable    Repeat CT CAP on 3/16/18 after C#24 shows stable disease.    C# 27 4/18/2018 5FU/LV    As he was complaining of off and on trouble swallowing with food getting stuck, we repeated EGD on 4/27/2018 which showed a nodular area with possible shallow ulceration in the stomach. Otherwise esophagus and stomach and duodenum were normal.  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  He was referred to gastroenterologist for manometry    The biopsies which were taken from the nodular shallow ulcer aren't consistent with poorly differentiated diffuse type adenocarcinoma with signet ring features  Esophogram showed extrinsic compression on the GE junction resulting in luminal narrowing and  associated extended retention of barium tablet. This likely corresponds with soft tissue thickening about the diaphragmatic hiatus as demonstrated on prior CT examination    Repeat CT scan on 6/8/18 after C#30 is stable with stable circumferential soft tissue thickening at the GE Junction and stable thickness of stomach wall and peritoneal thickening    C#31 6/13/2018 5FU/LV  C#32 6/27/18  C#33 7/11/2018      INTERVAL HISTORY:   He is tolerating chemotherapy well. On Sunday he again had a flare of gouty attack in his right ankle. He had allopurinol left from previous prescription so he started taking 100 mg daily. He ran out of it and did not take it today. He thinks that it has improved. Both the swelling and the pain has improved. No fevers or infections. Otherwise denies pain. No nausea or vomiting. Dysphagia is better especially because he is chewing better and eats only organic bread which goes down easily. Neuropathy is the same. The skin of the palms is about the same with some peeling and he is applying moisturizing creams. Energy is stable. No bleeding. No trouble breathing.      ECOG 0    ROS:  A comprehensive ROS was otherwise neg        MEDICATIONS:   Current Outpatient Prescriptions   Medication     Acetaminophen (TYLENOL PO)     allopurinol (ZYLOPRIM) 100 MG tablet     aspirin 81 MG chewable tablet     lidocaine-prilocaine (EMLA) cream     LORazepam (ATIVAN) 0.5 MG tablet     Multiple Vitamins-Minerals (MULTIVITAMIN ADULT PO)     Naproxen Sodium (ALEVE PO)     ondansetron (ZOFRAN) 8 MG tablet     prochlorperazine (COMPAZINE) 10 MG tablet     No current facility-administered medications for this visit.      Facility-Administered Medications Ordered in Other Visits   Medication     alteplase 2 mg/2 mL (in 10 mL syringe)     heparin 100 UNIT/ML injection 5 mL     sodium chloride (PF) 0.9% PF flush 10-20 mL            PHYSICAL EXAMINATION:   /79  Pulse 64  Temp 98.2  F (36.8  C) (Oral)  Resp 16   Wt 98.2 kg (216 lb 9.6 oz)  SpO2 98%  BMI 31.08 kg/m2  CONSTITUTIONAL: No apparent distress  EYES: PERRLA, without pallor or jaundice  ENT/MOUTH: Ears unremarkable. No oral lesions  CVS: s1s2 normal  RESPIRATORY: Chest is clear  GI: Abdomen is benign  NEURO: Alert and oriented ×3. He has stable neuropathy of the fingers and feet  INTEGUMENT: no concerning skin rashes   LYMPHATIC: no palpable lymphadenopathy  MUSCULOSKELETAL: Unremarkable. No bony tenderness.   EXTREMITIES: no pedal edema. Right ankle is mildly swollen and minimally tender. It is not red or warm. Mildly dry skin of palms with some peeling  PSYCH: Mentation, mood and affect are appropriate        LABS:   Reviewed   Results for LEONOR RALPH (MRN [id number]) as of 7/11/2018 08:21   Ref. Range 7/11/2018 07:43   Sodium Latest Ref Range: 133 - 144 mmol/L 143   Potassium Latest Ref Range: 3.4 - 5.3 mmol/L 4.6   Chloride Latest Ref Range: 94 - 109 mmol/L 108   Carbon Dioxide Latest Ref Range: 20 - 32 mmol/L 31   Urea Nitrogen Latest Ref Range: 7 - 30 mg/dL 13   Creatinine Latest Ref Range: 0.66 - 1.25 mg/dL 0.82   GFR Estimate Latest Ref Range: >60 mL/min/1.7m2 >90   GFR Estimate If Black Latest Ref Range: >60 mL/min/1.7m2 >90   Calcium Latest Ref Range: 8.5 - 10.1 mg/dL 9.2   Anion Gap Latest Ref Range: 3 - 14 mmol/L 4   Albumin Latest Ref Range: 3.4 - 5.0 g/dL 3.8   Protein Total Latest Ref Range: 6.8 - 8.8 g/dL 6.9   Bilirubin Total Latest Ref Range: 0.2 - 1.3 mg/dL 0.4   Alkaline Phosphatase Latest Ref Range: 40 - 150 U/L 82   ALT Latest Ref Range: 0 - 70 U/L 48   AST Latest Ref Range: 0 - 45 U/L 31   Glucose Latest Ref Range: 70 - 99 mg/dL 108 (H)   WBC Latest Ref Range: 4.0 - 11.0 10e9/L 5.4   Hemoglobin Latest Ref Range: 13.3 - 17.7 g/dL 13.1 (L)   Hematocrit Latest Ref Range: 40.0 - 53.0 % 39.7 (L)   Platelet Count Latest Ref Range: 150 - 450 10e9/L 177   RBC Count Latest Ref Range: 4.4 - 5.9 10e12/L 4.12 (L)   MCV Latest Ref Range: 78 - 100 fl 96    MCH Latest Ref Range: 26.5 - 33.0 pg 31.8   MCHC Latest Ref Range: 31.5 - 36.5 g/dL 33.0   RDW Latest Ref Range: 10.0 - 15.0 % 14.4   Diff Method Unknown Automated Method   % Neutrophils Latest Units: % 56.1   % Lymphocytes Latest Units: % 28.7   % Monocytes Latest Units: % 11.0   % Eosinophils Latest Units: % 3.1   % Basophils Latest Units: % 0.9   % Immature Granulocytes Latest Units: % 0.2   Absolute Neutrophil Latest Ref Range: 1.6 - 8.3 10e9/L 3.1   Absolute Lymphocytes Latest Ref Range: 0.8 - 5.3 10e9/L 1.6   Absolute Monocytes Latest Ref Range: 0.0 - 1.3 10e9/L 0.6   Absolute Eosinophils Latest Ref Range: 0.0 - 0.7 10e9/L 0.2   Absolute Basophils Latest Ref Range: 0.0 - 0.2 10e9/L 0.1       IMAGING:  Reviewed  EXAMINATION: CT CHEST/ABDOMEN/PELVIS W CONTRAST, 6/8/2018 8:28 AM     TECHNIQUE:  Helical CT images from the thoracic inlet through the  symphysis pubis were obtained  with contrast. Contrast dose: 135 ml  isovue 370     COMPARISON: 3/16/2018, PET CT 3/24/2017     HISTORY: follow up for gastric ca; Malignant neoplasm of overlapping  sites of stomach (H). Status post chemotherapy.     FINDINGS:     Chest: Right IJ port tip within the low SVC. Heart size is normal.  Normal caliber of the aorta and pulmonary artery. No central pulmonary  embolus. Visualized thyroid is unremarkable. Multiple calcified  mediastinal and bilateral hilar lymph nodes, not significantly  changed. No new or enlarged lymph nodes in the chest or axilla. The  upper and mid esophagus appear normal. Findings at the GE junction see  separate dissection. Central airway is patent. Scattered calcified  granulomas. Scattered small indeterminate noncalcified nodules are  unchanged from at least 3/24/2017, for example 6 mm nodule along the  right minor fissure (series 7 image 81).     Abdomen and pelvis: Mild diffuse gastric wall thickening is not  significantly changed. Circumferential soft tissue thickening around  the gastroesophageal  junction (series 3 image 128), measuring up to  1.2 cm in thickness which is not significantly changed. Hazy fat  stranding and mildly prominent lymph nodes in the retroperitoneum and  lesser sac are not significantly changed. For example rounded 1.2 cm  short axis diameter marychuy hepatis node (series 2 image 155). Unchanged  peritoneal thickening along the left anterior pararenal fascia (series  3 image 195).     Unchanged cyst in hepatic segment 4. 1.4 cm subcapsular lesion in the  right hepatic lobe (series 3 image 149) is not significantly changed  and has enhancement characteristics consistent with the benign  cavernous hemangioma. The gallbladder, pancreas, spleen, and adrenals  are within normal limits. Few hypoattenuating foci in both kidneys are  too small to characterize. Bladder is unremarkable. Prostate is  enlarged. Few colonic diverticula without evidence of diverticulitis.  The appendix and small bowel are within normal limits. Major  vasculature is patent. Aorta is normal in caliber with mild-moderate  atherosclerosis. No free air or significant free fluid.     Bones and soft tissues: Unchanged mild compression deformity of the  T12 vertebral body. Unchanged degenerative cystic change and  fragmentation about the right acetabulum.         IMPRESSION:   1. Mild diffuse gastric wall thickening and circumferential soft  tissue thickening around the gastroesophageal junction are not  significantly changed.  2. Unchanged fat stranding and mildly prominent lymph nodes in the  lesser sac and retroperitoneum.  3. Unchanged peritoneal thickening most prominent along the left  anterior pararenal fascia.   4. Sequelae of granulomatous disease in the lungs. A few small  noncalcified pulmonary nodules are unchanged from 3/24/2017. Attention  on follow-up examination.      Esophagram dated 5/16/2018     COMPARISON:    CT dated 3/16/2018     HISTORY:    Esophageal dysphagia in a patient with history of  gastric  carcinoma.     FINDINGS: Examination of the esophagus reveals adequate primary and  secondary peristalsis. There is distal esophageal narrowing at the  gastroesophageal junction without associated mucosal abnormality, with  apparent extrinsic compression. This may correspond with soft tissue  thickening seen at the diaphragmatic hiatus on CT dated 3/16/2018. The  gastroesophageal junction is patent. No hiatal hernia was seen on  examination. There was absence of gastroesophageal reflux.     Fluoroscopy time was 1.6 minutes.     A 12mm barium tablet was administered and did not initially pass  through the gastroesophageal junction. 15 minutes after initial  administration and following ingestion of pudding and thick barium,  the barium tablet did pass through the gastroesophageal junction.         IMPRESSION: Extrinsic compression on the gastroesophageal junction  results in luminal narrowing and associated extended retention of  barium tablet. This likely corresponds with soft tissue thickening  about the diaphragmatic hiatus as demonstrated on prior CT  examination.            EGD 4/27/18  Findings:        One 12 mm nodular area with possible shallow uleration was found in the        gastric fundus. Biopsies were taken with a cold forceps for histology.        The esophagus was normal.        The stomach was normal except as noted above..        The examined duodenum was normal.       ORIGINAL REPORT:     SPECIMEN(S):   Stomach biopsy, fundus     FINAL DIAGNOSIS:   STOMACH, FUNDUS, BIOPSY:   - Poorly differentiated carcinoma (diffuse type) with signet ring features   - Biopsy is limited to mucosa     ASSESSMENT AND PLAN:      Metastatic gastric cancer, HER-2/catrachita negative, MSI intact, PD-L1 neg (<1%), presenting with early satiety and significant weight loss and epigastric discomfort upon eating with some vomiting.  There is linitis plastica with involvement of periaortic lymph node in the aortocaval region  as well as extensive mesenteric infiltration.  There is an indeterminate right liver lobe lesion which has remained stable and likely is not a metastasis.      We started palliative FOLFOX on 03/12/2017.     Oxaliplatin was dropped from cycle #11 onwards because of neuropathy     Scans after C#18 show stable disease    Repeat CT CAP after 24 cycles also show stable disease so we continued with 5FU/LV.     CT scan after cycle #30 - stable disease.    He will get C#33 today. Plan to repeat scans after C#36    Gout. Involving the right ankle. I had previously discussed with him and we again discussed today that allopurinol should not be started or stopped during an acute gouty attack but it is a maintenance medication. Since he has been using it for the last 4 days I believe it is reasonable to continue it and not abruptly stop it now. I gave him a refill for allopurinol.  Also give him indomethacin to treat the acute gouty attack. I told him to keep himself very well hydrated while taking it and also use Prilosec 20 mg twice a day to prevent GI toxicity from indomethacin. I strongly advised him to follow with his primary care physician regarding management of gout but at this time I told him that he should continue taking allopurinol indefinitely.     Dysphagia. This is stable to better. He will continue with the modifications in his diet which is mainly that he chews the food well and has stopped eating regular bread but only uses organic bread.   Previously he had repeat EGD done as part of its workup was much improved apart from 1.2 cm malignant shallow ulcer   Esophogram showed extrinsic compression on the GE junction resulting in luminal narrowing and associated extended retention of barium tablet. This likely corresponds with soft tissue thickening about the diaphragmatic hiatus as demonstrated on prior CT examination. As mentioned above the repeat CT scan shows stable soft tissue circumferential thickening  around the GE junction       Skin toxicity from 5-FU. He continues to have dryness of the palms and some peeling of the skin of the palms. This is stable. I advised him to continue using moisturizing cream/lotions many times a day.       Anemia. Mild, stable and asymptomatic- due to chemo- cont to watch      Neuropathy. Stable- Cont to watch       We did not address the following today    His genetic testing did not show any identifiable mutation not reveal any identifiable mutation     He will return to clinic in 2 weeks to get the next chemotherapy and I will see him back in 4 weeks before cycle #35.    I answered all of his and his family's questions to their satisfaction. They're agreeable and comfortable with the plan.    LANEY SHAFFER MD             Again, thank you for allowing me to participate in the care of your patient.        Sincerely,        Laney Shaffer MD

## 2018-07-11 NOTE — PROGRESS NOTES
7/11/2018     REASON FOR VISIT:  Follow-up for unresectable metastatic gastric carcinoma, diffuse type, grade 3, HER-2 negative, MSI-Intact. PD-L1 negative (<1%)     HISTORY OF PRESENT ILLNESS:  Please see my previous note for details.  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      CT CAP after C#6 shows stable disease    C# 9 FOLFOX 8/2/2017 ( Oxaliplatin dose reduced to 70mg/m2 due to neuropathy )  C#10 8/22/2017 ( Oxaliplatin dose reduced to 60mg/m2 due to neuropathy )  Delayed by 1 week- patient preference  C#11 9/6/17 5FU/LV ( Stopped Oxaliplatin )  C#12 9/20/2017 5FU/LV    Repeat CT scan after C#12 shows stable to slightly improved disease.    Repeat CT CAP on 12/22/17 after C#18 was stable    Repeat CT CAP on 3/16/18 after C#24 shows stable disease.    C# 27 4/18/2018 5FU/LV    As he was complaining of off and on trouble swallowing with food getting stuck, we repeated EGD on 4/27/2018 which showed a nodular area with possible shallow ulceration in the stomach. Otherwise esophagus and stomach and duodenum were normal.  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  He was referred to gastroenterologist for manometry    The biopsies which were taken from the nodular shallow ulcer aren't consistent with poorly differentiated diffuse type adenocarcinoma with signet ring features  Esophogram showed extrinsic compression on the GE junction resulting in luminal narrowing and associated extended retention of barium tablet. This likely corresponds with soft tissue thickening about the diaphragmatic hiatus as demonstrated on prior CT examination    Repeat CT scan on 6/8/18 after C#30 is stable with stable circumferential soft  tissue thickening at the GE Junction and stable thickness of stomach wall and peritoneal thickening    C#31 6/13/2018 5FU/LV  C#32 6/27/18  C#33 7/11/2018      INTERVAL HISTORY:   He is tolerating chemotherapy well. On Sunday he again had a flare of gouty attack in his right ankle. He had allopurinol left from previous prescription so he started taking 100 mg daily. He ran out of it and did not take it today. He thinks that it has improved. Both the swelling and the pain has improved. No fevers or infections. Otherwise denies pain. No nausea or vomiting. Dysphagia is better especially because he is chewing better and eats only organic bread which goes down easily. Neuropathy is the same. The skin of the palms is about the same with some peeling and he is applying moisturizing creams. Energy is stable. No bleeding. No trouble breathing.      ECOG 0    ROS:  A comprehensive ROS was otherwise neg        MEDICATIONS:   Current Outpatient Prescriptions   Medication     Acetaminophen (TYLENOL PO)     allopurinol (ZYLOPRIM) 100 MG tablet     aspirin 81 MG chewable tablet     lidocaine-prilocaine (EMLA) cream     LORazepam (ATIVAN) 0.5 MG tablet     Multiple Vitamins-Minerals (MULTIVITAMIN ADULT PO)     Naproxen Sodium (ALEVE PO)     ondansetron (ZOFRAN) 8 MG tablet     prochlorperazine (COMPAZINE) 10 MG tablet     No current facility-administered medications for this visit.      Facility-Administered Medications Ordered in Other Visits   Medication     alteplase 2 mg/2 mL (in 10 mL syringe)     heparin 100 UNIT/ML injection 5 mL     sodium chloride (PF) 0.9% PF flush 10-20 mL            PHYSICAL EXAMINATION:   /79  Pulse 64  Temp 98.2  F (36.8  C) (Oral)  Resp 16  Wt 98.2 kg (216 lb 9.6 oz)  SpO2 98%  BMI 31.08 kg/m2  CONSTITUTIONAL: No apparent distress  EYES: PERRLA, without pallor or jaundice  ENT/MOUTH: Ears unremarkable. No oral lesions  CVS: s1s2 normal  RESPIRATORY: Chest is clear  GI: Abdomen is  benign  NEURO: Alert and oriented ×3. He has stable neuropathy of the fingers and feet  INTEGUMENT: no concerning skin rashes   LYMPHATIC: no palpable lymphadenopathy  MUSCULOSKELETAL: Unremarkable. No bony tenderness.   EXTREMITIES: no pedal edema. Right ankle is mildly swollen and minimally tender. It is not red or warm. Mildly dry skin of palms with some peeling  PSYCH: Mentation, mood and affect are appropriate        LABS:   Reviewed   Results for LEONOR RALPH (MRN [id number]) as of 7/11/2018 08:21   Ref. Range 7/11/2018 07:43   Sodium Latest Ref Range: 133 - 144 mmol/L 143   Potassium Latest Ref Range: 3.4 - 5.3 mmol/L 4.6   Chloride Latest Ref Range: 94 - 109 mmol/L 108   Carbon Dioxide Latest Ref Range: 20 - 32 mmol/L 31   Urea Nitrogen Latest Ref Range: 7 - 30 mg/dL 13   Creatinine Latest Ref Range: 0.66 - 1.25 mg/dL 0.82   GFR Estimate Latest Ref Range: >60 mL/min/1.7m2 >90   GFR Estimate If Black Latest Ref Range: >60 mL/min/1.7m2 >90   Calcium Latest Ref Range: 8.5 - 10.1 mg/dL 9.2   Anion Gap Latest Ref Range: 3 - 14 mmol/L 4   Albumin Latest Ref Range: 3.4 - 5.0 g/dL 3.8   Protein Total Latest Ref Range: 6.8 - 8.8 g/dL 6.9   Bilirubin Total Latest Ref Range: 0.2 - 1.3 mg/dL 0.4   Alkaline Phosphatase Latest Ref Range: 40 - 150 U/L 82   ALT Latest Ref Range: 0 - 70 U/L 48   AST Latest Ref Range: 0 - 45 U/L 31   Glucose Latest Ref Range: 70 - 99 mg/dL 108 (H)   WBC Latest Ref Range: 4.0 - 11.0 10e9/L 5.4   Hemoglobin Latest Ref Range: 13.3 - 17.7 g/dL 13.1 (L)   Hematocrit Latest Ref Range: 40.0 - 53.0 % 39.7 (L)   Platelet Count Latest Ref Range: 150 - 450 10e9/L 177   RBC Count Latest Ref Range: 4.4 - 5.9 10e12/L 4.12 (L)   MCV Latest Ref Range: 78 - 100 fl 96   MCH Latest Ref Range: 26.5 - 33.0 pg 31.8   MCHC Latest Ref Range: 31.5 - 36.5 g/dL 33.0   RDW Latest Ref Range: 10.0 - 15.0 % 14.4   Diff Method Unknown Automated Method   % Neutrophils Latest Units: % 56.1   % Lymphocytes Latest Units: %  28.7   % Monocytes Latest Units: % 11.0   % Eosinophils Latest Units: % 3.1   % Basophils Latest Units: % 0.9   % Immature Granulocytes Latest Units: % 0.2   Absolute Neutrophil Latest Ref Range: 1.6 - 8.3 10e9/L 3.1   Absolute Lymphocytes Latest Ref Range: 0.8 - 5.3 10e9/L 1.6   Absolute Monocytes Latest Ref Range: 0.0 - 1.3 10e9/L 0.6   Absolute Eosinophils Latest Ref Range: 0.0 - 0.7 10e9/L 0.2   Absolute Basophils Latest Ref Range: 0.0 - 0.2 10e9/L 0.1       IMAGING:  Reviewed  EXAMINATION: CT CHEST/ABDOMEN/PELVIS W CONTRAST, 6/8/2018 8:28 AM     TECHNIQUE:  Helical CT images from the thoracic inlet through the  symphysis pubis were obtained  with contrast. Contrast dose: 135 ml  isovue 370     COMPARISON: 3/16/2018, PET CT 3/24/2017     HISTORY: follow up for gastric ca; Malignant neoplasm of overlapping  sites of stomach (H). Status post chemotherapy.     FINDINGS:     Chest: Right IJ port tip within the low SVC. Heart size is normal.  Normal caliber of the aorta and pulmonary artery. No central pulmonary  embolus. Visualized thyroid is unremarkable. Multiple calcified  mediastinal and bilateral hilar lymph nodes, not significantly  changed. No new or enlarged lymph nodes in the chest or axilla. The  upper and mid esophagus appear normal. Findings at the GE junction see  separate dissection. Central airway is patent. Scattered calcified  granulomas. Scattered small indeterminate noncalcified nodules are  unchanged from at least 3/24/2017, for example 6 mm nodule along the  right minor fissure (series 7 image 81).     Abdomen and pelvis: Mild diffuse gastric wall thickening is not  significantly changed. Circumferential soft tissue thickening around  the gastroesophageal junction (series 3 image 128), measuring up to  1.2 cm in thickness which is not significantly changed. Hazy fat  stranding and mildly prominent lymph nodes in the retroperitoneum and  lesser sac are not significantly changed. For example  rounded 1.2 cm  short axis diameter marychuy hepatis node (series 2 image 155). Unchanged  peritoneal thickening along the left anterior pararenal fascia (series  3 image 195).     Unchanged cyst in hepatic segment 4. 1.4 cm subcapsular lesion in the  right hepatic lobe (series 3 image 149) is not significantly changed  and has enhancement characteristics consistent with the benign  cavernous hemangioma. The gallbladder, pancreas, spleen, and adrenals  are within normal limits. Few hypoattenuating foci in both kidneys are  too small to characterize. Bladder is unremarkable. Prostate is  enlarged. Few colonic diverticula without evidence of diverticulitis.  The appendix and small bowel are within normal limits. Major  vasculature is patent. Aorta is normal in caliber with mild-moderate  atherosclerosis. No free air or significant free fluid.     Bones and soft tissues: Unchanged mild compression deformity of the  T12 vertebral body. Unchanged degenerative cystic change and  fragmentation about the right acetabulum.         IMPRESSION:   1. Mild diffuse gastric wall thickening and circumferential soft  tissue thickening around the gastroesophageal junction are not  significantly changed.  2. Unchanged fat stranding and mildly prominent lymph nodes in the  lesser sac and retroperitoneum.  3. Unchanged peritoneal thickening most prominent along the left  anterior pararenal fascia.   4. Sequelae of granulomatous disease in the lungs. A few small  noncalcified pulmonary nodules are unchanged from 3/24/2017. Attention  on follow-up examination.      Esophagram dated 5/16/2018     COMPARISON:    CT dated 3/16/2018     HISTORY:    Esophageal dysphagia in a patient with history of gastric  carcinoma.     FINDINGS: Examination of the esophagus reveals adequate primary and  secondary peristalsis. There is distal esophageal narrowing at the  gastroesophageal junction without associated mucosal abnormality, with  apparent extrinsic  compression. This may correspond with soft tissue  thickening seen at the diaphragmatic hiatus on CT dated 3/16/2018. The  gastroesophageal junction is patent. No hiatal hernia was seen on  examination. There was absence of gastroesophageal reflux.     Fluoroscopy time was 1.6 minutes.     A 12mm barium tablet was administered and did not initially pass  through the gastroesophageal junction. 15 minutes after initial  administration and following ingestion of pudding and thick barium,  the barium tablet did pass through the gastroesophageal junction.         IMPRESSION: Extrinsic compression on the gastroesophageal junction  results in luminal narrowing and associated extended retention of  barium tablet. This likely corresponds with soft tissue thickening  about the diaphragmatic hiatus as demonstrated on prior CT  examination.            EGD 4/27/18  Findings:        One 12 mm nodular area with possible shallow uleration was found in the        gastric fundus. Biopsies were taken with a cold forceps for histology.        The esophagus was normal.        The stomach was normal except as noted above..        The examined duodenum was normal.       ORIGINAL REPORT:     SPECIMEN(S):   Stomach biopsy, fundus     FINAL DIAGNOSIS:   STOMACH, FUNDUS, BIOPSY:   - Poorly differentiated carcinoma (diffuse type) with signet ring features   - Biopsy is limited to mucosa     ASSESSMENT AND PLAN:      Metastatic gastric cancer, HER-2/catrachita negative, MSI intact, PD-L1 neg (<1%), presenting with early satiety and significant weight loss and epigastric discomfort upon eating with some vomiting.  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  There is an indeterminate right liver lobe lesion which has remained stable and likely is not a metastasis.      We started palliative FOLFOX on 03/12/2017.     Oxaliplatin was dropped from cycle #11  onwards because of neuropathy     Scans after C#18 show stable disease    Repeat CT CAP after 24 cycles also show stable disease so we continued with 5FU/LV.     CT scan after cycle #30 - stable disease.    He will get C#33 today. Plan to repeat scans after C#36    Gout. Involving the right ankle. I had previously discussed with him and we again discussed today that allopurinol should not be started or stopped during an acute gouty attack but it is a maintenance medication. Since he has been using it for the last 4 days I believe it is reasonable to continue it and not abruptly stop it now. I gave him a refill for allopurinol.  Also give him indomethacin to treat the acute gouty attack. I told him to keep himself very well hydrated while taking it and also use Prilosec 20 mg twice a day to prevent GI toxicity from indomethacin. I strongly advised him to follow with his primary care physician regarding management of gout but at this time I told him that he should continue taking allopurinol indefinitely.     Dysphagia. This is stable to better. He will continue with the modifications in his diet which is mainly that he chews the food well and has stopped eating regular bread but only uses organic bread.   Previously he had repeat EGD done as part of its workup was much improved apart from 1.2 cm malignant shallow ulcer   Esophogram showed extrinsic compression on the GE junction resulting in luminal narrowing and associated extended retention of barium tablet. This likely corresponds with soft tissue thickening about the diaphragmatic hiatus as demonstrated on prior CT examination. As mentioned above the repeat CT scan shows stable soft tissue circumferential thickening around the GE junction       Skin toxicity from 5-FU. He continues to have dryness of the palms and some peeling of the skin of the palms. This is stable. I advised him to continue using moisturizing cream/lotions many times a day.       Anemia. Mild,  stable and asymptomatic- due to chemo- cont to watch      Neuropathy. Stable- Cont to watch       We did not address the following today    His genetic testing did not show any identifiable mutation not reveal any identifiable mutation     He will return to clinic in 2 weeks to get the next chemotherapy and I will see him back in 4 weeks before cycle #35.    I answered all of his and his family's questions to their satisfaction. They're agreeable and comfortable with the plan.    LANEY HERNANDEZ MD

## 2018-07-11 NOTE — MR AVS SNAPSHOT
After Visit Summary   7/11/2018    Terry Yi    MRN: [id number]           Patient Information     Date Of Birth          1965        Visit Information        Provider Department      7/11/2018 7:30 AM NURSE ONLY CANCER CENTER [facility]        Today's Diagnoses     Malignant neoplasm of overlapping sites of stomach (H)    -  1    Thrombosis complicating venous access device (H)           Follow-ups after your visit        Your next 10 appointments already scheduled     Jul 11, 2018  9:00 AM CDT   Level 2 with BAY 5 INFUSION   [facility]    [address] [facility] [address]   [phone]            Jul 25, 2018  7:30 AM CDT   Return Visit with [facility] ONLY [facility]ER [facility]   [facility]    [address] [facility] [address] [phone]            Jul 25, 2018  8:00 AM CDT   Level 2 with BAY 2 INFUSION   [facility]    [address] [facility] [address] [phone]            Aug 08, 2018  8:00 AM CDT   Return Visit with [facility] ONLY [facility]CER CENTER   [facility]    [address] [facility] [address]   [phone]            Aug 08, 2018  8:45 AM CDT   Return Visit with Yakov Shaffer MD   [facility])    [address] [facility] [address] [phone]            Aug 08, 2018  9:30 AM CDT   Level 2 with BAY 6 INFUSION   [facility] ([facility]    [address] [facility] [address] [phone]              Who to contact     If you have questions or need follow up information about today's clinic visit or your schedule please contact [facility] directly at [phone].  Normal or non-critical lab and imaging  results will be communicated to you by Androcialhart, letter or phone within 4 business days after the clinic has received the results. If you do not hear from us within 7 days, please contact the clinic through [employer] or phone. If you have a critical or abnormal lab result, we will notify you by phone as soon as possible.  Submit refill requests through [employer] or call your pharmacy and they will forward the refill request to us. Please allow 3 business days for your refill to be completed.          Additional Information About Your Visit        [employer] Information     [employer] gives you secure access to your electronic health record. If you see a primary care provider, you can also send messages to your care team and make appointments. If you have questions, please call your primary care clinic.  If you do not have a primary care provider, please call [phone] and they will assist you.      [employer] is an electronic gateway that provides easy, online access to your medical records. With [employer], you can request a clinic appointment, read your test results, renew a prescription or communicate with your care team.     To access your existing account, please contact your [facility] or call [phone] for assistance.        Care EveryWhere ID     This is your Care EveryWhere ID. This could be used by other organizations to access your [facility] medical records  [id number]         Blood Pressure from Last 3 Encounters:   07/11/18 121/79   06/27/18 109/65   06/13/18 111/73    Weight from Last 3 Encounters:   07/11/18 98.2 kg (216 lb 9.6 oz)   06/13/18 98.9 kg (218 lb)   05/29/18 100.2 kg (221 lb)              We Performed the Following     CBC with platelets differential     Comprehensive metabolic panel          Today's Medication Changes          These changes are accurate as of 7/11/18  8:48 AM.  If you have any questions, ask your nurse or doctor.               Start taking these  medicines.        Dose/Directions    indomethacin 25 MG capsule   Commonly known as:  INDOCIN   Used for:  Acute gout, unspecified cause, unspecified site   Started by:  Yakov Shaffer MD        Dose:  25 mg   Take 1 capsule (25 mg) by mouth 2 times daily (with meals)   Quantity:  42 capsule   Refills:  1            Where to get your medicines      These medications were sent to [facility] - [place], [place] - [address], [address]  [address], [address], [facility] [address]     Phone:  [phone]     allopurinol 100 MG tablet    indomethacin 25 MG capsule                Primary Care Provider Office Phone # Fax #    Sandra DIDI Dickerson, SARAI [phone]       [facility]  [address]  [facility] [address]        Equal Access to Services     [facility] Center[facility] : Hadii aad ku hadasho Soomaali, waaxda luqadaha, qaybta kaalmada adeegyada, waxay prabhain hayaalukasz adejennifer kharalia cowart . So [facility] [phone].    ATENCIÓN: Si habla español, tiene a cuevas disposición servicios gratuitos de asistencia lingüística. [facility] [phone].    We comply with applicable federal civil rights laws and [place] laws. We do not discriminate on the basis of race, color, national origin, age, disability, sex, sexual orientation, or gender identity.            Thank you!     Thank you for choosing [facility]  for your care. Our goal is always to provide you with excellent care. Hearing back from our patients is one way we can continue to improve our services. Please take a few minutes to complete the written survey that you may receive in the mail after your visit with us. Thank you!             Your Updated Medication List - Protect others around you: Learn how to safely use, store and throw away your medicines at www.disposemymeds.org.          This list is accurate as of 7/11/18  8:48 AM.  Always use your most recent med list.                   Brand Name Dispense Instructions  for use Diagnosis    ALEVE PO       Acute gout, unspecified cause, unspecified site       allopurinol 100 MG tablet    ZYLOPRIM    30 tablet    Take 1 tablet (100 mg) by mouth daily    Acute gout, unspecified cause, unspecified site       aspirin 81 MG chewable tablet      Take 81 mg by mouth daily        indomethacin 25 MG capsule    INDOCIN    42 capsule    Take 1 capsule (25 mg) by mouth 2 times daily (with meals)    Acute gout, unspecified cause, unspecified site       lidocaine-prilocaine cream    EMLA          LORazepam 0.5 MG tablet    ATIVAN    30 tablet    Take 1 tablet (0.5 mg) by mouth every 4 hours as needed (Anxiety, Nausea/Vomiting or Sleep)    Malignant neoplasm of overlapping sites of stomach (H)       MULTIVITAMIN ADULT PO           ondansetron 8 MG tablet    ZOFRAN    10 tablet    Take 1 tablet (8 mg) by mouth every 8 hours as needed (Nausea/Vomiting)    Malignant neoplasm of overlapping sites of stomach (H)       prochlorperazine 10 MG tablet    COMPAZINE    30 tablet    Take 1 tablet (10 mg) by mouth every 6 hours as needed (Nausea/Vomiting)    Malignant neoplasm of overlapping sites of stomach (H)       TYLENOL PO      Take by mouth as needed for mild pain or fever

## 2018-07-11 NOTE — NURSING NOTE
"Oncology Rooming Note    July 11, 2018 8:22 AM   Terry Yi is a 52 year old male who presents for:    Chief Complaint   Patient presents with     Oncology Clinic Visit     Assessment prior to treatment     Initial Vitals: /79  Pulse 64  Temp 98.2  F (36.8  C) (Oral)  Resp 16  Wt 98.2 kg (216 lb 9.6 oz)  SpO2 98%  BMI 31.08 kg/m2 Estimated body mass index is 31.08 kg/(m^2) as calculated from the following:    Height as of 6/13/18: 1.778 m (5' 10\").    Weight as of this encounter: 98.2 kg (216 lb 9.6 oz). Body surface area is 2.2 meters squared.  No Pain (0) Comment: Data Unavailable   No LMP for male patient.  Allergies reviewed: Yes  Medications reviewed: Yes    Medications: MEDICATION REFILLS NEEDED TODAY. Provider was notified.  Pharmacy name entered into EPIC: Data Unavailable         6 minutes for nursing intake (face to face time)     ARAM GIBSON RN              "

## 2018-07-11 NOTE — PROGRESS NOTES
No blood return noted from port. Flushed easily without patient complaint. Alteplase ordered. Arleth Brandon RN  Obtained brisk blood return after 45 minutes of dwell time. Removed 5cc of blood/tpa and flushed with 20cc NS. Arleth Brandon, RN

## 2018-07-11 NOTE — PATIENT INSTRUCTIONS
Cont chemo  Cont allopurinol  Start indomethacin 3 times daily for gout for a few days  Also start Prilosec 20 mg twice daily for a few days when you are taking Indomethacin

## 2018-07-11 NOTE — PROGRESS NOTES
Infusion Nursing Note:  Terry Yi presents today for LV/5fu.    Patient seen by provider today: Yes: MD   present during visit today: Not Applicable.    Note: N/A.    Intravenous Access:  Implanted Port.    Treatment Conditions:  Results reviewed, labs MET treatment parameters, ok to proceed with treatment.    Post Infusion Assessment:  Patient tolerated infusion without incident.    Discharge Plan:   RTC as scheduled 7/25 for next cycle .    GREY MARCELO RN

## 2018-07-25 NOTE — MR AVS SNAPSHOT
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)    -  1       Follow-ups after your visit        Your next 10 appointments already scheduled     Jul 25, 2018  8:00 AM CDT   Level 2 with BAY 2 INFUSION   [facility] ([facility])    [id number] [place] [id number] [phone]            Aug 08, 2018  8:00 AM CDT   Return Visit with NURSE ONLY CANCER CENTER   [facility] ([facility])    [address] [place] [address]   [phone]            Aug 08, 2018  8:45 AM CDT   Return Visit with Yakov Shaffer MD   [facility] ([facility])    [address] [place] [id number] [phone]            Aug 08, 2018  9:30 AM CDT   Level 2 with BAY 6 INFUSION   [facility] ([facility])    [address] [place] [address]   [phone]              Who to contact     If you have questions or need follow up information about today's clinic visit or your schedule please contact [facility] directly at [phone].  Normal or non-critical lab and imaging results will be communicated to you by MyChart, letter or phone within 4 business days after the clinic has received the results. If you do not hear from us within 7 days, please contact the clinic through MyChart or phone. If you have a critical or abnormal lab result, we will notify you by phone as soon as possible.  Submit refill requests through [employer] or call your pharmacy and they will forward the refill request to us. Please allow 3 business days for  your refill to be completed.          Additional Information About Your Visit        [employer] Information     [employer] gives you secure access to your electronic health record. If you see a primary care provider, you can also send messages to your care team and make appointments. If you have questions, please call your primary care clinic.  If you do not have a primary care provider, please call [phone] and they will assist you.      [employer] is an electronic gateway that provides easy, online access to your medical records. With [employer], you can request a clinic appointment, read your test results, renew a prescription or communicate with your care team.     To access your existing account, please contact your [facility] or call [phone] for assistance.        Care EveryWhere ID     This is your Care EveryWhere ID. This could be used by other organizations to access your [place] medical records  [id number]         Blood Pressure from Last 3 Encounters:   07/11/18 121/79   06/27/18 109/65   06/13/18 111/73    Weight from Last 3 Encounters:   07/11/18 98.2 kg (216 lb 9.6 oz)   06/13/18 98.9 kg (218 lb)   05/29/18 100.2 kg (221 lb)              We Performed the Following     CBC with platelets differential     Comprehensive metabolic panel        Primary Care Provider Office Phone # Fax #    Sandra Dickerson [phone]       [facility]  [address]  [facility] [address]        Equal Access to Services     [facility] : Hadii aad ku hadasho Soomaali, waaxda luqadaha, qaybta kaalmada adeegyada, waxay prabhain hayguillermo cowart . So [facility] [phone].    ATENCIÓN: Si habla español, tiene a cuevas disposición servicios gratuitos de asistencia lingüística. Llame al [phone].    We comply with applicable federal civil rights laws and [place] laws. We do not discriminate on the basis of race, color, national origin, age, disability, sex, sexual  orientation, or gender identity.            Thank you!     Thank you for choosing [facility]  for your care. Our goal is always to provide you with excellent care. Hearing back from our patients is one way we can continue to improve our services. Please take a few minutes to complete the written survey that you may receive in the mail after your visit with us. Thank you!             Your Updated Medication List - Protect others around you: Learn how to safely use, store and throw away your medicines at www.disposemymeds.org.          This list is accurate as of 7/25/18  7:50 AM.  Always use your most recent med list.                   Brand Name Dispense Instructions for use Diagnosis    ALEVE PO       Acute gout, unspecified cause, unspecified site       allopurinol 100 MG tablet    ZYLOPRIM    30 tablet    Take 1 tablet (100 mg) by mouth daily    Acute gout, unspecified cause, unspecified site       aspirin 81 MG chewable tablet      Take 81 mg by mouth daily        indomethacin 25 MG capsule    INDOCIN    42 capsule    Take 1 capsule (25 mg) by mouth 3 times daily (with meals)    Acute gout, unspecified cause, unspecified site       lidocaine-prilocaine cream    EMLA          LORazepam 0.5 MG tablet    ATIVAN    30 tablet    Take 1 tablet (0.5 mg) by mouth every 4 hours as needed (Anxiety, Nausea/Vomiting or Sleep)    Malignant neoplasm of overlapping sites of stomach (H)       MULTIVITAMIN ADULT PO           ondansetron 8 MG tablet    ZOFRAN    10 tablet    Take 1 tablet (8 mg) by mouth every 8 hours as needed (Nausea/Vomiting)    Malignant neoplasm of overlapping sites of stomach (H)       prochlorperazine 10 MG tablet    COMPAZINE    30 tablet    Take 1 tablet (10 mg) by mouth every 6 hours as needed (Nausea/Vomiting)    Malignant neoplasm of overlapping sites of stomach (H)       TYLENOL PO      Take by mouth as needed for mild pain or fever

## 2018-07-25 NOTE — PROGRESS NOTES
"Infusion Nursing Note:  Terry Yi presents today for C34D1 Leucovorin/Fluorouricil bolus/pump connect.    Patient seen by provider today: No   present during visit today: Not Applicable.    Note: Prior to discharge: Port is secured in place with tegaderm and flushed with 10cc NS with positive blood return noted.  Continuous home infusion Dosi-Fuser pump connected.    All connectors secured in place and clamps taped open.    Pump started, \"running\" noted on display (CADD): Not Applicable.  Patient instructed to call our clinic or [facility] with any questions or concerns at home.  Patient verbalized understanding.    Patient set up for pump disconnect in [place] on 7/27/18. Patient will call and make the appointment.    Intravenous Access:  Implanted Port.    Treatment Conditions:  Lab Results   Component Value Date    HGB 13.5 07/25/2018     Lab Results   Component Value Date    WBC 5.8 07/25/2018      Lab Results   Component Value Date    ANEU 3.2 07/25/2018     Lab Results   Component Value Date     07/25/2018      Lab Results   Component Value Date     07/25/2018                   Lab Results   Component Value Date    POTASSIUM 4.3 07/25/2018           No results found for: MAG         Lab Results   Component Value Date    CR 0.83 07/25/2018                   Lab Results   Component Value Date    BRITTNEY 8.9 07/25/2018                Lab Results   Component Value Date    BILITOTAL 0.4 07/25/2018           Lab Results   Component Value Date    ALBUMIN 3.7 07/25/2018                    Lab Results   Component Value Date    ALT 48 07/25/2018           Lab Results   Component Value Date    AST 35 07/25/2018     Results reviewed, labs MET treatment parameters, ok to proceed with treatment.    Post Infusion Assessment:  Patient tolerated infusion without incident.  Blood return noted pre and post infusion.  Site patent and intact, free from redness, edema or " discomfort.    Discharge Plan:   Patient will return 8/8/18 for next appointment.   Patient discharged in stable condition accompanied by: wife and friend.  Departure Mode: Ambulatory.    Mayte Carbone RN

## 2018-08-08 NOTE — NURSING NOTE
"Oncology Rooming Note    August 8, 2018 8:28 AM   Terry Yi is a 52 year old male who presents for:    Chief Complaint   Patient presents with     Oncology Clinic Visit     follow up prior to treatment      Initial Vitals: /82  Pulse 72  Temp 97.6  F (36.4  C) (Oral)  Resp 18  Ht 1.778 m (5' 10\")  Wt 99.8 kg (220 lb)  SpO2 95%  BMI 31.57 kg/m2 Estimated body mass index is 31.57 kg/(m^2) as calculated from the following:    Height as of this encounter: 1.778 m (5' 10\").    Weight as of this encounter: 99.8 kg (220 lb). Body surface area is 2.22 meters squared.  No Pain (0) Comment: Data Unavailable   No LMP for male patient.  Allergies reviewed: Yes  Medications reviewed: Yes    Medications: MEDICATION REFILLS NEEDED TODAY. Provider was notified.  Pharmacy name entered into EPIC: Data Unavailable       5 minutes for nursing intake (face to face time)     Tiffanie Farmer LPN              "

## 2018-08-08 NOTE — MR AVS SNAPSHOT
After Visit Summary   8/8/2018    Terry Yi    MRN: [id number]           Patient Information     Date Of Birth          1965        Visit Information        Provider Department      8/8/2018 8:00 AM NURSE ONLY CANCER CENTER [facility]        Today's Diagnoses     Malignant neoplasm of overlapping sites of stomach (H)    -  1       Follow-ups after your visit        Your next 10 appointments already scheduled     Aug 08, 2018  8:45 AM CDT   Return Visit with Yakov [facility]affer MD   [facility]    [address] [facility] [address]   [phone]            Aug 08, 2018  9:30 AM CDT   Level 2 with BAY 6 INFUSION   [facility]    [address] [facility] [address]   [phone]            Aug 22, 2018  8:00 AM CDT   Return Visit with NURSE ONLY CANCER CENTER   [facility]    [address] [facility] [address]   [phone]            Aug 22, 2018  8:30 AM CDT   Level 2 with BAY 4 INFUSION   [facility]    [address] [facility] [address]   [phone]            Sep 05, 2018  9:30 AM CDT   Return Visit with NURSE ONLY CANCER CENTER   [facility]    [address] [facility] [address]   [phone]            Sep 05, 2018 10:15 AM CDT   Return Visit with Yakov Shaffer MD   [facility]    [address] [facility] [address]   [phone]            Sep 05, 2018 11:00 AM CDT   Level 2 with BAY 3 INFUSION   [facility]    [address] [facility] [address]   [phone]              Who to contact     If you have questions or need follow up information about today's  clinic visit or your schedule please contact [facility] directly at [phone].  Normal or non-critical lab and imaging results will be communicated to you by [employer], letter or phone within 4 business days after the clinic has received the results. If you do not hear from us within 7 days, please contact the clinic through [employer] or phone. If you have a critical or abnormal lab result, we will notify you by phone as soon as possible.  Submit refill requests through [employer] or call your pharmacy and they will forward the refill request to us. Please allow 3 business days for your refill to be completed.          Additional Information About Your Visit        [employer] Information     [employer] gives you secure access to your electronic health record. If you see a primary care provider, you can also send messages to your care team and make appointments. If you have questions, please call your primary care clinic.  If you do not have a primary care provider, please call [phone] and they will assist you.      [employer] is an electronic gateway that provides easy, online access to your medical records. With [employer], you can request a clinic appointment, read your test results, renew a prescription or communicate with your care team.     To access your existing account, please contact your [facility] or call [phone] for assistance.        Care EveryWhere ID     This is your Care EveryWhere ID. This could be used by other organizations to access your [place] medical records  [id number]         Blood Pressure from Last 3 Encounters:   07/25/18 144/86   07/11/18 121/79   06/27/18 109/65    Weight from Last 3 Encounters:   07/25/18 98.7 kg (217 lb 9.6 oz)   07/11/18 98.2 kg (216 lb 9.6 oz)   06/13/18 98.9 kg (218 lb)              We Performed the Following     CBC with platelets differential     Comprehensive metabolic panel        Primary Care Provider Office Phone  # Fax #    Sandra Dickerson, SARAI [phone]       [facility]  [address]  [facility] [address]        Equal Access to Services     CHANDAN GALLO : Hadii aad ku hadsohano Soomaali, waaxda luqadaha, qaybta kaalmada adeegyada, waxtejinder leen mayurjennifer june laSyedguillermo browne. So [facility] [phone].    ATENCIÓN: Si habla español, tiene a cuevas disposición servicios gratuitos de asistencia lingüística. Llame al [phone].    We comply with applicable federal civil rights laws and [place] laws. We do not discriminate on the basis of race, color, national origin, age, disability, sex, sexual orientation, or gender identity.            Thank you!     Thank you for choosing [facility]  for your care. Our goal is always to provide you with excellent care. Hearing back from our patients is one way we can continue to improve our services. Please take a few minutes to complete the written survey that you may receive in the mail after your visit with us. Thank you!             Your Updated Medication List - Protect others around you: Learn how to safely use, store and throw away your medicines at www.disposemymeds.org.          This list is accurate as of 8/8/18  8:26 AM.  Always use your most recent med list.                   Brand Name Dispense Instructions for use Diagnosis    ALEVE PO       Acute gout, unspecified cause, unspecified site       allopurinol 100 MG tablet    ZYLOPRIM    30 tablet    Take 1 tablet (100 mg) by mouth daily    Acute gout, unspecified cause, unspecified site       aspirin 81 MG chewable tablet      Take 81 mg by mouth daily        indomethacin 25 MG capsule    INDOCIN    42 capsule    Take 1 capsule (25 mg) by mouth 3 times daily (with meals)    Acute gout, unspecified cause, unspecified site       lidocaine-prilocaine cream    EMLA          LORazepam 0.5 MG tablet    ATIVAN    30 tablet    Take 1 tablet (0.5 mg) by mouth every 4 hours as needed (Anxiety,  Nausea/Vomiting or Sleep)    Malignant neoplasm of overlapping sites of stomach (H)       MULTIVITAMIN ADULT PO           ondansetron 8 MG tablet    ZOFRAN    10 tablet    Take 1 tablet (8 mg) by mouth every 8 hours as needed (Nausea/Vomiting)    Malignant neoplasm of overlapping sites of stomach (H)       prochlorperazine 10 MG tablet    COMPAZINE    30 tablet    Take 1 tablet (10 mg) by mouth every 6 hours as needed (Nausea/Vomiting)    Malignant neoplasm of overlapping sites of stomach (H)       TYLENOL PO      Take by mouth as needed for mild pain or fever

## 2018-08-08 NOTE — MR AVS SNAPSHOT
After Visit Summary   8/8/2018    Terry Yi    MRN: [id number]           Patient Information     Date Of Birth          1965        Visit Information        Provider Department      8/8/2018 8:45 AM Yakov Shaffer MD [facility]        Today's Diagnoses     Malignant neoplasm of overlapping sites of stomach (H)    -  1    Acute gout, unspecified cause, unspecified site          Care Instructions    Chemo today and in 2 weeks    Schedule CT scan on 8/31    See me as scheduled              Follow-ups after your visit        Your next 10 appointments already scheduled     Aug 08, 2018  9:30 AM CDT   Level 2 with BAY 6 INFUSION   [facility]    [id number] [place] [id number]   [phone]            Aug 22, 2018  8:00 AM CDT   Return Visit with [facility]   [facility]    [address] [place] [address]   [phone]            Aug 22, 2018  8:30 AM CDT   Level 2 with BAY 4 INFUSION   [facility]    [address] [place] [address]   [phone]            Aug 31, 2018  8:30 AM CDT   CT CHEST ABDOMEN PELVIS W/O & W CONTRAST with MGCT1   [facility]    [address] [place] [id number] [phone]           Please bring any scans or X-rays taken at other hospitals, if similar tests were done. Also bring a list of your medicines, including vitamins, minerals and over-the-counter drugs. It is safest to leave personal items at home.  Be sure to tell your doctor:   If you have any allergies.   If there s any chance you are pregnant.   If you are breastfeeding.  How to prepare:   Do not eat or drink for 2 hours before your exam. If you need to take medicine, you may take it with small sips of water. (We may ask you to  take liquid medicine as well.)   Please wear loose clothing, such as a sweat suit or jogging clothes. Avoid snaps, zippers and other metal. We may ask you to undress and put on a hospital gown.  Please arrive 30 minutes early for your CT. Once in the department you might be asked to drink water 15-20 minutes prior to your exam.  If indicated you may be asked to drink an oral contrast in advance of your CT.  If this is the case, the imaging team will let you know or be in contact with you prior to your appointment  Patients over 70 or patients with diabetes or kidney problems:   If you haven t had a blood test (creatinine test) within the last 30 days, the Cardiologist/Radiologist may require you to get this test prior to your exam.  If you have diabetes:   Continue to take your metformin medication on the day of your exam  If you have any questions, please call the Imaging Department where you will have your exam.            Sep 05, 2018  9:30 AM CDT   Return Visit with [facility] [place] [facility]   [facility]    [address] [place] [id number] [phone]            Sep 05, 2018 10:15 AM CDT   Return Visit with Yakov Shaffer MD   [facility] ([facility])    [address] [place] [id number] [phone]            Sep 05, 2018 11:00 AM CDT   Level 2 with [facility])    [address] [place] [id number] [phone]              Future tests that were ordered for you today     Open Future Orders        Priority Expected Expires Ordered    CT Chest abdomen pelvis w & w/o contrast Routine 8/31/2018 8/8/2019 8/8/2018            Who to contact     If you have questions or need follow up information about today's clinic visit or your schedule please contact [facility] directly at [phone].  Normal or  "non-critical lab and imaging results will be communicated to you by MyChart, letter or phone within 4 business days after the clinic has received the results. If you do not hear from us within 7 days, please contact the clinic through Opezt or phone. If you have a critical or abnormal lab result, we will notify you by phone as soon as possible.  Submit refill requests through [employer] or call your pharmacy and they will forward the refill request to us. Please allow 3 business days for your refill to be completed.          Additional Information About Your Visit        [employer] Information     [employer] gives you secure access to your electronic health record. If you see a primary care provider, you can also send messages to your care team and make appointments. If you have questions, please call your primary care clinic.  If you do not have a primary care provider, please call [phone] and they will assist you.      [employer] is an electronic gateway that provides easy, online access to your medical records. With [employer], you can request a clinic appointment, read your test results, renew a prescription or communicate with your care team.     To access your existing account, please contact your [facility] or call [phone] for assistance.        Care EveryWhere ID     This is your Care EveryWhere ID. This could be used by other organizations to access your [place] medical records  [id number]        Your Vitals Were     Pulse Temperature Respirations Height Pulse Oximetry BMI (Body Mass Index)    72 97.6  F (36.4  C) (Oral) 18 1.778 m (5' 10\") 95% 31.57 kg/m2       Blood Pressure from Last 3 Encounters:   08/08/18 135/82   07/25/18 144/86   07/11/18 121/79    Weight from Last 3 Encounters:   08/08/18 99.8 kg (220 lb)   07/25/18 98.7 kg (217 lb 9.6 oz)   07/11/18 98.2 kg (216 lb 9.6 oz)                 Where to get your medicines      These medications were sent to [facility] - [place], [place] - [address]  [address], [facility] [address]     Phone:  [phone]     allopurinol 100 MG tablet          Primary Care Provider Office Phone # Fax #    Sandra Dickerson [phone]       [facility]  [address]  [facility] [address]        Equal Access to Services     DONOVAN GALLO : Hadii aad ku hadasho Soomaali, waaxda luqadaha, qaybta kaalmada adeegyada, waxay idiin hayaan adeeg kharash la'aan . So [facility] [phone].    ATENCIÓN: Si habla español, tiene a cuevas disposición servicios gratuitos de asistencia lingüística. [facility] [phone].    We comply with applicable federal civil rights laws and [place] laws. We do not discriminate on the basis of race, color, national origin, age, disability, sex, sexual orientation, or gender identity.            Thank you!     Thank you for choosing [facility]  for your care. Our goal is always to provide you with excellent care. Hearing back from our patients is one way we can continue to improve our services. Please take a few minutes to complete the written survey that you may receive in the mail after your visit with us. Thank you!             Your Updated Medication List - Protect others around you: Learn how to safely use, store and throw away your medicines at www.disposemymeds.org.          This list is accurate as of 8/8/18  9:10 AM.  Always use your most recent med list.                   Brand Name Dispense Instructions for use Diagnosis    ALEVE PO       Acute gout, unspecified cause, unspecified site       allopurinol 100 MG tablet    ZYLOPRIM    30 tablet    Take 1 tablet (100 mg) by mouth daily    Acute gout, unspecified cause, unspecified site, Malignant neoplasm of overlapping sites of stomach (H)       aspirin 81 MG chewable tablet      Take 81 mg by mouth daily        indomethacin 25 MG capsule    INDOCIN    42 capsule    Take 1  capsule (25 mg) by mouth 3 times daily (with meals)    Acute gout, unspecified cause, unspecified site       lidocaine-prilocaine cream    EMLA          LORazepam 0.5 MG tablet    ATIVAN    30 tablet    Take 1 tablet (0.5 mg) by mouth every 4 hours as needed (Anxiety, Nausea/Vomiting or Sleep)    Malignant neoplasm of overlapping sites of stomach (H)       MULTIVITAMIN ADULT PO           ondansetron 8 MG tablet    ZOFRAN    10 tablet    Take 1 tablet (8 mg) by mouth every 8 hours as needed (Nausea/Vomiting)    Malignant neoplasm of overlapping sites of stomach (H)       prochlorperazine 10 MG tablet    COMPAZINE    30 tablet    Take 1 tablet (10 mg) by mouth every 6 hours as needed (Nausea/Vomiting)    Malignant neoplasm of overlapping sites of stomach (H)       TYLENOL PO      Take by mouth as needed for mild pain or fever

## 2018-08-08 NOTE — MR AVS SNAPSHOT
After Visit Summary   8/8/2018    Terry Yi    MRN: [id number]           Patient Information     Date Of Birth          1965        Visit Information        Provider Department      8/8/2018 9:30 AM [facility]        Today's Diagnoses     Malignant neoplasm of overlapping sites of stomach (H)    -  1       Follow-ups after your visit        Your next 10 appointments already scheduled     Aug 31, 2018  8:30 AM CDT   CT CHEST ABDOMEN PELVIS W/O & W CONTRAST with MGCT1   [facility]    [address] [place] [address] [phone]           Please bring any scans or X-rays taken at other hospitals, if similar tests were done. Also bring a list of your medicines, including vitamins, minerals and over-the-counter drugs. It is safest to leave personal items at home.  Be sure to tell your doctor:   If you have any allergies.   If there s any chance you are pregnant.   If you are breastfeeding.  How to prepare:   Do not eat or drink for 2 hours before your exam. If you need to take medicine, you may take it with small sips of water. (We may ask you to take liquid medicine as well.)   Please wear loose clothing, such as a sweat suit or jogging clothes. Avoid snaps, zippers and other metal. We may ask you to undress and put on a hospital gown.  Please arrive 30 minutes early for your CT. Once in the department you might be asked to drink water 15-20 minutes prior to your exam.  If indicated you may be asked to drink an oral contrast in advance of your CT.  If this is the case, the imaging team will let you know or be in contact with you prior to your appointment  Patients over 70 or patients with diabetes or kidney problems:   If you haven t had a blood test (creatinine test) within the last 30 days, the Cardiologist/Radiologist may require you to get this test prior to your exam.  If you have diabetes:    Continue to take your metformin medication on the day of your exam  If you have any questions, please call the Imaging Department where you will have your exam.            Sep 05, 2018  9:30 AM CDT   Return Visit with [facility] [place] [facility]   [facility]    [address] [place] [phone]            Sep 05, 2018 10:15 AM CDT   Return Visit with Yakov Shaffer MD   [facility]    [address] [place] [id number] [phone]            Sep 05, 2018 11:00 AM CDT   Level 2 with BAY 9 INFUSION   [facility])    [address] [place] [address] [phone]              Who to contact     If you have questions or need follow up information about today's clinic visit or your schedule please contact [facility] directly at [phone].  Normal or non-critical lab and imaging results will be communicated to you by appAttachhart, letter or phone within 4 business days after the clinic has received the results. If you do not hear from us within 7 days, please contact the clinic through [email or web address] or phone. If you have a critical or abnormal lab result, we will notify you by phone as soon as possible.  Submit refill requests through [employer] or call your pharmacy and they will forward the refill request to us. Please allow 3 business days for your refill to be completed.          Additional Information About Your Visit        [employer] Information     [employer] gives you secure access to your electronic health record. If you see a primary care provider, you can also send messages to your care team and make appointments. If you have questions, please call your primary care clinic.  If you do not have a primary care provider, please call [phone] and they will assist you.      [employer] is an electronic gateway that  provides easy, online access to your medical records. With [employer], you can request a clinic appointment, read your test results, renew a prescription or communicate with your care team.     To access your existing account, please contact your [facility] or call [phone] for assistance.        Care EveryWhere ID     This is your Care EveryWhere ID. This could be used by other organizations to access your [place] medical records  [id number]         Blood Pressure from Last 3 Encounters:   08/22/18 (!) 138/96   08/08/18 135/82   07/25/18 144/86    Weight from Last 3 Encounters:   08/22/18 98.9 kg (218 lb 1.6 oz)   08/08/18 99.8 kg (220 lb)   07/25/18 98.7 kg (217 lb 9.6 oz)              Today, you had the following     No orders found for display         Where to get your medicines      These medications were sent to [facility] - [place], [place] - [address]  [address], [facility] [address]     Phone:  [phone]     allopurinol 100 MG tablet          Primary Care Provider Office Phone # Fax #    Sandra Dickerson, SARAI [phone]       [facility]  [address]  [facility] [address]        Equal Access to Services     CHANDAN GALLO : Hadii aad ku hadasho Soomaali, waaxda luqadaha, qaybta kaalmada adeegyada, claire cowart . So [facility] [phone].    ATENCIÓN: Si habla español, tiene a cuevas disposición servicios gratuitos de asistencia lingüística. [facility]ara al [phone].    We comply with applicable federal civil rights laws and [place] laws. We do not discriminate on the basis of race, color, national origin, age, disability, sex, sexual orientation, or gender identity.            Thank you!     Thank you for choosing [facility]  for your care. Our goal is always to provide you with excellent care. Hearing back from our patients is one way we can continue to  improve our services. Please take a few minutes to complete the written survey that you may receive in the mail after your visit with us. Thank you!             Your Updated Medication List - Protect others around you: Learn how to safely use, store and throw away your medicines at www.disposemymeds.org.          This list is accurate as of 8/8/18 11:59 PM.  Always use your most recent med list.                   Brand Name Dispense Instructions for use Diagnosis    ALEVE PO       Acute gout, unspecified cause, unspecified site       allopurinol 100 MG tablet    ZYLOPRIM    30 tablet    Take 1 tablet (100 mg) by mouth daily    Acute gout, unspecified cause, unspecified site, Malignant neoplasm of overlapping sites of stomach (H)       aspirin 81 MG chewable tablet      Take 81 mg by mouth daily        indomethacin 25 MG capsule    INDOCIN    42 capsule    Take 1 capsule (25 mg) by mouth 3 times daily (with meals)    Acute gout, unspecified cause, unspecified site       lidocaine-prilocaine cream    EMLA          LORazepam 0.5 MG tablet    ATIVAN    30 tablet    Take 1 tablet (0.5 mg) by mouth every 4 hours as needed (Anxiety, Nausea/Vomiting or Sleep)    Malignant neoplasm of overlapping sites of stomach (H)       MULTIVITAMIN ADULT PO           ondansetron 8 MG tablet    ZOFRAN    10 tablet    Take 1 tablet (8 mg) by mouth every 8 hours as needed (Nausea/Vomiting)    Malignant neoplasm of overlapping sites of stomach (H)       prochlorperazine 10 MG tablet    COMPAZINE    30 tablet    Take 1 tablet (10 mg) by mouth every 6 hours as needed (Nausea/Vomiting)    Malignant neoplasm of overlapping sites of stomach (H)       TYLENOL PO      Take by mouth as needed for mild pain or fever

## 2018-08-08 NOTE — PROGRESS NOTES
Infusion Nursing Note:  Terry Yi presents today for LV/5FU infusional civi.    Patient seen by provider today: Yes: MD   present during visit today: Not Applicable.    Note: N/A.    Intravenous Access:  Implanted Port.    Treatment Conditions:  Lab Results   Component Value Date     08/08/2018                   Lab Results   Component Value Date    POTASSIUM 4.3 08/08/2018           No results found for: MAG         Lab Results   Component Value Date    CR 0.85 08/08/2018                   Lab Results   Component Value Date    BRITTNEY 8.8 08/08/2018                Lab Results   Component Value Date    BILITOTAL 0.4 08/08/2018           Lab Results   Component Value Date    ALBUMIN 3.4 08/08/2018                    Lab Results   Component Value Date    ALT 49 08/08/2018           Lab Results   Component Value Date    AST 36 08/08/2018       Post Infusion Assessment:  Patient tolerated infusion without incident.    Discharge Plan:   Patient schedules home disconnect at home clinic.  RTC as scheduled 8/22 for next cycle    GREY MARCELO RN

## 2018-08-08 NOTE — PROGRESS NOTES
Port accessed with no incidient. Blood return noted. Labs drawn, tubes labeled and double signed. Port flushed with saline and heparin. Patient tolerated port draw well.   Mayte Ryder RN

## 2018-08-08 NOTE — LETTER
8/8/2018         RE: Terry Yi  [address] [place] [address]        Dear Colleague,    Thank you for referring your patient, Terry Yi, to the [facility]. Please see a copy of my visit note below.    8/8/2018     REASON FOR VISIT:  Follow-up for unresectable metastatic gastric carcinoma, diffuse type, grade 3, HER-2 negative, MSI-Intact. PD-L1 negative (<1%)     HISTORY OF PRESENT ILLNESS:  Please see my previous note for details.  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      CT CAP after C#6 shows stable disease    C# 9 FOLFOX 8/2/2017 ( Oxaliplatin dose reduced to 70mg/m2 due to neuropathy )  C#10 8/22/2017 ( Oxaliplatin dose reduced to 60mg/m2 due to neuropathy )  Delayed by 1 week- patient preference  C#11 9/6/17 5FU/LV ( Stopped Oxaliplatin )  C#12 9/20/2017 5FU/LV    Repeat CT scan after C#12 shows stable to slightly improved disease.    Repeat CT CAP on 12/22/17 after C#18 was stable    Repeat CT CAP on 3/16/18 after C#24 shows stable disease.    C# 27 4/18/2018 5FU/LV    As he was complaining of off and on trouble swallowing with food getting stuck, we repeated EGD on 4/27/2018 which showed a nodular area with possible shallow ulceration in the stomach. Otherwise esophagus and stomach and duodenum were normal.  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  He was referred to gastroenterologist for manometry    The biopsies which were taken from the nodular shallow ulcer aren't consistent with poorly differentiated diffuse type adenocarcinoma with signet ring features  Esophogram showed extrinsic compression on the GE junction resulting in luminal narrowing and  "associated extended retention of barium tablet. This likely corresponds with soft tissue thickening about the diaphragmatic hiatus as demonstrated on prior CT examination    Repeat CT scan on 6/8/18 after C#30 is stable with stable circumferential soft tissue thickening at the GE Junction and stable thickness of stomach wall and peritoneal thickening    C#31 6/13/2018 5FU/LV  C#32 6/27/18  C#33 7/11/2018   C#34 7/25/18  C#35 8/8/2018       INTERVAL HISTORY:   Overall he continues to do well.  Dysphagia is stable and usually happens when he eats bread.  He has modified his diet and he chews more thoroughly and is able to swallow well.  No nausea or vomiting.  He has mild constipation so he is taking fiber supplement which is helpful.  No infections.  No bleeding.  No new swellings.  Neuropathy is a stable to slightly improved.  He still has dryness and mild peeling of the skin of the palms and he is applying moisturizing creams.  Gout has not been an issue recently.  He continues to take allopurinol.      ECOG 0    ROS:  Rest of the comprehensive review of the system was essentially unremarkable.        MEDICATIONS:   Current Outpatient Prescriptions   Medication     allopurinol (ZYLOPRIM) 100 MG tablet     lidocaine-prilocaine (EMLA) cream     Multiple Vitamins-Minerals (MULTIVITAMIN ADULT PO)     Naproxen Sodium (ALEVE PO)     Acetaminophen (TYLENOL PO)     aspirin 81 MG chewable tablet     indomethacin (INDOCIN) 25 MG capsule     LORazepam (ATIVAN) 0.5 MG tablet     ondansetron (ZOFRAN) 8 MG tablet     prochlorperazine (COMPAZINE) 10 MG tablet     No current facility-administered medications for this visit.      Facility-Administered Medications Ordered in Other Visits   Medication     heparin 100 UNIT/ML injection 5 mL     sodium chloride (PF) 0.9% PF flush 10-20 mL            PHYSICAL EXAMINATION:   /82  Pulse 72  Temp 97.6  F (36.4  C) (Oral)  Resp 18  Ht 1.778 m (5' 10\")  Wt 99.8 kg (220 lb)  SpO2 " 95%  BMI 31.57 kg/m2  CONSTITUTIONAL: no acute distress  EYES: PERRLA, no palor or icterus.   ENT/MOUTH: no mouth lesions. Ears normal  CVS: s1s2 no m r g .   RESPIRATORY: clear to auscultation b/l  GI: soft non tender no hepatosplenomegaly  NEURO: AAOX3  Grossly non focal neuro exam apart from neuropathy involving the fingers and feet  INTEGUMENT: There is dryness and mild peeling of the skin of the palms.  LYMPHATIC: no palpable cervical, supraclavicular, axillary or inguinal LAD  MUSCULOSKELETAL: Unremarkable. No bony tenderness.   EXTREMITIES: no edema.  PSYCH: Mentation, mood and affect are normal. Decision making capacity is intact        LABS:   Reviewed   Results for LEONOR RALPH (MRN [id number]) as of 8/8/2018 08:49   Ref. Range 8/8/2018 08:00   Sodium Latest Ref Range: 133 - 144 mmol/L 142   Potassium Latest Ref Range: 3.4 - 5.3 mmol/L 4.3   Chloride Latest Ref Range: 94 - 109 mmol/L 106   Carbon Dioxide Latest Ref Range: 20 - 32 mmol/L 29   Urea Nitrogen Latest Ref Range: 7 - 30 mg/dL 13   Creatinine Latest Ref Range: 0.66 - 1.25 mg/dL 0.85   GFR Estimate Latest Ref Range: >60 mL/min/1.7m2 >90   GFR Estimate If Black Latest Ref Range: >60 mL/min/1.7m2 >90   Calcium Latest Ref Range: 8.5 - 10.1 mg/dL 8.8   Anion Gap Latest Ref Range: 3 - 14 mmol/L 7   Albumin Latest Ref Range: 3.4 - 5.0 g/dL 3.4   Protein Total Latest Ref Range: 6.8 - 8.8 g/dL 6.5 (L)   Bilirubin Total Latest Ref Range: 0.2 - 1.3 mg/dL 0.4   Alkaline Phosphatase Latest Ref Range: 40 - 150 U/L 78   ALT Latest Ref Range: 0 - 70 U/L 49   AST Latest Ref Range: 0 - 45 U/L 36   Glucose Latest Ref Range: 70 - 99 mg/dL 100 (H)   WBC Latest Ref Range: 4.0 - 11.0 10e9/L 6.2   Hemoglobin Latest Ref Range: 13.3 - 17.7 g/dL 12.9 (L)   Hematocrit Latest Ref Range: 40.0 - 53.0 % 38.9 (L)   Platelet Count Latest Ref Range: 150 - 450 10e9/L 171   RBC Count Latest Ref Range: 4.4 - 5.9 10e12/L 4.10 (L)   MCV Latest Ref Range: 78 - 100 fl 95   MCH Latest  Ref Range: 26.5 - 33.0 pg 31.5   MCHC Latest Ref Range: 31.5 - 36.5 g/dL 33.2   RDW Latest Ref Range: 10.0 - 15.0 % 14.5   Diff Method Unknown Automated Method   % Neutrophils Latest Units: % 54.4   % Lymphocytes Latest Units: % 30.5   % Monocytes Latest Units: % 11.4   % Eosinophils Latest Units: % 2.6   % Basophils Latest Units: % 0.8   % Immature Granulocytes Latest Units: % 0.3   Absolute Neutrophil Latest Ref Range: 1.6 - 8.3 10e9/L 3.4   Absolute Lymphocytes Latest Ref Range: 0.8 - 5.3 10e9/L 1.9   Absolute Monocytes Latest Ref Range: 0.0 - 1.3 10e9/L 0.7   Absolute Eosinophils Latest Ref Range: 0.0 - 0.7 10e9/L 0.2   Absolute Basophils Latest Ref Range: 0.0 - 0.2 10e9/L 0.1   Abs Immature Granulocytes Latest Ref Range: 0 - 0.4 10e9/L 0.0         IMAGING:  Reviewed  EXAMINATION: CT CHEST/ABDOMEN/PELVIS W CONTRAST, 6/8/2018 8:28 AM     TECHNIQUE:  Helical CT images from the thoracic inlet through the  symphysis pubis were obtained  with contrast. Contrast dose: 135 ml  isovue 370     COMPARISON: 3/16/2018, PET CT 3/24/2017     HISTORY: follow up for gastric ca; Malignant neoplasm of overlapping  sites of stomach (H). Status post chemotherapy.     FINDINGS:     Chest: Right IJ port tip within the low SVC. Heart size is normal.  Normal caliber of the aorta and pulmonary artery. No central pulmonary  embolus. Visualized thyroid is unremarkable. Multiple calcified  mediastinal and bilateral hilar lymph nodes, not significantly  changed. No new or enlarged lymph nodes in the chest or axilla. The  upper and mid esophagus appear normal. Findings at the GE junction see  separate dissection. Central airway is patent. Scattered calcified  granulomas. Scattered small indeterminate noncalcified nodules are  unchanged from at least 3/24/2017, for example 6 mm nodule along the  right minor fissure (series 7 image 81).     Abdomen and pelvis: Mild diffuse gastric wall thickening is not  significantly changed. Circumferential  soft tissue thickening around  the gastroesophageal junction (series 3 image 128), measuring up to  1.2 cm in thickness which is not significantly changed. Hazy fat  stranding and mildly prominent lymph nodes in the retroperitoneum and  lesser sac are not significantly changed. For example rounded 1.2 cm  short axis diameter marychuy hepatis node (series 2 image 155). Unchanged  peritoneal thickening along the left anterior pararenal fascia (series  3 image 195).     Unchanged cyst in hepatic segment 4. 1.4 cm subcapsular lesion in the  right hepatic lobe (series 3 image 149) is not significantly changed  and has enhancement characteristics consistent with the benign  cavernous hemangioma. The gallbladder, pancreas, spleen, and adrenals  are within normal limits. Few hypoattenuating foci in both kidneys are  too small to characterize. Bladder is unremarkable. Prostate is  enlarged. Few colonic diverticula without evidence of diverticulitis.  The appendix and small bowel are within normal limits. Major  vasculature is patent. Aorta is normal in caliber with mild-moderate  atherosclerosis. No free air or significant free fluid.     Bones and soft tissues: Unchanged mild compression deformity of the  T12 vertebral body. Unchanged degenerative cystic change and  fragmentation about the right acetabulum.         IMPRESSION:   1. Mild diffuse gastric wall thickening and circumferential soft  tissue thickening around the gastroesophageal junction are not  significantly changed.  2. Unchanged fat stranding and mildly prominent lymph nodes in the  lesser sac and retroperitoneum.  3. Unchanged peritoneal thickening most prominent along the left  anterior pararenal fascia.   4. Sequelae of granulomatous disease in the lungs. A few small  noncalcified pulmonary nodules are unchanged from 3/24/2017. Attention  on follow-up examination.      Esophagram dated 5/16/2018     COMPARISON:    CT dated 3/16/2018     HISTORY:    Esophageal  dysphagia in a patient with history of gastric  carcinoma.     FINDINGS: Examination of the esophagus reveals adequate primary and  secondary peristalsis. There is distal esophageal narrowing at the  gastroesophageal junction without associated mucosal abnormality, with  apparent extrinsic compression. This may correspond with soft tissue  thickening seen at the diaphragmatic hiatus on CT dated 3/16/2018. The  gastroesophageal junction is patent. No hiatal hernia was seen on  examination. There was absence of gastroesophageal reflux.     Fluoroscopy time was 1.6 minutes.     A 12mm barium tablet was administered and did not initially pass  through the gastroesophageal junction. 15 minutes after initial  administration and following ingestion of pudding and thick barium,  the barium tablet did pass through the gastroesophageal junction.         IMPRESSION: Extrinsic compression on the gastroesophageal junction  results in luminal narrowing and associated extended retention of  barium tablet. This likely corresponds with soft tissue thickening  about the diaphragmatic hiatus as demonstrated on prior CT  examination.            EGD 4/27/18  Findings:        One 12 mm nodular area with possible shallow uleration was found in the        gastric fundus. Biopsies were taken with a cold forceps for histology.        The esophagus was normal.        The stomach was normal except as noted above..        The examined duodenum was normal.       ORIGINAL REPORT:     SPECIMEN(S):   Stomach biopsy, fundus     FINAL DIAGNOSIS:   STOMACH, FUNDUS, BIOPSY:   - Poorly differentiated carcinoma (diffuse type) with signet ring features   - Biopsy is limited to mucosa     ASSESSMENT AND PLAN:      Metastatic gastric cancer, HER-2/catrachita negative, MSI intact, PD-L1 neg (<1%), presenting with early satiety and significant weight loss and epigastric discomfort upon eating with some vomiting.  There is linitis plastica with involvement of  periaortic lymph node in the aortocaval region as well as extensive mesenteric infiltration.  There is an indeterminate right liver lobe lesion which has remained stable and likely is not a metastasis.      We started palliative FOLFOX on 03/12/2017.     Oxaliplatin was dropped from cycle #11 onwards because of neuropathy     Scans after C#18 show stable disease    Repeat CT CAP after 24 cycles also show stable disease so we continued with 5FU/LV.     CT scan after cycle #30 - stable disease.    We will proceed with cycle #35 today and repeat his scans before cycle #37.        He will get C#33 today. Plan to repeat scans after C#36    Gout.  This was involving the right ankle and now it has resolved.  Continue allopurinol.  I refilled allopurinol    Dysphagia.  This is a stable.  Continue chewing the food thoroughly as he is doing.    Previously he had repeat EGD done as part of its workup was much improved apart from 1.2 cm malignant shallow ulcer   Esophogram showed extrinsic compression on the GE junction resulting in luminal narrowing and associated extended retention of barium tablet. This likely corresponds with soft tissue thickening about the diaphragmatic hiatus as demonstrated on prior CT examination. As mentioned above the repeat CT scan shows stable soft tissue circumferential thickening around the GE junction   We will repeat his scans after cycle #36      Skin toxicity from 5-FU.  This is mild and stable.  Continue the moisturizing creams.  I also asked him to try a urea-based creams to help with keeping the skin well moisturized       Anemia.  This is due to chemotherapy.  Hemoglobin is 12.9.  He is asymptomatic.  We will repeat labs in 2 weeks       Neuropathy. Stable to slightly improved- Cont to observe      We did not address the following today    His genetic testing did not show any identifiable mutation not reveal any identifiable mutation     Return to clinic in 2 weeks to get chemotherapy and  then he will see me back in 4 weeks with a CT scan prior to that.    I answered all of his and his family's questions to their satisfaction. They're agreeable and comfortable with the plan.    LANEY SHAFFER MD             Again, thank you for allowing me to participate in the care of your patient.        Sincerely,        Laney Shaffer MD

## 2018-08-08 NOTE — PROGRESS NOTES
8/8/2018     REASON FOR VISIT:  Follow-up for unresectable metastatic gastric carcinoma, diffuse type, grade 3, HER-2 negative, MSI-Intact. PD-L1 negative (<1%)     HISTORY OF PRESENT ILLNESS:  Please see my previous note for details.  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      CT CAP after C#6 shows stable disease    C# 9 FOLFOX 8/2/2017 ( Oxaliplatin dose reduced to 70mg/m2 due to neuropathy )  C#10 8/22/2017 ( Oxaliplatin dose reduced to 60mg/m2 due to neuropathy )  Delayed by 1 week- patient preference  C#11 9/6/17 5FU/LV ( Stopped Oxaliplatin )  C#12 9/20/2017 5FU/LV    Repeat CT scan after C#12 shows stable to slightly improved disease.    Repeat CT CAP on 12/22/17 after C#18 was stable    Repeat CT CAP on 3/16/18 after C#24 shows stable disease.    C# 27 4/18/2018 5FU/LV    As he was complaining of off and on trouble swallowing with food getting stuck, we repeated EGD on 4/27/2018 which showed a nodular area with possible shallow ulceration in the stomach. Otherwise esophagus and stomach and duodenum were normal.  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  He was referred to gastroenterologist for manometry    The biopsies which were taken from the nodular shallow ulcer aren't consistent with poorly differentiated diffuse type adenocarcinoma with signet ring features  Esophogram showed extrinsic compression on the GE junction resulting in luminal narrowing and associated extended retention of barium tablet. This likely corresponds with soft tissue thickening about the diaphragmatic hiatus as demonstrated on prior CT examination    Repeat CT scan on 6/8/18 after C#30 is stable with stable circumferential soft  "tissue thickening at the GE Junction and stable thickness of stomach wall and peritoneal thickening    C#31 6/13/2018 5FU/LV  C#32 6/27/18  C#33 7/11/2018   C#34 7/25/18  C#35 8/8/2018       INTERVAL HISTORY:   Overall he continues to do well.  Dysphagia is stable and usually happens when he eats bread.  He has modified his diet and he chews more thoroughly and is able to swallow well.  No nausea or vomiting.  He has mild constipation so he is taking fiber supplement which is helpful.  No infections.  No bleeding.  No new swellings.  Neuropathy is a stable to slightly improved.  He still has dryness and mild peeling of the skin of the palms and he is applying moisturizing creams.  Gout has not been an issue recently.  He continues to take allopurinol.      ECOG 0    ROS:  Rest of the comprehensive review of the system was essentially unremarkable.        MEDICATIONS:   Current Outpatient Prescriptions   Medication     allopurinol (ZYLOPRIM) 100 MG tablet     lidocaine-prilocaine (EMLA) cream     Multiple Vitamins-Minerals (MULTIVITAMIN ADULT PO)     Naproxen Sodium (ALEVE PO)     Acetaminophen (TYLENOL PO)     aspirin 81 MG chewable tablet     indomethacin (INDOCIN) 25 MG capsule     LORazepam (ATIVAN) 0.5 MG tablet     ondansetron (ZOFRAN) 8 MG tablet     prochlorperazine (COMPAZINE) 10 MG tablet     No current facility-administered medications for this visit.      Facility-Administered Medications Ordered in Other Visits   Medication     heparin 100 UNIT/ML injection 5 mL     sodium chloride (PF) 0.9% PF flush 10-20 mL            PHYSICAL EXAMINATION:   /82  Pulse 72  Temp 97.6  F (36.4  C) (Oral)  Resp 18  Ht 1.778 m (5' 10\")  Wt 99.8 kg (220 lb)  SpO2 95%  BMI 31.57 kg/m2  CONSTITUTIONAL: no acute distress  EYES: PERRLA, no palor or icterus.   ENT/MOUTH: no mouth lesions. Ears normal  CVS: s1s2 no m r g .   RESPIRATORY: clear to auscultation b/l  GI: soft non tender no hepatosplenomegaly  NEURO: " AAOX3  Grossly non focal neuro exam apart from neuropathy involving the fingers and feet  INTEGUMENT: There is dryness and mild peeling of the skin of the palms.  LYMPHATIC: no palpable cervical, supraclavicular, axillary or inguinal LAD  MUSCULOSKELETAL: Unremarkable. No bony tenderness.   EXTREMITIES: no edema.  PSYCH: Mentation, mood and affect are normal. Decision making capacity is intact        LABS:   Reviewed   Results for LEONOR RALPH (MRN [id number]) as of 8/8/2018 08:49   Ref. Range 8/8/2018 08:00   Sodium Latest Ref Range: 133 - 144 mmol/L 142   Potassium Latest Ref Range: 3.4 - 5.3 mmol/L 4.3   Chloride Latest Ref Range: 94 - 109 mmol/L 106   Carbon Dioxide Latest Ref Range: 20 - 32 mmol/L 29   Urea Nitrogen Latest Ref Range: 7 - 30 mg/dL 13   Creatinine Latest Ref Range: 0.66 - 1.25 mg/dL 0.85   GFR Estimate Latest Ref Range: >60 mL/min/1.7m2 >90   GFR Estimate If Black Latest Ref Range: >60 mL/min/1.7m2 >90   Calcium Latest Ref Range: 8.5 - 10.1 mg/dL 8.8   Anion Gap Latest Ref Range: 3 - 14 mmol/L 7   Albumin Latest Ref Range: 3.4 - 5.0 g/dL 3.4   Protein Total Latest Ref Range: 6.8 - 8.8 g/dL 6.5 (L)   Bilirubin Total Latest Ref Range: 0.2 - 1.3 mg/dL 0.4   Alkaline Phosphatase Latest Ref Range: 40 - 150 U/L 78   ALT Latest Ref Range: 0 - 70 U/L 49   AST Latest Ref Range: 0 - 45 U/L 36   Glucose Latest Ref Range: 70 - 99 mg/dL 100 (H)   WBC Latest Ref Range: 4.0 - 11.0 10e9/L 6.2   Hemoglobin Latest Ref Range: 13.3 - 17.7 g/dL 12.9 (L)   Hematocrit Latest Ref Range: 40.0 - 53.0 % 38.9 (L)   Platelet Count Latest Ref Range: 150 - 450 10e9/L 171   RBC Count Latest Ref Range: 4.4 - 5.9 10e12/L 4.10 (L)   MCV Latest Ref Range: 78 - 100 fl 95   MCH Latest Ref Range: 26.5 - 33.0 pg 31.5   MCHC Latest Ref Range: 31.5 - 36.5 g/dL 33.2   RDW Latest Ref Range: 10.0 - 15.0 % 14.5   Diff Method Unknown Automated Method   % Neutrophils Latest Units: % 54.4   % Lymphocytes Latest Units: % 30.5   % Monocytes  Latest Units: % 11.4   % Eosinophils Latest Units: % 2.6   % Basophils Latest Units: % 0.8   % Immature Granulocytes Latest Units: % 0.3   Absolute Neutrophil Latest Ref Range: 1.6 - 8.3 10e9/L 3.4   Absolute Lymphocytes Latest Ref Range: 0.8 - 5.3 10e9/L 1.9   Absolute Monocytes Latest Ref Range: 0.0 - 1.3 10e9/L 0.7   Absolute Eosinophils Latest Ref Range: 0.0 - 0.7 10e9/L 0.2   Absolute Basophils Latest Ref Range: 0.0 - 0.2 10e9/L 0.1   Abs Immature Granulocytes Latest Ref Range: 0 - 0.4 10e9/L 0.0         IMAGING:  Reviewed  EXAMINATION: CT CHEST/ABDOMEN/PELVIS W CONTRAST, 6/8/2018 8:28 AM     TECHNIQUE:  Helical CT images from the thoracic inlet through the  symphysis pubis were obtained  with contrast. Contrast dose: 135 ml  isovue 370     COMPARISON: 3/16/2018, PET CT 3/24/2017     HISTORY: follow up for gastric ca; Malignant neoplasm of overlapping  sites of stomach (H). Status post chemotherapy.     FINDINGS:     Chest: Right IJ port tip within the low SVC. Heart size is normal.  Normal caliber of the aorta and pulmonary artery. No central pulmonary  embolus. Visualized thyroid is unremarkable. Multiple calcified  mediastinal and bilateral hilar lymph nodes, not significantly  changed. No new or enlarged lymph nodes in the chest or axilla. The  upper and mid esophagus appear normal. Findings at the GE junction see  separate dissection. Central airway is patent. Scattered calcified  granulomas. Scattered small indeterminate noncalcified nodules are  unchanged from at least 3/24/2017, for example 6 mm nodule along the  right minor fissure (series 7 image 81).     Abdomen and pelvis: Mild diffuse gastric wall thickening is not  significantly changed. Circumferential soft tissue thickening around  the gastroesophageal junction (series 3 image 128), measuring up to  1.2 cm in thickness which is not significantly changed. Hazy fat  stranding and mildly prominent lymph nodes in the retroperitoneum and  lesser sac  are not significantly changed. For example rounded 1.2 cm  short axis diameter marychuy hepatis node (series 2 image 155). Unchanged  peritoneal thickening along the left anterior pararenal fascia (series  3 image 195).     Unchanged cyst in hepatic segment 4. 1.4 cm subcapsular lesion in the  right hepatic lobe (series 3 image 149) is not significantly changed  and has enhancement characteristics consistent with the benign  cavernous hemangioma. The gallbladder, pancreas, spleen, and adrenals  are within normal limits. Few hypoattenuating foci in both kidneys are  too small to characterize. Bladder is unremarkable. Prostate is  enlarged. Few colonic diverticula without evidence of diverticulitis.  The appendix and small bowel are within normal limits. Major  vasculature is patent. Aorta is normal in caliber with mild-moderate  atherosclerosis. No free air or significant free fluid.     Bones and soft tissues: Unchanged mild compression deformity of the  T12 vertebral body. Unchanged degenerative cystic change and  fragmentation about the right acetabulum.         IMPRESSION:   1. Mild diffuse gastric wall thickening and circumferential soft  tissue thickening around the gastroesophageal junction are not  significantly changed.  2. Unchanged fat stranding and mildly prominent lymph nodes in the  lesser sac and retroperitoneum.  3. Unchanged peritoneal thickening most prominent along the left  anterior pararenal fascia.   4. Sequelae of granulomatous disease in the lungs. A few small  noncalcified pulmonary nodules are unchanged from 3/24/2017. Attention  on follow-up examination.      Esophagram dated 5/16/2018     COMPARISON:    CT dated 3/16/2018     HISTORY:    Esophageal dysphagia in a patient with history of gastric  carcinoma.     FINDINGS: Examination of the esophagus reveals adequate primary and  secondary peristalsis. There is distal esophageal narrowing at the  gastroesophageal junction without associated  mucosal abnormality, with  apparent extrinsic compression. This may correspond with soft tissue  thickening seen at the diaphragmatic hiatus on CT dated 3/16/2018. The  gastroesophageal junction is patent. No hiatal hernia was seen on  examination. There was absence of gastroesophageal reflux.     Fluoroscopy time was 1.6 minutes.     A 12mm barium tablet was administered and did not initially pass  through the gastroesophageal junction. 15 minutes after initial  administration and following ingestion of pudding and thick barium,  the barium tablet did pass through the gastroesophageal junction.         IMPRESSION: Extrinsic compression on the gastroesophageal junction  results in luminal narrowing and associated extended retention of  barium tablet. This likely corresponds with soft tissue thickening  about the diaphragmatic hiatus as demonstrated on prior CT  examination.            EGD 4/27/18  Findings:        One 12 mm nodular area with possible shallow uleration was found in the        gastric fundus. Biopsies were taken with a cold forceps for histology.        The esophagus was normal.        The stomach was normal except as noted above..        The examined duodenum was normal.       ORIGINAL REPORT:     SPECIMEN(S):   Stomach biopsy, fundus     FINAL DIAGNOSIS:   STOMACH, FUNDUS, BIOPSY:   - Poorly differentiated carcinoma (diffuse type) with signet ring features   - Biopsy is limited to mucosa     ASSESSMENT AND PLAN:      Metastatic gastric cancer, HER-2/catrachita negative, MSI intact, PD-L1 neg (<1%), presenting with early satiety and significant weight loss and epigastric discomfort upon eating with some vomiting.  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  There is an indeterminate right liver lobe lesion which has remained stable and likely is not a metastasis.      We started palliative FOLFOX on 03/12/2017.      Oxaliplatin was dropped from cycle #11 onwards because of neuropathy     Scans after C#18 show stable disease    Repeat CT CAP after 24 cycles also show stable disease so we continued with 5FU/LV.     CT scan after cycle #30 - stable disease.    We will proceed with cycle #35 today and repeat his scans before cycle #37.        He will get C#33 today. Plan to repeat scans after C#36    Gout.  This was involving the right ankle and now it has resolved.  Continue allopurinol.  I refilled allopurinol    Dysphagia.  This is a stable.  Continue chewing the food thoroughly as he is doing.    Previously he had repeat EGD done as part of its workup was much improved apart from 1.2 cm malignant shallow ulcer   Esophogram showed extrinsic compression on the GE junction resulting in luminal narrowing and associated extended retention of barium tablet. This likely corresponds with soft tissue thickening about the diaphragmatic hiatus as demonstrated on prior CT examination. As mentioned above the repeat CT scan shows stable soft tissue circumferential thickening around the GE junction   We will repeat his scans after cycle #36      Skin toxicity from 5-FU.  This is mild and stable.  Continue the moisturizing creams.  I also asked him to try a urea-based creams to help with keeping the skin well moisturized       Anemia.  This is due to chemotherapy.  Hemoglobin is 12.9.  He is asymptomatic.  We will repeat labs in 2 weeks       Neuropathy. Stable to slightly improved- Cont to observe      We did not address the following today    His genetic testing did not show any identifiable mutation not reveal any identifiable mutation     Return to clinic in 2 weeks to get chemotherapy and then he will see me back in 4 weeks with a CT scan prior to that.    I answered all of his and his family's questions to their satisfaction. They're agreeable and comfortable with the plan.    LANEY HERNANDEZ MD

## 2018-08-22 NOTE — MR AVS SNAPSHOT
After Visit Summary   8/22/2018    Terry Yi    MRN: [id number]           Patient Information     Date Of Birth          1965        Visit Information        Provider Department      8/22/2018 8:30 AM [address]        Today's Diagnoses     Malignant neoplasm of overlapping sites of stomach (H)    -  1       Follow-ups after your visit        Your next 10 appointments already scheduled     Aug 31, 2018  8:30 AM CDT   CT CHEST ABDOMEN PELVIS W/O & W CONTRAST with MGCT1   [facility] ([facility])    [address] [place] [id number] [phone]           Please bring any scans or X-rays taken at other hospitals, if similar tests were done. Also bring a list of your medicines, including vitamins, minerals and over-the-counter drugs. It is safest to leave personal items at home.  Be sure to tell your doctor:   If you have any allergies.   If there s any chance you are pregnant.   If you are breastfeeding.  How to prepare:   Do not eat or drink for 2 hours before your exam. If you need to take medicine, you may take it with small sips of water. (We may ask you to take liquid medicine as well.)   Please wear loose clothing, such as a sweat suit or jogging clothes. Avoid snaps, zippers and other metal. We may ask you to undress and put on a hospital gown.  Please arrive 30 minutes early for your CT. Once in the department you might be asked to drink water 15-20 minutes prior to your exam.  If indicated you may be asked to drink an oral contrast in advance of your CT.  If this is the case, the imaging team will let you know or be in contact with you prior to your appointment  Patients over 70 or patients with diabetes or kidney problems:   If you haven t had a blood test (creatinine test) within the last 30 days, the Cardiologist/Radiologist may require you to get this test prior to your exam.  If you have diabetes:    Continue to take your metformin medication on the day of your exam  If you have any questions, please call the Imaging Department where you will have your exam.            Sep 05, 2018  9:30 AM CDT   Return Visit with [facility]   [facility]    [id number] [place] [id number] [phone]            Sep 05, 2018 10:15 AM CDT   Return Visit with Yakov Shaffer MD   [facility] ([facility])    [address] [place] [id number] [phone]            Sep 05, 2018 11:00 AM CDT   Level 2 with BAY 9 INFUSION   [facility])    [address] [place] [id number] [phone]              Who to contact     If you have questions or need follow up information about today's clinic visit or your schedule please contact [facility] directly at [phone].  Normal or non-critical lab and imaging results will be communicated to you by Widemilehart, letter or phone within 4 business days after the clinic has received the results. If you do not hear from us within 7 days, please contact the clinic through TrumpITt or phone. If you have a critical or abnormal lab result, we will notify you by phone as soon as possible.  Submit refill requests through Innovari or call your pharmacy and they will forward the refill request to us. Please allow 3 business days for your refill to be completed.          Additional Information About Your Visit        Widemilehart Information     Innovari gives you secure access to your electronic health record. If you see a primary care provider, you can also send messages to your care team and make appointments. If you have questions, please call your primary care clinic.  If you do not have a primary care provider, please call [phone] and they will assist you.      Innovari is an electronic gateway that  provides easy, online access to your medical records. With Marblar, you can request a clinic appointment, read your test results, renew a prescription or communicate with your care team.     To access your existing account, please contact your [facility] or call [phone] for assistance.        Care EveryWhere ID     This is your Care EveryWhere ID. This could be used by other organizations to access your [facility] medical records  [id number]        Your Vitals Were     Pulse Temperature Respirations Pulse Oximetry BMI (Body Mass Index)       67 97.6  F (36.4  C) (Oral) 18 98% 31.29 kg/m2        Blood Pressure from Last 3 Encounters:   08/22/18 (!) 138/96   08/08/18 135/82   07/25/18 144/86    Weight from Last 3 Encounters:   08/22/18 98.9 kg (218 lb 1.6 oz)   08/08/18 99.8 kg (220 lb)   07/25/18 98.7 kg (217 lb 9.6 oz)              Today, you had the following     No orders found for display       Primary Care Provider Office Phone # Fax #    Sandra Dickerson [phone]       [facility]  [address]  [facility] [address]        Equal Access to Services     CHANDAN GALLO : Hadii aad ku hadasho Soomaali, waaxda luqadaha, qaybta kaalmada adeegyada, waxay idiin hayrodrigon charley cowart . So [facility] [phone].    ATENCIÓN: Si habla español, tiene a cuevas disposición servicios gratuitos de asistencia lingüística. Llame al [phone].    We comply with applicable federal civil rights laws and [place] laws. We do not discriminate on the basis of race, color, national origin, age, disability, sex, sexual orientation, or gender identity.            Thank you!     Thank you for choosing [facility]  for your care. Our goal is always to provide you with excellent care. Hearing back from our patients is one way we can continue to improve our services. Please take a few minutes to complete the written survey that you may receive in the mail after  your visit with us. Thank you!             Your Updated Medication List - Protect others around you: Learn how to safely use, store and throw away your medicines at www.disposemymeds.org.          This list is accurate as of 8/22/18  1:08 PM.  Always use your most recent med list.                   Brand Name Dispense Instructions for use Diagnosis    ALEVE PO       Acute gout, unspecified cause, unspecified site       allopurinol 100 MG tablet    ZYLOPRIM    30 tablet    Take 1 tablet (100 mg) by mouth daily    Acute gout, unspecified cause, unspecified site, Malignant neoplasm of overlapping sites of stomach (H)       aspirin 81 MG chewable tablet      Take 81 mg by mouth daily        indomethacin 25 MG capsule    INDOCIN    42 capsule    Take 1 capsule (25 mg) by mouth 3 times daily (with meals)    Acute gout, unspecified cause, unspecified site       lidocaine-prilocaine cream    EMLA          LORazepam 0.5 MG tablet    ATIVAN    30 tablet    Take 1 tablet (0.5 mg) by mouth every 4 hours as needed (Anxiety, Nausea/Vomiting or Sleep)    Malignant neoplasm of overlapping sites of stomach (H)       MULTIVITAMIN ADULT PO           ondansetron 8 MG tablet    ZOFRAN    10 tablet    Take 1 tablet (8 mg) by mouth every 8 hours as needed (Nausea/Vomiting)    Malignant neoplasm of overlapping sites of stomach (H)       prochlorperazine 10 MG tablet    COMPAZINE    30 tablet    Take 1 tablet (10 mg) by mouth every 6 hours as needed (Nausea/Vomiting)    Malignant neoplasm of overlapping sites of stomach (H)       TYLENOL PO      Take by mouth as needed for mild pain or fever

## 2018-08-22 NOTE — PROGRESS NOTES
Infusion Nursing Note:  Terry Yi presents today for leucovorin/5FU.    Patient seen by provider today: No   present during visit today: Not Applicable.    Note: N/A.    Intravenous Access:  Implanted Port.    Treatment Conditions:  Results reviewed, labs MET treatment parameters, ok to proceed with treatment.    Post Infusion Assessment:  Patient tolerated infusion without incident.    Discharge Plan:   Pt will go to home clinic to have pump removed    GREY MARCELO RN

## 2018-08-22 NOTE — PROGRESS NOTES
Power port needle left accessed for treatment. Tolerated lab draw without complaint. Port site scrubbed with Chloraprep for 30 seconds. Accessed using sterile technique. [place] drawn-Red/Green/Purple tubes. Double signed by patient and RN. See documentation flowsheet. Arleth Brandon, RN, BSN, OCN

## 2018-08-22 NOTE — MR AVS SNAPSHOT
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 Also bring a list of your medicines, including vitamins, minerals and over-the-counter drugs. It is safest to leave personal items at home.  Be sure to tell your doctor:   If you have any allergies.   If there s any chance you are pregnant.   If you are breastfeeding.  How to prepare:   Do not eat or drink for 2 hours before your exam. If you need to take medicine, you may take it with small sips of water. (We may ask you to take liquid medicine as well.)   Please wear loose clothing, such as a sweat suit or jogging clothes. Avoid snaps, zippers and other metal. We may ask you to undress and put on a hospital gown.  Please arrive 30 minutes early for your CT. Once in the department you might be asked to drink water 15-20 minutes prior to your exam.  If indicated you may be asked to drink an oral contrast in advance of your CT.  If this is the case, the imaging team will let you know or be in contact with you prior to your appointment  Patients over 70 or patients with diabetes  or kidney problems:   If you haven t had a blood test (creatinine test) within the last 30 days, the Cardiologist/Radiologist may require you to get this test prior to your exam.  If you have diabetes:   Continue to take your metformin medication on the day of your exam  If you have any questions, please call the Imaging Department where you will have your exam.            Sep 05, 2018  9:30 AM CDT   Return Visit with NURSE [place] [facility]   [facility]    [address] [place] [address] [phone]            Sep 05, 2018 10:15 AM CDT   Return Visit with Yakov Shaffer MD   [facility] ([facility])    [address] [place] [address] [phone]            Sep 05, 2018 11:00 AM CDT   Level 2 with [address])    [address] [place] [address] [phone]              Who to contact     If you have questions or need follow up information about today's clinic visit or your schedule please contact [facility] directly at [phone].  Normal or non-critical lab and imaging results will be communicated to you by ADTZhart, letter or phone within 4 business days after the clinic has received the results. If you do not hear from us within 7 days, please contact the clinic through ADTZhart or phone. If you have a critical or abnormal lab result, we will notify you by phone as soon as possible.  Submit refill requests through [employer] or call your pharmacy and they will forward the refill request to us. Please allow 3 business days for your refill to be completed.          Additional Information About Your Visit        [employer] Information     [employer] gives you secure access to your electronic health record. If you see a primary care provider, you can also send messages to your care team and make appointments.  If you have questions, please call your primary care clinic.  If you do not have a primary care provider, please call [phone] and they will assist you.      [employer] is an electronic gateway that provides easy, online access to your medical records. With [employer], you can request a clinic appointment, read your test results, renew a prescription or communicate with your care team.     To access your existing account, please contact your [facility] or call [phone] for assistance.        Care EveryWhere ID     This is your Care EveryWhere ID. This could be used by other organizations to access your [place] medical records  [id number]         Blood Pressure from Last 3 Encounters:   08/08/18 135/82   07/25/18 144/86   07/11/18 121/79    Weight from Last 3 Encounters:   08/08/18 99.8 kg (220 lb)   07/25/18 98.7 kg (217 lb 9.6 oz)   07/11/18 98.2 kg (216 lb 9.6 oz)              We Performed the Following     CBC with platelets differential     Comprehensive metabolic panel        Primary Care Provider Office Phone # Fax #    Sandra Dickerson [phone]       [facility]  [address]  [facility] [address]        Equal Access to Services     CHANDAN GALLO : Hadii aad ku hadasho Soomaali, waaxda luqadaha, qaybta kaalmada adeegyada, waxay idiin hayrodrigon charley cowart . So [facility] [phone].    ATENCIÓN: Si habla español, tiene a cuevas disposición servicios gratuitos de asistencia lingüística. Llame al [phone].    We comply with applicable federal civil rights laws and [place] laws. We do not discriminate on the basis of race, color, national origin, age, disability, sex, sexual orientation, or gender identity.            Thank you!     Thank you for choosing [facility]  for your care. Our goal is always to provide you with excellent care. Hearing back from our patients is one way we can continue to improve our services. Please take  a few minutes to complete the written survey that you may receive in the mail after your visit with us. Thank you!             Your Updated Medication List - Protect others around you: Learn how to safely use, store and throw away your medicines at www.disposemymeds.org.          This list is accurate as of 8/22/18  8:23 AM.  Always use your most recent med list.                   Brand Name Dispense Instructions for use Diagnosis    ALEVE PO       Acute gout, unspecified cause, unspecified site       allopurinol 100 MG tablet    ZYLOPRIM    30 tablet    Take 1 tablet (100 mg) by mouth daily    Acute gout, unspecified cause, unspecified site, Malignant neoplasm of overlapping sites of stomach (H)       aspirin 81 MG chewable tablet      Take 81 mg by mouth daily        indomethacin 25 MG capsule    INDOCIN    42 capsule    Take 1 capsule (25 mg) by mouth 3 times daily (with meals)    Acute gout, unspecified cause, unspecified site       lidocaine-prilocaine cream    EMLA          LORazepam 0.5 MG tablet    ATIVAN    30 tablet    Take 1 tablet (0.5 mg) by mouth every 4 hours as needed (Anxiety, Nausea/Vomiting or Sleep)    Malignant neoplasm of overlapping sites of stomach (H)       MULTIVITAMIN ADULT PO           ondansetron 8 MG tablet    ZOFRAN    10 tablet    Take 1 tablet (8 mg) by mouth every 8 hours as needed (Nausea/Vomiting)    Malignant neoplasm of overlapping sites of stomach (H)       prochlorperazine 10 MG tablet    COMPAZINE    30 tablet    Take 1 tablet (10 mg) by mouth every 6 hours as needed (Nausea/Vomiting)    Malignant neoplasm of overlapping sites of stomach (H)       TYLENOL PO      Take by mouth as needed for mild pain or fever

## 2018-09-05 NOTE — MR AVS SNAPSHOT
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)    -  1      Care Instructions    Chemo today and in 2 weeks    No need to see a provider in 2 weeks    See me back in 4 weeks with labs and chemo          Follow-ups after your visit        Your next 10 appointments already scheduled     Sep 19, 2018  8:30 AM CDT   Return Visit with NURSE ONLY CANCER CENTER   [facility]    [address] [facility] [address]   [phone]            Sep 19, 2018  9:00 AM CDT   Level 2 with BAY 4 INFUSION   [facility])    [address] [facility] [address]   [phone]            Oct 03, 2018  9:00 AM CDT   Return Visit with NURSE ONLY CANCER CENTER   [facility] ([facility])    [address] [facility] [address]   [phone]            Oct 03, 2018  9:45 AM CDT   Return Visit with Yakov Shaffer MD   [facility] ([facility])    [address] [facility] [address]   [phone]            Oct 03, 2018 10:30 AM CDT   Level 2 with BAY 5 INFUSION   [facility])    [address] [facility] [address]   [phone]            Oct 17, 2018  8:00 AM CDT   Return Visit with NURSE ONLY CANCER CENTER   [facility] ([facility])    [address] [facility] [address]   [phone]            Oct 17, 2018  8:30 AM CDT   Level 2 with BAY 4 INFUSION   [facility])    [address] [address]  "[address]  [facility] [address] [phone]              Who to contact     If you have questions or need follow up information about today's clinic visit or your schedule please contact [facility] directly at [phone].  Normal or non-critical lab and imaging results will be communicated to you by MyChart, letter or phone within 4 business days after the clinic has received the results. If you do not hear from us within 7 days, please contact the clinic through [employer] or phone. If you have a critical or abnormal lab result, we will notify you by phone as soon as possible.  Submit refill requests through [employer] or call your pharmacy and they will forward the refill request to us. Please allow 3 business days for your refill to be completed.          Additional Information About Your Visit        [employer] Information     [employer] gives you secure access to your electronic health record. If you see a primary care provider, you can also send messages to your care team and make appointments. If you have questions, please call your primary care clinic.  If you do not have a primary care provider, please call [phone] and they will assist you.      [employer] is an electronic gateway that provides easy, online access to your medical records. With [employer], you can request a clinic appointment, read your test results, renew a prescription or communicate with your care team.     To access your existing account, please contact your [facility] or call [phone] for assistance.        Care EveryWhere ID     This is your Care EveryWhere ID. This could be used by other organizations to access your [place] medical records  [id number]        Your Vitals Were     Pulse Temperature Respirations Height Pulse Oximetry BMI (Body Mass Index)    72 97.9  F (36.6  C) 18 1.778 m (5' 10\") 98% 31.57 kg/m2       Blood Pressure from Last 3 Encounters:   09/05/18 123/81   08/22/18 (!) " 138/96   08/08/18 135/82    Weight from Last 3 Encounters:   09/05/18 99.8 kg (220 lb)   08/22/18 98.9 kg (218 lb 1.6 oz)   08/08/18 99.8 kg (220 lb)              Today, you had the following     No orders found for display       Primary Care Provider Office Phone # Fax #    Sandra Dickerson, SARAI [phone]       [facility]  [address]  [facility] [address]        Equal Access to Services     [facility]: Hadii aad ku hadasho Soomaali, waaxda luqadaha, qaybta kaalmada adeegyada, waxay idiin hayaan charley cowart . So [facility] [phone].    ATENCIÓN: Si habla español, tiene a cuevas disposición servicios gratuitos de asistencia lingüística. Ll[facility] [phone].    We comply with applicable federal civil rights laws and [place] laws. We do not discriminate on the basis of race, color, national origin, age, disability, sex, sexual orientation, or gender identity.            Thank you!     Thank you for choosing [facility]  for your care. Our goal is always to provide you with excellent care. Hearing back from our patients is one way we can continue to improve our services. Please take a few minutes to complete the written survey that you may receive in the mail after your visit with us. Thank you!             Your Updated Medication List - Protect others around you: Learn how to safely use, store and throw away your medicines at www.disposemymeds.org.          This list is accurate as of 9/5/18 11:15 AM.  Always use your most recent med list.                   Brand Name Dispense Instructions for use Diagnosis    ALEVE PO       Acute gout, unspecified cause, unspecified site       allopurinol 100 MG tablet    ZYLOPRIM    30 tablet    Take 1 tablet (100 mg) by mouth daily    Acute gout, unspecified cause, unspecified site, Malignant neoplasm of overlapping sites of stomach (H)       aspirin 81 MG chewable tablet      Take 81 mg by mouth daily        Fiber 5 GM/15ML Liqd            gabapentin 300 MG capsule    NEURONTIN     Take 1 capsule (300 mg total) by mouth once daily. For neuropathy        indomethacin 25 MG capsule    INDOCIN    42 capsule    Take 1 capsule (25 mg) by mouth 3 times daily (with meals)    Acute gout, unspecified cause, unspecified site       IRON SUPPLEMENT PO      Take by mouth daily (with breakfast)        lidocaine-prilocaine cream    EMLA          LORazepam 0.5 MG tablet    ATIVAN    30 tablet    Take 1 tablet (0.5 mg) by mouth every 4 hours as needed (Anxiety, Nausea/Vomiting or Sleep)    Malignant neoplasm of overlapping sites of stomach (H)       MULTIVITAMIN ADULT PO           ondansetron 8 MG tablet    ZOFRAN    10 tablet    Take 1 tablet (8 mg) by mouth every 8 hours as needed (Nausea/Vomiting)    Malignant neoplasm of overlapping sites of stomach (H)       prochlorperazine 10 MG tablet    COMPAZINE    30 tablet    Take 1 tablet (10 mg) by mouth every 6 hours as needed (Nausea/Vomiting)    Malignant neoplasm of overlapping sites of stomach (H)       TYLENOL PO      Take by mouth as needed for mild pain or fever

## 2018-09-05 NOTE — PROGRESS NOTES
9/5/2018     REASON FOR VISIT:  Follow-up for unresectable metastatic gastric carcinoma, diffuse type, grade 3, HER-2 negative, MSI-Intact. PD-L1 negative (<1%)     HISTORY OF PRESENT ILLNESS:  Please see my previous note for details.  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      CT CAP after C#6 shows stable disease    C# 9 FOLFOX 8/2/2017 ( Oxaliplatin dose reduced to 70mg/m2 due to neuropathy )  C#10 8/22/2017 ( Oxaliplatin dose reduced to 60mg/m2 due to neuropathy )  Delayed by 1 week- patient preference  C#11 9/6/17 5FU/LV ( Stopped Oxaliplatin )  C#12 9/20/2017 5FU/LV    Repeat CT scan after C#12 shows stable to slightly improved disease.    Repeat CT CAP on 12/22/17 after C#18 was stable    Repeat CT CAP on 3/16/18 after C#24 shows stable disease.    C# 27 4/18/2018 5FU/LV    As he was complaining of off and on trouble swallowing with food getting stuck, we repeated EGD on 4/27/2018 which showed a nodular area with possible shallow ulceration in the stomach. Otherwise esophagus and stomach and duodenum were normal.  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  He was referred to gastroenterologist for manometry    The biopsies which were taken from the nodular shallow ulcer aren't consistent with poorly differentiated diffuse type adenocarcinoma with signet ring features  Esophogram showed extrinsic compression on the GE junction resulting in luminal narrowing and associated extended retention of barium tablet. This likely corresponds with soft tissue thickening about the diaphragmatic hiatus as demonstrated on prior CT examination    Repeat CT scan on 6/8/18 after C#30 is stable with stable circumferential soft  tissue thickening at the GE Junction and stable thickness of stomach wall and peritoneal thickening    C#31 6/13/2018 5FU/LV  C#32 6/27/18  C#33 7/11/2018   C#34 7/25/18  C#35 8/8/2018  C#36 8/22/18    CT scan on 8/31/18- stable, with minimal increased fascial thickening along the right anterior pararenal fossa.        INTERVAL HISTORY:   He is doing well.  He is tolerating chemotherapy well.  Dysphagia is about the same and occasionally he gets problems on eating bread but yesterday he was able to eat Laura sandwich without any problems.  Denies nausea or vomiting.  He tells me that for 2 days with chemotherapy he remains constipated and then he has diarrhea for a couple of days.  He is trying liquid fiber.  He does not take anything else for it.  No pain.  He started taking gabapentin once a day for the last 2 weeks and he thinks his neuropathy has improved.  The skin of the palms is better.  He continues to apply moisturizing creams.  He is not exercising regularly.  No infections.  No new swellings.  Energy has been good.  He continues to take allopurinol.  Denies any more episodes of gout.   ECOG     ROS:  A comprehensive ROS was otherwise neg          MEDICATIONS:   Current Outpatient Prescriptions   Medication     Acetaminophen (TYLENOL PO)     allopurinol (ZYLOPRIM) 100 MG tablet     Ferrous Sulfate (IRON SUPPLEMENT PO)     Fiber 5 GM/15ML LIQD     lidocaine-prilocaine (EMLA) cream     Multiple Vitamins-Minerals (MULTIVITAMIN ADULT PO)     Naproxen Sodium (ALEVE PO)     aspirin 81 MG chewable tablet     gabapentin (NEURONTIN) 300 MG capsule     indomethacin (INDOCIN) 25 MG capsule     LORazepam (ATIVAN) 0.5 MG tablet     ondansetron (ZOFRAN) 8 MG tablet     prochlorperazine (COMPAZINE) 10 MG tablet     No current facility-administered medications for this visit.      Facility-Administered Medications Ordered in Other Visits   Medication     alteplase 2 mg/2 mL (in 10 mL syringe)     heparin 100 UNIT/ML  "injection 5 mL     sodium chloride (PF) 0.9% PF flush 10-20 mL            PHYSICAL EXAMINATION:   /81  Pulse 72  Temp 97.9  F (36.6  C)  Resp 18  Ht 1.778 m (5' 10\")  Wt 99.8 kg (220 lb)  SpO2 98%  BMI 31.57 kg/m2  CONSTITUTIONAL: No apparent distress  EYES: PERRLA, without pallor or jaundice  ENT/MOUTH: Ears unremarkable. No oral lesions  CVS: s1s2 normal  RESPIRATORY: Chest is clear  GI: Abdomen is benign  NEURO: Alert and oriented ×3.  Subjective neuropathy involving the feet and fingers  INTEGUMENT: no concerning skin rashes in the skin of the palms looks much better with the very mild peeling in the index finger and thumb  LYMPHATIC: no palpable lymphadenopathy  MUSCULOSKELETAL: Unremarkable. No bony tenderness.   EXTREMITIES: no pedal edema  PSYCH: Mentation, mood and affect are appropriate          LABS:   Reviewed and stable  Results for LEONOR RALPH (MRN [id number]) as of 9/5/2018 10:48   Ref. Range 9/5/2018 09:37   Sodium Latest Ref Range: 133 - 144 mmol/L 141   Potassium Latest Ref Range: 3.4 - 5.3 mmol/L 4.1   Chloride Latest Ref Range: 94 - 109 mmol/L 106   Carbon Dioxide Latest Ref Range: 20 - 32 mmol/L 32   Urea Nitrogen Latest Ref Range: 7 - 30 mg/dL 12   Creatinine Latest Ref Range: 0.66 - 1.25 mg/dL 0.79   GFR Estimate Latest Ref Range: >60 mL/min/1.7m2 >90   GFR Estimate If Black Latest Ref Range: >60 mL/min/1.7m2 >90   Calcium Latest Ref Range: 8.5 - 10.1 mg/dL 9.2   Anion Gap Latest Ref Range: 3 - 14 mmol/L 3   Albumin Latest Ref Range: 3.4 - 5.0 g/dL 3.7   Protein Total Latest Ref Range: 6.8 - 8.8 g/dL 7.0   Bilirubin Total Latest Ref Range: 0.2 - 1.3 mg/dL 0.5   Alkaline Phosphatase Latest Ref Range: 40 - 150 U/L 85   ALT Latest Ref Range: 0 - 70 U/L 43   AST Latest Ref Range: 0 - 45 U/L 26   Glucose Latest Ref Range: 70 - 99 mg/dL 97   WBC Latest Ref Range: 4.0 - 11.0 10e9/L 6.8   Hemoglobin Latest Ref Range: 13.3 - 17.7 g/dL 13.6   Hematocrit Latest Ref Range: 40.0 - 53.0 % " 40.9   Platelet Count Latest Ref Range: 150 - 450 10e9/L 197   RBC Count Latest Ref Range: 4.4 - 5.9 10e12/L 4.36 (L)   MCV Latest Ref Range: 78 - 100 fl 94   MCH Latest Ref Range: 26.5 - 33.0 pg 31.2   MCHC Latest Ref Range: 31.5 - 36.5 g/dL 33.3   RDW Latest Ref Range: 10.0 - 15.0 % 14.5   Diff Method Unknown Automated Method   % Neutrophils Latest Units: % 62.1   % Lymphocytes Latest Units: % 22.9   % Monocytes Latest Units: % 11.2   % Eosinophils Latest Units: % 2.5   % Basophils Latest Units: % 0.9   % Immature Granulocytes Latest Units: % 0.4   Absolute Neutrophil Latest Ref Range: 1.6 - 8.3 10e9/L 4.2   Absolute Lymphocytes Latest Ref Range: 0.8 - 5.3 10e9/L 1.6   Absolute Monocytes Latest Ref Range: 0.0 - 1.3 10e9/L 0.8   Absolute Eosinophils Latest Ref Range: 0.0 - 0.7 10e9/L 0.2   Absolute Basophils Latest Ref Range: 0.0 - 0.2 10e9/L 0.1     IMAGING:  Reviewed and stable    EXAMINATION: CT CHEST/ABDOMEN/PELVIS W CONTRAST  8/31/2018 8:54 AM       CLINICAL HISTORY: follow up for gastric ca; Acute gout, unspecified  cause, unspecified site; Malignant neoplasm of overlapping sites of  stomach (H). As per chart review: mPlease see my previous note for  details. ?Terry Yi is a 52-year-old male with linitis plastica and  metastatic periaortic lymph node in the aortocaval region, as well as  extensive infiltration of the lesser omentum and peripancreatic fat,  who started on chemotherapy with FOLFOX      COMPARISON: CT CAP on 06/08/2018, 03/16/2018     PROCEDURE COMMENTS: CT of the chest, abdomen, and pelvis was performed  with 134 ml isovue 370 intravenous and oral contrast. Axial MIP   images of the chest, and coronal and sagittal reformatted images of  the chest, abdomen, and pelvis obtained.     FINDINGS:    Support devices:   Right-sided Port-A-Cath with tip terminating at the atriocaval  junction.     Chest:  Small hypodense nodule arising from the right thyroid lobe, unchanged.     Heart is not  enlarged. Trace pericardial fluid. No central pulmonary  embolism. Calcified mediastinal and hilar lymph nodes, stable. No  axillary adenopathy.     Trachea and central airway are patent. No focal consolidation or  pleural effusion. Multiple calcified pulmonary nodules, unchanged.  Previously seen 6 mm nodule along the right minor fissure, likely to  represent a perifissural lymph node. No suspicious pulmonary nodules.     Abdomen/pelvis:  Mild diffuse gastric wall thickening and mucosal hyperenhancement is  not significantly changed compared to 06/08/2018 study.  Circumferential soft tissue thickening around the gastroesophageal  junction is also not significantly change. Persistent hazy/amorphous  retroperitoneal stranding extending from the level of the marychuy  hepatis to the level of the aortic bifurcation, unchanged. Mesenteric  and para-aortic lymph nodes are again seen. No bowel obstruction.  Colonic diverticulosis without diverticulitis. Mild circumferential  thickening of rectosigmoid wall, new or increased from prior.     1 x 1 cm subcapsular lesion in right hepatic lobe, not significant  changed with features suggestive of a hemangioma. Subcentimeter  hypodense lesion in the medial segment of the left hepatic lobe  (series 3, image 154), too small to accurately characterize, but  stable from the prior. Pancreas appears atrophic with diffuse fatty  infiltration. Gallbladder, spleen, adrenal glands are unremarkable.  Prominent right pelvocaliectasis without hydronephrosis; there is mild  urothelial thickening and enhancement which is new. Small  hypodensities arising from kidneys are too small to characterize, but  they favored cyst, unchanged. Mild progression of fascial thickening  along the right anterior pararenal fossa when compared to prior study  (series 3 image 179). Stable thickening anterior to the left perirenal  fascia. Urinary bladder is unremarkable. Internal aspiration of  prostate  gland.     Major vasculature is patent. Calcification of abdominal aorta and  common iliac arteries. No infrarenal aortic aneurysm. Replaced right  hepatic artery arises from the SMA.     Soft tissue/bones: Unchanged degenerative cystic changes and  fragmentation about the right acetabulum. No acute bony lesion.         IMPRESSION: In this patient with linitis plastica   1a. Mild diffuse gastric wall and circumferential soft tissue  thickening at the level of gastroesophageal junction is not  significantly changed when compared to 06/08/2018.  1b. Persistent hazy and amorphous retrograde stranding extending from  the level of the marychuy hepatis to the level of the aortic bifurcation,  unchanged. Numerous unchanged mesenteric and periaortic lymph nodes.   1c. Mild progression of peritoneal/fascial thickening along the right  anterior pararenal fossa. Stable peritoneal thickening anterior to the  left perirenal fossa.  2. Sequela of granulomatous disease in the lung as evidenced by  multiple calcified mediastinal/hilar lymph nodes and pulmonary  nodules. Previously seen 6 mm nodule along the right minor fissure,  likely to represent a perifissural lymph node.  3. New or significantly increased circumferential wall thickening  involving the rectosigmoid colon suggestive of proctocolitis in the  appropriate clinical setting.      EXAMINATION: CT CHEST/ABDOMEN/PELVIS W CONTRAST, 6/8/2018 8:28 AM     TECHNIQUE:  Helical CT images from the thoracic inlet through the  symphysis pubis were obtained  with contrast. Contrast dose: 135 ml  isovue 370     COMPARISON: 3/16/2018, PET CT 3/24/2017     HISTORY: follow up for gastric ca; Malignant neoplasm of overlapping  sites of stomach (H). Status post chemotherapy.     FINDINGS:     Chest: Right IJ port tip within the low SVC. Heart size is normal.  Normal caliber of the aorta and pulmonary artery. No central pulmonary  embolus. Visualized thyroid is unremarkable. Multiple  calcified  mediastinal and bilateral hilar lymph nodes, not significantly  changed. No new or enlarged lymph nodes in the chest or axilla. The  upper and mid esophagus appear normal. Findings at the GE junction see  separate dissection. Central airway is patent. Scattered calcified  granulomas. Scattered small indeterminate noncalcified nodules are  unchanged from at least 3/24/2017, for example 6 mm nodule along the  right minor fissure (series 7 image 81).     Abdomen and pelvis: Mild diffuse gastric wall thickening is not  significantly changed. Circumferential soft tissue thickening around  the gastroesophageal junction (series 3 image 128), measuring up to  1.2 cm in thickness which is not significantly changed. Hazy fat  stranding and mildly prominent lymph nodes in the retroperitoneum and  lesser sac are not significantly changed. For example rounded 1.2 cm  short axis diameter marychuy hepatis node (series 2 image 155). Unchanged  peritoneal thickening along the left anterior pararenal fascia (series  3 image 195).     Unchanged cyst in hepatic segment 4. 1.4 cm subcapsular lesion in the  right hepatic lobe (series 3 image 149) is not significantly changed  and has enhancement characteristics consistent with the benign  cavernous hemangioma. The gallbladder, pancreas, spleen, and adrenals  are within normal limits. Few hypoattenuating foci in both kidneys are  too small to characterize. Bladder is unremarkable. Prostate is  enlarged. Few colonic diverticula without evidence of diverticulitis.  The appendix and small bowel are within normal limits. Major  vasculature is patent. Aorta is normal in caliber with mild-moderate  atherosclerosis. No free air or significant free fluid.     Bones and soft tissues: Unchanged mild compression deformity of the  T12 vertebral body. Unchanged degenerative cystic change and  fragmentation about the right acetabulum.         IMPRESSION:   1. Mild diffuse gastric wall  thickening and circumferential soft  tissue thickening around the gastroesophageal junction are not  significantly changed.  2. Unchanged fat stranding and mildly prominent lymph nodes in the  lesser sac and retroperitoneum.  3. Unchanged peritoneal thickening most prominent along the left  anterior pararenal fascia.   4. Sequelae of granulomatous disease in the lungs. A few small  noncalcified pulmonary nodules are unchanged from 3/24/2017. Attention  on follow-up examination.      Esophagram dated 5/16/2018     COMPARISON:    CT dated 3/16/2018     HISTORY:    Esophageal dysphagia in a patient with history of gastric  carcinoma.     FINDINGS: Examination of the esophagus reveals adequate primary and  secondary peristalsis. There is distal esophageal narrowing at the  gastroesophageal junction without associated mucosal abnormality, with  apparent extrinsic compression. This may correspond with soft tissue  thickening seen at the diaphragmatic hiatus on CT dated 3/16/2018. The  gastroesophageal junction is patent. No hiatal hernia was seen on  examination. There was absence of gastroesophageal reflux.     Fluoroscopy time was 1.6 minutes.     A 12mm barium tablet was administered and did not initially pass  through the gastroesophageal junction. 15 minutes after initial  administration and following ingestion of pudding and thick barium,  the barium tablet did pass through the gastroesophageal junction.         IMPRESSION: Extrinsic compression on the gastroesophageal junction  results in luminal narrowing and associated extended retention of  barium tablet. This likely corresponds with soft tissue thickening  about the diaphragmatic hiatus as demonstrated on prior CT  examination.            EGD 4/27/18  Findings:        One 12 mm nodular area with possible shallow uleration was found in the        gastric fundus. Biopsies were taken with a cold forceps for histology.        The esophagus was normal.        The  stomach was normal except as noted above..        The examined duodenum was normal.       ORIGINAL REPORT:     SPECIMEN(S):   Stomach biopsy, fundus     FINAL DIAGNOSIS:   STOMACH, FUNDUS, BIOPSY:   - Poorly differentiated carcinoma (diffuse type) with signet ring features   - Biopsy is limited to mucosa     ASSESSMENT AND PLAN:      Metastatic gastric cancer, HER-2/catrachita negative, MSI intact, PD-L1 neg (<1%), presenting with early satiety and significant weight loss and epigastric discomfort upon eating with some vomiting.  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  There is an indeterminate right liver lobe lesion which has remained stable and likely is not a metastasis.      We started palliative FOLFOX on 03/12/2017.     Oxaliplatin was dropped from cycle #11 onwards because of neuropathy     Scans after C#18 show stable disease    Repeat CT CAP after 24 cycles also show stable disease so we continued with 5FU/LV.     CT scan after cycle #30 - stable disease.    CT scan on 8/31/18 after C#36 also is stable.  I discussed with radiology and the subtle finding of fascial thickening around the pararenal fossa at this time is not very convincing progression of the disease  I discussed the findings with him and since he is doing well it is reasonable to continue the same chemotherapy without making any changes  He will get cycle #37 today        Dysphagia.  This is stable.  He is chewing the food well before swallowing and this is helping.    previously he had repeat EGD done as part of its workup was much improved apart from 1.2 cm malignant shallow ulcer   Esophogram showed extrinsic compression on the GE junction resulting in luminal narrowing and associated extended retention of barium tablet. This likely corresponds with soft tissue thickening about the diaphragmatic hiatus as demonstrated on prior CT examination.  Repeat CT scan shows stable soft tissue circumferential thickening around the GE junction     Constipation/diarrhea.  He gets constipated for 2 days after chemotherapy and then he has diarrhea for a couple of days.  I told him to start taking stool softeners on the day of chemotherapy so as to prevent constipation and see if it helps.  If the diarrhea gets worse he can take Imodium as needed    Skin toxicity from 5-FU.  Overall his skin looks better today.  Continue to apply the moisturizing creams as before.  Previously I had mentioned to him to try a urea-based creams       Neuropathy.  He is now using gabapentin once a day and is tolerating it well and he thinks neuropathy has improved.  Continue the same     Discussion regarding exercise.  He is not exercising regularly and I told him that he should follow a regular pattern of exercise to keep himself healthy    Gout.  The acute episode has resolved and now he is on allopurinol         We did not address the following today    His genetic testing did not show any identifiable mutation not reveal any identifiable mutation       He will get chemotherapy every 2 weeks and I will see him back in 4 weeks    I answered all of his and his family's questions to their satisfaction. They're agreeable and comfortable with the plan.    LANEY HERNANDEZ MD

## 2018-09-05 NOTE — PROGRESS NOTES
Unable to obtain blood return from port. Flushed easily and patient denied discomfort to port site. Alteplase ordered. Labs drawn peripherally. Port needle left accessed for treatment.Port site scrubbed with Chloraprep for 30 seconds. Accessed using sterile technique. See documentation flowsheet. Arleth Brandon, RN, BSN, OCN    Arleth Brandon, RN

## 2018-09-05 NOTE — PATIENT INSTRUCTIONS
Chemo today and in 2 weeks    No need to see a provider in 2 weeks    See me back in 4 weeks with labs and chemo

## 2018-09-05 NOTE — PROGRESS NOTES
"Infusion Nursing Note:  Terry Yi presents today for C37 D1 Leucovorin, Fluorouracil bolus and continuous infusion.    Patient seen by provider today: Yes: Dr Shaffer   present during visit today: Not Applicable.    Note: Withdrew 5ml  from port, after Alteplase administered, and received brisk blood return.    Intravenous Access:  Implanted Port.    Treatment Conditions:  Lab Results   Component Value Date    HGB 13.6 09/05/2018     Lab Results   Component Value Date    WBC 6.8 09/05/2018      Lab Results   Component Value Date    ANEU 4.2 09/05/2018     Lab Results   Component Value Date     09/05/2018      Lab Results   Component Value Date     09/05/2018                   Lab Results   Component Value Date    POTASSIUM 4.1 09/05/2018           No results found for: MAG         Lab Results   Component Value Date    CR 0.79 09/05/2018                   Lab Results   Component Value Date    BRITTNEY 9.2 09/05/2018                Lab Results   Component Value Date    BILITOTAL 0.5 09/05/2018           Lab Results   Component Value Date    ALBUMIN 3.7 09/05/2018                    Lab Results   Component Value Date    ALT 43 09/05/2018           Lab Results   Component Value Date    AST 26 09/05/2018       Results reviewed, labs MET treatment parameters, ok to proceed with treatment.      Post Infusion Assessment:  Patient tolerated infusion without incident.  Blood return noted pre and post infusion.  Site patent and intact, free from redness, edema or discomfort.  No evidence of extravasations.    Prior to discharge: Port is secured in place with tegaderm and flushed with 10cc NS with positive blood return noted.  Continuous home infusion Dosi-Fuser pump connected.    All connectors secured in place and clamps taped open.    Pump started, \"running\" noted on display (CADD): Not Applicable.  Patient instructed to call our clinic with any questions or concerns at home.  Patient verbalized " understanding.    Patient set up for pump disconnect at his local clinic on 9/7/18.        Discharge Plan:   Patient will return 9/19/18 for next appointment.   Patient discharged in stable condition accompanied by: wife, son and friend.  Departure Mode: Ambulatory.    Ashli Chavez RN

## 2018-09-05 NOTE — MR AVS SNAPSHOT
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)    -  1       Follow-ups after your visit        Your next 10 appointments already scheduled     Sep 19, 2018  8:30 AM CDT   Return Visit with [facility]SE ONLY CANCER CENTER   [facility] ([facility]    [address] [facility] [address]   [phone]            Sep 19, 2018  9:00 AM CDT   Level 2 with BAY 4 INFUSION   [facility] ([facility])    [address] [facility] [address]   [phone]            Oct 03, 2018  9:00 AM CDT   Return Visit with [facility]SE ONLY CANCER CENTER   [facility] ([facility])    [address] [facility] [address]   [phone]            Oct 03, 2018  9:45 AM CDT   Return Visit with Yakov Shaffer MD   [facility] ([facility])    [address] [facility] [address]   [phone]            Oct 03, 2018 10:30 AM CDT   Level 2 with BAY 5 INFUSION   [facility] ([facility])    [address] [facility] [address]   [phone]            Oct 17, 2018  8:00 AM CDT   Return Visit with [facility]URSE ONLY CANCER CENTER   [facility] ([facility])    [address] [facility] [address]   [phone]            Oct 17, 2018  8:30 AM CDT   Level 2 with BAY 4 INFUSION   [facility] ([facility])    [address] [facility] [address]   [phone]              Who to contact     If you have questions or need follow up information about today's  clinic visit or your schedule please contact [facility] directly at [phone].  Normal or non-critical lab and imaging results will be communicated to you by KeepFuhart, letter or phone within 4 business days after the clinic has received the results. If you do not hear from us within 7 days, please contact the clinic through KeepFuhart or phone. If you have a critical or abnormal lab result, we will notify you by phone as soon as possible.  Submit refill requests through DailyDigital or call your pharmacy and they will forward the refill request to us. Please allow 3 business days for your refill to be completed.          Additional Information About Your Visit        DailyDigital Information     DailyDigital gives you secure access to your electronic health record. If you see a primary care provider, you can also send messages to your care team and make appointments. If you have questions, please call your primary care clinic.  If you do not have a primary care provider, please call [phone] and they will assist you.      DailyDigital is an electronic gateway that provides easy, online access to your medical records. With DailyDigital, you can request a clinic appointment, read your test results, renew a prescription or communicate with your care team.     To access your existing account, please contact your [facility] or call [phone] for assistance.        Care EveryWhere ID     This is your Care EveryWhere ID. This could be used by other organizations to access your [place] medical records  [id number]         Blood Pressure from Last 3 Encounters:   09/05/18 123/81   08/22/18 (!) 138/96   08/08/18 135/82    Weight from Last 3 Encounters:   09/05/18 99.8 kg (220 lb)   08/22/18 98.9 kg (218 lb 1.6 oz)   08/08/18 99.8 kg (220 lb)              Today, you had the following     No orders found for display       Primary Care Provider Office Phone # Fax #    Sandra Dickerson NP  [phone]       [facility]  [address]  [facility] [address]        Equal Access to Services     CHANDAN GALLO : Hadbrendon tk pelaez karson [place], marekda breannapacheco, severinota kajoshuada tim, claire june laSyedguillermo browne. So [facility] [phone].    ATENCIÓN: Si habla español, tiene a cuevas disposición servicios gratuitos de asistencia lingüística. Llame al [phone].    We comply with applicable federal civil rights laws and [place] laws. We do not discriminate on the basis of race, color, national origin, age, disability, sex, sexual orientation, or gender identity.            Thank you!     Thank you for choosing [facility]  for your care. Our goal is always to provide you with excellent care. Hearing back from our patients is one way we can continue to improve our services. Please take a few minutes to complete the written survey that you may receive in the mail after your visit with us. Thank you!             Your Updated Medication List - Protect others around you: Learn how to safely use, store and throw away your medicines at www.disposemymeds.org.          This list is accurate as of 9/5/18  2:11 PM.  Always use your most recent med list.                   Brand Name Dispense Instructions for use Diagnosis    ALEVE PO       Acute gout, unspecified cause, unspecified site       allopurinol 100 MG tablet    ZYLOPRIM    30 tablet    Take 1 tablet (100 mg) by mouth daily    Acute gout, unspecified cause, unspecified site, Malignant neoplasm of overlapping sites of stomach (H)       aspirin 81 MG chewable tablet      Take 81 mg by mouth daily        Fiber 5 GM/15ML Liqd           gabapentin 300 MG capsule    NEURONTIN     Take 1 capsule (300 mg total) by mouth once daily. For neuropathy        indomethacin 25 MG capsule    INDOCIN    42 capsule    Take 1 capsule (25 mg) by mouth 3 times daily (with meals)    Acute gout, unspecified cause, unspecified site        IRON SUPPLEMENT PO      Take by mouth daily (with breakfast)        lidocaine-prilocaine cream    EMLA          LORazepam 0.5 MG tablet    ATIVAN    30 tablet    Take 1 tablet (0.5 mg) by mouth every 4 hours as needed (Anxiety, Nausea/Vomiting or Sleep)    Malignant neoplasm of overlapping sites of stomach (H)       MULTIVITAMIN ADULT PO           ondansetron 8 MG tablet    ZOFRAN    10 tablet    Take 1 tablet (8 mg) by mouth every 8 hours as needed (Nausea/Vomiting)    Malignant neoplasm of overlapping sites of stomach (H)       prochlorperazine 10 MG tablet    COMPAZINE    30 tablet    Take 1 tablet (10 mg) by mouth every 6 hours as needed (Nausea/Vomiting)    Malignant neoplasm of overlapping sites of stomach (H)       TYLENOL PO      Take by mouth as needed for mild pain or fever

## 2018-09-05 NOTE — MR AVS SNAPSHOT
After Visit Summary   9/5/2018    Terry Yi    MRN: [id number]           Patient Information     Date Of Birth          1965        Visit Information        Provider Department      9/5/2018 9:30 AM NURSE ONLY CANCER CENTER [facility]        Today's Diagnoses     Malignant neoplasm of overlapping sites of stomach (H)    -  1    Thrombosis complicating venous access device (H)           Follow-ups after your visit        Your next 10 appointments already scheduled     Sep 05, 2018 10:15 AM CDT   Return Visit with Yakov Shaffer MD   [facility] ([facility])    [address] [place] [address] [phone]            Sep 05, 2018 11:00 AM CDT   Level 2 with [address])    [address] [place] [address] [phone]              Who to contact     If you have questions or need follow up information about today's clinic visit or your schedule please contact [facility] directly at [phone].  Normal or non-critical lab and imaging results will be communicated to you by Cardiosolutionshart, letter or phone within 4 business days after the clinic has received the results. If you do not hear from us within 7 days, please contact the clinic through [facility] or phone. If you have a critical or abnormal lab result, we will notify you by phone as soon as possible.  Submit refill requests through mydala or call your pharmacy and they will forward the refill request to us. Please allow 3 business days for your refill to be completed.          Additional Information About Your Visit        Cardiosolutionshart Information     mydala gives you secure access to your electronic health record. If you see a primary care provider, you can also send messages to your care team and make appointments. If you have questions, please call your primary care clinic.  If you do  not have a primary care provider, please call [phone] and they will assist you.      yWorld is an electronic gateway that provides easy, online access to your medical records. With yWorld, you can request a clinic appointment, read your test results, renew a prescription or communicate with your care team.     To access your existing account, please contact your [facility] or call [phone] for assistance.        Care EveryWhere ID     This is your Care EveryWhere ID. This could be used by other organizations to access your [place] medical records  [id number]         Blood Pressure from Last 3 Encounters:   08/22/18 (!) 138/96   08/08/18 135/82   07/25/18 144/86    Weight from Last 3 Encounters:   08/22/18 98.9 kg (218 lb 1.6 oz)   08/08/18 99.8 kg (220 lb)   07/25/18 98.7 kg (217 lb 9.6 oz)              We Performed the Following     CBC with platelets differential     Comprehensive metabolic panel        Primary Care Provider Office Phone # Fax #    Sandra Dickerson, SARAI [phone]       [facility]  [address]  [facility] [address]        Equal Access to Services     CHANDAN GALLO : Hadii aad ku hadasho Soomaali, waaxda luqadaha, qaybta kaalmada adeegyada, waxay prabhain jesusn charley cowart . So [facility] [phone].    ATENCIÓN: Si habla español, tiene a cuevas disposición servicios gratuitos de asistencia lingüística. Llame al [phone].    We comply with applicable federal civil rights laws and [place] laws. We do not discriminate on the basis of race, color, national origin, age, disability, sex, sexual orientation, or gender identity.            Thank you!     Thank you for choosing [facility]  for your care. Our goal is always to provide you with excellent care. Hearing back from our patients is one way we can continue to improve our services. Please take a few minutes to complete the written survey that you may receive  in the mail after your visit with us. Thank you!             Your Updated Medication List - Protect others around you: Learn how to safely use, store and throw away your medicines at www.disposemymeds.org.          This list is accurate as of 9/5/18  9:49 AM.  Always use your most recent med list.                   Brand Name Dispense Instructions for use Diagnosis    ALEVE PO       Acute gout, unspecified cause, unspecified site       allopurinol 100 MG tablet    ZYLOPRIM    30 tablet    Take 1 tablet (100 mg) by mouth daily    Acute gout, unspecified cause, unspecified site, Malignant neoplasm of overlapping sites of stomach (H)       aspirin 81 MG chewable tablet      Take 81 mg by mouth daily        indomethacin 25 MG capsule    INDOCIN    42 capsule    Take 1 capsule (25 mg) by mouth 3 times daily (with meals)    Acute gout, unspecified cause, unspecified site       lidocaine-prilocaine cream    EMLA          LORazepam 0.5 MG tablet    ATIVAN    30 tablet    Take 1 tablet (0.5 mg) by mouth every 4 hours as needed (Anxiety, Nausea/Vomiting or Sleep)    Malignant neoplasm of overlapping sites of stomach (H)       MULTIVITAMIN ADULT PO           ondansetron 8 MG tablet    ZOFRAN    10 tablet    Take 1 tablet (8 mg) by mouth every 8 hours as needed (Nausea/Vomiting)    Malignant neoplasm of overlapping sites of stomach (H)       prochlorperazine 10 MG tablet    COMPAZINE    30 tablet    Take 1 tablet (10 mg) by mouth every 6 hours as needed (Nausea/Vomiting)    Malignant neoplasm of overlapping sites of stomach (H)       TYLENOL PO      Take by mouth as needed for mild pain or fever

## 2018-09-05 NOTE — LETTER
9/5/2018         RE: Terry Yi  [address] [place] [address]        Dear Colleague,    Thank you for referring your patient, Terry Yi, to the [facility]. Please see a copy of my visit note below.    9/5/2018     REASON FOR VISIT:  Follow-up for unresectable metastatic gastric carcinoma, diffuse type, grade 3, HER-2 negative, MSI-Intact. PD-L1 negative (<1%)     HISTORY OF PRESENT ILLNESS:  Please see my previous note for details.  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      CT CAP after C#6 shows stable disease    C# 9 FOLFOX 8/2/2017 ( Oxaliplatin dose reduced to 70mg/m2 due to neuropathy )  C#10 8/22/2017 ( Oxaliplatin dose reduced to 60mg/m2 due to neuropathy )  Delayed by 1 week- patient preference  C#11 9/6/17 5FU/LV ( Stopped Oxaliplatin )  C#12 9/20/2017 5FU/LV    Repeat CT scan after C#12 shows stable to slightly improved disease.    Repeat CT CAP on 12/22/17 after C#18 was stable    Repeat CT CAP on 3/16/18 after C#24 shows stable disease.    C# 27 4/18/2018 5FU/LV    As he was complaining of off and on trouble swallowing with food getting stuck, we repeated EGD on 4/27/2018 which showed a nodular area with possible shallow ulceration in the stomach. Otherwise esophagus and stomach and duodenum were normal.  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  He was referred to gastroenterologist for manometry    The biopsies which were taken from the nodular shallow ulcer aren't consistent with poorly differentiated diffuse type adenocarcinoma with signet ring features  Esophogram showed extrinsic compression on the GE junction resulting in luminal narrowing and  associated extended retention of barium tablet. This likely corresponds with soft tissue thickening about the diaphragmatic hiatus as demonstrated on prior CT examination    Repeat CT scan on 6/8/18 after C#30 is stable with stable circumferential soft tissue thickening at the GE Junction and stable thickness of stomach wall and peritoneal thickening    C#31 6/13/2018 5FU/LV  C#32 6/27/18  C#33 7/11/2018   C#34 7/25/18  C#35 8/8/2018  C#36 8/22/18    CT scan on 8/31/18- stable, with minimal increased fascial thickening along the right anterior pararenal fossa.        INTERVAL HISTORY:   He is doing well.  He is tolerating chemotherapy well.  Dysphagia is about the same and occasionally he gets problems on eating bread but yesterday he was able to eat Laura sandwich without any problems.  Denies nausea or vomiting.  He tells me that for 2 days with chemotherapy he remains constipated and then he has diarrhea for a couple of days.  He is trying liquid fiber.  He does not take anything else for it.  No pain.  He started taking gabapentin once a day for the last 2 weeks and he thinks his neuropathy has improved.  The skin of the palms is better.  He continues to apply moisturizing creams.  He is not exercising regularly.  No infections.  No new swellings.  Energy has been good.  He continues to take allopurinol.  Denies any more episodes of gout.   ECOG     ROS:  A comprehensive ROS was otherwise neg          MEDICATIONS:   Current Outpatient Prescriptions   Medication     Acetaminophen (TYLENOL PO)     allopurinol (ZYLOPRIM) 100 MG tablet     Ferrous Sulfate (IRON SUPPLEMENT PO)     Fiber 5 GM/15ML LIQD     lidocaine-prilocaine (EMLA) cream     Multiple Vitamins-Minerals (MULTIVITAMIN ADULT PO)     Naproxen Sodium (ALEVE PO)     aspirin 81 MG chewable tablet     gabapentin (NEURONTIN) 300 MG capsule     indomethacin (INDOCIN) 25 MG capsule     LORazepam (ATIVAN) 0.5 MG tablet     ondansetron (ZOFRAN) 8 MG tablet      "prochlorperazine (COMPAZINE) 10 MG tablet     No current facility-administered medications for this visit.      Facility-Administered Medications Ordered in Other Visits   Medication     alteplase 2 mg/2 mL (in 10 mL syringe)     heparin 100 UNIT/ML injection 5 mL     sodium chloride (PF) 0.9% PF flush 10-20 mL            PHYSICAL EXAMINATION:   /81  Pulse 72  Temp 97.9  F (36.6  C)  Resp 18  Ht 1.778 m (5' 10\")  Wt 99.8 kg (220 lb)  SpO2 98%  BMI 31.57 kg/m2  CONSTITUTIONAL: No apparent distress  EYES: PERRLA, without pallor or jaundice  ENT/MOUTH: Ears unremarkable. No oral lesions  CVS: s1s2 normal  RESPIRATORY: Chest is clear  GI: Abdomen is benign  NEURO: Alert and oriented ×3.  Subjective neuropathy involving the feet and fingers  INTEGUMENT: no concerning skin rashes in the skin of the palms looks much better with the very mild peeling in the index finger and thumb  LYMPHATIC: no palpable lymphadenopathy  MUSCULOSKELETAL: Unremarkable. No bony tenderness.   EXTREMITIES: no pedal edema  PSYCH: Mentation, mood and affect are appropriate          LABS:   Reviewed and stable  Results for LEONOR RALPH (MRN [id number]) as of 9/5/2018 10:48   Ref. Range 9/5/2018 09:37   Sodium Latest Ref Range: 133 - 144 mmol/L 141   Potassium Latest Ref Range: 3.4 - 5.3 mmol/L 4.1   Chloride Latest Ref Range: 94 - 109 mmol/L 106   Carbon Dioxide Latest Ref Range: 20 - 32 mmol/L 32   Urea Nitrogen Latest Ref Range: 7 - 30 mg/dL 12   Creatinine Latest Ref Range: 0.66 - 1.25 mg/dL 0.79   GFR Estimate Latest Ref Range: >60 mL/min/1.7m2 >90   GFR Estimate If Black Latest Ref Range: >60 mL/min/1.7m2 >90   Calcium Latest Ref Range: 8.5 - 10.1 mg/dL 9.2   Anion Gap Latest Ref Range: 3 - 14 mmol/L 3   Albumin Latest Ref Range: 3.4 - 5.0 g/dL 3.7   Protein Total Latest Ref Range: 6.8 - 8.8 g/dL 7.0   Bilirubin Total Latest Ref Range: 0.2 - 1.3 mg/dL 0.5   Alkaline Phosphatase Latest Ref Range: 40 - 150 U/L 85   ALT Latest Ref " Range: 0 - 70 U/L 43   AST Latest Ref Range: 0 - 45 U/L 26   Glucose Latest Ref Range: 70 - 99 mg/dL 97   WBC Latest Ref Range: 4.0 - 11.0 10e9/L 6.8   Hemoglobin Latest Ref Range: 13.3 - 17.7 g/dL 13.6   Hematocrit Latest Ref Range: 40.0 - 53.0 % 40.9   Platelet Count Latest Ref Range: 150 - 450 10e9/L 197   RBC Count Latest Ref Range: 4.4 - 5.9 10e12/L 4.36 (L)   MCV Latest Ref Range: 78 - 100 fl 94   MCH Latest Ref Range: 26.5 - 33.0 pg 31.2   MCHC Latest Ref Range: 31.5 - 36.5 g/dL 33.3   RDW Latest Ref Range: 10.0 - 15.0 % 14.5   Diff Method Unknown Automated Method   % Neutrophils Latest Units: % 62.1   % Lymphocytes Latest Units: % 22.9   % Monocytes Latest Units: % 11.2   % Eosinophils Latest Units: % 2.5   % Basophils Latest Units: % 0.9   % Immature Granulocytes Latest Units: % 0.4   Absolute Neutrophil Latest Ref Range: 1.6 - 8.3 10e9/L 4.2   Absolute Lymphocytes Latest Ref Range: 0.8 - 5.3 10e9/L 1.6   Absolute Monocytes Latest Ref Range: 0.0 - 1.3 10e9/L 0.8   Absolute Eosinophils Latest Ref Range: 0.0 - 0.7 10e9/L 0.2   Absolute Basophils Latest Ref Range: 0.0 - 0.2 10e9/L 0.1     IMAGING:  Reviewed and stable    EXAMINATION: CT CHEST/ABDOMEN/PELVIS W CONTRAST  8/31/2018 8:54 AM       CLINICAL HISTORY: follow up for gastric ca; Acute gout, unspecified  cause, unspecified site; Malignant neoplasm of overlapping sites of  stomach (H). As per chart review: mPlease see my previous note for  details. ?Terry Yi is a 52-year-old male with linitis plastica and  metastatic periaortic lymph node in the aortocaval region, as well as  extensive infiltration of the lesser omentum and peripancreatic fat,  who started on chemotherapy with FOLFOX      COMPARISON: CT CAP on 06/08/2018, 03/16/2018     PROCEDURE COMMENTS: CT of the chest, abdomen, and pelvis was performed  with 134 ml isovue 370 intravenous and oral contrast. Axial MIP   images of the chest, and coronal and sagittal reformatted images of  the chest,  abdomen, and pelvis obtained.     FINDINGS:    Support devices:   Right-sided Port-A-Cath with tip terminating at the atriocaval  junction.     Chest:  Small hypodense nodule arising from the right thyroid lobe, unchanged.     Heart is not enlarged. Trace pericardial fluid. No central pulmonary  embolism. Calcified mediastinal and hilar lymph nodes, stable. No  axillary adenopathy.     Trachea and central airway are patent. No focal consolidation or  pleural effusion. Multiple calcified pulmonary nodules, unchanged.  Previously seen 6 mm nodule along the right minor fissure, likely to  represent a perifissural lymph node. No suspicious pulmonary nodules.     Abdomen/pelvis:  Mild diffuse gastric wall thickening and mucosal hyperenhancement is  not significantly changed compared to 06/08/2018 study.  Circumferential soft tissue thickening around the gastroesophageal  junction is also not significantly change. Persistent hazy/amorphous  retroperitoneal stranding extending from the level of the amrychuy  hepatis to the level of the aortic bifurcation, unchanged. Mesenteric  and para-aortic lymph nodes are again seen. No bowel obstruction.  Colonic diverticulosis without diverticulitis. Mild circumferential  thickening of rectosigmoid wall, new or increased from prior.     1 x 1 cm subcapsular lesion in right hepatic lobe, not significant  changed with features suggestive of a hemangioma. Subcentimeter  hypodense lesion in the medial segment of the left hepatic lobe  (series 3, image 154), too small to accurately characterize, but  stable from the prior. Pancreas appears atrophic with diffuse fatty  infiltration. Gallbladder, spleen, adrenal glands are unremarkable.  Prominent right pelvocaliectasis without hydronephrosis; there is mild  urothelial thickening and enhancement which is new. Small  hypodensities arising from kidneys are too small to characterize, but  they favored cyst, unchanged. Mild progression of fascial  thickening  along the right anterior pararenal fossa when compared to prior study  (series 3 image 179). Stable thickening anterior to the left perirenal  fascia. Urinary bladder is unremarkable. Internal aspiration of  prostate gland.     Major vasculature is patent. Calcification of abdominal aorta and  common iliac arteries. No infrarenal aortic aneurysm. Replaced right  hepatic artery arises from the SMA.     Soft tissue/bones: Unchanged degenerative cystic changes and  fragmentation about the right acetabulum. No acute bony lesion.         IMPRESSION: In this patient with linitis plastica   1a. Mild diffuse gastric wall and circumferential soft tissue  thickening at the level of gastroesophageal junction is not  significantly changed when compared to 06/08/2018.  1b. Persistent hazy and amorphous retrograde stranding extending from  the level of the marychuy hepatis to the level of the aortic bifurcation,  unchanged. Numerous unchanged mesenteric and periaortic lymph nodes.   1c. Mild progression of peritoneal/fascial thickening along the right  anterior pararenal fossa. Stable peritoneal thickening anterior to the  left perirenal fossa.  2. Sequela of granulomatous disease in the lung as evidenced by  multiple calcified mediastinal/hilar lymph nodes and pulmonary  nodules. Previously seen 6 mm nodule along the right minor fissure,  likely to represent a perifissural lymph node.  3. New or significantly increased circumferential wall thickening  involving the rectosigmoid colon suggestive of proctocolitis in the  appropriate clinical setting.      EXAMINATION: CT CHEST/ABDOMEN/PELVIS W CONTRAST, 6/8/2018 8:28 AM     TECHNIQUE:  Helical CT images from the thoracic inlet through the  symphysis pubis were obtained  with contrast. Contrast dose: 135 ml  isovue 370     COMPARISON: 3/16/2018, PET CT 3/24/2017     HISTORY: follow up for gastric ca; Malignant neoplasm of overlapping  sites of stomach (H). Status post  chemotherapy.     FINDINGS:     Chest: Right IJ port tip within the low SVC. Heart size is normal.  Normal caliber of the aorta and pulmonary artery. No central pulmonary  embolus. Visualized thyroid is unremarkable. Multiple calcified  mediastinal and bilateral hilar lymph nodes, not significantly  changed. No new or enlarged lymph nodes in the chest or axilla. The  upper and mid esophagus appear normal. Findings at the GE junction see  separate dissection. Central airway is patent. Scattered calcified  granulomas. Scattered small indeterminate noncalcified nodules are  unchanged from at least 3/24/2017, for example 6 mm nodule along the  right minor fissure (series 7 image 81).     Abdomen and pelvis: Mild diffuse gastric wall thickening is not  significantly changed. Circumferential soft tissue thickening around  the gastroesophageal junction (series 3 image 128), measuring up to  1.2 cm in thickness which is not significantly changed. Hazy fat  stranding and mildly prominent lymph nodes in the retroperitoneum and  lesser sac are not significantly changed. For example rounded 1.2 cm  short axis diameter marychuy hepatis node (series 2 image 155). Unchanged  peritoneal thickening along the left anterior pararenal fascia (series  3 image 195).     Unchanged cyst in hepatic segment 4. 1.4 cm subcapsular lesion in the  right hepatic lobe (series 3 image 149) is not significantly changed  and has enhancement characteristics consistent with the benign  cavernous hemangioma. The gallbladder, pancreas, spleen, and adrenals  are within normal limits. Few hypoattenuating foci in both kidneys are  too small to characterize. Bladder is unremarkable. Prostate is  enlarged. Few colonic diverticula without evidence of diverticulitis.  The appendix and small bowel are within normal limits. Major  vasculature is patent. Aorta is normal in caliber with mild-moderate  atherosclerosis. No free air or significant free fluid.     Bones  and soft tissues: Unchanged mild compression deformity of the  T12 vertebral body. Unchanged degenerative cystic change and  fragmentation about the right acetabulum.         IMPRESSION:   1. Mild diffuse gastric wall thickening and circumferential soft  tissue thickening around the gastroesophageal junction are not  significantly changed.  2. Unchanged fat stranding and mildly prominent lymph nodes in the  lesser sac and retroperitoneum.  3. Unchanged peritoneal thickening most prominent along the left  anterior pararenal fascia.   4. Sequelae of granulomatous disease in the lungs. A few small  noncalcified pulmonary nodules are unchanged from 3/24/2017. Attention  on follow-up examination.      Esophagram dated 5/16/2018     COMPARISON:    CT dated 3/16/2018     HISTORY:    Esophageal dysphagia in a patient with history of gastric  carcinoma.     FINDINGS: Examination of the esophagus reveals adequate primary and  secondary peristalsis. There is distal esophageal narrowing at the  gastroesophageal junction without associated mucosal abnormality, with  apparent extrinsic compression. This may correspond with soft tissue  thickening seen at the diaphragmatic hiatus on CT dated 3/16/2018. The  gastroesophageal junction is patent. No hiatal hernia was seen on  examination. There was absence of gastroesophageal reflux.     Fluoroscopy time was 1.6 minutes.     A 12mm barium tablet was administered and did not initially pass  through the gastroesophageal junction. 15 minutes after initial  administration and following ingestion of pudding and thick barium,  the barium tablet did pass through the gastroesophageal junction.         IMPRESSION: Extrinsic compression on the gastroesophageal junction  results in luminal narrowing and associated extended retention of  barium tablet. This likely corresponds with soft tissue thickening  about the diaphragmatic hiatus as demonstrated on prior CT  examination.            EGD  4/27/18  Findings:        One 12 mm nodular area with possible shallow uleration was found in the        gastric fundus. Biopsies were taken with a cold forceps for histology.        The esophagus was normal.        The stomach was normal except as noted above..        The examined duodenum was normal.       ORIGINAL REPORT:     SPECIMEN(S):   Stomach biopsy, fundus     FINAL DIAGNOSIS:   STOMACH, FUNDUS, BIOPSY:   - Poorly differentiated carcinoma (diffuse type) with signet ring features   - Biopsy is limited to mucosa     ASSESSMENT AND PLAN:      Metastatic gastric cancer, HER-2/catrachita negative, MSI intact, PD-L1 neg (<1%), presenting with early satiety and significant weight loss and epigastric discomfort upon eating with some vomiting.  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  There is an indeterminate right liver lobe lesion which has remained stable and likely is not a metastasis.      We started palliative FOLFOX on 03/12/2017.     Oxaliplatin was dropped from cycle #11 onwards because of neuropathy     Scans after C#18 show stable disease    Repeat CT CAP after 24 cycles also show stable disease so we continued with 5FU/LV.     CT scan after cycle #30 - stable disease.    CT scan on 8/31/18 after C#36 also is stable.  I discussed with radiology and the subtle finding of fascial thickening around the pararenal fossa at this time is not very convincing progression of the disease  I discussed the findings with him and since he is doing well it is reasonable to continue the same chemotherapy without making any changes  He will get cycle #37 today        Dysphagia.  This is stable.  He is chewing the food well before swallowing and this is helping.    previously he had repeat EGD done as part of its workup was much improved apart from 1.2 cm malignant shallow ulcer   Esophogram showed extrinsic compression on the GE  junction resulting in luminal narrowing and associated extended retention of barium tablet. This likely corresponds with soft tissue thickening about the diaphragmatic hiatus as demonstrated on prior CT examination. Repeat CT scan shows stable soft tissue circumferential thickening around the GE junction     Constipation/diarrhea.  He gets constipated for 2 days after chemotherapy and then he has diarrhea for a couple of days.  I told him to start taking stool softeners on the day of chemotherapy so as to prevent constipation and see if it helps.  If the diarrhea gets worse he can take Imodium as needed    Skin toxicity from 5-FU.  Overall his skin looks better today.  Continue to apply the moisturizing creams as before.  Previously I had mentioned to him to try a urea-based creams       Neuropathy.  He is now using gabapentin once a day and is tolerating it well and he thinks neuropathy has improved.  Continue the same     Discussion regarding exercise.  He is not exercising regularly and I told him that he should follow a regular pattern of exercise to keep himself healthy    Gout.  The acute episode has resolved and now he is on allopurinol         We did not address the following today    His genetic testing did not show any identifiable mutation not reveal any identifiable mutation       He will get chemotherapy every 2 weeks and I will see him back in 4 weeks    I answered all of his and his family's questions to their satisfaction. They're agreeable and comfortable with the plan.    LANEY SHAFFER MD             Again, thank you for allowing me to participate in the care of your patient.        Sincerely,        Laney Shaffer MD

## 2018-09-05 NOTE — NURSING NOTE
"Oncology Rooming Note    September 5, 2018 10:19 AM   Terry Yi is a 52 year old male who presents for:    Chief Complaint   Patient presents with     Oncology Clinic Visit     prior to treatment      Initial Vitals: /81  Pulse 72  Temp 97.9  F (36.6  C)  Resp 18  Ht 1.778 m (5' 10\")  Wt 99.8 kg (220 lb)  SpO2 98%  BMI 31.57 kg/m2 Estimated body mass index is 31.57 kg/(m^2) as calculated from the following:    Height as of this encounter: 1.778 m (5' 10\").    Weight as of this encounter: 99.8 kg (220 lb). Body surface area is 2.22 meters squared.  No Pain (0) Comment: Data Unavailable   No LMP for male patient.  Allergies reviewed: Yes  Medications reviewed: Yes    Medications: Medication refills not needed today.  Pharmacy name entered into EPIC: Data Unavailable       5 minutes for nursing intake (face to face time)     Tiffanie Farmer LPN              "

## 2018-09-19 NOTE — MR AVS SNAPSHOT
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)    -  1       Follow-ups after your visit        Your next 10 appointments already scheduled     Sep 19, 2018  9:00 AM CDT   Level 2 with BAY 4 INFUSION   [facility] ([facility]    [address] [facility] [address]   [phone]            Oct 03, 2018  9:00 AM CDT   Return Visit with NURSE ONLY CANCER CENTER   [facility] ([facility])    [address] [facility] [address] [phone]            Oct 03, 2018  9:45 AM CDT   Return Visit with Yakov Shaffer MD   [facility] ([facility])    [address] [facility] [address] [phone]            Oct 03, 2018 10:30 AM CDT   Level 2 with BAY 5 INFUSION   [facility] ([facility])    [address] [facility] [address] [phone]            Oct 17, 2018  8:00 AM CDT   Return Visit with NURSE ONLY CANCER CENTER   [facility] ([facility])    [address] [facility] [address] [phone]            Oct 17, 2018  8:30 AM CDT   Level 2 with BAY 4 INFUSION   [facility] ([facility])    [address] [facility] [address] [phone]              Who to contact     If you have questions or need follow up information about today's clinic visit or your schedule please contact [facility] directly at [phone].  Normal or non-critical lab and imaging results will be communicated to you by MyChart, letter or  phone within 4 business days after the clinic has received the results. If you do not hear from us within 7 days, please contact the clinic through [employer] or phone. If you have a critical or abnormal lab result, we will notify you by phone as soon as possible.  Submit refill requests through [employer] or call your pharmacy and they will forward the refill request to us. Please allow 3 business days for your refill to be completed.          Additional Information About Your Visit        [employer] Information     [employer] gives you secure access to your electronic health record. If you see a primary care provider, you can also send messages to your care team and make appointments. If you have questions, please call your primary care clinic.  If you do not have a primary care provider, please call [phone] and they will assist you.      [employer] is an electronic gateway that provides easy, online access to your medical records. With [employer], you can request a clinic appointment, read your test results, renew a prescription or communicate with your care team.     To access your existing account, please contact your [facility] or call [phone] for assistance.        Care EveryWhere ID     This is your Care EveryWhere ID. This could be used by other organizations to access your [place] medical records  [id number]         Blood Pressure from Last 3 Encounters:   09/05/18 123/81   08/22/18 (!) 138/96   08/08/18 135/82    Weight from Last 3 Encounters:   09/05/18 99.8 kg (220 lb)   08/22/18 98.9 kg (218 lb 1.6 oz)   08/08/18 99.8 kg (220 lb)              We Performed the Following     CBC with platelets differential     Comprehensive metabolic panel        Primary Care Provider Office Phone # Fax #    Sandra Dickerson [phone]       [facility]  [address]  [facility] [address]        Equal Access to Services     CHANDAN GALLO : Jemal Casillas,  waaxda luqadaha, qaybta kaalmada tim, claire spannlukasz ah. So [facility] [phone].    ATENCIÓN: Si arabella luke, tiene a cuevas disposición servicios gratuitos de asistencia lingüística. Ciara al [phone].    We comply with applicable federal civil rights laws and [place] laws. We do not discriminate on the basis of race, color, national origin, age, disability, sex, sexual orientation, or gender identity.            Thank you!     Thank you for choosing [facility]  for your care. Our goal is always to provide you with excellent care. Hearing back from our patients is one way we can continue to improve our services. Please take a few minutes to complete the written survey that you may receive in the mail after your visit with us. Thank you!             Your Updated Medication List - Protect others around you: Learn how to safely use, store and throw away your medicines at www.disposemymeds.org.          This list is accurate as of 9/19/18  8:47 AM.  Always use your most recent med list.                   Brand Name Dispense Instructions for use Diagnosis    ALEVE PO       Acute gout, unspecified cause, unspecified site       allopurinol 100 MG tablet    ZYLOPRIM    30 tablet    Take 1 tablet (100 mg) by mouth daily    Acute gout, unspecified cause, unspecified site, Malignant neoplasm of overlapping sites of stomach (H)       aspirin 81 MG chewable tablet      Take 81 mg by mouth daily        Fiber 5 GM/15ML Liqd           gabapentin 300 MG capsule    NEURONTIN     Take 1 capsule (300 mg total) by mouth once daily. For neuropathy        indomethacin 25 MG capsule    INDOCIN    42 capsule    Take 1 capsule (25 mg) by mouth 3 times daily (with meals)    Acute gout, unspecified cause, unspecified site       IRON SUPPLEMENT PO      Take by mouth daily (with breakfast)        lidocaine-prilocaine cream    EMLA          LORazepam 0.5 MG tablet    ATIVAN    30 tablet    Take 1  tablet (0.5 mg) by mouth every 4 hours as needed (Anxiety, Nausea/Vomiting or Sleep)    Malignant neoplasm of overlapping sites of stomach (H)       MULTIVITAMIN ADULT PO           ondansetron 8 MG tablet    ZOFRAN    10 tablet    Take 1 tablet (8 mg) by mouth every 8 hours as needed (Nausea/Vomiting)    Malignant neoplasm of overlapping sites of stomach (H)       prochlorperazine 10 MG tablet    COMPAZINE    30 tablet    Take 1 tablet (10 mg) by mouth every 6 hours as needed (Nausea/Vomiting)    Malignant neoplasm of overlapping sites of stomach (H)       TYLENOL PO      Take by mouth as needed for mild pain or fever

## 2018-09-19 NOTE — PROGRESS NOTES
Power port needle left accessed for treatment. Tolerated port access and draw without complaint. Port site scrubbed with Chloraprep for 30 seconds. Accessed using sterile technique. [place] drawn-Red/Green/Purple tubes. Double signed by patient and RN. See documentation flowsheet. Arleth Brandon, RN, BSN, OCN

## 2018-09-19 NOTE — MR AVS SNAPSHOT
After Visit Summary   9/19/2018    Terry Yi    MRN: [id number]           Patient Information     Date Of Birth          1965        Visit Information        Provider Department      9/19/2018 9:00 AM BAY 4 INFUSION [facility]        Today's Diagnoses     Malignant neoplasm of overlapping sites of stomach (H)    -  1       Follow-ups after your visit        Your next 10 appointments already scheduled     Oct 03, 2018  9:00 AM CDT   [facility] Visit with [facility]   [facility]    [address] [facility] [address]   [phone]            Oct 03, 2018  9:45 AM CDT   [facility] Visit with [facility]akov [facility]affer MD   [facility]    [address] [facility] [address]   [phone]            Oct 03, 2018 10:30 AM CDT   Level 2 with BAY 5 INFUSION   [facility]    [address] [facility] [address]   [phone]            Oct 17, 2018  8:00 AM CDT   Return Visit with [facility]CER [facility]   [facility]    [address] [facility] [address]   [phone]            Oct 17, 2018  8:30 AM CDT   Level 2 with BAY 4 INFUS[facility]   [facility]    [address] [facility] [address]   [phone]            Oct 31, 2018  8:00 AM CDT   Return Visit with [facility] CANCER CENTER   [facility]    [address] [facility] [address]   [phone]            Oct 31, 2018  8:45 AM CDT   Return Visit with Yakov Shaffer MD   [facility]    [address] [facility] [address]   [phone]            Oct 31, 2018  9:30 AM CDT   Level 2 with BAY 8 INFUSION   [facility] ([facility])    [address] [place] [address] [phone]              Who to contact     If you have questions or need follow up information about today's clinic visit or your schedule please contact [facility] directly at [phone].  Normal or non-critical lab and imaging results will be communicated to you by MyChart, letter or phone within 4 business days after the clinic has received the results. If you do not hear from us within 7 days, please contact the clinic through [employer] or phone. If you have a critical or abnormal lab result, we will notify you by phone as soon as possible.  Submit refill requests through [employer] or call your pharmacy and they will forward the refill request to us. Please allow 3 business days for your refill to be completed.          Additional Information About Your Visit        [employer] Information     [employer] gives you secure access to your electronic health record. If you see a primary care provider, you can also send messages to your care team and make appointments. If you have questions, please call your primary care clinic.  If you do not have a primary care provider, please call [phone] and they will assist you.      [employer] is an electronic gateway that provides easy, online access to your medical records. With [employer], you can request a clinic appointment, read your test results, renew a prescription or communicate with your care team.     To access your existing account, please contact your [facility] or call [phone] for assistance.        Care EveryWhere ID     This is your Care EveryWhere ID. This could be used by other organizations to access your [place] medical records  [id number]        Your Vitals Were     Pulse Temperature Respirations Pulse Oximetry BMI (Body Mass Index)       66 97.4  F (36.3  C) (Oral) 18 99% 31.32 kg/m2        Blood Pressure from Last 3  Encounters:   09/19/18 (!) 142/95   09/05/18 123/81   08/22/18 (!) 138/96    Weight from Last 3 Encounters:   09/19/18 99 kg (218 lb 4.8 oz)   09/05/18 99.8 kg (220 lb)   08/22/18 98.9 kg (218 lb 1.6 oz)              Today, you had the following     No orders found for display       Primary Care Provider Office Phone # Fax #    Sandra DIDI Dickerson [phone]       [facility]  [address]  [facility] [address]        Equal Access to Services     [facility]: Hadii aad ku hadasho Soomaali, waaxda luqadaha, qaybta kaalmada adeegyada, claire mcdaniel hayguillermo cowart . So [facility] [phone].    ATENCIÓN: Si habla español, tiene a cuevas disposición servicios gratuitos de asistencia lingüística. Llame al [phone].    We comply with applicable federal civil rights laws and [place] laws. We do not discriminate on the basis of race, color, national origin, age, disability, sex, sexual orientation, or gender identity.            Thank you!     Thank you for choosing [facility]  for your care. Our goal is always to provide you with excellent care. Hearing back from our patients is one way we can continue to improve our services. Please take a few minutes to complete the written survey that you may receive in the mail after your visit with us. Thank you!             Your Updated Medication List - Protect others around you: Learn how to safely use, store and throw away your medicines at www.disposemymeds.org.          This list is accurate as of 9/19/18 11:59 PM.  Always use your most recent med list.                   Brand Name Dispense Instructions for use Diagnosis    ALEVE PO       Acute gout, unspecified cause, unspecified site       allopurinol 100 MG tablet    ZYLOPRIM    30 tablet    Take 1 tablet (100 mg) by mouth daily    Acute gout, unspecified cause, unspecified site, Malignant neoplasm of overlapping sites of stomach (H)       aspirin 81 MG chewable tablet       Take 81 mg by mouth daily        Fiber 5 GM/15ML Liqd           gabapentin 300 MG capsule    NEURONTIN     Take 1 capsule (300 mg total) by mouth once daily. For neuropathy        indomethacin 25 MG capsule    INDOCIN    42 capsule    Take 1 capsule (25 mg) by mouth 3 times daily (with meals)    Acute gout, unspecified cause, unspecified site       IRON SUPPLEMENT PO      Take by mouth daily (with breakfast)        lidocaine-prilocaine cream    EMLA          LORazepam 0.5 MG tablet    ATIVAN    30 tablet    Take 1 tablet (0.5 mg) by mouth every 4 hours as needed (Anxiety, Nausea/Vomiting or Sleep)    Malignant neoplasm of overlapping sites of stomach (H)       MULTIVITAMIN ADULT PO           ondansetron 8 MG tablet    ZOFRAN    10 tablet    Take 1 tablet (8 mg) by mouth every 8 hours as needed (Nausea/Vomiting)    Malignant neoplasm of overlapping sites of stomach (H)       prochlorperazine 10 MG tablet    COMPAZINE    30 tablet    Take 1 tablet (10 mg) by mouth every 6 hours as needed (Nausea/Vomiting)    Malignant neoplasm of overlapping sites of stomach (H)       TYLENOL PO      Take by mouth as needed for mild pain or fever

## 2018-09-20 NOTE — PROGRESS NOTES
Infusion Nursing Note:  Terry Yi presents today for Leucovorin/5FU.    Patient seen by provider today: No   present during visit today: Not Applicable.    Note: N/A.    Intravenous Access:  Implanted Port.    Treatment Conditions:  Results reviewed, labs MET treatment parameters, ok to proceed with treatment.      Post Infusion Assessment:  Patient tolerated infusion without incident.    Discharge Plan:   Pt has f/u cycle appts and will go to local clinic for chemo disconnect.    GREY MARCELO RN

## 2018-10-03 NOTE — PATIENT INSTRUCTIONS
Hold chemo    De-access the port    Please get the cardiac workup done and then we will reschedule the chemo    See me back when chemo is resumed

## 2018-10-03 NOTE — MR AVS SNAPSHOT
After Visit Summary   10/3/2018    Terry Yi    MRN: [id number]           Patient Information     Date Of Birth          1965        Today's Diagnoses     Need for prophylactic vaccination and inoculation against influenza    -  1       Follow-ups after your visit        Your next 10 appointments already scheduled     Oct 17, 2018  8:00 AM CDT   Return Visit with NURSE ONLY CANCER CENTER   [facility]    [address] [place] [address]   [phone]            Oct 17, 2018  8:30 AM CDT   Level 2 with BAY 4 INFUSION   [facility]    [address] [place] [address] [phone]            Oct 31, 2018  8:00 AM CDT   Return Visit with NURSE ONLY CANCER CENTER   [facility]    [address] [place] [address]   [phone]            Oct 31, 2018  8:45 AM CDT   Return Visit with Yakov Shaffer MD   [facility]    [address] [place] [address] [phone]            Oct 31, 2018  9:30 AM CDT   Level 2 with BAY 8 INFUSION   [facility]    [address] [place] [address] [phone]              Who to contact     If you have questions or need follow up information about today's clinic visit or your schedule please contact [email or web address] directly at [phone].  Normal or non-critical lab and imaging results will be communicated to you by MyChart, letter or phone within 4 business days after the clinic has received the results. If you do not hear from us within 7 days, please contact the clinic through MyChart or phone. If you have a critical or abnormal lab result, we will notify you by phone as soon as possible.  Submit refill requests through [employer]Khart or call your  pharmacy and they will forward the refill request to us. Please allow 3 business days for your refill to be completed.          Additional Information About Your Visit        [employer] Information     [employer] gives you secure access to your electronic health record. If you see a primary care provider, you can also send messages to your care team and make appointments. If you have questions, please call your primary care clinic.  If you do not have a primary care provider, please call [phone] and they will assist you.      [employer] is an electronic gateway that provides easy, online access to your medical records. With [employer], you can request a clinic appointment, read your test results, renew a prescription or communicate with your care team.     To access your existing account, please contact your [facility] or call [phone] for assistance.        Care EveryWhere ID     This is your Care EveryWhere ID. This could be used by other organizations to access your [place] medical records  [id number]         Blood Pressure from Last 3 Encounters:   10/03/18 115/74   09/19/18 (!) 142/95   09/05/18 123/81    Weight from Last 3 Encounters:   10/03/18 100.2 kg (221 lb)   09/19/18 99 kg (218 lb 4.8 oz)   09/05/18 99.8 kg (220 lb)              Today, you had the following     No orders found for display       Primary Care Provider Office Phone # Fax #    Sandra Dickerson [phone]       [facility] [place]  [address]  [facility] [address]        Equal Access to Services     CHANDAN GALLO : Hadii aad ku hadasho Soomaali, waaxda luqadaha, qaybta kaalmada adeegyada, waxay idiin hayrodrigon charley lopes'guillermo . So [facility] [phone].    ATENCIÓN: Si habla español, tiene a cuevas disposición servicios gratuitos de asistencia lingüística. Llame al [phone].    We comply with applicable federal civil rights laws and [place] laws. We do not discriminate on the basis of race,  color, national origin, age, disability, sex, sexual orientation, or gender identity.            Thank you!     Thank you for choosing [facility]  for your care. Our goal is always to provide you with excellent care. Hearing back from our patients is one way we can continue to improve our services. Please take a few minutes to complete the written survey that you may receive in the mail after your visit with us. Thank you!             Your Updated Medication List - Protect others around you: Learn how to safely use, store and throw away your medicines at www.disposemymeds.org.          This list is accurate as of 10/3/18 11:59 PM.  Always use your most recent med list.                   Brand Name Dispense Instructions for use Diagnosis    ALEVE PO       Acute gout, unspecified cause, unspecified site       allopurinol 100 MG tablet    ZYLOPRIM    30 tablet    Take 1 tablet (100 mg) by mouth daily    Acute gout, unspecified cause, unspecified site, Malignant neoplasm of overlapping sites of stomach (H)       aspirin 81 MG chewable tablet      Take 81 mg by mouth daily        Fiber 5 GM/15ML Liqd           gabapentin 300 MG capsule    NEURONTIN     Take 1 capsule (300 mg total) by mouth once daily. For neuropathy        indomethacin 25 MG capsule    INDOCIN    42 capsule    Take 1 capsule (25 mg) by mouth 3 times daily (with meals)    Acute gout, unspecified cause, unspecified site       IRON SUPPLEMENT PO      Take by mouth daily (with breakfast)        lidocaine-prilocaine cream    EMLA          LORazepam 0.5 MG tablet    ATIVAN    30 tablet    Take 1 tablet (0.5 mg) by mouth every 4 hours as needed (Anxiety, Nausea/Vomiting or Sleep)    Malignant neoplasm of overlapping sites of stomach (H)       MULTIVITAMIN ADULT PO           NEW MED      1 gel cap CBD from [employer]        ondansetron 8 MG tablet    ZOFRAN    10 tablet    Take 1 tablet (8 mg) by mouth every 8 hours as needed (Nausea/Vomiting)     Malignant neoplasm of overlapping sites of stomach (H)       prochlorperazine 10 MG tablet    COMPAZINE    30 tablet    Take 1 tablet (10 mg) by mouth every 6 hours as needed (Nausea/Vomiting)    Malignant neoplasm of overlapping sites of stomach (H)       TYLENOL PO      Take by mouth as needed for mild pain or fever

## 2018-10-03 NOTE — PROGRESS NOTES
"Injectable Influenza Immunization Documentation    1.  Has the patient received the information for the injectable influenza vaccine? YES     2. Is the patient 6 months of age or older? YES     3. Does the patient have any of the following contraindications?         Severe allergy to eggs?  No     Severe allergic reaction to previous influenza vaccines?  No   Severe allergy to latex?  No       History of Guillain-Santo syndrome?  No     Currently have a temperature greater than 100.4F?  No        4.  Severely egg allergic patients should have flu vaccine eligibility assessed by an MD, RN, or pharmacist, and those who received flu vaccine should be observed for 15 min by an MD, RN, Pharmacist, Medical Technician, or member of clinic staff.\": NO    5. Latex-allergic patients should be given latex-free influenza vaccine No. Please reference the Vaccine latex table to determine if your clinic s product is latex-containing.       Vaccination given by Tiffanie Farmer LPN on 10/3/2018 at 10:26 AM          "

## 2018-10-03 NOTE — MR AVS SNAPSHOT
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)    -  1       Follow-ups after your visit        Your next 10 appointments already scheduled     Oct 03, 2018  9:45 AM CDT   Return Visit with Yakov Shaffer MD   [facility] ([facility])    [address] [facility] [address]   [phone]            Oct 03, 2018 10:30 AM CDT   Level 2 with BAY 5 INFUSION   [facility] ([facility])    [address] [facility] [address]   [phone]            Oct 17, 2018  8:00 AM CDT   Return Visit with NURSE ONLY CANCER CENTER   [facility] ([facility])    [address] [facility] [address]   [phone]            Oct 17, 2018  8:30 AM CDT   Level 2 with BAY 4 INFUSION   [facility] ([facility])    [address] [facility] [address]   [phone]            Oct 31, 2018  8:00 AM CDT   Return Visit with NURSE ONLY CANCER CENTER   [facility] ([facility])    [address] [facility] [address] [phone]            Oct 31, 2018  8:45 AM CDT   Return Visit with Yakov Shaffer MD   [facility] ([facility])    [address] [facility] [address]   [phone]            Oct 31, 2018  9:30 AM CDT   Level 2 with BAY 8 INFUSION   [facility] ([facility])    [address] [facility] [address] [phone]              Who to contact     If you have questions or need follow up information about today's  clinic visit or your schedule please contact [facility] directly at [phone].  Normal or non-critical lab and imaging results will be communicated to you by Bespoke Posthart, letter or phone within 4 business days after the clinic has received the results. If you do not hear from us within 7 days, please contact the clinic through Bespoke Posthart or phone. If you have a critical or abnormal lab result, we will notify you by phone as soon as possible.  Submit refill requests through Zample or call your pharmacy and they will forward the refill request to us. Please allow 3 business days for your refill to be completed.          Additional Information About Your Visit        Zample Information     Zample gives you secure access to your electronic health record. If you see a primary care provider, you can also send messages to your care team and make appointments. If you have questions, please call your primary care clinic.  If you do not have a primary care provider, please call [phone] and they will assist you.      Zample is an electronic gateway that provides easy, online access to your medical records. With Zample, you can request a clinic appointment, read your test results, renew a prescription or communicate with your care team.     To access your existing account, please contact your [facility] or call [phone] for assistance.        Care EveryWhere ID     This is your Care EveryWhere ID. This could be used by other organizations to access your [place] medical records  [id number]         Blood Pressure from Last 3 Encounters:   09/19/18 (!) 142/95   09/05/18 123/81   08/22/18 (!) 138/96    Weight from Last 3 Encounters:   09/19/18 99 kg (218 lb 4.8 oz)   09/05/18 99.8 kg (220 lb)   08/22/18 98.9 kg (218 lb 1.6 oz)              We Performed the Following     CBC with platelets differential     Comprehensive metabolic panel        Primary Care Provider Office  Phone # Fax #    Sandra Dickerson [phone]       [facility]  [address]  [facility] [address]        Equal Access to Services     CHANDAN GALLO : Hadii aad ku hadsohano Soomaali, waaxda luqadaha, qaybta kaalmada adeegyada, claire hawley mayurjennifer june laSyedguillermo browne. So [facility] [phone].    ATENCIÓN: Si habla español, tiene a cuevas disposición servicios gratuitos de asistencia lingüística. Llame al [phone].    We comply with applicable federal civil rights laws and [place] laws. We do not discriminate on the basis of race, color, national origin, age, disability, sex, sexual orientation, or gender identity.            Thank you!     Thank you for choosing [facility]  for your care. Our goal is always to provide you with excellent care. Hearing back from our patients is one way we can continue to improve our services. Please take a few minutes to complete the written survey that you may receive in the mail after your visit with us. Thank you!             Your Updated Medication List - Protect others around you: Learn how to safely use, store and throw away your medicines at www.disposemymeds.org.          This list is accurate as of 10/3/18  9:26 AM.  Always use your most recent med list.                   Brand Name Dispense Instructions for use Diagnosis    ALEVE PO       Acute gout, unspecified cause, unspecified site       allopurinol 100 MG tablet    ZYLOPRIM    30 tablet    Take 1 tablet (100 mg) by mouth daily    Acute gout, unspecified cause, unspecified site, Malignant neoplasm of overlapping sites of stomach (H)       aspirin 81 MG chewable tablet      Take 81 mg by mouth daily        Fiber 5 GM/15ML Liqd           gabapentin 300 MG capsule    NEURONTIN     Take 1 capsule (300 mg total) by mouth once daily. For neuropathy        indomethacin 25 MG capsule    INDOCIN    42 capsule    Take 1 capsule (25 mg) by mouth 3 times daily (with meals)    Acute gout,  unspecified cause, unspecified site       IRON SUPPLEMENT PO      Take by mouth daily (with breakfast)        lidocaine-prilocaine cream    EMLA          LORazepam 0.5 MG tablet    ATIVAN    30 tablet    Take 1 tablet (0.5 mg) by mouth every 4 hours as needed (Anxiety, Nausea/Vomiting or Sleep)    Malignant neoplasm of overlapping sites of stomach (H)       MULTIVITAMIN ADULT PO           ondansetron 8 MG tablet    ZOFRAN    10 tablet    Take 1 tablet (8 mg) by mouth every 8 hours as needed (Nausea/Vomiting)    Malignant neoplasm of overlapping sites of stomach (H)       prochlorperazine 10 MG tablet    COMPAZINE    30 tablet    Take 1 tablet (10 mg) by mouth every 6 hours as needed (Nausea/Vomiting)    Malignant neoplasm of overlapping sites of stomach (H)       TYLENOL PO      Take by mouth as needed for mild pain or fever

## 2018-10-03 NOTE — MR AVS SNAPSHOT
After Visit Summary   10/3/2018    Terry Yi    MRN: [id number]           Patient Information     Date Of Birth          1965        Visit Information        Provider Department      10/3/2018 9:45 AM Yakov Shaffer MD [facility]        Today's Diagnoses     Malignant neoplasm of overlapping sites of stomach (H)    -  1      Care Instructions    Hold chemo    De-access the port    Please get the cardiac workup done and then we will reschedule the chemo    See me back when chemo is resumed          Follow-ups after your visit        Your next 10 appointments already scheduled     Oct 03, 2018 10:30 AM CDT   Level 2 with BAY 5 INFUSION   [facility] ([facility])    [address] [facility] [address] [phone]            Oct 17, 2018  8:00 AM CDT   Return Visit with NURSE ONLY CANCER CENTER   [facility] ([facility])    [address] [facility] [address] [phone]            Oct 17, 2018  8:30 AM CDT   Level 2 with BAY 4 INFUSION   [facility] ([facility])    [address] [facility] [address] [phone]            Oct 31, 2018  8:00 AM CDT   Return Visit with NURSE ONLY CANCER CENTER   [facility] ([facility])    [address] [facility] [address] [phone]            Oct 31, 2018  8:45 AM CDT   Return Visit with Yakov Shaffer MD   [facility])    [address] [facility] [address] [phone]            Oct 31, 2018  9:30 AM CDT   Level 2 with BAY 8 INFUSION   [facility])    [address] [facility] [address] [phone]              Who to contact     If you have questions or need follow up information about today's clinic visit or your schedule  "please contact [facility] directly at [phone].  Normal or non-critical lab and imaging results will be communicated to you by Clodicohart, letter or phone within 4 business days after the clinic has received the results. If you do not hear from us within 7 days, please contact the clinic through Clodicohart or phone. If you have a critical or abnormal lab result, we will notify you by phone as soon as possible.  Submit refill requests through MulliganPlus or call your pharmacy and they will forward the refill request to us. Please allow 3 business days for your refill to be completed.          Additional Information About Your Visit        MulliganPlus Information     MulliganPlus gives you secure access to your electronic health record. If you see a primary care provider, you can also send messages to your care team and make appointments. If you have questions, please call your primary care clinic.  If you do not have a primary care provider, please call [phone] and they will assist you.      MulliganPlus is an electronic gateway that provides easy, online access to your medical records. With MulliganPlus, you can request a clinic appointment, read your test results, renew a prescription or communicate with your care team.     To access your existing account, please contact your [facility] or call [phone] for assistance.        Care EveryWhere ID     This is your Care EveryWhere ID. This could be used by other organizations to access your [place] medical records  [id number]        Your Vitals Were     Pulse Temperature Respirations Height Pulse Oximetry BMI (Body Mass Index)    75 98  F (36.7  C) 18 1.778 m (5' 10\") 98% 31.71 kg/m2       Blood Pressure from Last 3 Encounters:   10/03/18 115/74   09/19/18 (!) 142/95   09/05/18 123/81    Weight from Last 3 Encounters:   10/03/18 100.2 kg (221 lb)   09/19/18 99 kg (218 lb 4.8 oz)   09/05/18 99.8 kg (220 lb)              Today, you had " the following     No orders found for display       Primary Care Provider Office Phone # Fax #    Sandra Dickerson, SARAI [phone]       [facility]  [address]  [facility] [address]        Equal Access to Services     CHANDAN GALLO : Hadii aad ku hadasho Soomaali, waaxda luqadaha, qaybta kaalmada adeegyada, waxay idiin hayaan adeeg khrezash lavivi . So [facility] [phone].    ATENCIÓN: Si habla español, tiene a cuevas disposición servicios gratuitos de asistencia lingüística. Llame al [phone].    We comply with applicable federal civil rights laws and [place] laws. We do not discriminate on the basis of race, color, national origin, age, disability, sex, sexual orientation, or gender identity.            Thank you!     Thank you for choosing [facility]  for your care. Our goal is always to provide you with excellent care. Hearing back from our patients is one way we can continue to improve our services. Please take a few minutes to complete the written survey that you may receive in the mail after your visit with us. Thank you!             Your Updated Medication List - Protect others around you: Learn how to safely use, store and throw away your medicines at www.disposemymeds.org.          This list is accurate as of 10/3/18 10:28 AM.  Always use your most recent med list.                   Brand Name Dispense Instructions for use Diagnosis    ALEVE PO       Acute gout, unspecified cause, unspecified site       allopurinol 100 MG tablet    ZYLOPRIM    30 tablet    Take 1 tablet (100 mg) by mouth daily    Acute gout, unspecified cause, unspecified site, Malignant neoplasm of overlapping sites of stomach (H)       aspirin 81 MG chewable tablet      Take 81 mg by mouth daily        Fiber 5 GM/15ML Liqd           gabapentin 300 MG capsule    NEURONTIN     Take 1 capsule (300 mg total) by mouth once daily. For neuropathy        indomethacin 25 MG capsule    INDOCIN    42 capsule     Take 1 capsule (25 mg) by mouth 3 times daily (with meals)    Acute gout, unspecified cause, unspecified site       IRON SUPPLEMENT PO      Take by mouth daily (with breakfast)        lidocaine-prilocaine cream    EMLA          LORazepam 0.5 MG tablet    ATIVAN    30 tablet    Take 1 tablet (0.5 mg) by mouth every 4 hours as needed (Anxiety, Nausea/Vomiting or Sleep)    Malignant neoplasm of overlapping sites of stomach (H)       MULTIVITAMIN ADULT PO           NEW MED      1 gel cap CBD from [employer]        ondansetron 8 MG tablet    ZOFRAN    10 tablet    Take 1 tablet (8 mg) by mouth every 8 hours as needed (Nausea/Vomiting)    Malignant neoplasm of overlapping sites of stomach (H)       prochlorperazine 10 MG tablet    COMPAZINE    30 tablet    Take 1 tablet (10 mg) by mouth every 6 hours as needed (Nausea/Vomiting)    Malignant neoplasm of overlapping sites of stomach (H)       TYLENOL PO      Take by mouth as needed for mild pain or fever

## 2018-10-03 NOTE — PROGRESS NOTES
Patient came into the clinic today to potentially receive chemotherapy but could not as stress test (patient had episode of chest pain about 1 1/2 weeks ago) was not scheduled.  There is a note in Care Everywhere that [facility] (Dr. Mendoza's office) received message from writer that Dr. Shaffer approved cardiac stress test with Delbutamine.  Patient's spouse contacted the [facility] while they were here and stress test is now scheduled for October 9.  Advised we will contact patient with plans for next chemotherapy.

## 2018-10-03 NOTE — PROGRESS NOTES
Power port needle left accessed for treatment. Tolerated port access and draw without complaint. Port site scrubbed with Chloraprep for 30 seconds. Accessed using sterile technique. [place] drawn-Red/Green/Purple tubes. Double signed by patient and RN. See documentation flowsheet. Arleth Brandon, RN, BSN, OCN

## 2018-10-03 NOTE — NURSING NOTE
"Oncology Rooming Note    October 3, 2018 9:48 AM   Terry Yi is a 52 year old male who presents for:    Chief Complaint   Patient presents with     Oncology Clinic Visit     prior to treatment      Initial Vitals: /74  Pulse 75  Temp 98  F (36.7  C)  Resp 18  Ht 1.778 m (5' 10\")  Wt 100.2 kg (221 lb)  SpO2 98%  BMI 31.71 kg/m2 Estimated body mass index is 31.71 kg/(m^2) as calculated from the following:    Height as of this encounter: 1.778 m (5' 10\").    Weight as of this encounter: 100.2 kg (221 lb). Body surface area is 2.22 meters squared.  No Pain (0) Comment: Data Unavailable   No LMP for male patient.  Allergies reviewed: Yes  Medications reviewed: Yes    Medications: Medication refills not needed today.  Pharmacy name entered into EPIC: Data Unavailable       5 minutes for nursing intake (face to face time)     Tiffanie Farmer LPN              "

## 2018-10-03 NOTE — LETTER
10/3/2018         RE: Terry Yi  [address] [place] [address]        Dear Colleague,    Thank you for referring your patient, Terry Yi, to the [facility]. Please see a copy of my visit note below.    10/3/2018     REASON FOR VISIT:  Follow-up for unresectable metastatic gastric carcinoma, diffuse type, grade 3, HER-2 negative, MSI-Intact. PD-L1 negative (<1%)     HISTORY OF PRESENT ILLNESS:  Please see my previous note for details.  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      CT CAP after C#6 shows stable disease    C# 9 FOLFOX 8/2/2017 ( Oxaliplatin dose reduced to 70mg/m2 due to neuropathy )  C#10 8/22/2017 ( Oxaliplatin dose reduced to 60mg/m2 due to neuropathy )  Delayed by 1 week- patient preference  C#11 9/6/17 5FU/LV ( Stopped Oxaliplatin )  C#12 9/20/2017 5FU/LV    Repeat CT scan after C#12 shows stable to slightly improved disease.    Repeat CT CAP on 12/22/17 after C#18 was stable    Repeat CT CAP on 3/16/18 after C#24 shows stable disease.    C# 27 4/18/2018 5FU/LV    As he was complaining of off and on trouble swallowing with food getting stuck, we repeated EGD on 4/27/2018 which showed a nodular area with possible shallow ulceration in the stomach. Otherwise esophagus and stomach and duodenum were normal.  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  He was referred to gastroenterologist for manometry    The biopsies which were taken from the nodular shallow ulcer aren't consistent with poorly differentiated diffuse type adenocarcinoma with signet ring features  Esophogram showed extrinsic compression on the GE junction resulting in luminal narrowing and  associated extended retention of barium tablet. This likely corresponds with soft tissue thickening about the diaphragmatic hiatus as demonstrated on prior CT examination    Repeat CT scan on 6/8/18 after C#30 is stable with stable circumferential soft tissue thickening at the GE Junction and stable thickness of stomach wall and peritoneal thickening    C#31 6/13/2018 5FU/LV  C#32 6/27/18  C#33 7/11/2018   C#34 7/25/18  C#35 8/8/2018  C#36 8/22/18    CT scan on 8/31/18- stable, with minimal increased fascial thickening along the right anterior pararenal fossa.     C#37 9/5/18  C#38 9/19/18         INTERVAL HISTORY:   Although he tolerated the chemotherapy well but on 9/23/2018 he was at his daughter's house and then he developed chest tightness associated with nausea, diaphoresis anxiety and shortness of breath.  The feeling lasted about 20 minutes and then it self resolved.  There was no provoking factor as per him.  He made an appointment with his primary care physician and was seen on 9/25/2018.  At that time his EKG apparently was normal.  The plan was to do a stress test but he has not heard back from his primary care physician as of the timing for it.    He tells me then he developed a similar feeling on 9/26/2018 as well but it lasted for about 10 minutes or so and it was not as severe.  Since then he has been feeling well.  Otherwise he is tolerating chemotherapy well without any nausea or vomiting.  He has occasional constipation and after that he has some loose stools but overall the bowel movements have been fine.  No bleeding.  No infections.  No new swellings.  Energy has been good.  Neuropathy has improved and he continues to take gabapentin.  The skin of the hands and feet is also much better and he continues to apply moisturizing creams.  Overall the swallowing is stable but he had one episode on 9/10/2018 when he probably did not chew the steak well and it got stuck and he had to go to the emergency  "room to push it down into his his stomach.  The EGD report mentions that there was no known narrowing at the distal end of the esophagus.  Otherwise he thinks the swallowing is stable      ECOG 0    ROS:  Rest of the comprehensive ROS was essentially unremarkable           MEDICATIONS:   Current Outpatient Prescriptions   Medication     Acetaminophen (TYLENOL PO)     allopurinol (ZYLOPRIM) 100 MG tablet     Ferrous Sulfate (IRON SUPPLEMENT PO)     gabapentin (NEURONTIN) 300 MG capsule     indomethacin (INDOCIN) 25 MG capsule     lidocaine-prilocaine (EMLA) cream     LORazepam (ATIVAN) 0.5 MG tablet     Multiple Vitamins-Minerals (MULTIVITAMIN ADULT PO)     Naproxen Sodium (ALEVE PO)     NEW MED     ondansetron (ZOFRAN) 8 MG tablet     prochlorperazine (COMPAZINE) 10 MG tablet     aspirin 81 MG chewable tablet     Fiber 5 GM/15ML LIQD     No current facility-administered medications for this visit.      Facility-Administered Medications Ordered in Other Visits   Medication     [START ON 10/4/2018] influenza quadrivalent (PF) vacc (FLUZONE or Flulaval or FLUARIX) injection 0.5 mL            PHYSICAL EXAMINATION:   /74  Pulse 75  Temp 98  F (36.7  C)  Resp 18  Ht 1.778 m (5' 10\")  Wt 100.2 kg (221 lb)  SpO2 98%  BMI 31.71 kg/m2  CONSTITUTIONAL: No apparent distress  EYES: PERRLA, without pallor or jaundice  ENT/MOUTH: Ears unremarkable. No oral lesions  CVS: s1s2 normal  RESPIRATORY: Chest is clear  GI: Abdomen is soft.  There is minimal tenderness across the upper abdomen.  No underlying palpable abnormality  NEURO: Alert and oriented ×3  INTEGUMENT: no concerning skin rashes   LYMPHATIC: no palpable lymphadenopathy  MUSCULOSKELETAL: Unremarkable. No bony tenderness.   EXTREMITIES: no pedal edema  PSYCH: Mentation, mood and affect are appropriate      LABS:   Reviewed   Results for LEONOR RALPH (MRN [id number]) as of 10/3/2018 10:21   Ref. Range 10/3/2018 09:00   Sodium Latest Ref Range: 133 - 144 mmol/L " 140   Potassium Latest Ref Range: 3.4 - 5.3 mmol/L 4.1   Chloride Latest Ref Range: 94 - 109 mmol/L 106   Carbon Dioxide Latest Ref Range: 20 - 32 mmol/L 30   Urea Nitrogen Latest Ref Range: 7 - 30 mg/dL 13   Creatinine Latest Ref Range: 0.66 - 1.25 mg/dL 0.79   GFR Estimate Latest Ref Range: >60 mL/min/1.7m2 >90   GFR Estimate If Black Latest Ref Range: >60 mL/min/1.7m2 >90   Calcium Latest Ref Range: 8.5 - 10.1 mg/dL 8.8   Anion Gap Latest Ref Range: 3 - 14 mmol/L 4   Albumin Latest Ref Range: 3.4 - 5.0 g/dL 3.4   Protein Total Latest Ref Range: 6.8 - 8.8 g/dL 6.6 (L)   Bilirubin Total Latest Ref Range: 0.2 - 1.3 mg/dL 0.4   Alkaline Phosphatase Latest Ref Range: 40 - 150 U/L 87   ALT Latest Ref Range: 0 - 70 U/L 36   AST Latest Ref Range: 0 - 45 U/L 20   Glucose Latest Ref Range: 70 - 99 mg/dL 114 (H)   WBC Latest Ref Range: 4.0 - 11.0 10e9/L 7.6   Hemoglobin Latest Ref Range: 13.3 - 17.7 g/dL 12.6 (L)   Hematocrit Latest Ref Range: 40.0 - 53.0 % 38.3 (L)   Platelet Count Latest Ref Range: 150 - 450 10e9/L 196   RBC Count Latest Ref Range: 4.4 - 5.9 10e12/L 4.08 (L)   MCV Latest Ref Range: 78 - 100 fl 94   MCH Latest Ref Range: 26.5 - 33.0 pg 30.9   MCHC Latest Ref Range: 31.5 - 36.5 g/dL 32.9   RDW Latest Ref Range: 10.0 - 15.0 % 14.6   Diff Method Unknown Automated Method   % Neutrophils Latest Units: % 62.1   % Lymphocytes Latest Units: % 22.7   % Monocytes Latest Units: % 11.6   % Eosinophils Latest Units: % 2.4   % Basophils Latest Units: % 0.9   % Immature Granulocytes Latest Units: % 0.3   Absolute Neutrophil Latest Ref Range: 1.6 - 8.3 10e9/L 4.7   Absolute Lymphocytes Latest Ref Range: 0.8 - 5.3 10e9/L 1.7   Absolute Monocytes Latest Ref Range: 0.0 - 1.3 10e9/L 0.9   Absolute Eosinophils Latest Ref Range: 0.0 - 0.7 10e9/L 0.2   Absolute Basophils Latest Ref Range: 0.0 - 0.2 10e9/L 0.1   Abs Immature Granulocytes Latest Ref Range: 0 - 0.4 10e9/L 0.0       IMAGING:  Reviewed and stable    EXAMINATION: CT  CHEST/ABDOMEN/PELVIS W CONTRAST  8/31/2018 8:54 AM       CLINICAL HISTORY: follow up for gastric ca; Acute gout, unspecified  cause, unspecified site; Malignant neoplasm of overlapping sites of  stomach (H). As per chart review: mPlease see my previous note for  details. ?Terry Yi is a 52-year-old male with linitis plastica and  metastatic periaortic lymph node in the aortocaval region, as well as  extensive infiltration of the lesser omentum and peripancreatic fat,  who started on chemotherapy with FOLFOX      COMPARISON: CT CAP on 06/08/2018, 03/16/2018     PROCEDURE COMMENTS: CT of the chest, abdomen, and pelvis was performed  with 134 ml isovue 370 intravenous and oral contrast. Axial MIP   images of the chest, and coronal and sagittal reformatted images of  the chest, abdomen, and pelvis obtained.     FINDINGS:    Support devices:   Right-sided Port-A-Cath with tip terminating at the atriocaval  junction.     Chest:  Small hypodense nodule arising from the right thyroid lobe, unchanged.     Heart is not enlarged. Trace pericardial fluid. No central pulmonary  embolism. Calcified mediastinal and hilar lymph nodes, stable. No  axillary adenopathy.     Trachea and central airway are patent. No focal consolidation or  pleural effusion. Multiple calcified pulmonary nodules, unchanged.  Previously seen 6 mm nodule along the right minor fissure, likely to  represent a perifissural lymph node. No suspicious pulmonary nodules.     Abdomen/pelvis:  Mild diffuse gastric wall thickening and mucosal hyperenhancement is  not significantly changed compared to 06/08/2018 study.  Circumferential soft tissue thickening around the gastroesophageal  junction is also not significantly change. Persistent hazy/amorphous  retroperitoneal stranding extending from the level of the marychuy  hepatis to the level of the aortic bifurcation, unchanged. Mesenteric  and para-aortic lymph nodes are again seen. No bowel obstruction.  Colonic  diverticulosis without diverticulitis. Mild circumferential  thickening of rectosigmoid wall, new or increased from prior.     1 x 1 cm subcapsular lesion in right hepatic lobe, not significant  changed with features suggestive of a hemangioma. Subcentimeter  hypodense lesion in the medial segment of the left hepatic lobe  (series 3, image 154), too small to accurately characterize, but  stable from the prior. Pancreas appears atrophic with diffuse fatty  infiltration. Gallbladder, spleen, adrenal glands are unremarkable.  Prominent right pelvocaliectasis without hydronephrosis; there is mild  urothelial thickening and enhancement which is new. Small  hypodensities arising from kidneys are too small to characterize, but  they favored cyst, unchanged. Mild progression of fascial thickening  along the right anterior pararenal fossa when compared to prior study  (series 3 image 179). Stable thickening anterior to the left perirenal  fascia. Urinary bladder is unremarkable. Internal aspiration of  prostate gland.     Major vasculature is patent. Calcification of abdominal aorta and  common iliac arteries. No infrarenal aortic aneurysm. Replaced right  hepatic artery arises from the SMA.     Soft tissue/bones: Unchanged degenerative cystic changes and  fragmentation about the right acetabulum. No acute bony lesion.         IMPRESSION: In this patient with linitis plastica   1a. Mild diffuse gastric wall and circumferential soft tissue  thickening at the level of gastroesophageal junction is not  significantly changed when compared to 06/08/2018.  1b. Persistent hazy and amorphous retrograde stranding extending from  the level of the marychuy hepatis to the level of the aortic bifurcation,  unchanged. Numerous unchanged mesenteric and periaortic lymph nodes.   1c. Mild progression of peritoneal/fascial thickening along the right  anterior pararenal fossa. Stable peritoneal thickening anterior to the  left perirenal  fossa.  2. Sequela of granulomatous disease in the lung as evidenced by  multiple calcified mediastinal/hilar lymph nodes and pulmonary  nodules. Previously seen 6 mm nodule along the right minor fissure,  likely to represent a perifissural lymph node.  3. New or significantly increased circumferential wall thickening  involving the rectosigmoid colon suggestive of proctocolitis in the  appropriate clinical setting.      EXAMINATION: CT CHEST/ABDOMEN/PELVIS W CONTRAST, 6/8/2018 8:28 AM     TECHNIQUE:  Helical CT images from the thoracic inlet through the  symphysis pubis were obtained  with contrast. Contrast dose: 135 ml  isovue 370     COMPARISON: 3/16/2018, PET CT 3/24/2017     HISTORY: follow up for gastric ca; Malignant neoplasm of overlapping  sites of stomach (H). Status post chemotherapy.     FINDINGS:     Chest: Right IJ port tip within the low SVC. Heart size is normal.  Normal caliber of the aorta and pulmonary artery. No central pulmonary  embolus. Visualized thyroid is unremarkable. Multiple calcified  mediastinal and bilateral hilar lymph nodes, not significantly  changed. No new or enlarged lymph nodes in the chest or axilla. The  upper and mid esophagus appear normal. Findings at the GE junction see  separate dissection. Central airway is patent. Scattered calcified  granulomas. Scattered small indeterminate noncalcified nodules are  unchanged from at least 3/24/2017, for example 6 mm nodule along the  right minor fissure (series 7 image 81).     Abdomen and pelvis: Mild diffuse gastric wall thickening is not  significantly changed. Circumferential soft tissue thickening around  the gastroesophageal junction (series 3 image 128), measuring up to  1.2 cm in thickness which is not significantly changed. Hazy fat  stranding and mildly prominent lymph nodes in the retroperitoneum and  lesser sac are not significantly changed. For example rounded 1.2 cm  short axis diameter marychuy hepatis node (series 2  image 155). Unchanged  peritoneal thickening along the left anterior pararenal fascia (series  3 image 195).     Unchanged cyst in hepatic segment 4. 1.4 cm subcapsular lesion in the  right hepatic lobe (series 3 image 149) is not significantly changed  and has enhancement characteristics consistent with the benign  cavernous hemangioma. The gallbladder, pancreas, spleen, and adrenals  are within normal limits. Few hypoattenuating foci in both kidneys are  too small to characterize. Bladder is unremarkable. Prostate is  enlarged. Few colonic diverticula without evidence of diverticulitis.  The appendix and small bowel are within normal limits. Major  vasculature is patent. Aorta is normal in caliber with mild-moderate  atherosclerosis. No free air or significant free fluid.     Bones and soft tissues: Unchanged mild compression deformity of the  T12 vertebral body. Unchanged degenerative cystic change and  fragmentation about the right acetabulum.         IMPRESSION:   1. Mild diffuse gastric wall thickening and circumferential soft  tissue thickening around the gastroesophageal junction are not  significantly changed.  2. Unchanged fat stranding and mildly prominent lymph nodes in the  lesser sac and retroperitoneum.  3. Unchanged peritoneal thickening most prominent along the left  anterior pararenal fascia.   4. Sequelae of granulomatous disease in the lungs. A few small  noncalcified pulmonary nodules are unchanged from 3/24/2017. Attention  on follow-up examination.      Esophagram dated 5/16/2018     COMPARISON:    CT dated 3/16/2018     HISTORY:    Esophageal dysphagia in a patient with history of gastric  carcinoma.     FINDINGS: Examination of the esophagus reveals adequate primary and  secondary peristalsis. There is distal esophageal narrowing at the  gastroesophageal junction without associated mucosal abnormality, with  apparent extrinsic compression. This may correspond with soft tissue  thickening  seen at the diaphragmatic hiatus on CT dated 3/16/2018. The  gastroesophageal junction is patent. No hiatal hernia was seen on  examination. There was absence of gastroesophageal reflux.     Fluoroscopy time was 1.6 minutes.     A 12mm barium tablet was administered and did not initially pass  through the gastroesophageal junction. 15 minutes after initial  administration and following ingestion of pudding and thick barium,  the barium tablet did pass through the gastroesophageal junction.         IMPRESSION: Extrinsic compression on the gastroesophageal junction  results in luminal narrowing and associated extended retention of  barium tablet. This likely corresponds with soft tissue thickening  about the diaphragmatic hiatus as demonstrated on prior CT  examination.            EGD 4/27/18  Findings:        One 12 mm nodular area with possible shallow uleration was found in the        gastric fundus. Biopsies were taken with a cold forceps for histology.        The esophagus was normal.        The stomach was normal except as noted above..        The examined duodenum was normal.       ORIGINAL REPORT:     SPECIMEN(S):   Stomach biopsy, fundus     FINAL DIAGNOSIS:   STOMACH, FUNDUS, BIOPSY:   - Poorly differentiated carcinoma (diffuse type) with signet ring features   - Biopsy is limited to mucosa     ASSESSMENT AND PLAN:      Metastatic gastric cancer, HER-2/catrachita negative, MSI intact, PD-L1 neg (<1%), presenting with early satiety and significant weight loss and epigastric discomfort upon eating with some vomiting.  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  There is an indeterminate right liver lobe lesion which has remained stable and likely is not a metastasis.      We started palliative FOLFOX on 03/12/2017.     Oxaliplatin was dropped from cycle #11 onwards because of neuropathy     Scans after C#18 show stable  disease    Repeat CT CAP after 24 cycles also show stable disease so we continued with 5FU/LV.     CT scan after cycle #30 - stable disease.    CT scan on 8/31/18 after C#36 also is stable.  I discussed with radiology and the subtle finding of fascial thickening around the pararenal fossa at this time is not very convincing progression of the disease  I discussed the findings with him and since he was doing well, we decided to continue the same chemotherapy without making any changes  He has received 38 cycles up until now.  He is tolerating it well but I will hold chemotherapy for now until he gets the cardiac workup done.    2 episodes of chest tightness, nausea and diaphoresis and shortness of breath.  EKG was normal but the description of the episodes is concerning for underlying cardiac etiology.  There is a stress test planned and we will get in touch with his primary care physician regarding that so that it can be done soon and patient is also going to give his primary care physician a call and will keep us updated.  We will resume chemotherapy after the cardiac workup is completed.  Occasionally 5-FU can cause vasospasm but he has been tolerating it well for such a long time that it is hard for me to blame the episodes on 5-FU.  In addition these were 2 separate episodes which were on day 4 and day 7 of chemotherapy.    Dysphagia.  Overall this has remained stable with occasional problems including the one mentioned above.  When he had a repeat EGD it showed improvement apart from 1.2 cm malignant shallow ulcer   Esophogram showed extrinsic compression on the GE junction resulting in luminal narrowing and associated extended retention of barium tablet. This likely corresponds with soft tissue thickening about the diaphragmatic hiatus as demonstrated on prior CT examination. Repeat CT scan shows stable soft tissue circumferential thickening around the GE junction     Constipation/diarrhea.  He gets  "constipated for 2 days after chemotherapy and then he has diarrhea for a couple of days.  He should continue the stool softeners on the day of chemotherapy so as to prevent constipation.       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  Overall this is much better.  I advised him to continue to keep his hands and feet well moisturized.     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  Overall it has significantly improved.  Continue gabapentin.     Mild abdominal tenderness on palpation.  I advised him to keep a watch on this and if it gets worse to let me know.  Currently it is very mild and he does not need any other medications for it.     We did not address the following today  Gout.  The acute episode has resolved and now he is on allopurinol     His genetic testing did not show any identifiable mutation not reveal any identifiable mutation     We will hold chemotherapy today and resume after the cardiac workup is completed.    I answered all of his and his family's questions to their satisfaction. They're agreeable and comfortable with the plan.    LANEY HERNANDEZ MD             Injectable Influenza Immunization Documentation    1.  Has the patient received the information for the injectable influenza vaccine? YES     2. Is the patient 6 months of age or older? YES     3. Does the patient have any of the following contraindications?         Severe allergy to eggs?  No     Severe allergic reaction to previous influenza vaccines?  No   Severe allergy to latex?  No       History of Guillain-[place] syndrome?  No     Currently have a temperature greater than 100.4F?  No        4.  Severely egg allergic patients should have flu vaccine eligibility assessed by an MD, RN, or pharmacist, and those who received flu vaccine should be observed for 15 min by an MD, RN, Pharmacist, Medical Technician, or member of clinic staff.\": NO    5. Latex-allergic patients should be given latex-free influenza vaccine No. Please reference the Vaccine latex table " to determine if your clinic s product is latex-containing.       Vaccination given by Tiffanie Farmer LPN on 10/3/2018 at 10:26 AM            Again, thank you for allowing me to participate in the care of your patient.        Sincerely,        Yakov Shaffer MD

## 2018-10-10 NOTE — PROGRESS NOTES
Left message on patient's voicemail to let him know that his Stress Test came back showing a normal result.  Report reviewed and received from Laine Shaffer aware; patient will be able to proceed with chemotherapy next week as planned.  Call back number left for patient if he has further questions.

## 2018-10-17 NOTE — PROGRESS NOTES
Power port needle left accessed for treatment. Tolerated port access and draw without complaint. Port site scrubbed with Chloraprep for 30 seconds. Accessed using sterile technique. [place] drawn-Red/Green/Purple tubes. Double signed by patient and RN. See documentation flowsheet. Arleth Brandon, RN, BSN, OCN

## 2018-10-17 NOTE — LETTER
10/17/2018         RE: Terry Yi  [address] [place] [address]        Dear Colleague,    Thank you for referring your patient, Terry Yi, to the [facility]. Please see a copy of my visit note below.    10/17/2018     REASON FOR VISIT:  Follow-up for unresectable metastatic gastric carcinoma, diffuse type, grade 3, HER-2 negative, MSI-Intact. PD-L1 negative (<1%)     HISTORY OF PRESENT ILLNESS:  Please see my previous note for details.  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      CT CAP after C#6 shows stable disease    C# 9 FOLFOX 8/2/2017 ( Oxaliplatin dose reduced to 70mg/m2 due to neuropathy )  C#10 8/22/2017 ( Oxaliplatin dose reduced to 60mg/m2 due to neuropathy )  Delayed by 1 week- patient preference  C#11 9/6/17 5FU/LV ( Stopped Oxaliplatin )  C#12 9/20/2017 5FU/LV    Repeat CT scan after C#12 shows stable to slightly improved disease.    Repeat CT CAP on 12/22/17 after C#18 was stable    Repeat CT CAP on 3/16/18 after C#24 shows stable disease.    C# 27 4/18/2018 5FU/LV    As he was complaining of off and on trouble swallowing with food getting stuck, we repeated EGD on 4/27/2018 which showed a nodular area with possible shallow ulceration in the stomach. Otherwise esophagus and stomach and duodenum were normal.  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  He was referred to gastroenterologist for manometry    The biopsies which were taken from the nodular shallow ulcer aren't consistent with poorly differentiated diffuse type adenocarcinoma with signet ring features  Esophogram showed extrinsic compression on the GE junction resulting in luminal narrowing and  "associated extended retention of barium tablet. This likely corresponds with soft tissue thickening about the diaphragmatic hiatus as demonstrated on prior CT examination    Repeat CT scan on 6/8/18 after C#30 is stable with stable circumferential soft tissue thickening at the GE Junction and stable thickness of stomach wall and peritoneal thickening    C#31 6/13/2018 5FU/LV  C#32 6/27/18  C#33 7/11/2018   C#34 7/25/18  C#35 8/8/2018  C#36 8/22/18    CT scan on 8/31/18- stable, with minimal increased fascial thickening along the right anterior pararenal fossa.     C#37 9/5/18    He had one episode on 9/10/2018 when he probably did not chew the steak well and it got stuck and he had to go to the emergency room to push it down into his his stomach.  The EGD report mentions that there was no known narrowing at the distal end of the esophagus.      C#38 9/19/18    We delayed cycle #39 because he had 2 episodes of Chest tightness and he was getting cardiac workup done so he wanted to get that completed before resuming chemotherapy.  Apparently his EKG was normal.  He also had a stress test done a few days ago which was unremarkable.    C#39 10/17/2018     INTERVAL HISTORY: He is now coming back for follow-up and possibly to resume chemotherapy.  Overall he is doing well and is feeling well.  He tells me that there was one day when he was having similar kind of chest tightness although not as severe and rather than going to the emergency room he went to see a chiropractor who did some an adjustment to his ribs and back and he felt \"like a new man\".  Since then he has had no issues with chest tightness.  Otherwise denies nausea and vomiting.  He did not have any diarrhea but he gets some loose stools with chemotherapy.  He thinks that the gabapentin is helping with his neuropathy and there are days when he has noticed feeling in his toes.  He feels much better after he takes a warm bath and the neuropathy feels better " after that.  Denies any infections.  No trouble breathing.  No new swellings.  No new skin problems.  He continues to have some issues with swallowing and he has to make sure that he chews the food very carefully and swallow slowly.  Overall this is a stable.      ECOG 0    ROS:  A comprehensive ROS was otherwise neg            MEDICATIONS:   Current Outpatient Prescriptions   Medication     Acetaminophen (TYLENOL PO)     allopurinol (ZYLOPRIM) 100 MG tablet     aspirin 81 MG chewable tablet     Ferrous Sulfate (IRON SUPPLEMENT PO)     Fiber 5 GM/15ML LIQD     gabapentin (NEURONTIN) 300 MG capsule     indomethacin (INDOCIN) 25 MG capsule     lidocaine-prilocaine (EMLA) cream     LORazepam (ATIVAN) 0.5 MG tablet     Multiple Vitamins-Minerals (MULTIVITAMIN ADULT PO)     Naproxen Sodium (ALEVE PO)     NEW MED     ondansetron (ZOFRAN) 8 MG tablet     prochlorperazine (COMPAZINE) 10 MG tablet     No current facility-administered medications for this visit.      Facility-Administered Medications Ordered in Other Visits   Medication     heparin 100 UNIT/ML injection 5 mL     sodium chloride (PF) 0.9% PF flush 10-20 mL            PHYSICAL EXAMINATION:   /86  Pulse 79  Temp 97.3  F (36.3  C) (Oral)  SpO2 98%  CONSTITUTIONAL: No apparent distress  EYES: PERRLA, without pallor or jaundice  ENT/MOUTH: Ears unremarkable. No oral lesions  CVS: s1s2 normal  RESPIRATORY: Chest is clear  GI: Abdomen is benign  NEURO: Alert and oriented ×3.  Subjective numbness of the fingers and feet  INTEGUMENT: no concerning skin rashes   LYMPHATIC: no palpable lymphadenopathy  MUSCULOSKELETAL: Unremarkable. No bony tenderness.   EXTREMITIES: no pedal edema  PSYCH: Mentation, mood and affect are appropriate      LABS:   Reviewed   Results for LEONOR RALPH (MRN [id number]) as of 10/17/2018 08:44   Ref. Range 10/17/2018 08:03   Sodium Latest Ref Range: 133 - 144 mmol/L 141   Potassium Latest Ref Range: 3.4 - 5.3 mmol/L 4.2   Chloride  Latest Ref Range: 94 - 109 mmol/L 107   Carbon Dioxide Latest Ref Range: 20 - 32 mmol/L 30   Urea Nitrogen Latest Ref Range: 7 - 30 mg/dL 13   Creatinine Latest Ref Range: 0.66 - 1.25 mg/dL 0.76   GFR Estimate Latest Ref Range: >60 mL/min/1.7m2 >90   GFR Estimate If Black Latest Ref Range: >60 mL/min/1.7m2 >90   Calcium Latest Ref Range: 8.5 - 10.1 mg/dL 8.7   Anion Gap Latest Ref Range: 3 - 14 mmol/L 4   Albumin Latest Ref Range: 3.4 - 5.0 g/dL 3.4   Protein Total Latest Ref Range: 6.8 - 8.8 g/dL 6.7 (L)   Bilirubin Total Latest Ref Range: 0.2 - 1.3 mg/dL 0.3   Alkaline Phosphatase Latest Ref Range: 40 - 150 U/L 97   ALT Latest Ref Range: 0 - 70 U/L 29   AST Latest Ref Range: 0 - 45 U/L 22   Glucose Latest Ref Range: 70 - 99 mg/dL 107 (H)   WBC Latest Ref Range: 4.0 - 11.0 10e9/L 6.8   Hemoglobin Latest Ref Range: 13.3 - 17.7 g/dL 13.4   Hematocrit Latest Ref Range: 40.0 - 53.0 % 40.4   Platelet Count Latest Ref Range: 150 - 450 10e9/L 222   RBC Count Latest Ref Range: 4.4 - 5.9 10e12/L 4.35 (L)   MCV Latest Ref Range: 78 - 100 fl 93   MCH Latest Ref Range: 26.5 - 33.0 pg 30.8   MCHC Latest Ref Range: 31.5 - 36.5 g/dL 33.2   RDW Latest Ref Range: 10.0 - 15.0 % 13.7   Diff Method Unknown Automated Method   % Neutrophils Latest Units: % 67.1   % Lymphocytes Latest Units: % 19.9   % Monocytes Latest Units: % 9.8   % Eosinophils Latest Units: % 2.2   % Basophils Latest Units: % 0.7   % Immature Granulocytes Latest Units: % 0.3   Absolute Neutrophil Latest Ref Range: 1.6 - 8.3 10e9/L 4.6   Absolute Lymphocytes Latest Ref Range: 0.8 - 5.3 10e9/L 1.4   Absolute Monocytes Latest Ref Range: 0.0 - 1.3 10e9/L 0.7   Absolute Eosinophils Latest Ref Range: 0.0 - 0.7 10e9/L 0.2   Absolute Basophils Latest Ref Range: 0.0 - 0.2 10e9/L 0.1       IMAGING:  Reviewed and stable    EXAMINATION: CT CHEST/ABDOMEN/PELVIS W CONTRAST  8/31/2018 8:54 AM       CLINICAL HISTORY: follow up for gastric ca; Acute gout, unspecified  cause,  unspecified site; Malignant neoplasm of overlapping sites of  stomach (H). As per chart review: mPlease see my previous note for  details. ?Terry Yi is a 52-year-old male with linitis plastica and  metastatic periaortic lymph node in the aortocaval region, as well as  extensive infiltration of the lesser omentum and peripancreatic fat,  who started on chemotherapy with FOLFOX      COMPARISON: CT CAP on 06/08/2018, 03/16/2018     PROCEDURE COMMENTS: CT of the chest, abdomen, and pelvis was performed  with 134 ml isovue 370 intravenous and oral contrast. Axial MIP   images of the chest, and coronal and sagittal reformatted images of  the chest, abdomen, and pelvis obtained.     FINDINGS:    Support devices:   Right-sided Port-A-Cath with tip terminating at the atriocaval  junction.     Chest:  Small hypodense nodule arising from the right thyroid lobe, unchanged.     Heart is not enlarged. Trace pericardial fluid. No central pulmonary  embolism. Calcified mediastinal and hilar lymph nodes, stable. No  axillary adenopathy.     Trachea and central airway are patent. No focal consolidation or  pleural effusion. Multiple calcified pulmonary nodules, unchanged.  Previously seen 6 mm nodule along the right minor fissure, likely to  represent a perifissural lymph node. No suspicious pulmonary nodules.     Abdomen/pelvis:  Mild diffuse gastric wall thickening and mucosal hyperenhancement is  not significantly changed compared to 06/08/2018 study.  Circumferential soft tissue thickening around the gastroesophageal  junction is also not significantly change. Persistent hazy/amorphous  retroperitoneal stranding extending from the level of the marychuy  hepatis to the level of the aortic bifurcation, unchanged. Mesenteric  and para-aortic lymph nodes are again seen. No bowel obstruction.  Colonic diverticulosis without diverticulitis. Mild circumferential  thickening of rectosigmoid wall, new or increased from prior.     1 x 1  cm subcapsular lesion in right hepatic lobe, not significant  changed with features suggestive of a hemangioma. Subcentimeter  hypodense lesion in the medial segment of the left hepatic lobe  (series 3, image 154), too small to accurately characterize, but  stable from the prior. Pancreas appears atrophic with diffuse fatty  infiltration. Gallbladder, spleen, adrenal glands are unremarkable.  Prominent right pelvocaliectasis without hydronephrosis; there is mild  urothelial thickening and enhancement which is new. Small  hypodensities arising from kidneys are too small to characterize, but  they favored cyst, unchanged. Mild progression of fascial thickening  along the right anterior pararenal fossa when compared to prior study  (series 3 image 179). Stable thickening anterior to the left perirenal  fascia. Urinary bladder is unremarkable. Internal aspiration of  prostate gland.     Major vasculature is patent. Calcification of abdominal aorta and  common iliac arteries. No infrarenal aortic aneurysm. Replaced right  hepatic artery arises from the SMA.     Soft tissue/bones: Unchanged degenerative cystic changes and  fragmentation about the right acetabulum. No acute bony lesion.         IMPRESSION: In this patient with linitis plastica   1a. Mild diffuse gastric wall and circumferential soft tissue  thickening at the level of gastroesophageal junction is not  significantly changed when compared to 06/08/2018.  1b. Persistent hazy and amorphous retrograde stranding extending from  the level of the marychuy hepatis to the level of the aortic bifurcation,  unchanged. Numerous unchanged mesenteric and periaortic lymph nodes.   1c. Mild progression of peritoneal/fascial thickening along the right  anterior pararenal fossa. Stable peritoneal thickening anterior to the  left perirenal fossa.  2. Sequela of granulomatous disease in the lung as evidenced by  multiple calcified mediastinal/hilar lymph nodes and  pulmonary  nodules. Previously seen 6 mm nodule along the right minor fissure,  likely to represent a perifissural lymph node.  3. New or significantly increased circumferential wall thickening  involving the rectosigmoid colon suggestive of proctocolitis in the  appropriate clinical setting.      EXAMINATION: CT CHEST/ABDOMEN/PELVIS W CONTRAST, 6/8/2018 8:28 AM     TECHNIQUE:  Helical CT images from the thoracic inlet through the  symphysis pubis were obtained  with contrast. Contrast dose: 135 ml  isovue 370     COMPARISON: 3/16/2018, PET CT 3/24/2017     HISTORY: follow up for gastric ca; Malignant neoplasm of overlapping  sites of stomach (H). Status post chemotherapy.     FINDINGS:     Chest: Right IJ port tip within the low SVC. Heart size is normal.  Normal caliber of the aorta and pulmonary artery. No central pulmonary  embolus. Visualized thyroid is unremarkable. Multiple calcified  mediastinal and bilateral hilar lymph nodes, not significantly  changed. No new or enlarged lymph nodes in the chest or axilla. The  upper and mid esophagus appear normal. Findings at the GE junction see  separate dissection. Central airway is patent. Scattered calcified  granulomas. Scattered small indeterminate noncalcified nodules are  unchanged from at least 3/24/2017, for example 6 mm nodule along the  right minor fissure (series 7 image 81).     Abdomen and pelvis: Mild diffuse gastric wall thickening is not  significantly changed. Circumferential soft tissue thickening around  the gastroesophageal junction (series 3 image 128), measuring up to  1.2 cm in thickness which is not significantly changed. Hazy fat  stranding and mildly prominent lymph nodes in the retroperitoneum and  lesser sac are not significantly changed. For example rounded 1.2 cm  short axis diameter marychuy hepatis node (series 2 image 155). Unchanged  peritoneal thickening along the left anterior pararenal fascia (series  3 image 195).     Unchanged cyst  in hepatic segment 4. 1.4 cm subcapsular lesion in the  right hepatic lobe (series 3 image 149) is not significantly changed  and has enhancement characteristics consistent with the benign  cavernous hemangioma. The gallbladder, pancreas, spleen, and adrenals  are within normal limits. Few hypoattenuating foci in both kidneys are  too small to characterize. Bladder is unremarkable. Prostate is  enlarged. Few colonic diverticula without evidence of diverticulitis.  The appendix and small bowel are within normal limits. Major  vasculature is patent. Aorta is normal in caliber with mild-moderate  atherosclerosis. No free air or significant free fluid.     Bones and soft tissues: Unchanged mild compression deformity of the  T12 vertebral body. Unchanged degenerative cystic change and  fragmentation about the right acetabulum.         IMPRESSION:   1. Mild diffuse gastric wall thickening and circumferential soft  tissue thickening around the gastroesophageal junction are not  significantly changed.  2. Unchanged fat stranding and mildly prominent lymph nodes in the  lesser sac and retroperitoneum.  3. Unchanged peritoneal thickening most prominent along the left  anterior pararenal fascia.   4. Sequelae of granulomatous disease in the lungs. A few small  noncalcified pulmonary nodules are unchanged from 3/24/2017. Attention  on follow-up examination.      Esophagram dated 5/16/2018     COMPARISON:    CT dated 3/16/2018     HISTORY:    Esophageal dysphagia in a patient with history of gastric  carcinoma.     FINDINGS: Examination of the esophagus reveals adequate primary and  secondary peristalsis. There is distal esophageal narrowing at the  gastroesophageal junction without associated mucosal abnormality, with  apparent extrinsic compression. This may correspond with soft tissue  thickening seen at the diaphragmatic hiatus on CT dated 3/16/2018. The  gastroesophageal junction is patent. No hiatal hernia was seen  on  examination. There was absence of gastroesophageal reflux.     Fluoroscopy time was 1.6 minutes.     A 12mm barium tablet was administered and did not initially pass  through the gastroesophageal junction. 15 minutes after initial  administration and following ingestion of pudding and thick barium,  the barium tablet did pass through the gastroesophageal junction.         IMPRESSION: Extrinsic compression on the gastroesophageal junction  results in luminal narrowing and associated extended retention of  barium tablet. This likely corresponds with soft tissue thickening  about the diaphragmatic hiatus as demonstrated on prior CT  examination.            EGD 4/27/18  Findings:        One 12 mm nodular area with possible shallow uleration was found in the        gastric fundus. Biopsies were taken with a cold forceps for histology.        The esophagus was normal.        The stomach was normal except as noted above..        The examined duodenum was normal.       ORIGINAL REPORT:     SPECIMEN(S):   Stomach biopsy, fundus     FINAL DIAGNOSIS:   STOMACH, FUNDUS, BIOPSY:   - Poorly differentiated carcinoma (diffuse type) with signet ring features   - Biopsy is limited to mucosa     ASSESSMENT AND PLAN:      Metastatic gastric cancer, HER-2/catrachita negative, MSI intact, PD-L1 neg (<1%), presenting with early satiety and significant weight loss and epigastric discomfort upon eating with some vomiting.  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  There is an indeterminate right liver lobe lesion which has remained stable and likely is not a metastasis.      We started palliative FOLFOX on 03/12/2017.     Oxaliplatin was dropped from cycle #11 onwards because of neuropathy     Scans after C#18 show stable disease    Repeat CT CAP after 24 cycles also show stable disease so we continued with 5FU/LV.     CT scan after cycle #30 -  stable disease.    CT scan on 8/31/18 after C#36 also is stable.  I discussed with radiology and the subtle finding of fascial thickening around the pararenal fossa at this time is not very convincing progression of the disease  I discussed the findings with him and since he was doing well, we decided to continue the same chemotherapy without making any changes    We have delayed cycle #39 because he had 2 episodes of chest tightness nausea and diaphoresis and shortness of breath and he was getting cardiac workup for that.  EKG was unremarkable.  He recently had a stress test done which was also unremarkable.  He again had some chest tightness and he went to a chiropractor who did some adjustments to his ribs and back and he felt much better after that and since then he has had no problems.  I told him that if the chest tightness returns then he should go to the emergency room to get evaluated at that time.  It is extremely unlikely that this is due to 5-FU because although it can cause vasospasm but he has received it so many times before without problems and the most recent episode happened just a few days ago and the last time he received 5-FU was on 9/19/2018.  Now it seems reasonable to resume chemotherapy and we will give him cycle #39 today.    I will also send his tumor for [facility] testing    Dysphagia.  I again advised him to make sure to chew the food very carefully and swallow slowly.  The dysphagia has remained stable.    As mentioned in previous notes when he had a repeat EGD it showed improvement apart from 1.2 cm malignant shallow ulcer   Esophogram showed extrinsic compression on the GE junction resulting in luminal narrowing and associated extended retention of barium tablet. This likely corresponds with soft tissue thickening about the diaphragmatic hiatus as demonstrated on prior CT examination. Repeat CT scan shows stable soft tissue circumferential thickening around the GE junction      Diarrhea.  He gets it with chemotherapy and I told him to take Imodium as needed.      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  With a little break in chemotherapy now the skin seems normal but he will continue to keep his hands and feet well moisturized all the time     Neuropathy.  Overall this has been significantly better on gabapentin so he will continue that.  I also discussed with him about soaking his feet and hands in warm water because he feels better after taking a warm bath.        We did not address the following today  Gout.  The acute episode has resolved and now he is on allopurinol     His genetic testing did not show any identifiable mutation not reveal any identifiable mutation     Return to clinic as scheduled.    I answered all of his and his family's questions to their satisfaction. They're agreeable and comfortable with the plan.    LANEY SHAFFER MD             Again, thank you for allowing me to participate in the care of your patient.        Sincerely,        Laney Shaffer MD

## 2018-10-17 NOTE — MR AVS SNAPSHOT
After Visit Summary   10/17/2018    Terry Yi    MRN: [id number]           Patient Information     Date Of Birth          1965        Visit Information        Provider Department      10/17/2018 8:30 AM BAY 4 INFUSION [facility]        Today's Diagnoses     Malignant neoplasm of overlapping sites of stomach (H)    -  1       Follow-ups after your visit        Your next 10 appointments already scheduled     Oct 31, 2018  8:00 AM CDT   Return Visit with [facility]SE ONLY CANCER CENTER   [facility] ([facility]    [address] [facility] [address]   [phone]            Oct 31, 2018  8:45 AM CDT   Return Visit with Yakov Shaffer MD   [facility] ([facility]    [address] [facility] [address]   [phone]            Oct 31, 2018  9:30 AM CDT   Level 2 with BAY 8 INFUSION   [facility] ([facility]    [address] [facility] [address]   [phone]            Nov 14, 2018  8:30 AM CST   Return Visit with [facility] ONLY CAN[facility] C[facility]   [facility]    [address] [facility] [address]   [phone]            Nov 14, 2018  9:00 AM CST   Level 2 with BAY 8 INFUSION   [facility] ([facility]    [address] [facility] [address]   [phone]            Nov 28, 2018  8:00 AM CST   Return Visit with [facility]SE ONLY CANCER CENTER   [facility] ([facility])    [address] [facility] [address]   [phone]            Nov 28, 2018  8:45 AM CST   Return Visit with Yakov Shaffer MD   [facility] ([facility])    [address] [facility] [address]   [phone]            Nov 28, 2018  9:30 AM CST   Level 2 with BAY 4 INFUSION   [facility] ([facility])    [address] [place] [address] [phone]              Who to contact     If you have questions or need follow up information about today's clinic visit or your schedule please contact [facility] directly at [phone].  Normal or non-critical lab and imaging results will be communicated to you by MyChart, letter or phone within 4 business days after the clinic has received the results. If you do not hear from us within 7 days, please contact the clinic through Sports MatchMakerhart or phone. If you have a critical or abnormal lab result, we will notify you by phone as soon as possible.  Submit refill requests through [employer] or call your pharmacy and they will forward the refill request to us. Please allow 3 business days for your refill to be completed.          Additional Information About Your Visit        Sports MatchMakerhart Information     [employer]ia gives you secure access to your electronic health record. If you see a primary care provider, you can also send messages to your care team and make appointments. If you have questions, please call your primary care clinic.  If you do not have a primary care provider, please call [phone] and they will assist you.      [employer]ia is an electronic gateway that provides easy, online access to your medical records. With [employer]ia, you can request a clinic appointment, read your test results, renew a prescription or communicate with your care team.     To access your existing account, please contact your [facility] or call [phone] for assistance.        Care EveryWhere ID     This is your Care EveryWhere ID. This could be used by other organizations to access your [place] medical records  [id number]        Your Vitals Were     BMI (Body Mass Index)                   31.84 kg/m2            Blood Pressure from Last 3 Encounters:   10/17/18 138/86   10/03/18 115/74   09/19/18 (!)  142/95    Weight from Last 3 Encounters:   10/17/18 100.7 kg (221 lb 14.4 oz)   10/03/18 100.2 kg (221 lb)   09/19/18 99 kg (218 lb 4.8 oz)              Today, you had the following     No orders found for display       Primary Care Provider Office Phone # Fax #    Sandra Dickerson, SARAI [phone]       [facility]  [address]  [facility] [address]        Equal Access to Services     CHANDAN GALLO : Hadii aad ku hadasho Soomaali, waaxda luqadaha, qaybta kaalmada adeegyada, waxay idiin hayaan adeeg kharash sofya . So [facility] [phone].    ATENCIÓN: Si habla español, tiene a cuevas disposición servicios gratuitos de asistencia lingüística. Llame al [phone].    We comply with applicable federal civil rights laws and [place] laws. We do not discriminate on the basis of race, color, national origin, age, disability, sex, sexual orientation, or gender identity.            Thank you!     Thank you for choosing [facility]  for your care. Our goal is always to provide you with excellent care. Hearing back from our patients is one way we can continue to improve our services. Please take a few minutes to complete the written survey that you may receive in the mail after your visit with us. Thank you!             Your Updated Medication List - Protect others around you: Learn how to safely use, store and throw away your medicines at www.disposemymeds.org.          This list is accurate as of 10/17/18  1:48 PM.  Always use your most recent med list.                   Brand Name Dispense Instructions for use Diagnosis    ALEVE PO       Acute gout, unspecified cause, unspecified site       allopurinol 100 MG tablet    ZYLOPRIM    30 tablet    Take 1 tablet (100 mg) by mouth daily    Acute gout, unspecified cause, unspecified site, Malignant neoplasm of overlapping sites of stomach (H)       aspirin 81 MG chewable tablet      Take 81 mg by mouth daily        Fiber 5 GM/15ML Liqd            gabapentin 300 MG capsule    NEURONTIN     Take 1 capsule (300 mg total) by mouth once daily. For neuropathy        indomethacin 25 MG capsule    INDOCIN    42 capsule    Take 1 capsule (25 mg) by mouth 3 times daily (with meals)    Acute gout, unspecified cause, unspecified site       IRON SUPPLEMENT PO      Take by mouth daily (with breakfast)        lidocaine-prilocaine cream    EMLA          LORazepam 0.5 MG tablet    ATIVAN    30 tablet    Take 1 tablet (0.5 mg) by mouth every 4 hours as needed (Anxiety, Nausea/Vomiting or Sleep)    Malignant neoplasm of overlapping sites of stomach (H)       MULTIVITAMIN ADULT PO           NEW MED      1 gel cap CBD from [employer]        ondansetron 8 MG tablet    ZOFRAN    10 tablet    Take 1 tablet (8 mg) by mouth every 8 hours as needed (Nausea/Vomiting)    Malignant neoplasm of overlapping sites of stomach (H)       prochlorperazine 10 MG tablet    COMPAZINE    30 tablet    Take 1 tablet (10 mg) by mouth every 6 hours as needed (Nausea/Vomiting)    Malignant neoplasm of overlapping sites of stomach (H)       TYLENOL PO      Take by mouth as needed for mild pain or fever

## 2018-10-17 NOTE — MR AVS SNAPSHOT
After Visit Summary   10/17/2018    Terry Yi    MRN: [id number]           Patient Information     Date Of Birth          1965        Visit Information        Provider Department      10/17/2018 8:45 AM Yakov Shaffer MD [facility]        Today's Diagnoses     Malignant neoplasm of overlapping sites of stomach (H)    -  1       Follow-ups after your visit        Your next 10 appointments already scheduled     Oct 31, 2018  8:00 AM CDT   Return Visit with NURSE ONLY CANCER CENTER   [facility] ([facility])    [address] [facility] [address]   [phone]            Oct 31, 2018  8:45 AM CDT   Return Visit with Yakov Shaffer MD   [facility] ([facility])    [address] [facility] [address]   [phone]            Oct 31, 2018  9:30 AM CDT   Level 2 with BAY 8 INFUSION   [facility] ([facility])    [address] [facility] [address]   [phone]            Nov 14, 2018  8:30 AM CST   Return Visit with NURSE ONLY CANCER CENTER   [facility] ([facility])    [address] [facility] [address]   [phone]            Nov 14, 2018  9:00 AM CST   Level 2 with BAY 8 INFUSION   [facility] ([facility])    [address] [facility] [address]   [phone]            Nov 28, 2018  8:00 AM CST   Return Visit with NURSE ONLY CANCER CENTER   [facility] ([facility])    [address] [facility] [address]   [phone]            Nov 28, 2018  8:45 AM CST   Return Visit with Yakov Shaffer MD   [facility] ([facility])    [address] [facility] [address]   [phone]            Nov 28, 2018  9:30 AM CST   Level 2 with BAY 4 INFUSION   [facility] ([facility])    [address] [place] [address] [phone]              Who to contact     If you have questions or need follow up information about today's clinic visit or your schedule please contact [facility] directly at [phone].  Normal or non-critical lab and imaging results will be communicated to you by MyChart, letter or phone within 4 business days after the clinic has received the results. If you do not hear from us within 7 days, please contact the clinic through [email or web address] or phone. If you have a critical or abnormal lab result, we will notify you by phone as soon as possible.  Submit refill requests through [employer] MatchMaker or call your pharmacy and they will forward the refill request to us. Please allow 3 business days for your refill to be completed.          Additional Information About Your Visit        Stazoo.comhart Information     [employer] Match[employer]aker gives you secure access to your electronic health record. If you see a primary care provider, you can also send messages to your care team and make appointments. If you have questions, please call your primary care clinic.  If you do not have a primary care provider, please call [phone] and they will assist you.      [employer] Match[employer] is an electronic gateway that provides easy, online access to your medical records. With [employer] MatchMaker, you can request a clinic appointment, read your test results, renew a prescription or communicate with your care team.     To access your existing account, please contact your [facility] or call [phone] for assistance.        Care EveryWhere ID     This is your Care EveryWhere ID. This could be used by other organizations to access your [place] medical records  [id number]        Your Vitals Were     Pulse Temperature Pulse Oximetry             79 97.3  F (36.3  C) (Oral) 98%          Blood Pressure from Last 3 Encounters:   10/17/18 138/86   10/03/18  115/74   09/19/18 (!) 142/95    Weight from Last 3 Encounters:   10/17/18 100.7 kg (221 lb 14.4 oz)   10/03/18 100.2 kg (221 lb)   09/19/18 99 kg (218 lb 4.8 oz)              Today, you had the following     No orders found for display       Primary Care Provider Office Phone # Fax #    Sandra Dickerson SARAI [phone]       [facility]  [address]  [facility] [address]        Equal Access to Services     [facility]: Hadii aad ku hadasho Soomaali, waaxda luqadaha, qaybta kaalmada adeegyada, waxay prabhain hayguillermo adejennifer cowart . So [facility] [phone].    ATENCIÓN: Si habla español, tiene a cuevas disposición servicios gratuitos de asistencia lingüística. [facility] [phone].    We comply with applicable federal civil rights laws and [place] laws. We do not discriminate on the basis of race, color, national origin, age, disability, sex, sexual orientation, or gender identity.            Thank you!     Thank you for choosing [facility]  for your care. Our goal is always to provide you with excellent care. Hearing back from our patients is one way we can continue to improve our services. Please take a few minutes to complete the written survey that you may receive in the mail after your visit with us. Thank you!             Your Updated Medication List - Protect others around you: Learn how to safely use, store and throw away your medicines at www.disposemymeds.org.          This list is accurate as of 10/17/18  9:24 AM.  Always use your most recent med list.                   Brand Name Dispense Instructions for use Diagnosis    ALEVE PO       Acute gout, unspecified cause, unspecified site       allopurinol 100 MG tablet    ZYLOPRIM    30 tablet    Take 1 tablet (100 mg) by mouth daily    Acute gout, unspecified cause, unspecified site, Malignant neoplasm of overlapping sites of stomach (H)       aspirin 81 MG chewable tablet      Take 81 mg by mouth daily        Fiber 5  GM/15ML Liqd           gabapentin 300 MG capsule    NEURONTIN     Take 1 capsule (300 mg total) by mouth once daily. For neuropathy        indomethacin 25 MG capsule    INDOCIN    42 capsule    Take 1 capsule (25 mg) by mouth 3 times daily (with meals)    Acute gout, unspecified cause, unspecified site       IRON SUPPLEMENT PO      Take by mouth daily (with breakfast)        lidocaine-prilocaine cream    EMLA          LORazepam 0.5 MG tablet    ATIVAN    30 tablet    Take 1 tablet (0.5 mg) by mouth every 4 hours as needed (Anxiety, Nausea/Vomiting or Sleep)    Malignant neoplasm of overlapping sites of stomach (H)       MULTIVITAMIN ADULT PO           NEW MED      1 gel cap CBD from [employer]        ondansetron 8 MG tablet    ZOFRAN    10 tablet    Take 1 tablet (8 mg) by mouth every 8 hours as needed (Nausea/Vomiting)    Malignant neoplasm of overlapping sites of stomach (H)       prochlorperazine 10 MG tablet    COMPAZINE    30 tablet    Take 1 tablet (10 mg) by mouth every 6 hours as needed (Nausea/Vomiting)    Malignant neoplasm of overlapping sites of stomach (H)       TYLENOL PO      Take by mouth as needed for mild pain or fever

## 2018-10-17 NOTE — MR AVS SNAPSHOT
After Visit Summary   10/17/2018    Terry Yi    MRN: [id number]           Patient Information     Date Of Birth          1965        Visit Information        Provider Department      10/17/2018 8:00 AM NURSE ONLY CANCER CENTER [facility]        Today's Diagnoses     Malignant neoplasm of overlapping sites of stomach (H)    -  1       Follow-ups after your visit        Your next 10 appointments already scheduled     Oct 17, 2018  8:30 AM CDT   Level 2 with BAY 4 INFUSION   [facility]    [address] [place] [address]   [phone]            Oct 17, 2018  8:45 AM CDT   Return Visit with Yakov Shaffer MD   [facility] ([facility])    [address] [place] [address]   [phone]            Oct 31, 2018  8:00 AM CDT   Return Visit with NURSE ONLY CANCER CENTER   [facility] ([facility])    [address] [place] [address]   [phone]            Oct 31, 2018  8:45 AM CDT   Return Visit with Yakov Shaffer MD   [facility] ([facility])    [address] [place] [address]   [phone]            Oct 31, 2018  9:30 AM CDT   Level 2 with BAY 8 INFUSION   [facility] ([facility])    [address] [place] [address]   [phone]              Who to contact     If you have questions or need follow up information about today's clinic visit or your schedule please contact [facility] directly at [phone].  Normal or non-critical lab and imaging results will be communicated to you by MyChart, letter or phone within 4 business days after the clinic has received the results. If you do not hear from us within 7 days, please contact the clinic through MyChart or phone. If you have a critical or abnormal lab  result, we will notify you by phone as soon as possible.  Submit refill requests through [employer] or call your pharmacy and they will forward the refill request to us. Please allow 3 business days for your refill to be completed.          Additional Information About Your Visit        SpontactsharPowWowHR Information     [employer] gives you secure access to your electronic health record. If you see a primary care provider, you can also send messages to your care team and make appointments. If you have questions, please call your primary care clinic.  If you do not have a primary care provider, please call [phone] and they will assist you.      [employer] is an electronic gateway that provides easy, online access to your medical records. With [employer], you can request a clinic appointment, read your test results, renew a prescription or communicate with your care team.     To access your existing account, please contact your [facility] or call [phone] for assistance.        Care EveryWhere ID     This is your Care EveryWhere ID. This could be used by other organizations to access your [place] medical records  [id number]         Blood Pressure from Last 3 Encounters:   10/03/18 115/74   09/19/18 (!) 142/95   09/05/18 123/81    Weight from Last 3 Encounters:   10/03/18 100.2 kg (221 lb)   09/19/18 99 kg (218 lb 4.8 oz)   09/05/18 99.8 kg (220 lb)              We Performed the Following     CBC with platelets differential     Comprehensive metabolic panel        Primary Care Provider Office Phone # Fax #    Sandra Dickerson [phone]       [facility]  [address]  [facility] [address]        Equal Access to Services     [facility]: Hadii aad ku hadasho Soomaali, waaxda luqadaha, qaybta kaalmada adeegfrancisco javier, claire browne. So [facility] [phone].    ATENCIÓN: Si habla español, tiene a cuevas disposición servicios gratuitos de asistencia  lingüística. Ciara al [phone].    We comply with applicable federal civil rights laws and [place] laws. We do not discriminate on the basis of race, color, national origin, age, disability, sex, sexual orientation, or gender identity.            Thank you!     Thank you for choosing [facility]  for your care. Our goal is always to provide you with excellent care. Hearing back from our patients is one way we can continue to improve our services. Please take a few minutes to complete the written survey that you may receive in the mail after your visit with us. Thank you!             Your Updated Medication List - Protect others around you: Learn how to safely use, store and throw away your medicines at www.disposemymeds.org.          This list is accurate as of 10/17/18  8:25 AM.  Always use your most recent med list.                   Brand Name Dispense Instructions for use Diagnosis    ALEVE PO       Acute gout, unspecified cause, unspecified site       allopurinol 100 MG tablet    ZYLOPRIM    30 tablet    Take 1 tablet (100 mg) by mouth daily    Acute gout, unspecified cause, unspecified site, Malignant neoplasm of overlapping sites of stomach (H)       aspirin 81 MG chewable tablet      Take 81 mg by mouth daily        Fiber 5 GM/15ML Liqd           gabapentin 300 MG capsule    NEURONTIN     Take 1 capsule (300 mg total) by mouth once daily. For neuropathy        indomethacin 25 MG capsule    INDOCIN    42 capsule    Take 1 capsule (25 mg) by mouth 3 times daily (with meals)    Acute gout, unspecified cause, unspecified site       IRON SUPPLEMENT PO      Take by mouth daily (with breakfast)        lidocaine-prilocaine cream    EMLA          LORazepam 0.5 MG tablet    ATIVAN    30 tablet    Take 1 tablet (0.5 mg) by mouth every 4 hours as needed (Anxiety, Nausea/Vomiting or Sleep)    Malignant neoplasm of overlapping sites of stomach (H)       MULTIVITAMIN ADULT PO           NEW MED      1  gel cap CBD from [employer]        ondansetron 8 MG tablet    ZOFRAN    10 tablet    Take 1 tablet (8 mg) by mouth every 8 hours as needed (Nausea/Vomiting)    Malignant neoplasm of overlapping sites of stomach (H)       prochlorperazine 10 MG tablet    COMPAZINE    30 tablet    Take 1 tablet (10 mg) by mouth every 6 hours as needed (Nausea/Vomiting)    Malignant neoplasm of overlapping sites of stomach (H)       TYLENOL PO      Take by mouth as needed for mild pain or fever

## 2018-10-17 NOTE — PROGRESS NOTES
[facility] application was completed and faxed along with pathology report and billing/insurance information to their office in [place] @ [phone]

## 2018-10-17 NOTE — PROGRESS NOTES
10/17/2018     REASON FOR VISIT:  Follow-up for unresectable metastatic gastric carcinoma, diffuse type, grade 3, HER-2 negative, MSI-Intact. PD-L1 negative (<1%)     HISTORY OF PRESENT ILLNESS:  Please see my previous note for details.  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      CT CAP after C#6 shows stable disease    C# 9 FOLFOX 8/2/2017 ( Oxaliplatin dose reduced to 70mg/m2 due to neuropathy )  C#10 8/22/2017 ( Oxaliplatin dose reduced to 60mg/m2 due to neuropathy )  Delayed by 1 week- patient preference  C#11 9/6/17 5FU/LV ( Stopped Oxaliplatin )  C#12 9/20/2017 5FU/LV    Repeat CT scan after C#12 shows stable to slightly improved disease.    Repeat CT CAP on 12/22/17 after C#18 was stable    Repeat CT CAP on 3/16/18 after C#24 shows stable disease.    C# 27 4/18/2018 5FU/LV    As he was complaining of off and on trouble swallowing with food getting stuck, we repeated EGD on 4/27/2018 which showed a nodular area with possible shallow ulceration in the stomach. Otherwise esophagus and stomach and duodenum were normal.  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  He was referred to gastroenterologist for manometry    The biopsies which were taken from the nodular shallow ulcer aren't consistent with poorly differentiated diffuse type adenocarcinoma with signet ring features  Esophogram showed extrinsic compression on the GE junction resulting in luminal narrowing and associated extended retention of barium tablet. This likely corresponds with soft tissue thickening about the diaphragmatic hiatus as demonstrated on prior CT examination    Repeat CT scan on 6/8/18 after C#30 is stable with stable circumferential soft  "tissue thickening at the GE Junction and stable thickness of stomach wall and peritoneal thickening    C#31 6/13/2018 5FU/LV  C#32 6/27/18  C#33 7/11/2018   C#34 7/25/18  C#35 8/8/2018  C#36 8/22/18    CT scan on 8/31/18- stable, with minimal increased fascial thickening along the right anterior pararenal fossa.     C#37 9/5/18    He had one episode on 9/10/2018 when he probably did not chew the steak well and it got stuck and he had to go to the emergency room to push it down into his his stomach.  The EGD report mentions that there was no known narrowing at the distal end of the esophagus.      C#38 9/19/18    We delayed cycle #39 because he had 2 episodes of Chest tightness and he was getting cardiac workup done so he wanted to get that completed before resuming chemotherapy.  Apparently his EKG was normal.  He also had a stress test done a few days ago which was unremarkable.    C#39 10/17/2018     INTERVAL HISTORY: He is now coming back for follow-up and possibly to resume chemotherapy.  Overall he is doing well and is feeling well.  He tells me that there was one day when he was having similar kind of chest tightness although not as severe and rather than going to the emergency room he went to see a chiropractor who did some an adjustment to his ribs and back and he felt \"like a new man\".  Since then he has had no issues with chest tightness.  Otherwise denies nausea and vomiting.  He did not have any diarrhea but he gets some loose stools with chemotherapy.  He thinks that the gabapentin is helping with his neuropathy and there are days when he has noticed feeling in his toes.  He feels much better after he takes a warm bath and the neuropathy feels better after that.  Denies any infections.  No trouble breathing.  No new swellings.  No new skin problems.  He continues to have some issues with swallowing and he has to make sure that he chews the food very carefully and swallow slowly.  Overall this is a " stable.      ECOG 0    ROS:  A comprehensive ROS was otherwise neg            MEDICATIONS:   Current Outpatient Prescriptions   Medication     Acetaminophen (TYLENOL PO)     allopurinol (ZYLOPRIM) 100 MG tablet     aspirin 81 MG chewable tablet     Ferrous Sulfate (IRON SUPPLEMENT PO)     Fiber 5 GM/15ML LIQD     gabapentin (NEURONTIN) 300 MG capsule     indomethacin (INDOCIN) 25 MG capsule     lidocaine-prilocaine (EMLA) cream     LORazepam (ATIVAN) 0.5 MG tablet     Multiple Vitamins-Minerals (MULTIVITAMIN ADULT PO)     Naproxen Sodium (ALEVE PO)     NEW MED     ondansetron (ZOFRAN) 8 MG tablet     prochlorperazine (COMPAZINE) 10 MG tablet     No current facility-administered medications for this visit.      Facility-Administered Medications Ordered in Other Visits   Medication     heparin 100 UNIT/ML injection 5 mL     sodium chloride (PF) 0.9% PF flush 10-20 mL            PHYSICAL EXAMINATION:   /86  Pulse 79  Temp 97.3  F (36.3  C) (Oral)  SpO2 98%  CONSTITUTIONAL: No apparent distress  EYES: PERRLA, without pallor or jaundice  ENT/MOUTH: Ears unremarkable. No oral lesions  CVS: s1s2 normal  RESPIRATORY: Chest is clear  GI: Abdomen is benign  NEURO: Alert and oriented ×3.  Subjective numbness of the fingers and feet  INTEGUMENT: no concerning skin rashes   LYMPHATIC: no palpable lymphadenopathy  MUSCULOSKELETAL: Unremarkable. No bony tenderness.   EXTREMITIES: no pedal edema  PSYCH: Mentation, mood and affect are appropriate      LABS:   Reviewed   Results for LEONOR RALPH (MRN [id number]) as of 10/17/2018 08:44   Ref. Range 10/17/2018 08:03   Sodium Latest Ref Range: 133 - 144 mmol/L 141   Potassium Latest Ref Range: 3.4 - 5.3 mmol/L 4.2   Chloride Latest Ref Range: 94 - 109 mmol/L 107   Carbon Dioxide Latest Ref Range: 20 - 32 mmol/L 30   Urea Nitrogen Latest Ref Range: 7 - 30 mg/dL 13   Creatinine Latest Ref Range: 0.66 - 1.25 mg/dL 0.76   GFR Estimate Latest Ref Range: >60 mL/min/1.7m2 >90   GFR  Estimate If Black Latest Ref Range: >60 mL/min/1.7m2 >90   Calcium Latest Ref Range: 8.5 - 10.1 mg/dL 8.7   Anion Gap Latest Ref Range: 3 - 14 mmol/L 4   Albumin Latest Ref Range: 3.4 - 5.0 g/dL 3.4   Protein Total Latest Ref Range: 6.8 - 8.8 g/dL 6.7 (L)   Bilirubin Total Latest Ref Range: 0.2 - 1.3 mg/dL 0.3   Alkaline Phosphatase Latest Ref Range: 40 - 150 U/L 97   ALT Latest Ref Range: 0 - 70 U/L 29   AST Latest Ref Range: 0 - 45 U/L 22   Glucose Latest Ref Range: 70 - 99 mg/dL 107 (H)   WBC Latest Ref Range: 4.0 - 11.0 10e9/L 6.8   Hemoglobin Latest Ref Range: 13.3 - 17.7 g/dL 13.4   Hematocrit Latest Ref Range: 40.0 - 53.0 % 40.4   Platelet Count Latest Ref Range: 150 - 450 10e9/L 222   RBC Count Latest Ref Range: 4.4 - 5.9 10e12/L 4.35 (L)   MCV Latest Ref Range: 78 - 100 fl 93   MCH Latest Ref Range: 26.5 - 33.0 pg 30.8   MCHC Latest Ref Range: 31.5 - 36.5 g/dL 33.2   RDW Latest Ref Range: 10.0 - 15.0 % 13.7   Diff Method Unknown Automated Method   % Neutrophils Latest Units: % 67.1   % Lymphocytes Latest Units: % 19.9   % Monocytes Latest Units: % 9.8   % Eosinophils Latest Units: % 2.2   % Basophils Latest Units: % 0.7   % Immature Granulocytes Latest Units: % 0.3   Absolute Neutrophil Latest Ref Range: 1.6 - 8.3 10e9/L 4.6   Absolute Lymphocytes Latest Ref Range: 0.8 - 5.3 10e9/L 1.4   Absolute Monocytes Latest Ref Range: 0.0 - 1.3 10e9/L 0.7   Absolute Eosinophils Latest Ref Range: 0.0 - 0.7 10e9/L 0.2   Absolute Basophils Latest Ref Range: 0.0 - 0.2 10e9/L 0.1       IMAGING:  Reviewed and stable    EXAMINATION: CT CHEST/ABDOMEN/PELVIS W CONTRAST  8/31/2018 8:54 AM       CLINICAL HISTORY: follow up for gastric ca; Acute gout, unspecified  cause, unspecified site; Malignant neoplasm of overlapping sites of  stomach (H). As per chart review: Caroline see my previous note for  details. ?Terry Yi is a 52-year-old male with linitis plastica and  metastatic periaortic lymph node in the aortocaval region, as  well as  extensive infiltration of the lesser omentum and peripancreatic fat,  who started on chemotherapy with FOLFOX      COMPARISON: CT CAP on 06/08/2018, 03/16/2018     PROCEDURE COMMENTS: CT of the chest, abdomen, and pelvis was performed  with 134 ml isovue 370 intravenous and oral contrast. Axial MIP   images of the chest, and coronal and sagittal reformatted images of  the chest, abdomen, and pelvis obtained.     FINDINGS:    Support devices:   Right-sided Port-A-Cath with tip terminating at the atriocaval  junction.     Chest:  Small hypodense nodule arising from the right thyroid lobe, unchanged.     Heart is not enlarged. Trace pericardial fluid. No central pulmonary  embolism. Calcified mediastinal and hilar lymph nodes, stable. No  axillary adenopathy.     Trachea and central airway are patent. No focal consolidation or  pleural effusion. Multiple calcified pulmonary nodules, unchanged.  Previously seen 6 mm nodule along the right minor fissure, likely to  represent a perifissural lymph node. No suspicious pulmonary nodules.     Abdomen/pelvis:  Mild diffuse gastric wall thickening and mucosal hyperenhancement is  not significantly changed compared to 06/08/2018 study.  Circumferential soft tissue thickening around the gastroesophageal  junction is also not significantly change. Persistent hazy/amorphous  retroperitoneal stranding extending from the level of the marychuy  hepatis to the level of the aortic bifurcation, unchanged. Mesenteric  and para-aortic lymph nodes are again seen. No bowel obstruction.  Colonic diverticulosis without diverticulitis. Mild circumferential  thickening of rectosigmoid wall, new or increased from prior.     1 x 1 cm subcapsular lesion in right hepatic lobe, not significant  changed with features suggestive of a hemangioma. Subcentimeter  hypodense lesion in the medial segment of the left hepatic lobe  (series 3, image 154), too small to accurately characterize, but  stable  from the prior. Pancreas appears atrophic with diffuse fatty  infiltration. Gallbladder, spleen, adrenal glands are unremarkable.  Prominent right pelvocaliectasis without hydronephrosis; there is mild  urothelial thickening and enhancement which is new. Small  hypodensities arising from kidneys are too small to characterize, but  they favored cyst, unchanged. Mild progression of fascial thickening  along the right anterior pararenal fossa when compared to prior study  (series 3 image 179). Stable thickening anterior to the left perirenal  fascia. Urinary bladder is unremarkable. Internal aspiration of  prostate gland.     Major vasculature is patent. Calcification of abdominal aorta and  common iliac arteries. No infrarenal aortic aneurysm. Replaced right  hepatic artery arises from the SMA.     Soft tissue/bones: Unchanged degenerative cystic changes and  fragmentation about the right acetabulum. No acute bony lesion.         IMPRESSION: In this patient with linitis plastica   1a. Mild diffuse gastric wall and circumferential soft tissue  thickening at the level of gastroesophageal junction is not  significantly changed when compared to 06/08/2018.  1b. Persistent hazy and amorphous retrograde stranding extending from  the level of the marychuy hepatis to the level of the aortic bifurcation,  unchanged. Numerous unchanged mesenteric and periaortic lymph nodes.   1c. Mild progression of peritoneal/fascial thickening along the right  anterior pararenal fossa. Stable peritoneal thickening anterior to the  left perirenal fossa.  2. Sequela of granulomatous disease in the lung as evidenced by  multiple calcified mediastinal/hilar lymph nodes and pulmonary  nodules. Previously seen 6 mm nodule along the right minor fissure,  likely to represent a perifissural lymph node.  3. New or significantly increased circumferential wall thickening  involving the rectosigmoid colon suggestive of proctocolitis in the  appropriate  clinical setting.      EXAMINATION: CT CHEST/ABDOMEN/PELVIS W CONTRAST, 6/8/2018 8:28 AM     TECHNIQUE:  Helical CT images from the thoracic inlet through the  symphysis pubis were obtained  with contrast. Contrast dose: 135 ml  isovue 370     COMPARISON: 3/16/2018, PET CT 3/24/2017     HISTORY: follow up for gastric ca; Malignant neoplasm of overlapping  sites of stomach (H). Status post chemotherapy.     FINDINGS:     Chest: Right IJ port tip within the low SVC. Heart size is normal.  Normal caliber of the aorta and pulmonary artery. No central pulmonary  embolus. Visualized thyroid is unremarkable. Multiple calcified  mediastinal and bilateral hilar lymph nodes, not significantly  changed. No new or enlarged lymph nodes in the chest or axilla. The  upper and mid esophagus appear normal. Findings at the GE junction see  separate dissection. Central airway is patent. Scattered calcified  granulomas. Scattered small indeterminate noncalcified nodules are  unchanged from at least 3/24/2017, for example 6 mm nodule along the  right minor fissure (series 7 image 81).     Abdomen and pelvis: Mild diffuse gastric wall thickening is not  significantly changed. Circumferential soft tissue thickening around  the gastroesophageal junction (series 3 image 128), measuring up to  1.2 cm in thickness which is not significantly changed. Hazy fat  stranding and mildly prominent lymph nodes in the retroperitoneum and  lesser sac are not significantly changed. For example rounded 1.2 cm  short axis diameter marychuy hepatis node (series 2 image 155). Unchanged  peritoneal thickening along the left anterior pararenal fascia (series  3 image 195).     Unchanged cyst in hepatic segment 4. 1.4 cm subcapsular lesion in the  right hepatic lobe (series 3 image 149) is not significantly changed  and has enhancement characteristics consistent with the benign  cavernous hemangioma. The gallbladder, pancreas, spleen, and adrenals  are within  normal limits. Few hypoattenuating foci in both kidneys are  too small to characterize. Bladder is unremarkable. Prostate is  enlarged. Few colonic diverticula without evidence of diverticulitis.  The appendix and small bowel are within normal limits. Major  vasculature is patent. Aorta is normal in caliber with mild-moderate  atherosclerosis. No free air or significant free fluid.     Bones and soft tissues: Unchanged mild compression deformity of the  T12 vertebral body. Unchanged degenerative cystic change and  fragmentation about the right acetabulum.         IMPRESSION:   1. Mild diffuse gastric wall thickening and circumferential soft  tissue thickening around the gastroesophageal junction are not  significantly changed.  2. Unchanged fat stranding and mildly prominent lymph nodes in the  lesser sac and retroperitoneum.  3. Unchanged peritoneal thickening most prominent along the left  anterior pararenal fascia.   4. Sequelae of granulomatous disease in the lungs. A few small  noncalcified pulmonary nodules are unchanged from 3/24/2017. Attention  on follow-up examination.      Esophagram dated 5/16/2018     COMPARISON:    CT dated 3/16/2018     HISTORY:    Esophageal dysphagia in a patient with history of gastric  carcinoma.     FINDINGS: Examination of the esophagus reveals adequate primary and  secondary peristalsis. There is distal esophageal narrowing at the  gastroesophageal junction without associated mucosal abnormality, with  apparent extrinsic compression. This may correspond with soft tissue  thickening seen at the diaphragmatic hiatus on CT dated 3/16/2018. The  gastroesophageal junction is patent. No hiatal hernia was seen on  examination. There was absence of gastroesophageal reflux.     Fluoroscopy time was 1.6 minutes.     A 12mm barium tablet was administered and did not initially pass  through the gastroesophageal junction. 15 minutes after initial  administration and following ingestion of  pudding and thick barium,  the barium tablet did pass through the gastroesophageal junction.         IMPRESSION: Extrinsic compression on the gastroesophageal junction  results in luminal narrowing and associated extended retention of  barium tablet. This likely corresponds with soft tissue thickening  about the diaphragmatic hiatus as demonstrated on prior CT  examination.            EGD 4/27/18  Findings:        One 12 mm nodular area with possible shallow uleration was found in the        gastric fundus. Biopsies were taken with a cold forceps for histology.        The esophagus was normal.        The stomach was normal except as noted above..        The examined duodenum was normal.       ORIGINAL REPORT:     SPECIMEN(S):   Stomach biopsy, fundus     FINAL DIAGNOSIS:   STOMACH, FUNDUS, BIOPSY:   - Poorly differentiated carcinoma (diffuse type) with signet ring features   - Biopsy is limited to mucosa     ASSESSMENT AND PLAN:      Metastatic gastric cancer, HER-2/catrachita negative, MSI intact, PD-L1 neg (<1%), presenting with early satiety and significant weight loss and epigastric discomfort upon eating with some vomiting.  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  There is an indeterminate right liver lobe lesion which has remained stable and likely is not a metastasis.      We started palliative FOLFOX on 03/12/2017.     Oxaliplatin was dropped from cycle #11 onwards because of neuropathy     Scans after C#18 show stable disease    Repeat CT CAP after 24 cycles also show stable disease so we continued with 5FU/LV.     CT scan after cycle #30 - stable disease.    CT scan on 8/31/18 after C#36 also is stable.  I discussed with radiology and the subtle finding of fascial thickening around the pararenal fossa at this time is not very convincing progression of the disease  I discussed the findings with him and since he was  doing well, we decided to continue the same chemotherapy without making any changes    We have delayed cycle #39 because he had 2 episodes of chest tightness nausea and diaphoresis and shortness of breath and he was getting cardiac workup for that.  EKG was unremarkable.  He recently had a stress test done which was also unremarkable.  He again had some chest tightness and he went to a chiropractor who did some adjustments to his ribs and back and he felt much better after that and since then he has had no problems.  I told him that if the chest tightness returns then he should go to the emergency room to get evaluated at that time.  It is extremely unlikely that this is due to 5-FU because although it can cause vasospasm but he has received it so many times before without problems and the most recent episode happened just a few days ago and the last time he received 5-FU was on 9/19/2018.  Now it seems reasonable to resume chemotherapy and we will give him cycle #39 today.    I will also send his tumor for [facility] testing    Dysphagia.  I again advised him to make sure to chew the food very carefully and swallow slowly.  The dysphagia has remained stable.    As mentioned in previous notes when he had a repeat EGD it showed improvement apart from 1.2 cm malignant shallow ulcer   Esophogram showed extrinsic compression on the GE junction resulting in luminal narrowing and associated extended retention of barium tablet. This likely corresponds with soft tissue thickening about the diaphragmatic hiatus as demonstrated on prior CT examination. Repeat CT scan shows stable soft tissue circumferential thickening around the GE junction     Diarrhea.  He gets it with chemotherapy and I told him to take Imodium as needed.      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  With a little break in chemotherapy now the skin seems normal but he will continue to keep his hands and feet well moisturized all the time     Neuropathy.  Overall  this has been significantly better on gabapentin so he will continue that.  I also discussed with him about soaking his feet and hands in warm water because he feels better after taking a warm bath.        We did not address the following today  Gout.  The acute episode has resolved and now he is on allopurinol     His genetic testing did not show any identifiable mutation not reveal any identifiable mutation     Return to clinic as scheduled.    I answered all of his and his family's questions to their satisfaction. They're agreeable and comfortable with the plan.    LANEY HERNANDEZ MD

## 2018-10-17 NOTE — NURSING NOTE
"Oncology Rooming Note    October 17, 2018 9:22 AM   Terry Yi is a 52 year old male who presents for:    Chief Complaint   Patient presents with     Oncology Clinic Visit     return prior to tx     Initial Vitals: /86  Pulse 79  Temp 97.3  F (36.3  C) (Oral)  SpO2 98% Estimated body mass index is 31.84 kg/(m^2) as calculated from the following:    Height as of 10/3/18: 1.778 m (5' 10\").    Weight as of an earlier encounter on 10/17/18: 100.7 kg (221 lb 14.4 oz). There is no height or weight on file to calculate BSA.  No Pain (0) Comment: Data Unavailable   No LMP for male patient.  Allergies reviewed: Yes  Medications reviewed: Yes    Medications: Medication refills not needed today.  Pharmacy name entered into EPIC: Data Unavailable    Clinical concerns: none Dr. Shaffer was notified.    5 minutes for nursing intake (face to face time)     Marcos Corley RN              "

## 2018-10-17 NOTE — PROGRESS NOTES
"Infusion Nursing Note:  Terry Yi presents today for C39D1 Leucovorin/5FU.  Patient seen by provider today: Yes: Dr. Shaffer   present during visit today: Not Applicable.    Note: Pt denies new medical complaints, see flow sheet for assessment.    Intravenous Access:  Implanted Port.    Treatment Conditions:  Lab Results   Component Value Date    HGB 13.4 10/17/2018     Lab Results   Component Value Date    WBC 6.8 10/17/2018      Lab Results   Component Value Date    ANEU 4.6 10/17/2018     Lab Results   Component Value Date     10/17/2018      Lab Results   Component Value Date     10/17/2018                   Lab Results   Component Value Date    POTASSIUM 4.2 10/17/2018           No results found for: MAG         Lab Results   Component Value Date    CR 0.76 10/17/2018                   Lab Results   Component Value Date    BRITTNEY 8.7 10/17/2018                Lab Results   Component Value Date    BILITOTAL 0.3 10/17/2018           Lab Results   Component Value Date    ALBUMIN 3.4 10/17/2018                    Lab Results   Component Value Date    ALT 29 10/17/2018           Lab Results   Component Value Date    AST 22 10/17/2018       Results reviewed, labs MET treatment parameters, ok to proceed with treatment.      Post Infusion Assessment:  Patient tolerated infusion without incident.  Blood return noted pre and post infusion.  Site patent and intact, free from redness, edema or discomfort.  No evidence of extravasations.    Prior to discharge: Port is secured in place with tegaderm and flushed with 10cc NS with positive blood return noted.  Continuous home infusion Dosi-Fuser pump connected.    All connectors secured in place and clamps taped open.    Pump started, \"running\" noted on display (CADD): Not Applicable.  Patient instructed to call our clinic or [facility] with any questions or concerns at home.  Patient verbalized understanding.    Pt will call the hospital in " [place] to set up his own pump disconnect on 10/19/18 at 8:30am.      .    Discharge Plan:   Patient discharged in stable condition accompanied by: wife.  Departure Mode: Ambulatory.  Pt will RTC 10/31/18 for C40D1 Leucovorin/5FU.    Marcos Corley RN

## 2018-10-30 NOTE — TELEPHONE ENCOUNTER
Left vm for patient. Following up on [employer] message from yesterday. Provided callback number for this RN  Amanda Licea  RN, BSN, OCN

## 2018-10-31 NOTE — PATIENT INSTRUCTIONS
Chemo today    Start Omeprazole 20 mg twice daily    Start Senokot    Schedule CT CAP 11/9    See me back on 11/14 with labs and chemo

## 2018-10-31 NOTE — NURSING NOTE
"Oncology Rooming Note    October 31, 2018 8:43 AM   Terry Yi is a 52 year old male who presents for:    Chief Complaint   Patient presents with     Oncology Clinic Visit     Follow Up/ Prior to Treatment     Initial Vitals: /87 (BP Location: Right arm)  Pulse 76  Temp 97.6  F (36.4  C) (Oral)  Resp 18  Ht 1.778 m (5' 10\")  Wt 96.7 kg (213 lb 2 oz)  SpO2 97%  BMI 30.58 kg/m2 Estimated body mass index is 30.58 kg/(m^2) as calculated from the following:    Height as of this encounter: 1.778 m (5' 10\").    Weight as of this encounter: 96.7 kg (213 lb 2 oz). Body surface area is 2.19 meters squared.  Mild Pain (3) Comment: Data Unavailable   No LMP for male patient.  Allergies reviewed: Yes  Medications reviewed: Yes    Medications: Medication refills not needed today.  Pharmacy name entered into EPIC: Data Unavailable      5 minutes for nursing intake (face to face time)     Vianey Chau LPN              "

## 2018-10-31 NOTE — MR AVS SNAPSHOT
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)    -  1      Care Instructions    Chemo today    Start Omeprazole 20 mg twice daily    Start Senokot    Schedule CT CAP 11/9    See me back on 11/14 with labs and chemo              Follow-ups after your visit        Your next 10 appointments already scheduled     Oct 31, 2018  9:30 AM CDT   Level 2 with BAY 8 INFUSION   [facility]    [address] [place] [address] [phone]            Nov 09, 2018  9:00 AM CST   CT CHEST ABDOMEN PELVIS W/O & W CONTRAST with MGCT1   [facility]    [address] [place] [address] [phone]           How do I prepare for my exam? (Food and drink instructions) To prepare: Do not eat or drink for 2 hours before your exam. If you need to take medicine, you may take it with small sips of water. (We may ask you to take liquid medicine as well.)  How do I prepare for my exam? (Other instructions) Please arrive 30 minutes early for your CT.  Once in the department you might be asked to drink water 15-20 minutes prior to your exam.  If indicated you may be asked to drink an oral contrast in advance of your CT.  If this is the case, the imaging team will let you know or be in contact with you prior to your appointment  Patients over 70 or patients with diabetes or kidney problems: If you haven t had a blood test (creatinine test) within the last 30 days, the Cardiologist/Radiologist may require you to get this test prior to your exam.  If you have diabetes:  Continue to take your metformin medication on the  day of your exam  What should I wear: Please wear loose clothing, such as a sweat suit or jogging clothes. Avoid snaps, zippers and other metal. We may ask you to undress and put on a hospital gown.  How long does the exam take: Most scans take less than 20 minutes.  What should I bring: Please bring any scans or X-rays taken at other hospitals, if similar tests were done. Also bring a list of your medicines, including vitamins, minerals and over-the-counter drugs. It is safest to leave personal items at home.  Do I need a : No  is needed.  What do I need to tell my doctor? Be sure to tell your doctor: * If you have any allergies. * If there s any chance you are pregnant. * If you are breastfeeding.  What should I do after the exam: No restrictions, You may resume normal activities.  What is this test: A CT (computed tomography) scan is a series of pictures that allows us to look inside your body. The scanner creates images of the body in cross sections, much like slices of bread. This helps us see any problems more clearly. You may receive contrast (X-ray dye) before or during your scan. You will be asked to drink the contrast.  Who should I call with questions: If you have any questions, please call the Imaging Department where you will have your exam. Directions, parking instructions, and other information is available on our website, [email or web address].            Nov 14, 2018  7:30 AM CST   Return Visit with [facility] [place] [facility]   [facility])    [address] [place] [address] [facility] [address]   [phone]            Nov 14, 2018  8:15 AM CST   Return Visit with Yakov Shaffer MD   [facility])    [address] [address] [address]   [phone]            Nov 14, 2018  9:00 AM CST   Level 2 with BAY 8 INFUSION   [facility])    [address]  [facility] [address] [phone]            Nov 28, 2018  8:00 AM CST   Return Visit with [facility]   [facility]    [address] [address] [address] [phone]            Nov 28, 2018  8:45 AM CST   Return Visit with Yakov Shaffer MD   [facility]    [address] [address] [address] [phone]            Nov 28, 2018  9:30 AM CST   Level 2 with BAY 4 INFUSION   [facility]    [address] [place] [address] [phone]              Future tests that were ordered for you today     Open Future Orders        Priority Expected Expires Ordered    CT Chest abdomen pelvis w & w/o contrast Routine 11/9/2018 10/31/2019 10/31/2018            Who to contact     If you have questions or need follow up information about today's clinic visit or your schedule please contact [facility] directly at [phone].  Normal or non-critical lab and imaging results will be communicated to you by MyChart, letter or phone within 4 business days after the clinic has received the results. If you do not hear from us within 7 days, please contact the clinic through Eyelation[employer] or phone. If you have a critical or abnormal lab result, we will notify you by phone as soon as possible.  Submit refill requests through [employer] or call your pharmacy and they will forward the refill request to us. Please allow 3 business days for your refill to be completed.          Additional Information About Your Visit        Eyelationhart Information     [employer] gives you secure access to your electronic health record. If you see a primary care provider, you can also send messages to your care team and make appointments. If you have questions, please call your primary care clinic.  If you do not have a primary care provider,  "please call [phone] and they will assist you.      KAR[employer]IT is an electronic gateway that provides easy, online access to your medical records. With KARALIT, you can request a clinic appointment, read your test results, renew a prescription or communicate with your care team.     To access your existing account, please contact your [facility] or call [phone] for assistance.        Care EveryWhere ID     This is your Care EveryWhere ID. This could be used by other organizations to access your [facility] medical records  [id number]        Your Vitals Were     Pulse Temperature Respirations Height Pulse Oximetry BMI (Body Mass Index)    76 97.6  F (36.4  C) (Oral) 18 1.778 m (5' 10\") 97% 30.58 kg/m2       Blood Pressure from Last 3 Encounters:   10/31/18 135/87   10/17/18 138/86   10/03/18 115/74    Weight from Last 3 Encounters:   10/31/18 96.7 kg (213 lb 2 oz)   10/17/18 100.7 kg (221 lb 14.4 oz)   10/03/18 100.2 kg (221 lb)               Primary Care Provider Office Phone # Fax #    Sandra Dickerson [phone]       [facility]  [address]  [facility] [address]        Equal Access to Services     CHANDAN GALLO : Hadii aad ku hadasho Soomaali, waaxda luqadaha, qaybta kaalmada adeegyada, waxay idiin hayguillermo whitehead khbret cowart . So [facility] [phone].    ATENCIÓN: Si habla español, tiene a cuevas disposición servicios gratuitos de asistencia lingüística. Llame al [phone].    We comply with applicable federal civil rights laws and [place] laws. We do not discriminate on the basis of race, color, national origin, age, disability, sex, sexual orientation, or gender identity.            Thank you!     Thank you for choosing [facility]  for your care. Our goal is always to provide you with excellent care. Hearing back from our patients is one way we can continue to improve our services. Please take a few minutes to complete the written " survey that you may receive in the mail after your visit with us. Thank you!             Your Updated Medication List - Protect others around you: Learn how to safely use, store and throw away your medicines at www.disposemymeds.org.          This list is accurate as of 10/31/18  9:12 AM.  Always use your most recent med list.                   Brand Name Dispense Instructions for use Diagnosis    ALEVE PO       Acute gout, unspecified cause, unspecified site       allopurinol 100 MG tablet    ZYLOPRIM    30 tablet    Take 1 tablet (100 mg) by mouth daily    Acute gout, unspecified cause, unspecified site, Malignant neoplasm of overlapping sites of stomach (H)       aspirin 81 MG chewable tablet      Take 81 mg by mouth daily        Fiber 5 GM/15ML Liqd           gabapentin 300 MG capsule    NEURONTIN     Take 1 capsule (300 mg total) by mouth once daily. For neuropathy        indomethacin 25 MG capsule    INDOCIN    42 capsule    Take 1 capsule (25 mg) by mouth 3 times daily (with meals)    Acute gout, unspecified cause, unspecified site       IRON SUPPLEMENT PO      Take by mouth daily (with breakfast)        lidocaine-prilocaine cream    EMLA          LORazepam 0.5 MG tablet    ATIVAN    30 tablet    Take 1 tablet (0.5 mg) by mouth every 4 hours as needed (Anxiety, Nausea/Vomiting or Sleep)    Malignant neoplasm of overlapping sites of stomach (H)       MULTIVITAMIN ADULT PO           NEW MED      1 gel cap CBD from [employer]        ondansetron 8 MG tablet    ZOFRAN    10 tablet    Take 1 tablet (8 mg) by mouth every 8 hours as needed (Nausea/Vomiting)    Malignant neoplasm of overlapping sites of stomach (H)       prochlorperazine 10 MG tablet    COMPAZINE    30 tablet    Take 1 tablet (10 mg) by mouth every 6 hours as needed (Nausea/Vomiting)    Malignant neoplasm of overlapping sites of stomach (H)       TYLENOL PO      Take by mouth as needed for mild pain or fever

## 2018-10-31 NOTE — MR AVS SNAPSHOT
After Visit Summary   10/31/2018    Terry Yi    MRN: [id number]           Patient Information     Date Of Birth          1965        Visit Information        Provider Department      10/31/2018 9:30 AM [facility]        Today's Diagnoses     Malignant neoplasm of overlapping sites of stomach (H)    -  1       Follow-ups after your visit        Your next 10 appointments already scheduled     Nov 09, 2018  9:00 AM CST   CT CHEST ABDOMEN PELVIS W/O & W CONTRAST with MGCT1   [facility]    [address] [place] [address] [phone]           How do I prepare for my exam? (Food and drink instructions) To prepare: Do not eat or drink for 2 hours before your exam. If you need to take medicine, you may take it with small sips of water. (We may ask you to take liquid medicine as well.)  How do I prepare for my exam? (Other instructions) Please arrive 30 minutes early for your CT.  Once in the department you might be asked to drink water 15-20 minutes prior to your exam.  If indicated you may be asked to drink an oral contrast in advance of your CT.  If this is the case, the imaging team will let you know or be in contact with you prior to your appointment  Patients over 70 or patients with diabetes or kidney problems: If you haven t had a blood test (creatinine test) within the last 30 days, the Cardiologist/Radiologist may require you to get this test prior to your exam.  If you have diabetes:  Continue to take your metformin medication on the day of your exam  What should I wear: Please wear loose clothing, such as a sweat suit or jogging clothes. Avoid snaps, zippers and other metal. We may ask you to undress and put on a hospital gown.  How long does the exam take: Most scans take less than 20 minutes.  What should I bring: Please bring any scans or X-rays taken at other hospitals, if similar tests  were done. Also bring a list of your medicines, including vitamins, minerals and over-the-counter drugs. It is safest to leave personal items at home.  Do I need a : No  is needed.  What do I need to tell my doctor? Be sure to tell your doctor: * If you have any allergies. * If there s any chance you are pregnant. * If you are breastfeeding.  What should I do after the exam: No restrictions, You may resume normal activities.  What is this test: A CT (computed tomography) scan is a series of pictures that allows us to look inside your body. The scanner creates images of the body in cross sections, much like slices of bread. This helps us see any problems more clearly. You may receive contrast (X-ray dye) before or during your scan. You will be asked to drink the contrast.  Who should I call with questions: If you have any questions, please call the Imaging Department where you will have your exam. Directions, parking instructions, and other information is available on our website, [email or web address].            Nov 14, 2018  7:30 AM CST   Return Visit with NURSE ONLY CANCER CENTER   [facility])    [address] [place] [address]   [phone]            Nov 14, 2018  8:15 AM CST   Return Visit with Yakov Shaffer MD   [facility])    [address] [place] [address]   [phone]            Nov 14, 2018  9:00 AM CST   Level 2 with BAY 8 INFUSION   [facility])    [address] [place] [address]   [phone]            Nov 28, 2018  8:00 AM CST   Return Visit with [facility]URSE ONLY [facility]   [facility] ([facility])    [address] [place] [address]   [phone]            Nov 28, 2018  8:45 AM CST   Return Visit with Yakov Shaffer MD   [facility]  [facility])    [address] [place] [address] [phone]            Nov 28, 2018  9:30 AM CST   Level 2 with BAY 4 INFUSION   [facility]    [address] [place] [address] [phone]              Future tests that were ordered for you today     Open Future Orders        Priority Expected Expires Ordered    CT Chest abdomen pelvis w & w/o contrast Routine 11/9/2018 10/31/2019 10/31/2018            Who to contact     If you have questions or need follow up information about today's clinic visit or your schedule please contact [facility] directly at [phone].  Normal or non-critical lab and imaging results will be communicated to you by Smart [employer], letter or phone within 4 business days after the clinic has received the results. If you do not hear from us within 7 days, please contact the clinic through [employer] or phone. If you have a critical or abnormal lab result, we will notify you by phone as soon as possible.  Submit refill requests through [employer] or call your pharmacy and they will forward the refill request to us. Please allow 3 business days for your refill to be completed.          Additional Information About Your Visit        [employer] Information     [employer] gives you secure access to your electronic health record. If you see a primary care provider, you can also send messages to your care team and make appointments. If you have questions, please call your primary care clinic.  If you do not have a primary care provider, please call [phone] and they will assist you.      [employer] is an electronic gateway that provides easy, online access to your medical records. With [employer], you can request a clinic appointment, read your test results, renew a prescription or communicate with your care team.     To access your existing account, please contact your [facility] or call [phone] for assistance.        Care EveryWhere ID     This is your Care EveryWhere ID. This could be used by other organizations to access your [facility] medical records  [id number]         Blood Pressure from Last 3 Encounters:   10/31/18 135/87   10/17/18 138/86   10/03/18 115/74    Weight from Last 3 Encounters:   10/31/18 96.7 kg (213 lb 2 oz)   10/17/18 100.7 kg (221 lb 14.4 oz)   10/03/18 100.2 kg (221 lb)              Today, you had the following     No orders found for display       Primary Care Provider Office Phone # Fax #    Sandra DIDI Dickerson [phone]       [facility]  [address]  [facility] [address]        Equal Access to Services     [facility] CLEO : Hadii aad ku hadasho Soomaali, waaxda luqadaha, qaybta kaalmada adeegyada, claire mcdaniel hayguillermo cowart . So [facility] [phone].    ATENCIÓN: Si habla español, tiene a cuevas disposición servicios gratuitos de asistencia lingüística. Ciara al [phone].    We comply with applicable federal civil rights laws and [place] laws. We do not discriminate on the basis of race, color, national origin, age, disability, sex, sexual orientation, or gender identity.            Thank you!     Thank you for choosing [facility]  for your care. Our goal is always to provide you with excellent care. Hearing back from our patients is one way we can continue to improve our services. Please take a few minutes to complete the written survey that you may receive in the mail after your visit with us. Thank you!             Your Updated Medication List - Protect others around you: Learn how to safely use, store and throw away your medicines at www.disposemymeds.org.          This list is accurate as of 10/31/18  1:02 PM.  Always use your most recent med list.                   Brand Name Dispense Instructions for use Diagnosis    ALEVE PO       Acute gout, unspecified cause, unspecified site        allopurinol 100 MG tablet    ZYLOPRIM    30 tablet    Take 1 tablet (100 mg) by mouth daily    Acute gout, unspecified cause, unspecified site, Malignant neoplasm of overlapping sites of stomach (H)       aspirin 81 MG chewable tablet      Take 81 mg by mouth daily        Fiber 5 GM/15ML Liqd           gabapentin 300 MG capsule    NEURONTIN     Take 1 capsule (300 mg total) by mouth once daily. For neuropathy        indomethacin 25 MG capsule    INDOCIN    42 capsule    Take 1 capsule (25 mg) by mouth 3 times daily (with meals)    Acute gout, unspecified cause, unspecified site       IRON SUPPLEMENT PO      Take by mouth daily (with breakfast)        lidocaine-prilocaine cream    EMLA          LORazepam 0.5 MG tablet    ATIVAN    30 tablet    Take 1 tablet (0.5 mg) by mouth every 4 hours as needed (Anxiety, Nausea/Vomiting or Sleep)    Malignant neoplasm of overlapping sites of stomach (H)       MULTIVITAMIN ADULT PO           NEW MED      1 gel cap CBD from [employer]        ondansetron 8 MG tablet    ZOFRAN    10 tablet    Take 1 tablet (8 mg) by mouth every 8 hours as needed (Nausea/Vomiting)    Malignant neoplasm of overlapping sites of stomach (H)       prochlorperazine 10 MG tablet    COMPAZINE    30 tablet    Take 1 tablet (10 mg) by mouth every 6 hours as needed (Nausea/Vomiting)    Malignant neoplasm of overlapping sites of stomach (H)       TYLENOL PO      Take by mouth as needed for mild pain or fever

## 2018-10-31 NOTE — PROGRESS NOTES
10/31/2018     REASON FOR VISIT:  Follow-up for unresectable metastatic gastric carcinoma, diffuse type, grade 3, HER-2 negative, MSI-Intact. PD-L1 negative (<1%)     HISTORY OF PRESENT ILLNESS:  Please see my previous note for details.  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      CT CAP after C#6 shows stable disease    C# 9 FOLFOX 8/2/2017 ( Oxaliplatin dose reduced to 70mg/m2 due to neuropathy )  C#10 8/22/2017 ( Oxaliplatin dose reduced to 60mg/m2 due to neuropathy )  Delayed by 1 week- patient preference  C#11 9/6/17 5FU/LV ( Stopped Oxaliplatin )  C#12 9/20/2017 5FU/LV    Repeat CT scan after C#12 shows stable to slightly improved disease.    Repeat CT CAP on 12/22/17 after C#18 was stable    Repeat CT CAP on 3/16/18 after C#24 shows stable disease.    C# 27 4/18/2018 5FU/LV    As he was complaining of off and on trouble swallowing with food getting stuck, we repeated EGD on 4/27/2018 which showed a nodular area with possible shallow ulceration in the stomach. Otherwise esophagus and stomach and duodenum were normal.  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  He was referred to gastroenterologist for manometry    The biopsies which were taken from the nodular shallow ulcer aren't consistent with poorly differentiated diffuse type adenocarcinoma with signet ring features  Esophogram showed extrinsic compression on the GE junction resulting in luminal narrowing and associated extended retention of barium tablet. This likely corresponds with soft tissue thickening about the diaphragmatic hiatus as demonstrated on prior CT examination    Repeat CT scan on 6/8/18 after C#30 is stable with stable circumferential soft  tissue thickening at the GE Junction and stable thickness of stomach wall and peritoneal thickening    C#31 6/13/2018 5FU/LV  C#32 6/27/18  C#33 7/11/2018   C#34 7/25/18  C#35 8/8/2018  C#36 8/22/18    CT scan on 8/31/18- stable, with minimal increased fascial thickening along the right anterior pararenal fossa.     C#37 9/5/18    He had one episode on 9/10/2018 when he probably did not chew the steak well and it got stuck and he had to go to the emergency room to push it down into his his stomach.  The EGD report mentions that there was no known narrowing at the distal end of the esophagus.      C#38 9/19/18    We delayed cycle #39 because he had 2 episodes of Chest tightness and he was getting cardiac workup done so he wanted to get that completed before resuming chemotherapy.  Apparently his EKG was normal.  He also had a stress test done a few days ago which was unremarkable.  We resumed chemotherapy after a 2 week delay    C#39 10/17/2018  C#40 10/31/2018       INTERVAL HISTORY: He comes in today accompanied by his family members and tells me that for the last 10 days or so he has not felt well.  Occasionally he has nausea and his appetite is down and he feels full to his stomach quickly.  He has not eaten much and he has lost a few pounds since last time.  He takes Dulcolax for constipation.  Denies fevers or chills or diarrhea.  He is also feeling discomfort in his upper abdomen especially below the ribs.  Denies any trouble breathing or denies any difficulty taking a deep breath in.  He thinks his neuropathy is about the same as before.  Energy is a touch down but overall seems to be stable.  The chest pain which she had previously has not recurred.  He has to be really careful about what he is eating and has to chew the food very carefully and thoroughly before swallowing.  Overall his swallowing is the same to a little worse.        ECOG 1    ROS:  Rest of the comprehensive review of the system was  essentially unremarkable.      MEDICATIONS:   Current Outpatient Prescriptions   Medication     Acetaminophen (TYLENOL PO)     allopurinol (ZYLOPRIM) 100 MG tablet     aspirin 81 MG chewable tablet     Ferrous Sulfate (IRON SUPPLEMENT PO)     Fiber 5 GM/15ML LIQD     gabapentin (NEURONTIN) 300 MG capsule     indomethacin (INDOCIN) 25 MG capsule     lidocaine-prilocaine (EMLA) cream     LORazepam (ATIVAN) 0.5 MG tablet     Multiple Vitamins-Minerals (MULTIVITAMIN ADULT PO)     Naproxen Sodium (ALEVE PO)     NEW MED     ondansetron (ZOFRAN) 8 MG tablet     prochlorperazine (COMPAZINE) 10 MG tablet     No current facility-administered medications for this visit.      Facility-Administered Medications Ordered in Other Visits   Medication     heparin 100 UNIT/ML injection 5 mL     sodium chloride (PF) 0.9% PF flush 10-20 mL            PHYSICAL EXAMINATION:   There were no vitals taken for this visit.  CONSTITUTIONAL: no acute distress  EYES: PERRLA, no palor or icterus.   ENT/MOUTH: no mouth lesions. Ears normal  CVS: s1s2 no m r g .   RESPIRATORY: clear to auscultation b/l  GI: Abdomen is soft.  There is tenderness to palpation in the epigastrium and the left upper quadrant.  I could not appreciate any underlying nodules, masses or hepatosplenomegaly  NEURO: AAOX3  Grossly non focal neuro exam apart from subjective neuropathy of the fingers and feet  INTEGUMENT: no obvious rashes apart from minimal dryness and peeling of the skin of the palms  LYMPHATIC: no palpable cervical, supraclavicular, axillary or inguinal LAD  MUSCULOSKELETAL: Unremarkable. No bony tenderness.   EXTREMITIES: no edema  PSYCH: Mentation, mood and affect are normal. Decision making capacity is intact    LABS:   Reviewed   Results for DIANA LEONOR (MRN [id number]) as of 10/31/2018 09:10   Ref. Range 10/31/2018 08:05   Sodium Latest Ref Range: 133 - 144 mmol/L 139   Potassium Latest Ref Range: 3.4 - 5.3 mmol/L 4.0   Chloride Latest Ref Range: 94 -  109 mmol/L 103   Carbon Dioxide Latest Ref Range: 20 - 32 mmol/L 32   Urea Nitrogen Latest Ref Range: 7 - 30 mg/dL 14   Creatinine Latest Ref Range: 0.66 - 1.25 mg/dL 0.78   GFR Estimate Latest Ref Range: >60 mL/min/1.7m2 >90   GFR Estimate If Black Latest Ref Range: >60 mL/min/1.7m2 >90   Calcium Latest Ref Range: 8.5 - 10.1 mg/dL 9.2   Anion Gap Latest Ref Range: 3 - 14 mmol/L 4   Albumin Latest Ref Range: 3.4 - 5.0 g/dL 3.4   Protein Total Latest Ref Range: 6.8 - 8.8 g/dL 6.7 (L)   Bilirubin Total Latest Ref Range: 0.2 - 1.3 mg/dL 0.6   Alkaline Phosphatase Latest Ref Range: 40 - 150 U/L 112   ALT Latest Ref Range: 0 - 70 U/L 33   AST Latest Ref Range: 0 - 45 U/L 24   Glucose Latest Ref Range: 70 - 99 mg/dL 116 (H)   WBC Latest Ref Range: 4.0 - 11.0 10e9/L 7.6   Hemoglobin Latest Ref Range: 13.3 - 17.7 g/dL 13.7   Hematocrit Latest Ref Range: 40.0 - 53.0 % 41.4   Platelet Count Latest Ref Range: 150 - 450 10e9/L 235   RBC Count Latest Ref Range: 4.4 - 5.9 10e12/L 4.50   MCV Latest Ref Range: 78 - 100 fl 92   MCH Latest Ref Range: 26.5 - 33.0 pg 30.4   MCHC Latest Ref Range: 31.5 - 36.5 g/dL 33.1   RDW Latest Ref Range: 10.0 - 15.0 % 13.7   Diff Method Unknown Automated Method   % Neutrophils Latest Units: % 68.7   % Lymphocytes Latest Units: % 19.4   % Monocytes Latest Units: % 9.7   % Eosinophils Latest Units: % 1.2   % Basophils Latest Units: % 0.7   % Immature Granulocytes Latest Units: % 0.3   Absolute Neutrophil Latest Ref Range: 1.6 - 8.3 10e9/L 5.3   Absolute Lymphocytes Latest Ref Range: 0.8 - 5.3 10e9/L 1.5   Absolute Monocytes Latest Ref Range: 0.0 - 1.3 10e9/L 0.7   Absolute Eosinophils Latest Ref Range: 0.0 - 0.7 10e9/L 0.1   Absolute Basophils Latest Ref Range: 0.0 - 0.2 10e9/L 0.1       IMAGING:  Reviewed and stable    EXAMINATION: CT CHEST/ABDOMEN/PELVIS W CONTRAST  8/31/2018 8:54 AM       CLINICAL HISTORY: follow up for gastric ca; Acute gout, unspecified  cause, unspecified site; Malignant  neoplasm of overlapping sites of  stomach (H). As per chart review: mPlease see my previous note for  details. ?Terry Yi is a 52-year-old male with linitis plastica and  metastatic periaortic lymph node in the aortocaval region, as well as  extensive infiltration of the lesser omentum and peripancreatic fat,  who started on chemotherapy with FOLFOX      COMPARISON: CT CAP on 06/08/2018, 03/16/2018     PROCEDURE COMMENTS: CT of the chest, abdomen, and pelvis was performed  with 134 ml isovue 370 intravenous and oral contrast. Axial MIP   images of the chest, and coronal and sagittal reformatted images of  the chest, abdomen, and pelvis obtained.     FINDINGS:    Support devices:   Right-sided Port-A-Cath with tip terminating at the atriocaval  junction.     Chest:  Small hypodense nodule arising from the right thyroid lobe, unchanged.     Heart is not enlarged. Trace pericardial fluid. No central pulmonary  embolism. Calcified mediastinal and hilar lymph nodes, stable. No  axillary adenopathy.     Trachea and central airway are patent. No focal consolidation or  pleural effusion. Multiple calcified pulmonary nodules, unchanged.  Previously seen 6 mm nodule along the right minor fissure, likely to  represent a perifissural lymph node. No suspicious pulmonary nodules.     Abdomen/pelvis:  Mild diffuse gastric wall thickening and mucosal hyperenhancement is  not significantly changed compared to 06/08/2018 study.  Circumferential soft tissue thickening around the gastroesophageal  junction is also not significantly change. Persistent hazy/amorphous  retroperitoneal stranding extending from the level of the marychuy  hepatis to the level of the aortic bifurcation, unchanged. Mesenteric  and para-aortic lymph nodes are again seen. No bowel obstruction.  Colonic diverticulosis without diverticulitis. Mild circumferential  thickening of rectosigmoid wall, new or increased from prior.     1 x 1 cm subcapsular lesion in  right hepatic lobe, not significant  changed with features suggestive of a hemangioma. Subcentimeter  hypodense lesion in the medial segment of the left hepatic lobe  (series 3, image 154), too small to accurately characterize, but  stable from the prior. Pancreas appears atrophic with diffuse fatty  infiltration. Gallbladder, spleen, adrenal glands are unremarkable.  Prominent right pelvocaliectasis without hydronephrosis; there is mild  urothelial thickening and enhancement which is new. Small  hypodensities arising from kidneys are too small to characterize, but  they favored cyst, unchanged. Mild progression of fascial thickening  along the right anterior pararenal fossa when compared to prior study  (series 3 image 179). Stable thickening anterior to the left perirenal  fascia. Urinary bladder is unremarkable. Internal aspiration of  prostate gland.     Major vasculature is patent. Calcification of abdominal aorta and  common iliac arteries. No infrarenal aortic aneurysm. Replaced right  hepatic artery arises from the SMA.     Soft tissue/bones: Unchanged degenerative cystic changes and  fragmentation about the right acetabulum. No acute bony lesion.         IMPRESSION: In this patient with linitis plastica   1a. Mild diffuse gastric wall and circumferential soft tissue  thickening at the level of gastroesophageal junction is not  significantly changed when compared to 06/08/2018.  1b. Persistent hazy and amorphous retrograde stranding extending from  the level of the marychuy hepatis to the level of the aortic bifurcation,  unchanged. Numerous unchanged mesenteric and periaortic lymph nodes.   1c. Mild progression of peritoneal/fascial thickening along the right  anterior pararenal fossa. Stable peritoneal thickening anterior to the  left perirenal fossa.  2. Sequela of granulomatous disease in the lung as evidenced by  multiple calcified mediastinal/hilar lymph nodes and pulmonary  nodules. Previously seen 6 mm  nodule along the right minor fissure,  likely to represent a perifissural lymph node.  3. New or significantly increased circumferential wall thickening  involving the rectosigmoid colon suggestive of proctocolitis in the  appropriate clinical setting.      EXAMINATION: CT CHEST/ABDOMEN/PELVIS W CONTRAST, 6/8/2018 8:28 AM     TECHNIQUE:  Helical CT images from the thoracic inlet through the  symphysis pubis were obtained  with contrast. Contrast dose: 135 ml  isovue 370     COMPARISON: 3/16/2018, PET CT 3/24/2017     HISTORY: follow up for gastric ca; Malignant neoplasm of overlapping  sites of stomach (H). Status post chemotherapy.     FINDINGS:     Chest: Right IJ port tip within the low SVC. Heart size is normal.  Normal caliber of the aorta and pulmonary artery. No central pulmonary  embolus. Visualized thyroid is unremarkable. Multiple calcified  mediastinal and bilateral hilar lymph nodes, not significantly  changed. No new or enlarged lymph nodes in the chest or axilla. The  upper and mid esophagus appear normal. Findings at the GE junction see  separate dissection. Central airway is patent. Scattered calcified  granulomas. Scattered small indeterminate noncalcified nodules are  unchanged from at least 3/24/2017, for example 6 mm nodule along the  right minor fissure (series 7 image 81).     Abdomen and pelvis: Mild diffuse gastric wall thickening is not  significantly changed. Circumferential soft tissue thickening around  the gastroesophageal junction (series 3 image 128), measuring up to  1.2 cm in thickness which is not significantly changed. Hazy fat  stranding and mildly prominent lymph nodes in the retroperitoneum and  lesser sac are not significantly changed. For example rounded 1.2 cm  short axis diameter marychuy hepatis node (series 2 image 155). Unchanged  peritoneal thickening along the left anterior pararenal fascia (series  3 image 195).     Unchanged cyst in hepatic segment 4. 1.4 cm subcapsular  lesion in the  right hepatic lobe (series 3 image 149) is not significantly changed  and has enhancement characteristics consistent with the benign  cavernous hemangioma. The gallbladder, pancreas, spleen, and adrenals  are within normal limits. Few hypoattenuating foci in both kidneys are  too small to characterize. Bladder is unremarkable. Prostate is  enlarged. Few colonic diverticula without evidence of diverticulitis.  The appendix and small bowel are within normal limits. Major  vasculature is patent. Aorta is normal in caliber with mild-moderate  atherosclerosis. No free air or significant free fluid.     Bones and soft tissues: Unchanged mild compression deformity of the  T12 vertebral body. Unchanged degenerative cystic change and  fragmentation about the right acetabulum.         IMPRESSION:   1. Mild diffuse gastric wall thickening and circumferential soft  tissue thickening around the gastroesophageal junction are not  significantly changed.  2. Unchanged fat stranding and mildly prominent lymph nodes in the  lesser sac and retroperitoneum.  3. Unchanged peritoneal thickening most prominent along the left  anterior pararenal fascia.   4. Sequelae of granulomatous disease in the lungs. A few small  noncalcified pulmonary nodules are unchanged from 3/24/2017. Attention  on follow-up examination.      Esophagram dated 5/16/2018     COMPARISON:    CT dated 3/16/2018     HISTORY:    Esophageal dysphagia in a patient with history of gastric  carcinoma.     FINDINGS: Examination of the esophagus reveals adequate primary and  secondary peristalsis. There is distal esophageal narrowing at the  gastroesophageal junction without associated mucosal abnormality, with  apparent extrinsic compression. This may correspond with soft tissue  thickening seen at the diaphragmatic hiatus on CT dated 3/16/2018. The  gastroesophageal junction is patent. No hiatal hernia was seen on  examination. There was absence of  gastroesophageal reflux.     Fluoroscopy time was 1.6 minutes.     A 12mm barium tablet was administered and did not initially pass  through the gastroesophageal junction. 15 minutes after initial  administration and following ingestion of pudding and thick barium,  the barium tablet did pass through the gastroesophageal junction.         IMPRESSION: Extrinsic compression on the gastroesophageal junction  results in luminal narrowing and associated extended retention of  barium tablet. This likely corresponds with soft tissue thickening  about the diaphragmatic hiatus as demonstrated on prior CT  examination.            EGD 4/27/18  Findings:        One 12 mm nodular area with possible shallow uleration was found in the        gastric fundus. Biopsies were taken with a cold forceps for histology.        The esophagus was normal.        The stomach was normal except as noted above..        The examined duodenum was normal.       ORIGINAL REPORT:     SPECIMEN(S):   Stomach biopsy, fundus     FINAL DIAGNOSIS:   STOMACH, FUNDUS, BIOPSY:   - Poorly differentiated carcinoma (diffuse type) with signet ring features   - Biopsy is limited to mucosa     ASSESSMENT AND PLAN:      Metastatic gastric cancer, HER-2/catrachita negative, MSI intact, PD-L1 neg (<1%), presenting with early satiety and significant weight loss and epigastric discomfort upon eating with some vomiting.  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  There is an indeterminate right liver lobe lesion which has remained stable and likely is not a metastasis.      We started palliative FOLFOX on 03/12/2017.     Oxaliplatin was dropped from cycle #11 onwards because of neuropathy     Scans after C#18 show stable disease    Repeat CT CAP after 24 cycles also show stable disease so we continued with 5FU/LV.     CT scan after cycle #30 - stable disease.    CT scan on 8/31/18  after C#36 also is stable.  I discussed with radiology and the subtle finding of fascial thickening around the pararenal fossa at this time is not very convincing progression of the disease  I discussed the findings with him and since he was doing well, we decided to continue the same chemotherapy without making any changes    We delayed cycle #39 because he had 2 episodes of chest tightness nausea and diaphoresis and shortness of breath and he was getting cardiac workup for that.  EKG was unremarkable.  He recently had a stress test done which was also unremarkable.  He again had some chest tightness and he went to a chiropractor who did some adjustments to his ribs and back and he felt much better after that and since then he has had no problems.   It is extremely unlikely that this is due to 5-FU because although it can cause vasospasm but he has received it so many times before without problems and the most recent episode happened just a few days ago and the last time he received 5-FU was on 9/19/2018.    We resumed chemotherapy after a 2-week delay.  He will get cycle #40 today.  Because of his new symptoms I am concerned that this could be due to progression of the cancer and I would like to repeat a CT scan before next chemotherapy.    We were unable to perform foundation one testing as there was insufficient amount of tumor cells in the specimen.       Dysphagia.  Overall this is a stable to a little worse and for now he will continue to be very careful about chewing the food thoroughly and swallowing carefully.    As mentioned in previous notes when he had a repeat EGD it showed improvement apart from 1.2 cm malignant shallow ulcer   Esophogram showed extrinsic compression on the GE junction resulting in luminal narrowing and associated extended retention of barium tablet. This likely corresponds with soft tissue thickening about the diaphragmatic hiatus as demonstrated on prior CT examination. Repeat CT  scan shows stable soft tissue circumferential thickening around the GE junction     Upper abdominal pain.  As mentioned above I suspect this could be due to progression of the cancer.  He has extensive mesenteric involvement from the cancer.  I offered him pain medication but at this time he is not interested in anything very strong so I told him he can take Tylenol extra strength 3 times a day for the pain.  I also advised him to take omeprazole 20 mg twice a day    Nutrition.  He has lost several pounds from last time.  Advised him to try boost, Ensure and high protein calorie drinks to improve his nutrition.  If the weight loss continues then I will refer him to a nutritionist.    Constipation.  I advised him to take Senokot.  Previously he had some diarrhea from the chemotherapy.    Neuropathy.  Overall this has improved on gabapentin but stable from last time.  Continue gabapentin.      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  With a little break in chemotherapy now the skin seems normal but he will continue to keep his hands and feet well moisturized all the time     Gout.  The acute episode has resolved and now he is on allopurinol     His genetic testing did not show any identifiable mutation not reveal any identifiable mutation     Return to clinic in 2 weeks    I answered all of his and his family's questions to their satisfaction. They're agreeable and comfortable with the plan.    LANEY HERNANDEZ MD

## 2018-10-31 NOTE — PROGRESS NOTES
"Infusion Nursing Note:  Terry Yi presents today for C40D1 Leucovorin/5FU push/5FUpump .    Patient seen by provider today: Yes: Dr. Shaffer   present during visit today: Not Applicable.    Note: Pt admits to not feeling well this week, says he is crabby and has little appetite.    Intravenous Access:  Implanted Port.    Treatment Conditions:  WBC = 7.6  HGB = 13.7  PLT = 235  ANC = 5.3    Lab Results   Component Value Date     10/31/2018                   Lab Results   Component Value Date    POTASSIUM 4.0 10/31/2018           No results found for: MAG         Lab Results   Component Value Date    CR 0.78 10/31/2018                   Lab Results   Component Value Date    BRITTNEY 9.2 10/31/2018                Lab Results   Component Value Date    BILITOTAL 0.6 10/31/2018           Lab Results   Component Value Date    ALBUMIN 3.4 10/31/2018                    Lab Results   Component Value Date    ALT 33 10/31/2018           Lab Results   Component Value Date    AST 24 10/31/2018       Results reviewed, labs MET treatment parameters, ok to proceed with treatment.      Post Infusion Assessment:  Patient tolerated infusion without incident.  Blood return noted pre and post infusion.  Site patent and intact, free from redness, edema or discomfort.  No evidence of extravasations.  Prior to discharge: Port is secured in place with tegaderm and flushed with 10cc NS with positive blood return noted.  Continuous home infusion Dosi-Fuser pump connected.    All connectors secured in place and clamps taped open.    Pump started, \"running\" noted on display (CADD): Not Applicable.  Patient will go to hospital in [place] to get disconnected.     Patient verbalized understanding.      Discharge Plan:   Patient discharged in stable condition accompanied by: wife and son.  Departure Mode: Ambulatory.  Pt will RTC 11/14/81 for C41D1 Leucovorin/5FU.    Marcos Corley RN                        "

## 2018-10-31 NOTE — MR AVS SNAPSHOT
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)    -  1       Follow-ups after your visit        Your next 10 appointments already scheduled     Oct 31, 2018  8:45 AM CDT   Return Visit with Yakov Shaffer MD   [facility] ([facility])    [address] [facility] [address]   [phone]            Oct 31, 2018  9:30 AM CDT   Level 2 with BAY 8 INFUSION   [facility] ([facility])    [address] [facility] [address]   [phone]            Nov 14, 2018  8:30 AM CST   Return Visit with NURSE ONLY CANCER CENTER   [facility] ([facility])    [address] [facility] [address]   [phone]            Nov 14, 2018  9:00 AM CST   Level 2 with BAY 8 INFUSION   [facility] ([facility])    [address] [facility] [address]   [phone]            Nov 28, 2018  8:00 AM CST   Return Visit with NURSE ONLY CANCER CENTER   [facility] ([facility])    [address] [facility] [address]   [phone]            Nov 28, 2018  8:45 AM CST   Return Visit with Yakov Shaffer MD   [facility] ([facility])    [address] [facility] [address]   [phone]            Nov 28, 2018  9:30 AM CST   Level 2 with BAY 4 INFUSION   [facility] ([facility])    [address] [facility] [address]   [phone]              Who to contact     If you have questions or need follow up information about today's  clinic visit or your schedule please contact [facility] directly at [phone].  Normal or non-critical lab and imaging results will be communicated to you by payworkshart, letter or phone within 4 business days after the clinic has received the results. If you do not hear from us within 7 days, please contact the clinic through payworkshart or phone. If you have a critical or abnormal lab result, we will notify you by phone as soon as possible.  Submit refill requests through [employer] or call your pharmacy and they will forward the refill request to us. Please allow 3 business days for your refill to be completed.          Additional Information About Your Visit        [employer] Information     [employer] gives you secure access to your electronic health record. If you see a primary care provider, you can also send messages to your care team and make appointments. If you have questions, please call your primary care clinic.  If you do not have a primary care provider, please call [phone] and they will assist you.      [employer] is an electronic gateway that provides easy, online access to your medical records. With [employer], you can request a clinic appointment, read your test results, renew a prescription or communicate with your care team.     To access your existing account, please contact your [facility] or call [phone] for assistance.        Care EveryWhere ID     This is your Care EveryWhere ID. This could be used by other organizations to access your [place] medical records  [id number]         Blood Pressure from Last 3 Encounters:   10/17/18 138/86   10/03/18 115/74   09/19/18 (!) 142/95    Weight from Last 3 Encounters:   10/17/18 100.7 kg (221 lb 14.4 oz)   10/03/18 100.2 kg (221 lb)   09/19/18 99 kg (218 lb 4.8 oz)              We Performed the Following     CBC with platelets differential     Comprehensive metabolic panel        Primary Care Provider Office  Phone # Fax #    Sandra Dickerson [phone]       [facility]  [address]  [facility] [address]        Equal Access to Services     CHANDAN GALLO : Hadii aad ku hadsohano Soomaali, waaxda luqadaha, qaybta kaalmada adeegyada, claire hawley mayurjennifer june laSyedguillermo browne. So [facility] [phone].    ATENCIÓN: Si habla español, tiene a cuevas disposición servicios gratuitos de asistencia lingüística. Llame al [phone].    We comply with applicable federal civil rights laws and [place] laws. We do not discriminate on the basis of race, color, national origin, age, disability, sex, sexual orientation, or gender identity.            Thank you!     Thank you for choosing [facility]  for your care. Our goal is always to provide you with excellent care. Hearing back from our patients is one way we can continue to improve our services. Please take a few minutes to complete the written survey that you may receive in the mail after your visit with us. Thank you!             Your Updated Medication List - Protect others around you: Learn how to safely use, store and throw away your medicines at www.disposemymeds.org.          This list is accurate as of 10/31/18  8:24 AM.  Always use your most recent med list.                   Brand Name Dispense Instructions for use Diagnosis    ALEVE PO       Acute gout, unspecified cause, unspecified site       allopurinol 100 MG tablet    ZYLOPRIM    30 tablet    Take 1 tablet (100 mg) by mouth daily    Acute gout, unspecified cause, unspecified site, Malignant neoplasm of overlapping sites of stomach (H)       aspirin 81 MG chewable tablet      Take 81 mg by mouth daily        Fiber 5 GM/15ML Liqd           gabapentin 300 MG capsule    NEURONTIN     Take 1 capsule (300 mg total) by mouth once daily. For neuropathy        indomethacin 25 MG capsule    INDOCIN    42 capsule    Take 1 capsule (25 mg) by mouth 3 times daily (with meals)    Acute gout,  unspecified cause, unspecified site       IRON SUPPLEMENT PO      Take by mouth daily (with breakfast)        lidocaine-prilocaine cream    EMLA          LORazepam 0.5 MG tablet    ATIVAN    30 tablet    Take 1 tablet (0.5 mg) by mouth every 4 hours as needed (Anxiety, Nausea/Vomiting or Sleep)    Malignant neoplasm of overlapping sites of stomach (H)       MULTIVITAMIN ADULT PO           NEW MED      1 gel cap CBD from [employer]        ondansetron 8 MG tablet    ZOFRAN    10 tablet    Take 1 tablet (8 mg) by mouth every 8 hours as needed (Nausea/Vomiting)    Malignant neoplasm of overlapping sites of stomach (H)       prochlorperazine 10 MG tablet    COMPAZINE    30 tablet    Take 1 tablet (10 mg) by mouth every 6 hours as needed (Nausea/Vomiting)    Malignant neoplasm of overlapping sites of stomach (H)       TYLENOL PO      Take by mouth as needed for mild pain or fever

## 2018-10-31 NOTE — PROGRESS NOTES
Power port needle left accessed for treatment. Tolerated port access and draw without complaint. Port site scrubbed with Chloraprep for 30 seconds. Accessed using sterile technique. Tea drawn-Red/Green/Purple tubes. Double signed by patient and RN. See documentation flowsheet. Arleth Brandon, RN, BSN, OCN

## 2018-11-05 NOTE — TELEPHONE ENCOUNTER
"Caller: Harika, wife (C2C on file dated 3/23/17)  Reason for call: \"he has stomach cancer, his intestines have been bothering him, we saw Dr. Shaffer last week, and he has a CT scan scheduled for next week, his intestines are painful, cramping, and he is sleeping a lot, his eyes are dark, what should we do?\" Reports current chemotherapy (last was 10/31/18) for stomach cancer. WBC 7.6, ANC 5.3 on 10/31/18 per EPIC. Reports Terry worked yesterday and also helped her with yard work earlier today. Terry is sleeping during triage call.  Symptoms: abdominal pains (increases after eating), constipation, fatigue, \"eyes are dark\", decreased PO intake, \"gritting teeth\" from the pain  Symptoms started: \"a while now, today seems worse\"  Denies vomiting or diarrhea.  Fever? unsure  Pain? yes   Location: stomach  Rated: unsure  Constant or intermittent?  unsure   Home cares tried: rest (no prescribed pain meds) and Senna  Emergent symptoms reviewed. Care advice given per triage guideline (see below for details); advised caller to call [facility] back with Terry available for triage, bring Terry to ER now if symptoms not well controled with home self cares, or speak to on call oncology provider for 2nd level triage.   Caller verbalized understanding of care advice given and plans to \"let him sleep and see how he feels when he wakes up\". Advised to report symptoms to Dr. Shaffer tomorrow. Caller had no further questions. Encouraged call back to [facility] 24/7 for nurse line services, new/worsening symptoms or further questions.    Tonja Richard RN  [facility]  Reason for Disposition    Patient sounds very sick or weak to the triager     Recent chemo for stomach cancer, abdominal pains, dehydration suspected, fatigue, possible fever    Additional Information    Negative: Sounds like a life-threatening emergency to the triager    Protocols used: CANCER - CONSTIPATION-ADULT-AH      "

## 2018-11-09 NOTE — MR AVS SNAPSHOT
MRN:[id number]                      After Visit Summary   11/9/2018    Terry Yi    MRN: [id number]           Visit Information        Provider Department      11/9/2018 8:45 AM MG IMAGING NURSE [facility]        Your next 10 appointments already scheduled     Nov 14, 2018  7:30 AM CST   Return Visit with NURSE ONLY CANCER CENTER   [facility] ([facility])    [address] [place] [address]   [phone]            Nov 14, 2018  8:15 AM CST   Return Visit with Yakov Shaffer MD   [facility])    [address] [place] [address]   [phone]            Nov 14, 2018  9:00 AM CST   Level 2 with BAY 8 INFUSION   [facility] ([facility])    [address] [place] [address]   [phone]            Nov 28, 2018  8:00 AM CST   Return Visit with NURSE ONLY CANCER CENTER   [facility] ([facility])    [address] [place] [address]   [phone]            Nov 28, 2018  8:45 AM CST   Return Visit with Yakov Shaffer MD   [facility])    [address] [place] [address]   [phone]            Nov 28, 2018  9:30 AM CST   Level 2 with BAY 4 INFUSION   [facility])    [address] [place] [address]   [phone]              [employer] Information     [employer] gives you secure access to your electronic health record. If you see a primary care provider, you can also send messages to your care team and make appointments. If you have questions, please call your primary care clinic.  If you do not have a primary care provider, please call [phone] and they will assist you.      [employer] is an electronic gateway that provides easy, online access to your medical  records. With [employer], you can request a clinic appointment, read your test results, renew a prescription or communicate with your care team.     To access your existing account, please contact your [facility] or call [phone] for assistance.        Care EveryWhere ID     This is your Care EveryWhere ID. This could be used by other organizations to access your [place] medical records  [id number]        Equal Access to Services     CHANDAN GALLO : Jemal Casillas, matt rincon, karime gregoryalmaladonna dumont, claire browne. So [facility] [phone].    ATENCIÓN: Si habla español, tiene a cuevas disposición servicios gratuitos de asistencia lingüística. Llame al [phone].    We comply with applicable federal civil rights laws and [place] laws. We do not discriminate on the basis of race, color, national origin, age, disability, sex, sexual orientation, or gender identity.

## 2018-11-09 NOTE — NURSING NOTE
IVAD accessed with 20 gauge 3/4inch matamoros gripper plus needle.  Flushed with 10 ml Sterile 0.9% NaCl (NDC [address]-10) and dressed with sterile Tegaderm.  Blood return positive: yes  Site without redness, tenderness or swelling: yes  Flushed with 20 cc NS (NDC [address]-02)and 5 cc 100 unit/ml Heparin (NDC [address]-03)  Needle: d/c'd intact

## 2018-11-13 NOTE — PROGRESS NOTES
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 PD-L1 negative (<1%)     HISTORY OF PRESENT ILLNESS:  Please see my previous note for details.  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      CT CAP after C#6 shows stable disease    C# 9 FOLFOX 8/2/2017 ( Oxaliplatin dose reduced to 70mg/m2 due to neuropathy )  C#10 8/22/2017 ( Oxaliplatin dose reduced to 60mg/m2 due to neuropathy )  Delayed by 1 week- patient preference  C#11 9/6/17 5FU/LV ( Stopped Oxaliplatin )  C#12 9/20/2017 5FU/LV    Repeat CT scan after C#12 shows stable to slightly improved disease.    Repeat CT CAP on 12/22/17 after C#18 was stable    Repeat CT CAP on 3/16/18 after C#24 shows stable disease.    C# 27 4/18/2018 5FU/LV    As he was complaining of off and on trouble swallowing with food getting stuck, we repeated EGD on 4/27/2018 which showed a nodular area with possible shallow ulceration in the stomach. Otherwise esophagus and stomach and duodenum were normal.  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  He was referred to gastroenterologist for manometry    The biopsies which were taken from the nodular shallow ulcer aren't consistent with poorly differentiated diffuse type adenocarcinoma with signet ring features  Esophogram showed extrinsic compression on the GE junction resulting in luminal narrowing and associated extended retention of barium tablet. This likely corresponds with soft tissue thickening about the diaphragmatic hiatus as demonstrated on prior CT examination    Repeat CT scan on 6/8/18 after C#30 is stable with stable circumferential soft  tissue thickening at the GE Junction and stable thickness of stomach wall and peritoneal thickening    C#31 6/13/2018 5FU/LV  C#32 6/27/18  C#33 7/11/2018   C#34 7/25/18  C#35 8/8/2018  C#36 8/22/18    CT scan on 8/31/18- stable, with minimal increased fascial thickening along the right anterior pararenal fossa.     C#37 9/5/18    He had one episode on 9/10/2018 when he probably did not chew the steak well and it got stuck and he had to go to the emergency room to push it down into his his stomach.  The EGD report mentions that there was no known narrowing at the distal end of the esophagus.      C#38 9/19/18    We delayed cycle #39 because he had 2 episodes of Chest tightness and he was getting cardiac workup done so he wanted to get that completed before resuming chemotherapy.  Apparently his EKG was normal.  He also had a stress test done a few days ago which was unremarkable.  We resumed chemotherapy after a 2 week delay    C#39 10/17/2018  C#40 10/31/2018    CT chest abdomen and pelvis done on 11/9/2018 showed progression of the disease with increasing gastric wall thickening and mucosal hyperenhancement with new ascites and worsening peritoneal carcinomatosis.  There is also a new right-sided pleural effusion.         INTERVAL HISTORY: He comes in today accompanied by his family members and tells me that the last few weeks have been tougher for him.  He is having more abdominal discomfort and bloating and constipation.  He is taking Senokot 2 tablets a day and notices that when he has a bowel movement his abdominal discomfort gets better.  He is having nausea and poor appetite.  He also notices worsening in swallowing and more difficulty in swallowing.  He is now eating more softer foods.  Overall energy is down.  Does not report any fevers or infections.  He noticed this morning some swelling in the right groin which is uncomfortable.  Denies any dysuria.  Neuropathy is about the same.  Denies any shortness  "of breath.    ECOG 1    ROS:  A comprehensive ROS was otherwise neg        MEDICATIONS:   Current Outpatient Prescriptions   Medication     Acetaminophen (TYLENOL PO)     aspirin 81 MG chewable tablet     Ferrous Sulfate (IRON SUPPLEMENT PO)     Fiber 5 GM/15ML LIQD     lidocaine-prilocaine (EMLA) cream     LORazepam (ATIVAN) 0.5 MG tablet     Multiple Vitamins-Minerals (MULTIVITAMIN ADULT PO)     naloxone (NARCAN) nasal spray     Naproxen Sodium (ALEVE PO)     NEW MED     ondansetron (ZOFRAN) 8 MG tablet     oxyCODONE (ROXICODONE) 5 MG/5ML solution     prochlorperazine (COMPAZINE) 10 MG tablet     allopurinol (ZYLOPRIM) 100 MG tablet     gabapentin (NEURONTIN) 300 MG capsule     indomethacin (INDOCIN) 25 MG capsule     No current facility-administered medications for this visit.      Facility-Administered Medications Ordered in Other Visits   Medication     heparin 100 UNIT/ML injection 5 mL     sodium chloride (PF) 0.9% PF flush 10-20 mL            PHYSICAL EXAMINATION:   /89 (BP Location: Right arm)  Pulse 90  Temp 97.7  F (36.5  C) (Oral)  Resp 18  Ht 1.778 m (5' 10\")  Wt 95.4 kg (210 lb 6 oz)  SpO2 97%  BMI 30.19 kg/m2  CONSTITUTIONAL: No apparent distress  EYES: PERRLA, without pallor or jaundice  ENT/MOUTH: Ears unremarkable. No oral lesions  CVS: s1s2 normal  RESPIRATORY: There is decreased breath sounds at the right lung base but otherwise chest is clear to auscultation  GI: Abdomen is soft but there is generalized tenderness to palpation but no guarding rigidity or rebound.  In the right groin there is mild swelling but there is no palpable mass per se underneath.  It is tender.  Overlying the skin looks normal.  NEURO: Alert and oriented ×3 with subjective neuropathy of fingers and feet  INTEGUMENT: no concerning skin rashes   LYMPHATIC: no palpable lymphadenopathy  MUSCULOSKELETAL: Unremarkable. No bony tenderness.   EXTREMITIES: no pedal edema  PSYCH: Mentation, mood and affect are " appropriate      LABS:   Reviewed   Results for LEONOR RALPH (MRN [id number]) as of 11/14/2018 09:03   Ref. Range 11/14/2018 07:40   Sodium Latest Ref Range: 133 - 144 mmol/L 140   Potassium Latest Ref Range: 3.4 - 5.3 mmol/L 3.9   Chloride Latest Ref Range: 94 - 109 mmol/L 106   Carbon Dioxide Latest Ref Range: 20 - 32 mmol/L 29   Urea Nitrogen Latest Ref Range: 7 - 30 mg/dL 8   Creatinine Latest Ref Range: 0.66 - 1.25 mg/dL 0.82   GFR Estimate Latest Ref Range: >60 mL/min/1.7m2 >90   GFR Estimate If Black Latest Ref Range: >60 mL/min/1.7m2 >90   Calcium Latest Ref Range: 8.5 - 10.1 mg/dL 9.0   Anion Gap Latest Ref Range: 3 - 14 mmol/L 5   Albumin Latest Ref Range: 3.4 - 5.0 g/dL 3.3 (L)   Protein Total Latest Ref Range: 6.8 - 8.8 g/dL 6.7 (L)   Bilirubin Total Latest Ref Range: 0.2 - 1.3 mg/dL 0.7   Alkaline Phosphatase Latest Ref Range: 40 - 150 U/L 125   ALT Latest Ref Range: 0 - 70 U/L 30   AST Latest Ref Range: 0 - 45 U/L 26   Glucose Latest Ref Range: 70 - 99 mg/dL 110 (H)   WBC Latest Ref Range: 4.0 - 11.0 10e9/L 9.9   Hemoglobin Latest Ref Range: 13.3 - 17.7 g/dL 15.1   Hematocrit Latest Ref Range: 40.0 - 53.0 % 45.0   Platelet Count Latest Ref Range: 150 - 450 10e9/L 242   RBC Count Latest Ref Range: 4.4 - 5.9 10e12/L 4.98   MCV Latest Ref Range: 78 - 100 fl 90   MCH Latest Ref Range: 26.5 - 33.0 pg 30.3   MCHC Latest Ref Range: 31.5 - 36.5 g/dL 33.6   RDW Latest Ref Range: 10.0 - 15.0 % 14.2   Diff Method Unknown Automated Method   % Neutrophils Latest Units: % 72.2   % Lymphocytes Latest Units: % 17.4   % Monocytes Latest Units: % 8.4   % Eosinophils Latest Units: % 1.0   % Basophils Latest Units: % 0.7   % Immature Granulocytes Latest Units: % 0.3   Absolute Neutrophil Latest Ref Range: 1.6 - 8.3 10e9/L 7.1   Absolute Lymphocytes Latest Ref Range: 0.8 - 5.3 10e9/L 1.7   Absolute Monocytes Latest Ref Range: 0.0 - 1.3 10e9/L 0.8   Absolute Eosinophils Latest Ref Range: 0.0 - 0.7 10e9/L 0.1   Absolute  Basophils Latest Ref Range: 0.0 - 0.2 10e9/L 0.1   Abs Immature Granulocytes Latest Ref Range: 0 - 0.4 10e9/L 0.0       IMAGING:  Reviewed     EXAMINATION: CT CHEST/ABDOMEN/PELVIS W CONTRAST, 11/9/2018 10:10 AM     TECHNIQUE:  Helical CT images from the thoracic inlet through the  symphysis pubis were obtained  with IV contrast.     COMPARISON: CT 8/31/2018, 6/8/2018, and 3/16/2018     HISTORY: follow up for gastric cancer; Malignant neoplasm of  overlapping sites of stomach (H)     DLP: 1128 mGy*cm     FINDINGS:     Chest: Right chest wall Port-A-Cath with the tip terminating in the  low SVC. Unchanged hypoattenuating nodule within the right thyroid  lobe. Numerous calcified mediastinal and hilar lymph nodes. The heart  size is within normal limits. Trace pericardial effusion. The thoracic  aorta main pulmonary artery diameters are within normal limits. No  central pulmonary embolism. Normal three-vessel branching pattern.  Atherosclerotic calcifications of the thoracic aorta and great  vessels.     New large right pleural effusion. Central tracheobronchial tree is  patent. No focal consolidation or pneumothorax. Multiple calcified  pulmonary nodules are unchanged in comparison to prior exam. A  suspicious pulmonary nodule.     Abdomen and pelvis: Increased gastric wall thickening and mucosal  hyperenhancement in comparison to exam from 6/8/2018. Stable soft  tissue thickening around the gastroesophageal junction. Sightly  decreased size of the prominent lymph nodes in the retroperitoneum and  lesser sac, for example there is a 10 mm lymph node in the marychuy  hepatis (series 3, image 155). Persistent hazy fat stranding extending  from the marychuy hepatis to the retroperitoneum to the level of the  distal aorta, probably worsened since prior exam. New hazy nodular  opacities involving the omentum. Increased thickening and nodularity  of the peritoneum, for example soft tissue nodularity with enhancement  is seen of the  pararenal fascia and the peritoneum along the superior  surface of the bladder.      Unchanged subcapsular lesion within the right hepatic lobe measuring  1.2 x 1.0 cm and likely represents a hemangioma. Stable subcentimeter  hypoattenuating lesion within the medial aspect of the left hepatic  lobe, too small to characterize. No new liver lesion. No intrahepatic  biliary dilatation. The gallbladder, pancreas, spleen, and adrenal  glands are unremarkable. Hypoattenuating foci in both kidneys are too  small to characterize. Eccentric bladder wall thickening along the  right lateral and posterior wall of the urinary bladder, significantly  worsened since March 2018 exam. Mildly enlarged prostate.     Rectosigmoid circumferential wall thickening with mucosal  hyperenhancement extends for approximately 5.9 cm (series 4, image  66), slightly increased mucosal enhancement from prior. Colonic  diverticulosis without evidence of diverticulitis. No dilated loops of  bowel. Moderate volume ascites in the perihepatic, right, and left  paracolic locations. The portal vein, splenic vein, SMV, celiac  access, and SMA are patent. No abdominal aorta aneurysmal dilatation.  Atherosclerotic calcifications of the distal abdominal aorta and iliac  vessels.     Bones and soft tissues: No inguinal or axillary lymphadenopathy.  Multilevel degenerative changes of the thoracic oh lumbar spine.  Degenerative changes of the hips bilaterally. No suspicious osseous  lesion.         IMPRESSION: In this patient with history of signet cell gastric  cancer, there is suggestion of slow progression:   1a. Increased gastric wall thickening and mucosal hyperenhancement  with new ascites, hazy nodular opacities involving the omentum, and  nodular thickening of the peritoneum (particularly along the anterior  aspect of pararenal fascia, and along the superior surface of the  urinary bladder). These findings are highly concerning for worsening  peritoneal  carcinomatosis.   1b. Interval mild increase in hazy fat stranding involving the marychuy  hepatis and retroperitoneum to the level of the distal aorta. This  could be within the spectrum of worsening peritoneal carcinomatosis.  Attention on follow-up.  2. New right pleural effusion. Innumerable calcified pulmonary  granulomas and calcified mediastinal and hilar lymph nodes, this is  likely sequela of prior granulomatous disease.  3. Slightly increased circumferential wall thickening of the  rectosigmoid colon with increased mucosal hyperenhancement. Findings  are suggestive of proctocolitis.    EXAMINATION: CT CHEST/ABDOMEN/PELVIS W CONTRAST  8/31/2018 8:54 AM       CLINICAL HISTORY: follow up for gastric ca; Acute gout, unspecified  cause, unspecified site; Malignant neoplasm of overlapping sites of  stomach (H). As per chart review: mPlease see my previous note for  details. ?Terry Yi is a 52-year-old male with linitis plastica and  metastatic periaortic lymph node in the aortocaval region, as well as  extensive infiltration of the lesser omentum and peripancreatic fat,  who started on chemotherapy with FOLFOX      COMPARISON: CT CAP on 06/08/2018, 03/16/2018     PROCEDURE COMMENTS: CT of the chest, abdomen, and pelvis was performed  with 134 ml isovue 370 intravenous and oral contrast. Axial MIP   images of the chest, and coronal and sagittal reformatted images of  the chest, abdomen, and pelvis obtained.     FINDINGS:    Support devices:   Right-sided Port-A-Cath with tip terminating at the atriocaval  junction.     Chest:  Small hypodense nodule arising from the right thyroid lobe, unchanged.     Heart is not enlarged. Trace pericardial fluid. No central pulmonary  embolism. Calcified mediastinal and hilar lymph nodes, stable. No  axillary adenopathy.     Trachea and central airway are patent. No focal consolidation or  pleural effusion. Multiple calcified pulmonary nodules, unchanged.  Previously seen 6 mm  nodule along the right minor fissure, likely to  represent a perifissural lymph node. No suspicious pulmonary nodules.     Abdomen/pelvis:  Mild diffuse gastric wall thickening and mucosal hyperenhancement is  not significantly changed compared to 06/08/2018 study.  Circumferential soft tissue thickening around the gastroesophageal  junction is also not significantly change. Persistent hazy/amorphous  retroperitoneal stranding extending from the level of the marychuy  hepatis to the level of the aortic bifurcation, unchanged. Mesenteric  and para-aortic lymph nodes are again seen. No bowel obstruction.  Colonic diverticulosis without diverticulitis. Mild circumferential  thickening of rectosigmoid wall, new or increased from prior.     1 x 1 cm subcapsular lesion in right hepatic lobe, not significant  changed with features suggestive of a hemangioma. Subcentimeter  hypodense lesion in the medial segment of the left hepatic lobe  (series 3, image 154), too small to accurately characterize, but  stable from the prior. Pancreas appears atrophic with diffuse fatty  infiltration. Gallbladder, spleen, adrenal glands are unremarkable.  Prominent right pelvocaliectasis without hydronephrosis; there is mild  urothelial thickening and enhancement which is new. Small  hypodensities arising from kidneys are too small to characterize, but  they favored cyst, unchanged. Mild progression of fascial thickening  along the right anterior pararenal fossa when compared to prior study  (series 3 image 179). Stable thickening anterior to the left perirenal  fascia. Urinary bladder is unremarkable. Internal aspiration of  prostate gland.     Major vasculature is patent. Calcification of abdominal aorta and  common iliac arteries. No infrarenal aortic aneurysm. Replaced right  hepatic artery arises from the SMA.     Soft tissue/bones: Unchanged degenerative cystic changes and  fragmentation about the right acetabulum. No acute bony  lesion.         IMPRESSION: In this patient with linitis plastica   1a. Mild diffuse gastric wall and circumferential soft tissue  thickening at the level of gastroesophageal junction is not  significantly changed when compared to 06/08/2018.  1b. Persistent hazy and amorphous retrograde stranding extending from  the level of the marychuy hepatis to the level of the aortic bifurcation,  unchanged. Numerous unchanged mesenteric and periaortic lymph nodes.   1c. Mild progression of peritoneal/fascial thickening along the right  anterior pararenal fossa. Stable peritoneal thickening anterior to the  left perirenal fossa.  2. Sequela of granulomatous disease in the lung as evidenced by  multiple calcified mediastinal/hilar lymph nodes and pulmonary  nodules. Previously seen 6 mm nodule along the right minor fissure,  likely to represent a perifissural lymph node.  3. New or significantly increased circumferential wall thickening  involving the rectosigmoid colon suggestive of proctocolitis in the  appropriate clinical setting.      EXAMINATION: CT CHEST/ABDOMEN/PELVIS W CONTRAST, 6/8/2018 8:28 AM     TECHNIQUE:  Helical CT images from the thoracic inlet through the  symphysis pubis were obtained  with contrast. Contrast dose: 135 ml  isovue 370     COMPARISON: 3/16/2018, PET CT 3/24/2017     HISTORY: follow up for gastric ca; Malignant neoplasm of overlapping  sites of stomach (H). Status post chemotherapy.     FINDINGS:     Chest: Right IJ port tip within the low SVC. Heart size is normal.  Normal caliber of the aorta and pulmonary artery. No central pulmonary  embolus. Visualized thyroid is unremarkable. Multiple calcified  mediastinal and bilateral hilar lymph nodes, not significantly  changed. No new or enlarged lymph nodes in the chest or axilla. The  upper and mid esophagus appear normal. Findings at the GE junction see  separate dissection. Central airway is patent. Scattered calcified  granulomas. Scattered small  indeterminate noncalcified nodules are  unchanged from at least 3/24/2017, for example 6 mm nodule along the  right minor fissure (series 7 image 81).     Abdomen and pelvis: Mild diffuse gastric wall thickening is not  significantly changed. Circumferential soft tissue thickening around  the gastroesophageal junction (series 3 image 128), measuring up to  1.2 cm in thickness which is not significantly changed. Hazy fat  stranding and mildly prominent lymph nodes in the retroperitoneum and  lesser sac are not significantly changed. For example rounded 1.2 cm  short axis diameter marychuy hepatis node (series 2 image 155). Unchanged  peritoneal thickening along the left anterior pararenal fascia (series  3 image 195).     Unchanged cyst in hepatic segment 4. 1.4 cm subcapsular lesion in the  right hepatic lobe (series 3 image 149) is not significantly changed  and has enhancement characteristics consistent with the benign  cavernous hemangioma. The gallbladder, pancreas, spleen, and adrenals  are within normal limits. Few hypoattenuating foci in both kidneys are  too small to characterize. Bladder is unremarkable. Prostate is  enlarged. Few colonic diverticula without evidence of diverticulitis.  The appendix and small bowel are within normal limits. Major  vasculature is patent. Aorta is normal in caliber with mild-moderate  atherosclerosis. No free air or significant free fluid.     Bones and soft tissues: Unchanged mild compression deformity of the  T12 vertebral body. Unchanged degenerative cystic change and  fragmentation about the right acetabulum.         IMPRESSION:   1. Mild diffuse gastric wall thickening and circumferential soft  tissue thickening around the gastroesophageal junction are not  significantly changed.  2. Unchanged fat stranding and mildly prominent lymph nodes in the  lesser sac and retroperitoneum.  3. Unchanged peritoneal thickening most prominent along the left  anterior pararenal fascia.    4. Sequelae of granulomatous disease in the lungs. A few small  noncalcified pulmonary nodules are unchanged from 3/24/2017. Attention  on follow-up examination.      Esophagram dated 5/16/2018     COMPARISON:    CT dated 3/16/2018     HISTORY:    Esophageal dysphagia in a patient with history of gastric  carcinoma.     FINDINGS: Examination of the esophagus reveals adequate primary and  secondary peristalsis. There is distal esophageal narrowing at the  gastroesophageal junction without associated mucosal abnormality, with  apparent extrinsic compression. This may correspond with soft tissue  thickening seen at the diaphragmatic hiatus on CT dated 3/16/2018. The  gastroesophageal junction is patent. No hiatal hernia was seen on  examination. There was absence of gastroesophageal reflux.     Fluoroscopy time was 1.6 minutes.     A 12mm barium tablet was administered and did not initially pass  through the gastroesophageal junction. 15 minutes after initial  administration and following ingestion of pudding and thick barium,  the barium tablet did pass through the gastroesophageal junction.         IMPRESSION: Extrinsic compression on the gastroesophageal junction  results in luminal narrowing and associated extended retention of  barium tablet. This likely corresponds with soft tissue thickening  about the diaphragmatic hiatus as demonstrated on prior CT  examination.            EGD 4/27/18  Findings:        One 12 mm nodular area with possible shallow uleration was found in the        gastric fundus. Biopsies were taken with a cold forceps for histology.        The esophagus was normal.        The stomach was normal except as noted above..        The examined duodenum was normal.       ORIGINAL REPORT:     SPECIMEN(S):   Stomach biopsy, fundus     FINAL DIAGNOSIS:   STOMACH, FUNDUS, BIOPSY:   - Poorly differentiated carcinoma (diffuse type) with signet ring features   - Biopsy is limited to mucosa     ASSESSMENT  AND PLAN:      Metastatic gastric cancer, HER-2/catrachita negative, MSI intact, PD-L1 neg (<1%), presenting with early satiety and significant weight loss and epigastric discomfort upon eating with some vomiting.  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  There is an indeterminate right liver lobe lesion which has remained stable and likely is not a metastasis.      We started palliative FOLFOX on 03/12/2017.     Oxaliplatin was dropped from cycle #11 onwards because of neuropathy     Scans after C#18 show stable disease    Repeat CT CAP after 24 cycles also show stable disease so we continued with 5FU/LV.     CT scan after cycle #30 - stable disease.    CT scan on 8/31/18 after C#36 also is stable.  I discussed with radiology and the subtle finding of fascial thickening around the pararenal fossa at this time is not very convincing progression of the disease  I discussed the findings with him and since he was doing well, we decided to continue the same chemotherapy without making any changes    We delayed cycle #39 because he had 2 episodes of chest tightness nausea and diaphoresis and shortness of breath and he was getting cardiac workup for that.  EKG was unremarkable.  He recently had a stress test done which was also unremarkable.  He again had some chest tightness and he went to a chiropractor who did some adjustments to his ribs and back and he felt much better after that and since then he has had no problems.   It is extremely unlikely that this is due to 5-FU because although it can cause vasospasm but he has received it so many times before without problems and the most recent episode happened just a few days ago and the last time he received 5-FU was on 9/19/2018.    We resumed chemotherapy after a 2-week delay.  As he was complaining of new onset of nausea and abdominal discomfort and poor appetite and had lost  some weight, I was concerned that these could be due to progression of the disease.  We decided on repeating a CT scan after cycle #40.    CT chest abdomen and pelvis done on 11/9/2018 showed progression of the disease with increasing gastric wall thickening and mucosal hyperenhancement with new ascites and worsening peritoneal carcinomatosis.  There is also a new right-sided pleural effusion.    We discussed the situation in detail.  I believe we need to switch treatments.  At this time I would recommend changing to Ramucirumab and paclitaxel.  Due to his neuropathy I would recommend giving the combination every 2 weeks rather than weekly paclitaxel.  We discussed the rational schedule and potential side effects of this combination.  We will likely start this new regimen in the next few days.    We also discussed other possibility would be not to try anticancer treatment and focus on his comfort measures only.  Because he is otherwise in good shape at this time it is reasonable to try chemotherapy and he agrees to take the chemotherapy as mentioned above.      We were unable to perform foundation one testing as there was insufficient amount of tumor cells in the specimen.       Dysphagia.  This has worsened.  This likely is in the setting of progression of the disease.  I would like to repeat EGD to see if he needs dilation or possible stent placement.  Depending on findings of EGD we might need to consider palliative radiation.     As mentioned in previous notes when he had a repeat EGD it showed improvement apart from 1.2 cm malignant shallow ulcer   Esophogram showed extrinsic compression on the GE junction resulting in luminal narrowing and associated extended retention of barium tablet. This likely corresponds with soft tissue thickening about the diaphragmatic hiatus as demonstrated on prior CT examination. Repeat CT scan shows stable soft tissue circumferential thickening around the GE junction     Abdominal  pain.  I believe this is due to worsening of the disease and peritoneal carcinomatosis.  I gave him oxycodone liquid 5 mg every 6 hours as needed.  I also referred him to see palliative care.  Continue omeprazole.    Right groin swelling and discomfort.  I recommend checking urinalysis and ultrasound of the testes/scrotum.    Nausea.  Continue antinausea medications as needed.    Constipation.  I told him that he should take Senokot 2 tablets twice a day and take MiraLAX as needed for constipation.    The right pleural effusion.  Most likely due to progression of the disease.  At this time he is not short of breath so at this time it is not necessary to do a therapeutic paracentesis.  I hope that with the start of chemotherapy and if he responds to it then the effusion would get better.    Neuropathy.  He is on gabapentin which she will continue.  Overall neuropathy has improved.  We have to keep a close watch on it when he is taking paclitaxel.            We did not address the following today    Gout.  The acute episode has resolved and now he is on allopurinol     His genetic testing did not show any identifiable mutation not reveal any identifiable mutation       I would like to see him back 2 weeks after resumption of chemotherapy    I answered all of his and his family's questions to their satisfaction. They're agreeable and comfortable with the plan.    LANEY HERNANDEZ MD

## 2018-11-14 NOTE — PROGRESS NOTES
Met with patient today after visit with Dr. Shaffer; patient has progression of his disease unfortunately and is experiencing epigastric pain.  He was offered to start a new chemotherapy regiment with Taxol and Ramucicumab and writer provided patient with these instructions both written and verbal.  Patient was given a Rx for Oxycodone liquid; he has been using Senokot 4 tablets and also was advised to use Miralax; he feels his food is having difficulty passing;  He will be scheduled for an ultrasound for swelling in the right groin and also an EGD.  He will start on the new regimen of chemotherapy within the next few days.  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

## 2018-11-14 NOTE — MR AVS SNAPSHOT
After Visit Summary   11/14/2018    Terry Yi    MRN: [id number]           Patient Information     Date Of Birth          1965        Visit Information        Provider Department      11/14/2018 8:15 AM Yakov Shaffer MD [facility]        Today's Diagnoses     Malignant neoplasm of overlapping sites of stomach (H)    -  1    Abdominal pain, generalized        Right groin pain        Right testicular pain        Dysphagia, unspecified type          Care Instructions    No chemo today    Schedule repeat EGD asap     Schedule Ramucirumab and paclitaxel every other week in the next few days    Urinalysis today    Schedule US testes today if possible    Take oxycodone 5mg every 6 hrs as needed    Take senokot 2 tabs twice daily.    Take Miralax as needed for constipation    Refer to palliative care    See me back 2 weeks after start of chemo          Follow-ups after your visit        Additional Services     GASTROENTEROLOGY ADULT REF PROCEDURE ONLY       Last Lab Result: Creatinine (mg/dL)       Date                     Value                 11/14/2018               0.82             ----------  Body mass index is 30.19 kg/(m^2).     Needed:  No  Language:  English    Patient will be contacted to schedule procedure.     Please be aware that coverage of these services is subject to the terms and limitations of your health insurance plan.  Call member services at your health plan with any benefit or coverage questions.  Any procedures must be performed at a [facility] facility OR coordinated by your clinic's referral office.    Please bring the following with you to your appointment:    (1) Any X-Rays, CTs or MRIs which have been performed.  Contact the facility where they were done to arrange for  prior to your scheduled appointment.    (2) List of current medications   (3) This referral request   (4) Any documents/labs given to you for this referral             PALLIATIVE CARE REFERRAL       Your provider has referred you to Palliative Care Services.    To schedule an Outpatient Palliative Care Referral appointment, please call: PREFERRED PROVIDERS:.    Please be aware that coverage of these services is subject to the terms and limitations of your health insurance plan.  Call member services at your health plan with any benefit or coverage questions.      Please bring the following with you to your appointment:    (1) Any X-Rays, CTs or MRIs which have been performed.  Contact the facility where they were done to arrange for  prior to your scheduled appointment.   (2) If you have recently seen a provider outside of the [facility], please bring your most recent clinic note and/or imaging results  (3) List of current medications - please bring ALL of the medications that you are currently taking (in their original bottles) to your appointment  (4) This referral request  (5) Any documents/labs given to you for this referral    Services Requested: Evaluate and treat symptoms (including writing prescriptions)    Please complete the following questions:  1. What is patient's life-limiting diagnosis? Metastatic gastric ca  2. What is the reason for the referral? Pain and symptom management  3. What is the patient's prognosis?     Palliative Care Definition:    Palliative Care is specialized medical care for people with serious illness.  This type of care is focused on providing patients with relief from symptoms, pain and stresses of serious illness - whatever the diagnosis may be.  The goal of Palliative Care is to improve quality of life for both the patient and the family.  Palliative Care is provided by a team of doctors, nurses and other specialists who work with the patient's other doctors to provide an extra layer of support.  Palliative Care is appropriate at any age and at any stage in a serious illness, and can be provided together with curative treatment.                   Your next 10 appointments already scheduled     Nov 14, 2018 11:10 AM CST   US TESTICULAR AND SCROTUM WITH DOPPLER LIMITED with MGUS1, MG US TECH   [facility]    [address] [place] [id number] [phone]           How do I prepare for my exam? (Food and drink instructions) No Food and Drink Restrictions.  How do I prepare for my exam? (Other instructions) You do not need to do anything special to prepare for your exam.  What should I wear: Wear comfortable clothes.  How long does the exam take: Most ultrasounds take 30 to 60 minutes.  What should I bring: Bring a list of your medicines, including vitamins, minerals and over-the-counter drugs. It is safest to leave personal items at home.  Do I need a :  No  is needed.  What do I need to tell my doctor: Tell your doctor about any allergies you may have.  What should I do after the exam: No restrictions, You may resume normal activities.  What is this test: An ultrasound uses sound waves to make pictures of the body. Sound waves do not cause pain. The only discomfort may be the pressure of the wand against your skin or full bladder.  Who should I call with questions: If you have any questions, please call the Imaging Department where you will have your exam. Directions, parking instructions, and other information is available on our website, [email or web address].            Dec 27, 2018  9:15 AM CST   New Visit with Cecily Leung MD   [facility])    [address] [place] [id number] [phone]              Future tests that were ordered for you today     Open Future Orders        Priority Expected Expires Ordered    US Testicular & Scrotum w Doppler Ltd Routine  11/14/2019 11/14/2018            Who to contact     If you have questions or need follow up information about today's clinic visit or your  "schedule please contact [facility] directly at [phone].  Normal or non-critical lab and imaging results will be communicated to you by [employer], letter or phone within 4 business days after the clinic has received the results. If you do not hear from us within 7 days, please contact the clinic through [employer] or phone. If you have a critical or abnormal lab result, we will notify you by phone as soon as possible.  Submit refill requests through [employer] or call your pharmacy and they will forward the refill request to us. Please allow 3 business days for your refill to be completed.          Additional Information About Your Visit        [employer] Information     [employer] gives you secure access to your electronic health record. If you see a primary care provider, you can also send messages to your care team and make appointments. If you have questions, please call your primary care clinic.  If you do not have a primary care provider, please call [phone] and they will assist you.      [employer] is an electronic gateway that provides easy, online access to your medical records. With [employer], you can request a clinic appointment, read your test results, renew a prescription or communicate with your care team.     To access your existing account, please contact your [facility] or call [phone] for assistance.        Care EveryWhere ID     This is your Care EveryWhere ID. This could be used by other organizations to access your [place] medical records  [id number]        Your Vitals Were     Pulse Temperature Respirations Height Pulse Oximetry BMI (Body Mass Index)    90 97.7  F (36.5  C) (Oral) 18 1.778 m (5' 10\") 97% 30.19 kg/m2       Blood Pressure from Last 3 Encounters:   11/14/18 137/89   10/31/18 135/87   10/17/18 138/86    Weight from Last 3 Encounters:   11/14/18 95.4 kg (210 lb 6 oz)   10/31/18 96.7 kg (213 lb 2 oz)   10/17/18 100.7 kg (221 lb 14.4 oz) "              We Performed the Following     GASTROENTEROLOGY ADULT REF PROCEDURE ONLY     PALLIATIVE CARE REFERRAL          Today's Medication Changes          These changes are accurate as of 11/14/18  9:43 AM.  If you have any questions, ask your nurse or doctor.               Start taking these medicines.        Dose/Directions    naloxone nasal spray   Commonly known as:  NARCAN   Used for:  Malignant neoplasm of overlapping sites of stomach (H), Abdominal pain, generalized, Right groin pain, Right testicular pain   Started by:  Yakov Shaffer MD        Dose:  4 mg   Spray 1 spray (4 mg) into one nostril alternating nostrils as needed for opioid reversal every 2-3 minutes until assistance arrives   Quantity:  0.2 mL   Refills:  0       oxyCODONE 5 MG/5ML solution   Commonly known as:  ROXICODONE   Used for:  Malignant neoplasm of overlapping sites of stomach (H), Abdominal pain, generalized, Right groin pain, Right testicular pain   Started by:  Yakov Shaffer MD        Dose:  5 mg   Take 5 mLs (5 mg) by mouth every 6 hours as needed for severe pain   Quantity:  300 mL   Refills:  0         Stop taking these medicines if you haven't already. Please contact your care team if you have questions.     LORazepam 0.5 MG tablet   Commonly known as:  ATIVAN   Stopped by:  Yakov Shaffer MD           ondansetron 8 MG tablet   Commonly known as:  ZOFRAN   Stopped by:  Yakov Shaffer MD           prochlorperazine 10 MG tablet   Commonly known as:  COMPAZINE   Stopped by:  Yakov Shaffer MD                Where to get your medicines      These medications were sent to [facility] - [place], [place] - [address], [address]  [address], [address], [facility] [address]     Phone:  [phone]     naloxone nasal spray         Some of these will need a paper prescription and others can be bought over the counter.  Ask your nurse if you have questions.     Bring a paper prescription for each of  these medications     oxyCODONE 5 MG/5ML solution               Information about OPIOIDS     PRESCRIPTION OPIOIDS: WHAT YOU NEED TO KNOW   We gave you an opioid (narcotic) pain medicine. It is important to manage your pain, but opioids are not always the best choice. You should first try all the other options your care team gave you. Take this medicine for as short a time (and as few doses) as possible.    Some activities can increase your pain, such as bandage changes or therapy sessions. It may help to take your pain medicine 30 to 60 minutes before these activities. Reduce your stress by getting enough sleep, working on hobbies you enjoy and practicing relaxation or meditation. Talk to your care team about ways to manage your pain beyond prescription opioids.    These medicines have risks:    DO NOT drive when on new or higher doses of pain medicine. These medicines can affect your alertness and reaction times, and you could be arrested for driving under the influence (DUI). If you need to use opioids long-term, talk to your care team about driving.    DO NOT operate heavy machinery    DO NOT do any other dangerous activities while taking these medicines.    DO NOT drink any alcohol while taking these medicines.     If the opioid prescribed includes acetaminophen, DO NOT take with any other medicines that contain acetaminophen. Read all labels carefully. Look for the word  acetaminophen  or  Tylenol.  Ask your pharmacist if you have questions or are unsure.    You can get addicted to pain medicines, especially if you have a history of addiction (chemical, alcohol or substance dependence). Talk to your care team about ways to reduce this risk.    All opioids tend to cause constipation. Drink plenty of water and eat foods that have a lot of fiber, such as fruits, vegetables, prune juice, apple juice and high-fiber cereal. Take a laxative (Miralax, milk of magnesia, Colace, Senna) if you don t move your bowels at  least every other day. Other side effects include upset stomach, sleepiness, dizziness, throwing up, tolerance (needing more of the medicine to have the same effect), physical dependence and slowed breathing.    Store your pills in a secure place, locked if possible. We will not replace any lost or stolen medicine. If you don t finish your medicine, please throw away (dispose) as directed by your pharmacist. The [place] [employer]ution [employer] has more information about safe disposal: [email or web address]         Primary Care Provider Office Phone # Fax #    Sandra Dickerson [phone]       [facility]  [address]  [facility] [address]        Equal Access to Services     CHANDAN GALLO : Hadii aad ku hadasho Sojacquelinali, waaxda luqadaha, qaybta kaalmada adeegyada, claier cowart . So [facility] [phone].    ATENCIÓN: Si habla español, tiene a cuevas disposición servicios gratuitos de asistencia lingüística. Llame al [phone].    We comply with applicable federal civil rights laws and [place] laws. We do not discriminate on the basis of race, color, national origin, age, disability, sex, sexual orientation, or gender identity.            Thank you!     Thank you for choosing [facility]  for your care. Our goal is always to provide you with excellent care. Hearing back from our patients is one way we can continue to improve our services. Please take a few minutes to complete the written survey that you may receive in the mail after your visit with us. Thank you!             Your Updated Medication List - Protect others around you: Learn how to safely use, store and throw away your medicines at www.disposemymeds.org.          This list is accurate as of 11/14/18  9:43 AM.  Always use your most recent med list.                   Brand Name Dispense Instructions for use Diagnosis    ALEVE PO       Acute  gout, unspecified cause, unspecified site       allopurinol 100 MG tablet    ZYLOPRIM    30 tablet    Take 1 tablet (100 mg) by mouth daily    Acute gout, unspecified cause, unspecified site, Malignant neoplasm of overlapping sites of stomach (H)       aspirin 81 MG chewable tablet      Take 81 mg by mouth daily        Fiber 5 GM/15ML Liqd           gabapentin 300 MG capsule    NEURONTIN     Take 1 capsule (300 mg total) by mouth once daily. For neuropathy        indomethacin 25 MG capsule    INDOCIN    42 capsule    Take 1 capsule (25 mg) by mouth 3 times daily (with meals)    Acute gout, unspecified cause, unspecified site       IRON SUPPLEMENT PO      Take by mouth daily (with breakfast)        lidocaine-prilocaine cream    EMLA          MULTIVITAMIN ADULT PO           naloxone nasal spray    NARCAN    0.2 mL    Spray 1 spray (4 mg) into one nostril alternating nostrils as needed for opioid reversal every 2-3 minutes until assistance arrives    Malignant neoplasm of overlapping sites of stomach (H), Abdominal pain, generalized, Right groin pain, Right testicular pain       NEW MED      1 gel cap CBD from [employer]        oxyCODONE 5 MG/5ML solution    ROXICODONE    300 mL    Take 5 mLs (5 mg) by mouth every 6 hours as needed for severe pain    Malignant neoplasm of overlapping sites of stomach (H), Abdominal pain, generalized, Right groin pain, Right testicular pain       TYLENOL PO      Take by mouth as needed for mild pain or fever

## 2018-11-14 NOTE — MR AVS SNAPSHOT
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 [address] [phone]           How do I prepare for my exam? (Food and drink instructions) No Food and Drink Restrictions.  How do I prepare for my exam? (Other instructions) You do not need to do anything special to prepare for your exam.  What should I wear: Wear comfortable clothes.  How long does the exam take: Most ultrasounds take 30 to 60 minutes.  What should I bring: Bring a list of your medicines, including vitamins, minerals and over-the-counter drugs. It is safest to leave personal items at home.  Do I need a :  No  is needed.  What do I need to tell my doctor: Tell your doctor about any allergies you may have.  What should I do after the exam: No restrictions, You may resume normal activities.  What is this test: An ultrasound uses sound waves to make pictures of the body. Sound waves do not cause pain. The only discomfort may be the pressure of the wand against your skin or full bladder.  Who should I call with questions: If you have any questions, please call the Imaging Department where you will have your exam. Directions, parking instructions, and other information is available on our website, [email or web address].            Nov 28, 2018   Procedure with William Charles Duane, MD   [facility] (--)    [address]  [place] [address]   [phone]            Dec 27, 2018  9:15 AM CST   New Visit with Cecily Leung MD   [facility] ([facility])    [address] [place] [address]   [phone]              Who to contact     If you have questions or need follow up information about today's clinic visit or your schedule please contact [facility] directly at [phone].  Normal or non-critical lab and imaging results will be communicated to you by [employer], letter or phone within 4 business days after the clinic has received the results. If you do not hear from us within 7 days, please contact the clinic through [employer] or phone. If you have a critical or abnormal lab result, we will notify you by phone as soon as possible.  Submit refill requests through [employer] or call your pharmacy and they will forward the refill request to us. Please allow 3 business days for your refill to be completed.          Additional Information About Your Visit        MyChart Information     [employer] gives you secure access to your electronic health record. If you see a primary care provider, you can also send messages to your care team and make appointments. If you have questions, please call your primary care clinic.  If you do not have a primary care provider, please call [phone] and they will assist you.      Bub[employer]li is an electronic gateway that provides easy, online access to your medical records. With [employer], you can request a clinic appointment, read your test results, renew a prescription or communicate with your care team.     To access your existing account, please contact your [facility] or call [phone] for assistance.        Care EveryWhere ID     This is your Care EveryWhere ID. This could be used by other organizations to access your [facility] medical records  [id number]         Blood Pressure from Last 3  Encounters:   11/14/18 137/89   10/31/18 135/87   10/17/18 138/86    Weight from Last 3 Encounters:   11/14/18 95.4 kg (210 lb 6 oz)   10/31/18 96.7 kg (213 lb 2 oz)   10/17/18 100.7 kg (221 lb 14.4 oz)              We Performed the Following     CBC with platelets differential     Comprehensive metabolic panel          Today's Medication Changes          These changes are accurate as of 11/14/18 10:36 AM.  If you have any questions, ask your nurse or doctor.               Start taking these medicines.        Dose/Directions    naloxone nasal spray   Commonly known as:  NARCAN   Used for:  Malignant neoplasm of overlapping sites of stomach (H), Abdominal pain, generalized, Right groin pain, Right testicular pain   Started by:  Yakov Shaffer MD        Dose:  4 mg   Spray 1 spray (4 mg) into one nostril alternating nostrils as needed for opioid reversal every 2-3 minutes until assistance arrives   Quantity:  0.2 mL   Refills:  0       oxyCODONE 5 MG/5ML solution   Commonly known as:  ROXICODONE   Used for:  Malignant neoplasm of overlapping sites of stomach (H), Abdominal pain, generalized, Right groin pain, Right testicular pain   Started by:  Yakov Shaffer MD        Dose:  5 mg   Take 5 mLs (5 mg) by mouth every 6 hours as needed for severe pain   Quantity:  300 mL   Refills:  0         Stop taking these medicines if you haven't already. Please contact your care team if you have questions.     LORazepam 0.5 MG tablet   Commonly known as:  ATIVAN   Stopped by:  Yakov Shaffer MD           ondansetron 8 MG tablet   Commonly known as:  ZOFRAN   Stopped by:  Yakov Shaffer MD           prochlorperazine 10 MG tablet   Commonly known as:  COMPAZINE   Stopped by:  Yakov Shaffer MD                Where to get your medicines      These medications were sent to [facility] [place], [place] - [address], [address]  [address], [facility] [address]     Phone:  [phone]      naloxone nasal spray         Some of these will need a paper prescription and others can be bought over the counter.  Ask your nurse if you have questions.     Bring a paper prescription for each of these medications     oxyCODONE 5 MG/5ML solution                Primary Care Provider Office Phone # Fax #    Sandra Dickerson SARAI [phone]       [facility]  [address]  [facility] [address]        Equal Access to Services     CHANDAN GALLO : Hadii aad ku hadasho Soomaali, waaxda luqadaha, qaybta kaalmada adeegyada, waxay idiin hayaan adeeg kharash la'rodrigon . So [facility] [phone].    ATENCIÓN: Si habla español, tiene a cuevas disposición servicios gratuitos de asistencia lingüística. Llame al [phone].    We comply with applicable federal civil rights laws and [place] laws. We do not discriminate on the basis of race, color, national origin, age, disability, sex, sexual orientation, or gender identity.            Thank you!     Thank you for choosing [facility]  for your care. Our goal is always to provide you with excellent care. Hearing back from our patients is one way we can continue to improve our services. Please take a few minutes to complete the written survey that you may receive in the mail after your visit with us. Thank you!             Your Updated Medication List - Protect others around you: Learn how to safely use, store and throw away your medicines at www.disposemymeds.org.          This list is accurate as of 11/14/18 10:36 AM.  Always use your most recent med list.                   Brand Name Dispense Instructions for use Diagnosis    ALEVE PO       Acute gout, unspecified cause, unspecified site       allopurinol 100 MG tablet    ZYLOPRIM    30 tablet    Take 1 tablet (100 mg) by mouth daily    Acute gout, unspecified cause, unspecified site, Malignant neoplasm of overlapping sites of stomach (H)       aspirin 81 MG chewable tablet      Take 81 mg by mouth  daily        Fiber 5 GM/15ML Liqd           gabapentin 300 MG capsule    NEURONTIN     Take 1 capsule (300 mg total) by mouth once daily. For neuropathy        indomethacin 25 MG capsule    INDOCIN    42 capsule    Take 1 capsule (25 mg) by mouth 3 times daily (with meals)    Acute gout, unspecified cause, unspecified site       IRON SUPPLEMENT PO      Take by mouth daily (with breakfast)        lidocaine-prilocaine cream    EMLA          MULTIVITAMIN ADULT PO           naloxone nasal spray    NARCAN    0.2 mL    Spray 1 spray (4 mg) into one nostril alternating nostrils as needed for opioid reversal every 2-3 minutes until assistance arrives    Malignant neoplasm of overlapping sites of stomach (H), Abdominal pain, generalized, Right groin pain, Right testicular pain       NEW MED      1 gel cap CBD from [employer]        oxyCODONE 5 MG/5ML solution    ROXICODONE    300 mL    Take 5 mLs (5 mg) by mouth every 6 hours as needed for severe pain    Malignant neoplasm of overlapping sites of stomach (H), Abdominal pain, generalized, Right groin pain, Right testicular pain       TYLENOL PO      Take by mouth as needed for mild pain or fever

## 2018-11-14 NOTE — NURSING NOTE
"Oncology Rooming Note    November 14, 2018 8:21 AM   Terry Yi is a 52 year old male who presents for:    Chief Complaint   Patient presents with     Oncology Clinic Visit     Follow Up     Initial Vitals: /89 (BP Location: Right arm)  Pulse 90  Temp 97.7  F (36.5  C) (Oral)  Resp 18  Ht 1.778 m (5' 10\")  Wt 95.4 kg (210 lb 6 oz)  SpO2 97%  BMI 30.19 kg/m2 Estimated body mass index is 30.19 kg/(m^2) as calculated from the following:    Height as of this encounter: 1.778 m (5' 10\").    Weight as of this encounter: 95.4 kg (210 lb 6 oz). Body surface area is 2.17 meters squared.  Moderate Pain (5) Comment: Data Unavailable   No LMP for male patient.  Allergies reviewed: Yes  Medications reviewed: Yes    Medications: Medication refills not needed today.  Pharmacy name entered into EPIC: Data Unavailable      5 minutes for nursing intake (face to face time)     Vianey Chau LPN              "

## 2018-11-14 NOTE — PATIENT INSTRUCTIONS
No chemo today    Schedule repeat EGD asap     Schedule Ramucirumab and paclitaxel every other week in the next few days    Urinalysis today    Schedule US testes today if possible    Take oxycodone 5mg every 6 hrs as needed    Take senokot 2 tabs twice daily.    Take Miralax as needed for constipation    Refer to palliative care    See me back 2 weeks after start of chemo

## 2018-11-14 NOTE — PROGRESS NOTES
Power port needle left accessed for treatment. Tolerated port access and draw without complaint. Port site scrubbed with Chloraprep for 30 seconds. Accessed using sterile technique. Cascade drawn-Red/Green/Purple tubes. Double signed by patient and RN. See documentation flowsheet. Arleth Brandon, RN, BSN, OCN

## 2018-11-14 NOTE — LETTER
11/14/2018         RE: Terry Yi  [address] [place] [address]        Dear Colleague,    Thank you for referring your patient, Terry Yi, to the [facility]. Please see a copy of my visit note below.    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 PD-L1 negative (<1%)     HISTORY OF PRESENT ILLNESS:  Please see my previous note for details.  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      CT CAP after C#6 shows stable disease    C# 9 FOLFOX 8/2/2017 ( Oxaliplatin dose reduced to 70mg/m2 due to neuropathy )  C#10 8/22/2017 ( Oxaliplatin dose reduced to 60mg/m2 due to neuropathy )  Delayed by 1 week- patient preference  C#11 9/6/17 5FU/LV ( Stopped Oxaliplatin )  C#12 9/20/2017 5FU/LV    Repeat CT scan after C#12 shows stable to slightly improved disease.    Repeat CT CAP on 12/22/17 after C#18 was stable    Repeat CT CAP on 3/16/18 after C#24 shows stable disease.    C# 27 4/18/2018 5FU/LV    As he was complaining of off and on trouble swallowing with food getting stuck, we repeated EGD on 4/27/2018 which showed a nodular area with possible shallow ulceration in the stomach. Otherwise esophagus and stomach and duodenum were normal.  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  He was referred to gastroenterologist for manometry    The biopsies which were taken from the nodular shallow ulcer aren't consistent with poorly differentiated diffuse type adenocarcinoma with signet ring features  Esophogram showed extrinsic compression on the GE junction resulting in luminal narrowing and  associated extended retention of barium tablet. This likely corresponds with soft tissue thickening about the diaphragmatic hiatus as demonstrated on prior CT examination    Repeat CT scan on 6/8/18 after C#30 is stable with stable circumferential soft tissue thickening at the GE Junction and stable thickness of stomach wall and peritoneal thickening    C#31 6/13/2018 5FU/LV  C#32 6/27/18  C#33 7/11/2018   C#34 7/25/18  C#35 8/8/2018  C#36 8/22/18    CT scan on 8/31/18- stable, with minimal increased fascial thickening along the right anterior pararenal fossa.     C#37 9/5/18    He had one episode on 9/10/2018 when he probably did not chew the steak well and it got stuck and he had to go to the emergency room to push it down into his his stomach.  The EGD report mentions that there was no known narrowing at the distal end of the esophagus.      C#38 9/19/18    We delayed cycle #39 because he had 2 episodes of Chest tightness and he was getting cardiac workup done so he wanted to get that completed before resuming chemotherapy.  Apparently his EKG was normal.  He also had a stress test done a few days ago which was unremarkable.  We resumed chemotherapy after a 2 week delay    C#39 10/17/2018  C#40 10/31/2018    CT chest abdomen and pelvis done on 11/9/2018 showed progression of the disease with increasing gastric wall thickening and mucosal hyperenhancement with new ascites and worsening peritoneal carcinomatosis.  There is also a new right-sided pleural effusion.         INTERVAL HISTORY: He comes in today accompanied by his family members and tells me that the last few weeks have been tougher for him.  He is having more abdominal discomfort and bloating and constipation.  He is taking Senokot 2 tablets a day and notices that when he has a bowel movement his abdominal discomfort gets better.  He is having nausea and poor appetite.  He also notices worsening in swallowing and more difficulty in swallowing.  He is  "now eating more softer foods.  Overall energy is down.  Does not report any fevers or infections.  He noticed this morning some swelling in the right groin which is uncomfortable.  Denies any dysuria.  Neuropathy is about the same.  Denies any shortness of breath.    ECOG 1    ROS:  A comprehensive ROS was otherwise neg        MEDICATIONS:   Current Outpatient Prescriptions   Medication     Acetaminophen (TYLENOL PO)     aspirin 81 MG chewable tablet     Ferrous Sulfate (IRON SUPPLEMENT PO)     Fiber 5 GM/15ML LIQD     lidocaine-prilocaine (EMLA) cream     LORazepam (ATIVAN) 0.5 MG tablet     Multiple Vitamins-Minerals (MULTIVITAMIN ADULT PO)     naloxone (NARCAN) nasal spray     Naproxen Sodium (ALEVE PO)     NEW MED     ondansetron (ZOFRAN) 8 MG tablet     oxyCODONE (ROXICODONE) 5 MG/5ML solution     prochlorperazine (COMPAZINE) 10 MG tablet     allopurinol (ZYLOPRIM) 100 MG tablet     gabapentin (NEURONTIN) 300 MG capsule     indomethacin (INDOCIN) 25 MG capsule     No current facility-administered medications for this visit.      Facility-Administered Medications Ordered in Other Visits   Medication     heparin 100 UNIT/ML injection 5 mL     sodium chloride (PF) 0.9% PF flush 10-20 mL            PHYSICAL EXAMINATION:   /89 (BP Location: Right arm)  Pulse 90  Temp 97.7  F (36.5  C) (Oral)  Resp 18  Ht 1.778 m (5' 10\")  Wt 95.4 kg (210 lb 6 oz)  SpO2 97%  BMI 30.19 kg/m2  CONSTITUTIONAL: No apparent distress  EYES: PERRLA, without pallor or jaundice  ENT/MOUTH: Ears unremarkable. No oral lesions  CVS: s1s2 normal  RESPIRATORY: There is decreased breath sounds at the right lung base but otherwise chest is clear to auscultation  GI: Abdomen is soft but there is generalized tenderness to palpation but no guarding rigidity or rebound.  In the right groin there is mild swelling but there is no palpable mass per se underneath.  It is tender.  Overlying the skin looks normal.  NEURO: Alert and oriented ×3 " with subjective neuropathy of fingers and feet  INTEGUMENT: no concerning skin rashes   LYMPHATIC: no palpable lymphadenopathy  MUSCULOSKELETAL: Unremarkable. No bony tenderness.   EXTREMITIES: no pedal edema  PSYCH: Mentation, mood and affect are appropriate      LABS:   Reviewed   Results for LEONOR RALPH (MRN [id number]) as of 11/14/2018 09:03   Ref. Range 11/14/2018 07:40   Sodium Latest Ref Range: 133 - 144 mmol/L 140   Potassium Latest Ref Range: 3.4 - 5.3 mmol/L 3.9   Chloride Latest Ref Range: 94 - 109 mmol/L 106   Carbon Dioxide Latest Ref Range: 20 - 32 mmol/L 29   Urea Nitrogen Latest Ref Range: 7 - 30 mg/dL 8   Creatinine Latest Ref Range: 0.66 - 1.25 mg/dL 0.82   GFR Estimate Latest Ref Range: >60 mL/min/1.7m2 >90   GFR Estimate If Black Latest Ref Range: >60 mL/min/1.7m2 >90   Calcium Latest Ref Range: 8.5 - 10.1 mg/dL 9.0   Anion Gap Latest Ref Range: 3 - 14 mmol/L 5   Albumin Latest Ref Range: 3.4 - 5.0 g/dL 3.3 (L)   Protein Total Latest Ref Range: 6.8 - 8.8 g/dL 6.7 (L)   Bilirubin Total Latest Ref Range: 0.2 - 1.3 mg/dL 0.7   Alkaline Phosphatase Latest Ref Range: 40 - 150 U/L 125   ALT Latest Ref Range: 0 - 70 U/L 30   AST Latest Ref Range: 0 - 45 U/L 26   Glucose Latest Ref Range: 70 - 99 mg/dL 110 (H)   WBC Latest Ref Range: 4.0 - 11.0 10e9/L 9.9   Hemoglobin Latest Ref Range: 13.3 - 17.7 g/dL 15.1   Hematocrit Latest Ref Range: 40.0 - 53.0 % 45.0   Platelet Count Latest Ref Range: 150 - 450 10e9/L 242   RBC Count Latest Ref Range: 4.4 - 5.9 10e12/L 4.98   MCV Latest Ref Range: 78 - 100 fl 90   MCH Latest Ref Range: 26.5 - 33.0 pg 30.3   MCHC Latest Ref Range: 31.5 - 36.5 g/dL 33.6   RDW Latest Ref Range: 10.0 - 15.0 % 14.2   Diff Method Unknown Automated Method   % Neutrophils Latest Units: % 72.2   % Lymphocytes Latest Units: % 17.4   % Monocytes Latest Units: % 8.4   % Eosinophils Latest Units: % 1.0   % Basophils Latest Units: % 0.7   % Immature Granulocytes Latest Units: % 0.3   Absolute  Neutrophil Latest Ref Range: 1.6 - 8.3 10e9/L 7.1   Absolute Lymphocytes Latest Ref Range: 0.8 - 5.3 10e9/L 1.7   Absolute Monocytes Latest Ref Range: 0.0 - 1.3 10e9/L 0.8   Absolute Eosinophils Latest Ref Range: 0.0 - 0.7 10e9/L 0.1   Absolute Basophils Latest Ref Range: 0.0 - 0.2 10e9/L 0.1   Abs Immature Granulocytes Latest Ref Range: 0 - 0.4 10e9/L 0.0       IMAGING:  Reviewed     EXAMINATION: CT CHEST/ABDOMEN/PELVIS W CONTRAST, 11/9/2018 10:10 AM     TECHNIQUE:  Helical CT images from the thoracic inlet through the  symphysis pubis were obtained  with IV contrast.     COMPARISON: CT 8/31/2018, 6/8/2018, and 3/16/2018     HISTORY: follow up for gastric cancer; Malignant neoplasm of  overlapping sites of stomach (H)     DLP: 1128 mGy*cm     FINDINGS:     Chest: Right chest wall Port-A-Cath with the tip terminating in the  low SVC. Unchanged hypoattenuating nodule within the right thyroid  lobe. Numerous calcified mediastinal and hilar lymph nodes. The heart  size is within normal limits. Trace pericardial effusion. The thoracic  aorta main pulmonary artery diameters are within normal limits. No  central pulmonary embolism. Normal three-vessel branching pattern.  Atherosclerotic calcifications of the thoracic aorta and great  vessels.     New large right pleural effusion. Central tracheobronchial tree is  patent. No focal consolidation or pneumothorax. Multiple calcified  pulmonary nodules are unchanged in comparison to prior exam. A  suspicious pulmonary nodule.     Abdomen and pelvis: Increased gastric wall thickening and mucosal  hyperenhancement in comparison to exam from 6/8/2018. Stable soft  tissue thickening around the gastroesophageal junction. Sightly  decreased size of the prominent lymph nodes in the retroperitoneum and  lesser sac, for example there is a 10 mm lymph node in the marychuy  hepatis (series 3, image 155). Persistent hazy fat stranding extending  from the marychuy hepatis to the retroperitoneum  to the level of the  distal aorta, probably worsened since prior exam. New hazy nodular  opacities involving the omentum. Increased thickening and nodularity  of the peritoneum, for example soft tissue nodularity with enhancement  is seen of the pararenal fascia and the peritoneum along the superior  surface of the bladder.      Unchanged subcapsular lesion within the right hepatic lobe measuring  1.2 x 1.0 cm and likely represents a hemangioma. Stable subcentimeter  hypoattenuating lesion within the medial aspect of the left hepatic  lobe, too small to characterize. No new liver lesion. No intrahepatic  biliary dilatation. The gallbladder, pancreas, spleen, and adrenal  glands are unremarkable. Hypoattenuating foci in both kidneys are too  small to characterize. Eccentric bladder wall thickening along the  right lateral and posterior wall of the urinary bladder, significantly  worsened since March 2018 exam. Mildly enlarged prostate.     Rectosigmoid circumferential wall thickening with mucosal  hyperenhancement extends for approximately 5.9 cm (series 4, image  66), slightly increased mucosal enhancement from prior. Colonic  diverticulosis without evidence of diverticulitis. No dilated loops of  bowel. Moderate volume ascites in the perihepatic, right, and left  paracolic locations. The portal vein, splenic vein, SMV, celiac  access, and SMA are patent. No abdominal aorta aneurysmal dilatation.  Atherosclerotic calcifications of the distal abdominal aorta and iliac  vessels.     Bones and soft tissues: No inguinal or axillary lymphadenopathy.  Multilevel degenerative changes of the thoracic oh lumbar spine.  Degenerative changes of the hips bilaterally. No suspicious osseous  lesion.         IMPRESSION: In this patient with history of signet cell gastric  cancer, there is suggestion of slow progression:   1a. Increased gastric wall thickening and mucosal hyperenhancement  with new ascites, hazy nodular opacities  involving the omentum, and  nodular thickening of the peritoneum (particularly along the anterior  aspect of pararenal fascia, and along the superior surface of the  urinary bladder). These findings are highly concerning for worsening  peritoneal carcinomatosis.   1b. Interval mild increase in hazy fat stranding involving the marychuy  hepatis and retroperitoneum to the level of the distal aorta. This  could be within the spectrum of worsening peritoneal carcinomatosis.  Attention on follow-up.  2. New right pleural effusion. Innumerable calcified pulmonary  granulomas and calcified mediastinal and hilar lymph nodes, this is  likely sequela of prior granulomatous disease.  3. Slightly increased circumferential wall thickening of the  rectosigmoid colon with increased mucosal hyperenhancement. Findings  are suggestive of proctocolitis.    EXAMINATION: CT CHEST/ABDOMEN/PELVIS W CONTRAST  8/31/2018 8:54 AM       CLINICAL HISTORY: follow up for gastric ca; Acute gout, unspecified  cause, unspecified site; Malignant neoplasm of overlapping sites of  stomach (H). As per chart review: mPlease see my previous note for  details. ?Terry Yi is a 52-year-old male with linitis plastica and  metastatic periaortic lymph node in the aortocaval region, as well as  extensive infiltration of the lesser omentum and peripancreatic fat,  who started on chemotherapy with FOLFOX      COMPARISON: CT CAP on 06/08/2018, 03/16/2018     PROCEDURE COMMENTS: CT of the chest, abdomen, and pelvis was performed  with 134 ml isovue 370 intravenous and oral contrast. Axial MIP   images of the chest, and coronal and sagittal reformatted images of  the chest, abdomen, and pelvis obtained.     FINDINGS:    Support devices:   Right-sided Port-A-Cath with tip terminating at the atriocaval  junction.     Chest:  Small hypodense nodule arising from the right thyroid lobe, unchanged.     Heart is not enlarged. Trace pericardial fluid. No central  pulmonary  embolism. Calcified mediastinal and hilar lymph nodes, stable. No  axillary adenopathy.     Trachea and central airway are patent. No focal consolidation or  pleural effusion. Multiple calcified pulmonary nodules, unchanged.  Previously seen 6 mm nodule along the right minor fissure, likely to  represent a perifissural lymph node. No suspicious pulmonary nodules.     Abdomen/pelvis:  Mild diffuse gastric wall thickening and mucosal hyperenhancement is  not significantly changed compared to 06/08/2018 study.  Circumferential soft tissue thickening around the gastroesophageal  junction is also not significantly change. Persistent hazy/amorphous  retroperitoneal stranding extending from the level of the marychuy  hepatis to the level of the aortic bifurcation, unchanged. Mesenteric  and para-aortic lymph nodes are again seen. No bowel obstruction.  Colonic diverticulosis without diverticulitis. Mild circumferential  thickening of rectosigmoid wall, new or increased from prior.     1 x 1 cm subcapsular lesion in right hepatic lobe, not significant  changed with features suggestive of a hemangioma. Subcentimeter  hypodense lesion in the medial segment of the left hepatic lobe  (series 3, image 154), too small to accurately characterize, but  stable from the prior. Pancreas appears atrophic with diffuse fatty  infiltration. Gallbladder, spleen, adrenal glands are unremarkable.  Prominent right pelvocaliectasis without hydronephrosis; there is mild  urothelial thickening and enhancement which is new. Small  hypodensities arising from kidneys are too small to characterize, but  they favored cyst, unchanged. Mild progression of fascial thickening  along the right anterior pararenal fossa when compared to prior study  (series 3 image 179). Stable thickening anterior to the left perirenal  fascia. Urinary bladder is unremarkable. Internal aspiration of  prostate gland.     Major vasculature is patent. Calcification of  abdominal aorta and  common iliac arteries. No infrarenal aortic aneurysm. Replaced right  hepatic artery arises from the SMA.     Soft tissue/bones: Unchanged degenerative cystic changes and  fragmentation about the right acetabulum. No acute bony lesion.         IMPRESSION: In this patient with linitis plastica   1a. Mild diffuse gastric wall and circumferential soft tissue  thickening at the level of gastroesophageal junction is not  significantly changed when compared to 06/08/2018.  1b. Persistent hazy and amorphous retrograde stranding extending from  the level of the marychuy hepatis to the level of the aortic bifurcation,  unchanged. Numerous unchanged mesenteric and periaortic lymph nodes.   1c. Mild progression of peritoneal/fascial thickening along the right  anterior pararenal fossa. Stable peritoneal thickening anterior to the  left perirenal fossa.  2. Sequela of granulomatous disease in the lung as evidenced by  multiple calcified mediastinal/hilar lymph nodes and pulmonary  nodules. Previously seen 6 mm nodule along the right minor fissure,  likely to represent a perifissural lymph node.  3. New or significantly increased circumferential wall thickening  involving the rectosigmoid colon suggestive of proctocolitis in the  appropriate clinical setting.      EXAMINATION: CT CHEST/ABDOMEN/PELVIS W CONTRAST, 6/8/2018 8:28 AM     TECHNIQUE:  Helical CT images from the thoracic inlet through the  symphysis pubis were obtained  with contrast. Contrast dose: 135 ml  isovue 370     COMPARISON: 3/16/2018, PET CT 3/24/2017     HISTORY: follow up for gastric ca; Malignant neoplasm of overlapping  sites of stomach (H). Status post chemotherapy.     FINDINGS:     Chest: Right IJ port tip within the low SVC. Heart size is normal.  Normal caliber of the aorta and pulmonary artery. No central pulmonary  embolus. Visualized thyroid is unremarkable. Multiple calcified  mediastinal and bilateral hilar lymph nodes, not  significantly  changed. No new or enlarged lymph nodes in the chest or axilla. The  upper and mid esophagus appear normal. Findings at the GE junction see  separate dissection. Central airway is patent. Scattered calcified  granulomas. Scattered small indeterminate noncalcified nodules are  unchanged from at least 3/24/2017, for example 6 mm nodule along the  right minor fissure (series 7 image 81).     Abdomen and pelvis: Mild diffuse gastric wall thickening is not  significantly changed. Circumferential soft tissue thickening around  the gastroesophageal junction (series 3 image 128), measuring up to  1.2 cm in thickness which is not significantly changed. Hazy fat  stranding and mildly prominent lymph nodes in the retroperitoneum and  lesser sac are not significantly changed. For example rounded 1.2 cm  short axis diameter marychuy hepatis node (series 2 image 155). Unchanged  peritoneal thickening along the left anterior pararenal fascia (series  3 image 195).     Unchanged cyst in hepatic segment 4. 1.4 cm subcapsular lesion in the  right hepatic lobe (series 3 image 149) is not significantly changed  and has enhancement characteristics consistent with the benign  cavernous hemangioma. The gallbladder, pancreas, spleen, and adrenals  are within normal limits. Few hypoattenuating foci in both kidneys are  too small to characterize. Bladder is unremarkable. Prostate is  enlarged. Few colonic diverticula without evidence of diverticulitis.  The appendix and small bowel are within normal limits. Major  vasculature is patent. Aorta is normal in caliber with mild-moderate  atherosclerosis. No free air or significant free fluid.     Bones and soft tissues: Unchanged mild compression deformity of the  T12 vertebral body. Unchanged degenerative cystic change and  fragmentation about the right acetabulum.         IMPRESSION:   1. Mild diffuse gastric wall thickening and circumferential soft  tissue thickening around the  gastroesophageal junction are not  significantly changed.  2. Unchanged fat stranding and mildly prominent lymph nodes in the  lesser sac and retroperitoneum.  3. Unchanged peritoneal thickening most prominent along the left  anterior pararenal fascia.   4. Sequelae of granulomatous disease in the lungs. A few small  noncalcified pulmonary nodules are unchanged from 3/24/2017. Attention  on follow-up examination.      Esophagram dated 5/16/2018     COMPARISON:    CT dated 3/16/2018     HISTORY:    Esophageal dysphagia in a patient with history of gastric  carcinoma.     FINDINGS: Examination of the esophagus reveals adequate primary and  secondary peristalsis. There is distal esophageal narrowing at the  gastroesophageal junction without associated mucosal abnormality, with  apparent extrinsic compression. This may correspond with soft tissue  thickening seen at the diaphragmatic hiatus on CT dated 3/16/2018. The  gastroesophageal junction is patent. No hiatal hernia was seen on  examination. There was absence of gastroesophageal reflux.     Fluoroscopy time was 1.6 minutes.     A 12mm barium tablet was administered and did not initially pass  through the gastroesophageal junction. 15 minutes after initial  administration and following ingestion of pudding and thick barium,  the barium tablet did pass through the gastroesophageal junction.         IMPRESSION: Extrinsic compression on the gastroesophageal junction  results in luminal narrowing and associated extended retention of  barium tablet. This likely corresponds with soft tissue thickening  about the diaphragmatic hiatus as demonstrated on prior CT  examination.            EGD 4/27/18  Findings:        One 12 mm nodular area with possible shallow uleration was found in the        gastric fundus. Biopsies were taken with a cold forceps for histology.        The esophagus was normal.        The stomach was normal except as noted above..        The examined  duodenum was normal.       ORIGINAL REPORT:     SPECIMEN(S):   Stomach biopsy, fundus     FINAL DIAGNOSIS:   STOMACH, FUNDUS, BIOPSY:   - Poorly differentiated carcinoma (diffuse type) with signet ring features   - Biopsy is limited to mucosa     ASSESSMENT AND PLAN:      Metastatic gastric cancer, HER-2/catrachita negative, MSI intact, PD-L1 neg (<1%), presenting with early satiety and significant weight loss and epigastric discomfort upon eating with some vomiting.  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  There is an indeterminate right liver lobe lesion which has remained stable and likely is not a metastasis.      We started palliative FOLFOX on 03/12/2017.     Oxaliplatin was dropped from cycle #11 onwards because of neuropathy     Scans after C#18 show stable disease    Repeat CT CAP after 24 cycles also show stable disease so we continued with 5FU/LV.     CT scan after cycle #30 - stable disease.    CT scan on 8/31/18 after C#36 also is stable.  I discussed with radiology and the subtle finding of fascial thickening around the pararenal fossa at this time is not very convincing progression of the disease  I discussed the findings with him and since he was doing well, we decided to continue the same chemotherapy without making any changes    We delayed cycle #39 because he had 2 episodes of chest tightness nausea and diaphoresis and shortness of breath and he was getting cardiac workup for that.  EKG was unremarkable.  He recently had a stress test done which was also unremarkable.  He again had some chest tightness and he went to a chiropractor who did some adjustments to his ribs and back and he felt much better after that and since then he has had no problems.   It is extremely unlikely that this is due to 5-FU because although it can cause vasospasm but he has received it so many times before without problems and  the most recent episode happened just a few days ago and the last time he received 5-FU was on 9/19/2018.    We resumed chemotherapy after a 2-week delay.  As he was complaining of new onset of nausea and abdominal discomfort and poor appetite and had lost some weight, I was concerned that these could be due to progression of the disease.  We decided on repeating a CT scan after cycle #40.    CT chest abdomen and pelvis done on 11/9/2018 showed progression of the disease with increasing gastric wall thickening and mucosal hyperenhancement with new ascites and worsening peritoneal carcinomatosis.  There is also a new right-sided pleural effusion.    We discussed the situation in detail.  I believe we need to switch treatments.  At this time I would recommend changing to Ramucirumab and paclitaxel.  Due to his neuropathy I would recommend giving the combination every 2 weeks rather than weekly paclitaxel.  We discussed the rational schedule and potential side effects of this combination.  We will likely start this new regimen in the next few days.    We also discussed other possibility would be not to try anticancer treatment and focus on his comfort measures only.  Because he is otherwise in good shape at this time it is reasonable to try chemotherapy and he agrees to take the chemotherapy as mentioned above.      We were unable to perform foundation one testing as there was insufficient amount of tumor cells in the specimen.       Dysphagia.  This has worsened.  This likely is in the setting of progression of the disease.  I would like to repeat EGD to see if he needs dilation or possible stent placement.  Depending on findings of EGD we might need to consider palliative radiation.     As mentioned in previous notes when he had a repeat EGD it showed improvement apart from 1.2 cm malignant shallow ulcer   Esophogram showed extrinsic compression on the GE junction resulting in luminal narrowing and associated  extended retention of barium tablet. This likely corresponds with soft tissue thickening about the diaphragmatic hiatus as demonstrated on prior CT examination. Repeat CT scan shows stable soft tissue circumferential thickening around the GE junction     Abdominal pain.  I believe this is due to worsening of the disease and peritoneal carcinomatosis.  I gave him oxycodone liquid 5 mg every 6 hours as needed.  I also referred him to see palliative care.  Continue omeprazole.    Right groin swelling and discomfort.  I recommend checking urinalysis and ultrasound of the testes/scrotum.    Nausea.  Continue antinausea medications as needed.    Constipation.  I told him that he should take Senokot 2 tablets twice a day and take MiraLAX as needed for constipation.    The right pleural effusion.  Most likely due to progression of the disease.  At this time he is not short of breath so at this time it is not necessary to do a therapeutic paracentesis.  I hope that with the start of chemotherapy and if he responds to it then the effusion would get better.    Neuropathy.  He is on gabapentin which she will continue.  Overall neuropathy has improved.  We have to keep a close watch on it when he is taking paclitaxel.            We did not address the following today    Gout.  The acute episode has resolved and now he is on allopurinol     His genetic testing did not show any identifiable mutation not reveal any identifiable mutation       I would like to see him back 2 weeks after resumption of chemotherapy    I answered all of his and his family's questions to their satisfaction. They're agreeable and comfortable with the plan.    LANEY SHAFFER MD             Again, thank you for allowing me to participate in the care of your patient.        Sincerely,        Laney Shaffer MD

## 2018-11-15 NOTE — MR AVS SNAPSHOT
After Visit Summary   11/15/2018    Terry Yi    MRN: [id number]           Patient Information     Date Of Birth          1965        Visit Information        Provider Department      11/15/2018 11:00 AM BAY 6 INFUSION [facility]        Today's Diagnoses     Malignant neoplasm of overlapping sites of stomach (H)    -  1       Follow-ups after your visit        Your next 10 appointments already scheduled     Nov 28, 2018  8:00 AM CST   LAB with [facility]   [facility] [place]    [address] [facility] [address]   [phone]           Please do not eat 10-12 hours before your appointment if you are coming in fasting for labs on lipids, cholesterol, or glucose (sugar). This does not apply to pregnant women. Water, hot tea and black coffee (with nothing added) are okay. Do not drink other fluids, diet soda or chew gum.            Nov 28, 2018  8:45 AM CST   Return Visit with Yakov Shaffer MD   [facility] [place] 2[place]    [address] [facility] [address]   [phone]            Nov 28, 2018   Procedure with William Charles Duane, [place]   [facility] [place]    [address] [facility] [address]   [phone]            Nov 29, 2018  8:00 AM CST   Level 3 with BAY 3 [facility]    [address] [facility] [address]   [phone]            Dec 12, 2018  8:00 AM CST   Level 3 with BAY 6 [facility]USION   [facility]    [address] [facility] [address]   [phone]            Dec 12, 2018  8:15 AM CST   Return Visit with Yakov Shaffer MD   [facility] [place]note 2[place]    [address] [facility] [address]   [phone]            Dec 26, 2018  9:30 AM CST   [facility]  [facility]    [address] [place] [id number] [phone]            Dec 26, 2018 10:15 AM CST   [facility] Visit with [facility]er MD   [facility]    [address] [place] [address] [id number] [phone]            Dec 26, 2018 11:00 AM CST   Level 3 with BAY 1 INFUSION   [facility]    [address] [place] [address] [id number] [phone]            Dec 27, 2018  9:15 AM CST   New Visit with Cecily Leung MD   [facility]    [address] [place] [id number] [phone]              Who to contact     If you have questions or need follow up information about today's clinic visit or your schedule please contact [facility] directly at [phone].  Normal or non-critical lab and imaging results will be communicated to you by Beemhart, letter or phone within 4 business days after the clinic has received the results. If you do not hear from us within 7 days, please contact the clinic through Beemhart or phone. If you have a critical or abnormal lab result, we will notify you by phone as soon as possible.  Submit refill requests through [employer] G[employer] or call your pharmacy and they will forward the refill request to us. Please allow 3 business days for your refill to be completed.          Additional Information About Your Visit        MyChart Information     [employer] gives you secure access to your electronic health record. If you see a primary care provider, you can also send messages to your care team and make appointments. If you have questions, please call your primary care clinic.  If you do not have a primary care provider, please call [phone] and they will assist you.      P[employer] Gems is an electronic gateway that provides  easy, online access to your medical records. With [employer], you can request a clinic appointment, read your test results, renew a prescription or communicate with your care team.     To access your existing account, please contact your [facility] or call [phone] for assistance.        Care EveryWhere ID     This is your Care EveryWhere ID. This could be used by other organizations to access your [facility] medical records  [id number]        Your Vitals Were     Pulse Temperature Respirations Pulse Oximetry          88 97  F (36.1  C) 18 97%         Blood Pressure from Last 3 Encounters:   11/15/18 (!) 134/93   11/14/18 137/89   10/31/18 135/87    Weight from Last 3 Encounters:   11/14/18 95.4 kg (210 lb 6 oz)   10/31/18 96.7 kg (213 lb 2 oz)   10/17/18 100.7 kg (221 lb 14.4 oz)              Today, you had the following     No orders found for display         Today's Medication Changes          These changes are accurate as of 11/15/18  3:09 PM.  If you have any questions, ask your nurse or doctor.               Start taking these medicines.        Dose/Directions    LORazepam 0.5 MG tablet   Commonly known as:  ATIVAN   Used for:  Malignant neoplasm of overlapping sites of stomach (H)        Dose:  0.5 mg   Take 1 tablet (0.5 mg) by mouth every 4 hours as needed (Anxiety, Nausea/Vomiting or Sleep)   Quantity:  30 tablet   Refills:  2       prochlorperazine 10 MG tablet   Commonly known as:  COMPAZINE   Used for:  Malignant neoplasm of overlapping sites of stomach (H)        Dose:  10 mg   Take 1 tablet (10 mg) by mouth every 6 hours as needed (Nausea/Vomiting)   Quantity:  30 tablet   Refills:  2            Where to get your medicines      These medications were sent to [facility] - [place], [place] - [address], [address]  [address], [facility] [address]     Phone:  [phone]     prochlorperazine 10 MG tablet         Some of these  will need a paper prescription and others can be bought over the counter.  Ask your nurse if you have questions.     Bring a paper prescription for each of these medications     LORazepam 0.5 MG tablet                Primary Care Provider Office Phone # Fax #    Sandra Dickerson [phone]       [facility]  [address]  [facility] [address]        Equal Access to Services     [facility] CLEO : Hadii aad ku hadasho Soomaali, waaxda luqadaha, qaybta kaalmada adeegyada, waxay idiin hayaan adeeg kharash la'aan . So [facility] [phone].    ATENCIÓN: Si habla español, tiene a cuevas disposición servicios gratuitos de asistencia lingüística. Llame al [phone].    We comply with applicable federal civil rights laws and [place] laws. We do not discriminate on the basis of race, color, national origin, age, disability, sex, sexual orientation, or gender identity.            Thank you!     Thank you for choosing [facility]  for your care. Our goal is always to provide you with excellent care. Hearing back from our patients is one way we can continue to improve our services. Please take a few minutes to complete the written survey that you may receive in the mail after your visit with us. Thank you!             Your Updated Medication List - Protect others around you: Learn how to safely use, store and throw away your medicines at www.disposemymeds.org.          This list is accurate as of 11/15/18  3:09 PM.  Always use your most recent med list.                   Brand Name Dispense Instructions for use Diagnosis    ALEVE PO       Acute gout, unspecified cause, unspecified site       allopurinol 100 MG tablet    ZYLOPRIM    30 tablet    Take 1 tablet (100 mg) by mouth daily    Acute gout, unspecified cause, unspecified site, Malignant neoplasm of overlapping sites of stomach (H)       aspirin 81 MG chewable tablet      Take 81 mg by mouth daily        Fiber 5 GM/15ML Liqd            gabapentin 300 MG capsule    NEURONTIN     Take 1 capsule (300 mg total) by mouth once daily. For neuropathy        indomethacin 25 MG capsule    INDOCIN    42 capsule    Take 1 capsule (25 mg) by mouth 3 times daily (with meals)    Acute gout, unspecified cause, unspecified site       IRON SUPPLEMENT PO      Take by mouth daily (with breakfast)        lidocaine-prilocaine cream    EMLA          LORazepam 0.5 MG tablet    ATIVAN    30 tablet    Take 1 tablet (0.5 mg) by mouth every 4 hours as needed (Anxiety, Nausea/Vomiting or Sleep)    Malignant neoplasm of overlapping sites of stomach (H)       MULTIVITAMIN ADULT PO           naloxone nasal spray    NARCAN    0.2 mL    Spray 1 spray (4 mg) into one nostril alternating nostrils as needed for opioid reversal every 2-3 minutes until assistance arrives    Malignant neoplasm of overlapping sites of stomach (H), Abdominal pain, generalized, Right groin pain, Right testicular pain       NEW MED      1 gel cap CBD from [employer]        oxyCODONE 5 MG/5ML solution    ROXICODONE    300 mL    Take 5 mLs (5 mg) by mouth every 6 hours as needed for severe pain    Malignant neoplasm of overlapping sites of stomach (H), Abdominal pain, generalized, Right groin pain, Right testicular pain       prochlorperazine 10 MG tablet    COMPAZINE    30 tablet    Take 1 tablet (10 mg) by mouth every 6 hours as needed (Nausea/Vomiting)    Malignant neoplasm of overlapping sites of stomach (H)       TYLENOL PO      Take by mouth as needed for mild pain or fever

## 2018-11-15 NOTE — PROGRESS NOTES
Infusion Nursing Note:  Terry Yi presents today for C1D1 Cyramza/Taxol.    Patient seen by provider today: No- saw Dr. Shaffer yesterday   present during visit today: Not Applicable.    Note: Approximately 40 minutes into Cyramza infusion, patient noted restless legs.  No shortness of breath, itchiness, flushing, redness.  Restless legs likely caused by IV benadryl, infusion continued.     Intravenous Access:  Implanted Port.    Treatment Conditions:  Lab Results   Component Value Date    HGB 15.1 11/14/2018     Lab Results   Component Value Date    WBC 9.9 11/14/2018      Lab Results   Component Value Date    ANEU 7.1 11/14/2018     Lab Results   Component Value Date     11/14/2018      Lab Results   Component Value Date     11/14/2018                   Lab Results   Component Value Date    POTASSIUM 3.9 11/14/2018           No results found for: MAG         Lab Results   Component Value Date    CR 0.82 11/14/2018                   Lab Results   Component Value Date    BRITTNEY 9.0 11/14/2018                Lab Results   Component Value Date    BILITOTAL 0.7 11/14/2018           Lab Results   Component Value Date    ALBUMIN 3.3 11/14/2018                    Lab Results   Component Value Date    ALT 30 11/14/2018           Lab Results   Component Value Date    AST 26 11/14/2018     Urine Protein= 10  Results reviewed, labs MET treatment parameters, ok to proceed with treatment.        Post Infusion Assessment:  Patient tolerated infusion without incident.  Blood return noted pre and post infusion.  Site patent and intact, free from redness, edema or discomfort.  No evidence of extravasations.  Access discontinued per protocol.    Discharge Plan:   Patient will return 11/29/2018 for next appointment.   Patient discharged in stable condition accompanied by: wife and friends.  Departure Mode: Ambulatory.    Judi Hanson, RN-BSN, PHN, OCN  [facility]

## 2018-11-19 NOTE — OR NURSING
Dr. Duane, Endoscopist notified RN that patient's EGD procedure should be canceled at [facility] and done at a hospital endoscopy center.  Patient has unresectable metastatic gastric carcinoma and feels it would be safer for the patient to have EGD at a hospital with more resources if needed.  Patient and Harika Yi (wife) notified regarding cancel and given [facility] endoscopy department phone number.
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
## 2018-11-21 NOTE — TELEPHONE ENCOUNTER
Called for pre assessment. Wife answered, pt at work. Instructed wife to arrive at 12:45 Monday, [facility], 1st floor in Endoscopy.  She will be . Pt not on any blood thinners. Instructed not to eat or drink anything day of procedure. Clear liquids only in AM.  Informed her will also send P[employer] instructions for prep.

## 2018-11-26 NOTE — IP AVS SNAPSHOT
MRN:[id number]                      After Visit Summary   11/26/2018    Terry Yi    MRN: [id number]           Thank you!     Thank you for choosing [facility] for your care. Our goal is always to provide you with excellent care. Hearing back from our patients is one way we can continue to improve our services. Please take a few minutes to complete the written survey that you may receive in the mail after you visit with us. Thank you!        Patient Information     Date Of Birth          1965        About your hospital stay     You were admitted on:  November 26, 2018 You last received care in the:  [facility], Endoscopy    You were discharged on:  November 26, 2018       Who to Call     For medical emergencies, please call 911.  For non-urgent questions about your medical care, please call your primary care provider or clinic, [phone]  For questions related to your surgery, please call your surgery clinic        Attending Provider     Provider Specialty    James Mckeon MD Gastroenterology       Primary Care Provider Office Phone # Fax #    Sandra Dickerson [phone]      Your next 10 appointments already scheduled     Nov 29, 2018  7:45 AM CST   Return Visit with [facility]   [facility])    [id number] [place] [address]   [phone]            Nov 29, 2018  8:15 AM CST   Return Visit with MARLIN Morales CNP   [facility])    [id number] [place] [address]   [phone]            Nov 29, 2018  8:30 AM CST   Level 3 with BAY 3 INFUSION   [facility])    [id number] [place] [address]   [phone]            Dec 12, 2018  7:30 AM CST   Return Visit with [facility]   [facility])     [address] [facility] [address]   [phone]            Dec 12, 2018  8:00 AM CST   Level 3 with BAY 6 INFUSION   [facility]    [address] [facility] [address]   [phone]            Dec 12, 2018  8:15 AM CST   Return Visit with Yakov Shaffer MD   [facility] ([facility])    [address] [facility] [address]   [phone]            Dec 26, 2018  9:30 AM CST   Return Visit with [facility]   [facility] ([facility])    [address] [facility] [address]   [phone]            Dec 26, 2018 10:15 AM CST   Return Visit with Yakov Shaffer MD   [facility])    [address] [facility] [address]   [phone]            Dec 26, 2018 11:00 AM CST   Level 3 with BAY 1 INFUSION   [facility] ([facility])    [address] [facility] [address]   [phone]            Dec 27, 2018  9:15 AM CST   New Visit with Cecily Leung MD   [facility])    [address] [facility] [address]   [phone]              Pending Results     No orders found from 11/24/2018 to 11/27/2018.            Admission Information     Date & Time Provider Department Dept. Phone    11/26/2018 James Mckeon MD [facility], [place], Endoscopy [phone]      Your Vitals Were     Blood Pressure Pulse Respirations Pulse Oximetry          130/83 85 21 97%        MyChart Information     [facility] gives you secure access to your electronic health record. If you see a primary care provider, you can also send messages to your care team and make appointments. If you have questions, please call your primary care clinic.  If you do not have a primary care provider, please call [phone] and they  will assist you.        Care EveryWhere ID     This is your Care EveryWhere ID. This could be used by other organizations to access your [facility] medical records  [id number]        Equal Access to Services     CHANDAN GALLO : Jemal Casillas, waben rincon, karime kajoshuada tim, waxtejinder prabhain hayaalukasz merchantjennifer june sofya browne. So [facility] [phone].    ATENCIÓN: Si habla español, tiene a cuevas disposición servicios gratuitos de asistencia lingüística. Llame al [phone].    We comply with applicable federal civil rights laws and [place] laws. We do not discriminate on the basis of race, color, national origin, age, disability, sex, sexual orientation, or gender identity.               Review of your medicines      UNREVIEWED medicines. 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 For neuropathy   Refills:  1       indomethacin 25 MG capsule   Commonly known as:  INDOCIN   Used for:  Acute gout, unspecified cause, unspecified site        Dose:  25 mg   Take 1 capsule (25 mg) by mouth 3 times daily (with meals)   Quantity:  42 capsule   Refills:  1       IRON SUPPLEMENT PO        Take by mouth daily (with breakfast)   Refills:  0       lidocaine-prilocaine cream   Commonly known as:  EMLA        Refills:  2       LORazepam 0.5 MG  tablet   Commonly known as:  ATIVAN   Used for:  Malignant neoplasm of overlapping sites of stomach (H)        Dose:  0.5 mg   Take 1 tablet (0.5 mg) by mouth every 4 hours as needed (Anxiety, Nausea/Vomiting or Sleep)   Quantity:  30 tablet   Refills:  2       MULTIVITAMIN ADULT PO        Refills:  0       naloxone 4 MG/0.1ML nasal spray   Commonly known as:  NARCAN   Used for:  Malignant neoplasm of overlapping sites of stomach (H), Abdominal pain, generalized, Right groin pain, Right testicular pain        Dose:  4 mg   Spray 1 spray (4 mg) into one nostril alternating nostrils as needed for opioid reversal every 2-3 minutes until assistance arrives   Quantity:  0.2 mL   Refills:  0       NEW MED        1 gel cap CBD from [employer]   Refills:  0       oxyCODONE 5 MG/5ML solution   Commonly known as:  ROXICODONE   Used for:  Malignant neoplasm of overlapping sites of stomach (H), Abdominal pain, generalized, Right groin pain, Right testicular pain        Dose:  5 mg   Take 5 mLs (5 mg) by mouth every 6 hours as needed for severe pain   Quantity:  300 mL   Refills:  0       prochlorperazine 10 MG tablet   Commonly known as:  COMPAZINE   Used for:  Malignant neoplasm of overlapping sites of stomach (H)        Dose:  10 mg   Take 1 tablet (10 mg) by mouth every 6 hours as needed (Nausea/Vomiting)   Quantity:  30 tablet   Refills:  2       TYLENOL PO        Take by mouth as needed for mild pain or fever   Refills:  0                Protect others around you: Learn how to safely use, store and throw away your medicines at www.disposemymeds.org.             Medication List: This is a list of all your medications and when to take them. Check marks below indicate your daily home schedule. Keep this list as a reference.      Medications           Morning Afternoon Evening Bedtime As Needed    ALEVE PO                                allopurinol 100 MG tablet   Commonly known as:  ZYLOPRIM   Take 1 tablet (100 mg) by mouth  daily                                aspirin 81 MG chewable tablet   Commonly known as:  ASA   Take 81 mg by mouth daily                                Fiber 5 GM/15ML Liqd                                gabapentin 300 MG capsule   Commonly known as:  NEURONTIN   Take 1 capsule (300 mg total) by mouth once daily. For neuropathy                                indomethacin 25 MG capsule   Commonly known as:  INDOCIN   Take 1 capsule (25 mg) by mouth 3 times daily (with meals)                                IRON SUPPLEMENT PO   Take by mouth daily (with breakfast)                                lidocaine-prilocaine cream   Commonly known as:  EMLA                                LORazepam 0.5 MG tablet   Commonly known as:  ATIVAN   Take 1 tablet (0.5 mg) by mouth every 4 hours as needed (Anxiety, Nausea/Vomiting or Sleep)                                MULTIVITAMIN ADULT PO                                naloxone 4 MG/0.1ML nasal spray   Commonly known as:  NARCAN   Spray 1 spray (4 mg) into one nostril alternating nostrils as needed for opioid reversal every 2-3 minutes until assistance arrives                                NEW MED   1 gel cap CBD from [employer]                                oxyCODONE 5 MG/5ML solution   Commonly known as:  ROXICODONE   Take 5 mLs (5 mg) by mouth every 6 hours as needed for severe pain                                prochlorperazine 10 MG tablet   Commonly known as:  COMPAZINE   Take 1 tablet (10 mg) by mouth every 6 hours as needed (Nausea/Vomiting)                                TYLENOL PO   Take by mouth as needed for mild pain or fever

## 2018-11-26 NOTE — OR NURSING
Dr. Mckeon spoke with pt and pts family members in recovery regarding procedure interventions, findings, and follow up. All questions answered. Pt stable upon discharge with family at side.

## 2018-11-26 NOTE — IP AVS SNAPSHOT
[facility], [place], Endoscopy    [address] [facility] [address]    Phone:  [phone]                                       After Visit Summary   11/26/2018    Terry Yi    MRN: [id number]           After Visit Summary Signature Page     I have received my discharge instructions, and my questions have been answered. I have discussed any challenges I see with this plan with the nurse or doctor.    ..........................................................................................................................................  Patient/Patient Representative Signature      ..........................................................................................................................................  Patient Representative Print Name and Relationship to Patient    ..................................................               ................................................  Date                                   Time    ..........................................................................................................................................  Reviewed by Signature/Title    ...................................................              ..............................................  Date                                               Time          22EPIC Rev 08/18

## 2018-11-26 NOTE — OR NURSING
EGD without interventions done, pt unable to tolerate procedure (biting/pulling out scope). Plan to reschedule for later date. VSS on 2L NC, weaned to room air.

## 2018-11-28 NOTE — PROGRESS NOTES
Oncology Follow Up Visit: November 29, 2018     Oncologist: Dr Yakov Shaffer  PCP: Sandra Dickerson    Diagnosis: Metastatic Gastric Carcinoma  Terry Yi is a 51 yo  male That originally presented with early satiety and weight loss Was found on 3/19/17 to have diffusely increased soft tissue densityby the proximal abdominal aorta at the level of the superior mesentery artery and extending distally to the level of bifurcation and slightly above the proximal common iliac arteries.  Also noted enlarged lymph node in the central mesentery at the celiac artery level measuring 1.3 x 1.4 cm with additional multiple small mesenteric lymph nodes. There was also minimal gastric wall thickening and a small 1.3 cm hypodense lesion in the peripheral aspect of the liver possibly representing a hemangioma.Because of these findings, he underwent an EGD on 03/10/2017 which showed significant thickening and hardening of the greater curvature of the stomach with edema. The biopsies which were taken from it showed invasive gastric carcinoma, diffuse type, grade 3. IHC for HER2/catrachita was negative.  Found to be unresectable.  No identifiable mutations found with genetic testing  Treatment:   4/12/2017- 10/31/2018 FOLFOX x 40 cycles  11/15/2018 began Ramucirumab/paclitaxel    Interval History: Mr Yi comes to clinic with wife and friend for symptom evaluation and review prior to start of day 15 of start of Ramucirumab/ paclitaxel.  Patient shares that he still is having pain to the stomach ranked 8-9/10 and using oxycodone 5 mg twice daily to control the pain.  He continues to work as much as possible but is understanding he is not meeting goals at this time and is about ready to say that he is done with with work.  He did have an EDG recently when they have found that he had a stricture at the esophagus on11/26/2018 Esophagoscopy- repeat and needs stent that needs to be arranged.  He continues with some shortness of breath  "especially while laying in bed and tends to be up in the recliner trying to sleep through the night but having difficulty.  He has some constipation using 2 Senokot pills daily that he is crushing to use-diet is now liquid for fear of obstruction.  He is not losing weight.  He does have some weakness and has noted some tingling to his hands and feet-but does not appear to be progressing.  His biggest concern is worried about the bloating and fluid on his abdomen.  Does share he is no longer using alcohol.  Rest of comprehensive and complete ROS is reviewed and is negative.   Past Medical History:   Diagnosis Date     Alcohol abuse     in remission x 4 years (7/2013)     Diverticulosis      Gastric cancer (H)      Gastroparesis      HTN (hypertension)      Hyperplastic colon polyp      Marijuana abuse     Daily use     Vasovagal episode     with IV stick     Weight loss      Current Outpatient Prescriptions   Medication     Acetaminophen (TYLENOL PO)     allopurinol (ZYLOPRIM) 100 MG tablet     aspirin 81 MG chewable tablet     Ferrous Sulfate (IRON SUPPLEMENT PO)     Fiber 5 GM/15ML LIQD     gabapentin (NEURONTIN) 300 MG capsule     indomethacin (INDOCIN) 25 MG capsule     lidocaine-prilocaine (EMLA) cream     LORazepam (ATIVAN) 0.5 MG tablet     Multiple Vitamins-Minerals (MULTIVITAMIN ADULT PO)     naloxone (NARCAN) nasal spray     Naproxen Sodium (ALEVE PO)     NEW MED     oxyCODONE (ROXICODONE) 5 MG/5ML solution     prochlorperazine (COMPAZINE) 10 MG tablet     No current facility-administered medications for this visit.      Allergies   Allergen Reactions     Latex      Rash (when picked up rubber tires)       Physical Exam:BP (!) 142/92  Pulse 82  Temp 97  F (36.1  C) (Oral)  Resp 18  Ht 1.778 m (5' 10\")  Wt 91.7 kg (202 lb 2 oz)  SpO2 98%  BMI 29 kg/m2 -seeing weight loss of near 17 pounds in last 6 weeks  ECOG PS- 1  Constitutional: Alert cooperative but appears anxious.   ENT: Eyes bright, No mouth " sores  Neck: Supple, No adenopathy.Thyroid symmetric  Cardiac: Heart rate and rhythm is regular and strong without murmur  Respiratory: Breathing easy. Lung sounds clear to auscultation  GI: Abdomen is soft, mildly tender especially around the upper right quadrant and appears to have some additional fluid sitting in the abdomen but is not tense or tender especially to the lower  quadrants BS normal.  MS: Muscle tone normal-extremities normal with no edema. Strength is good bilaterally to his hands and good movement to his feet.  Skin: No suspicious lesions or rashes  Neuro: Sensory grossly WNL, gait normal.   Lymph: Normal ant/post cervical, axillary, supraclavicular nodes  Psych: Mentation appears normal and affect normal with good conversation but I did see him snap at his wife about his pain    Laboratory Results:   Results for orders placed or performed during the hospital encounter of 11/26/18   UPPER GI ENDOSCOPY   Result Value Ref Range    Upper GI Endoscopy       [address] [place] [address] [phone]     Endoscopy Department  _______________________________________________________________________________  Patient Name: Terry Yi            Procedure Date: 11/26/2018 1:57 PM  MRN: [id number]                       Account Number: [id number]  YOB: 1965             Admit Type: Outpatient  Age: 52                                Gender: Male  Note Status: Finalized                Attending MD: James Mckeon MD  Total Sedation Time:                    _______________________________________________________________________________     Procedure:           Upper GI endoscopy  Indications:         Dysphagia  Providers:           James Mckeon MD, Teresita Franco RN  Patient Profile:     This is a 52 year old male on chemotherapy for gastric                        cancer. He has been having increasing dysphagia; unable                        to  swallow solids and having increasing  dysphagia to                        liquids as well.  Referring MD:        Yakov Shaffer  Medicines:           Midazolam 4 mg IV, Fentanyl 150 micrograms IV, Cetacaine                        spray  Complications:       No immediate complications.  _______________________________________________________________________________  Procedure:           Pre-Anesthesia Assessment:                       - Mental Status Examination: alert and oriented.                       - Respiratory Examination: clear to auscultation.                       - CV Examination: normal.                       - ASA Grade Assessment: II - A patient with mild                        systemic disease.                       - After reviewing the risks and benefits, the patient                        was deemed in satisfactory condition to undergo the                        procedure.                       - The anesthesia plan was to use moderate                        sedation/analgesia (conscious sedation).                        - Immediately prior to administration of medications,                        the patient was re-assessed for adequacy to receive                        sedatives.                       - Sedation was administered by an endoscopy nurse. The                        sedation level attained was moderate.                       - The heart rate, respiratory rate, oxygen saturations,                        blood pressure, adequacy of pulmonary ventilation, and                        response to care were monitored throughout the procedure.                       - The physical status of the patient was re-assessed                        after the procedure.                       After obtaining informed consent, the endoscope was                        passed under direct vision. Throughout the procedure,                        the patient's blood pressure, pulse, and oxygen                         saturations were monitored continuously. The Endoscope                        was int roduced through the mouth, and advanced to the                        antrum of the stomach. The upper GI endoscopy was                        unusually difficult due to the patient's inability to                        cooperate. The patient tolerated the procedure poorly.                        As noted below, the distal esophagus could not be                        traversed with the adult scope and the procedure was                        continued with a pediatric scope. Unfortunately, the                        patient repeatedly took the mouth piece off and was                        biting the scope -- thus, the procedure had to be                        terminated. Biopsies were not taken in part because of                        the questionable quality of biopsies obtained through a                        pediatric scope and in part because of the exam                        difficulty.                                                                                   Finding s:       Distal esophagus is narrowed -- adult endoscope does not pass. This area        was traversed with a pediatric scope. There is no evidence of erosive        esophagitis or mucosal tumor growth.       Nodular and erythematous, suspicious for involvement with patient's        known gastric cancer.                                                                                   Moderate Sedation:       Moderate (conscious) sedation was administered by the endoscopy nurse        and supervised by the endoscopist. The patient's oxygen saturation,        heart rate, blood pressure and response to care were monitored. Total        physician intraservice time was 16 minutes.  Impression:          - Esophageal stenoses.                       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                        - No specimens collected.  Recommendation:      - Repeat upper endoscopy under MAC and with anticipated                        stent placeme nt.                       - Will contact advanced endoscopy team for consideration                        of stent placement.                                                                                     Signed Electronically by James Mckeon MD  _____________________  James Mckeon MD  11/26/2018 2:39:27 PM  I was physically present for the entire viewing portion of the exam.  __________________________  Signature of teaching physician  B4c/Tyrone Mckeon MD  Number of Addenda: 0    Note Initiated On: 11/26/2018 1:57 PM       Results for orders placed or performed in visit on 11/29/18   CBC with platelets differential   Result Value Ref Range    WBC 6.1 4.0 - 11.0 10e9/L    RBC Count 4.89 4.4 - 5.9 10e12/L    Hemoglobin 14.7 13.3 - 17.7 g/dL    Hematocrit 44.3 40.0 - 53.0 %    MCV 91 78 - 100 fl    MCH 30.1 26.5 - 33.0 pg    MCHC 33.2 31.5 - 36.5 g/dL    RDW 14.2 10.0 - 15.0 %    Platelet Count 270 150 - 450 10e9/L    Diff Method Automated Method     % Neutrophils 57.9 %    % Lymphocytes 25.9 %    % Monocytes 13.6 %    % Eosinophils 1.6 %    % Basophils 0.8 %    % Immature Granulocytes 0.2 %    Absolute Neutrophil 3.5 1.6 - 8.3 10e9/L    Absolute Lymphocytes 1.6 0.8 - 5.3 10e9/L    Absolute Monocytes 0.8 0.0 - 1.3 10e9/L    Absolute Eosinophils 0.1 0.0 - 0.7 10e9/L    Absolute Basophils 0.1 0.0 - 0.2 10e9/L    Abs Immature Granulocytes 0.0 0 - 0.4 10e9/L   Comprehensive metabolic panel   Result Value Ref Range    Sodium 141 133 - 144 mmol/L    Potassium 3.8 3.4 - 5.3 mmol/L    Chloride 107 94 - 109 mmol/L    Carbon Dioxide 27 20 - 32 mmol/L    Anion Gap 7 3 - 14 mmol/L    Glucose 101 (H) 70 - 99 mg/dL    Urea Nitrogen 9 7 - 30 mg/dL    Creatinine 0.69 0.66 - 1.25 mg/dL    GFR Estimate >90 >60 mL/min/1.7m2    GFR Estimate If Black >90 >60 mL/min/1.7m2     Calcium 8.9 8.5 - 10.1 mg/dL    Bilirubin Total 0.5 0.2 - 1.3 mg/dL    Albumin 3.1 (L) 3.4 - 5.0 g/dL    Protein Total 6.3 (L) 6.8 - 8.8 g/dL    Alkaline Phosphatase 128 40 - 150 U/L    ALT 27 0 - 70 U/L    AST 31 0 - 45 U/L   Protein qualitative urine   Result Value Ref Range    Protein Albumin Urine 30 (A) NEG^Negative mg/dL     Assessment and Plan:   Unresectable metastatic gastric carcinoma-patient is now been switched to Ramucirumab/paclitaxel and appears to have tolerated the first treatment well with minimal side effects from the medication.  He was found to have a stricture to the base of the esophagus after choking on some steak and is needing to have a stent placed.  EGD with stent placement should be set up in the very near future-care coordinator will look into this.  He does meet goals to continue with cycle 1 day 15 today.  He will return on 1213 for review with physician and labs to start cycle 2.  Patient is aware that hair may be thinning by that time and is to call with new symptoms.  Increased abdominal girth suggestive of ascites-though he is noticing the increase he is not having side effects related to that and the area is not tense so we will continue with therapy today and will see him back in 2 weeks however suggested that if he is noting increased pressure or pain in even more shortness of breath he may need to have this reviewed with imaging -he is aware this is a side effect of the disease progression.    Cancer related pain-patient is rating his pain an 8-9/10 to the stomach or upper abdomen.  Using oxycodone 5 mg orally twice daily in liquid form.  He admits this is not taking care of his pain and suggest that he could try 10 mg at a time however he would not be able to drive and he is sleeping well at this time.  Suggest follow-up with palliative care for review of pain.  Constipation been taking care of by Senokot at this time however he with the stricture would be better with liquids  so did change him to Colace liquid that can be used for the constipation brought on by the opioid use,  Peripheral neuropathy-this was present prior to the treatments today and does not appear to be changing significantly with the start of the new program.  We will continue to monitor.  Anxiety-it is obvious that he is having more anxiety over the progression of his disease.  He is not on medication for this.  Suggested discussing this with palliative care team and again would be willing to try some medication for this after pain is under better control.  He is also concerned about work issues and will be thinking about discontinuing his work in the near future.  Nutrition/weight loss-patient now needing liquid diet due to esophageal stricture.  Offered nutrition visit but currently using supplements to help with protein and calorie intake.  Encouraged him to continue with supplements after the stent is placed  This was a 30 min visit with > 50% in counseling and coordinating care including education and management of concerns.    Virgen Garcia,CNP

## 2018-11-29 NOTE — PROGRESS NOTES
Infusion Nursing Note:  Terry Yi presents today for C1D15 Cyramza/Taxol.    Patient seen by provider today: Yes: Virgen Garcia NP   present during visit today: Not Applicable.    Note: N/A.    Intravenous Access:  Implanted Port.    Treatment Conditions:  Lab Results   Component Value Date    HGB 14.7 11/29/2018     Lab Results   Component Value Date    WBC 6.1 11/29/2018      Lab Results   Component Value Date    ANEU 3.5 11/29/2018     Lab Results   Component Value Date     11/29/2018      Lab Results   Component Value Date     11/29/2018                   Lab Results   Component Value Date    POTASSIUM 3.8 11/29/2018           No results found for: MAG         Lab Results   Component Value Date    CR 0.69 11/29/2018                   Lab Results   Component Value Date    BRITTNEY 8.9 11/29/2018                Lab Results   Component Value Date    BILITOTAL 0.5 11/29/2018           Lab Results   Component Value Date    ALBUMIN 3.1 11/29/2018                    Lab Results   Component Value Date    ALT 27 11/29/2018           Lab Results   Component Value Date    AST 31 11/29/2018       Results reviewed, labs MET treatment parameters, ok to proceed with treatment.  Urine Protein = 30.  24 hour urine needs to be collected prior to next treatment.  Discussed with Virgen GONSALEZ NP, who stated that he could turn it in at the clinic near home.      Post Infusion Assessment:  Patient tolerated infusion without incident.  Blood return noted pre and post infusion.  Site patent and intact, free from redness, edema or discomfort.  No evidence of extravasations.  Access discontinued per protocol.    Discharge Plan:   Discharge instructions reviewed with: Patient and Family.  Patient and/or family verbalized understanding of discharge instructions and all questions answered.  Patient will return 12/12/2018 for next appointment.   Patient discharged in stable condition accompanied by: wife and  friend.  Departure Mode: Ambulatory.    Judi Hanson, RN-BSN, PHN, OCN  [facility]

## 2018-11-29 NOTE — NURSING NOTE
"Oncology Rooming Note    November 29, 2018 8:18 AM   Terry Yi is a 52 year old male who presents for:    Chief Complaint   Patient presents with     Oncology Clinic Visit     Follow Up/Prior to Treatment     Initial Vitals: BP (!) 153/102 (BP Location: Right arm)  Pulse 82  Temp 97  F (36.1  C) (Oral)  Resp 18  Ht 1.778 m (5' 10\")  Wt 91.7 kg (202 lb 2 oz)  SpO2 98%  BMI 29 kg/m2 Estimated body mass index is 29 kg/(m^2) as calculated from the following:    Height as of this encounter: 1.778 m (5' 10\").    Weight as of this encounter: 91.7 kg (202 lb 2 oz). Body surface area is 2.13 meters squared.  Extreme Pain (8) Comment: Data Unavailable   No LMP for male patient.  Allergies reviewed: Yes  Medications reviewed: Yes    Medications: Medication refills not needed today.  Pharmacy name entered into EPIC: Data Unavailable      5 minutes for nursing intake (face to face time)     Vianey Chau LPN              "

## 2018-11-29 NOTE — PROGRESS NOTES
"SPIRITUAL HEALTH SERVICES Progress Note  [facility]    Visited Terry Yi due to scores on his distress screen from this month's visit with his MD.  I introduced  services and met with him in the infusion clinic. His wife Harika (of 32 years) was present and his friend Al ( who has been with him through each of the 40+ treatments Roberto has received.  Offered support for all of them and affirmation of this cancer journey.       Illness Narrative - Patient shared that things were going well until recently when the chemotherapy was no longer working and his gastric cancer continued to progress.  He states he is starting on some stronger medications today.       Distress - Patient was tearful when discussing how much he appreciates his wife's and friend's support. How tough these past weeks have been for them.       Coping - Patient and his family/friends  joke and poke fun at each other as their go to method of coping.  They have two adult children that are supportive. When asked about his spirituality he said that he is OK  and has no needs today and also voices that he  knows \"where he is going.\"       Meaning-Making - Not discussed      Plan - I gave Harika and Roberto my contact number and let them know of my availability.     Josie Solares  Associate   Pager     "

## 2018-11-29 NOTE — MR AVS SNAPSHOT
After Visit Summary   11/29/2018    Terry Yi    MRN: [id number]           Patient Information     Date Of Birth          1965        Visit Information        Provider Department      11/29/2018 7:45 AM NURSE ONLY CANCER CENTER [facility]        Today's Diagnoses     Malignant neoplasm of overlapping sites of stomach (H)    -  1       Follow-ups after your visit        Your next 10 appointments already scheduled     Nov 29, 2018  8:30 AM CST   Level 3 with BAY 3 INFUSION   [facility] ([facility])    [address] [place] [address]   [phone]            Dec 12, 2018  7:30 AM CST   Return Visit with [facility] ONLY [facility]ER CENTER   [facility] ([facility])    [address] [place] [address]   [phone]            Dec 12, 2018  8:00 AM CST   Level 3 with BAY 6 INFUSION   [facility] ([facility])    [address] [place] [address]   [phone]            Dec 12, 2018  8:15 AM CST   Return Visit with Yakov Shaffer MD   [facility] ([facility])    [address] [place] [address]   [phone]            Dec 26, 2018  9:30 AM CST   Return Visit with [facility]URSE ONLY CANCER CENTER   [facility] ([facility])    [address] [place] [address]   [phone]            Dec 26, 2018 10:15 AM CST   Return Visit with Yakov Shaffer MD   [facility] ([facility])    [address] [place] [address]   [phone]            Dec 26, 2018 11:00 AM CST   Level 3 with BAY 1 INFUSION   [facility] ([facility])    [address] [place] [address]   [phone]            Dec 27, 2018  9:15 AM CST   New Visit with Cecily Leung MD   [facility]  [facility] ([facility])    [address] [place] [address] [phone]              Who to contact     If you have questions or need follow up information about today's clinic visit or your schedule please contact [facility] directly at [phone].  Normal or non-critical lab and imaging results will be communicated to you by MyChart, letter or phone within 4 business days after the clinic has received the results. If you do not hear from us within 7 days, please contact the clinic through [employer] or phone. If you have a critical or abnormal lab result, we will notify you by phone as soon as possible.  Submit refill requests through [employer] or call your pharmacy and they will forward the refill request to us. Please allow 3 business days for your refill to be completed.          Additional Information About Your Visit        [employer] Information     [employer] gives you secure access to your electronic health record. If you see a primary care provider, you can also send messages to your care team and make appointments. If you have questions, please call your primary care clinic.  If you do not have a primary care provider, please call [phone] and they will assist you.      [employer] is an electronic gateway that provides easy, online access to your medical records. With [employer], you can request a clinic appointment, read your test results, renew a prescription or communicate with your care team.     To access your existing account, please contact your [facility] or call [phone] for assistance.        Care EveryWhere ID     This is your Care EveryWhere ID. This could be used by other organizations to access your [facility] medical records  [id number]         Blood Pressure from Last 3 Encounters:   11/26/18 (!) 133/94   11/15/18 (!) 134/93   11/14/18 137/89    Weight from Last 3 Encounters:   11/14/18 95.4 kg (210 lb 6  oz)   10/31/18 96.7 kg (213 lb 2 oz)   10/17/18 100.7 kg (221 lb 14.4 oz)              We Performed the Following     CBC with platelets differential     Comprehensive metabolic panel     Protein qualitative urine        Primary Care Provider Office Phone # Fax #    Sandra Dickerson, SARAI [phone]       [facility]  [address]  [facility] [address]        Equal Access to Services     CHANDAN GALLO : Hadii aad ku hadasho Soomaali, waaxda luqadaha, qaybta kaalmada adeegyada, waxay idiin hayaan adeeg kharash la'aan . So [facility] [phone].    ATENCIÓN: Si habla español, tiene a cuevas disposición servicios gratuitos de asistencia lingüística. Llame al [phone].    We comply with applicable federal civil rights laws and [place] laws. We do not discriminate on the basis of race, color, national origin, age, disability, sex, sexual orientation, or gender identity.            Thank you!     Thank you for choosing [facility]  for your care. Our goal is always to provide you with excellent care. Hearing back from our patients is one way we can continue to improve our services. Please take a few minutes to complete the written survey that you may receive in the mail after your visit with us. Thank you!             Your Updated Medication List - Protect others around you: Learn how to safely use, store and throw away your medicines at www.disposemymeds.org.          This list is accurate as of 11/29/18  8:15 AM.  Always use your most recent med list.                   Brand Name Dispense Instructions for use Diagnosis    ALEVE PO       Acute gout, unspecified cause, unspecified site       allopurinol 100 MG tablet    ZYLOPRIM    30 tablet    Take 1 tablet (100 mg) by mouth daily    Acute gout, unspecified cause, unspecified site, Malignant neoplasm of overlapping sites of stomach (H)       aspirin 81 MG chewable tablet    ASA     Take 81 mg by mouth daily        Fiber 5 GM/15ML Liqd            gabapentin 300 MG capsule    NEURONTIN     Take 1 capsule (300 mg total) by mouth once daily. For neuropathy        indomethacin 25 MG capsule    INDOCIN    42 capsule    Take 1 capsule (25 mg) by mouth 3 times daily (with meals)    Acute gout, unspecified cause, unspecified site       IRON SUPPLEMENT PO      Take by mouth daily (with breakfast)        lidocaine-prilocaine 2.5-2.5 % external cream    EMLA          LORazepam 0.5 MG tablet    ATIVAN    30 tablet    Take 1 tablet (0.5 mg) by mouth every 4 hours as needed (Anxiety, Nausea/Vomiting or Sleep)    Malignant neoplasm of overlapping sites of stomach (H)       MULTIVITAMIN ADULT PO           naloxone 4 MG/0.1ML nasal spray    NARCAN    0.2 mL    Spray 1 spray (4 mg) into one nostril alternating nostrils as needed for opioid reversal every 2-3 minutes until assistance arrives    Malignant neoplasm of overlapping sites of stomach (H), Abdominal pain, generalized, Right groin pain, Right testicular pain       NEW MED      1 gel cap CBD from [employer]        oxyCODONE 5 MG/5ML solution    ROXICODONE    300 mL    Take 5 mLs (5 mg) by mouth every 6 hours as needed for severe pain    Malignant neoplasm of overlapping sites of stomach (H), Abdominal pain, generalized, Right groin pain, Right testicular pain       prochlorperazine 10 MG tablet    COMPAZINE    30 tablet    Take 1 tablet (10 mg) by mouth every 6 hours as needed (Nausea/Vomiting)    Malignant neoplasm of overlapping sites of stomach (H)       TYLENOL PO      Take by mouth as needed for mild pain or fever

## 2018-11-29 NOTE — LETTER
11/29/2018         RE: Terry Yi  [address] [place] [address]        Dear Colleague,    Thank you for referring your patient, Terry Yi, to the [facility]. Please see a copy of my visit note below.    Oncology Follow Up Visit: November 29, 2018     Oncologist: Dr Yakov Shaffer  PCP: Sandra Dickerson    Diagnosis: Metastatic Gastric Carcinoma  Terry Yi is a 53 yo  male That originally presented with early satiety and weight loss Was found on 3/19/17 to have diffusely increased soft tissue densityby the proximal abdominal aorta at the level of the superior mesentery artery and extending distally to the level of bifurcation and slightly above the proximal common iliac arteries.  Also noted enlarged lymph node in the central mesentery at the celiac artery level measuring 1.3 x 1.4 cm with additional multiple small mesenteric lymph nodes. There was also minimal gastric wall thickening and a small 1.3 cm hypodense lesion in the peripheral aspect of the liver possibly representing a hemangioma.Because of these findings, he underwent an EGD on 03/10/2017 which showed significant thickening and hardening of the greater curvature of the stomach with edema. The biopsies which were taken from it showed invasive gastric carcinoma, diffuse type, grade 3. IHC for HER2/catrachita was negative.  Found to be unresectable.  No identifiable mutations found with genetic testing  Treatment:   4/12/2017- 10/31/2018 FOLFOX x 40 cycles  11/15/2018 began Ramucirumab/paclitaxel    Interval History: Mr Yi comes to clinic with wife and friend for symptom evaluation and review prior to start of day 15 of start of Ramucirumab/ paclitaxel.  Patient shares that he still is having pain to the stomach ranked 8-9/10 and using oxycodone 5 mg twice daily to control the pain.  He continues to work as much as possible but is understanding he is not meeting goals at this time and is about ready to say  that he is done with with work.  He did have an EDG recently when they have found that he had a stricture at the esophagus on11/26/2018 Esophagoscopy- repeat and needs stent that needs to be arranged.  He continues with some shortness of breath especially while laying in bed and tends to be up in the recliner trying to sleep through the night but having difficulty.  He has some constipation using 2 Senokot pills daily that he is crushing to use-diet is now liquid for fear of obstruction.  He is not losing weight.  He does have some weakness and has noted some tingling to his hands and feet-but does not appear to be progressing.  His biggest concern is worried about the bloating and fluid on his abdomen.  Does share he is no longer using alcohol.  Rest of comprehensive and complete ROS is reviewed and is negative.   Past Medical History:   Diagnosis Date     Alcohol abuse     in remission x 4 years (7/2013)     Diverticulosis      Gastric cancer (H)      Gastroparesis      HTN (hypertension)      Hyperplastic colon polyp      Marijuana abuse     Daily use     Vasovagal episode     with IV stick     Weight loss      Current Outpatient Prescriptions   Medication     Acetaminophen (TYLENOL PO)     allopurinol (ZYLOPRIM) 100 MG tablet     aspirin 81 MG chewable tablet     Ferrous Sulfate (IRON SUPPLEMENT PO)     Fiber 5 GM/15ML LIQD     gabapentin (NEURONTIN) 300 MG capsule     indomethacin (INDOCIN) 25 MG capsule     lidocaine-prilocaine (EMLA) cream     LORazepam (ATIVAN) 0.5 MG tablet     Multiple Vitamins-Minerals (MULTIVITAMIN ADULT PO)     naloxone (NARCAN) nasal spray     Naproxen Sodium (ALEVE PO)     NEW MED     oxyCODONE (ROXICODONE) 5 MG/5ML solution     prochlorperazine (COMPAZINE) 10 MG tablet     No current facility-administered medications for this visit.      Allergies   Allergen Reactions     Latex      Rash (when picked up rubber tires)       Physical Exam:BP (!) 142/92  Pulse 82  Temp 97  F (36.1  C)  "(Oral)  Resp 18  Ht 1.778 m (5' 10\")  Wt 91.7 kg (202 lb 2 oz)  SpO2 98%  BMI 29 kg/m2 -seeing weight loss of near 17 pounds in last 6 weeks  ECOG PS- 1  Constitutional: Alert cooperative but appears anxious.   ENT: Eyes bright, No mouth sores  Neck: Supple, No adenopathy.Thyroid symmetric  Cardiac: Heart rate and rhythm is regular and strong without murmur  Respiratory: Breathing easy. Lung sounds clear to auscultation  GI: Abdomen is soft, mildly tender especially around the upper right quadrant and appears to have some additional fluid sitting in the abdomen but is not tense or tender especially to the lower  quadrants BS normal.  MS: Muscle tone normal-extremities normal with no edema. Strength is good bilaterally to his hands and good movement to his feet.  Skin: No suspicious lesions or rashes  Neuro: Sensory grossly WNL, gait normal.   Lymph: Normal ant/post cervical, axillary, supraclavicular nodes  Psych: Mentation appears normal and affect normal with good conversation but I did see him snap at his wife about his pain    Laboratory Results:   Results for orders placed or performed during the hospital encounter of 11/26/18   UPPER GI ENDOSCOPY   Result Value Ref Range    Upper GI Endoscopy       [facility]  [address] [facility] [place] [address] [phone]     Endoscopy Department  _______________________________________________________________________________  Patient Name: Terry Yi            Procedure Date: 11/26/2018 1:57 PM  MRN: [id number]                       Account Number: [id number]  YOB: 1965             Admit Type: Outpatient  Age: 52                                Gender: Male  Note Status: Finalized                Attending MD: James Mckeon MD  Total Sedation Time:                    _______________________________________________________________________________     Procedure:           Upper GI endoscopy  Indications:         " Dysphagia  Providers:           James Mckeon MD, Teresita Franco, RN  Patient Profile:     This is a 52 year old male on chemotherapy for gastric                        cancer. He has been having increasing dysphagia; unable                        to swallow solids and having increasing  dysphagia to                        liquids as well.  Referring MD:        Yakov Shaffer  Medicines:           Midazolam 4 mg IV, Fentanyl 150 micrograms IV, Cetacaine                        spray  Complications:       No immediate complications.  _______________________________________________________________________________  Procedure:           Pre-Anesthesia Assessment:                       - Mental Status Examination: alert and oriented.                       - Respiratory Examination: clear to auscultation.                       - CV Examination: normal.                       - ASA Grade Assessment: II - A patient with mild                        systemic disease.                       - After reviewing the risks and benefits, the patient                        was deemed in satisfactory condition to undergo the                        procedure.                       - The anesthesia plan was to use moderate                        sedation/analgesia (conscious sedation).                        - Immediately prior to administration of medications,                        the patient was re-assessed for adequacy to receive                        sedatives.                       - Sedation was administered by an endoscopy nurse. The                        sedation level attained was moderate.                       - The heart rate, respiratory rate, oxygen saturations,                        blood pressure, adequacy of pulmonary ventilation, and                        response to care were monitored throughout the procedure.                       - The physical status of the patient was re-assessed                         after the procedure.                       After obtaining informed consent, the endoscope was                        passed under direct vision. Throughout the procedure,                        the patient's blood pressure, pulse, and oxygen                        saturations were monitored continuously. The Endoscope                        was int roduced through the mouth, and advanced to the                        antrum of the stomach. The upper GI endoscopy was                        unusually difficult due to the patient's inability to                        cooperate. The patient tolerated the procedure poorly.                        As noted below, the distal esophagus could not be                        traversed with the adult scope and the procedure was                        continued with a pediatric scope. Unfortunately, the                        patient repeatedly took the mouth piece off and was                        biting the scope -- thus, the procedure had to be                        terminated. Biopsies were not taken in part because of                        the questionable quality of biopsies obtained through a                        pediatric scope and in part because of the exam                        difficulty.                                                                                   Finding s:       Distal esophagus is narrowed -- adult endoscope does not pass. This area        was traversed with a pediatric scope. There is no evidence of erosive        esophagitis or mucosal tumor growth.       Nodular and erythematous, suspicious for involvement with patient's        known gastric cancer.                                                                                   Moderate Sedation:       Moderate (conscious) sedation was administered by the endoscopy nurse        and supervised by the endoscopist. The patient's oxygen saturation,        heart rate, blood pressure and  response to care were monitored. Total        physician intraservice time was 16 minutes.  Impression:          - Esophageal stenoses.                       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                       - No specimens collected.  Recommendation:      - Repeat upper endoscopy under MAC and with anticipated                        stent placeme nt.                       - Will contact advanced endoscopy team for consideration                        of stent placement.                                                                                     Signed Electronically by James Mckeon MD  _____________________  James Mckeon MD  11/26/2018 2:39:27 PM  I was physically present for the entire viewing portion of the exam.  __________________________  Signature of teaching physician  B4c/Tyrone Mckeon MD  Number of Addenda: 0    Note Initiated On: 11/26/2018 1:57 PM       Results for orders placed or performed in visit on 11/29/18   CBC with platelets differential   Result Value Ref Range    WBC 6.1 4.0 - 11.0 10e9/L    RBC Count 4.89 4.4 - 5.9 10e12/L    Hemoglobin 14.7 13.3 - 17.7 g/dL    Hematocrit 44.3 40.0 - 53.0 %    MCV 91 78 - 100 fl    MCH 30.1 26.5 - 33.0 pg    MCHC 33.2 31.5 - 36.5 g/dL    RDW 14.2 10.0 - 15.0 %    Platelet Count 270 150 - 450 10e9/L    Diff Method Automated Method     % Neutrophils 57.9 %    % Lymphocytes 25.9 %    % Monocytes 13.6 %    % Eosinophils 1.6 %    % Basophils 0.8 %    % Immature Granulocytes 0.2 %    Absolute Neutrophil 3.5 1.6 - 8.3 10e9/L    Absolute Lymphocytes 1.6 0.8 - 5.3 10e9/L    Absolute Monocytes 0.8 0.0 - 1.3 10e9/L    Absolute Eosinophils 0.1 0.0 - 0.7 10e9/L    Absolute Basophils 0.1 0.0 - 0.2 10e9/L    Abs Immature Granulocytes 0.0 0 - 0.4 10e9/L   Comprehensive metabolic panel   Result Value Ref Range    Sodium 141 133 - 144 mmol/L    Potassium 3.8 3.4 - 5.3 mmol/L    Chloride 107 94 -  109 mmol/L    Carbon Dioxide 27 20 - 32 mmol/L    Anion Gap 7 3 - 14 mmol/L    Glucose 101 (H) 70 - 99 mg/dL    Urea Nitrogen 9 7 - 30 mg/dL    Creatinine 0.69 0.66 - 1.25 mg/dL    GFR Estimate >90 >60 mL/min/1.7m2    GFR Estimate If Black >90 >60 mL/min/1.7m2    Calcium 8.9 8.5 - 10.1 mg/dL    Bilirubin Total 0.5 0.2 - 1.3 mg/dL    Albumin 3.1 (L) 3.4 - 5.0 g/dL    Protein Total 6.3 (L) 6.8 - 8.8 g/dL    Alkaline Phosphatase 128 40 - 150 U/L    ALT 27 0 - 70 U/L    AST 31 0 - 45 U/L   Protein qualitative urine   Result Value Ref Range    Protein Albumin Urine 30 (A) NEG^Negative mg/dL     Assessment and Plan:   Unresectable metastatic gastric carcinoma-patient is now been switched to Ramucirumab/paclitaxel and appears to have tolerated the first treatment well with minimal side effects from the medication.  He was found to have a stricture to the base of the esophagus after choking on some steak and is needing to have a stent placed.  EGD with stent placement should be set up in the very near future-care coordinator will look into this.  He does meet goals to continue with cycle 1 day 15 today.  He will return on 1213 for review with physician and labs to start cycle 2.  Patient is aware that hair may be thinning by that time and is to call with new symptoms.  Increased abdominal girth suggestive of ascites-though he is noticing the increase he is not having side effects related to that and the area is not tense so we will continue with therapy today and will see him back in 2 weeks however suggested that if he is noting increased pressure or pain in even more shortness of breath he may need to have this reviewed with imaging -he is aware this is a side effect of the disease progression.    Cancer related pain-patient is rating his pain an 8-9/10 to the stomach or upper abdomen.  Using oxycodone 5 mg orally twice daily in liquid form.  He admits this is not taking care of his pain and suggest that he could try 10  mg at a time however he would not be able to drive and he is sleeping well at this time.  Suggest follow-up with palliative care for review of pain.  Constipation been taking care of by Senokot at this time however he with the stricture would be better with liquids so did change him to Colace liquid that can be used for the constipation brought on by the opioid use,  Peripheral neuropathy-this was present prior to the treatments today and does not appear to be changing significantly with the start of the new program.  We will continue to monitor.  Anxiety-it is obvious that he is having more anxiety over the progression of his disease.  He is not on medication for this.  Suggested discussing this with palliative care team and again would be willing to try some medication for this after pain is under better control.  He is also concerned about work issues and will be thinking about discontinuing his work in the near future.  Nutrition/weight loss-patient now needing liquid diet due to esophageal stricture.  Offered nutrition visit but currently using supplements to help with protein and calorie intake.  Encouraged him to continue with supplements after the stent is placed  This was a 30 min visit with > 50% in counseling and coordinating care including education and management of concerns.    Virgen Garcia CNP      Again, thank you for allowing me to participate in the care of your patient.        Sincerely,        Virgen Garcia, SARAI, APRN CNP

## 2018-11-29 NOTE — MR AVS SNAPSHOT
After Visit Summary   11/29/2018    Terry Yi    MRN: [id number]           Patient Information     Date Of Birth          1965        Visit Information        Provider Department      11/29/2018 8:30 AM BAY 3 INFUSION [facility]        Today's Diagnoses     Malignant neoplasm of overlapping sites of stomach (H)    -  1    Proteinuria, unspecified type           Follow-ups after your visit        Your next 10 appointments already scheduled     Dec 12, 2018  7:30 AM CST   Return Visit with [facility]   [facility]    [address] [facility] [address] [phone]            Dec 12, 2018  8:00 AM CST   Level 3 with BAY 6 INFUSION   [facility]    [address] [facility] [address] [phone]            Dec 12, 2018  8:15 AM CST   Return Visit with Yakov Shaffer MD   [facility])    [address] [facility] [address] [phone]            Dec 26, 2018  9:30 AM CST   Return Visit with [facility]SE ONLY CANCER CENTER   [facility]    [address] [facility] [address] [phone]            Dec 26, 2018 10:15 AM CST   Return Visit with Yakov Shaffer MD   [facility]    [address] [facility] [address] [phone]            Dec 26, 2018 11:00 AM CST   Level 3 with BAY 1 INFUSION   [facility]    [address] [facility] [address] [phone]            Dec 27, 2018  9:15 AM CST   New Visit with Cecily Leung MD   [facility]    [address] [facility] [id number] [phone]              Future tests that were ordered for you  today     Open Future Orders        Priority Expected Expires Ordered    Protein timed urine with Creat Ratio Routine  11/29/2019 11/29/2018            Who to contact     If you have questions or need follow up information about today's clinic visit or your schedule please contact [facility] directly at [phone].  Normal or non-critical lab and imaging results will be communicated to you by MyChart, letter or phone within 4 business days after the clinic has received the results. If you do not hear from us within 7 days, please contact the clinic through [employer] or phone. If you have a critical or abnormal lab result, we will notify you by phone as soon as possible.  Submit refill requests through [employer] or call your pharmacy and they will forward the refill request to us. Please allow 3 business days for your refill to be completed.          Additional Information About Your Visit        MyChart Information     [employer] gives you secure access to your electronic health record. If you see a primary care provider, you can also send messages to your care team and make appointments. If you have questions, please call your primary care clinic.  If you do not have a primary care provider, please call [phone] and they will assist you.      [employer] is an electronic gateway that provides easy, online access to your medical records. With [employer], you can request a clinic appointment, read your test results, renew a prescription or communicate with your care team.     To access your existing account, please contact your [facility] or call [phone] for assistance.        Care EveryWhere ID     This is your Care EveryWhere ID. This could be used by other organizations to access your [place] medical records  [id number]         Blood Pressure from Last 3 Encounters:   11/29/18 132/82   11/29/18 (!) 142/92   11/26/18 (!) 133/94    Weight from Last 3 Encounters:    11/29/18 91.7 kg (202 lb 2 oz)   11/14/18 95.4 kg (210 lb 6 oz)   10/31/18 96.7 kg (213 lb 2 oz)                 Today's Medication Changes          These changes are accurate as of 11/29/18 12:36 PM.  If you have any questions, ask your nurse or doctor.               Start taking these medicines.        Dose/Directions    docusate 50 MG/5ML liquid   Commonly known as:  COLACE   Used for:  Constipation, unspecified constipation type   Started by:  Virgen Garcia APRN CNP        Dose:  100 mg   Take 10 mLs (100 mg) by mouth daily   Quantity:  473 mL   Refills:  1            Where to get your medicines      These medications were sent to [facility] - [place], [place] - [address]  [address], [facility] [address]     Phone:  [phone]     docusate 50 MG/5ML liquid                Primary Care Provider Office Phone # Fax #    Sandra Dickerson [phone]       [facility]  [address]  [facility] [address]        Equal Access to Services     DNOOVAN GALLO AH: Hadii aad ku hadasho Soomaali, waaxda luqadaha, qaybta kaalmada adeegyada, waxay prabhain hayrodrigon charley cowart ah. So [facility] [phone].    ATENCIÓN: Si habla español, tiene a cuevas disposición servicios gratuitos de asistencia lingüística. [facility] [phone].    We comply with applicable federal civil rights laws and [place] laws. We do not discriminate on the basis of race, color, national origin, age, disability, sex, sexual orientation, or gender identity.            Thank you!     Thank you for choosing [facility]  for your care. Our goal is always to provide you with excellent care. Hearing back from our patients is one way we can continue to improve our services. Please take a few minutes to complete the written survey that you may receive in the mail after your visit with us. Thank you!             Your Updated Medication List - Protect others  around you: Learn how to safely use, store and throw away your medicines at www.disposemymeds.org.          This list is accurate as of 11/29/18 12:36 PM.  Always use your most recent med list.                   Brand Name Dispense Instructions for use Diagnosis    ALEVE PO       Acute gout, unspecified cause, unspecified site       allopurinol 100 MG tablet    ZYLOPRIM    30 tablet    Take 1 tablet (100 mg) by mouth daily    Acute gout, unspecified cause, unspecified site, Malignant neoplasm of overlapping sites of stomach (H)       aspirin 81 MG chewable tablet    ASA     Take 81 mg by mouth daily        docusate 50 MG/5ML liquid    COLACE    473 mL    Take 10 mLs (100 mg) by mouth daily    Constipation, unspecified constipation type       Fiber 5 GM/15ML Liqd           gabapentin 300 MG capsule    NEURONTIN     Take 1 capsule (300 mg total) by mouth once daily. For neuropathy        indomethacin 25 MG capsule    INDOCIN    42 capsule    Take 1 capsule (25 mg) by mouth 3 times daily (with meals)    Acute gout, unspecified cause, unspecified site       IRON SUPPLEMENT PO      Take by mouth daily (with breakfast)        lidocaine-prilocaine 2.5-2.5 % external cream    EMLA          LORazepam 0.5 MG tablet    ATIVAN    30 tablet    Take 1 tablet (0.5 mg) by mouth every 4 hours as needed (Anxiety, Nausea/Vomiting or Sleep)    Malignant neoplasm of overlapping sites of stomach (H)       MULTIVITAMIN ADULT PO           naloxone 4 MG/0.1ML nasal spray    NARCAN    0.2 mL    Spray 1 spray (4 mg) into one nostril alternating nostrils as needed for opioid reversal every 2-3 minutes until assistance arrives    Malignant neoplasm of overlapping sites of stomach (H), Abdominal pain, generalized, Right groin pain, Right testicular pain       NEW MED      CBD oil 1 Drop PRN        oxyCODONE 5 MG/5ML solution    ROXICODONE    300 mL    Take 5 mLs (5 mg) by mouth every 6 hours as needed for severe pain    Malignant neoplasm of  overlapping sites of stomach (H), Abdominal pain, generalized, Right groin pain, Right testicular pain       prochlorperazine 10 MG tablet    COMPAZINE    30 tablet    Take 1 tablet (10 mg) by mouth every 6 hours as needed (Nausea/Vomiting)    Malignant neoplasm of overlapping sites of stomach (H)       TYLENOL PO      Take by mouth as needed for mild pain or fever

## 2018-11-29 NOTE — MR AVS SNAPSHOT
After Visit Summary   11/29/2018    Terry Yi    MRN: [id number]           Patient Information     Date Of Birth          1965        Visit Information        Provider Department      11/29/2018 8:15 AM Virgen Garcia APRN CNP [facility]         Follow-ups after your visit        Your next 10 appointments already scheduled     Nov 29, 2018  7:45 AM CST   Return Visit with NURSE ONLY CANCER CENTER   [facility] ([facility])    [address] [facility] [address] [phone]            Nov 29, 2018  8:15 AM CST   Return Visit with MARLIN Morales CNP   [facility] ([facility])    [address] [facility] [address] [phone]            Nov 29, 2018  8:30 AM CST   Level 3 with BAY 3 INFUSION   [facility] ([facility])    [address] [facility] [address] [phone]            Dec 12, 2018  7:30 AM CST   Return Visit with NURSE ONLY CANCER CENTER   [facility] ([facility])    [address] [facility] [address]   [phone]            Dec 12, 2018  8:00 AM CST   Level 3 with BAY 6 INFUSION   [facility] ([facility])    [address] [facility] [address]   [phone]            Dec 12, 2018  8:15 AM CST   Return Visit with Yakov Shaffer MD   [facility] ([facility])    [address] [facility] [address]   [phone]            Dec 26, 2018  9:30 AM CST   Return Visit with NURSE ONLY CANCER CENTER   [facility] ([facility])    [address] [facility] [address]   [phone]            Dec 26, 2018 10:15 AM CST   Return Visit with Yakov Shaffer MD   [facility] ([facility])    [address]  [facility] [address] [phone]            Dec 26, 2018 11:00 AM CST   Level 3 with BAY 1 INFUSION   [facility] ([facility])    [address] [address] [phone]            Dec 27, 2018  9:15 AM CST   New Visit with Cecily Leung MD   [facility] ([facility])    [address] [facility] [address] [phone]              Who to contact     If you have questions or need follow up information about today's clinic visit or your schedule please contact [facility] directly at [phone].  Normal or non-critical lab and imaging results will be communicated to you by [employer], letter or phone within 4 business days after the clinic has received the results. If you do not hear from us within 7 days, please contact the clinic through [employer] or phone. If you have a critical or abnormal lab result, we will notify you by phone as soon as possible.  Submit refill requests through [employer] or call your pharmacy and they will forward the refill request to us. Please allow 3 business days for your refill to be completed.          Additional Information About Your Visit        [employer] Information     [employer] gives you secure access to your electronic health record. If you see a primary care provider, you can also send messages to your care team and make appointments. If you have questions, please call your primary care clinic.  If you do not have a primary care provider, please call [phone] and they will assist you.      [employer] is an electronic gateway that provides easy, online access to your medical records. With [employer], you can request a clinic appointment, read your test results, renew a prescription or communicate with your care team.     To access your existing account, please contact your [facility] or call [phone] for  assistance.        Care EveryWhere ID     This is your Care EveryWhere ID. This could be used by other organizations to access your [place] medical records  [id number]         Blood Pressure from Last 3 Encounters:   11/26/18 (!) 133/94   11/15/18 (!) 134/93   11/14/18 137/89    Weight from Last 3 Encounters:   11/14/18 95.4 kg (210 lb 6 oz)   10/31/18 96.7 kg (213 lb 2 oz)   10/17/18 100.7 kg (221 lb 14.4 oz)              Today, you had the following     No orders found for display       Primary Care Provider Office Phone # Fax #    Sandra DIDI Dickerson, SARAI [phone]       [facility]  [address]  [facility] [address]        Equal Access to Services     [facility]: Hadii aad ku hadasho Soomaali, waaxda luqadaha, qaybta kaalmada adeegyada, waxtejinder cowart . So [facility] [phone].    ATENCIÓN: Si habla español, tiene a cuevas disposición servicios gratuitos de asistencia lingüística. Ciara al [phone].    We comply with applicable federal civil rights laws and [place] laws. We do not discriminate on the basis of race, color, national origin, age, disability, sex, sexual orientation, or gender identity.            Thank you!     Thank you for choosing [facility]  for your care. Our goal is always to provide you with excellent care. Hearing back from our patients is one way we can continue to improve our services. Please take a few minutes to complete the written survey that you may receive in the mail after your visit with us. Thank you!             Your Updated Medication List - Protect others around you: Learn how to safely use, store and throw away your medicines at www.disposemymeds.org.          This list is accurate as of 11/27/18 11:15 AM.  Always use your most recent med list.                   Brand Name Dispense Instructions for use Diagnosis    ALEVE PO       Acute gout, unspecified cause, unspecified site       allopurinol 100 MG tablet     ZYLOPRIM    30 tablet    Take 1 tablet (100 mg) by mouth daily    Acute gout, unspecified cause, unspecified site, Malignant neoplasm of overlapping sites of stomach (H)       aspirin 81 MG chewable tablet    ASA     Take 81 mg by mouth daily        Fiber 5 GM/15ML Liqd           gabapentin 300 MG capsule    NEURONTIN     Take 1 capsule (300 mg total) by mouth once daily. For neuropathy        indomethacin 25 MG capsule    INDOCIN    42 capsule    Take 1 capsule (25 mg) by mouth 3 times daily (with meals)    Acute gout, unspecified cause, unspecified site       IRON SUPPLEMENT PO      Take by mouth daily (with breakfast)        lidocaine-prilocaine cream    EMLA          LORazepam 0.5 MG tablet    ATIVAN    30 tablet    Take 1 tablet (0.5 mg) by mouth every 4 hours as needed (Anxiety, Nausea/Vomiting or Sleep)    Malignant neoplasm of overlapping sites of stomach (H)       MULTIVITAMIN ADULT PO           naloxone 4 MG/0.1ML nasal spray    NARCAN    0.2 mL    Spray 1 spray (4 mg) into one nostril alternating nostrils as needed for opioid reversal every 2-3 minutes until assistance arrives    Malignant neoplasm of overlapping sites of stomach (H), Abdominal pain, generalized, Right groin pain, Right testicular pain       NEW MED      1 gel cap CBD from [employer]        oxyCODONE 5 MG/5ML solution    ROXICODONE    300 mL    Take 5 mLs (5 mg) by mouth every 6 hours as needed for severe pain    Malignant neoplasm of overlapping sites of stomach (H), Abdominal pain, generalized, Right groin pain, Right testicular pain       prochlorperazine 10 MG tablet    COMPAZINE    30 tablet    Take 1 tablet (10 mg) by mouth every 6 hours as needed (Nausea/Vomiting)    Malignant neoplasm of overlapping sites of stomach (H)       TYLENOL PO      Take by mouth as needed for mild pain or fever

## 2018-11-30 NOTE — PROGRESS NOTES
Spoke with patient's wife but she was sitting next to him.     He amenable to plan: EGD with Dr. Lopez and aware of the following:  Procedure explained to patient.  This is an urgent procedure.     Can expect a call for date and time for procedure.   Will need a , someone to stay with them for 24 hours and stay in town for 24 hours (within 45 min of Hospital) post procedure, rational explained.   Will get pre-op physical done locally and will fax a copy to us along with bringing a hard copy with them. Fax number given. [phone]    Blood thinner -  no  ASA - no  Diabetic - no  NSAIDS: no    A pre-op nurse will call 1-2 days prior to the procedure.   Is advised to be NPO/solid food at midnight before the procedure in the event the procedure time is moved up. Ok to drink clear liquids (Water, Apple Juice or Gatorade) up to 6 hours prior to procedure.     Post Procedure: start with clear liquids and then advance as tolerated.     Verbalized understanding of all instructions. All questions answered.   Contact information verified for future questions/concerns.     Wife advised that his throat is the size of a pen clicker and that this should be done urgently.     Sent to .     MADYSON Haro Dr., Dr. Lopez, & Dr. Hancock  [facility]  [phone]

## 2018-11-30 NOTE — PROGRESS NOTES
Message received from Dr. Lopez to organize the following:   Team, can we organize an upper endoscopy with me in the OR with general and fluoroscopy next week if possible, Friday is fine     Order placed and sent to scheduling.     Left VM advising that we got a referral to our office and to call back. Direct line given.     MADYSON Haro Dr., Dr. Lopez, & Dr. Hancock  [facility]  [phone]

## 2018-12-03 NOTE — PROGRESS NOTES
Spoke with Harika, spouse, regarding request of Oxycodone (Roxicodone) to be filled through Dr. Shah's office in [place]; Writer contacted Dr. Shah and left message on voice mail of this need.  Advised for Harika to call Dr. Shah if they have heard back by tomorrow.  Roberto's appetite is slightly improved; he is asking for certain foods; the main problem is his abdominal pain.

## 2018-12-03 NOTE — TELEPHONE ENCOUNTER
Per my last conversation with wife she advised:   stephenie advised that his throat is the size of a pen clicker and that this should be done urgently.    Per Dr. Lopez: Have them come to the ED if he can not tolerate liquids; we will do him next week     Advised wife of the above. She informed me that he has some good days and some bad. She knows when they should go to the ED but is hoping he can get in soon. She was in [employer] and could not write down our number.   Called back and left clinic number on voicemail.     MADYSON Haro Dr., Dr. Lopez, & Dr. Hancock  [facility]  [phone]

## 2018-12-05 NOTE — TELEPHONE ENCOUNTER
Terry is informed that he is scheduled for EGD procedure with Dr. Lopez on 12/6/18 at 730 A with an arrival time of 530 A. Location of appt confirmed. All instructions sent via irisnote per pt's request.    SR 12/5/18 @ 1020 A

## 2018-12-06 NOTE — ANESTHESIA PREPROCEDURE EVALUATION
Anesthesia Pre-Procedure Evaluation    Patient: Terry Yi   MRN:     [id number] Gender:   male   Age:    52 year old :      1965        Preoperative Diagnosis: Observation For Suspected Cancer    Procedure(s):  Upper Gastrointestinal Endoscopy      Past Medical History:   Diagnosis Date     Alcohol abuse     in remission x 4 years (2013)     Diverticulosis      Gastric cancer (H)      Gastroparesis      HTN (hypertension)      Hyperplastic colon polyp      Marijuana abuse     Daily use     Vasovagal episode     with IV stick     Weight loss       Past Surgical History:   Procedure Laterality Date     AS ESOPHAGOSCOPY, DIAGNOSTIC  2017     COLONOSCOPY  2017     COLONOSCOPY  10/2014     COMBINED ESOPHAGOSCOPY, GASTROSCOPY, DUODENOSCOPY (EGD) WITH CO2 INSUFFLATION N/A 2018    Procedure: COMBINED ESOPHAGOSCOPY, GASTROSCOPY, DUODENOSCOPY (EGD) WITH CO2 INSUFFLATION;  EGD, Dysphagia, unspecified type Malignant neoplasm of overlapping sites of stomach (H), Ref by Edmund, BMI 30.71;  Surgeon: Duane, William Charles, MD;  Location: [facility]     ESOPHAGOSCOPY, GASTROSCOPY, DUODENOSCOPY (EGD), COMBINED N/A 3/27/2017    Procedure: COMBINED ENDOSCOPIC ULTRASOUND, ESOPHAGOSCOPY, GASTROSCOPY, DUODENOSCOPY (EGD), FINE NEEDLE ASPIRATE/BIOPSY;  Surgeon: Guru Brien Hancock MD;  Location: [facility]     ESOPHAGOSCOPY, GASTROSCOPY, DUODENOSCOPY (EGD), COMBINED N/A 2018    Procedure: COMBINED ESOPHAGOSCOPY, GASTROSCOPY, DUODENOSCOPY (EGD), BIOPSY SINGLE OR MULTIPLE;;  Surgeon: Duane, William Charles, MD;  Location: [facility]     ESOPHAGOSCOPY, GASTROSCOPY, DUODENOSCOPY (EGD), COMBINED N/A 2018    Procedure: COMBINED ESOPHAGOSCOPY, GASTROSCOPY, DUODENOSCOPY (EGD);  Surgeon: James Mckeon MD;  Location: [facility]          Anesthesia Evaluation     . Pt has had prior anesthetic. Type: General    History of anesthetic complications    patient states woke up during procedure and tried to pull tube  out      ROS/MED HX    ENT/Pulmonary:     (+), . Other pulmonary disease Describes some SOB, new right pleural effusion.    Neurologic:     (+)neuropathy - bilateral hands and feet,     Cardiovascular:     (+) hypertension----. : . . . :. . Previous cardiac testing date:results:Stress Testdate:10/2018 results:normalECG reviewed date:2017 results:SR date: results:          METS/Exercise Tolerance:  >4 METS   Hematologic:  - neg hematologic  ROS       Musculoskeletal:  - neg musculoskeletal ROS       GI/Hepatic:     (+) Other GI/Hepatic gastroparesis, dysphagia      Renal/Genitourinary:  - ROS Renal section negative       Endo:  - neg endo ROS       Psychiatric:  - neg psychiatric ROS       Infectious Disease:  - neg infectious disease ROS       Malignancy:   (+) Malignancy History of Other  Other CA Active status post Chemo         Other:    (+) C-spine cleared: N/A, H/O Chronic Pain,H/O chronic opiod use ,                        PHYSICAL EXAM:   Mental Status/Neuro: A/A/O   Airway: Facies: Feasible  Mallampati: II  Mouth/Opening: Full  TM distance: > 6 cm  Neck ROM: Full   Respiratory: Auscultation: CTAB     Resp. Rate: Normal     Resp. Effort: Normal      CV: Rhythm: Regular  Heart: Normal Sounds   Comments:                    Lab Results   Component Value Date    WBC 6.1 11/29/2018    HGB 14.7 11/29/2018    HCT 44.3 11/29/2018     11/29/2018     11/29/2018    POTASSIUM 3.8 11/29/2018    CHLORIDE 107 11/29/2018    CO2 27 11/29/2018    BUN 9 11/29/2018    CR 0.69 11/29/2018     (H) 11/29/2018    BRITTNEY 8.9 11/29/2018    ALBUMIN 3.1 (L) 11/29/2018    PROTTOTAL 6.3 (L) 11/29/2018    ALT 27 11/29/2018    AST 31 11/29/2018    ALKPHOS 128 11/29/2018    BILITOTAL 0.5 11/29/2018    INR 1.05 03/27/2017       Preop Vitals  BP Readings from Last 3 Encounters:   12/06/18 (!) 137/102   11/29/18 132/82   11/29/18 (!) 142/92    Pulse Readings from Last 3 Encounters:   11/29/18 82   11/26/18 85   11/15/18 88     "  Resp Readings from Last 3 Encounters:   12/06/18 18   11/29/18 18   11/26/18 18    SpO2 Readings from Last 3 Encounters:   12/06/18 97%   11/29/18 98%   11/26/18 95%      Temp Readings from Last 1 Encounters:   12/06/18 36.6  C (97.8  F) (Oral)    Ht Readings from Last 1 Encounters:   12/06/18 1.778 m (5' 10\")      Wt Readings from Last 1 Encounters:   12/06/18 88.5 kg (195 lb 1.7 oz)    Estimated body mass index is 27.99 kg/(m^2) as calculated from the following:    Height as of this encounter: 1.778 m (5' 10\").    Weight as of this encounter: 88.5 kg (195 lb 1.7 oz).     LDA:  Port A Cath Single 03/30/17 Right Chest wall (Active)   Access Date 12/06/18 12/6/2018  6:00 AM   Site Assessment WDL 12/6/2018  6:00 AM   Line status: Medial or Superior Lumen Saline locked 12/6/2018  6:00 AM   Line Status Blood return noted 12/6/2018  6:00 AM   Number of days:616            Assessment:   ASA SCORE: 3       Documentation: H&P complete; Preop Testing complete; Consents complete   Proceeding: Proceed without further delay  Tobacco Use:  NO Active use of Tobacco/UNKNOWN Tobacco use status     Plan:   Anes. Type:  General   Pre-Induction: Midazolam IV   Induction:  IV (RSI)   Airway: Oral ETT   Access/Monitoring: PIV   Maintenance: Balanced   Emergence: Procedure Site   Logistics: Observation/Admission     Postop Pain/Sedation Strategy:  Standard-Options: Opioids PRN     PONV Management:  Adult Risk Factors:, Non-Smoker, Postop Opioids     CONSENT:   Plan and risks discussed with: Not Applicable   Blood Products: N/a                         Josse Echols MD  "

## 2018-12-06 NOTE — OR NURSING
Writer spoke with Dr. Lopez, he stated that pt and family may discharge from Phase II, he does not need to see them again before discharge.

## 2018-12-06 NOTE — IP AVS SNAPSHOT
MRN:[id number]                      After Visit Summary   12/6/2018    Terry Yi    MRN: [id number]           Thank you!     Thank you for choosing [facility] for your care. Our goal is always to provide you with excellent care. Hearing back from our patients is one way we can continue to improve our services. Please take a few minutes to complete the written survey that you may receive in the mail after you visit with us. Thank you!        Patient Information     Date Of Birth          1965        Designated Caregiver       Most Recent Value    Caregiver    Will someone help with your care after discharge? no      About your hospital stay     You were admitted on:  December 6, 2018 You last received care in the:  Same Day Surgery [facility]    You were discharged on:  December 6, 2018       Who to Call     For medical emergencies, please call 911.  For non-urgent questions about your medical care, please call your primary care provider or clinic, [phone]  For questions related to your surgery, please call your surgery clinic        Attending Provider     Provider Specialty    Miguel Lopez MD Gastroenterology       Primary Care Provider Office Phone # Fax #    Brandon Mendoza [phone]      After Care Instructions     Discharge Instructions       Resume pre procedure diet            Discharge Instructions       Restart home medications.                  Your next 10 appointments already scheduled     Dec 12, 2018  7:30 AM CST   Return Visit with [facility]   [facility])    [address] [place] [id number] [phone]            Dec 12, 2018  8:00 AM CST   Level 3 with BAY 6 [facility])    [address] [place] [address]   [phone]            Dec 12, 2018  8:15 AM CST   [facility]    [facility]    [address] [facility] [address]   [phone]            Dec 26, 2018  9:30 AM CST   Return Visit with [facility]   [facility]    [id number] [facility] [address]   [phone]            Dec 26, 2018 10:15 AM CST   Return Visit with Yakov Shaffer MD   [facility])    [address] [place] [address]   [phone]            Dec 26, 2018 11:00 AM CST   Level 3 with BAY 1 INFUSION   [facility])    [address] [place] [place] [address]   [phone]            Dec 27, 2018  9:15 AM CST   New Visit with Cecily Leung MD   [facility])    [address] [place] [address]   [phone]              Further instructions from your care team       [facility]  Same-Day Surgery   Adult Discharge Orders & Instructions     For 24 hours after surgery    1. Get plenty of rest.  A responsible adult must stay with you for at least 24 hours after you leave the hospital.   2. Do not drive or use heavy equipment.  If you have weakness or tingling, don't drive or use heavy equipment until this feeling goes away.  3. Do not drink alcohol.  4. Avoid strenuous or risky activities.  Ask for help when climbing stairs.   5. You may feel lightheaded.  IF so, sit for a few minutes before standing.  Have someone help you get up.   6. If you have nausea (feel sick to your stomach): Drink only clear liquids such as apple juice, ginger ale, broth or 7-Up.  Rest may also help.  Be sure to drink enough fluids.  Move to a regular diet as you feel able.  7. You may have a slight fever. Call the doctor if your fever is over 100 F (37.7 C) (taken under the tongue) or  "lasts longer than 24 hours.  8. You may have a dry mouth, a sore throat, muscle aches or trouble sleeping.  These should go away after 24 hours.  9. Do not make important or legal decisions.   Call your doctor for any of the followin.  Signs of infection (fever, growing tenderness at the surgery site, a large amount of drainage or bleeding, severe pain, foul-smelling drainage, redness, swelling).    2. It has been over 8 to 10 hours since surgery and you are still not able to urinate (pass water).    3.  Headache for over 24 hours.    To contact a doctor, call Dr Lopez at [phone] (clinic)    or:        [phone] and ask for the resident on call for GI (answered 24 hours a day)      Emergency Department:    [facility]: [phone]       (TTY for hearing impaired: [phone])        Pending Results     No orders found from 2018 to 2018.            Admission Information     Date & Time Provider Department Dept. Phone    2018 Miguel Lopez MD Same Day Surgery [facility] [phone]      Your Vitals Were     Blood Pressure Temperature Respirations Height Weight Pulse Oximetry    156/110 97.5  F (36.4  C) (Oral) 16 1.778 m (5' 10\") 88.5 kg (195 lb 1.7 oz) 97%    BMI (Body Mass Index)                   27.99 kg/m2           MyChart Information     [facility] gives you secure access to your electronic health record. If you see a primary care provider, you can also send messages to your care team and make appointments. If you have questions, please call your primary care clinic.  If you do not have a primary care provider, please call [phone] and they will assist you.        Care EveryWhere ID     This is your Care EveryWhere ID. This could be used by other organizations to access your [facility] medical records  [id number]        Equal Access to Services     CHANDAN GALLO AH: Jemal Csaillas, matt rincon, claire whitmore " charley zapienrezalia lopes'aan ah. So [facility] [phone].    ATENCIÓN: Si shirinla marly, tiene a cuevas disposición servicios gratuitos de asistencia lingüística. Ciara al [phone].    We comply with applicable federal civil rights laws and [place] laws. We do not discriminate on the basis of race, color, national origin, age, disability, sex, sexual orientation, or gender identity.               Review of your medicines      START taking        Dose / Directions    omeprazole 40 MG DR capsule   Commonly known as:  priLOSEC   Used for:  Malignant neoplasm of overlapping sites of stomach (H), Gastroesophageal reflux disease without esophagitis        Dose:  40 mg   Take 1 capsule (40 mg) by mouth daily Take 30-60 minutes before a meal.   Quantity:  90 capsule   Refills:  3         CONTINUE these medicines which have NOT CHANGED        Dose / Directions    ALEVE PO   Used for:  Acute gout, unspecified cause, unspecified site        Refills:  0       allopurinol 100 MG tablet   Commonly known as:  ZYLOPRIM   Used for:  Acute gout, unspecified cause, unspecified site, Malignant neoplasm of overlapping sites of stomach (H)        Dose:  100 mg   Take 1 tablet (100 mg) by mouth daily   Quantity:  30 tablet   Refills:  3       aspirin 81 MG chewable tablet   Commonly known as:  ASA        Dose:  81 mg   Take 81 mg by mouth daily   Refills:  0       docusate 50 MG/5ML liquid   Commonly known as:  COLACE   Used for:  Constipation, unspecified constipation type        Dose:  100 mg   Take 10 mLs (100 mg) by mouth daily   Quantity:  473 mL   Refills:  1       Fiber 5 GM/15ML Liqd        Refills:  0       gabapentin 300 MG capsule   Commonly known as:  NEURONTIN        Take 1 capsule (300 mg total) by mouth once daily. For neuropathy   Refills:  1       indomethacin 25 MG capsule   Commonly known as:  INDOCIN   Used for:  Acute gout, unspecified cause, unspecified site        Dose:  25 mg   Take 1 capsule (25 mg) by mouth 3 times daily  (with meals)   Quantity:  42 capsule   Refills:  1       IRON SUPPLEMENT PO        Take by mouth daily (with breakfast)   Refills:  0       lidocaine-prilocaine 2.5-2.5 % external cream   Commonly known as:  EMLA        Refills:  2       LORazepam 0.5 MG tablet   Commonly known as:  ATIVAN   Used for:  Malignant neoplasm of overlapping sites of stomach (H)        Dose:  0.5 mg   Take 1 tablet (0.5 mg) by mouth every 4 hours as needed (Anxiety, Nausea/Vomiting or Sleep)   Quantity:  30 tablet   Refills:  2       MULTIVITAMIN ADULT PO        Refills:  0       naloxone 4 MG/0.1ML nasal spray   Commonly known as:  NARCAN   Used for:  Malignant neoplasm of overlapping sites of stomach (H), Abdominal pain, generalized, Right groin pain, Right testicular pain        Dose:  4 mg   Spray 1 spray (4 mg) into one nostril alternating nostrils as needed for opioid reversal every 2-3 minutes until assistance arrives   Quantity:  0.2 mL   Refills:  0       NEW MED        CBD oil 1 Drop PRN   Refills:  0       oxyCODONE 5 MG/5ML solution   Commonly known as:  ROXICODONE   Used for:  Malignant neoplasm of overlapping sites of stomach (H), Abdominal pain, generalized, Right groin pain, Right testicular pain        Dose:  5 mg   Take 5 mLs (5 mg) by mouth every 6 hours as needed for severe pain   Quantity:  300 mL   Refills:  0       prochlorperazine 10 MG tablet   Commonly known as:  COMPAZINE   Used for:  Malignant neoplasm of overlapping sites of stomach (H)        Dose:  10 mg   Take 1 tablet (10 mg) by mouth every 6 hours as needed (Nausea/Vomiting)   Quantity:  30 tablet   Refills:  2       TYLENOL PO        Take by mouth as needed for mild pain or fever   Refills:  0            Where to get your medicines      These medications were sent to [facility] - [place], [place] - [address] [facility]  [address], [facility] [address]     Phone:  [phone]     omeprazole 40 MG DR capsule                 Protect others around you: Learn how to safely use, store and throw away your medicines at www.disposemymeds.org.             Medication List: This is a list of all your medications and when to take them. Check marks below indicate your daily home schedule. Keep this list as a reference.      Medications           Morning Afternoon Evening Bedtime As Needed    ALEVE PO                                allopurinol 100 MG tablet   Commonly known as:  ZYLOPRIM   Take 1 tablet (100 mg) by mouth daily                                aspirin 81 MG chewable tablet   Commonly known as:  ASA   Take 81 mg by mouth daily                                docusate 50 MG/5ML liquid   Commonly known as:  COLACE   Take 10 mLs (100 mg) by mouth daily                                Fiber 5 GM/15ML Liqd                                gabapentin 300 MG capsule   Commonly known as:  NEURONTIN   Take 1 capsule (300 mg total) by mouth once daily. For neuropathy                                indomethacin 25 MG capsule   Commonly known as:  INDOCIN   Take 1 capsule (25 mg) by mouth 3 times daily (with meals)                                IRON SUPPLEMENT PO   Take by mouth daily (with breakfast)                                lidocaine-prilocaine 2.5-2.5 % external cream   Commonly known as:  EMLA                                LORazepam 0.5 MG tablet   Commonly known as:  ATIVAN   Take 1 tablet (0.5 mg) by mouth every 4 hours as needed (Anxiety, Nausea/Vomiting or Sleep)                                MULTIVITAMIN ADULT PO                                naloxone 4 MG/0.1ML nasal spray   Commonly known as:  NARCAN   Spray 1 spray (4 mg) into one nostril alternating nostrils as needed for opioid reversal every 2-3 minutes until assistance arrives                                NEW MED   CBD oil 1 Drop PRN                                omeprazole 40 MG DR capsule   Commonly known as:  priLOSEC   Take 1 capsule (40 mg) by mouth daily Take 30-60  minutes before a meal.                                oxyCODONE 5 MG/5ML solution   Commonly known as:  ROXICODONE   Take 5 mLs (5 mg) by mouth every 6 hours as needed for severe pain   Last time this was given:  5 mg on 12/6/2018  9:33 AM                                prochlorperazine 10 MG tablet   Commonly known as:  COMPAZINE   Take 1 tablet (10 mg) by mouth every 6 hours as needed (Nausea/Vomiting)                                TYLENOL PO   Take by mouth as needed for mild pain or fever

## 2018-12-06 NOTE — ADDENDUM NOTE
Addendum  created 12/06/18 1053 by Josse Echols MD    Anesthesia Event edited, Procedure Event Log accessed

## 2018-12-06 NOTE — IP AVS SNAPSHOT
Same Day Surgery [address] [address]    Phone:  [phone]                                       After Visit Summary   12/6/2018    Terry Yi    MRN: [id number]           After Visit Summary Signature Page     I have received my discharge instructions, and my questions have been answered. I have discussed any challenges I see with this plan with the nurse or doctor.    ..........................................................................................................................................  Patient/Patient Representative Signature      ..........................................................................................................................................  Patient Representative Print Name and Relationship to Patient    ..................................................               ................................................  Date                                   Time    ..........................................................................................................................................  Reviewed by Signature/Title    ...................................................              ..............................................  Date                                               Time          22EPIC Rev 08/18

## 2018-12-06 NOTE — ANESTHESIA CARE TRANSFER NOTE
Patient: Terry Yi    Procedure(s):  Upper Gastrointestinal Endoscopy with Esophageal Stent Placement    Diagnosis: Observation For Suspected Cancer   Diagnosis Additional Information: No value filed.    Anesthesia Type:   No value filed.     Note:  Airway :Face Mask  Patient transferred to:PACU  Comments: Pt alert, breathing spontaneously on 6L O2 via FM. VSS. Report shared with RN.Handoff Report: Identifed the Patient, Identified the Reponsible Provider, Reviewed the pertinent medical history, Discussed the surgical course, Reviewed Intra-OP anesthesia mangement and issues during anesthesia, Set expectations for post-procedure period and Allowed opportunity for questions and acknowledgement of understanding      Vitals: (Last set prior to Anesthesia Care Transfer)    CRNA VITALS  12/6/2018 0746 - 12/6/2018 0826      12/6/2018             NIBP: (!)  187/125    NIBP Mean: 148    Ht Rate: 72    SpO2: 99 %                Electronically Signed By: MARLIN Lyons CRNA  December 6, 2018  8:26 AM

## 2018-12-06 NOTE — ANESTHESIA POSTPROCEDURE EVALUATION
Anesthesia POST Procedure Evaluation    Patient: Terry Yi   MRN:     [id number] Gender:   male   Age:    52 year old :      1965        Preoperative Diagnosis: Observation For Suspected Cancer    Procedure(s):  Upper Gastrointestinal Endoscopy with Esophageal Stent Placement   Postop Comments: No value filed.       Anesthesia Type:  General    Reportable Event: NO     PAIN: Uncomplicated   Sign Out status: Comfortable, Well controlled pain     PONV: No PONV   Sign Out status:  No Nausea or Vomiting     Neuro/Psych: Uneventful perioperative course   Sign Out Status: Preoperative baseline; Age appropriate mentation     Airway/Resp.: Uneventful perioperative course   Sign Out Status: Non labored breathing, age appropriate RR; Resp. Status within EXPECTED Parameters     CV: Uneventful perioperative course   Sign Out status: Appropriate BP and perfusion indices; Appropriate HR/Rhythm     Disposition:   Sign Out in:  PACU  Disposition:  Phase II; Home  Recovery Course: Uneventful  Follow-Up: Not required           Last Anesthesia Record Vitals:  CRNA VITALS  2018 0746 - 2018 0846      2018             NIBP: (!)  187/125    NIBP Mean: 148    Ht Rate: 72    SpO2: 99 %          Last PACU/Preop Vitals:  Vitals:    18 1000 18 1015 18 1030   BP:  (!) 180/120 (!) 161/106   Resp: 14 14 14   Temp:      SpO2:            Electronically Signed By: Josse Echols MD, 2018, 10:54 AM

## 2018-12-06 NOTE — DISCHARGE INSTRUCTIONS
[facility]  Same-Day Surgery   Adult Discharge Orders & Instructions     For 24 hours after surgery    1. Get plenty of rest.  A responsible adult must stay with you for at least 24 hours after you leave the hospital.   2. Do not drive or use heavy equipment.  If you have weakness or tingling, don't drive or use heavy equipment until this feeling goes away.  3. Do not drink alcohol.  4. Avoid strenuous or risky activities.  Ask for help when climbing stairs.   5. You may feel lightheaded.  IF so, sit for a few minutes before standing.  Have someone help you get up.   6. If you have nausea (feel sick to your stomach): Drink only clear liquids such as apple juice, ginger ale, broth or 7-Up.  Rest may also help.  Be sure to drink enough fluids.  Move to a regular diet as you feel able.  7. You may have a slight fever. Call the doctor if your fever is over 100 F (37.7 C) (taken under the tongue) or lasts longer than 24 hours.  8. You may have a dry mouth, a sore throat, muscle aches or trouble sleeping.  These should go away after 24 hours.  9. Do not make important or legal decisions.   Call your doctor for any of the followin.  Signs of infection (fever, growing tenderness at the surgery site, a large amount of drainage or bleeding, severe pain, foul-smelling drainage, redness, swelling).    2. It has been over 8 to 10 hours since surgery and you are still not able to urinate (pass water).    3.  Headache for over 24 hours.    To contact a doctor, call Dr Lopez at [phone] (clinic)    or:        [phone] and ask for the resident on call for GI (answered 24 hours a day)      Emergency Department:    [facility]: [phone]       (TTY for hearing impaired: [phone])

## 2018-12-07 NOTE — TELEPHONE ENCOUNTER
Pt w/ hx  Upper Gastrointestinal Endoscopy with Esophageal Stent Placement 12/6/18 w/ Dr Lopez. Wife Harika calls red flag triage reporting pt has vomiting yellow green liquid, hiccups, burping, starting  8 pm last evening. Vomiting 5x. Has been drinking water,1 spoon cream of wheat.  Soreness to throat,stomach noted. Pain not rated. No fever, no bleeding. Call/report to Lori GI RN. She will call wife, has spoken to her in past.   Amanda German triage protocol  Post op problems pg 458, vomiting pg 638.

## 2018-12-07 NOTE — TELEPHONE ENCOUNTER
Called patient per last conversation this morning to see how patient is doing. She did not answer but left a Vm advising to give me a call back as soon as she can because I will be leaving the clinic today at 3:30pm and I want to make sure he is feeling ok. Advised that if she thinks he is dehydrated to go to the ED.   Direct line given.     MADYSON Haro Dr., Dr. Lopez, & Dr. Hancock  [facility]  [phone]

## 2018-12-07 NOTE — TELEPHONE ENCOUNTER
Patient returned my call and reports that he is still vomiting when he drinks water. He is taking oxycodone which makes him sleepy so he has just been resting in the chair.     Advised that since we work in a clinic setting, we are not here on the weekends and to go to ED if he has any of the previous symptoms that VILMA Hook went over this morning. They can also call the [facility] and ask for a fellow. Gave hospital number.     MADYSON Haro Dr., Dr. Lopez, & Dr. Hancock  [facility]  [phone]

## 2018-12-07 NOTE — PROGRESS NOTES
Called Harika since last night he vomited after eating pear baby food with his pills. He went to bed and then he was drinking water and then burped he vomited again. She also had made him eat a bite of cream of wheat and eggs this morning with more vomiting this morning.  Denies fever. Has abdominal pain but denies heartburn. She states he is taking enough to stay hydrated and will monitor his hydration status.     Advised and reviewed Dr. Lopez's recommendations for post procedure: - Twice daily high dose PPI and aspiration precautions throughout the time of in situ stent (as discussed)   - Liquids (including fulls) for the next 48h to allow further expansion with progression to well chewed small meal low roughage diet   - Chest pain (stent expansion and heart burn) is anticipated and should minimize over the coming days; shortness of breath or increasing pain are not expected   - The findings and recommendations were discussed with the patient and their family     He and his wife are home in [place]. Wife acknowledged the instructions to start over with clear liquids for the next 48 hours along with aspiration precautions. Reviewed that he can cut up his pills into smaller pieces - specifically for nausea if needed. He has been taking them in baby food and advised for the next couple of days to try to take pill with water or full liquids.   Reviewed what clear and full liquid diet included. Reviewed aspiration precautions. She will monitor her husbands abdominal pain and will call this office with any further questions.     Will have LPN call this afternoon to make sure he symptoms are going in the right direction. Message routed to Dr. Lopez and Dr. Robison as a previous message was routed to him yesterday about vomiting post procedure.      Miladys SR RN Care Coordinator  Dr. Graf, Dr. Lopez & Dr. Hancock   [facility]  [phone]

## 2018-12-07 NOTE — PROGRESS NOTES
This is a recent snapshot of the patient's [facility] medical record.  For current drug dose and complete information and questions, call [phone] or In Basket pool, [facility] home infusion ([id number])  CSN Number:  [id number]

## 2018-12-07 NOTE — TELEPHONE ENCOUNTER
Wife called in and reports that last night at 2000 he took oxycodone and shortly after that vomited 4 times in row, yellow/green like bile. Vomited again at 2230 and 0130.  Denies fever, no blood in vomit. Has been mixing oxycodone in baby pear food. Pain 6/10 across abdomen. Had a bite of eggs this morning.     Advised I would get this message to our RNCC.   Confirmed clinic number.     MADYSON Haro Dr., Dr. Lopez, & Dr. Hancock  [facility]  [phone]

## 2018-12-09 NOTE — TELEPHONE ENCOUNTER
"Daughter calls with patient due to concern of nausea and vomiting, hiccupping and diarrhea.  Daughter says patient has not eaten all week but patient feels bloat.  Patient had watery green BM this morning.  Patient has been drinking water but urine looks concentrated.  Patient says he's been vomiting bile/stomach acid 6 times since midnight.  Reviewed guideline and care advice with caller.  Caller verbalizes understanding.              Reason for Disposition    SEVERE vomiting (e.g., 6 or more times / day)    Additional Information    Negative: Shock suspected (e.g., cold/pale/clammy skin, too weak to stand, low BP, rapid pulse)    Negative: Difficult to awaken or acting confused  (e.g., disoriented, slurred speech)    Negative: Sounds like a life-threatening emergency to the triager    Negative: Chest pain    Negative: Vomiting occurs only while coughing    Negative: Constipation is main symptom    Negative: Diarrhea is main symptom    Negative: [1] Vomiting AND [2] contains red blood or black (\"coffee ground\") material  (Exception: few red streaks in vomit that only happened once)    Negative: [1] Vomiting AND [2] recent head injury (within last 3 days)    Negative: [1] Vomiting AND [2] recent abdominal injury (within last 3 days)    Negative: [1] Vomiting AND [2] insulin-dependent diabetes (Type I) AND [3] glucose > 400 mg/dl (22 mmol/l)    Negative: [1] Neutropenia known or suspected (e.g., recent cancer chemotherapy) AND [2] fever > 100.4 F (38.0 C)    Negative: [1] Neutropenia known or suspected (e.g., recent cancer chemotherapy) AND [2] vomiting    Protocols used: CANCER - NAUSEA AND VOMITING-ADULT-      "

## 2018-12-10 NOTE — TELEPHONE ENCOUNTER
Received call from patient's wife,Harika. States patient has been having trouble with constant hiccups. He occasionally will go  30 minutes without hiccups but otherwise they are constant. They are painful and causing him to vomit green bile. He is unable to sleep and is only getting about 1.5hrs /night. Eating very little but drinking well. States she missed a call from Dr Lopez's RN care coordinator,Miladys and did call her back but had to leave a message. Instructed Harika to wait for Miladys to call back and writer will forward telephone note to Dr Lopez and Miladys.  Amanda Licea  RN, BSN, OCN

## 2018-12-10 NOTE — PROGRESS NOTES
Message left for Terry and his wife on home number and cell number checking in to see an update.     Asked them to call this RN with any questions/concerns and advised if his symptoms had not improved over the weekend- hope they went their local ED.     Clinic number left for them to call.     Miladys SR, RN Care Coordinator  Dr. Graf, Dr. Lopez & Dr. Hancock   [facility]  [phone]

## 2018-12-11 NOTE — PROGRESS NOTES
Called to get status to see if they went to ED as recommended. Also wanted to make sure Dr. Lopez's medication was sent and received.     Clinic number given with the instrcutions to have them send this RN an Epic message when she calls back rather then voicemail.     Miladys SR, RN Care Coordinator  Dr. Graf, Dr. Lopez & Dr. Hancock   [facility]  [phone]

## 2018-12-11 NOTE — TELEPHONE ENCOUNTER
[facility] Call Center    Phone Message    May a detailed message be left on voicemail: yes    Reason for Call: Other: Patient's wife Harika returning call to Dr. Lopez's nurse. Harika stated that patient was seen through the ER at [facility] in [place] last night and the ER did not think patient had internal bleeding. Harika stated that patient threw up this morning and it was back to the normal, stomach bile, yellow color. Harika also stated patient did receive his prescription from Dr. Lopez. If you have other questions, please call Harika at work until 5:30pm.     Action Taken: Message routed to:  [facility] Panc-Bili

## 2018-12-11 NOTE — TELEPHONE ENCOUNTER
Received call from patient's wife. Patient has been having increased acid reflux, hiccups, and vomiting since esophageal stent placement last week.     She is particularly worried about the vomiting. It has previously been bilious, but has turned coffee ground this evening. She reports her  made himself vomit due to nausea about one hour ago and that was the last time he noticed it.     He is not dizzy, fatigued, and has not passed out.     I advised her that patient should not force himself to vomit as this may cause stent dislodgement/migration. I advised her increased acid reflux may be normal after stent placement, however, coffee ground emesis is not and since I was only able to discuss his case over the phone and not in person the only way to have full evaluation by a physician would be to present to the ED and this is what I recommended. She expressed understanding.     Rosa Rivera MD  GI Fellow  [id number]

## 2018-12-12 PROBLEM — R11.2 NON-INTRACTABLE VOMITING WITH NAUSEA: Status: ACTIVE | Noted: 2018-01-01

## 2018-12-12 PROBLEM — T85.528A: Status: ACTIVE | Noted: 2018-01-01

## 2018-12-12 PROBLEM — E86.0 DEHYDRATION: Status: ACTIVE | Noted: 2018-01-01

## 2018-12-12 PROBLEM — G89.18 POSTOPERATIVE PAIN: Status: ACTIVE | Noted: 2018-01-01

## 2018-12-12 NOTE — PROGRESS NOTES
Port needle left accessed from ER visit yesterday. States he went in for abdominal pain and hiccups. States he was still having pain today. Recently had a stent placed. Provided pt with a wheelchair so he wouldn't have to walk long distances in the clinic. Cap scrubbed for 30 seconds prior to lab draw.  [facility] drawn-Red/Green/Purple tubes. Double signed by patient and RN. See documentation flowsheet. Patient unable to leave a urine sample. Arleth Brandon, RN, BSN, OCN

## 2018-12-12 NOTE — ED PROVIDER NOTES
"  History     Chief Complaint   Patient presents with     Abdominal Pain     The history is provided by the patient, the spouse and medical records.     Terry Yi is a 52 year old male with a history of gastric carcinoma, gastroparesis, diverticulosis, s/p esophageal placement (12/06/2018), and drug-induced polyneuropathy, who presents to the Emergency Department for evaluation of abdominal pain and chest tightness.  The patient rates his pain at 4/10. The patient reports that since his esophageal stent placement his stomach has been \"acting weird\". The patient states that he is able to take medication po, however, 5-10 minutes later he will have an episode of emesis.  The patient reports his emesis is of a yellow, brown, or black color, with his last episode of black emesis on 12/10/2018.  The patient also complains of bilateral feet swelling that began approximately 24-48 hours, lower back pain, which he attributes to his recent reduction in movement and activity, as well as, \"hiccups that just won't quit\". The patient reports he is taking baclofen 3 times per day. The patient did verify he is having low urine production.  The patient denies loose stools, diarrhea, constipation, or shortness of breath.    I have reviewed the Medications, Allergies, Past Medical and Surgical History, and Social History in the [employer] system.    Past Medical History:   Diagnosis Date     Alcohol abuse     in remission x 4 years (7/2013)     Diverticulosis      Gastric cancer (H)      Gastroparesis      HTN (hypertension)      Hyperplastic colon polyp      Marijuana abuse     Daily use     Vasovagal episode     with IV stick     Weight loss        Past Surgical History:   Procedure Laterality Date     AS ESOPHAGOSCOPY, DIAGNOSTIC  03/17/2017     COLONOSCOPY  03/2017     COLONOSCOPY  10/2014     COMBINED ESOPHAGOSCOPY, GASTROSCOPY, DUODENOSCOPY (EGD) WITH CO2 INSUFFLATION N/A 4/27/2018    Procedure: COMBINED ESOPHAGOSCOPY, " GASTROSCOPY, DUODENOSCOPY (EGD) WITH CO2 INSUFFLATION;  EGD, Dysphagia, unspecified type Malignant neoplasm of overlapping sites of stomach (H), Ref by Edmund, BMI 30.71;  Surgeon: Duane, William Charles, MD;  Location: [facility] OR     ESOPHAGOSCOPY, GASTROSCOPY, DUODENOSCOPY (EGD), COMBINED N/A 3/27/2017    Procedure: COMBINED ENDOSCOPIC ULTRASOUND, ESOPHAGOSCOPY, GASTROSCOPY, DUODENOSCOPY (EGD), FINE NEEDLE ASPIRATE/BIOPSY;  Surgeon: Guru Brien Hancock MD;  Location: [facility] OR     ESOPHAGOSCOPY, GASTROSCOPY, DUODENOSCOPY (EGD), COMBINED N/A 2018    Procedure: COMBINED ESOPHAGOSCOPY, GASTROSCOPY, DUODENOSCOPY (EGD), BIOPSY SINGLE OR MULTIPLE;;  Surgeon: Duane, William Charles, MD;  Location: [facility] OR     ESOPHAGOSCOPY, GASTROSCOPY, DUODENOSCOPY (EGD), COMBINED N/A 2018    Procedure: COMBINED ESOPHAGOSCOPY, GASTROSCOPY, DUODENOSCOPY (EGD);  Surgeon: James Mckeon MD;  Location:  GI     ESOPHAGOSCOPY, GASTROSCOPY, DUODENOSCOPY (EGD), COMBINED N/A 2018    Procedure: Upper Gastrointestinal Endoscopy with Esophageal Stent Placement;  Surgeon: Miguel Lopez MD;  Location:  OR       No family history on file.    Social History     Tobacco Use     Smoking status: Former Smoker     Types: Other, Cigarettes     Last attempt to quit:      Years since quittin.9     Smokeless tobacco: Former User     Tobacco comment: Light smoker in past   Substance Use Topics     Alcohol use: No     Comment: Sober since 2013       Current Facility-Administered Medications   Medication     HYDROmorphone (PF) (DILAUDID) injection 0.5 mg     lactated ringers BOLUS 1,000 mL     ondansetron (ZOFRAN) injection 8 mg     pantoprazole (PROTONIX) 40 mg IV push injection     Current Outpatient Medications   Medication     Baclofen 5 MG TABS     omeprazole (PRILOSEC) 40 MG DR capsule     Acetaminophen (TYLENOL PO)     allopurinol (ZYLOPRIM) 100 MG tablet     aspirin 81 MG chewable tablet     docusate  "(COLACE) 50 MG/5ML liquid     Ferrous Sulfate (IRON SUPPLEMENT PO)     Fiber 5 GM/15ML LIQD     gabapentin (NEURONTIN) 300 MG capsule     indomethacin (INDOCIN) 25 MG capsule     lidocaine-prilocaine (EMLA) cream     LORazepam (ATIVAN) 0.5 MG tablet     Multiple Vitamins-Minerals (MULTIVITAMIN ADULT PO)     naloxone (NARCAN) nasal spray     Naproxen Sodium (ALEVE PO)     NEW MED     oxyCODONE (ROXICODONE) 5 MG/5ML solution     prochlorperazine (COMPAZINE) 10 MG tablet        Allergies   Allergen Reactions     Latex      Rash (when picked up rubber tires)       Review of Systems  A ten point ROS was completed with pertinent positives and negatives noted in HPI; otherwise negative.     Physical Exam   BP: (!) 148/96  Pulse: 106  Temp: 97.7  F (36.5  C)  Resp: 14  Height: 156.2 cm (5' 1.5\")  Weight: 88.6 kg (195 lb 6.4 oz)  SpO2: 95 %    Physical Exam  Gen: Alert, oriented.  HEENT: Normocephalic. Conjunctivae without injection. No scleral icterus.  Tolerating secretions.  CV: Tachycardic . No clear murmur appreciated.   Peripheral vascular: No significant lower edema. Warm extremities.   Respiratory: Non-labored breathing on RA. No wheezing or stridor appreciated.   Abdomen/GI: Soft, currently distended.  There is tenderness palpation in the epigastric region.  No rebound.   : No CVA tenderness.   MSK: No gross bony deformity.   Heme/lymph: No ecchymoses noted.   Neuro: Alert, oriented. CN 2-12 grossly intact.   Psych: No delusions or agitation.     ED Course   11:39 AM  The patient was seen and examined by Dr. Sullivan in Room ED12.      Procedures    IR Paracentesis   Final Result   IMPRESSION: Ultrasound guided paracentesis. 3320 cc ascites aspirated.   Labs sent.      UMU TRAORE MD      CT Chest/Abdomen/Pelvis w Contrast   Final Result   IMPRESSION:    1. Following esophageal stent replacement on 12/12/2018, decreased but   persistent infolding of the mid posterior wall of the new stent.   Stable " circumferential distal esophageal wall thickening, particularly   posteriorly at the site of stent wall infolding.   2. Stable diffuse gastric wall thickening and mucosal hyperenhancement   in this patient with history of signet cell gastric cancer.   3. Stable moderate to large volume ascites. Associated omental   heterogeneity and retroperitoneal soft tissue thickening in keeping   with carcinomatosis.   4. Stable prominent upper abdominal, central mesenteric, and   retroperitoneal lymph nodes.   5. Unchanged indeterminate focal hypodensity in hepatic segment 8.   6. Stable circumferential anorectal wall thickening.   7. Stable moderate to large right and slightly increased small left   pleural effusions with adjacent compressive atelectasis.   8. Persistent nodular opacities in the left lower lobe, concerning for   infection. Consider aspiration.      I have personally reviewed the examination and initial interpretation   and I agree with the findings.      ELIJAH CRESPO MD      XR Surgery YOSELYN Fluoro L/T 5 Min w Stills   Final Result      CT Chest/Abdomen/Pelvis w Contrast   Final Result   Abnormal   IMPRESSION:    1. Esophageal stent folded in on itself, likely significantly   restricting passage of contents into stomach. Upstream esophageal wall   thickening and enhancement may be related to associated esophagitis.   2. Centrilobular nodular opacities in the left lower lobe, likely   infection and/or aspiration.   3. Unchanged gastric wall thickening, soft tissue at the   gastroesophageal junction and gastric mucosal hyperenhancement, likely   site of primary malignancy.   4. Grossly unchanged omental nodularity with increased moderate   ascites from peritoneal carcinomatosis.   5. Unchanged metastatic upper abdominal adenopathy.   6. Moderate right and trace left pleural effusions with overlying   atelectasis.   7. Unchanged 10 mm hypodensity in hepatic segment 7.   8. Unchanged rectal wall thickening and  hyperenhancement of the mucosa   since 11/9/2018.      [Urgent Result: Stent malfunction]      Finding was identified on 12/12/2018 12:56 PM.       Dr. Hills was contacted by Dr. Aguirre at 12/12/2018 1:17 PM and   verbalized understanding of the urgent finding.       I have personally reviewed the examination and initial interpretation   and I agree with the findings.      LIGIA SANDERS MD      POC US ECHO LIMITED    (Results Pending)   POC US Guide for Paracentesis    (Results Pending)        Labs Ordered and Resulted from Time of ED Arrival Up to the Time of Departure from the ED   INR - Abnormal; Notable for the following components:       Result Value    INR 1.37 (*)     All other components within normal limits   INR - Abnormal; Notable for the following components:    INR 1.42 (*)     All other components within normal limits            Assessments & Plan (with Medical Decision Making)   Terry Yi is a 52 year old M with medical history significant for gastric cancer who presents for abdominal pain after recent gastric stent placement. . Differential considered included stent malfunction, gastritis, esophageal perforation, pneumonia, ACS, among others. I reviewed nursing notes and patient's vitals on arrival. Examination was significant for uncomfortable appearing male with no peritoneal signs with there is central abdominal discomfort.  Lung sounds clear bilaterally.      IV access obtained and he was given Dilaudid, Protonix and Zofran for symptoms.  CT of the abdomen and pelvis were completed given his recent stent placement and concern for some stent-related complication.  CT did show involution of the stent itself leaving minimal lumen for any passage of liquids or solids.  I spoke with the GI fellow and the luminal service who recommended making the patient n.p.o. with plans for removal of the stent later today.  Additional labs added on the previous workup.  INR within normal limits.  LFTs and  lipase otherwise unremarkable as albumin of 2.4.  Troponin also within normal limits.  Patient's symptoms improved and he was taken to the GI service for management this afternoon.      Current Discharge Medication List          Final diagnoses:   Epigastric pain   Migration of esophageal stent, initial encounter   IParamjit, am serving as a trained medical scribe to document services personally performed by Eagle Hills MD, based on the provider's statements to me.      IEagle MD, was physically present and have reviewed and verified the accuracy of this note documented by Paramjit Masters.    12/12/2018   [facility] EMERGENCY DEPARTMENT     Eagle Hills MD  12/15/18 0129       Eagle Hills MD  12/15/18 0129

## 2018-12-12 NOTE — CONSULTS
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 Lopez on 12/6/18. Patient now presenting with chest pain and difficulty handling secretions. CT C/A/P showed likely invaginating and obstructing passage of contents to the stomach. Patient hemodynamically stable. We will take him to the OR to change the stent.      Recommendations  --Continue NPO  --EGD with stent replacement  --We will continue to follow     Gastroenterology follow up recommendations: TBD    Thank you for involving us in this patient's care. Please do not hesitate to contact the GI service with any questions or concerns.     Pt care plan discussed with Dr. Lopez, GI staff physician.    Chencho Velez  GI Fellow  -------------------------------------------------------------------------------------------------------------------          Chief Complaint:   We were asked by Dr. Masters of ED to evaluate this patient with Esophageal stent misplacement    History is obtained from the patient and the medical record.          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 Lopez on 12/6/18. Patient now presenting with chest pain and difficulty handling secretions. CT C/A/P showed likely invaginating and obstructing passage of contents to the stomach. Patient reports no fevers, chills. He does reports emesis but no hematemesis or melena.              Past Medical History:   Reviewed and edited as appropriate  Past Medical History:   Diagnosis Date     Alcohol abuse     in remission x 4 years (7/2013)     Diverticulosis      Gastric cancer (H)      Gastroparesis      HTN (hypertension)      Hyperplastic colon polyp      Marijuana abuse     Daily use     Vasovagal episode     with IV stick     Weight loss             Past Surgical History:   Reviewed and edited as appropriate   Past Surgical History:   Procedure Laterality Date     AS ESOPHAGOSCOPY, DIAGNOSTIC  03/17/2017     COLONOSCOPY  03/2017     COLONOSCOPY  10/2014     COMBINED ESOPHAGOSCOPY, GASTROSCOPY, DUODENOSCOPY (EGD) WITH CO2 INSUFFLATION N/A 4/27/2018    Procedure: COMBINED ESOPHAGOSCOPY, GASTROSCOPY, DUODENOSCOPY (EGD) WITH CO2 INSUFFLATION;  EGD, Dysphagia, unspecified type Malignant neoplasm of overlapping sites of stomach (H), Ref by Edmund, BMI 30.71;  Surgeon: Duane, William Charles, MD;  Location: [facility] OR     ESOPHAGOSCOPY, GASTROSCOPY, DUODENOSCOPY (EGD), COMBINED N/A 3/27/2017    Procedure: COMBINED ENDOSCOPIC ULTRASOUND, ESOPHAGOSCOPY, GASTROSCOPY, DUODENOSCOPY (EGD), FINE NEEDLE ASPIRATE/BIOPSY;  Surgeon: Guru Brien Hancock MD;  Location: [facility] OR     ESOPHAGOSCOPY, GASTROSCOPY, DUODENOSCOPY (EGD), COMBINED N/A 4/27/2018    Procedure: COMBINED ESOPHAGOSCOPY, GASTROSCOPY, DUODENOSCOPY (EGD), BIOPSY SINGLE OR MULTIPLE;;  Surgeon: Duane, William Charles, MD;  Location: [facility] OR     ESOPHAGOSCOPY, GASTROSCOPY, DUODENOSCOPY (EGD), COMBINED N/A 11/26/2018    Procedure: COMBINED ESOPHAGOSCOPY, GASTROSCOPY, DUODENOSCOPY (EGD);  Surgeon: James Mckeon MD;  Location: [facility]     ESOPHAGOSCOPY, GASTROSCOPY, DUODENOSCOPY (EGD), COMBINED N/A 12/6/2018    Procedure: Upper Gastrointestinal Endoscopy with Esophageal Stent Placement;  Surgeon: Miguel Lopez MD;  Location:  OR            Previous Endoscopy:   No results found for this or any previous visit.         Social  History:   Reviewed and edited as appropriate  Social History     Socioeconomic History     Marital status:      Spouse name: Not on file     Number of children: Not on file     Years of education: Not on file     Highest education level: Not on file   Social Needs     Financial resource strain: Not on file     Food insecurity - worry: Not on file     Food insecurity - inability: Not on file     Transportation needs - medical: Not on file     Transportation needs - non-medical: Not on file   Occupational History     Not on file   Tobacco Use     Smoking status: Former Smoker     Types: Other, Cigarettes     Last attempt to quit:      Years since quittin.9     Smokeless tobacco: Former User     Tobacco comment: Light smoker in past   Substance and Sexual Activity     Alcohol use: No     Comment: Sober since 2013     Drug use: Yes     Types: Marijuana     Comment: marijuana     Sexual activity: Not on file   Other Topics Concern     Parent/sibling w/ CABG, MI or angioplasty before 65F 55M? Not Asked   Social History Narrative     x 31 years.      [employer].    2 children - healthy    2 siblings - good health    Parents alive:  Mother with diabetes mellitus, obesity    Father with CAD, stents.            Family History:   Reviewed and edited as appropriate  No known history of gastrointestinal/liver disease or  gastrointestinal malignancies       Allergies:   Reviewed and edited as appropriate     Allergies   Allergen Reactions     Latex      Rash (when picked up rubber tires)            Medications:     Current Facility-Administered Medications   Medication     lidocaine (LMX4) cream     lidocaine 1 % 1 mL     May continue current IV fluids if patient has IV fluids infusing.     May take regular AM medications except those listed below     sodium chloride (PF) 0.9% PF flush 3 mL     sodium chloride (PF) 0.9% PF flush 3 mL             Review of Systems:   A complete review of  "systems was performed and is negative except as noted in the HPI           Physical Exam:   BP (!) 113/99   Pulse 106   Temp 97.7  F (36.5  C) (Oral)   Resp 14   Ht 1.562 m (5' 1.5\")   Wt 88.6 kg (195 lb 6.4 oz)   SpO2 95%   BMI 36.32 kg/m    Wt:   Wt Readings from Last 2 Encounters:   12/12/18 88.6 kg (195 lb 6.4 oz)   12/06/18 88.5 kg (195 lb 1.7 oz)      Constitutional: cooperative, pleasant, not dyspneic/diaphoretic, no acute distress  Eyes: Sclera anicteric/injected  Ears/nose/mouth/throat: Normal oropharynx without ulcers or exudate, mucus membranes moist, hearing intact  Neck: supple, thyroid normal size  CV: No edema  Respiratory: Unlabored breathing  Lymph: No axillary, submandibular, supraclavicular or inguinal lymphadenopathy  Abd:  Nondistended, +bs, no hepatosplenomegaly, nontender, no peritoneal signs  Skin: warm, perfused, no jaundice  Neuro: AAO x 3, No asterixis  Psych: Normal affect  MSK: No gross deformities         Data:   Labs and imaging below were independently reviewed and interpreted    BMP  Recent Labs   Lab 12/12/18  0745      POTASSIUM 3.4   CHLORIDE 98   BRITTNEY 8.8   CO2 26   BUN 14   CR 0.62*   *     CBC  Recent Labs   Lab 12/12/18  0745   WBC 7.4   RBC 5.06   HGB 14.9   HCT 44.4   MCV 88   MCH 29.4   MCHC 33.6   RDW 14.2        INR  Recent Labs   Lab 12/12/18  1350 12/12/18  1315   INR 1.42* 1.37*     LFTs  Recent Labs   Lab 12/12/18  1315 12/12/18  0745   ALKPHOS 113 114   AST 40 37   ALT 33 35   BILITOTAL 0.9 0.8   PROTTOTAL 6.0* 6.3*   ALBUMIN 2.4* 2.5*      PANC  Recent Labs   Lab 12/12/18  1315   LIPASE 83       Imaging:  CT C/A/P 12/12/18  IMPRESSION:   1. Esophageal stent folded in on itself, likely significantly  restricting passage of contents into stomach. Upstream esophageal wall  thickening and enhancement may be related to associated esophagitis.  2. Centrilobular nodular opacities in the left lower lobe, likely  infection and/or aspiration.  3. " Unchanged gastric wall thickening, soft tissue at the  gastroesophageal junction and gastric mucosal hyperenhancement, likely  site of primary malignancy.  4. Grossly unchanged omental nodularity with increased moderate  ascites from peritoneal carcinomatosis.  5. Unchanged metastatic upper abdominal adenopathy.  6. Moderate right and trace left pleural effusions with overlying  atelectasis.  7. Unchanged 10 mm hypodensity in hepatic segment 7.  8. Unchanged rectal wall thickening and hyperenhancement of the mucosa  since 11/9/2018.

## 2018-12-12 NOTE — PROGRESS NOTES
12/12/2018     REASON FOR VISIT:  Follow-up for unresectable metastatic gastric carcinoma, diffuse type, grade 3, HER-2 negative, MSI-Intact. PD-L1 negative (<1%)     HISTORY OF PRESENT ILLNESS:  Please see my previous note for details.  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      CT CAP after C#6 shows stable disease    C# 9 FOLFOX 8/2/2017 ( Oxaliplatin dose reduced to 70mg/m2 due to neuropathy )  C#10 8/22/2017 ( Oxaliplatin dose reduced to 60mg/m2 due to neuropathy )  Delayed by 1 week- patient preference  C#11 9/6/17 5FU/LV ( Stopped Oxaliplatin )  C#12 9/20/2017 5FU/LV    Repeat CT scan after C#12 shows stable to slightly improved disease.    Repeat CT CAP on 12/22/17 after C#18 was stable    Repeat CT CAP on 3/16/18 after C#24 shows stable disease.    C# 27 4/18/2018 5FU/LV    As he was complaining of off and on trouble swallowing with food getting stuck, we repeated EGD on 4/27/2018 which showed a nodular area with possible shallow ulceration in the stomach. Otherwise esophagus and stomach and duodenum were normal.  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  He was referred to gastroenterologist for manometry    The biopsies which were taken from the nodular shallow ulcer aren't consistent with poorly differentiated diffuse type adenocarcinoma with signet ring features  Esophogram showed extrinsic compression on the GE junction resulting in luminal narrowing and associated extended retention of barium tablet. This likely corresponds with soft tissue thickening about the diaphragmatic hiatus as demonstrated on prior CT examination    Repeat CT scan on 6/8/18 after C#30 is stable with stable circumferential soft  tissue thickening at the GE Junction and stable thickness of stomach wall and peritoneal thickening    C#31 6/13/2018 5FU/LV  C#32 6/27/18  C#33 7/11/2018   C#34 7/25/18  C#35 8/8/2018  C#36 8/22/18    CT scan on 8/31/18- stable, with minimal increased fascial thickening along the right anterior pararenal fossa.     C#37 9/5/18    He had one episode on 9/10/2018 when he probably did not chew the steak well and it got stuck and he had to go to the emergency room to push it down into his his stomach.  The EGD report mentions that there was no known narrowing at the distal end of the esophagus.      C#38 9/19/18    We delayed cycle #39 because he had 2 episodes of Chest tightness and he was getting cardiac workup done so he wanted to get that completed before resuming chemotherapy.  Apparently his EKG was normal.  He also had a stress test done a few days ago which was unremarkable.  We resumed chemotherapy after a 2 week delay    C#39 10/17/2018  C#40 10/31/2018    CT chest abdomen and pelvis done on 11/9/2018 showed progression of the disease with increasing gastric wall thickening and mucosal hyperenhancement with new ascites and worsening peritoneal carcinomatosis.  There is also a new right-sided pleural effusion.    2nd line Ramucirumab and paclitaxel every other week was started 11/15/18.    Repeat EGD showed a long stenosis involving the distal esophagus and proximal stomach. A 23 mm x 120 mm EndoMAXX fully covered stent was placed.  The findings were also suspicious for linitis plastica.       INTERVAL HISTORY:   He comes in today accompanied by his family members and he is not doing well.  He has been having vomiting if he eats anything although he thinks that the swallowing is much better.  He does not have any pain upon eating but he is having significant abdominal pain with hiccups.  He is not able to tolerate p.o. intake.  He is getting very weak and feels dehydrated.  He had a small bowel movement  yesterday and he is passing gas from below.  No bleeding.  No fevers.  Thinks that his neuropathy is about the same.  He is also feeling short of breath.  He has lost weight about 25 pounds over the last couple of months.    ECOG 3    ROS:  Rest of the comprehensive review of the system was essentially unremarkable.        MEDICATIONS:   Current Outpatient Medications   Medication     Acetaminophen (TYLENOL PO)     allopurinol (ZYLOPRIM) 100 MG tablet     aspirin 81 MG chewable tablet     Baclofen 5 MG TABS     docusate (COLACE) 50 MG/5ML liquid     Ferrous Sulfate (IRON SUPPLEMENT PO)     Fiber 5 GM/15ML LIQD     gabapentin (NEURONTIN) 300 MG capsule     indomethacin (INDOCIN) 25 MG capsule     lidocaine-prilocaine (EMLA) cream     LORazepam (ATIVAN) 0.5 MG tablet     Multiple Vitamins-Minerals (MULTIVITAMIN ADULT PO)     naloxone (NARCAN) nasal spray     Naproxen Sodium (ALEVE PO)     NEW MED     omeprazole (PRILOSEC) 40 MG DR capsule     oxyCODONE (ROXICODONE) 5 MG/5ML solution     prochlorperazine (COMPAZINE) 10 MG tablet     No current facility-administered medications for this visit.      Facility-Administered Medications Ordered in Other Visits   Medication     heparin 100 UNIT/ML injection 5 mL     sodium chloride (PF) 0.9% PF flush 10-20 mL            PHYSICAL EXAMINATION:   BP (!) 128/91   Pulse 105   Temp 98.4  F (36.9  C) (Oral)   Resp 20   SpO2 96%   CONSTITUTIONAL: He looks like in moderate distress and looks very weak today.  He also looks thin  EYES: PERRLA, no palor or icterus.   ENT/MOUTH: no mouth lesions. Ears normal  CVS: s1s2 no m r g .   RESPIRATORY: Mildly decreased breath sounds at the right lung base but otherwise clear to auscultation  GI: Although abdomen is soft but it is extremely tender and he does not let me palpate any deeply.  There is no guarding or rigidity.  It is difficult to appreciate rebound because of the limited exam which I was able to do.  NEURO: AAOX3 neuropathy of  the fingers and feet.  INTEGUMENT: no obvious rashes  LYMPHATIC: no palpable LAD  MUSCULOSKELETAL: Unremarkable. No bony tenderness.   EXTREMITIES: 1+ bilateral edema  PSYCH: Mentation, mood and affect are normal. Decision making capacity is intact      LABS:   Reviewed   Results for LEONOR RALPH (MRN [id number]) as of 12/12/2018 08:41   Ref. Range 12/12/2018 07:45   Sodium Latest Ref Range: 133 - 144 mmol/L 134   Potassium Latest Ref Range: 3.4 - 5.3 mmol/L 3.4   Chloride Latest Ref Range: 94 - 109 mmol/L 98   Carbon Dioxide Latest Ref Range: 20 - 32 mmol/L 26   Urea Nitrogen Latest Ref Range: 7 - 30 mg/dL 14   Creatinine Latest Ref Range: 0.66 - 1.25 mg/dL 0.62 (L)   GFR Estimate Latest Ref Range: >60 mL/min/1.7m2 >90   GFR Estimate If Black Latest Ref Range: >60 mL/min/1.7m2 >90   Calcium Latest Ref Range: 8.5 - 10.1 mg/dL 8.8   Anion Gap Latest Ref Range: 3 - 14 mmol/L 10   Albumin Latest Ref Range: 3.4 - 5.0 g/dL 2.5 (L)   Protein Total Latest Ref Range: 6.8 - 8.8 g/dL 6.3 (L)   Bilirubin Total Latest Ref Range: 0.2 - 1.3 mg/dL 0.8   Alkaline Phosphatase Latest Ref Range: 40 - 150 U/L 114   ALT Latest Ref Range: 0 - 70 U/L 35   AST Latest Ref Range: 0 - 45 U/L 37   Glucose Latest Ref Range: 70 - 99 mg/dL 135 (H)   WBC Latest Ref Range: 4.0 - 11.0 10e9/L 7.4   Hemoglobin Latest Ref Range: 13.3 - 17.7 g/dL 14.9   Hematocrit Latest Ref Range: 40.0 - 53.0 % 44.4   Platelet Count Latest Ref Range: 150 - 450 10e9/L 297   RBC Count Latest Ref Range: 4.4 - 5.9 10e12/L 5.06   MCV Latest Ref Range: 78 - 100 fl 88   MCH Latest Ref Range: 26.5 - 33.0 pg 29.4   MCHC Latest Ref Range: 31.5 - 36.5 g/dL 33.6   RDW Latest Ref Range: 10.0 - 15.0 % 14.2   Diff Method Unknown Manual Differential   % Neutrophils Latest Units: % 57.0   % Lymphocytes Latest Units: % 14.0   % Monocytes Latest Units: % 26.0   % Eosinophils Latest Units: % 1.0   % Basophils Latest Units: % 2.0   Absolute Neutrophil Latest Ref Range: 1.6 - 8.3 10e9/L  4.3   Absolute Lymphocytes Latest Ref Range: 0.8 - 5.3 10e9/L 1.0   Absolute Monocytes Latest Ref Range: 0.0 - 1.3 10e9/L 1.9 (H)   Absolute Eosinophils Latest Ref Range: 0.0 - 0.7 10e9/L 0.1   Absolute Basophils Latest Ref Range: 0.0 - 0.2 10e9/L 0.1   RBC Morphology Unknown Normal       IMAGING:  Reviewed     EXAMINATION: CT CHEST/ABDOMEN/PELVIS W CONTRAST, 11/9/2018 10:10 AM     TECHNIQUE:  Helical CT images from the thoracic inlet through the  symphysis pubis were obtained  with IV contrast.     COMPARISON: CT 8/31/2018, 6/8/2018, and 3/16/2018     HISTORY: follow up for gastric cancer; Malignant neoplasm of  overlapping sites of stomach (H)     DLP: 1128 mGy*cm     FINDINGS:     Chest: Right chest wall Port-A-Cath with the tip terminating in the  low SVC. Unchanged hypoattenuating nodule within the right thyroid  lobe. Numerous calcified mediastinal and hilar lymph nodes. The heart  size is within normal limits. Trace pericardial effusion. The thoracic  aorta main pulmonary artery diameters are within normal limits. No  central pulmonary embolism. Normal three-vessel branching pattern.  Atherosclerotic calcifications of the thoracic aorta and great  vessels.     New large right pleural effusion. Central tracheobronchial tree is  patent. No focal consolidation or pneumothorax. Multiple calcified  pulmonary nodules are unchanged in comparison to prior exam. A  suspicious pulmonary nodule.     Abdomen and pelvis: Increased gastric wall thickening and mucosal  hyperenhancement in comparison to exam from 6/8/2018. Stable soft  tissue thickening around the gastroesophageal junction. Sightly  decreased size of the prominent lymph nodes in the retroperitoneum and  lesser sac, for example there is a 10 mm lymph node in the marychuy  hepatis (series 3, image 155). Persistent hazy fat stranding extending  from the marychuy hepatis to the retroperitoneum to the level of the  distal aorta, probably worsened since prior exam. New  hazy nodular  opacities involving the omentum. Increased thickening and nodularity  of the peritoneum, for example soft tissue nodularity with enhancement  is seen of the pararenal fascia and the peritoneum along the superior  surface of the bladder.      Unchanged subcapsular lesion within the right hepatic lobe measuring  1.2 x 1.0 cm and likely represents a hemangioma. Stable subcentimeter  hypoattenuating lesion within the medial aspect of the left hepatic  lobe, too small to characterize. No new liver lesion. No intrahepatic  biliary dilatation. The gallbladder, pancreas, spleen, and adrenal  glands are unremarkable. Hypoattenuating foci in both kidneys are too  small to characterize. Eccentric bladder wall thickening along the  right lateral and posterior wall of the urinary bladder, significantly  worsened since March 2018 exam. Mildly enlarged prostate.     Rectosigmoid circumferential wall thickening with mucosal  hyperenhancement extends for approximately 5.9 cm (series 4, image  66), slightly increased mucosal enhancement from prior. Colonic  diverticulosis without evidence of diverticulitis. No dilated loops of  bowel. Moderate volume ascites in the perihepatic, right, and left  paracolic locations. The portal vein, splenic vein, SMV, celiac  access, and SMA are patent. No abdominal aorta aneurysmal dilatation.  Atherosclerotic calcifications of the distal abdominal aorta and iliac  vessels.     Bones and soft tissues: No inguinal or axillary lymphadenopathy.  Multilevel degenerative changes of the thoracic oh lumbar spine.  Degenerative changes of the hips bilaterally. No suspicious osseous  lesion.         IMPRESSION: In this patient with history of signet cell gastric  cancer, there is suggestion of slow progression:   1a. Increased gastric wall thickening and mucosal hyperenhancement  with new ascites, hazy nodular opacities involving the omentum, and  nodular thickening of the peritoneum  (particularly along the anterior  aspect of pararenal fascia, and along the superior surface of the  urinary bladder). These findings are highly concerning for worsening  peritoneal carcinomatosis.   1b. Interval mild increase in hazy fat stranding involving the marychuy  hepatis and retroperitoneum to the level of the distal aorta. This  could be within the spectrum of worsening peritoneal carcinomatosis.  Attention on follow-up.  2. New right pleural effusion. Innumerable calcified pulmonary  granulomas and calcified mediastinal and hilar lymph nodes, this is  likely sequela of prior granulomatous disease.  3. Slightly increased circumferential wall thickening of the  rectosigmoid colon with increased mucosal hyperenhancement. Findings  are suggestive of proctocolitis.    EXAMINATION: CT CHEST/ABDOMEN/PELVIS W CONTRAST  8/31/2018 8:54 AM       CLINICAL HISTORY: follow up for gastric ca; Acute gout, unspecified  cause, unspecified site; Malignant neoplasm of overlapping sites of  stomach (H). As per chart review: mPlease see my previous note for  details. ?Terry Yi is a 52-year-old male with linitis plastica and  metastatic periaortic lymph node in the aortocaval region, as well as  extensive infiltration of the lesser omentum and peripancreatic fat,  who started on chemotherapy with FOLFOX      COMPARISON: CT CAP on 06/08/2018, 03/16/2018     PROCEDURE COMMENTS: CT of the chest, abdomen, and pelvis was performed  with 134 ml isovue 370 intravenous and oral contrast. Axial MIP   images of the chest, and coronal and sagittal reformatted images of  the chest, abdomen, and pelvis obtained.     FINDINGS:    Support devices:   Right-sided Port-A-Cath with tip terminating at the atriocaval  junction.     Chest:  Small hypodense nodule arising from the right thyroid lobe, unchanged.     Heart is not enlarged. Trace pericardial fluid. No central pulmonary  embolism. Calcified mediastinal and hilar lymph nodes, stable.  No  axillary adenopathy.     Trachea and central airway are patent. No focal consolidation or  pleural effusion. Multiple calcified pulmonary nodules, unchanged.  Previously seen 6 mm nodule along the right minor fissure, likely to  represent a perifissural lymph node. No suspicious pulmonary nodules.     Abdomen/pelvis:  Mild diffuse gastric wall thickening and mucosal hyperenhancement is  not significantly changed compared to 06/08/2018 study.  Circumferential soft tissue thickening around the gastroesophageal  junction is also not significantly change. Persistent hazy/amorphous  retroperitoneal stranding extending from the level of the marychuy  hepatis to the level of the aortic bifurcation, unchanged. Mesenteric  and para-aortic lymph nodes are again seen. No bowel obstruction.  Colonic diverticulosis without diverticulitis. Mild circumferential  thickening of rectosigmoid wall, new or increased from prior.     1 x 1 cm subcapsular lesion in right hepatic lobe, not significant  changed with features suggestive of a hemangioma. Subcentimeter  hypodense lesion in the medial segment of the left hepatic lobe  (series 3, image 154), too small to accurately characterize, but  stable from the prior. Pancreas appears atrophic with diffuse fatty  infiltration. Gallbladder, spleen, adrenal glands are unremarkable.  Prominent right pelvocaliectasis without hydronephrosis; there is mild  urothelial thickening and enhancement which is new. Small  hypodensities arising from kidneys are too small to characterize, but  they favored cyst, unchanged. Mild progression of fascial thickening  along the right anterior pararenal fossa when compared to prior study  (series 3 image 179). Stable thickening anterior to the left perirenal  fascia. Urinary bladder is unremarkable. Internal aspiration of  prostate gland.     Major vasculature is patent. Calcification of abdominal aorta and  common iliac arteries. No infrarenal aortic aneurysm.  Replaced right  hepatic artery arises from the SMA.     Soft tissue/bones: Unchanged degenerative cystic changes and  fragmentation about the right acetabulum. No acute bony lesion.         IMPRESSION: In this patient with linitis plastica   1a. Mild diffuse gastric wall and circumferential soft tissue  thickening at the level of gastroesophageal junction is not  significantly changed when compared to 06/08/2018.  1b. Persistent hazy and amorphous retrograde stranding extending from  the level of the marychuy hepatis to the level of the aortic bifurcation,  unchanged. Numerous unchanged mesenteric and periaortic lymph nodes.   1c. Mild progression of peritoneal/fascial thickening along the right  anterior pararenal fossa. Stable peritoneal thickening anterior to the  left perirenal fossa.  2. Sequela of granulomatous disease in the lung as evidenced by  multiple calcified mediastinal/hilar lymph nodes and pulmonary  nodules. Previously seen 6 mm nodule along the right minor fissure,  likely to represent a perifissural lymph node.  3. New or significantly increased circumferential wall thickening  involving the rectosigmoid colon suggestive of proctocolitis in the  appropriate clinical setting.      EXAMINATION: CT CHEST/ABDOMEN/PELVIS W CONTRAST, 6/8/2018 8:28 AM     TECHNIQUE:  Helical CT images from the thoracic inlet through the  symphysis pubis were obtained  with contrast. Contrast dose: 135 ml  isovue 370     COMPARISON: 3/16/2018, PET CT 3/24/2017     HISTORY: follow up for gastric ca; Malignant neoplasm of overlapping  sites of stomach (H). Status post chemotherapy.     FINDINGS:     Chest: Right IJ port tip within the low SVC. Heart size is normal.  Normal caliber of the aorta and pulmonary artery. No central pulmonary  embolus. Visualized thyroid is unremarkable. Multiple calcified  mediastinal and bilateral hilar lymph nodes, not significantly  changed. No new or enlarged lymph nodes in the chest or axilla.  The  upper and mid esophagus appear normal. Findings at the GE junction see  separate dissection. Central airway is patent. Scattered calcified  granulomas. Scattered small indeterminate noncalcified nodules are  unchanged from at least 3/24/2017, for example 6 mm nodule along the  right minor fissure (series 7 image 81).     Abdomen and pelvis: Mild diffuse gastric wall thickening is not  significantly changed. Circumferential soft tissue thickening around  the gastroesophageal junction (series 3 image 128), measuring up to  1.2 cm in thickness which is not significantly changed. Hazy fat  stranding and mildly prominent lymph nodes in the retroperitoneum and  lesser sac are not significantly changed. For example rounded 1.2 cm  short axis diameter marychuy hepatis node (series 2 image 155). Unchanged  peritoneal thickening along the left anterior pararenal fascia (series  3 image 195).     Unchanged cyst in hepatic segment 4. 1.4 cm subcapsular lesion in the  right hepatic lobe (series 3 image 149) is not significantly changed  and has enhancement characteristics consistent with the benign  cavernous hemangioma. The gallbladder, pancreas, spleen, and adrenals  are within normal limits. Few hypoattenuating foci in both kidneys are  too small to characterize. Bladder is unremarkable. Prostate is  enlarged. Few colonic diverticula without evidence of diverticulitis.  The appendix and small bowel are within normal limits. Major  vasculature is patent. Aorta is normal in caliber with mild-moderate  atherosclerosis. No free air or significant free fluid.     Bones and soft tissues: Unchanged mild compression deformity of the  T12 vertebral body. Unchanged degenerative cystic change and  fragmentation about the right acetabulum.         IMPRESSION:   1. Mild diffuse gastric wall thickening and circumferential soft  tissue thickening around the gastroesophageal junction are not  significantly changed.  2. Unchanged fat  stranding and mildly prominent lymph nodes in the  lesser sac and retroperitoneum.  3. Unchanged peritoneal thickening most prominent along the left  anterior pararenal fascia.   4. Sequelae of granulomatous disease in the lungs. A few small  noncalcified pulmonary nodules are unchanged from 3/24/2017. Attention  on follow-up examination.      Esophagram dated 5/16/2018     COMPARISON:    CT dated 3/16/2018     HISTORY:    Esophageal dysphagia in a patient with history of gastric  carcinoma.     FINDINGS: Examination of the esophagus reveals adequate primary and  secondary peristalsis. There is distal esophageal narrowing at the  gastroesophageal junction without associated mucosal abnormality, with  apparent extrinsic compression. This may correspond with soft tissue  thickening seen at the diaphragmatic hiatus on CT dated 3/16/2018. The  gastroesophageal junction is patent. No hiatal hernia was seen on  examination. There was absence of gastroesophageal reflux.     Fluoroscopy time was 1.6 minutes.     A 12mm barium tablet was administered and did not initially pass  through the gastroesophageal junction. 15 minutes after initial  administration and following ingestion of pudding and thick barium,  the barium tablet did pass through the gastroesophageal junction.         IMPRESSION: Extrinsic compression on the gastroesophageal junction  results in luminal narrowing and associated extended retention of  barium tablet. This likely corresponds with soft tissue thickening  about the diaphragmatic hiatus as demonstrated on prior CT  examination.            EGD 4/27/18  Findings:        One 12 mm nodular area with possible shallow uleration was found in the        gastric fundus. Biopsies were taken with a cold forceps for histology.        The esophagus was normal.        The stomach was normal except as noted above..        The examined duodenum was normal.       ORIGINAL REPORT:     SPECIMEN(S):   Stomach biopsy,  fundus     FINAL DIAGNOSIS:   STOMACH, FUNDUS, BIOPSY:   - Poorly differentiated carcinoma (diffuse type) with signet ring features   - Biopsy is limited to mucosa     ASSESSMENT AND PLAN:      Metastatic gastric cancer, HER-2/catrachita negative, MSI intact, PD-L1 neg (<1%), presenting with early satiety and significant weight loss and epigastric discomfort upon eating with some vomiting.  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  There is an indeterminate right liver lobe lesion which has remained stable and likely is not a metastasis.      We started palliative FOLFOX on 03/12/2017.     Oxaliplatin was dropped from cycle #11 onwards because of neuropathy     Scans after C#18 show stable disease    Repeat CT CAP after 24 cycles also show stable disease so we continued with 5FU/LV.     CT scan after cycle #30 - stable disease.    CT scan on 8/31/18 after C#36 also is stable.  I discussed with radiology and the subtle finding of fascial thickening around the pararenal fossa at this time is not very convincing progression of the disease  I discussed the findings with him and since he was doing well, we decided to continue the same chemotherapy without making any changes    We delayed cycle #39 because he had 2 episodes of chest tightness nausea and diaphoresis and shortness of breath and he was getting cardiac workup for that.  EKG was unremarkable.  He recently had a stress test done which was also unremarkable.  He again had some chest tightness and he went to a chiropractor who did some adjustments to his ribs and back and he felt much better after that and since then he has had no problems.   It is extremely unlikely that this is due to 5-FU because although it can cause vasospasm but he has received it so many times before without problems and the most recent episode happened just a few days ago and the last time he  received 5-FU was on 9/19/2018.    We resumed chemotherapy after a 2-week delay.  As he was complaining of new onset of nausea and abdominal discomfort and poor appetite and had lost some weight, I was concerned that these could be due to progression of the disease.  We decided on repeating a CT scan after cycle #40.    CT chest abdomen and pelvis done on 11/9/2018 showed progression of the disease with increasing gastric wall thickening and mucosal hyperenhancement with new ascites and worsening peritoneal carcinomatosis.  There is also a new right-sided pleural effusion.    We started second line Ramucirumab and paclitaxel on 11/15/2018.  We plan on giving him every other week due to previous neuropathy.  Cycle 1 day 15 was on 11/29/2018.  Because of his current situation, we are going to hold chemotherapy today.    Abdominal pain, vomiting, hiccups, failure to thrive and dehydration.  I believe he needs to be evaluated in the emergency room and he needs an urgent CT scan of his abdomen and pelvis.  He also has some shortness of breath so it would be reasonable to include a CT chest and this as well.  He did have right pleural effusion on the previous CT scan and today there is decreased breath sounds on the right lung base.    Will give IV zofran 8 mg.   We will give him IV Dilaudid and start him on IV fluids and I recommended transfer to the emergency room.  We wanted to transfer him through ambulance but the family wants him to drive him there.  I recommend going to the [facility] emergency room.  We will give them update regarding this.  In the meantime we will check lactic acid and blood cultures.  Most likely he will need admission to the hospital at least for symptom control.      The plan regarding further chemotherapy versus transition to hospice can be decided later once we get a hold on this acute problem and make him more comfortable.    We were unable to perform foundation one testing as  there was insufficient amount of tumor cells in the specimen.     Dysphagia.  He had esophageal stent placed and he thinks that the dysphagia is much better although because of the current symptoms he feels miserable.  I suspect that the current symptoms are due to cancer progression rather than due to the stent placement but at this time I am not 100% sure.    We did not address the following today  Right groin swelling and discomfort.  He resolved.        Neuropathy.  He is on gabapentin.  Overall neuropathy has improved.  We have to keep a close watch on it when he is taking paclitaxel.      Gout.  The acute episode has resolved and now he is on allopurinol     His genetic testing did not show any identifiable mutation not reveal any identifiable mutation     We will determine the follow-up accordingly    I answered all of his and his family's questions to their satisfaction. They're agreeable with the plan.    LANEY HERNANDEZ MD

## 2018-12-12 NOTE — PROGRESS NOTES
Patient arrived to clinic to receive chemotherapy but he was not doing well.  See notes from visit today.  Recommend patient go to ER at [facility]. [place].  Report called into ER charge nurse, Hai.  Patient will be transported by family to ER.

## 2018-12-12 NOTE — ED NOTES
Bed: IN01  Expected date:   Expected time:   Means of arrival:   Comments:  ED Referral Note:  Referred to ED by [facility] - RN Coordinator, Velvet    --Patient name, :  Terry Yi, 61      --MR#:  [id number]      --Referral/Medical C/o:  Patient receiving chemo for metastatic CA - Gastric; increasing nausea, pain, SOB, dehydration issues.  Patient needs CT of chest, abdomen; has had recent stent placement for dysphagia        --ETA:  After clinic visi  t (unknown)    Hai Jackson  2018

## 2018-12-12 NOTE — ANESTHESIA PREPROCEDURE EVALUATION
Anesthesia Pre-Procedure Evaluation    Patient: Terry Yi   MRN:     [id number] Gender:   male   Age:    52 year old :      1965        Preoperative Diagnosis: Esophageal Obstruction   Procedure(s):  Esophagogastroduodenoscopy     Past Medical History:   Diagnosis Date     Alcohol abuse     in remission x 4 years (2013)     Diverticulosis      Gastric cancer (H)      Gastroparesis      HTN (hypertension)      Hyperplastic colon polyp      Marijuana abuse     Daily use     Vasovagal episode     with IV stick     Weight loss       Past Surgical History:   Procedure Laterality Date     AS ESOPHAGOSCOPY, DIAGNOSTIC  2017     COLONOSCOPY  2017     COLONOSCOPY  10/2014     COMBINED ESOPHAGOSCOPY, GASTROSCOPY, DUODENOSCOPY (EGD) WITH CO2 INSUFFLATION N/A 2018    Procedure: COMBINED ESOPHAGOSCOPY, GASTROSCOPY, DUODENOSCOPY (EGD) WITH CO2 INSUFFLATION;  EGD, Dysphagia, unspecified type Malignant neoplasm of overlapping sites of stomach (H), Ref by Edmund, BMI 30.71;  Surgeon: Duane, William Charles, MD;  Location: [facility] OR     ESOPHAGOSCOPY, GASTROSCOPY, DUODENOSCOPY (EGD), COMBINED N/A 3/27/2017    Procedure: COMBINED ENDOSCOPIC ULTRASOUND, ESOPHAGOSCOPY, GASTROSCOPY, DUODENOSCOPY (EGD), FINE NEEDLE ASPIRATE/BIOPSY;  Surgeon: Guru Brien Hancock MD;  Location: [facility] OR     ESOPHAGOSCOPY, GASTROSCOPY, DUODENOSCOPY (EGD), COMBINED N/A 2018    Procedure: COMBINED ESOPHAGOSCOPY, GASTROSCOPY, DUODENOSCOPY (EGD), BIOPSY SINGLE OR MULTIPLE;;  Surgeon: Duane, William Charles, MD;  Location: [facility] OR     ESOPHAGOSCOPY, GASTROSCOPY, DUODENOSCOPY (EGD), COMBINED N/A 2018    Procedure: COMBINED ESOPHAGOSCOPY, GASTROSCOPY, DUODENOSCOPY (EGD);  Surgeon: James Mckeon MD;  Location: [facility]     ESOPHAGOSCOPY, GASTROSCOPY, DUODENOSCOPY (EGD), COMBINED N/A 2018    Procedure: Upper Gastrointestinal Endoscopy with Esophageal Stent Placement;  Surgeon: Miguel Lopez MD;  Location:  "[facility] OR               JZG FV AN PHYSICAL EXAM    Lab Results   Component Value Date    WBC 7.4 12/12/2018    HGB 14.9 12/12/2018    HCT 44.4 12/12/2018     12/12/2018     12/12/2018    POTASSIUM 3.4 12/12/2018    CHLORIDE 98 12/12/2018    CO2 26 12/12/2018    BUN 14 12/12/2018    CR 0.62 (L) 12/12/2018     (H) 12/12/2018    BRITTNEY 8.8 12/12/2018    ALBUMIN 2.4 (L) 12/12/2018    PROTTOTAL 6.0 (L) 12/12/2018    ALT 33 12/12/2018    AST 40 12/12/2018    ALKPHOS 113 12/12/2018    BILITOTAL 0.9 12/12/2018    LIPASE 83 12/12/2018    INR 1.42 (H) 12/12/2018       Preop Vitals  BP Readings from Last 3 Encounters:   12/12/18 (!) 113/99   12/12/18 (!) 128/91   12/12/18 (!) 128/91    Pulse Readings from Last 3 Encounters:   12/12/18 106   12/12/18 105   12/12/18 105      Resp Readings from Last 3 Encounters:   12/12/18 14   12/12/18 20   12/12/18 20    SpO2 Readings from Last 3 Encounters:   12/12/18 95%   12/12/18 96%   12/12/18 96%      Temp Readings from Last 1 Encounters:   12/12/18 36.5  C (97.7  F) (Oral)    Ht Readings from Last 1 Encounters:   12/12/18 1.562 m (5' 1.5\")      Wt Readings from Last 1 Encounters:   12/12/18 88.6 kg (195 lb 6.4 oz)    Estimated body mass index is 36.32 kg/m  as calculated from the following:    Height as of this encounter: 1.562 m (5' 1.5\").    Weight as of this encounter: 88.6 kg (195 lb 6.4 oz).     LDA:  Port A Cath Single 03/30/17 Right Chest wall (Active)   Site Assessment WDL 12/12/2018  7:48 AM   Line Status Blood return noted;Heparin locked;Cap change 12/12/2018  7:48 AM   Number of days: 622            Assessment:   ASA SCORE: 4 emergent     NPO Status: > 6 hours since completed Solid Foods   Documentation: H&P complete; Consents complete   Proceeding: Proceed without further delay     Plan:   Olamide. Type:  General   Pre-Induction: Midazolam IV; Acetaminophen PO   Induction:  IV (Standard)   Airway: Oral ETT   Access/Monitoring: PIV   Maintenance: Balanced "   Emergence: Procedure Site   Logistics: Same Day Surgery     Postop Pain/Sedation Strategy:  Standard-Options: Opioids PRN     PONV Management:  Adult Risk Factors:, Postop Opioids  Prevention: Ondansetron     CONSENT: Direct conversation   Plan and risks discussed with: Patient                   Anesthesiology Attending Note    HPI: Terry Yi is a 52 year old male who  has a past medical history of Alcohol abuse, Diverticulosis, Gastric cancer (H), Gastroparesis, HTN (hypertension), Hyperplastic colon polyp, Marijuana abuse, Vasovagal episode, and Weight loss.  has a past surgical history that includes colonoscopy (03/2017); ESOPHAGOSCOPY, DIAGNOSTIC (03/17/2017); colonoscopy (10/2014); Esophagoscopy, gastroscopy, duodenoscopy (EGD), combined (N/A, 3/27/2017); Combined Esophagoscopy, Gastroscopy, Duodenoscopy (Egd) With Co2 Insufflation (N/A, 4/27/2018); Esophagoscopy, gastroscopy, duodenoscopy (EGD), combined (N/A, 4/27/2018); Esophagoscopy, gastroscopy, duodenoscopy (EGD), combined (N/A, 11/26/2018); and Esophagoscopy, gastroscopy, duodenoscopy (EGD), combined (N/A, 12/6/2018). with a No admission diagnoses are documented for this encounter. scheduled for Procedure(s):  Esophagogastroduodenoscopy.      PMHx/PSHx/ROS:  Past Medical History:   Diagnosis Date     Alcohol abuse     in remission x 4 years (7/2013)     Diverticulosis      Gastric cancer (H)      Gastroparesis      HTN (hypertension)      Hyperplastic colon polyp      Marijuana abuse     Daily use     Vasovagal episode     with IV stick     Weight loss        Past Surgical History:   Procedure Laterality Date     AS ESOPHAGOSCOPY, DIAGNOSTIC  03/17/2017     COLONOSCOPY  03/2017     COLONOSCOPY  10/2014     COMBINED ESOPHAGOSCOPY, GASTROSCOPY, DUODENOSCOPY (EGD) WITH CO2 INSUFFLATION N/A 4/27/2018    Procedure: COMBINED ESOPHAGOSCOPY, GASTROSCOPY, DUODENOSCOPY (EGD) WITH CO2 INSUFFLATION;  EGD, Dysphagia, unspecified type Malignant neoplasm of  overlapping sites of stomach (H), Ref by Leigh, BMI 30.71;  Surgeon: Duane, William Charles, MD;  Location: [facility]     ESOPHAGOSCOPY, GASTROSCOPY, DUODENOSCOPY (EGD), COMBINED N/A 3/27/2017    Procedure: COMBINED ENDOSCOPIC ULTRASOUND, ESOPHAGOSCOPY, GASTROSCOPY, DUODENOSCOPY (EGD), FINE NEEDLE ASPIRATE/BIOPSY;  Surgeon: Guru Brien Hancock MD;  Location: [facility]     ESOPHAGOSCOPY, GASTROSCOPY, DUODENOSCOPY (EGD), COMBINED N/A 2018    Procedure: COMBINED ESOPHAGOSCOPY, GASTROSCOPY, DUODENOSCOPY (EGD), BIOPSY SINGLE OR MULTIPLE;;  Surgeon: Duane, William Charles, MD;  Location: [facility]     ESOPHAGOSCOPY, GASTROSCOPY, DUODENOSCOPY (EGD), COMBINED N/A 2018    Procedure: COMBINED ESOPHAGOSCOPY, GASTROSCOPY, DUODENOSCOPY (EGD);  Surgeon: James Mckeon MD;  Location: [facility]     ESOPHAGOSCOPY, GASTROSCOPY, DUODENOSCOPY (EGD), COMBINED N/A 2018    Procedure: Upper Gastrointestinal Endoscopy with Esophageal Stent Placement;  Surgeon: Miguel Lopez MD;  Location: [facility]       Soc Hx:   Social History     Tobacco Use     Smoking status: Former Smoker     Types: Other, Cigarettes     Last attempt to quit: 2006     Years since quittin.9     Smokeless tobacco: Former User     Tobacco comment: Light smoker in past   Substance Use Topics     Alcohol use: No     Comment: Sober since 2013         Allergies:   Allergies   Allergen Reactions     Latex      Rash (when picked up rubber tires)       Meds:   Medications Prior to Admission   Medication Sig Dispense Refill Last Dose     Baclofen 5 MG TABS Take 5 mg by mouth 3 times daily 120 tablet 0 2018 at Unknown time     omeprazole (PRILOSEC) 40 MG DR capsule Take 1 capsule (40 mg) by mouth daily Take 30-60 minutes before a meal. 90 capsule 3 2018 at Unknown time     Acetaminophen (TYLENOL PO) Take by mouth as needed for mild pain or fever   Taking     allopurinol (ZYLOPRIM) 100 MG tablet Take 1 tablet (100 mg) by mouth daily 30  tablet 3 Taking     aspirin 81 MG chewable tablet Take 81 mg by mouth daily   Not Taking     docusate (COLACE) 50 MG/5ML liquid Take 10 mLs (100 mg) by mouth daily 473 mL 1 Taking     Ferrous Sulfate (IRON SUPPLEMENT PO) Take by mouth daily (with breakfast)   Not Taking     Fiber 5 GM/15ML LIQD    Taking     gabapentin (NEURONTIN) 300 MG capsule Take 1 capsule (300 mg total) by mouth once daily. For neuropathy  1 Taking     indomethacin (INDOCIN) 25 MG capsule Take 1 capsule (25 mg) by mouth 3 times daily (with meals) 42 capsule 1 Taking     lidocaine-prilocaine (EMLA) cream   2 Taking     LORazepam (ATIVAN) 0.5 MG tablet Take 1 tablet (0.5 mg) by mouth every 4 hours as needed (Anxiety, Nausea/Vomiting or Sleep) 30 tablet 2 Taking     Multiple Vitamins-Minerals (MULTIVITAMIN ADULT PO)    Taking     naloxone (NARCAN) nasal spray Spray 1 spray (4 mg) into one nostril alternating nostrils as needed for opioid reversal every 2-3 minutes until assistance arrives 0.2 mL 0 Taking     Naproxen Sodium (ALEVE PO)    Not Taking     NEW MED CBD oil 1 Drop PRN   Taking     oxyCODONE (ROXICODONE) 5 MG/5ML solution Take 5 mLs (5 mg) by mouth every 6 hours as needed for severe pain 300 mL 0 Taking     prochlorperazine (COMPAZINE) 10 MG tablet Take 1 tablet (10 mg) by mouth every 6 hours as needed (Nausea/Vomiting) 30 tablet 2 Taking       No current outpatient medications on file.         Labs:  BMP:  Recent Labs   Lab Test 12/12/18  0745      POTASSIUM 3.4   CHLORIDE 98   CO2 26   BUN 14   CR 0.62*   *   BRITTNEY 8.8     CBC:   Recent Labs   Lab Test 12/12/18  0745   WBC 7.4   RBC 5.06   HGB 14.9   HCT 44.4   MCV 88   MCH 29.4   MCHC 33.6   RDW 14.2        Coags:  Recent Labs   Lab Test 12/12/18  1350   INR 1.42*       Vital Signs:  T 97.7  P 106  /99  R 14  SpO2 95%    Anesthetic Assessment and Plan:    52 year old male who  has a past medical history of Alcohol abuse, Diverticulosis, Gastric cancer (H),  Gastroparesis, HTN (hypertension), Hyperplastic colon polyp, Marijuana abuse, Vasovagal episode, and Weight loss. to OR for Procedure(s):  Esophagogastroduodenoscopy with esophageal stent exchange.  Ascites and B/L pleural effusions, patient resting comfortably in NAD.    NPO status adequate.    ASA 4    Plan for GA with RSI, standard monitors, large bore IVs, PONV prophylaxis. Antibiotics per primary service.    Prior to anesthetic I have examined the patient, reviewed the medical history and pertinent results, and discussed the ssessment and plan with the anesthesia and surgery teams.  Anesthetic risks discussed with the patient, consent in chart.      Nancy Corea MD  Anesthesiology Attending  12/12/18

## 2018-12-12 NOTE — ANESTHESIA CARE TRANSFER NOTE
Patient: Terry Yi    Procedure(s):  Esophagogastroduodenoscopy with Esophageal Stent Exchange    Diagnosis: Esophageal Obstruction  Diagnosis Additional Information: No value filed.    Anesthesia Type:   No value filed.     Note:  Airway :Face Mask  Patient transferred to:PACU  Comments: VSS. Breathing spontaneously at a regular rate with adequate tidal volumes and maintaining O2 sats on 6L facemask. Denies nausea or pain. No apparent complications from anesthesia.     Federico Posey DO  CA-2  Handoff Report: Identifed the Patient, Identified the Reponsible Provider, Reviewed the pertinent medical history, Discussed the surgical course, Reviewed Intra-OP anesthesia mangement and issues during anesthesia, Set expectations for post-procedure period and Allowed opportunity for questions and acknowledgement of understanding      Vitals: (Last set prior to Anesthesia Care Transfer)    CRNA VITALS  12/12/2018 1712 - 12/12/2018 1750      12/12/2018             Pulse:  97    SpO2:  98 %                Electronically Signed By: Federico Posey DO  December 12, 2018  5:50 PM

## 2018-12-12 NOTE — ED TRIAGE NOTES
Pt presents ambulatory to triage with c/o upper quadrant abdominal pain that got worse after his stent placement on Thursday. States he has been hiccupping and vomiting since and was seen already on Monday night.     Hx: Stomach Cancer

## 2018-12-12 NOTE — PROGRESS NOTES
Infusion Nursing Note:  Terry Yi presents today for IVF, IV Dilaudid, IV Zofran.    Patient seen by provider today: Yes: Dr. Shaffer   present during visit today: Not Applicable.    Note: Dr. Shaffer saw patient in infusion room.  Per Dr. Shaffer, NO CHEMO today, give 1 L NS, 1 mg IV Dilaudid prior to patient going to [facility] ED.  Approximately 30 minutes into infusion, pt began vomiting and had the hiccups.  Writer discussed with Dr. Shaffer, order for IV Zofran placed and given.  Pt took his own supply of Baclofen to alleviate the hiccups.  Post infusion, pt's spouse planning on driving patient to [facility] ED for possible admission.      Intravenous Access:  Implanted Port.    Treatment Conditions:  Lab Results   Component Value Date    HGB 14.9 12/12/2018     Lab Results   Component Value Date    WBC 7.4 12/12/2018      Lab Results   Component Value Date    ANEU 4.3 12/12/2018     Lab Results   Component Value Date     12/12/2018      Lab Results   Component Value Date     12/12/2018                   Lab Results   Component Value Date    POTASSIUM 3.4 12/12/2018           No results found for: MAG         Lab Results   Component Value Date    CR 0.62 12/12/2018                   Lab Results   Component Value Date    BRITTNEY 8.8 12/12/2018                Lab Results   Component Value Date    BILITOTAL 0.8 12/12/2018           Lab Results   Component Value Date    ALBUMIN 2.5 12/12/2018                    Lab Results   Component Value Date    ALT 35 12/12/2018           Lab Results   Component Value Date    AST 37 12/12/2018       Results reviewed, labs did NOT meet treatment parameters: Per Dr. Shaffer, NO TREATMENT TODAY.      Post Infusion Assessment:  Patient tolerated infusion without incident.  Blood return noted pre and post infusion.  Site patent and intact, free from redness, edema or discomfort.  No evidence of extravasations.    Discharge Plan:   Patient will return 12/26/2018 for next appointment.    Patient discharged in stable but guarded condition accompanied by: wife, son and friend.  Departure Mode: Wheelchair.    Judi Hanson RN-BSN, PHN, OCN  [facility]

## 2018-12-13 NOTE — PLAN OF CARE
Pt A&O x4, VSS on 2L. Capno in place. Except hypertensive 163/103, MD aware. Voiding spontaneously, dark radha urine. Tolerating clear liquid with intermittent nausea, retching, and hiccups throughout night, recieved scheduled Zofran and PRN Compazine x1. Received IV Dilaudid x4. Up SBA. Port infusing NS 100ml. Will continue to monitor and follow POC.

## 2018-12-13 NOTE — OR NURSING
Dr. Corea at bedside, aware of diastolic BP. Patient has underlying elvated DBP. Patient states DBP over 100 is normal for him.

## 2018-12-13 NOTE — PHARMACY-ADMISSION MEDICATION HISTORY
"Admission medication history interview status for the 12/12/2018 admission is complete. See Epic admission navigator for allergy information, pharmacy, prior to admission medications and immunization status.     Medication history interview sources:  pt's wife, [facility] Everywhere, and Verify Rx Benefits fill records    Changes made to PTA medication list (reason)  Added: none  Deleted: aspirin (wife reports pt discontinued this \"a long time ago\"), ferrous sulfate, fiber, and naproxen   Changed: acetaminophen (changed from Tylenol PO to liquid and added dose), gabapentin (changed from capsule to liquid and updated directions), indomethacin (changed from TID to TID PRN), lidocaine/prilocaine (added directions), multivitamin (added directions), \"New med\"/CBD oil (added reason for use in directions)    Additional medication history information (including reliability of information, actions taken by pharmacist):  -due to pt's condition, he has been unable to take PO medications regularly  -allopurinol and indomethacin: have not been taken for \"months.\" Left on PTA list as pt may be able to take them in the future/is likely supposed to be taking them  -gabapentin: pt's wife reports that pt doesn't like the taste of this medication and hasn't been taking it (last dose uncertain, initially filled on 11/18/18).     Prior to Admission medications    Medication Sig Last Dose Taking? Auth Provider   acetaminophen (TYLENOL) 32 mg/mL liquid Take 960 mg by mouth every 8 hours as needed for fever or mild pain Past Month at Unknown time Yes Unknown, Entered By History   Baclofen 5 MG TABS Take 5 mg by mouth 3 times daily  Patient taking differently: Take 10 mg by mouth 3 times daily  12/12/2018 Yes Miguel Lopez MD   docusate (COLACE) 50 MG/5ML liquid Take 10 mLs (100 mg) by mouth daily Past Week at Unknown time Yes Virgen Garcia APRN CNP   gabapentin (NEURONTIN) 250 MG/5ML solution Take 300 mg (6 mL) by " mouth twice daily. NOT TAKING Yes Reported, Patient   lidocaine-prilocaine (EMLA) cream Apply topically as needed  Past Month at Unknown time Yes Reported, Patient   LORazepam (ATIVAN) 0.5 MG tablet Take 1 tablet (0.5 mg) by mouth every 4 hours as needed (Anxiety, Nausea/Vomiting or Sleep) Past Month at Unknown time Yes Yakov Shaffer MD   Multiple Vitamins-Minerals (MULTIVITAMIN ADULT PO) Take 2 gummies by mouth once daily 12/12/2018 at Unknown time Yes Reported, Patient   NEW MED CBD oil 1 Drop PRN pain Past Month at Unknown time Yes Reported, Patient   omeprazole (PRILOSEC) 40 MG DR capsule Take 1 capsule (40 mg) by mouth daily Take 30-60 minutes before a meal. 12/12/2018 at Unknown time Yes Miguel Lopez MD   oxyCODONE (ROXICODONE) 5 MG/5ML solution Take 5 mLs (5 mg) by mouth every 6 hours as needed for severe pain 12/9/2018 at Unknown time Yes Yakov Shaffer MD   prochlorperazine (COMPAZINE) 10 MG tablet Take 1 tablet (10 mg) by mouth every 6 hours as needed (Nausea/Vomiting) Past Month at Unknown time Yes Yakov Shaffer MD   allopurinol (ZYLOPRIM) 100 MG tablet Take 1 tablet (100 mg) by mouth daily More than a month at Unknown time  Yakov Shaffer MD   indomethacin (INDOCIN) 25 MG capsule Take 1 capsule (25 mg) by mouth 3 times daily (with meals)  Patient taking differently: Take 25 mg by mouth three times daily with meals as needed. More than a month at Unknown time  Yakov Shaffer MD   naloxone (NARCAN) nasal spray Spray 1 spray (4 mg) into one nostril alternating nostrils as needed for opioid reversal every 2-3 minutes until assistance arrives   Yakov Shaffer MD     Medication history completed by: Nica Cortes, PharmD IV Student

## 2018-12-13 NOTE — PROGRESS NOTES
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 S/p urgent removal and replacement with GI service.     #Esophageal stent failure d/t intraluminal malignant growth-s/p replacement.  #Unresectable metastatic gastric carcinoma.  Presented from clinic with recurrent nausea/vomiting, abdominal pain and hiccups x 1 week. Had an esophogeal stent placed 12/6 and has felt miserable ever since per pt report. CT imaging showing invagination of recently placed esophageal stent d/t ongoing malignant growth. Underwent upper endoscopy 12/12 with removal of failed stent and replacement with longer stent. Tolerated procedure well. Continued to have some hiccups, n/v and pain overnight per nursing notes however has improved some per patient.   -Full liquid diet, may advance slowly to low residue.  -NS @ 75 ml/hr.  -IV PPI BID, elevate HOB, reflux precautions.  -Per GI: repeat scope in 8-10 wks. Will consider stent removal and Gtube in the future.  -Started Carafate qid. IV dilaudid 0.5-1 mg q3hrs prn.     #Recurrent nausea, vomiting, poor PO, hiccups.  #Unintentional weight loss.  Ongoing due to above stent issues, though seems to be improving following replacement procedure.   -IV zofran 8 mg q6h and IV compazine 10 mg q6h PRN for breakthrough.  -Home baclofen and gabapentin transitioned to suspensions.  -IV dilaudid 0.5-1 mg mg q3h PRN for severe pain, PO oxycodone ordered to try orals    #Abdominal distension.  #Ascites.   Has had a  paracentesis in the past per patient report. Notes abdominal fullness which is mildly bothersome. CT with e/o moderate ascites.   -CAPs team consulted for paracentesis--they have concerns about performing another procedure on a patient that has had multiple recent procedures and are unsure whether he will have any symptom benefit. Defer to tomorrow and they will reassess.      FEN:   -NS @ 75 mL/hr  -full liquid diet     DVT: SCDs    Code: DNR/DNI - discussed with patient and family at bedside    Dispo: Pending improved pain/nausea control and tolerating PO, possibly tomorrow.      Discussed with Dr. German.     Becky Todd PA-C  Hematology/Oncology  Pager #[phone]    Addendum :  Pt was seen and evaluated by me independently of the NP/PA.  Reviewed labs and imaging and clilnical course.  Pt with nausea and epigastric pain.  Will start carafate.  Dilaudid.    Abd pain - consider paracentesis.      Elizabeth German MD    Interval History   Terry continues to have mid esophogeal and epigastric pain today. Denies shortness of breath or dyspnea. Patient states he had no further nausea/vomiting/hiccups since stent replacement. Patient/family requesting paracentesis for moderate ascites fluid. Agreeable to trying Carafate. Offers no other complaints.     Physical Exam   Temp: 98.5  F (36.9  C) Temp src: Oral BP: (!) 155/99 Pulse: 96 Heart Rate: 98 Resp: 18 SpO2: 94 % O2 Device: None (Room air) Oxygen Delivery: 2 LPM  Vitals:    12/12/18 1126 12/12/18 2111 12/13/18 1219   Weight: 88.6 kg (195 lb 6.4 oz) 89 kg (196 lb 1.6 oz) 91.1 kg (200 lb 14.4 oz)     Vital Signs with Ranges  Temp:  [96.3  F (35.7  C)-98.5  F (36.9  C)] 98.5  F (36.9  C)  Pulse:  [] 96  Heart Rate:  [60-98] 98  Resp:  [15-20] 18  BP: (138-167)/() 155/99  SpO2:  [94 %-99 %] 94 %  I/O last 3 completed shifts:  In: 1500 [I.V.:1500]  Out: 450 [Urine:450]    Constitutional: Pleasant male seen lying in bed, appears to feel unwell. Often dozes off during  assessment/discussion.  HEENT: NCAT, anicteric sclerae, conjunctiva clear. Moist mucous membranes.  Respiratory: Non-labored breathing, good air exchange. Lungs are clear to anterior auscultation bilaterally, although diminished in R base, no crackles or wheezes.   Cardiovascular: Regular rate and rhythm with no murmur, rub or gallop.  GI: Normoactive BS. Abdomen is moderately and tender to palpation in epigastric and umbilical areas.   Skin: Warm and dry. No rashes or lesions on exposed surfaces.  Musculoskeletal: Extremities grossly normal. No tenderness. 2+ pedal edema to ankles bilaterally. SCDs in place.  Neurologic: A &O x3 but quite drowsy, speech normal, answering questions appropriately. Moves all extremities spontaneously. Grossly non-focal.  Neuropsychiatric: Mentation and affect normal/appropriate.    Medications   Current Facility-Administered Medications   Medication     baclofen (LIORESAL) suspension 10 mg     docusate (COLACE) 50 MG/5ML liquid 100 mg     gabapentin (NEURONTIN) solution 300 mg     HYDROmorphone (PF) (DILAUDID) injection 0.5-1 mg     LORazepam (ATIVAN) injection 0.5 mg     May continue current IV fluids if patient has IV fluids infusing.     melatonin tablet 1 mg     naloxone (NARCAN) injection 0.1-0.4 mg     ondansetron (ZOFRAN) injection 8 mg     pantoprazole (PROTONIX) 40 mg IV push injection     prochlorperazine (COMPAZINE) injection 10 mg     sodium chloride (PF) 0.9% PF flush 3 mL     sodium chloride 0.9% infusion     sucralfate (CARAFATE) suspension 1 g       Data   CBC  Recent Labs   Lab 12/13/18  0627 12/12/18  0745   WBC 7.9 7.4   RBC 4.83 5.06   HGB 14.3 14.9   HCT 43.7 44.4   MCV 91 88   MCH 29.6 29.4   MCHC 32.7 33.6   RDW 14.6 14.2    297     CMP  Recent Labs   Lab 12/13/18  0627 12/12/18  1315 12/12/18  0745     --  134   POTASSIUM 3.5  --  3.4   CHLORIDE 100  --  98   CO2 22  --  26   ANIONGAP 11  --  10   GLC 93  --  135*   BUN 12  --  14   CR 0.58*  --   0.62*   GFRESTIMATED >90  --  >90   GFRESTBLACK >90  --  >90   BRITTNEY 8.5  --  8.8   MAG 1.7  --   --    PHOS 3.0  --   --    PROTTOTAL 5.7* 6.0* 6.3*   ALBUMIN 2.2* 2.4* 2.5*   BILITOTAL 0.6 0.9 0.8   ALKPHOS 109 113 114   AST 35 40 37   ALT 32 33 35     INR  Recent Labs   Lab 12/12/18  1350 12/12/18  1315   INR 1.42* 1.37*       Results for orders placed or performed during the hospital encounter of 12/12/18   CT Chest/Abdomen/Pelvis w Contrast     Value    Radiologist flags Stent malfunction (Urgent)    Narrative    EXAMINATION: CT CHEST/ABDOMEN/PELVIS W CONTRAST, 12/12/2018 12:53 PM    TECHNIQUE:  Helical CT images from the thoracic inlet through the  symphysis pubis were obtained  with contrast. Contrast dose: iopamidol  (ISOVUE-370) solution 119 mL    COMPARISON: 12/6/2018, 11/9/2018, 8/31/2018    HISTORY: Gastric cancer, unresectable, post primary tx, assess tx  response; worsening abdominal pain s/p stenting.  History of  gastric?carcinoma,?gastroparesis, diverticulosis,?s/p esophageal  placement (12/06/2018).    FINDINGS:    Chest: The central tracheobronchial tree is patent. Centrilobular  nodular opacities in the left lower lobe. Scattered calcified  granulomas. Moderate right and small left pleural effusions with  overlying atelectasis. No pneumothorax.    Heart size is normal. Mild three-vessel coronary calcifications. No  pericardial effusion. Normal caliber of the thoracic great vessels.  Numerous calcified hilar lymph nodes are similar from prior. Right  chest wall Port-A-Cath with tip in the mid SVC. Esophageal stent  folded in on itself. Upstream from this there is esophageal mucosal  thickening and enhancement.    Abdomen and pelvis: Unchanged 10 mm hypodensity in hepatic segment 7  (series 8 image 24). The gallbladder, spleen, adrenal glands, and  pancreas demonstrate no worrisome abnormalities. Subcentimeter renal  cortical hypodensities, too small to characterize. Prominent right  renal pelvis,  similar to prior. No dilated loops of bowel. Sigmoid  diverticulosis. Unchanged gastric wall thickening and mucosal  enhancement. Unchanged nodular omental thickening. Unchanged soft  tissue thickening around the gastroesophageal junction. Numerous upper  abdominal abnormally enhancing lymph nodes are also unchanged, for  example a 8 mm celiac axis node (series 8 image 304). Unchanged  eccentric right urinary bladder wall thickening. Unchanged rectal wall  thickening and hyperenhancement of the mucosa.    Bones and soft tissues: Mild anasarca. No worrisome osseous  abnormality. Prominent degenerative changes in the hips.      Impression    IMPRESSION:   1. Esophageal stent folded in on itself, likely significantly  restricting passage of contents into stomach. Upstream esophageal wall  thickening and enhancement may be related to associated esophagitis.  2. Centrilobular nodular opacities in the left lower lobe, likely  infection and/or aspiration.  3. Unchanged gastric wall thickening, soft tissue at the  gastroesophageal junction and gastric mucosal hyperenhancement, likely  site of primary malignancy.  4. Grossly unchanged omental nodularity with increased moderate  ascites from peritoneal carcinomatosis.  5. Unchanged metastatic upper abdominal adenopathy.  6. Moderate right and trace left pleural effusions with overlying  atelectasis.  7. Unchanged 10 mm hypodensity in hepatic segment 7.  8. Unchanged rectal wall thickening and hyperenhancement of the mucosa  since 11/9/2018.    [Urgent Result: Stent malfunction]    Finding was identified on 12/12/2018 12:56 PM.     Dr. Hills was contacted by Dr. Aguirre at 12/12/2018 1:17 PM and  verbalized understanding of the urgent finding.     I have personally reviewed the examination and initial interpretation  and I agree with the findings.    LIGIA SANDERS MD   XR Surgery YOSELYN Fluoro L/T 5 Min w Stills    Narrative    This exam was marked as non-reportable  because it will not be read by a   radiologist or a [facility] non-radiologist provider.

## 2018-12-13 NOTE — PLAN OF CARE
Adm post esophageal stent placement.   7D arrival at about 21:00.  On arrival to 7d Terry reports pain due to procedure pointing mid to lower chest, sternal area(no change from baseline) .  MD notified, IV dilaudid ordered and given with relief. Blood pressures noted to be elevated prior to admission, continues to be elevated, see VS flow, MD aware no tx.  Capnography in place, all readings WNL. Clear liquid diet.  Pt declines at present. Aspiration, reflux precautions HOB 30-40 degrees. Continue to monitor.

## 2018-12-13 NOTE — H&P
[facility]  History and Physical Examination   Hematology/Oncology Service     Date and Time of Admission: 12/12/2018, 8:30 PM     CC: Abdominal pain, recurrent hiccups, nausea/vomiting     History of Present Illness: Mr. Terry Yi is a pleasant 52-year-old gentleman with unresectable metastatic gastric carcinoma complicated by linitis plastica and esophageal stricture who recently underwent esophageal stent placement (12/6/18) who presented to oncology clinic today with his family for recurrent nausea/vomiting, hiccups and abdominal pain and was found on CT imaging to have stent failure by invagination. He is s/p urgent removal and replacement by the GI service.    He is here this evening with his wife and two children. He notes that he has not felt well since leaving the hospital last week. He has had persistent, severe hiccups, along with recurrent retching. He has been taking in liquid PO, though has hardly been able to eat any other food. His family notes that he was becoming increasingly weak. He had only had one small bowel movement in the past few days, though was passing gas. He presented to oncology clinic today for scheduled chemotherapy, however he was sent directly to the ED for further evaluation of his symptoms.    In the ED, he was afebrile, mildly tachycardic to 106, though otherwise stable. Labs were relatively unremarkable, however a CT chest/abd/pelvis showed that his previously placed esophageal stent had folded onto itself. GI was consulted, whom took the patient for upper GI. The stent was removed and replaced. He subsequently had an unremarkable PACU stay and is now admitted to the general medicine floor.      ROS: A complete 10 pt ROS was performed and negative besides stated in the HPI.     Past Medical History:  Unresectable metastatic gastric cancer  Recent esophageal stricture s/p stent  Drug indued polyneuropathy     Medications:  Baclofen 5 mg TID  Omeprazole 40 mg every  "day  Tylenol PRN  Allopurinol 100 mg every day  ASA 81 mg every day  Docusate 100 mg every day  Ferrous sulfate every day  Gabapentin 300 mg every day  Indocin 25 mg TID   Ativan 0.5 mg PRN  Naproxen PRN  Oxycodone 5 mg q6h PRN  Compazine 10 mg q6h PRN  CBD oil PRN     Allergies: Latex     Social History: Lives in [place] with his wife. Has two children. Former smoker. No alcohol use.      Family History: No recent illnesses.     Physical Exam:  BP (!) 153/109   Pulse 106   Temp 98.3  F (36.8  C) (Temporal)   Resp 18   Ht 1.562 m (5' 1.5\")   Wt 88.6 kg (195 lb 6.4 oz)   SpO2 97%   BMI 36.32 kg/m    Gen: Tired appearing gentleman sitting upright in bed. Conversive, AA&Ox3. Intermittently nauseated and dry heaving. Wife and two children at bedside and attentive in cares.   HEENT: Normocephalic. Conjunctivae without injection. No scleral icterus. Dry mucous membranes. No mucosal lesions.   Chest: Right port in place. No overlying erythema or tenderness with palpation.  CV: Mild sinus tachycardia, no clear murmur appreciated, normal S1 and S2. Distal pulses strong and equal.  Extremities: No significant edema. No cyanosis.   Respiratory: Non-labored breathing on RA. No wheezing or stridor appreciated.   Abdomen/GI: Mild distension present. No bowel sounds. Soft. Nontender to palpation. No rebound or guarding.   : No CVA tenderness.   MSK: No gross bony deformity. No joint erythema or swelling.  Heme/lymph: No ecchymoses noted. No adenopathy.   Neuro: Alert, oriented. CN 2-12 grossly intact. Moving all 4 extremities equally and bilaterally. Sensation intact x4.  Psych: Normal affect.      Labs/Imaging/Studies:  CBC: Normal  Lactate 1.5  BMP: Normal  LFTs: Albumin 2.5, otherwise normal  INR: 1.37  Lipase: 83  , Troponin negative  Blood cultures: NGTD    CT chest/abd/pelvis 12/12:  1. Esophageal stent folded in on itself, likely significantly  restricting passage of contents into stomach. Upstream " esophageal wall  thickening and enhancement may be related to associated esophagitis.  2. Centrilobular nodular opacities in the left lower lobe, likely  infection and/or aspiration.  3. Unchanged gastric wall thickening, soft tissue at the  gastroesophageal junction and gastric mucosal hyperenhancement, likely  site of primary malignancy.  4. Grossly unchanged omental nodularity with increased moderate  ascites from peritoneal carcinomatosis.  5. Unchanged metastatic upper abdominal adenopathy.  6. Moderate right and trace left pleural effusions with overlying  atelectasis.  7. Unchanged 10 mm hypodensity in hepatic segment 7.  8. Unchanged rectal wall thickening and hyperenhancement of the mucosa  since 11/9/2018.    Upper GI endoscopy 12/12:  - Envaginated in situ esophageal stent, envaginated likely due to the intraluminal malignant growth within the distal esophagus, managed by stent removal and replacement with a longer stent (rationale as above)     Recommendation:   - We will admit to medicine/oncology the patient for monitoring in light of the ongoing pain and nausea (prior to replacement)   - Full liquids may begin once on the floor and diet may be advanced slowly to low roughage, small well chewed diet as tolerated   - As previous, aspiration and relfux precautions with twice daily PPI, avoidance of eating prior to lying down, sleeping on an angle etc   - Repeat upper endoscopy in 8-10 weeks for repeat evaluation and exchange   - With ongoing stent failure our options include stent removal and gastrostomy tube placement, the latter to be complex due to the linitis plastica   - The findings and recommendations were discussed with the patient and their family .    Assessment and Plan:  Mr. Terry Yi is a pleasant 52-year-old gentleman with unresectable metastatic gastric carcinoma, on chemotherapy, complicated by linitis plastica and esophageal stricture s/p recent esophageal stent placement (12/6/18)  who presented to oncology clinic today with recurrent nausea, vomiting and hiccups and was found on imaging to have stent failure by invagination. He is now s/p urgent stent removal and replacement.    Esophageal stent failure d/t intraluminal malignant growth, s/p replacement  Unresectable metastatic gastric carcinoma  Presenting to clinic today with 1 week of ongoing GI symptoms. CT imaging showing evagination of recently placed esophageal stent d/t ongoing malignant growth. Underwent upper endoscopy this evening with removal of failed stent and replacement. Tolerated procedure well; will admit for post-procedural monitoring and ongoing symptomatic management.   -Full liquid diet, may advance slowly to low roughage, small, well chewed diet as tolerated, NS @ 100 ml/hr   -IV PPI BID, elevate HOB, reflux precautions   -Per GI: repeat scope in 8-10 wks. Will consider stent removal and Gtube in the future    Recurrent nausea, vomiting, poor PO, hiccups  Unintentional weight loss  Ongoing due to above stent issues, though seems to be improving following this evening's procedure. Will focus on symptomatic management in the overnight.       -IV zofran 8 mg q6h scheduled in the overnight, IV compazine 10 mg q6h PRN for breakthrough                -Home baclofen and gabapentin transitioned to suspensions                  -IV dilaudid 0.5 mg q2h PRN for severe pain                -Ensure clears ordered TID, defer to day team for consideration of nutrition/PT/OT consultations    FEN: NS @ 100 mL/hr, full liquid diet   DVT: SCDs  Code: DNR/DNI - discussed with patient and family at bedside  Dispo: Pending improved pain/nausea control and tolerating PO, possibly tomorrow.      Heather Salas MD  Hematology/Oncology/BMT [facility]  Internal Medicine and Pediatrics

## 2018-12-13 NOTE — PROGRESS NOTES
CLINICAL NUTRITION SERVICES - ASSESSMENT NOTE     Nutrition Prescription    RECOMMENDATIONS FOR MDs/PROVIDERS TO ORDER:  -None at this time.     Malnutrition Status:    Severe malnutrition in the context of chronic illness     Recommendations already ordered by Registered Dietitian (RD):  -Medical food supplement therapy: Boost Breeze with meals, and Magic Cup BID between meals (berry at 2pm, vanilla at 8pm/HS)  -Calorie counts to begin 12/14-12/16 to assess quality of PO intakes.     Future/Additional Recommendations:  -Once pt appropriate to advance beyond FL, recommend mechanical soft diet   -Encouraged intakes of high protein supplements to optimize nutrient intakes.   -Monitor results of calorie count data; consider nutrition support if oral intakes meet less than 75% of estimated needs (1670 Kcals and 80 grams protein)     REASON FOR ASSESSMENT  Terry Yi is a/an 52 year old male assessed by the dietitian for Admission Nutrition Risk Screen for unintentional loss of 10# or more in the past two months and reduced oral intake over the last month.    NUTRITION HISTORY  Per EMR: Pt with a hx of unresectable metastatic gastric carcinoma on chemotherapy, complicated by linitis plastica and esophageal stricture s/p stent placement 12/6/18, who presents with C/O recurrent N/V, hiccups, and abdominal pain, per CT found to have stent failure by invagination s/p urgent removal and replacement.     Per patient interview: Pt reports having difficulty taking PO last year in March 2017 where he lost a significant amount of weight due to difficulty taking PO with food getting stuck in his esophagus. Pt reports going from 310 lbs --> 170 lbs --> then gaining/stabilizing at 225 lbs. Patient's wife reports that the patient had been doing well overall until difficulty taking PO began again ~April 2018. Weights remained stable until ~October/November when patient's chemotherapy regimen changed. Pt reports recently only being  "able to take \"2 bites of oatmeal, 2 bites of Jello\" in one sitting. He also reports taking 1 Ensure diluted with either milk or ice per day, and a gummy multivitamin daily. Aversion to sweet foods reported by pt.     CURRENT NUTRITION ORDERS  Diet: Full Liquid with generic orders for clear liquid supplement between meals    Intake/Tolerance: No intakes documented per flow-sheet yet. Pt reports being able to take 3 spoon fulls of fruit ice and Jello so far today. He hasn't had a chance to try the clear liquid Boost Breeze supplement yet, but it is sitting at his bedside. Advised pt to consume supplement over ice, to dilute the sweet flavor.     Per chart note, provider recommended advancing diet slowly to low residue; given patient's hx of dysphagia with replacement of esophageal stent, mechanical soft diet may be indicated vs. Low residue which is used more for lower GI issues.     LABS  Labs reviewed  Creatinine 0.58 (L) - taken 12/13, possibly r/t inadequate protein intakes/muscle breakdown     MEDICATIONS  Medications reviewed    ANTHROPOMETRICS  Height: 179.1 cm (5' 10.5\")  Most Recent Weight: 89 kg (196 lb 1.6 oz)    IBW: 76.8 kg (using height 5'10.5\")  BMI: Overweight BMI 25-29.9  Weight History: Significant 11% wt loss x 2 months PTA.   Wt Readings from Last 10 Encounters:   12/12/18 89 kg (196 lb 1.6 oz)   12/06/18 88.5 kg (195 lb 1.7 oz)   11/29/18 91.7 kg (202 lb 2 oz)   11/14/18 95.4 kg (210 lb 6 oz)   10/31/18 96.7 kg (213 lb 2 oz)   10/17/18 100.7 kg (221 lb 14.4 oz)   10/03/18 100.2 kg (221 lb)   09/19/18 99 kg (218 lb 4.8 oz)   09/05/18 99.8 kg (220 lb)   08/22/18 98.9 kg (218 lb 1.6 oz)     Dosing Weight: 89 kg -based on lowest documented wt so far this adm (89 kg on 12/12)    ASSESSED NUTRITION NEEDS  Estimated Energy Needs: 9506-1851 kcals/day (25 - 30 kcals/kg)  Justification: Maintenance  Estimated Protein Needs: 107-134 grams protein/day (1.2 - 1.5 grams of pro/kg)  Justification: Increased " needs with chemotherapy  Estimated Fluid Needs: 2627-6402 mL/day (1 mL/kcal)   Justification: Maintenance    PHYSICAL FINDINGS  See malnutrition section below.    MALNUTRITION  % Intake: </=75% for >/= 1 month (severe)  % Weight Loss: > 7.5% in 3 months (severe)  Subcutaneous Fat Loss: Facial region, Upper arm and Lower arm: Moderate   Muscle Loss: Temporal: Facial & jaw region, Upper arm (bicep, tricep), Lower arm  (forearm), Upper leg (quadricep, hamstring) and Posterior calf: Moderate loss  Fluid Accumulation/Edema: None noted  Malnutrition Diagnosis: Severe malnutrition in the context of chronic illness     NUTRITION DIAGNOSIS  Inadequate protein-energy intake related to inability to take adequate PO secondary to esophageal stricture as evidenced by patient diet hx indicating intakes of <75% of estimated needs for > 1 month, severe 11% wt loss x 2 months, moderate muscle/fat loss, and creatinine 0.58 mg/dL indicating probable poor protein intakes/muscle breakdown.       INTERVENTIONS  Implementation  Nutrition Education: Provided education on nutrition supplements to optimize protein/nutrient intakes.    Medical food supplement therapy: Boost Breeze with meals, and Magic Cup BID between meals (berry at 2pm, vanilla at 8pm/HS)  Calorie counts to begin 12/14-12/16 to assess quality of PO intakes.     Goals  1. Diet advancement beyond full liquids   2. Avg nutritional intake to meet a minimum of 75% of estimated needs (1670 Kcals and 80 g PRO) daily (per dosing wt 89 kg).     Monitoring/Evaluation  Progress toward goals will be monitored and evaluated per protocol.    Margaret Martins, Dietetic Intern

## 2018-12-13 NOTE — ANESTHESIA POSTPROCEDURE EVALUATION
Anesthesia POST Procedure Evaluation    Patient: Terry Yi   MRN:     [id number] Gender:   male   Age:    52 year old :      1965        Preoperative Diagnosis: Esophageal Obstruction   Procedure(s):  Esophagogastroduodenoscopy with Esophageal Stent Exchange   Postop Comments: No value filed.       Anesthesia Type:  General    Reportable Event: NO     PAIN: Uncomplicated   Sign Out status: Comfortable, Well controlled pain     PONV: No PONV   Sign Out status:  No Nausea or Vomiting     Neuro/Psych: Uneventful perioperative course   Sign Out Status: Preoperative baseline; Age appropriate mentation     Airway/Resp.: Uneventful perioperative course   Sign Out Status: Non labored breathing, age appropriate RR; Resp. Status within EXPECTED Parameters     CV: Uneventful perioperative course   Sign Out status: Appropriate BP and perfusion indices; Appropriate HR/Rhythm     Disposition:   Sign Out in:  PACU  Disposition:  Floor  Recovery Course: Uneventful  Follow-Up: Not required           Last Anesthesia Record Vitals:  CRNA VITALS  2018 1712 - 2018 1803      2018             Pulse:  97    SpO2:  98 %          Last PACU/Preop Vitals:  Vitals:    18 1126 18 1330   BP: (!) 148/96 (!) 113/99   Pulse: 106    Resp: 14    Temp: 36.5  C (97.7  F)    SpO2: 95%          Electronically Signed By: Nancy Corea MD, 2018, 6:03 PM

## 2018-12-14 NOTE — PROGRESS NOTES
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 S/p urgent removal and replacement with GI service.     #Esophageal stent failure d/t intraluminal malignant growth-s/p replacement.  #Unresectable metastatic gastric carcinoma.  Presented from clinic with recurrent nausea/vomiting, abdominal pain and hiccups x 1 week. Had an esophogeal stent placed 12/6 and has felt miserable ever since per pt report. CT imaging showing invagination of recently placed esophageal stent d/t ongoing malignant growth. Underwent upper endoscopy 12/12 with removal of failed stent and replacement with longer stent. Tolerated procedure well. Continued to have some hiccups, n/v and pain overnight per nursing notes however has improved some per patient.   -Full liquid diet, may advance slowly to low residue.  -NS @ 75 ml/hr, 500 ml bolus 12/14.  -IV PPI BID, elevate HOB, reflux precautions.  -Per GI: repeat scope in 8-10 wks. Will consider stent removal and Gtube in the future.  -Started Carafate qid. IV dilaudid 0.5-1 mg q3hrs prn.  -CT chest and abdomen to reevaluate stent with persistent pain--demonstrates persistent infolding of mid posterior wall of stent. Per discussion with GI, options include removal of stent and placement of GJ tube or leaving stent in place. GI to discuss with patient.   --Follow up scheduled for 12/21 in [place].     #Recurrent nausea, vomiting, poor PO, hiccups.  #Unintentional weight loss.  Ongoing due to above stent  issues, though seems to be improving following replacement procedure.   -IV zofran 8 mg q6h and IV compazine 10 mg q6h PRN for breakthrough.  -Home baclofen and gabapentin transitioned to suspensions-->increase baclofen to 15 mg tid and gabapentin resumed at PTA dose of 300 bid to aid in mgmt of hiccups.  -IV dilaudid 0.5-1 mg mg q3h PRN for severe pain, PO oxycodone ordered to try orals    #Abdominal distension.  #Ascites.   Has had a paracentesis in the past per patient report. Notes abdominal fullness which is mildly bothersome. CT with e/o moderate ascites.   -CAPs team consulted for paracentesis--they have concerns about performing another procedure on a patient that has had multiple recent procedures and are unsure whether he will have any symptom benefit. Defer to tomorrow and they will reassess. CAPS attempted to position patient for procedure but he could not tolerate without emesis.   -IR consulted for paracentesis this afternoon, f/u on ordered labs evaluating for SBP.   -On CT chest abdomen 12/14--mod to large volume ascites stable from previous.     #Leukocytosis. 12.1 on 12/14.  #Possible aspiration PNA on CT scan.   White cells elevated acutely. Patient producing white sputum. Could be 2/2 aspiration PNA identified on CT chest/abdomen 12/14. Additionally evaluating for SBP with thoracentesis. No fevers.   -Started Augmentin for possible aspiration PNA.   -Follow SBP labs above.    #Hypertension.   Could be 2/2 Cyramza.   -Started Norvasc 5 mg daily. May need to be up-titrated.     #Herpes simplex eruption.   Patient with h/o genital herpes s/p treatment with eruptions. Has a cluster of small lesions on shaft of penis. No swab collected with known history.  -Started Acyclovir 400 mg tid x10 days per [employer].      FEN:   -NS @ 75 mL/hr, s/p 1L bolus with CT scan.  -full liquid diet     DVT: SCDs    Code: DNR/DNI - discussed with patient and family at bedside    Dispo: Pending improved pain/nausea  control, hiccups and completed interventions by GI, likely another 1-2 days.     Discussed with Dr. German.     Becky Todd PA-C  Hematology/Oncology  Pager #[phone]    Addendum:  Pt was seen and evaluated by me independently of SEMAJ.  Reviewed labs and imaging.  Early am, pt's pain improved.  Esophageal stent revised.  Diminished breath sounds in lungs bilaterally.  + fluid wave with mid abdominal tenderness.    Esophageal stent - not completely open.  Will need to discuss with GI - GJ tube vs reposition stent.  Given linitis plastica, I do not think a Gtube will work well.    Ascites - paracentesis.    Elevated wbc - ? Aspiration.  Start augmentin.    Elizabeth German MD      Interval History   Terry is feeling marginally better today although still quite uncomfortable. Continues to have sternal to mid abdominal vertical chest pain as well as pain that travels along the diaphragm. Tenderness to palpation of abdomen has improved. Coughing up quite a bit of white colored sputum per patient report. No BM. Nausea intermittent but no vomiting until this afternoon when paracentesis tried. Ambulating in halls a bit, encourage 4 x daily.    Physical Exam   Temp: 97.3  F (36.3  C) Temp src: Oral BP: (!) 165/100 Pulse: 99 Heart Rate: 113 Resp: 20 SpO2: 97 % O2 Device: None (Room air)    Vitals:    12/12/18 2111 12/13/18 1219 12/14/18 0755   Weight: 89 kg (196 lb 1.6 oz) 91.1 kg (200 lb 14.4 oz) 91.9 kg (202 lb 8 oz)     Vital Signs with Ranges  Temp:  [96.6  F (35.9  C)-98.5  F (36.9  C)] 97.3  F (36.3  C)  Pulse:  [99] 99  Heart Rate:  [] 113  Resp:  [16-20] 20  BP: (152-165)/() 165/100  SpO2:  [93 %-98 %] 97 %  I/O last 3 completed shifts:  In: 2470 [P.O.:50; I.V.:2420]  Out: -     Constitutional: Pleasant male seen sitting up in bed, in NAD. More alert and interactive today.  HEENT: NCAT, anicteric sclerae, conjunctiva clear. Moist mucous membranes.  Respiratory: Non-labored breathing, good air exchange. Lungs are  clear to anterior auscultation bilaterally, although diminished in R base, no crackles or wheezes.   Cardiovascular: Regular rate and rhythm with no murmur, rub or gallop.  GI: Normoactive BS. Abdomen is moderately distended and tender to palpation in epigastric and umbilical areas although improved.  : Cluster of 3 small erythematous lesions on dorsal shaft of penis.   Skin: Warm and dry. No rashes or lesions on exposed surfaces.  Musculoskeletal: Extremities grossly normal. No tenderness. 1+ pedal edema to ankles bilaterally-improved. SCDs in place.  Neurologic: A &O x3, speech normal, answering questions appropriately. Moves all extremities spontaneously. Grossly non-focal.  Neuropsychiatric: Mentation and affect normal/appropriate.    Medications   Current Facility-Administered Medications   Medication     0.9% sodium chloride BOLUS     acyclovir (ZOVIRAX) suspension 400 mg     amLODIPine (NORVASC) suspension 5 mg     baclofen (LIORESAL) suspension 15 mg     docusate (COLACE) 50 MG/5ML liquid 100 mg     gabapentin (NEURONTIN) solution 300 mg     heparin 100 UNIT/ML injection 5 mL     heparin lock flush 10 UNIT/ML injection 5-10 mL     heparin lock flush 10 UNIT/ML injection 5-10 mL     hydrALAZINE (APRESOLINE) injection 10 mg     HYDROmorphone (PF) (DILAUDID) injection 0.5-1 mg     lidocaine (LMX4) cream     lidocaine 1 % 1 mL     LORazepam (ATIVAN) injection 0.5 mg     magnesium hydroxide (MILK OF MAGNESIA) suspension 30 mL     May continue current IV fluids if patient has IV fluids infusing.     melatonin tablet 1 mg     naloxone (NARCAN) injection 0.1-0.4 mg     ondansetron (ZOFRAN) injection 8 mg     oxyCODONE (ROXICODONE) solution 5 mg     pantoprazole (PROTONIX) 40 mg IV push injection     potassium chloride (KLOR-CON) Packet 20-40 mEq     potassium chloride 10 mEq in 100 mL intermittent infusion with 10 mg lidocaine     potassium chloride 10 mEq in 100 mL sterile water intermittent infusion (premix)      potassium chloride 20 mEq in 50 mL intermittent infusion     potassium chloride ER (K-DUR/KLOR-CON M) CR tablet 20-40 mEq     prochlorperazine (COMPAZINE) injection 10 mg     senna-docusate (SENOKOT-S/PERICOLACE) 8.6-50 MG per tablet 1 tablet    Or     senna-docusate (SENOKOT-S/PERICOLACE) 8.6-50 MG per tablet 2 tablet     sodium chloride (PF) 0.9% PF flush 10-20 mL     sodium chloride (PF) 0.9% PF flush 10-20 mL     sodium chloride (PF) 0.9% PF flush 3 mL     sodium chloride 0.9% infusion     sucralfate (CARAFATE) suspension 1 g       Data   CBC  Recent Labs   Lab 12/14/18  0609 12/13/18  0627 12/12/18  0745   WBC 12.1* 7.9 7.4   RBC 4.78 4.83 5.06   HGB 14.4 14.3 14.9   HCT 43.6 43.7 44.4   MCV 91 91 88   MCH 30.1 29.6 29.4   MCHC 33.0 32.7 33.6   RDW 14.8 14.6 14.2    238 297     CMP  Recent Labs   Lab 12/14/18  0609 12/13/18  0627 12/12/18  1315 12/12/18  0745    133  --  134   POTASSIUM 3.2* 3.5  --  3.4   CHLORIDE 101 100  --  98   CO2 24 22  --  26   ANIONGAP 11 11  --  10   GLC 82 93  --  135*   BUN 9 12  --  14   CR 0.50* 0.58*  --  0.62*   GFRESTIMATED >90 >90  --  >90   GFRESTBLACK >90 >90  --  >90   BRITTNEY 8.2* 8.5  --  8.8   MAG  --  1.7  --   --    PHOS  --  3.0  --   --    PROTTOTAL  --  5.7* 6.0* 6.3*   ALBUMIN  --  2.2* 2.4* 2.5*   BILITOTAL  --  0.6 0.9 0.8   ALKPHOS  --  109 113 114   AST  --  35 40 37   ALT  --  32 33 35     INR  Recent Labs   Lab 12/14/18  1046 12/12/18  1350 12/12/18  1315   INR 1.53* 1.42* 1.37*       Results for orders placed or performed during the hospital encounter of 12/12/18   CT Chest/Abdomen/Pelvis w Contrast     Value    Radiologist flags Stent malfunction (Urgent)    Narrative    EXAMINATION: CT CHEST/ABDOMEN/PELVIS W CONTRAST, 12/12/2018 12:53 PM    TECHNIQUE:  Helical CT images from the thoracic inlet through the  symphysis pubis were obtained  with contrast. Contrast dose: iopamidol  (ISOVUE-370) solution 119 mL    COMPARISON: 12/6/2018, 11/9/2018,  8/31/2018    HISTORY: Gastric cancer, unresectable, post primary tx, assess tx  response; worsening abdominal pain s/p stenting.  History of  gastric?carcinoma,?gastroparesis, diverticulosis,?s/p esophageal  placement (12/06/2018).    FINDINGS:    Chest: The central tracheobronchial tree is patent. Centrilobular  nodular opacities in the left lower lobe. Scattered calcified  granulomas. Moderate right and small left pleural effusions with  overlying atelectasis. No pneumothorax.    Heart size is normal. Mild three-vessel coronary calcifications. No  pericardial effusion. Normal caliber of the thoracic great vessels.  Numerous calcified hilar lymph nodes are similar from prior. Right  chest wall Port-A-Cath with tip in the mid SVC. Esophageal stent  folded in on itself. Upstream from this there is esophageal mucosal  thickening and enhancement.    Abdomen and pelvis: Unchanged 10 mm hypodensity in hepatic segment 7  (series 8 image 24). The gallbladder, spleen, adrenal glands, and  pancreas demonstrate no worrisome abnormalities. Subcentimeter renal  cortical hypodensities, too small to characterize. Prominent right  renal pelvis, similar to prior. No dilated loops of bowel. Sigmoid  diverticulosis. Unchanged gastric wall thickening and mucosal  enhancement. Unchanged nodular omental thickening. Unchanged soft  tissue thickening around the gastroesophageal junction. Numerous upper  abdominal abnormally enhancing lymph nodes are also unchanged, for  example a 8 mm celiac axis node (series 8 image 304). Unchanged  eccentric right urinary bladder wall thickening. Unchanged rectal wall  thickening and hyperenhancement of the mucosa.    Bones and soft tissues: Mild anasarca. No worrisome osseous  abnormality. Prominent degenerative changes in the hips.      Impression    IMPRESSION:   1. Esophageal stent folded in on itself, likely significantly  restricting passage of contents into stomach. Upstream esophageal  wall  thickening and enhancement may be related to associated esophagitis.  2. Centrilobular nodular opacities in the left lower lobe, likely  infection and/or aspiration.  3. Unchanged gastric wall thickening, soft tissue at the  gastroesophageal junction and gastric mucosal hyperenhancement, likely  site of primary malignancy.  4. Grossly unchanged omental nodularity with increased moderate  ascites from peritoneal carcinomatosis.  5. Unchanged metastatic upper abdominal adenopathy.  6. Moderate right and trace left pleural effusions with overlying  atelectasis.  7. Unchanged 10 mm hypodensity in hepatic segment 7.  8. Unchanged rectal wall thickening and hyperenhancement of the mucosa  since 11/9/2018.    [Urgent Result: Stent malfunction]    Finding was identified on 12/12/2018 12:56 PM.     Dr. Hills was contacted by Dr. Aguirre at 12/12/2018 1:17 PM and  verbalized understanding of the urgent finding.     I have personally reviewed the examination and initial interpretation  and I agree with the findings.    LIGIA SANDERS MD   XR Surgery YOSELYN Fluoro L/T 5 Min w Stills    Narrative    This exam was marked as non-reportable because it will not be read by a   radiologist or a [facility] non-radiologist provider.

## 2018-12-14 NOTE — PLAN OF CARE
[id number]: Afebrile, VSS. C/o mid abdominal pain, IV Dilaudid given x1. No acute events. Family at bedside with pt. Continue to monitor.

## 2018-12-14 NOTE — PROGRESS NOTES
HTN and slightly tachy. LA 1.3. R port patent with NS at 75 ml/hr. 1/2 L bolus given. Will need KCl replaced (4.2.) Dilaudid use continues for midgastric pain though using less frequently. Has also been more fatigued. Yellow watery emesis X2-scheduled zofran and compazine given. Baclofen increased for returning hiccups. Abd CT done. Ascites present-to have paracentesis either today or tomorrow. Continues to cough up thick sputum.

## 2018-12-14 NOTE — PROGRESS NOTES
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 Lopez on 12/6/18. Patient presented with chest pain and difficulty handling secretions. CT C/A/P showed likely invaginating and obstructing passage of contents to the stomach, now s/p esophageal stent replacement 12/12. Patient with ongoing pain and hiccups.         RECOMMENDATIONS:  --Please obtain CT C/A with contrast to evaluate stent   --Options are limited, either pull the stent if not tolerating with possible placement of G-Tube. We will evaluate imaging first.  --Continue baclofen for hiccups   --Continue ongoing supportive cares  --We will follow     The patient was discussed and plan agreed upon with GI staff.    Chencho Velez MD  GI Fellow  _______________________________________________________________  S: Patient report abdominal pain mainly epigastric. He also complaints of one time vomiting and hiccups.    O:  Blood pressure (!) 155/102, pulse 99, temperature 98.5  F (36.9  C), temperature source Oral, resp. rate 20, height 1.791 m (5' 10.5\"), weight 91.9 kg (202 lb 8 oz), SpO2 96 %.    Gen:alert and oriented  HEENT: mucosa moist  CV: RRR  Lungs: Clear to auscultation   Abd: Pain to palpation in epigastrium no peritoneal signs   Skin: No jaundice   Neuro: No focal deficits  Psych: Normal affect      LABS:  BMP  Recent Labs   Lab 12/14/18  0609 12/13/18  0627 12/12/18  0745    133 134   POTASSIUM 3.2* 3.5 3.4   CHLORIDE 101 100 98   BRITTNEY 8.2* 8.5 8.8   CO2 24 22 26   BUN 9 12 14   CR 0.50* 0.58* 0.62*   GLC 82 93 135*     CBC  Recent Labs   Lab 12/14/18  0609 12/13/18  0627 12/12/18  0745   WBC 12.1* 7.9 7.4   RBC 4.78 4.83 5.06   HGB 14.4 14.3 14.9   HCT 43.6 43.7 " 44.4   MCV 91 91 88   MCH 30.1 29.6 29.4   MCHC 33.0 32.7 33.6   RDW 14.8 14.6 14.2    238 297     INR  Recent Labs   Lab 12/12/18  1350 12/12/18  1315   INR 1.42* 1.37*     LFTs  Recent Labs   Lab 12/13/18  0627 12/12/18  1315 12/12/18  0745   ALKPHOS 109 113 114   AST 35 40 37   ALT 32 33 35   BILITOTAL 0.6 0.9 0.8   PROTTOTAL 5.7* 6.0* 6.3*   ALBUMIN 2.2* 2.4* 2.5*      PANC  Recent Labs   Lab 12/12/18  1315   LIPASE 83

## 2018-12-14 NOTE — PLAN OF CARE
Hypertensive - max 159/99. OVS stable, afebrile. Pt c/o of nausea - zofran administered as scheduled. No emesis. Pt c/o of abdominal pain - 1mg IV dilaudid administered x2 this shift. Pt slept throughout nursing cares, no significant events during night. Pt stated urine occurrence once this shift, no BM. Continue to monitor.

## 2018-12-14 NOTE — CONSULTS
Patient is on IR schedule 12/14/2018 for a paracentesis.   No NPO required.  Consent will be done prior to procedure.     [place] [phone] for estimated time of procedure.     Case discussed with Dr. Anglin from IR and Dr. Cabral.    Anisha Quiñonez, MICHAELA, APRN  Interventional Radiology  Pager: [phone]

## 2018-12-14 NOTE — PROGRESS NOTES
Stable HTN, OVSS. R port patent with NS at 75 ml/hr. Requesting dilaudid when available for tight mid upper abd pain. Very limited appetite and refusing many PO meds. Abd distended. Plan for possible paracentesis tomorrow.  Coughing up thick white phlegm. DNR/DNI.

## 2018-12-15 NOTE — PROGRESS NOTES
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 He is now admitted with recurrent nausea/vomiting, hiccups and abdominal pain and CT imaging demonstrating existing stent failure by invagination. He underwent urgent stent removal and replacement with GI service on 12/12/18, though per follow-up imaging, appears to have persistent infolding of the mid-posterior wall of the stent.      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  Unresectable metastatic gastric carcinoma.  Presented from clinic with recurrent nausea/vomiting, abdominal pain and hiccups x 1 week. Had an esophogeal stent placed on 12/6/18 and felt miserable ever since. CT on admission showed invagination of the recently placed esophageal stent, likely due to ongoing malignant growth. He underwent EGD on 12/12/18 with removal of the failed stent and replacement with a longer stent. He continued to have nausea, vomiting and pain, and follow-up CT CAP on 12/12/18 showed persistent infolding of the mid-posterior wall of the stent.  - GI consulted for assistance. Considering stent removal, or leaving it in place and maximizing anti-emetic therapy. May consider feeding tube as well. Given improvement today, patient has decided to leave it in for now and defer feeding tube.  - Advancing diet slowly to low residue.  - Discontinue IV fluids today.  - Continue PPI BID (transition to PO suspension today), elevate HOB, reflux precautions.  - Per GI: repeat scope in 8-10 weeks. Will consider stent removal and G-tube in the future.  - Started Carafate QID. Dilaudid 0.5-1 mg IV Q3H PRN.  - Follow-up with outpatient oncology is scheduled for 12/21/18 in  [place].     Recurrent nausea, vomiting, poor PO, hiccups.  Unintentional weight loss.  Ongoing due to above stent issues, though seems to be improving following replacement procedure.   - Anticipating discharge soon, will change from ondansetron 8 mg IV Q6H to TID scheduled, and can use PO or suspension if tolerated.   - Will leave compazine 10 mg IV Q6H PRN for breakthrough.  - Home baclofen and gabapentin transitioned to suspensions. Increased baclofen to 15 mg TID and gabapentin resumed at PTA dose of 300 BID to aid in management of hiccups.  - Encourage home oxycodone use, increased to 5-10 mg PO Q4H PRN. Can use Dilaudid 0.5-1 mg IV Q4H PRN for breakthrough pain.     Ascites.   Has had a paracentesis in the past. CT CAP this admission notable for moderate ascites. He underwent IR-guided paracentesis on 12/14/18 with removal of 3.3 L clear yellow ascitic fluid. Cell count with >1800 WBCs, cytology pending, no differential.  - Add ciprofloxacin 500 mg PO BID for coverage of possible SBP.  - Follow-up fluid culture and cytology.     Possible aspiration PNA on CT scan.   New leukocytosis this admission, though mild and he remains afebrile. Patient producing white sputum. Could be secondary to aspiration PNA as this was identified on CT chest/abdomen on 12/14/18.  - Started Augmentin for possible aspiration PNA on 12/14/18.      Hypertension.   - Started Norvasc 5 mg daily. May need to be up-titrated as an outpatient.  - Hydralazine PRN while hospitalized.      Herpes simplex eruption.   History of genital herpes, treated intermittently when he develops eruptions. Has a cluster of small lesions on shaft of penis. No swab collected with known history.  - Started Acyclovir 400 mg tid x10 days per [employer].     CODE: DNR/DNI  DVT: mechanical  Diet/fluids: advance diet to low residue as tolerated, no IV fluids  Disposition: Anticipate discharge to home tomorrow if improvement in N/V and can tolerate PO  "intake.    Patient was seen and plan of care was discussed with attending physician Dr. German.    Lori Rousseau MD/PhD  Heme/Onc Fellow    Addendum:  Pt was seen and evaluated by me with Dr Rousseau.  I edited the above note to reflect my evaluation.  Overall pt is doing better this morning with no further emesis.  Pain improving.  He had a paracentesis suggestive of SBP.      We reviewed his nausea and vomiting.  Esophageal stent in place.  Will continue to try to advance diet.  Antiemetics.    SBP - on augmentin for presumed aspiration pneumonia.  Will start cipro.    Constipation - laxatives.    Elizabeth German MD  ___________________________________________________________________    Subjective & Interval History:    No acute events noted overnight. Feeling a little better today. No further emesis. He is hungry. Has been tolerating liquids. Mild abdominal pain, described as cramping around where the stent is thought to be localized. No fevers or chills. No hiccups today. Feeling a bit puffy, extending up to thighs and scrotum.    Physical Exam:    Blood pressure (!) 155/100, pulse 99, temperature 96.5  F (35.8  C), temperature source Axillary, resp. rate 18, height 1.791 m (5' 10.5\"), weight 91.1 kg (200 lb 12.8 oz), SpO2 98 %.    General: alert and cooperative, lying in bed, no acute distress  HEENT: sclera anicteric, EOMI, MMM  Neck: supple, normal ROM  CV: RRR, normal S1/S2, no m/r/g  Resp: CTAB, no wheezing/crackles, normal respiratory effort on ambient air  GI: soft, mildly distended and tender to palpation in the epigastric region, bowel sounds present and normoactive  MSK: warm and well-perfused, 1+ edema (up to thighs bilaterally, and involving scrotum)  Skin: no rashes on limited exam, no jaundice  Neuro: AOx3, moves all extremities equally, no focal deficits    Labs & Studies: I personally reviewed the following studies:  ROUTINE LABS (Last four results):  [facility]  Recent Labs   Lab 12/15/18  0540 " 12/14/18  2033 12/14/18  0609 12/13/18  0627 12/12/18  1315 12/12/18  0745     --  136 133  --  134   POTASSIUM 3.4 3.6 3.2* 3.5  --  3.4   CHLORIDE 98  --  101 100  --  98   CO2 25  --  24 22  --  26   ANIONGAP 12  --  11 11  --  10   GLC 95  --  82 93  --  135*   BUN 8  --  9 12  --  14   CR 0.53*  --  0.50* 0.58*  --  0.62*   GFRESTIMATED >90  --  >90 >90  --  >90   GFRESTBLACK >90  --  >90 >90  --  >90   BRITTNEY 8.6  --  8.2* 8.5  --  8.8   MAG  --   --   --  1.7  --   --    PHOS  --   --   --  3.0  --   --    PROTTOTAL  --   --   --  5.7* 6.0* 6.3*   ALBUMIN  --   --   --  2.2* 2.4* 2.5*   BILITOTAL  --   --   --  0.6 0.9 0.8   ALKPHOS  --   --   --  109 113 114   AST  --   --   --  35 40 37   ALT  --   --   --  32 33 35     CBC  Recent Labs   Lab 12/15/18  0540 12/14/18  0609 12/13/18  0627 12/12/18  0745   WBC 13.0* 12.1* 7.9 7.4   RBC 4.60 4.78 4.83 5.06   HGB 13.7 14.4 14.3 14.9   HCT 41.5 43.6 43.7 44.4   MCV 90 91 91 88   MCH 29.8 30.1 29.6 29.4   MCHC 33.0 33.0 32.7 33.6   RDW 14.8 14.8 14.6 14.2    300 238 297     INR  Recent Labs   Lab 12/14/18  1046 12/12/18  1350 12/12/18  1315   INR 1.53* 1.42* 1.37*       Medications list for reference:  Current Facility-Administered Medications   Medication     0.9% sodium chloride BOLUS     acyclovir (ZOVIRAX) suspension 400 mg     amLODIPine (NORVASC) suspension 5 mg     amoxicillin-clavulanate (AUGMENTIN) 400-57 MG/5ML suspension 875 mg     baclofen (LIORESAL) suspension 15 mg     ciprofloxacin (CIPRO) suspension 500 mg     docusate (COLACE) 50 MG/5ML liquid 100 mg     gabapentin (NEURONTIN) solution 300 mg     heparin 100 UNIT/ML injection 5 mL     heparin lock flush 10 UNIT/ML injection 5-10 mL     heparin lock flush 10 UNIT/ML injection 5-10 mL     hydrALAZINE (APRESOLINE) injection 10 mg     HYDROmorphone (PF) (DILAUDID) injection 0.5-1 mg     lidocaine (LMX4) cream     lidocaine 1 % 1 mL     LORazepam (ATIVAN) injection 0.5 mg     magnesium  hydroxide (MILK OF MAGNESIA) suspension 30 mL     May continue current IV fluids if patient has IV fluids infusing.     melatonin tablet 1 mg     naloxone (NARCAN) injection 0.1-0.4 mg     ondansetron (ZOFRAN) injection 8 mg     oxyCODONE (ROXICODONE) solution 5 mg     pantoprazole (PROTONIX) 40 mg IV push injection     potassium chloride (KLOR-CON) Packet 20-40 mEq     potassium chloride 10 mEq in 100 mL intermittent infusion with 10 mg lidocaine     potassium chloride 10 mEq in 100 mL sterile water intermittent infusion (premix)     potassium chloride 20 mEq in 50 mL intermittent infusion     potassium chloride ER (K-DUR/KLOR-CON M) CR tablet 20-40 mEq     prochlorperazine (COMPAZINE) injection 10 mg     senna-docusate (SENOKOT-S/PERICOLACE) 8.6-50 MG per tablet 1 tablet    Or     senna-docusate (SENOKOT-S/PERICOLACE) 8.6-50 MG per tablet 2 tablet     sodium chloride (PF) 0.9% PF flush 10-20 mL     sodium chloride (PF) 0.9% PF flush 10-20 mL     sodium chloride (PF) 0.9% PF flush 3 mL     sucralfate (CARAFATE) suspension 1 g

## 2018-12-15 NOTE — PROGRESS NOTES
Patient Name: Terry Yi  Medical Record Number: [id number]  Today's Date: 12/14/2018    Procedure: Diagnostic and Therapeutic Paracentesis  Proceduralist: Kaity with [facility]    Sedation start time: no sedation    Procedure start time: 1921  Puncture time: 1926  Procedure end time:     Report given to: 7D VILMA Chester  : no  needed    Other Notes: Pt arrived to IR room 7 from 7D. Consent obtained. Pt denies any questions or concerns regarding procedure. Pt positioned supine, semiFowlers and monitored per protocol. Pt tolerated procedure without any noted complications. Pt transferred back to 7D.  Total of 3200 ml ml ascites drained, 120 ml sent to lab for all tests requested in Epic. VSS.  [place] over site is clean and dry.

## 2018-12-15 NOTE — PLAN OF CARE
Pt alert and oriented x4, VSS on RA. C/O abdomen pain, given PRN IV dilaudid 1mg x2, 0.5mg x1. R chest port running NS at 75 mL/hr. Full liquid diet, poor appetite. Family at bedside, very helpful with cares. Continue plan of care.

## 2018-12-15 NOTE — PLAN OF CARE
Afebrile, VSS. Pt c/o abdominal pain, IV dilaudid given x2. Denies nausea. Potassium replaced, redraw 3.6. Augmentin, gabapentin, and acyclovir started today. Pt had paracentesis this evening, IR able to get 3320 mL of fluid off. Pt feeling much better when he returned. Good oral intake, adequate UOP. Family at bedside visiting w/ pt. Continue to monitor.

## 2018-12-15 NOTE — PROCEDURES
Interventional Radiology Brief Post Procedure Note    Procedure: IR PARACENTESIS    Proceduralist: Isaiah Anglin MD    Assistant: IR Fellow Physician, Luiz Long MD and None    Time Out: Prior to the start of the procedure and with procedural staff participation, I verbally confirmed the patient s identity using two indicators, relevant allergies, that the procedure was appropriate and matched the consent or emergent situation, and that the correct equipment/implants were available. Immediately prior to starting the procedure I conducted the Time Out with the procedural staff and re-confirmed the patient s name, procedure, and site/side. (The Joint Commission universal protocol was followed.)  Yes    Medications   Medication Event Details Admin User Admin Time   sucralfate (CARAFATE) suspension 1 g Medication Not Given Dose: 1 g; Route: Oral; Reason: Patient/family refused; Scheduled Time:  4:30 PM Glenda Ramos RN 12/14/2018  6:09 PM   lidocaine (PF) (XYLOCAINE) 1 % injection 1-30 mL Medication Given by Other Dose: 9 mL; Route: Subcutaneous; Scheduled Time:  7:30 PM; Comment: by A Page ND in IR Camille Martines RN 12/14/2018  7:32 PM       Sedation: None. Local Anesthestic used    Findings: U/S guided paracentesis in RLQ.  3320 mL clear yellow ascitic fluid drained, 120 mL of which was sent for requested labs.      Estimated Blood Loss: Minimal    Fluoroscopy Time: 0 minute(s)    SPECIMENS: None    Complications: 1. None     Condition: Stable    Plan: Per primary service.      Comments: See dictated procedure note for full details.    Isaiah Anglin MD

## 2018-12-15 NOTE — PROGRESS NOTES
Calorie Counts  Intake recorded for: 12/14 Total Kcals: 74  Total Protein: 1.5g  Kcals from Hospital Food: 74   Protein: 1.5g  Kcals from Outside Food: 0    Protein: 0g  # Meals recorded:  50% 2 jello, chicken broth  # Supplements recorded: 0

## 2018-12-16 NOTE — PLAN OF CARE
"[id number]: Vomited 200 ml this am prior to morning medications. States, \"I am done with this. I can't do this any longer.\" Asked what could be done and we discussed a g/j tube and he said \"then that's what I want.\"MD's aware and they are going to talk with GI. Hasn't vomited since this am, but keeps the emesis bag close and spits intermittently. Eating small bites of full liquid diet. Small BM yesterday (\" about 1 inch long\"). MOM ordered- awaiting from pharmacy. Colace given this am. Has been hypertensive 140s-150s/90s-106. Washed up in the bathroom with assistance of his wife and has ambulated x 2 in the halls. Also sat in the lounge for about 30 min with his family. IV dilaudid given x 2 for burning esophogeal pain at mid chest. Reports dilaudid is effective. Reports buttocks hurts from lying. Skin without breakdown, and blanchable. Sacrum mepilex ordered for prevention and comfort.     [id number]: Dilaudid was ordered q4 hours but pt having too much pain and declines oxycodone- states it always makes him vomit after getting it. MD notified and Dilaudid changed to q 2 hours. GI cocktail also ordered. Pt didn't notice much of a change after the GI cocktail but reports the dilaudid is helpful. MOM given. Ambulated again in the halls.   "

## 2018-12-16 NOTE — PROGRESS NOTES
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 He is now admitted with recurrent nausea/vomiting, hiccups and abdominal pain and CT imaging demonstrating existing stent failure by invagination. He underwent urgent stent removal and replacement with GI service on 12/12/18, though per follow-up imaging, appears to have persistent infolding of the mid-posterior wall of the stent.      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  Unresectable metastatic gastric carcinoma.  Presented from clinic with recurrent nausea/vomiting, abdominal pain and hiccups x 1 week. Had an esophogeal stent placed on 12/6/18 and felt miserable ever since. CT on admission showed invagination of the recently placed esophageal stent, likely due to ongoing malignant growth. He underwent EGD on 12/12/18 with removal of the failed stent and replacement with a longer stent. He continued to have nausea, vomiting and pain, and follow-up CT CAP on 12/12/18 showed persistent infolding of the mid-posterior wall of the stent.  - GI consulted for assistance. Considering stent removal, or leaving it in place and maximizing anti-emetic therapy. May consider feeding tube as well. Had been deferring feeding tube given mild improvement yesterday, but got worse N/V over the course of the day and now very interested in G-tube placement. Have alerted the GI team that this should be addressed on Monday. Will obtain AXR today to evaluate stent position and obstructive signs.  - Continue PPI BID, elevate HOB, reflux precautions.  - Per GI: repeat scope in 8-10 weeks. Will consider stent removal and G-tube in the future.  - Started Carafate QID.  Dilaudid 0.5-1 mg IV Q3H PRN.  - Follow-up with outpatient oncology is scheduled for 12/21/18 in [place].     Recurrent nausea, vomiting, poor PO, hiccups.  Unintentional weight loss.  Ongoing due to above stent issues, though seems to be improving following replacement procedure.   - Continue ondansetron scheduled TID for now, with compazine 10 mg IV Q6H PRN for breakthrough.  - Home baclofen and gabapentin transitioned to suspensions. Increased baclofen to 15 mg TID and gabapentin resumed at PTA dose of 300 BID to aid in management of hiccups.  - Encourage home oxycodone use, increased to 5-10 mg PO Q4H PRN. Can use Dilaudid 0.5-1 mg IV Q4H PRN for breakthrough pain.     Ascites.   Has had a paracentesis in the past. CT CAP this admission notable for moderate/large ascites. He underwent IR-guided paracentesis on 12/14/18 with removal of 3.3 L clear yellow ascitic fluid. Cell count with >1800 WBCs (no differential), cytology pending, no growth on culture.  - Added ciprofloxacin 500 mg PO BID for coverage of possible SBP on 12/15/18.  - Follow-up fluid culture and cytology.     Possible aspiration PNA on CT scan.   New leukocytosis this admission, though mild and he remains afebrile. Patient producing white sputum. Could be secondary to aspiration PNA as this was identified on CT chest/abdomen on 12/14/18.  - Started Augmentin for possible aspiration PNA on 12/14/18.      Hypertension.   - Started amlodipine 5 mg daily. May need to be up-titrated as he remains hypertensive.  - Hydralazine PRN while hospitalized.      Herpes simplex eruption.   History of genital herpes, treated intermittently when he develops eruptions. Has a cluster of small lesions on shaft of penis. No swab collected with known history.  - Started Acyclovir 400 mg TID x10 days (on 12/14/18).    CODE: DNR/DNI  DVT: mechanical  Diet/fluids: advance diet to low residue as tolerated, no IV fluids  Disposition: Anticipate discharge to home in the  "next few days pending improvement in N/V and PO intake (versus feeding tube placement).    Patient was seen and plan of care was discussed with attending physician Dr. German.    Lori Rousseau MD/PhD  Heme/Onc Fellow    Addendum:  PT was seen and evaluated by me with Dr Rousseau.  Pt continues to have n/v despite antiemetics and esophageal stent.    We discussed reconsulting GI tomorrow.  Pt is agreeable to GJ tube for feedings.  Also suggested starting ZYdis to help with nausea.  Symptoms improved with paracentesis and antibiotics.    Will also consult palliative care tomorrow morning.    Elizabeth German MD  ___________________________________________________________________    Subjective & Interval History:    Felt a little better yesterday in the AM, but over the course of the day had more nausea and vomiting. PO intake was minimal. Some urinary incontinence. Very small BM. Some abdominal cramping related to the esophageal stent. No fevers or chills. Mild non-productive cough noted. Interested in feeding tube placement. Discussed with multiple family members present.    Physical Exam:    Blood pressure (!) 146/96, pulse 99, temperature 98.9  F (37.2  C), temperature source Oral, resp. rate 18, height 1.791 m (5' 10.5\"), weight 90.6 kg (199 lb 11.2 oz), SpO2 97 %.    General: alert and cooperative, sitting up in chair, no acute distress  HEENT: sclera anicteric, EOMI, MMM  Neck: supple, normal ROM  CV: RRR, normal S1/S2, no m/r/g  Resp: CTAB, no wheezing/crackles, normal respiratory effort on ambient air  GI: soft, mildly distended and tender to palpation in the epigastric region, bowel sounds present but hypoactive  MSK: warm and well-perfused, 1+ edema (up to thighs bilaterally, and involving scrotum)  Skin: no rashes on limited exam, no jaundice  Neuro: AOx3, moves all extremities equally, no focal deficits    Labs & Studies: I personally reviewed the following studies:  ROUTINE LABS (Last four " results):  CMP  Recent Labs   Lab 12/16/18  0645 12/15/18  0540 12/14/18  2033 12/14/18  0609 12/13/18  0627 12/12/18  1315 12/12/18  0745    136  --  136 133  --  134   POTASSIUM 3.5 3.4 3.6 3.2* 3.5  --  3.4   CHLORIDE 96 98  --  101 100  --  98   CO2 24 25  --  24 22  --  26   ANIONGAP 12 12  --  11 11  --  10   * 95  --  82 93  --  135*   BUN 9 8  --  9 12  --  14   CR 0.57* 0.53*  --  0.50* 0.58*  --  0.62*   GFRESTIMATED >90 >90  --  >90 >90  --  >90   GFRESTBLACK >90 >90  --  >90 >90  --  >90   BRITTNEY 8.4* 8.6  --  8.2* 8.5  --  8.8   MAG  --   --   --   --  1.7  --   --    PHOS  --   --   --   --  3.0  --   --    PROTTOTAL  --   --   --   --  5.7* 6.0* 6.3*   ALBUMIN  --   --   --   --  2.2* 2.4* 2.5*   BILITOTAL  --   --   --   --  0.6 0.9 0.8   ALKPHOS  --   --   --   --  109 113 114   AST  --   --   --   --  35 40 37   ALT  --   --   --   --  32 33 35     CBC  Recent Labs   Lab 12/16/18  0645 12/15/18  0540 12/14/18  0609 12/13/18  0627   WBC 12.7* 13.0* 12.1* 7.9   RBC 4.79 4.60 4.78 4.83   HGB 14.4 13.7 14.4 14.3   HCT 43.0 41.5 43.6 43.7   MCV 90 90 91 91   MCH 30.1 29.8 30.1 29.6   MCHC 33.5 33.0 33.0 32.7   RDW 14.9 14.8 14.8 14.6    269 300 238     INR  Recent Labs   Lab 12/14/18  1046 12/12/18  1350 12/12/18  1315   INR 1.53* 1.42* 1.37*       Medications list for reference:  Current Facility-Administered Medications   Medication     0.9% sodium chloride BOLUS     acyclovir (ZOVIRAX) suspension 400 mg     amLODIPine (NORVASC) suspension 5 mg     amoxicillin-clavulanate (AUGMENTIN) 400-57 MG/5ML suspension 875 mg     chlorproMAZINE (THORAZINE) 25 mg in sodium chloride 0.9 % intermittent infusion     ciprofloxacin (CIPRO) suspension 500 mg     docusate (COLACE) 50 MG/5ML liquid 100 mg     gabapentin (NEURONTIN) solution 300 mg     heparin 100 UNIT/ML injection 5 mL     heparin lock flush 10 UNIT/ML injection 5-10 mL     heparin lock flush 10 UNIT/ML injection 5-10 mL     hydrALAZINE  (APRESOLINE) injection 10 mg     HYDROmorphone (PF) (DILAUDID) injection 0.5-1 mg     lidocaine (LMX4) cream     lidocaine 1 % 1 mL     LORazepam (ATIVAN) injection 0.5 mg     magnesium hydroxide (MILK OF MAGNESIA) suspension 30 mL     May continue current IV fluids if patient has IV fluids infusing.     melatonin tablet 1 mg     naloxone (NARCAN) injection 0.1-0.4 mg     OLANZapine zydis (zyPREXA) ODT tab 5 mg     ondansetron (ZOFRAN) injection 8 mg    Or     ondansetron (ZOFRAN-ODT) ODT tab 8 mg    Or     ondansetron (ZOFRAN) solution 8 mg     oxyCODONE (ROXICODONE) solution 5-10 mg     pantoprazole (PROTONIX) 2 mg/mL suspension 40 mg     potassium chloride (KLOR-CON) Packet 20-40 mEq     potassium chloride 10 mEq in 100 mL intermittent infusion with 10 mg lidocaine     potassium chloride 10 mEq in 100 mL sterile water intermittent infusion (premix)     potassium chloride 20 mEq in 50 mL intermittent infusion     potassium chloride ER (K-DUR/KLOR-CON M) CR tablet 20-40 mEq     prochlorperazine (COMPAZINE) injection 10 mg     senna-docusate (SENOKOT-S/PERICOLACE) 8.6-50 MG per tablet 1 tablet    Or     senna-docusate (SENOKOT-S/PERICOLACE) 8.6-50 MG per tablet 2 tablet     sodium chloride (PF) 0.9% PF flush 10-20 mL     sodium chloride (PF) 0.9% PF flush 10-20 mL     sodium chloride (PF) 0.9% PF flush 3 mL     sucralfate (CARAFATE) suspension 1 g

## 2018-12-16 NOTE — PLAN OF CARE
7p-11p: Afebrile. /106. Rechecked and was 152/93. No prn hydralazine given. No pain. Tolerated few bites of dinner.Tired today. Sleeping in between meds and cares. Incontinent of urine x 1. Did have an emesis of about 200 ml at shift change around 1930. On scheduled zofran. Wife at bedside to help with cares.

## 2018-12-16 NOTE — PROGRESS NOTES
Calorie Count    Intake recorded for: 12/15  Kcals: 302  Protein: 12g    kcals from Hospital Food: 302   Protein:12g    Kcals from Outside Food: 0  Protein: 0g    # Meals Recorded 100% popsicle, 8 oz skim milk, less than 25% cream of potato soup, 8 oz orange juice, sugar free hot cocoa, cream of wheat    # Supplements Recorded: 25% magic cup

## 2018-12-16 NOTE — PROGRESS NOTES
HTN and intermittently tachy up to 110s. R port patent. Ascites present though states paracentesis was helpful. Cipro added for possible small bowel perf. Dilaudid and oxy for Mid gastric pain. Emesis with hiccups-thorazine ordered and can be given. Calorie counts. Very little appetite though improving. Showered today and had many family members visiting. DNR/DNI.

## 2018-12-16 NOTE — PLAN OF CARE
"11p-7a: VSS. Afebrile. Had 200 ml brown, liquid emesis as soon as he took his po liquid oxy. Given compazine and was able to sleep after that. Given IV dilaudid x 1 for epigastric/esophageal pain with relief. Wife at bedside. Assisted pt to and from the bathroom. Pt was incontinent x 1 of urine, stating \"I was sleeping so soundly that I didn't even know that I urinated.\"   "

## 2018-12-17 NOTE — PROGRESS NOTES
Patient Name: Terry Yi  Medical Record Number: [id number]  Today's Date: 12/17/2018    Procedure: paracentesis  Proceduralist: Leanna Hancock PA-C    Procedure start time: 1035  Procedure end time: 1107    Report given to: 7D RN      Pt arrived to IR room 7 from 7D. Consent reviewed. Pt agitated with many questions, MD Fragoso to IR 7 to answer pt questions. Pt positioned supine and monitored per protocol. Pt tolerated procedure without any noted complications. Pt transferred back to 7D.

## 2018-12-17 NOTE — PROGRESS NOTES
Calorie Count    Intake recorded for: 12/16  Total Kcals: 393 Total Protein: 10g    Kcals from Hospital Food: 393  Protein: 10g    Kcals from Outside Food (average):0 Protein: 0g    # Meals Recorded: 100% sprite, 50% popsicle,  25% ice cream, less than 25% cream of wheat w/ brown sugar & milk     # Supplements Recorded: 50% 1 Magic Cup

## 2018-12-17 NOTE — OP NOTE
Upper GI Endoscopy 12/17/2018  1:38 PM [address] [place] [address] [phone]     Endoscopy Department   _______________________________________________________________________________   Patient Name: Terry Yi            Procedure Date: 12/17/2018 1:38 PM   MRN: [id number]                       Account Number: [id number]   YOB: 1965             Admit Type: Inpatient   Age: 52                                Gender: Male   Note Status: Finalized                Attending MD: Miguel Lopez MD   Total Sedation Time:                     _______________________________________________________________________________       Procedure:           Upper GI endoscopy   Indications:         Place PEG-J because patient is unable to eat   Providers:           Migule Lopez MD, Ellie Chris MD   Patient Profile:     Mr Yi is a 53yo gentleman failing to thrive in the                        setting of gastric cancer with esophageal involvement                        and linitis plastica who proceeds to endoscopic                        placement of one piece gastrojejunostomy tube following                        large volume paracentesis.   Referring MD:        Elizabeth German MD   Medicines:           General Anesthesia, Antibiotics as scheduled   Complications:       No immediate complications.   _______________________________________________________________________________   Procedure:           Pre-Anesthesia Assessment:                        - Prior to the procedure, a History and Physical was                        performed, and patient medications and allergies were                        reviewed. The patient is competent. The risks and                        benefits of the procedure and the sedation options and                        risks were discussed with the patient. All questions                        were answered and  informed consent was obtained. Patient                        identification and proposed procedure were verified by                        the nurse in the pre-procedure area. Mental Status                        Examination: alert and oriented. Airway Examination:                        Mallampati Class II (the uvula but not tonsillar pillars                        visualized). Respiratory Examination: clear to                        auscultation. CV Examination: normal. ASA Grade                        Assessment: III - A patient with severe systemic                        disease. After reviewing the risks and benefits, the                        patient was deemed in satisfactory condition to undergo                        the procedure. The anesthesia plan was to use general                        anesthesia. Immediately prior to administration of                        medications, the patient was re-assessed for adequacy to                        receive sedatives. The heart rate, respiratory rate,                        oxygen saturations, blood pressure, adequacy of                        pulmonary ventilation, and response to care were                        monitored throughout the procedure. The physical status                        of the patient was re-assessed after the procedure.                        After obtaining informed consent, the endoscope was                        passed under direct vision. Throughout the procedure,                        the patient's blood pressure, pulse, and oxygen                        saturations were monitored continuously. The gastroscope                        was introduced through the mouth, and advanced to the                        second part of duodenum. The upper GI endoscopy was                        accomplished without difficulty. The patient tolerated                        the procedure well.                                                                                      Findings:         films demonstrated that the esophageal stent was well positioned.        The proximal esophagus contained liquid contents which were suctioned.        The mid and distal esophagus were occupied by a fully expanded well        positioned EndoMaxx stent which bridged the gastroesophageal junction.        The fundus and body of the stomach apppeared diffusely inflammed and        thickened, less so in the antrum. The duodenal sweep was unremarkable.        The stomach was insufflated to appose gastric and abdominal walls. A        site was located in the antrum of the stomach with excellent        transillumination, manual external pressure and fluoroscopy for        placement. The abdominal wall was marked and prepped in a sterile        manner. The area was anesthetized with 2 mL of 1% lidocaine. The trocar        needle was introduced through the abdominal wall and into the stomach        under both fluoroscopic and direct endoscopic view. A snare was        introduced through the endoscope and opened in the gastric lumen. The        guide wire was passed through the trocar and into the open snare. The        snare was closed around the guide wire. The endoscope and snare were        removed, pulling the wire out through the mouth. A skin incision was        made at the site of needle insertion. The externally removable 24 Fr        gastrostomy tube was lubricated. The G-tube was tied to the guide wire        and pulled through the mouth and into the stomach. The trocar needle was        removed, and the gastrostomy tube was pulled out from the stomach        through the skin. The external bumper was attached to the gastrostomy        tube, and the tube was cut to remove the guide wire. With the G tube in        place a gastropexy was performed using three T tags triangulated around        the gastrostomy tube. The gastrostomy tube was then exchange over an        " 0.035\" Glidewire with a 22 Fr dilating sheath. The sheath was then        directed across the pylorus and the wire positioned within the proximal        jeunum. Over this wire, an 18F 45cm one piece gastrojejunostomy tube was        positioned with the tip verified in the proximal jejunum.The skin        marking noted to be 2 cm at the external bumper. The final tension and        compression of the abdominal wall by the PEGJ tube and external bumper        were checked and revealed that the bumper was moderately tight and        mildly deforming the skin. The feeding tube was capped, and the tube        site cleaned and dressed.                                                                                     Impression:          - Well positioned, now fully expanded esophageal stent                        bridging the esophageal component of the malignancy; the                        stent was without manipulation throughout the procedure                        - Uncomplicated placement of a one piece 18F 45cm                        gastrojejunostomy tube using the technique described                        above without gastric curl and tip confirmed in the                        proximal jejunum                        - Uncomplicated adjunct gastropexy   Recommendation:      - Standard inpatient general anesthesia recovery with                        return to oncology floor when appropriate                        - The gastrostomy tube may be used for decompression                        immediately                        - Serial abdominal exams in 2h and 4h, without signs of                        complicaiton such as increasing abdominal pain,                        shortness of breath or bleeding, the jejunostomy tube                        may be used for feeding as per nutrition recommendations                        - Tube education for the family and patient before                        discharge "                        - Avoid antithrombotics for at least three days                        - External T tag bumpers should be removed by cutting                        the underlying blue sutures in 2w                        - The findings and recommendations were discussed with                        the patient and their family                                                                                       electronically signed by SHANDA Lopez

## 2018-12-17 NOTE — PROGRESS NOTES
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 He is now admitted with recurrent nausea/vomiting, hiccups and abdominal pain and CT imaging demonstrating existing stent failure by invagination. He underwent urgent stent removal and replacement with GI service on 12/12/18, though per follow-up imaging, appears to have persistent infolding of the mid-posterior wall of the stent. Pending placement of PEG tube 12/17.       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  Unresectable metastatic gastric carcinoma.  Presented from clinic with recurrent nausea/vomiting, abdominal pain and hiccups x 1 week. Had an esophogeal stent placed on 12/6/18 and felt miserable ever since. CT on admission showed invagination of the recently placed esophageal stent, likely due to ongoing malignant growth. He underwent EGD on 12/12/18 with removal of the failed stent and replacement with a longer stent. He continued to have nausea, vomiting and pain, and follow-up CT CAP on 12/12/18 showed persistent infolding of the mid-posterior wall of the stent.  - GI consulted for assistance. Considering stent removal, or leaving it in place and maximizing anti-emetic therapy.   - Plan for PEG placement 12/17 by GI.  - Advancing diet slowly to low residue.  - Discontinue IV fluids today.  - Continue PPI BID (PO suspension), elevate HOB, reflux precautions.  - Per GI: repeat scope in 8-10 weeks. Will consider stent removal and G-tube in the future.  - Started Carafate QID. Dilaudid 0.5-1 mg IV Q3H PRN.  - Follow-up with outpatient oncology is scheduled for 12/21/18 in [place].     Recurrent nausea, vomiting, poor PO,  hiccups.  Unintentional weight loss.  Ongoing due to above stent issues, though seems to be improving following replacement procedure.   - Anticipating discharge soon, will change from ondansetron 8 mg IV TID scheduled, and can use PO or suspension if tolerated for trial of home regimen.   - Will leave compazine 10 mg IV Q6H PRN for breakthrough.  - Home baclofen and gabapentin transitioned to suspensions. Increased baclofen to 15 mg TID and gabapentin resumed at PTA dose of 300 BID to aid in management of hiccups.  - Encourage home oxycodone use, increased to 5-10 mg PO Q4H PRN. Can use Dilaudid 0.5-1 mg IV Q4H PRN for breakthrough pain.     Ascites.   Has had a paracentesis in the past. CT CAP this admission notable for moderate ascites. He underwent IR-guided paracentesis on 12/14/18 with removal of 3.3 L clear yellow ascitic fluid. Cell count with >1800 WBCs, cytology pending, no differential.  Repeat para on 12/17 with 2L fluid removed).    - Continue ciprofloxacin 500 mg PO BID for coverage of possible SBP.  - Follow-up fluid culture and cytology.     Possible aspiration PNA on CT scan.   New leukocytosis this admission, though mild and he remains afebrile. Patient producing white sputum. Could be secondary to aspiration PNA as this was identified on CT chest/abdomen on 12/14/18.  - Started Augmentin for possible aspiration PNA on 12/14/18.      Hypertension.   - Started Norvasc 5 mg daily. May need to be up-titrated as an outpatient.  - Hydralazine PRN while hospitalized.      Herpes simplex eruption.   History of genital herpes, treated intermittently when he develops eruptions. Has a cluster of small lesions on shaft of penis. No swab collected with known history.  - Started Acyclovir 400 mg tid x10 days per [employer].     CODE: DNR/DNI  DVT: mechanical  Diet/fluids: advance diet to low residue as tolerated, no IV fluids  Disposition: Anticipate discharge to home in next 1-2 days after stable in terms of  "fluid intake (PEG or PO)    Patient was seen and plan of care was discussed with attending physician Dr. German.    Gildardo Arreguin MD, PhD  Hematology/Oncology Fellow  Pager: [phone]      Pt was seen and evaluated by me with Dr Arreguin.  Reviewed labs, imaging and clinical course.  On exam, pt comfortable.  Diminished breath sounds at bases.  Abd soft with mild tenderness    Discussed with patient that we have been in contact with GI to look at doing a GJ tube for tube feeds.  Increase olanzapine to bid.  Anticipate once he is not vomiting and is comfortable, will plan for discharge.    Elizabeth German MD        December 17, 2018    ___________________________________________________________________    Subjective & Interval History:    No acute events noted overnight. Feels about the same today.  Notes firmness of abdomen, but pain is less than previously, particularly after paracentesis this morning.  Was hungry and ready to eat, but was then made NPO for paracentesis.  No fevers, chills, SOB.  No nausea or vomiting this morning.  Denies other complaints or additional questions regarding plan of care.      Physical Exam:    Blood pressure (!) 167/106, pulse 99, temperature 97.8  F (36.6  C), temperature source Axillary, resp. rate 18, height 1.791 m (5' 10.5\"), weight 90.6 kg (199 lb 11.2 oz), SpO2 97 %.    General: alert and cooperative, lying in bed, no acute distress  HEENT: sclera anicteric, MMM  Neck: supple, normal ROM  CV: RRR, normal S1/S2, no m/r/g  Resp: CTAB, no wheezing/crackles, normal respiratory effort on RA  GI: soft, mildly distended and tender to palpation in the epigastric region, bowel sounds present and normoactive, R sided paracentesis site w/o tenderness or ecchymosis.   MSK: warm and well-perfused, 1+ edema (up to thighs bilaterally, and involving scrotum)  Skin: no rashes on limited exam, no jaundice  Neuro: AOx3, moves all extremities equally, no focal deficits, normal mentation, linear thought " processes.     Labs & Studies: I personally reviewed the following studies:  ROUTINE LABS (Last four results):  CMP  Recent Labs   Lab 12/17/18  0535 12/16/18  0645 12/15/18  0540 12/14/18  2033 12/14/18  0609 12/13/18  0627 12/12/18  1315 12/12/18  0745    133 136  --  136 133  --  134   POTASSIUM 3.3* 3.5 3.4 3.6 3.2* 3.5  --  3.4   CHLORIDE 96 96 98  --  101 100  --  98   CO2 26 24 25  --  24 22  --  26   ANIONGAP 11 12 12  --  11 11  --  10   GLC 98 106* 95  --  82 93  --  135*   BUN 9 9 8  --  9 12  --  14   CR 0.51* 0.57* 0.53*  --  0.50* 0.58*  --  0.62*   GFRESTIMATED >90 >90 >90  --  >90 >90  --  >90   GFRESTBLACK >90 >90 >90  --  >90 >90  --  >90   BRITTNEY 8.3* 8.4* 8.6  --  8.2* 8.5  --  8.8   MAG  --   --   --   --   --  1.7  --   --    PHOS  --   --   --   --   --  3.0  --   --    PROTTOTAL  --   --   --   --   --  5.7* 6.0* 6.3*   ALBUMIN  --   --   --   --   --  2.2* 2.4* 2.5*   BILITOTAL  --   --   --   --   --  0.6 0.9 0.8   ALKPHOS  --   --   --   --   --  109 113 114   AST  --   --   --   --   --  35 40 37   ALT  --   --   --   --   --  32 33 35     CBC  Recent Labs   Lab 12/17/18 0535 12/16/18 0645 12/15/18  0540 12/14/18  0609   WBC 10.7 12.7* 13.0* 12.1*   RBC 4.68 4.79 4.60 4.78   HGB 13.9 14.4 13.7 14.4   HCT 42.3 43.0 41.5 43.6   MCV 90 90 90 91   MCH 29.7 30.1 29.8 30.1   MCHC 32.9 33.5 33.0 33.0   RDW 15.1* 14.9 14.8 14.8    263 269 300     INR  Recent Labs   Lab 12/17/18  0823 12/14/18  1046 12/12/18  1350 12/12/18  1315   INR 1.59* 1.53* 1.42* 1.37*       Medications list for reference:  Current Facility-Administered Medications   Medication     0.9% sodium chloride BOLUS     acyclovir (ZOVIRAX) suspension 400 mg     amLODIPine (NORVASC) suspension 5 mg     amoxicillin-clavulanate (AUGMENTIN) 400-57 MG/5ML suspension 875 mg     chlorproMAZINE (THORAZINE) 25 mg in sodium chloride 0.9 % intermittent infusion     ciprofloxacin (CIPRO) suspension 500 mg     docusate (COLACE) 50  MG/5ML liquid 100 mg     gabapentin (NEURONTIN) solution 300 mg     heparin 100 UNIT/ML injection 5 mL     heparin lock flush 10 UNIT/ML injection 5-10 mL     heparin lock flush 10 UNIT/ML injection 5-10 mL     hydrALAZINE (APRESOLINE) injection 10 mg     HYDROmorphone (PF) (DILAUDID) injection 0.5-1 mg     lidocaine (LMX4) cream     lidocaine 1 % 1 mL     LORazepam (ATIVAN) injection 0.5 mg     magnesium hydroxide (MILK OF MAGNESIA) suspension 30 mL     May continue current IV fluids if patient has IV fluids infusing.     melatonin tablet 1 mg     naloxone (NARCAN) injection 0.1-0.4 mg     OLANZapine zydis (zyPREXA) ODT tab 5 mg     ondansetron (ZOFRAN) injection 8 mg    Or     ondansetron (ZOFRAN-ODT) ODT tab 8 mg    Or     ondansetron (ZOFRAN) solution 8 mg     oxyCODONE (ROXICODONE) solution 5-10 mg     pantoprazole (PROTONIX) 2 mg/mL suspension 40 mg     potassium chloride (KLOR-CON) Packet 20-40 mEq     potassium chloride 10 mEq in 100 mL intermittent infusion with 10 mg lidocaine     potassium chloride 10 mEq in 100 mL sterile water intermittent infusion (premix)     potassium chloride 20 mEq in 50 mL intermittent infusion     potassium chloride ER (K-DUR/KLOR-CON M) CR tablet 20-40 mEq     prochlorperazine (COMPAZINE) injection 10 mg     senna-docusate (SENOKOT-S/PERICOLACE) 8.6-50 MG per tablet 1 tablet    Or     senna-docusate (SENOKOT-S/PERICOLACE) 8.6-50 MG per tablet 2 tablet     sodium chloride (PF) 0.9% PF flush 10-20 mL     sodium chloride (PF) 0.9% PF flush 10-20 mL     sodium chloride (PF) 0.9% PF flush 3 mL     sucralfate (CARAFATE) suspension 1 g

## 2018-12-17 NOTE — ANESTHESIA PREPROCEDURE EVALUATION
Anesthesia Pre-Procedure Evaluation    Patient: Terry Yi   MRN:     [id number] Gender:   male   Age:    52 year old :      1965        Preoperative Diagnosis: Esophageal Obstruction   Procedure(s):  Esophagogastroduodenoscopy     Past Medical History:   Diagnosis Date     Alcohol abuse     in remission x 4 years (2013)     Diverticulosis      Gastric cancer (H)      Gastroparesis      HTN (hypertension)      Hyperplastic colon polyp      Marijuana abuse     Daily use     Vasovagal episode     with IV stick     Weight loss       Past Surgical History:   Procedure Laterality Date     AS ESOPHAGOSCOPY, DIAGNOSTIC  2017     COLONOSCOPY  2017     COLONOSCOPY  10/2014     COMBINED ESOPHAGOSCOPY, GASTROSCOPY, DUODENOSCOPY (EGD) WITH CO2 INSUFFLATION N/A 2018    Procedure: COMBINED ESOPHAGOSCOPY, GASTROSCOPY, DUODENOSCOPY (EGD) WITH CO2 INSUFFLATION;  EGD, Dysphagia, unspecified type Malignant neoplasm of overlapping sites of stomach (H), Ref by Edmund, BMI 30.71;  Surgeon: Duane, William Charles, MD;  Location: [facility]     ESOPHAGOSCOPY, GASTROSCOPY, DUODENOSCOPY (EGD), COMBINED N/A 3/27/2017    Procedure: COMBINED ENDOSCOPIC ULTRASOUND, ESOPHAGOSCOPY, GASTROSCOPY, DUODENOSCOPY (EGD), FINE NEEDLE ASPIRATE/BIOPSY;  Surgeon: Guru Brien Hancock MD;  Location: [facility]     ESOPHAGOSCOPY, GASTROSCOPY, DUODENOSCOPY (EGD), COMBINED N/A 2018    Procedure: COMBINED ESOPHAGOSCOPY, GASTROSCOPY, DUODENOSCOPY (EGD), BIOPSY SINGLE OR MULTIPLE;;  Surgeon: Duane, William Charles, MD;  Location: [facility]     ESOPHAGOSCOPY, GASTROSCOPY, DUODENOSCOPY (EGD), COMBINED N/A 2018    Procedure: COMBINED ESOPHAGOSCOPY, GASTROSCOPY, DUODENOSCOPY (EGD);  Surgeon: James Mckeon MD;  Location: [facility]     ESOPHAGOSCOPY, GASTROSCOPY, DUODENOSCOPY (EGD), COMBINED N/A 2018    Procedure: Upper Gastrointestinal Endoscopy with Esophageal Stent Placement;  Surgeon: Miguel Lopez MD;  Location:  [facility]     ESOPHAGOSCOPY, GASTROSCOPY, DUODENOSCOPY (EGD), COMBINED N/A 12/12/2018    Procedure: Esophagogastroduodenoscopy with Esophageal Stent Exchange;  Surgeon: Miguel Lopez MD;  Location: [facility]     IR PARACENTESIS  12/14/2018     IR PARACENTESIS  12/17/2018          Anesthesia Evaluation     . Pt has had prior anesthetic. Type: General    No history of anesthetic complications          ROS/MED HX    ENT/Pulmonary:       Neurologic:       Cardiovascular:     (+) hypertension----. : . . . :. .       METS/Exercise Tolerance:     Hematologic:         Musculoskeletal:         GI/Hepatic:         Renal/Genitourinary:         Endo:         Psychiatric:         Infectious Disease:         Malignancy:         Other:                         PHYSICAL EXAM:   Mental Status/Neuro: A/A/O   Airway: Facies: Feasible  Mallampati: I  Mouth/Opening: Full  TM distance: > 6 cm  Neck ROM: Full   Respiratory: Auscultation: CTAB     Resp. Rate: Normal     Resp. Effort: Normal      CV: Rhythm: Regular  Rate: Age appropriate  Heart: Normal Sounds   Comments:      Dental: Normal                  Lab Results   Component Value Date    WBC 10.7 12/17/2018    HGB 13.9 12/17/2018    HCT 42.3 12/17/2018     12/17/2018     12/17/2018    POTASSIUM 3.3 (L) 12/17/2018    CHLORIDE 96 12/17/2018    CO2 26 12/17/2018    BUN 9 12/17/2018    CR 0.51 (L) 12/17/2018    GLC 98 12/17/2018    BRITTNEY 8.3 (L) 12/17/2018    PHOS 3.0 12/13/2018    MAG 1.7 12/13/2018    ALBUMIN 2.2 (L) 12/13/2018    PROTTOTAL 5.7 (L) 12/13/2018    ALT 32 12/13/2018    AST 35 12/13/2018    ALKPHOS 109 12/13/2018    BILITOTAL 0.6 12/13/2018    LIPASE 83 12/12/2018    PTT 37 12/14/2018    INR 1.59 (H) 12/17/2018       Preop Vitals  BP Readings from Last 3 Encounters:   12/17/18 (!) 167/106   12/12/18 (!) 128/91   12/12/18 (!) 128/91    Pulse Readings from Last 3 Encounters:   12/14/18 99   12/12/18 105   12/12/18 105      Resp Readings from Last 3 Encounters:  "  12/17/18 18   12/12/18 20   12/12/18 20    SpO2 Readings from Last 3 Encounters:   12/17/18 97%   12/12/18 96%   12/12/18 96%      Temp Readings from Last 1 Encounters:   12/17/18 36.6  C (97.8  F) (Axillary)    Ht Readings from Last 1 Encounters:   12/13/18 1.791 m (5' 10.5\")      Wt Readings from Last 1 Encounters:   12/16/18 90.6 kg (199 lb 11.2 oz)    Estimated body mass index is 28.25 kg/m  as calculated from the following:    Height as of 12/13/18: 1.791 m (5' 10.5\").    Weight as of 12/16/18: 90.6 kg (199 lb 11.2 oz).     LDA:  Port A Cath Single 03/30/17 Right Chest wall (Active)   Access Date 12/11/18 12/15/2018  7:54 PM   Site Assessment WDL 12/17/2018 10:25 AM   Line Status Blood return noted;Infusing 12/17/2018 10:25 AM   Extravasation? No 12/17/2018 10:25 AM   Dressing Intervention Transparent 12/17/2018 10:25 AM   Dressing change due 12/18/18 12/17/2018 10:25 AM   Needle Change Due 12/18/18 12/17/2018 10:25 AM   Number of days: 627            Assessment:   ASA SCORE: 4 emergent     NPO Status: > 6 hours since completed Solid Foods   Documentation: H&P complete; Consents complete   Proceeding: Proceed without further delay     Plan:   Anes. Type:  General   Pre-Induction: Midazolam IV; Acetaminophen PO   Induction:  IV (RSI)   Airway: Oral ETT   Access/Monitoring: PIV   Maintenance: Balanced   Emergence: Procedure Site   Logistics: Same Day Surgery     Postop Pain/Sedation Strategy:  Standard-Options: Opioids PRN     PONV Management:  Adult Risk Factors:, H/o PONV or Motion Sickness, Postop Opioids  Prevention: Ondansetron     CONSENT: Direct conversation   Plan and risks discussed with: Patient   Blood Products: Consent Deferred (Minimal Blood Loss)       Comments for Plan/Consent:  DNR/DNI is lifted perioperatively according to the patient's wishes                  Anesthesiology Attending Note    HPI: Terry Yi is a 52 year old male who  has a past medical history of Alcohol abuse, " Diverticulosis, Gastric cancer (H), Gastroparesis, HTN (hypertension), Hyperplastic colon polyp, Marijuana abuse, Vasovagal episode, and Weight loss.  has a past surgical history that includes colonoscopy (03/2017); ESOPHAGOSCOPY, DIAGNOSTIC (03/17/2017); colonoscopy (10/2014); Esophagoscopy, gastroscopy, duodenoscopy (EGD), combined (N/A, 3/27/2017); Combined Esophagoscopy, Gastroscopy, Duodenoscopy (Egd) With Co2 Insufflation (N/A, 4/27/2018); Esophagoscopy, gastroscopy, duodenoscopy (EGD), combined (N/A, 4/27/2018); Esophagoscopy, gastroscopy, duodenoscopy (EGD), combined (N/A, 11/26/2018); Esophagoscopy, gastroscopy, duodenoscopy (EGD), combined (N/A, 12/6/2018); Esophagoscopy, gastroscopy, duodenoscopy (EGD), combined (N/A, 12/12/2018); IR Paracentesis (12/14/2018); and IR Paracentesis (12/17/2018). with a No admission diagnoses are documented for this encounter. scheduled for Procedure(s):  Percutaneous Endoscopic Gastrostomy Tube Placement.      PMHx/PSHx/ROS:  Past Medical History:   Diagnosis Date     Alcohol abuse     in remission x 4 years (7/2013)     Diverticulosis      Gastric cancer (H)      Gastroparesis      HTN (hypertension)      Hyperplastic colon polyp      Marijuana abuse     Daily use     Vasovagal episode     with IV stick     Weight loss        Past Surgical History:   Procedure Laterality Date     AS ESOPHAGOSCOPY, DIAGNOSTIC  03/17/2017     COLONOSCOPY  03/2017     COLONOSCOPY  10/2014     COMBINED ESOPHAGOSCOPY, GASTROSCOPY, DUODENOSCOPY (EGD) WITH CO2 INSUFFLATION N/A 4/27/2018    Procedure: COMBINED ESOPHAGOSCOPY, GASTROSCOPY, DUODENOSCOPY (EGD) WITH CO2 INSUFFLATION;  EGD, Dysphagia, unspecified type Malignant neoplasm of overlapping sites of stomach (H), Ref by Edmund, BMI 30.71;  Surgeon: Duane, William Charles, MD;  Location: [facility]     ESOPHAGOSCOPY, GASTROSCOPY, DUODENOSCOPY (EGD), COMBINED N/A 3/27/2017    Procedure: COMBINED ENDOSCOPIC ULTRASOUND, ESOPHAGOSCOPY, GASTROSCOPY,  DUODENOSCOPY (EGD), FINE NEEDLE ASPIRATE/BIOPSY;  Surgeon: Guru Brien Hancock MD;  Location: [facility]     ESOPHAGOSCOPY, GASTROSCOPY, DUODENOSCOPY (EGD), COMBINED N/A 2018    Procedure: COMBINED ESOPHAGOSCOPY, GASTROSCOPY, DUODENOSCOPY (EGD), BIOPSY SINGLE OR MULTIPLE;;  Surgeon: Duane, William Charles, MD;  Location: [facility]     ESOPHAGOSCOPY, GASTROSCOPY, DUODENOSCOPY (EGD), COMBINED N/A 2018    Procedure: COMBINED ESOPHAGOSCOPY, GASTROSCOPY, DUODENOSCOPY (EGD);  Surgeon: James Mckeon MD;  Location: [facility]     ESOPHAGOSCOPY, GASTROSCOPY, DUODENOSCOPY (EGD), COMBINED N/A 2018    Procedure: Upper Gastrointestinal Endoscopy with Esophageal Stent Placement;  Surgeon: Miguel Lopez MD;  Location: [facility]     ESOPHAGOSCOPY, GASTROSCOPY, DUODENOSCOPY (EGD), COMBINED N/A 2018    Procedure: Esophagogastroduodenoscopy with Esophageal Stent Exchange;  Surgeon: Miguel Lopez MD;  Location: [facility]     IR PARACENTESIS  2018     IR PARACENTESIS  2018       Soc Hx:   Social History     Tobacco Use     Smoking status: Former Smoker     Types: Other, Cigarettes     Last attempt to quit:      Years since quittin.9     Smokeless tobacco: Former User     Tobacco comment: Light smoker in past   Substance Use Topics     Alcohol use: No     Comment: Sober since 2013         Allergies:   Allergies   Allergen Reactions     Latex      Rash (when picked up rubber tires)       Meds:     (Not in a hospital admission)    No current outpatient medications on file.         Labs:  BMP:  Recent Labs   Lab Test 18  0745      POTASSIUM 3.4   CHLORIDE 98   CO2 26   BUN 14   CR 0.62*   *   BRITTNEY 8.8     CBC:   Recent Labs   Lab Test 18  0745   WBC 7.4   RBC 5.06   HGB 14.9   HCT 44.4   MCV 88   MCH 29.4   MCHC 33.6   RDW 14.2        Coags:  Recent Labs   Lab Test 18  1350   INR 1.42*       Vital Signs:  T 97.7  P 106  /99  R 14  SpO2       Anesthetic Assessment and Plan:    52 year old male who  has a past medical history of Alcohol abuse, Diverticulosis, Gastric cancer (H), Gastroparesis, HTN (hypertension), Hyperplastic colon polyp, Marijuana abuse, Vasovagal episode, and Weight loss. to OR for Procedure(s):  Percutaneous Endoscopic Gastrostomy Tube Placement with esophageal stent exchange.  Ascites and B/L pleural effusions, patient resting comfortably in NAD.    NPO status adequate.    ASA 4    Plan for GA with RSI, standard monitors, large bore IVs, PONV prophylaxis. Antibiotics per primary service.    Prior to anesthetic I have examined the patient, reviewed the medical history and pertinent results, and discussed the ssessment and plan with the anesthesia and surgery teams.  Anesthetic risks discussed with the patient, consent in chart.      Nancy Corea MD  Anesthesiology Attending  12/12/18

## 2018-12-17 NOTE — PROCEDURES
Interventional Radiology Brief Post Procedure Note    Procedure: IR PARACENTESIS    Proceduralist: Leanna Judd PA-C    Assistant: None    Time Out: Prior to the start of the procedure and with procedural staff participation, I verbally confirmed the patient s identity using two indicators, relevant allergies, that the procedure was appropriate and matched the consent or emergent situation, and that the correct equipment/implants were available. Immediately prior to starting the procedure I conducted the Time Out with the procedural staff and re-confirmed the patient s name, procedure, and site/side. (The Joint Commission universal protocol was followed.)  Yes    Medications   Medication Event Details Admin User Admin Time       Sedation: None. Local Anesthestic used    Findings: Completed ultrasound-guided therapeutic paracentesis. A total of 1800 ml clear yellow fluid aspirated from the abdomen.      Estimated Blood Loss: Minimal    Fluoroscopy Time:  minute(s)    SPECIMENS: None    Complications: 1. None     Condition: Stable    Plan: Follow up per primary team.     Comments: See dictated procedure note for full details.    Leanna Hancock PA-C

## 2018-12-17 NOTE — CONSULTS
Patient is on IR schedule 12/17/2018 for a paracentesis.   No NPO required.  Consent will be done prior to procedure.     Please [place] the [place] RN at [phone] for estimated time of procedure.     Case discussed with Lucina from .    Anisha Quiñonez DNP, APRN  Interventional Radiology  Pager: [phone]

## 2018-12-17 NOTE — OR NURSING
K=3.3 today before supplementation that is occurring on 7D, and INR=1.59. Informed Dr. Chester of labs. No orders to recheck in Pre-op.

## 2018-12-17 NOTE — OR NURSING
"Pt's code status and preference was discussed with Dr. Chetser for the perioperative phase. Pt states \"I feel good right now, so I want to live. I want to be resuscitated\" when asked if he wanted resuscitation during the perioperative phase. Dr. Chester approved mouth swabs and ice chips while pt waits for surgery. Pt was tilted to the right side to get off his coccyx. He gets very sick when his head is lowered and he lies more flat. He starts to retch and have hiccups, and then he spits up frothy secretions, and has pain to his chest. He wants to be sitting up in the bed. His wife and friend are at bedside. Pt has not voided since 0930 this am, but he also had a paracentesis this am for removal of almost 2 liters.. He feels no urge to void at present.  "

## 2018-12-17 NOTE — PLAN OF CARE
1511-2341: Hypertensive, not meeting parameters for hydralazine. Requiring IV dilaudid 1mg Q2H for abdominal/chest pain. GI consult today for possible G tube placement. Nausea well controlled with scheduled antiemetics. Up most of night d/t nonproductive cough. Wife at bedside. Continue with POC.

## 2018-12-17 NOTE — ANESTHESIA CARE TRANSFER NOTE
Patient: Terry Yi    Procedure(s):  Percutaneous Endoscopic Gastrostomy Tube Placement with gastropexy, upper endoscopy    Diagnosis: Malnutrition  Diagnosis Additional Information: No value filed.    Anesthesia Type:   No value filed.     Note:  Airway :Face Mask  Patient transferred to:PACU  Comments: VSS. Ventilating well.Handoff Report: Identifed the Patient, Identified the Reponsible Provider, Reviewed the pertinent medical history, Discussed the surgical course, Reviewed Intra-OP anesthesia mangement and issues during anesthesia, Set expectations for post-procedure period and Allowed opportunity for questions and acknowledgement of understanding      Vitals: (Last set prior to Anesthesia Care Transfer)    CRNA VITALS  12/17/2018 1631 - 12/17/2018 1710      12/17/2018             Resp Rate (observed):  2  (Abnormal)                 Electronically Signed By: MARLIN Roach CRNA  December 17, 2018  5:10 PM

## 2018-12-17 NOTE — PLAN OF CARE
0450-5150: Hypertensive, not meeting parameters for hydralazine. Requiring IV dilaudid 1mg Q2H for abdominal/chest pain. GI consult today for possible G tube placement. Nausea well controlled with scheduled antiemetics. Up most of night d/t nonproductive cough. Wife at bedside. Continue with POC.

## 2018-12-18 NOTE — PLAN OF CARE
6050-6448: Hypertensive, not meeting parameters for hydralazine. PEG tube to gravity, dressing c/d/I. 350cc output. Dilaudid x 3 for G tube site pain, pt states oxycodone causes upset stomach. BM this shift. Continue with POC.

## 2018-12-18 NOTE — PHARMACY
Pharmacy Tube Feeding Consult    Medication reviewed for administration by feeding tube and for potential food/drug interactions.    Recommendation: No changes are needed at this time.     Pharmacy will continue to follow as new medications are ordered.    Guille MendezD

## 2018-12-18 NOTE — PROVIDER NOTIFICATION
Pt called and said GJ tube site is leaking. Nurse noted the secure tract was loose while changing his dressing. GI NP was seeing another pt on the floor at the time stopped by to look at the Pt and read GI procedure note, said the tube got pulled out 2cm. She said she will contact pt's GI team to follow up. Primary MD was also notified.

## 2018-12-18 NOTE — PROGRESS NOTES
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 He is now admitted with recurrent nausea/vomiting, hiccups and abdominal pain and CT imaging demonstrating existing stent failure by invagination. He underwent urgent stent removal and replacement with GI service on 12/12/18, though per follow-up imaging, appears to have persistent infolding of the mid-posterior wall of the stent. 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  #Unresectable metastatic gastric carcinoma.  Presented from clinic with recurrent nausea/vomiting, abdominal pain and hiccups x 1 week. Had an esophogeal stent placed on 12/6/18 and felt miserable ever since. CT on admission showed invagination of the recently placed esophageal stent, likely due to ongoing malignant growth. He underwent EGD on 12/12/18 with removal of the failed stent and replacement with a longer stent. He continued to have nausea, vomiting and pain, and follow-up CT CAP on 12/12/18 showed persistent infolding of the mid-posterior wall of the stent.  - GI consulted for assistance. Considering stent removal, or leaving it in place and maximizing anti-emetic therapy.   - Plan for PEG placement 12/17 by GI.  - Advancing diet slowly to low residue.  - IVF to TKO.  - Continue PPI BID (PO suspension), elevate HOB, reflux precautions.  - Per GI: repeat scope in 8-10 weeks. Will consider stent removal in the future.  - Started Carafate QID. Dilaudid 0.5-1 mg IV Q3H PRN.  - Follow-up with outpatient oncology is scheduled for 12/21/18 in [place].     #Recurrent nausea, vomiting, poor PO,  hiccups.  #Unintentional weight loss.  #Ongoing due to above stent issues, though seems to be improving following replacement procedure.   - Anticipating discharge soon, will change from ondansetron 8 mg IV TID scheduled, and can use PO or suspension if tolerated for trial of home regimen.   - Will leave compazine 10 mg IV Q6H PRN for breakthrough.  - Home baclofen and gabapentin transitioned to suspensions. Increased baclofen to 15 mg TID and gabapentin resumed at PTA dose of 300 BID to aid in management of hiccups.  - Discontinued PTA oxycodone prn, ordered PO dilaudid 3-4 mg PO q3hrs prn, IV dilaudid for break through.     #Ascites.   Has had a paracentesis in the past. CT CAP this admission notable for moderate ascites. He underwent IR-guided paracentesis on 12/14/18 with removal of 3.3 L clear yellow ascitic fluid. Cell count with >1800 WBCs, cytology pending, no differential.  Repeat para on 12/17 with 2L fluid removed).    - Continue ciprofloxacin 500 mg PO BID-->discontinued 12/18 (white cells with around 1% neutrophils on preliminary diff, majority (~79 %) 'other cells'.  -Cx NGTD, cytology, flow, and final differential pending.     #Possible aspiration PNA on CT scan.   New leukocytosis this admission, though mild and he remains afebrile. Patient producing white sputum. Could be secondary to aspiration PNA as this was identified on CT chest/abdomen on 12/14/18.  - Started Augmentin for possible aspiration PNA on 12/14/18.      #Hypertension.   - Started Norvasc 5 mg daily, increased to 10 mg (x12/18).  - Hydralazine PRN while hospitalized.      #Herpes simplex eruption.   History of genital herpes, treated intermittently when he develops eruptions. Has a cluster of small lesions on shaft of penis. No swab collected with known history.  - Started Acyclovir 400 mg tid x10 days per UpToDate.     FEN:  -IVF @ 50 ml/hr  -PRN lyte replacement  -Low residue diet, to start TF this evening.     Prophy/Misc:  -VTE:  No VTE ppx as post procedure, encourage to ambulate at least 4x daily.  -GI/PUD: sucralfate, Protonix   -Bowels: prn    Code status: DNR/DNI    Disposition: Pending improvement of acute issues, establishment on TF, and pain control, likely another 2-3 days.     Discussed with Dr. German.     Becky Todd PA-C  Hematology/Oncology  Pager #[phone]      Pt was seen and evaluated by me independently of SEMAJ. Reviewed labs, imaging and clinical course.  Overall much improved with GJ placement.  No emesis overnight.  On exam, pt comfortable.  Diminished breath sounds at bases.  Abd soft with mild tenderness.  GJ tube in place.     Titrate TFs using GJ.  Monitor for refeeding syndrome.  Increase olanzapine to bid if emesis reoccurs.  Currently on augmentin for aspiration pneumonia.     Elizabeth German MD        Interval History   Roberto is feeling a bit better today. Endorses pain around GJ tube site and epigastric pain but feels somewhat improved. No hiccups, nausea or vomiting in the past 24 hours. Bowels moving.     Physical Exam   Temp: 97.4  F (36.3  C) Temp src: Axillary BP: (!) 152/97 Pulse: 92 Heart Rate: 94 Resp: 20 SpO2: 98 % O2 Device: None (Room air) Oxygen Delivery: 2 LPM  Vitals:    12/15/18 0746 12/16/18 0758 12/18/18 1040   Weight: 91.1 kg (200 lb 12.8 oz) 90.6 kg (199 lb 11.2 oz) 89.7 kg (197 lb 12.8 oz)     Vital Signs with Ranges  Temp:  [96  F (35.6  C)-97.8  F (36.6  C)] 97.4  F (36.3  C)  Pulse:  [73-98] 92  Heart Rate:  [91-98] 94  Resp:  [18-20] 20  BP: (134-158)/() 152/97  SpO2:  [96 %-100 %] 98 %  I/O last 3 completed shifts:  In: 780 [I.V.:780]  Out: 2570 [Urine:400; Emesis/NG output:1170; Other:1000]    Constitutional: Pleasant male seen sitting up in bed, in NAD. Alert and interactive.   HEENT: NCAT, anicteric sclerae, conjunctiva clear. Moist mucous membranes.   Respiratory: Non-labored breathing, good air exchange. Lungs are clear to auscultation bilaterally, without wheezing, crackles or  rhonchi. No cough noted.   Cardiovascular: Regular rate and rhythm with no murmur, rub or gallop.  GI: Normoactive BS. Abdomen is soft, mildly distended, and tender to palpation in epigastric region and surrounding new GJ tube. No rigidity or guarding.  Skin: Warm and dry. No rashes or lesions on exposed surfaces.  Musculoskeletal: Extremities grossly normal. No tenderness, mild 1+ edema bilaterally.  Neurologic: A &O x3, speech normal, answering questions appropriately. Moves all extremities spontaneously. Grossly non-focal.  Neuropsychiatric: Mentation and affect normal/appropriate.  VAD: Port is c/d/i with no erythema, drainage, or tenderness.    Medications   Current Facility-Administered Medications   Medication     0.9% sodium chloride BOLUS     acyclovir (ZOVIRAX) suspension 400 mg     [START ON 12/19/2018] amLODIPine (NORVASC) suspension 10 mg     amoxicillin-clavulanate (AUGMENTIN) 400-57 MG/5ML suspension 875 mg     chlorproMAZINE (THORAZINE) 25 mg in sodium chloride 0.9 % intermittent infusion     dextrose 10 % 1,000 mL infusion     docusate (COLACE) 50 MG/5ML liquid 100 mg     gabapentin (NEURONTIN) solution 300 mg     heparin 100 UNIT/ML injection 5 mL     heparin lock flush 10 UNIT/ML injection 5-10 mL     heparin lock flush 10 UNIT/ML injection 5-10 mL     hydrALAZINE (APRESOLINE) injection 10 mg     HYDROmorphone (PF) (DILAUDID) injection 0.5-1 mg     HYDROmorphone (STANDARD CONC) (DILAUDID) liquid 3-4 mg     lactated ringers infusion     lidocaine (LMX4) cream     lidocaine (LMX4) cream     lidocaine 1 % 1 mL     lidocaine 1 % 1 mL     LORazepam (ATIVAN) injection 0.5 mg     magnesium hydroxide (MILK OF MAGNESIA) suspension 30 mL     May continue current IV fluids if patient has IV fluids infusing.     May continue current IV fluids if patient has IV fluids infusing.     May take regular AM medications except those listed below     melatonin tablet 1 mg     multivitamins w/minerals (CERTAVITE)  liquid 15 mL     naloxone (NARCAN) injection 0.1-0.4 mg     naloxone (NARCAN) injection 0.1-0.4 mg     naloxone (NARCAN) injection 0.1-0.4 mg     naloxone (NARCAN) injection 0.1-0.4 mg     OLANZapine zydis (zyPREXA) ODT tab 5 mg     ondansetron (ZOFRAN) injection 8 mg    Or     ondansetron (ZOFRAN-ODT) ODT tab 8 mg    Or     ondansetron (ZOFRAN) solution 8 mg     pantoprazole (PROTONIX) 2 mg/mL suspension 40 mg     potassium chloride (KLOR-CON) Packet 20-40 mEq     potassium chloride 10 mEq in 100 mL intermittent infusion with 10 mg lidocaine     potassium chloride 10 mEq in 100 mL sterile water intermittent infusion (premix)     potassium chloride 20 mEq in 50 mL intermittent infusion     potassium chloride ER (K-DUR/KLOR-CON M) CR tablet 20-40 mEq     prochlorperazine (COMPAZINE) injection 10 mg     senna-docusate (SENOKOT-S/PERICOLACE) 8.6-50 MG per tablet 1 tablet    Or     senna-docusate (SENOKOT-S/PERICOLACE) 8.6-50 MG per tablet 2 tablet     sodium chloride (PF) 0.9% PF flush 10-20 mL     sodium chloride (PF) 0.9% PF flush 10-20 mL     sodium chloride (PF) 0.9% PF flush 3 mL     sodium chloride (PF) 0.9% PF flush 3 mL     sodium chloride (PF) 0.9% PF flush 3 mL     sodium chloride (PF) 0.9% PF flush 3 mL     sodium chloride (PF) 0.9% PF flush 3 mL     sucralfate (CARAFATE) suspension 1 g       Data   CBC  Recent Labs   Lab 12/18/18  0544 12/17/18  0535 12/16/18  0645 12/15/18  0540   WBC 11.0 10.7 12.7* 13.0*   RBC 4.52 4.68 4.79 4.60   HGB 13.5 13.9 14.4 13.7   HCT 40.8 42.3 43.0 41.5   MCV 90 90 90 90   MCH 29.9 29.7 30.1 29.8   MCHC 33.1 32.9 33.5 33.0   RDW 15.0 15.1* 14.9 14.8    242 263 269     CMP  Recent Labs   Lab 12/18/18  0544 12/17/18  1920 12/17/18  0535 12/16/18  0645 12/15/18  0540  12/13/18  0627 12/12/18  1315 12/12/18  0745     --  133 133 136   < > 133  --  134   POTASSIUM 4.2 4.1 3.3* 3.5 3.4   < > 3.5  --  3.4   CHLORIDE 97  --  96 96 98   < > 100  --  98   CO2 26  --  26 24  25   < > 22  --  26   ANIONGAP 10  --  11 12 12   < > 11  --  10   GLC 83  --  98 106* 95   < > 93  --  135*   BUN 9  --  9 9 8   < > 12  --  14   CR 0.54*  --  0.51* 0.57* 0.53*   < > 0.58*  --  0.62*   GFRESTIMATED >90  --  >90 >90 >90   < > >90  --  >90   GFRESTBLACK >90  --  >90 >90 >90   < > >90  --  >90   BRITTNEY 8.3*  --  8.3* 8.4* 8.6   < > 8.5  --  8.8   MAG 2.0  --   --   --   --   --  1.7  --   --    PHOS 3.4  --   --   --   --   --  3.0  --   --    PROTTOTAL  --   --   --   --   --   --  5.7* 6.0* 6.3*   ALBUMIN  --   --   --   --   --   --  2.2* 2.4* 2.5*   BILITOTAL  --   --   --   --   --   --  0.6 0.9 0.8   ALKPHOS  --   --   --   --   --   --  109 113 114   AST  --   --   --   --   --   --  35 40 37   ALT  --   --   --   --   --   --  32 33 35    < > = values in this interval not displayed.     INR  Recent Labs   Lab 12/17/18  0823 12/14/18  1046 12/12/18  1350 12/12/18  1315   INR 1.59* 1.53* 1.42* 1.37*       Results for orders placed or performed during the hospital encounter of 12/12/18   CT Chest/Abdomen/Pelvis w Contrast     Value    Radiologist flags Stent malfunction (Urgent)    Narrative    EXAMINATION: CT CHEST/ABDOMEN/PELVIS W CONTRAST, 12/12/2018 12:53 PM    TECHNIQUE:  Helical CT images from the thoracic inlet through the  symphysis pubis were obtained  with contrast. Contrast dose: iopamidol  (ISOVUE-370) solution 119 mL    COMPARISON: 12/6/2018, 11/9/2018, 8/31/2018    HISTORY: Gastric cancer, unresectable, post primary tx, assess tx  response; worsening abdominal pain s/p stenting.  History of  gastric?carcinoma,?gastroparesis, diverticulosis,?s/p esophageal  placement (12/06/2018).    FINDINGS:    Chest: The central tracheobronchial tree is patent. Centrilobular  nodular opacities in the left lower lobe. Scattered calcified  granulomas. Moderate right and small left pleural effusions with  overlying atelectasis. No pneumothorax.    Heart size is normal. Mild three-vessel coronary  calcifications. No  pericardial effusion. Normal caliber of the thoracic great vessels.  Numerous calcified hilar lymph nodes are similar from prior. Right  chest wall Port-A-Cath with tip in the mid SVC. Esophageal stent  folded in on itself. Upstream from this there is esophageal mucosal  thickening and enhancement.    Abdomen and pelvis: Unchanged 10 mm hypodensity in hepatic segment 7  (series 8 image 24). The gallbladder, spleen, adrenal glands, and  pancreas demonstrate no worrisome abnormalities. Subcentimeter renal  cortical hypodensities, too small to characterize. Prominent right  renal pelvis, similar to prior. No dilated loops of bowel. Sigmoid  diverticulosis. Unchanged gastric wall thickening and mucosal  enhancement. Unchanged nodular omental thickening. Unchanged soft  tissue thickening around the gastroesophageal junction. Numerous upper  abdominal abnormally enhancing lymph nodes are also unchanged, for  example a 8 mm celiac axis node (series 8 image 304). Unchanged  eccentric right urinary bladder wall thickening. Unchanged rectal wall  thickening and hyperenhancement of the mucosa.    Bones and soft tissues: Mild anasarca. No worrisome osseous  abnormality. Prominent degenerative changes in the hips.      Impression    IMPRESSION:   1. Esophageal stent folded in on itself, likely significantly  restricting passage of contents into stomach. Upstream esophageal wall  thickening and enhancement may be related to associated esophagitis.  2. Centrilobular nodular opacities in the left lower lobe, likely  infection and/or aspiration.  3. Unchanged gastric wall thickening, soft tissue at the  gastroesophageal junction and gastric mucosal hyperenhancement, likely  site of primary malignancy.  4. Grossly unchanged omental nodularity with increased moderate  ascites from peritoneal carcinomatosis.  5. Unchanged metastatic upper abdominal adenopathy.  6. Moderate right and trace left pleural effusions with  overlying  atelectasis.  7. Unchanged 10 mm hypodensity in hepatic segment 7.  8. Unchanged rectal wall thickening and hyperenhancement of the mucosa  since 11/9/2018.    [Urgent Result: Stent malfunction]    Finding was identified on 12/12/2018 12:56 PM.     Dr. Hills was contacted by Dr. Aguirre at 12/12/2018 1:17 PM and  verbalized understanding of the urgent finding.     I have personally reviewed the examination and initial interpretation  and I agree with the findings.    LIGIA SANDERS MD   XR Surgery [facility] Fluoro L/T 5 Min w Stills    Narrative    This exam was marked as non-reportable because it will not be read by a   radiologist or a [place] non-radiologist provider.             CT Chest/Abdomen/Pelvis w Contrast    Narrative    EXAMINATION: CT CHEST/ABDOMEN/PELVIS W CONTRAST, 12/14/2018 11:04 AM    TECHNIQUE: Helical CT images from the thoracic inlet through the  symphysis pubis were obtained with intravenous contrast. Contrast  dose: 124 cc of isovue 370    COMPARISON: 12/12/2018 CT chest/abdomen/pelvis    HISTORY: Gastric cancer, monitor, post surg/post-op management; recent  esophogeal stent placement in distal esophagus/proximal stomach,  evaluate for appropriate stent placement in patient with uncontrolled  pain and unresolved sx    FINDINGS:    Chest: Decreased degree but persistent infolding of the mid posterior  wall of the recently replaced (on 12/12/2018) distal esophageal stent.  Persistent diffuse nodular distal esophageal wall thickening,  particularly at the site of stent wall infolding. The stent extends  from the mid esophagus to the gastroesophageal junction.    Normal heart size. Trace pericardial effusion. Moderate coronary  artery calcifications. Normal caliber ascending aorta and main  pulmonary artery. No central pulmonary embolism. Calcified  paratracheal, subcarinal, and hilar lymph nodes. No thoracic  lymphadenopathy. Unchanged subcentimeter hypoattenuating nodule in  the  posterior right thyroid lobe. Right chest wall internal jugular  Port-A-Cath tip at the low SVC.    Stable large right and slightly increased small left pleural effusions  with adjacent compressive atelectasis. Persistent nodular opacities in  the left lower lobe. Numerous scattered calcified granulomas. No new  or enlarging pulmonary nodule. The central tracheobronchial tree is  clear. No pneumothorax.    Abdomen and pelvis: Unchanged ill-defined hypodensity in subcapsular  hepatic segment 8 measuring 12 mm (series 3, image 268) no new focal  hepatic lesion. The gallbladder, pancreas, spleen, adrenal glands, and  renal parenchyma are unremarkable. Stable distention of the right  renal pelvis and calyces without associated hydroureter. The bladder  is unremarkable. Dystrophic calcifications in the mildly enlarged  prostate. Stable moderate to large volume ascites. Stable No free air.  No bowel obstruction. Diffuse gastric wall thickening and enhancement,  similar to prior. Scattered distal colonic diverticula.  Circumferential anorectal wall thickening. The major abdominal vessels  are patent. Moderate aortoiliac atherosclerotic calcification without  aneurysmal dilation. Stable prominent upper abdominal, periaortic  retroperitoneal, and scattered central mesenteric lymph nodes. Stable  soft tissue thickening in throughout the retroperitoneum. Stable  infiltrative heterogeneity of the anterior peritoneum without clear  soft tissue nodularity.    Bones and soft tissues: Increased subcutaneous edema about the  abdominal wall, hips, and thighs. No aggressive osseous lesion.  Degenerative changes of the hips, moderate to severe on the right.      Impression    IMPRESSION:   1. Following esophageal stent replacement on 12/12/2018, decreased but  persistent infolding of the mid posterior wall of the new stent.  Stable circumferential distal esophageal wall thickening, particularly  posteriorly at the site of stent  wall infolding.  2. Stable diffuse gastric wall thickening and mucosal hyperenhancement  in this patient with history of signet cell gastric cancer.  3. Stable moderate to large volume ascites. Associated omental  heterogeneity and retroperitoneal soft tissue thickening in keeping  with carcinomatosis.  4. Stable prominent upper abdominal, central mesenteric, and  retroperitoneal lymph nodes.  5. Unchanged indeterminate focal hypodensity in hepatic segment 8.  6. Stable circumferential anorectal wall thickening.  7. Stable moderate to large right and slightly increased small left  pleural effusions with adjacent compressive atelectasis.  8. Persistent nodular opacities in the left lower lobe, concerning for  infection. Consider aspiration.    I have personally reviewed the examination and initial interpretation  and I agree with the findings.    ELIJAH CRESPO MD   IR Paracentesis    Narrative    PROCEDURES:  1. Ultrasound guided diagnostic and therapeutic paracentesis.    CLINICAL HISTORY: Ascites.    Staff Radiologist: Isaiah Anglin MD    Fellow: Dr. James Long    Medications: Lidocaine 1% local.    PROCEDURE: The patient understood the limitations, alternatives, and  risks of the procedure and requested the procedure be performed. Both  written and oral consent were obtained.    Ultrasound demonstrates moderate ascites with adequate pocket for  paracentesis in the right mid to lower quadrant.  This area was  prepped and draped in the usual sterile fashion. Using 1% lidocaine  for local anesthesia, ultrasound guided puncture is made into fluid  within the peritoneal space with a 5 [employer] centesis needle  from a right lower quadrant approach. Image documenting position was  entered into the patient's permanent record. The catheter was advanced  into the peritoneal space and connected to vacuum bottle drainage.   3320 cc clear yellow-colored ascites aspirated. Catheter removed, 120  mL of which was  sent for requested laboratory studies. Sterile  dressing applied. No immediate complication.      Impression    IMPRESSION: Ultrasound guided paracentesis. 3320 cc ascites aspirated.  Labs sent.    UMU TRAORE MD   XR Abdomen Port 1 View    Narrative    Exam: Abdominal x-ray, upright views, 12/16/2018.    COMPARISON: CT exam 12/14/2018.    HISTORY: Nausea, vomiting, abdominal pain, status post esophageal  stent. Please evaluate for stent placement and obstruction.    FINDINGS: Upright views of the abdomen were obtained. The most  inferior portions and a portion of the left lateral abdomen were  collimated out of the field-of-view. Nonspecific paucity of bowel gas  throughout the abdomen. Again noted esophageal stent. No pneumatosis  or portal venous gas. No free air beneath the diaphragm. Moderate  right pleural effusion with overlying atelectasis versus  consolidation. Degenerative changes of the spine.      Impression    IMPRESSION:  1. Nonspecific paucity of bowel gas throughout the abdomen. Stable  position of esophageal stent correlating with CT exam 12/14/2018.  2. Moderate right pleural effusion with overlying atelectasis versus  consolidation.    CARLA MANLEY MD   IR Paracentesis    Narrative    PRE-PROCEDURE DIAGNOSIS:  Ascites    POST-PROCEDURE DIAGNOSIS:  Same    PROCEDURE:  Ultrasound-guided therapeutic paracentesis    Impression    IMPRESSION:  Completed ultrasound-guided therapeutic paracentesis. A  total of 1800 mL clear yellow fluid aspirated from the abdomen. No  immediate complication.      ----------    CLINICAL HISTORY:  Ascites. Patients for PEG tube placement today,  request for paracentesis prior.    PERFORMED BY:  Leanna Hancock PA-C    CONSENT:  The patient understood the limitations, alternatives, and  risks of the procedure and agreed to the procedure.  Written informed  consent was obtained and is documented in the patient record.    MEDICATIONS:  No intravenous sedation was  administered.  A 10:1 volume  mixture of 1% lidocaine without epinephrine buffered with 8.4%  bicarbonate solution was used for local anesthesia.  Intravenous  albumin was available (see nursing documentation for administration  record).      DESCRIPTION:  Ascites fluid was identified on limited abdominal  ultrasound exam and picture is documented in the patient's record.   The abdomen was prepped and draped in the usual sterile fashion.   Color ultrasound was used to assess the subcutaneous tissues. No  vessels were identified in the region of planned puncture. Local  anesthesia was achieved.  Under ultrasound guidance, a 5-Iranian  straight centesis needle/catheter was advanced into the peritoneal  space with ascites fluid return.  Needle was removed.  The catheter  was connected to drainage container.  Ascites was aspirated.  Catheter  was removed on completion of drainage and sterile dressing was  applied.     COMPLICATIONS:  No immediate concerns; the patient remained stable  throughout the procedure and tolerated it well.    ESTIMATED BLOOD LOSS:  Minimal    SPECIMENS: None    MARY AVINA PA-C   XR Surgery [facility] Fluoro L/T 5 Min w Stills    Narrative    This exam was marked as non-reportable because it will not be read by a   radiologist or a [place] non-radiologist provider.

## 2018-12-18 NOTE — PROGRESS NOTES
Care Coordinator - Discharge Planning    Admission Date/Time:  12/12/2018  Attending MD:  Elizabeth German MD     Data  Date of initial CC assessment:  12/18/18  Chart reviewed, discussed with interdisciplinary team.   Patient was admitted for:   1. Postoperative pain    2. Epigastric pain    3. Migration of esophageal stent, initial encounter    4. Dehydration    5. Non-intractable vomiting with nausea, unspecified vomiting type    6. 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  Current Living Situation: Patient lives with spouse.  Support System: Supportive and Involved  Services Involved: none prior to admission  Transportation at Discharge: Car and Family or friend will provide  Transportation to Medical Appointments:    - Name of caregiver: Harika Yi, wife  Barriers to Discharge: pending tolerance of tube feeds and medical stability    Met with patient and wife, Harika, at bedside. Discussed need for home tube feeds now that G/J tube is in place. Provided options for infusion company, they chose [facility]. Benefit check completed. He has 100% coverage and has met deductible for the year. Deductible will start over in January. Patient is aware of this. Placed PLC order for enteral feeds.     Coordination of Care and Referrals: Provided patient/family with options for Home Infusion.      Plan  Anticipated Discharge Date:  12/21/18  Anticipated Discharge Plan: Home with tube feeds and PRN fluids    CTS Handoff completed:  Will do when ready for discharge    Marcos Boyd RN Care Coordinator [phone]/pager [phone]

## 2018-12-18 NOTE — PROGRESS NOTES
Therapy: Enteral  Insurance: [employer] [place]   Ded: $2700  Met: $2700    Co-Insurance: 40  Max Out of Pocket: $7350  Met: $7350    Please contact Intake with any questions, [phone] or In Basket pool, [facility] ([phone]).  In reference to admission date 12/18/2018 to check Enteral coverage.

## 2018-12-18 NOTE — PLAN OF CARE
"BP (!) 152/97   Pulse 92   Temp 97.4  F (36.3  C) (Axillary)   Resp 20   Ht 1.791 m (5' 10.5\")   Wt 89.7 kg (197 lb 12.8 oz)   SpO2 98%   BMI 27.98 kg/m      Neuro: A&Ox4.   Cardiac: SR. VSS.   Respiratory: Sats >92% on RA  GI/: Adequate urine output. No BM  Diet/appetite: Tolerating Full liquid diet. Denies nausea   Activity:  Ambulating in halls SBA  Pain: At acceptable level on current regimen. Dilaudid given x2  Skin: GJ leaking at site and got pulled out 2cm. GI to follow up  LDA's: Port a cath with LR at 25ml/hr    Plan: To start TF tonight   "

## 2018-12-18 NOTE — PROGRESS NOTES
CLINICAL NUTRITION SERVICES - REASSESSMENT NOTE     Nutrition Prescription    RECOMMENDATIONS FOR MDs/PROVIDERS TO ORDER:  - Recommend continuing MIVF's since H20 flushes with TF are only for maintaining tube patency and pt at risk for dehydration pending G tube drainage.  - Please order lytes replacement protocol for Mg and PO4 with initiation of TF d/t risk for refeeding.  - Recommend limiting po intakes to CLs only with initiation and adv of TF to goal in order to allow RD to better assess TF tolerance and minimize refeeding risk. .    Malnutrition Status:    Severe malnutrition in the context of acute illness    Recommendations already ordered by Registered Dietitian (RD):  - Order lab add on to check Mg and PO4 for evaluation prior initiation of TF.  - Orders entered to initiate TF via J tube with TF formula of Isosource 1.5 @ 10 ml/hr x 8 hrs with adv by 10 ml every 8 hrs to goal of 70 ml/hr. Regimen to provide 1680 ml/day,  2520 kcals (28 kcal/kg/day), 114 g PRO (1.3 g/kg/day), 1277 ml free H2O, 296 g CHO and 25 g Fiber daily, based on dosing wt = 89 kg     Future/Additional Recommendations:  - Ability to cycle TF (once goal rate on 24 hr infusion achieved and tolerance confirmed) overnight to initial goal of 85 ml/hr x 20 hrs with further cycling to possible 14 hrs cycle @ goal rate of 120 ml/hr pending pt's tolerance.     EVALUATION OF THE PROGRESS TOWARD GOALS   Diet: adv from NPO (yesterday) to Low Fiber diet today.   - Per review of previous diet orders over the past week, pt's diet has fluctuated from FLs (12/12) to NPO to FL to Low Fiber to FL on 12/18.   - Pt also with order for the following nutritional supplements; Boost Breeze and Magic Cup.     Intake: Calorie counts initiated on 12/14 x 3 days. Ave po intakes x 3 days = 256 kcals and 8 g PRO which is meeting is meeting minimal amounts of pt's estimated nutritional needs. Pt mentioned that he had cold cereal for breakfast.      NEW FINDINGS    Provider consult received for TF Assess and Order  - Pt is s/p PEG-J placement per GI yesterday d/t inability to eat. G tube has been to gravity at present     Weight: 89.7 kg (today), 91.9 kg (12/14), 88.6 kg (12/12 - admit wt); Wt has fluctuated over past week, but overall stable. However, pt with h/o 11% wt loss x past 2 mos.    - Received IV bolus flush on 12/14, 12/12  - Pt is also s/p paracentesis on 12/17 with 1800 ml removed.    Labs: K+ WNL, No Mg or PO4 ordered since 12/13.     Medications:  MIVF's of Lactated ringers @ 25 ml/hr.  No lytes replacement protocol ordered for Mg or PO4 per review of MAR    MALNUTRITION  (Limited to upper extremities only)  % Intake: </= 50% for >/= 5 days (severe)  % Weight Loss: > 5% in 1 month (severe)  Subcutaneous Fat Loss: Facial region,  Upper arm and Lower arm:Moderate  Muscle Loss: Temporal, Facial & jaw region, Thoracic region (clavicle, acromium bone, deltoid, trapezius, pectoral), Upper arm (bicep, tricep) and Lower arm  (forearm): Moderate  Fluid Accumulation/Edema: None noted per chart review  Malnutrition Diagnosis: Severe malnutrition in the context of acute illness    Previous Goals   1. Diet advancement beyond full liquids     Evaluation: Met    2. Avg nutritional intake to meet a minimum of 75% of estimated needs (1670 Kcals and 80 g PRO) daily (per dosing wt 89 kg).    Evaluation: Not met    Previous Nutrition Diagnosis  Inadequate protein-energy intake related to inability to take adequate PO secondary to esophageal stricture as evidenced by patient diet hx indicating intakes of <75% of estimated needs for > 1 month, severe 11% wt loss x 2 months, moderate muscle/fat loss, and creatinine 0.58 mg/dL indicating probable poor protein intakes/muscle breakdown.       Evaluation: Improving    CURRENT NUTRITION DIAGNOSIS  Inadequate protein-energy intake related to altered GI function secondary to esophageal stricture inhibiting po and now with G-J tube for  question decompression/venting and TF therapy ordered, but not initiated as evidenced by po intakes meeting < 50% of intakes over the past week and no TF intakes received yet.     INTERVENTIONS  Implementation  Collaboration with Provider, RN and Care Coordinator regarding TF therapy  Enteral Nutrition - Initiate  Feeding tube flush  Multivitamin/mineral supplement therapy  Provided education to pt and spouse on plan for TF therapy    Goals  Total avg nutritional intake to meet a minimum of 25 kcal/kg and 1.2 g PRO/kg daily (per dosing wt 89 kg).    Monitoring/Evaluation  Progress toward goals will be monitored and evaluated per protocol.    Yokasta Goldberg RD,LD  Unit pager [phone]

## 2018-12-18 NOTE — ANESTHESIA POSTPROCEDURE EVALUATION
Anesthesia POST Procedure Evaluation    Patient: Terry Yi   MRN:     [id number] Gender:   male   Age:    52 year old :      1965        Preoperative Diagnosis: Malnutrition   Procedure(s):  Percutaneous Endoscopic Gastrostomy Tube Placement with gastropexy, upper endoscopy   Postop Comments: No value filed.       Anesthesia Type:  General    Reportable Event: NO     PAIN: Uncomplicated   Sign Out status: Comfortable, Well controlled pain     PONV: No PONV   Sign Out status:  No Nausea or Vomiting     Neuro/Psych: Uneventful perioperative course   Sign Out Status: Preoperative baseline; Age appropriate mentation     Airway/Resp.: Uneventful perioperative course   Sign Out Status: Non labored breathing, age appropriate RR; Resp. Status within EXPECTED Parameters     CV: Uneventful perioperative course   Sign Out status: Appropriate BP and perfusion indices; Appropriate HR/Rhythm     Disposition:   Sign Out in:  PACU  Disposition:  Phase II; Home  Recovery Course: Uneventful  Follow-Up: Not required           Last Anesthesia Record Vitals:  CR[facility] VITALS  2018 1631 - 2018 1731      2018             SpO2:  100 %          Last PACU/Preop Vitals:  Vitals:    18 1100 18 1433 18 1704   BP: (!) 167/106  (!) 144/101   Pulse:      Resp:    Temp: 36.6  C (97.8  F)  36.4  C (97.6  F)   SpO2: 97% 96% 100%         Electronically Signed By: Miguel Cui MD, 2018, 6:03 PM

## 2018-12-18 NOTE — PROGRESS NOTES
BRIEF GI PROGRESS NOTE    DOS: 12/18/2018    ASSESSMENT & PLAN:  Mr. 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 Lopez on 12/6/18. Patient presented with chest pain and difficulty handling secretions. CT C/A/P showed likely invaginating and obstructing passage of contents to the stomach, now s/p esophageal stent replacement 12/12. Following, the patient continued to have ongoing nausea and difficulty eating, interval imaging noted persistent invagination of the mid-posterior wall of the stent. PEG-J subsequently placed 12/17 for nutrition and decompression. 12/18 patient noting leakage of ascitic fluid around his PEG-J, also noting that the buttons from the t-tags have fallen off. PEG now at 4cm at the bumper (2cm when placed) this was pulled tighter to 2cm at the bumper with gentle traction.    RECOMMENDATIONS:  -- NPO at midnight (if patient has ongoing leakage may consider re-evaluation)  -- PEG pulled tighter to ~ 2cm at the bumper  -- The gastrostomy tube may be used for decompression  -- Jejunostomy tube may be used for feeding as per nutrition recommendations   -- Avoid antithrombotics for at least three days   -- External T tag bumpers should be removed by cutting the underlying blue sutures in 2w   -- Inpatient GI will follow    Discussed with Dr. Graf.    Lucina Lazaro MD  GI Fellow    Bharathi Graf MD  Gastroenterology, Pancreas and Biliary Disorders  [facility]  [id number]    ====================================================  S: States that when he got up to void last night he noted leakage around his PEG site, reports that it was like he was peeing himself. Leakage slowed down this morning but increased again when he got up to walk. Stable  "abdominal pain that is managed with his pain meds.    O:   Physical Exam:  BP (!) 145/93   Pulse 73   Temp 96.4  F (35.8  C) (Axillary)   Resp 20   Ht 1.791 m (5' 10.5\")   Wt 90.6 kg (199 lb 11.2 oz)   SpO2 96%   BMI 28.25 kg/m      CONSTITUTIONAL: Mr. Yi is lying down in bed in no apparent distress on exam.  HEENT:  NC/AT.  OP Clear.  MMM. PERRL  NECK: Supple, no cervical LAD, no JVD.   LUNGS: CTAB w/ no wheezes or crackles appreciated.   CARDIOVASCULAR: RRR w/ no M/R/G.   ABDOMEN: Soft, ND, mild tenderness around the PEG site, site is C/D/I with no surrounding erythema, mimal to no drainage in the morning but 4x4 damp when re-examined in the afternoon. No buttons in place from t-tags. PEG 4cm at the external bumper, this was pulled to 2cm at the bumper.  MUSCULOSKELETAL:  Moves all four extremities without difficulty.  DP pulses intact.  No lower extremity edema.   NEURO: A&Ox3, CN II-XII grossly intact, non-focal.   PSYCHIATRIC:  Normal affect.  SKIN: Warm, Dry, No rashes.     BMP  Recent Labs   Lab 12/18/18  0544 12/17/18 1920 12/17/18  0535 12/16/18  0645 12/15/18  0540     --  133 133 136   POTASSIUM 4.2 4.1 3.3* 3.5 3.4   CHLORIDE 97  --  96 96 98   BRITTNEY 8.3*  --  8.3* 8.4* 8.6   CO2 26  --  26 24 25   BUN 9  --  9 9 8   CR 0.54*  --  0.51* 0.57* 0.53*   GLC 83  --  98 106* 95     CBC  Recent Labs   Lab 12/18/18  0544 12/17/18  0535 12/16/18  0645 12/15/18  0540   WBC 11.0 10.7 12.7* 13.0*   RBC 4.52 4.68 4.79 4.60   HGB 13.5 13.9 14.4 13.7   HCT 40.8 42.3 43.0 41.5   MCV 90 90 90 90   MCH 29.9 29.7 30.1 29.8   MCHC 33.1 32.9 33.5 33.0   RDW 15.0 15.1* 14.9 14.8    242 263 269     INR  Recent Labs   Lab 12/17/18  0823 12/14/18  1046 12/12/18  1350 12/12/18  1315   INR 1.59* 1.53* 1.42* 1.37*     LFTs  Recent Labs   Lab 12/13/18  0627 12/12/18  1315 12/12/18  0745   ALKPHOS 109 113 114   AST 35 40 37   ALT 32 33 35   BILITOTAL 0.6 0.9 0.8   PROTTOTAL 5.7* 6.0* 6.3*   ALBUMIN 2.2* 2.4* " 2.5*      PANC  Recent Labs   Lab 12/12/18  1315   LIPASE 83     PEG-J:  Impression:          - Well positioned, now fully expanded esophageal stent                        bridging the esophageal component of the malignancy; the                        stent was without manipulation throughout the procedure                        - Uncomplicated placement of a one piece 18F 45cm                        gastrojejunostomy tube using the technique described                        above without gastric curl and tip confirmed in the                        proximal jejunum                        - Uncomplicated adjunct gastropexy   Recommendation:      - Standard inpatient general anesthesia recovery with                        return to oncology floor when appropriate                        - The gastrostomy tube may be used for decompression                        immediately                        - Serial abdominal exams in 2h and 4h, without signs of                        complicaiton such as increasing abdominal pain,                        shortness of breath or bleeding, the jejunostomy tube                        may be used for feeding as per nutrition recommendations                        - Tube education for the family and patient before                        discharge                        - Avoid antithrombotics for at least three days                        - External T tag bumpers should be removed by cutting                        the underlying blue sutures in 2w                        - The findings and recommendations were discussed with                        the patient and their family

## 2018-12-18 NOTE — PLAN OF CARE
"BP (!) 152/97   Pulse 92   Temp 97.4  F (36.3  C) (Axillary)   Resp 20   Ht 1.791 m (5' 10.5\")   Wt 89.7 kg (197 lb 12.8 oz)   SpO2 98%   BMI 27.98 kg/m      Pt called and said his GJ tube site is leaking. Had moderate amounts of yellow green fluids on dressing and on blankets. GI NP happened to be on the floor seeing another pt at the time. She stopped by and looked at it, read GI note and said the tube got pulled out 2cm. She said she will contact GI team to follow up. Primary PA was also notified.     Pt to start on TF tonight once we have and okay from GI to use his GJ tube. Given dilaudid x2 for pain. Up ambulating in halls. GT to gravity, JT clamped. Port a cath with LR at 25ml/hr  "

## 2018-12-18 NOTE — PLAN OF CARE
[id number]: BP elevated (167/101). PRN IV Hydralazine given. OVSS. Continues with mid chest pain. PRN IV Dilaudid 1 mg given. Denied nausea and vomiting. Patient went to IR for a paracentesis. 1800 mL out from RUQ. PEG tube placed and patient arrived back on floor around 1845. Patient refused capnography. PEG tube to gravity for decompression. Diet changed back to full liquid. K+ replaced for a level of 3.3. Recheck is in process. UOP adequate. Showered in the morning. Family at bedside. Continue with POC.

## 2018-12-19 NOTE — PROGRESS NOTES
"BRIEF GI PROGRESS NOTE    DOS: 12/19/2018    ASSESSMENT & PLAN:  Mr. 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 Lopez on 12/6/18. Patient presented with chest pain and difficulty handling secretions. CT C/A/P showed likely invaginating and obstructing passage of contents to the stomach, now s/p esophageal stent replacement 12/12. Following, the patient continued to have ongoing nausea and difficulty eating, interval imaging noted persistent invagination of the mid-posterior wall of the stent. PEG-J subsequently placed 12/17 for nutrition and decompression. 12/18 patient noting leakage of ascitic fluid around his PEG-J, also noting that the buttons from the t-tags have fallen off. PEG now at 4cm at the bumper (2cm when placed) this was pulled tighter to 2cm at the bumper with gentle traction. 12/19 minimal (few drops) leak throughout the day, bumper remains 2cm.     RECOMMENDATIONS:  -- OK to resume TFs and diet  -- The gastrostomy tube may be used for decompression  -- Jejunostomy tube may be used for feeding as per nutrition recommendations   -- Avoid antithrombotics for at least three days   -- External T tag bumpers should be removed by cutting the underlying blue sutures in 2w   -- Inpatient GI will sign off at this time, please contact our service if the patient has recurrent or worse leaking from the PEG-tube site     Discussed with Dr. Guidry.     Lucina Lazaro MD  GI Fellow  [id number]    ====================================================  S: Reports minimal leaking (only a few drops). Abdominal pain markedly improved.    O:   Physical Exam:  /89 (BP Location: Right arm)   Pulse 92   Temp 97.6  F (36.4  C) (Oral)   Resp 18   Ht 1.791 m (5' 10.5\")   Wt 89.7 kg " (197 lb 12.8 oz)   SpO2 96%   BMI 27.98 kg/m      CONSTITUTIONAL: Mr. Yi is lying down in bed in no apparent distress on exam.  HEENT:  NC/AT.  OP Clear.  MMM.   NECK: Supple, no cervical LAD, no JVD.   LUNGS: CTAB w/ no wheezes or crackles appreciated.   CARDIOVASCULAR: RRR w/ no M/R/G.   ABDOMEN: Soft, ND, NT, BS +ve. PEG site C/D/I. 2cm at the external bumper.  MUSCULOSKELETAL:  Moves all four extremities without difficulty.    NEURO: A&Ox3, CN II-XII grossly intact, non-focal.   PSYCHIATRIC:  Normal affect.  SKIN: Warm, Dry, No rashes.     BMP  Recent Labs   Lab 12/19/18  0536 12/18/18  0544 12/17/18  1920 12/17/18  0535 12/16/18  0645    134  --  133 133   POTASSIUM 4.1 4.2 4.1 3.3* 3.5   CHLORIDE 98 97  --  96 96   BRITTNEY 8.1* 8.3*  --  8.3* 8.4*   CO2 28 26  --  26 24   BUN 12 9  --  9 9   CR 0.60* 0.54*  --  0.51* 0.57*   GLC 85 83  --  98 106*     CBC  Recent Labs   Lab 12/19/18  0536 12/18/18  0544 12/17/18  0535 12/16/18  0645   WBC 13.0* 11.0 10.7 12.7*   RBC 4.59 4.52 4.68 4.79   HGB 13.6 13.5 13.9 14.4   HCT 41.6 40.8 42.3 43.0   MCV 91 90 90 90   MCH 29.6 29.9 29.7 30.1   MCHC 32.7 33.1 32.9 33.5   RDW 15.1* 15.0 15.1* 14.9    231 242 263     INR  Recent Labs   Lab 12/17/18  0823 12/14/18  1046 12/12/18  1350 12/12/18  1315   INR 1.59* 1.53* 1.42* 1.37*     LFTs  Recent Labs   Lab 12/13/18  0627 12/12/18  1315   ALKPHOS 109 113   AST 35 40   ALT 32 33   BILITOTAL 0.6 0.9   PROTTOTAL 5.7* 6.0*   ALBUMIN 2.2* 2.4*      PANC  Recent Labs   Lab 12/12/18  1315   LIPASE 83

## 2018-12-19 NOTE — PROGRESS NOTES
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 He is now admitted with recurrent nausea/vomiting, hiccups and abdominal pain and CT imaging demonstrating existing stent failure by invagination. He underwent urgent stent removal and replacement with GI service on 12/12/18, though per follow-up imaging, appears to have persistent infolding of the mid-posterior wall of the stent. S/p placement of PEG tube 12/17 and adjustment due to leak 12/18.  Pending control of nausea, tolerance of tube feeds prior to discharge.      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  #Unresectable metastatic gastric carcinoma.  Presented from clinic with recurrent nausea/vomiting, abdominal pain and hiccups x 1 week. Had an esophogeal stent placed on 12/6/18 and felt miserable ever since. CT on admission showed invagination of the recently placed esophageal stent, likely due to ongoing malignant growth. He underwent EGD on 12/12/18 with removal of the failed stent and replacement with a longer stent. He continued to have nausea, vomiting and pain, and follow-up CT CAP on 12/12/18 showed persistent infolding of the mid-posterior wall of the stent.  PEG placed 12/17 with need for tightening 12/18 due to ascites leak (2cm now).  Doing well from PEG standpoint and pending restart of TFs and tolerance.    - PEG placed 12/17 by GI.    - OK for clear liquid diet 12/19 and restart TF afternoon of 12/19.    - IVF to TKO.  - Continue PPI BID (PO suspension), elevate HOB, reflux precautions.  - Per GI: repeat scope in 8-10 weeks. Will consider stent removal in the future.  - Started Carafate QID.  Dilaudid 0.5-1 mg IV Q3H PRN.  - Follow-up with outpatient oncology is scheduled for 12/21/18 in [place] and 12/26.  Discussing with pt and wife regarding follow-up appointments.       #Recurrent nausea, vomiting, poor PO, hiccups.  #Unintentional weight loss.  #Ongoing due to above stent issues, though seems to be improving following replacement procedure.   - Anticipating discharge soon, will change from ondansetron 8 mg IV TID scheduled, and can use PO or suspension if tolerated for trial of home regimen.   - Will leave compazine 10 mg IV Q6H PRN for breakthrough.  - Home baclofen and gabapentin transitioned to suspensions. Increased baclofen to 15 mg TID and gabapentin resumed at PTA dose of 300 BID to aid in management of hiccups.  - PO dilaudid 3-4 mg PO q3hrs prn, IV dilaudid for break through.     #Ascites.   Has had a paracentesis in the past. CT CAP this admission notable for moderate ascites. He underwent IR-guided paracentesis on 12/14/18 with removal of 3.3 L clear yellow ascitic fluid. Cell count with >1800 WBCs, cytology pending, no differential.  Repeat para on 12/17 with 2L fluid removed).    - Continue ciprofloxacin 500 mg PO BID-->discontinued 12/18 (white cells with around 1% neutrophils on preliminary diff, majority (~79 %) 'other cells'.  -Cx NGTD, cytology, flow, and final differential pending.     #Possible aspiration PNA on CT scan.   New leukocytosis this admission, though mild and he remains afebrile. Patient producing white sputum. Could be secondary to aspiration PNA as this was identified on CT chest/abdomen on 12/14/18.  - Started Augmentin for possible aspiration PNA on 12/14/18.      #Hypertension.   - Started Norvasc 5 mg daily, increased to 10 mg (x12/18).  - Hydralazine PRN while hospitalized.      #Herpes simplex eruption.   History of genital herpes, treated intermittently when he develops eruptions. Has a cluster of small lesions on shaft of penis. No swab collected with  known history.  - Started Acyclovir 400 mg tid x10 days per UpToDate.     FEN:  -IVF @ 50 ml/hr  -PRN lyte replacement  -Clear liquid diet, OK to restart tube feeds afternoon of 12/19.    Prophy/Misc:  -VTE: No VTE ppx as post procedure, encourage to ambulate at least 4x daily.  -GI/PUD: sucralfate, Protonix   -Bowels: prn    Code status: DNR/DNI    Disposition: Pending improvement of acute issues, establishment on TF, and pain control, likely another 2-3 days.     Discussed with Dr. Cueva.    Gildardo Arreguin MD, PhD  Hematology/Oncology Fellow  Pager: [phone]  December 19, 2018       Interval History   Feeling better overall today.  G tube is not leaking this morning after adjustment by GI.  Hungry and wanting to eat.  No fevers, chills, or vomiting.  No SOB, CP.  Mild abdominal pain at G-tube insertion site, but this is improving.  Denies other complaints at this time.  Wondering when he can go home.      Physical Exam   Temp: 96.9  F (36.1  C) Temp src: Oral BP: 140/89   Heart Rate: 99 Resp: 18 SpO2: 96 % O2 Device: None (Room air)    Vitals:    12/16/18 0758 12/18/18 1040 12/19/18 1102   Weight: 90.6 kg (199 lb 11.2 oz) 89.7 kg (197 lb 12.8 oz) 87 kg (191 lb 14.4 oz)     Vital Signs with Ranges  Temp:  [96.7  F (35.9  C)-98.9  F (37.2  C)] 96.9  F (36.1  C)  Heart Rate:  [89-99] 99  Resp:  [18-20] 18  BP: (138-150)/(89-93) 140/89  SpO2:  [96 %-97 %] 96 %  I/O last 3 completed shifts:  In: 452.91 [I.V.:382.91]  Out: 5175 [Urine:225; Emesis/NG output:4950]    Constitutional: Pleasant male seen sitting up in bed, in NAD. Alert and interactive.   HEENT: NCAT, anicteric sclerae, conjunctiva clear. Moist mucous membranes.   Respiratory: Non-labored breathing, good air exchange.  Cardiovascular: Regular rate and rhythm with no murmur, rub or gallop.  GI: Normoactive BS. Abdomen is soft, mildly tender to palpation around G-tube site.  Skin: Warm and dry. No rashes or lesions on exposed surfaces. G-tube site without exudate or  drainage.    Musculoskeletal: WWPx4, 1+ LE edema b/l.    Neurologic: A &O x3, normal speech, CNII-XII grossly intact.    Neuropsychiatric: Mentation and affect normal/appropriate.  VAD: Port is c/d/i with no erythema, drainage, or tenderness.    Medications   Current Facility-Administered Medications   Medication     0.9% sodium chloride BOLUS     acyclovir (ZOVIRAX) suspension 400 mg     amLODIPine (NORVASC) suspension 10 mg     amoxicillin-clavulanate (AUGMENTIN) 400-57 MG/5ML suspension 875 mg     chlorproMAZINE (THORAZINE) 25 mg in sodium chloride 0.9 % intermittent infusion     dextrose 10 % 1,000 mL infusion     docusate (COLACE) 50 MG/5ML liquid 100 mg     gabapentin (NEURONTIN) solution 300 mg     heparin 100 UNIT/ML injection 5 mL     heparin lock flush 10 UNIT/ML injection 5-10 mL     heparin lock flush 10 UNIT/ML injection 5-10 mL     hydrALAZINE (APRESOLINE) injection 10 mg     HYDROmorphone (PF) (DILAUDID) injection 0.5-1 mg     HYDROmorphone (STANDARD CONC) (DILAUDID) liquid 3-4 mg     lactated ringers infusion     lidocaine (LMX4) cream     lidocaine (LMX4) cream     lidocaine 1 % 1 mL     lidocaine 1 % 1 mL     LORazepam (ATIVAN) injection 0.5 mg     magnesium hydroxide (MILK OF MAGNESIA) suspension 30 mL     May continue current IV fluids if patient has IV fluids infusing.     May continue current IV fluids if patient has IV fluids infusing.     May take regular AM medications except those listed below     melatonin tablet 1 mg     multivitamins w/minerals (CERTAVITE) liquid 15 mL     naloxone (NARCAN) injection 0.1-0.4 mg     OLANZapine zydis (zyPREXA) ODT tab 5 mg     ondansetron (ZOFRAN) injection 8 mg    Or     ondansetron (ZOFRAN-ODT) ODT tab 8 mg    Or     ondansetron (ZOFRAN) solution 8 mg     pantoprazole (PROTONIX) 2 mg/mL suspension 40 mg     potassium chloride (KLOR-CON) Packet 20-40 mEq     potassium chloride 10 mEq in 100 mL intermittent infusion with 10 mg lidocaine     potassium  chloride 10 mEq in 100 mL sterile water intermittent infusion (premix)     potassium chloride 20 mEq in 50 mL intermittent infusion     potassium chloride ER (K-DUR/KLOR-CON M) CR tablet 20-40 mEq     prochlorperazine (COMPAZINE) injection 10 mg     senna-docusate (SENOKOT-S/PERICOLACE) 8.6-50 MG per tablet 1 tablet    Or     senna-docusate (SENOKOT-S/PERICOLACE) 8.6-50 MG per tablet 2 tablet     sodium chloride (PF) 0.9% PF flush 10-20 mL     sodium chloride (PF) 0.9% PF flush 10-20 mL     sodium chloride (PF) 0.9% PF flush 3 mL     sodium chloride (PF) 0.9% PF flush 3 mL     sodium chloride (PF) 0.9% PF flush 3 mL     sodium chloride (PF) 0.9% PF flush 3 mL     sodium chloride (PF) 0.9% PF flush 3 mL     sucralfate (CARAFATE) suspension 1 g       Data   CBC  Recent Labs   Lab 12/19/18  0536 12/18/18  0544 12/17/18  0535 12/16/18  0645   WBC 13.0* 11.0 10.7 12.7*   RBC 4.59 4.52 4.68 4.79   HGB 13.6 13.5 13.9 14.4   HCT 41.6 40.8 42.3 43.0   MCV 91 90 90 90   MCH 29.6 29.9 29.7 30.1   MCHC 32.7 33.1 32.9 33.5   RDW 15.1* 15.0 15.1* 14.9    231 242 263     CMP  Recent Labs   Lab 12/19/18  0536 12/18/18  0544 12/17/18  1920 12/17/18  0535 12/16/18  0645  12/13/18  0627    134  --  133 133   < > 133   POTASSIUM 4.1 4.2 4.1 3.3* 3.5   < > 3.5   CHLORIDE 98 97  --  96 96   < > 100   CO2 28 26  --  26 24   < > 22   ANIONGAP 8 10  --  11 12   < > 11   GLC 85 83  --  98 106*   < > 93   BUN 12 9  --  9 9   < > 12   CR 0.60* 0.54*  --  0.51* 0.57*   < > 0.58*   GFRESTIMATED >90 >90  --  >90 >90   < > >90   GFRESTBLACK >90 >90  --  >90 >90   < > >90   BRITTNEY 8.1* 8.3*  --  8.3* 8.4*   < > 8.5   MAG 1.9 2.0  --   --   --   --  1.7   PHOS 3.1 3.4  --   --   --   --  3.0   PROTTOTAL  --   --   --   --   --   --  5.7*   ALBUMIN  --   --   --   --   --   --  2.2*   BILITOTAL  --   --   --   --   --   --  0.6   ALKPHOS  --   --   --   --   --   --  109   AST  --   --   --   --   --   --  35   ALT  --   --   --   --   --    --  32    < > = values in this interval not displayed.     INR  Recent Labs   Lab 12/17/18  0823 12/14/18  1046 12/12/18  1350   INR 1.59* 1.53* 1.42*       Results for orders placed or performed during the hospital encounter of 12/12/18   CT Chest/Abdomen/Pelvis w Contrast     Value    Radiologist flags Stent malfunction (Urgent)    Narrative    EXAMINATION: CT CHEST/ABDOMEN/PELVIS W CONTRAST, 12/12/2018 12:53 PM    TECHNIQUE:  Helical CT images from the thoracic inlet through the  symphysis pubis were obtained  with contrast. Contrast dose: iopamidol  (ISOVUE-370) solution 119 mL    COMPARISON: 12/6/2018, 11/9/2018, 8/31/2018    HISTORY: Gastric cancer, unresectable, post primary tx, assess tx  response; worsening abdominal pain s/p stenting.  History of  gastric?carcinoma,?gastroparesis, diverticulosis,?s/p esophageal  placement (12/06/2018).    FINDINGS:    Chest: The central tracheobronchial tree is patent. Centrilobular  nodular opacities in the left lower lobe. Scattered calcified  granulomas. Moderate right and small left pleural effusions with  overlying atelectasis. No pneumothorax.    Heart size is normal. Mild three-vessel coronary calcifications. No  pericardial effusion. Normal caliber of the thoracic great vessels.  Numerous calcified hilar lymph nodes are similar from prior. Right  chest wall Port-A-Cath with tip in the mid SVC. Esophageal stent  folded in on itself. Upstream from this there is esophageal mucosal  thickening and enhancement.    Abdomen and pelvis: Unchanged 10 mm hypodensity in hepatic segment 7  (series 8 image 24). The gallbladder, spleen, adrenal glands, and  pancreas demonstrate no worrisome abnormalities. Subcentimeter renal  cortical hypodensities, too small to characterize. Prominent right  renal pelvis, similar to prior. No dilated loops of bowel. Sigmoid  diverticulosis. Unchanged gastric wall thickening and mucosal  enhancement. Unchanged nodular omental thickening.  Unchanged soft  tissue thickening around the gastroesophageal junction. Numerous upper  abdominal abnormally enhancing lymph nodes are also unchanged, for  example a 8 mm celiac axis node (series 8 image 304). Unchanged  eccentric right urinary bladder wall thickening. Unchanged rectal wall  thickening and hyperenhancement of the mucosa.    Bones and soft tissues: Mild anasarca. No worrisome osseous  abnormality. Prominent degenerative changes in the hips.      Impression    IMPRESSION:   1. Esophageal stent folded in on itself, likely significantly  restricting passage of contents into stomach. Upstream esophageal wall  thickening and enhancement may be related to associated esophagitis.  2. Centrilobular nodular opacities in the left lower lobe, likely  infection and/or aspiration.  3. Unchanged gastric wall thickening, soft tissue at the  gastroesophageal junction and gastric mucosal hyperenhancement, likely  site of primary malignancy.  4. Grossly unchanged omental nodularity with increased moderate  ascites from peritoneal carcinomatosis.  5. Unchanged metastatic upper abdominal adenopathy.  6. Moderate right and trace left pleural effusions with overlying  atelectasis.  7. Unchanged 10 mm hypodensity in hepatic segment 7.  8. Unchanged rectal wall thickening and hyperenhancement of the mucosa  since 11/9/2018.    [Urgent Result: Stent malfunction]    Finding was identified on 12/12/2018 12:56 PM.     Dr. Hills was contacted by Dr. Aguirre at 12/12/2018 1:17 PM and  verbalized understanding of the urgent finding.     I have personally reviewed the examination and initial interpretation  and I agree with the findings.    LIGIA SANDERS MD   XR Surgery YOSELYN Fluoro L/T 5 Min w Stills    Narrative    This exam was marked as non-reportable because it will not be read by a   radiologist or a [place] non-radiologist provider.             CT Chest/Abdomen/Pelvis w Contrast    Narrative    EXAMINATION: CT  CHEST/ABDOMEN/PELVIS W CONTRAST, 12/14/2018 11:04 AM    TECHNIQUE: Helical CT images from the thoracic inlet through the  symphysis pubis were obtained with intravenous contrast. Contrast  dose: 124 cc of isovue 370    COMPARISON: 12/12/2018 CT chest/abdomen/pelvis    HISTORY: Gastric cancer, monitor, post surg/post-op management; recent  esophogeal stent placement in distal esophagus/proximal stomach,  evaluate for appropriate stent placement in patient with uncontrolled  pain and unresolved sx    FINDINGS:    Chest: Decreased degree but persistent infolding of the mid posterior  wall of the recently replaced (on 12/12/2018) distal esophageal stent.  Persistent diffuse nodular distal esophageal wall thickening,  particularly at the site of stent wall infolding. The stent extends  from the mid esophagus to the gastroesophageal junction.    Normal heart size. Trace pericardial effusion. Moderate coronary  artery calcifications. Normal caliber ascending aorta and main  pulmonary artery. No central pulmonary embolism. Calcified  paratracheal, subcarinal, and hilar lymph nodes. No thoracic  lymphadenopathy. Unchanged subcentimeter hypoattenuating nodule in the  posterior right thyroid lobe. Right chest wall internal jugular  Port-A-Cath tip at the low SVC.    Stable large right and slightly increased small left pleural effusions  with adjacent compressive atelectasis. Persistent nodular opacities in  the left lower lobe. Numerous scattered calcified granulomas. No new  or enlarging pulmonary nodule. The central tracheobronchial tree is  clear. No pneumothorax.    Abdomen and pelvis: Unchanged ill-defined hypodensity in subcapsular  hepatic segment 8 measuring 12 mm (series 3, image 268) no new focal  hepatic lesion. The gallbladder, pancreas, spleen, adrenal glands, and  renal parenchyma are unremarkable. Stable distention of the right  renal pelvis and calyces without associated hydroureter. The bladder  is  unremarkable. Dystrophic calcifications in the mildly enlarged  prostate. Stable moderate to large volume ascites. Stable No free air.  No bowel obstruction. Diffuse gastric wall thickening and enhancement,  similar to prior. Scattered distal colonic diverticula.  Circumferential anorectal wall thickening. The major abdominal vessels  are patent. Moderate aortoiliac atherosclerotic calcification without  aneurysmal dilation. Stable prominent upper abdominal, periaortic  retroperitoneal, and scattered central mesenteric lymph nodes. Stable  soft tissue thickening in throughout the retroperitoneum. Stable  infiltrative heterogeneity of the anterior peritoneum without clear  soft tissue nodularity.    Bones and soft tissues: Increased subcutaneous edema about the  abdominal wall, hips, and thighs. No aggressive osseous lesion.  Degenerative changes of the hips, moderate to severe on the right.      Impression    IMPRESSION:   1. Following esophageal stent replacement on 12/12/2018, decreased but  persistent infolding of the mid posterior wall of the new stent.  Stable circumferential distal esophageal wall thickening, particularly  posteriorly at the site of stent wall infolding.  2. Stable diffuse gastric wall thickening and mucosal hyperenhancement  in this patient with history of signet cell gastric cancer.  3. Stable moderate to large volume ascites. Associated omental  heterogeneity and retroperitoneal soft tissue thickening in keeping  with carcinomatosis.  4. Stable prominent upper abdominal, central mesenteric, and  retroperitoneal lymph nodes.  5. Unchanged indeterminate focal hypodensity in hepatic segment 8.  6. Stable circumferential anorectal wall thickening.  7. Stable moderate to large right and slightly increased small left  pleural effusions with adjacent compressive atelectasis.  8. Persistent nodular opacities in the left lower lobe, concerning for  infection. Consider aspiration.    I have  personally reviewed the examination and initial interpretation  and I agree with the findings.    ELIJAH CRESPO MD   IR Paracentesis    Narrative    PROCEDURES:  1. Ultrasound guided diagnostic and therapeutic paracentesis.    CLINICAL HISTORY: Ascites.    Staff Radiologist: Umu Anglin MD    Fellow: Dr. James Long    Medications: Lidocaine 1% local.    PROCEDURE: The patient understood the limitations, alternatives, and  risks of the procedure and requested the procedure be performed. Both  written and oral consent were obtained.    Ultrasound demonstrates moderate ascites with adequate pocket for  paracentesis in the right mid to lower quadrant.  This area was  prepped and draped in the usual sterile fashion. Using 1% lidocaine  for local anesthesia, ultrasound guided puncture is made into fluid  within the peritoneal space with a 5 Romanian Codesion centesis needle  from a right lower quadrant approach. Image documenting position was  entered into the patient's permanent record. The catheter was advanced  into the peritoneal space and connected to vacuum bottle drainage.   3320 cc clear yellow-colored ascites aspirated. Catheter removed, 120  mL of which was sent for requested laboratory studies. Sterile  dressing applied. No immediate complication.      Impression    IMPRESSION: Ultrasound guided paracentesis. 3320 cc ascites aspirated.  Labs sent.    UMU ANGLIN MD   XR Abdomen Port 1 View    Narrative    Exam: Abdominal x-ray, upright views, 12/16/2018.    COMPARISON: CT exam 12/14/2018.    HISTORY: Nausea, vomiting, abdominal pain, status post esophageal  stent. Please evaluate for stent placement and obstruction.    FINDINGS: Upright views of the abdomen were obtained. The most  inferior portions and a portion of the left lateral abdomen were  collimated out of the field-of-view. Nonspecific paucity of bowel gas  throughout the abdomen. Again noted esophageal stent. No pneumatosis  or  portal venous gas. No free air beneath the diaphragm. Moderate  right pleural effusion with overlying atelectasis versus  consolidation. Degenerative changes of the spine.      Impression    IMPRESSION:  1. Nonspecific paucity of bowel gas throughout the abdomen. Stable  position of esophageal stent correlating with CT exam 12/14/2018.  2. Moderate right pleural effusion with overlying atelectasis versus  consolidation.    CARLA MANLEY MD   IR Paracentesis    Narrative    PRE-PROCEDURE DIAGNOSIS:  Ascites    POST-PROCEDURE DIAGNOSIS:  Same    PROCEDURE:  Ultrasound-guided therapeutic paracentesis    Impression    IMPRESSION:  Completed ultrasound-guided therapeutic paracentesis. A  total of 1800 mL clear yellow fluid aspirated from the abdomen. No  immediate complication.      ----------    CLINICAL HISTORY:  Ascites. Patients for PEG tube placement today,  request for paracentesis prior.    PERFORMED BY:  Leanna Hancock PA-C    CONSENT:  The patient understood the limitations, alternatives, and  risks of the procedure and agreed to the procedure.  Written informed  consent was obtained and is documented in the patient record.    MEDICATIONS:  No intravenous sedation was administered.  A 10:1 volume  mixture of 1% lidocaine without epinephrine buffered with 8.4%  bicarbonate solution was used for local anesthesia.  Intravenous  albumin was available (see nursing documentation for administration  record).      DESCRIPTION:  Ascites fluid was identified on limited abdominal  ultrasound exam and picture is documented in the patient's record.   The abdomen was prepped and draped in the usual sterile fashion.   Color ultrasound was used to assess the subcutaneous tissues. No  vessels were identified in the region of planned puncture. Local  anesthesia was achieved.  Under ultrasound guidance, a 5-Thai  straight centesis needle/catheter was advanced into the peritoneal  space with ascites fluid return.  Needle was  removed.  The catheter  was connected to drainage container.  Ascites was aspirated.  Catheter  was removed on completion of drainage and sterile dressing was  applied.     COMPLICATIONS:  No immediate concerns; the patient remained stable  throughout the procedure and tolerated it well.    ESTIMATED BLOOD LOSS:  Minimal    SPECIMENS: None    MARY AVINA, PAWEL   XR Surgery YOSELYN Fluoro L/T 5 Min w Stills    Narrative    This exam was marked as non-reportable because it will not be read by a   radiologist or a [place] non-radiologist provider.

## 2018-12-19 NOTE — PLAN OF CARE
Roberto and Harika came to the [facility] for tube feeding education. They were taught cares of his new GJ tube, water flushes, medication administration and use of the Slick pump for feeds. They did OK with all skills but are a bit overwhelmed with everything. Please reinforce all education and have them do as many cares for themselves prior to discharge to decrease anxiety and increase confidence.

## 2018-12-19 NOTE — PLAN OF CARE
Afebrile, VSS. Pt c/o abdominal pain, oral Dilaudid given x2 w/ no relief, IV dilaudid given x2 w/ relief. Pt's continues to have leaking around G-tube site, GI team aware, plan for possible repair tomorrow, NPO at midnight. G-tube to gravity w/ good output. Good UOP and BM today. Tube feedings started at 10mL/hr, pt tolerating well, increase to 20mL/hr at 0030. Port needle changed. Pt w/ family throughout the evening. Continue to monitor.

## 2018-12-19 NOTE — PLAN OF CARE
[id number]: Hypertensive, not meeting parameters for hydralazine. Dilaudid Q2H for G tube site pain. NPO at midnight for potential endoscopic procedure to fix G tube site leaking. TF off at midnight as well. Continue with POC.

## 2018-12-19 NOTE — PLAN OF CARE
Neuro: A&Ox4.   Cardiac:  VSS.   Respiratory: Sating 96% on RA. Infrequent productive cough. Lungs are clear.   GI/: Adequate urine output. Using urinal. Has G/J tube. G tube to gravity. J tube with TF and Meds.   Diet/appetite: Tolerating Clear diet. Restarted TF at 1400- running at 20ml/hr. (goal 75ml.hr)  Activity:  Independent in room and halls.   Pain: At acceptable level on current regimen. $mg Dilaudid via Jtube Q 3hr.   Skin: Meplix on sacrum intact.   LDA's: PORT TKo.      Plan: Continue with POC. Notify primary team with changes. Wife at bedside. Wife and Pt went to [facility] today for G/J tube teaching

## 2018-12-20 NOTE — PROGRESS NOTES
Care Coordinator - Discharge Planning    Admission Date/Time:  12/12/2018  Attending MD:  Elizabeth German MD     Data  Date of initial CC assessment:  12/18/18  Chart reviewed, discussed with interdisciplinary team.   Patient was admitted for:   1. Postoperative pain    2. Epigastric pain    3. Migration of esophageal stent, initial encounter    4. Dehydration    5. Non-intractable vomiting with nausea, unspecified vomiting type    6. 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 Constipation, unspecified constipation type         Assessment   Per MD team, patient is ready for discharge today. Will discharge with TF's, likely not IV fluids as his GT is now clamped so will not have high outputs. TF supplies will be managed by [facility]. They are aware of discharge and awaiting orders. Supplies to be delivered 2-3 hours after orders signed. They have set up [facility] for nursing agency. Per team, there are labs that need to be drawn tomorrow. [facility] is able to do that.  Discussed plan with patient and his wife. They are in agreement and happy to be discharging today.    Plan  Anticipated Discharge Date:  12/10/18  Anticipated Discharge Plan:  Home with wife and Home infusion/Home Care    CTS Handoff completed:  YES    Marcos Boyd, RN Care Coordinator [phone]/Pager [phone]

## 2018-12-20 NOTE — PLAN OF CARE
8898-2034: AVSS. Received 4mg dilaudid x 2 via J tube. TF running @ 40 ml/hr. Goal rate of 70cc/hr. Potential discharge tomorrow. Continue with POC.

## 2018-12-20 NOTE — PLAN OF CARE
AVSS. Oral dilaudid given x2 through j tube. Pt's wife participating in all of pt's GJ tube cares and administering all medications. Tube feedings increased to 30mL/hr at 2200. G-tube with large amount of output. Good UOP, BM x2 this shift. Pt ambulating in hallway with wife tonight. Continue with POC.

## 2018-12-20 NOTE — PLAN OF CARE
Discharge    D: Orders for discharge and outpatient medications written.    I: Home medications and return to clinic schedule reviewed with patient. Discharge instructions and parameters for calling Health Care Provider reviewed. Patient left at 1630 accompanied by wife.     A: Patient/family verbalized understanding and was ready for discharge. Dilaudid adequately managing abdominal pain. TF infusing at 50 mL/hr upon discharge (goal rate 70 mL/hr). Wife has been helping administer meds through his J-tube. G tube was clamped from 7862-1744. Returned to gravity with large amount of output; clamped again upon discharge. Patient is aware to change G-tube to gravity if symptomatic. Zofran is scheduled for 3x a day. G/J tube site cleaned and gauze placed around site. Encouraged to keep track of intake and output at home. Mepilex on bottom for prevention. Port de-accessed.    P: Patient instructed to  medications in Pharmacy. Home infusion service will draw labs on 12/21 at home. Follow up as scheduled for 12/26 in [place] for labs, visit with Dr. Shaffer, and infusion.

## 2018-12-20 NOTE — PROGRESS NOTES
Prior Authorization Approval    ondansetron 4mg/5ml solution  Date Initiated: 12/20/2018  Date Completed: 12/20/2018  Prior Auth Type: Quantity Limit                Status: Approved    Effective Date: 12/20/2018 - 12/20/2019  Copay: 0.00     [place] Filled: Yes    Insurance: [employer] [place] - Phone [phone] Fax [phone]  ID: [id number]  Case Number: [id number]   Submitted Via: Rene Reynoso  [facility] Liaison  Ph: [phone] Page: [phone]

## 2018-12-20 NOTE — PROGRESS NOTES
Home Infusion  Roberto is expected to dc in a day or two and will be going home on continuous enteral feeds.  He has never done home enteral therapy before however she and his wife Harika  have had some initial teaching by [facility] nurse.  Met with Roberto and Harika at bedside and provided information about I services.  Explained about administration method via the Slick pump and backpack for mobility.      Informed them about supplies and delivery of supplies, storage of formula, plan for SNV and 24/7 availability of I staff while on enteral therapy.   Let them know we have made arrangements for [facility] to provide home nursing services.   Discussed a plan to have TF formula and supplies delivered to the hospital on day of discharge and I will return to provide some additional instruction and assist with changing Roberto over to the home Slick pump and will plan for SNV at home the following day.   Ongoing supplies will be delivered to their home.  Roberto and Harika verbalized understanding of all information given.  Harika will be Roberto's primary CG and is willing and able to learn and manage home enteral therapy.   Questions answered.  Will continue to follow and revisit pt once dc plans are confirmed.    Vi BANERJEE  [facility] Liaison  [phone] pager

## 2018-12-21 NOTE — PROGRESS NOTES
This is a recent snapshot of the patient's [facility] medical record.  For current drug dose and complete information and questions, call [phone] or In [facility] pool, fv home infusion ([id number])  CSN Number:  [id number]

## 2018-12-21 NOTE — PROGRESS NOTES
Spoke with spouse, Harika, to find out how patient was feeling following discharge from hospital on 12/20.  He is feeling so much better; he has a feeding tube (J tube) so he is only taking liquids, nothing by mouth.  He will be seen on 12/26.

## 2018-12-21 NOTE — PROGRESS NOTES
This is a recent snapshot of the patient's [facility] medical record.  For current drug dose and complete information and questions, call [phone] or In B[facility] pool, fv home infusion ([id number])  CSN Number:  [id number]

## 2018-12-26 NOTE — PROGRESS NOTES
12/26/2018      REASON FOR VISIT:  Follow-up for unresectable metastatic gastric carcinoma, diffuse type, grade 3, HER-2 negative, MSI-Intact. PD-L1 negative (<1%)     HISTORY OF PRESENT ILLNESS:  Please see my previous note for details.  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      CT CAP after C#6 shows stable disease    C# 9 FOLFOX 8/2/2017 ( Oxaliplatin dose reduced to 70mg/m2 due to neuropathy )  C#10 8/22/2017 ( Oxaliplatin dose reduced to 60mg/m2 due to neuropathy )  Delayed by 1 week- patient preference  C#11 9/6/17 5FU/LV ( Stopped Oxaliplatin )  C#12 9/20/2017 5FU/LV    Repeat CT scan after C#12 shows stable to slightly improved disease.    Repeat CT CAP on 12/22/17 after C#18 was stable    Repeat CT CAP on 3/16/18 after C#24 shows stable disease.    C# 27 4/18/2018 5FU/LV    As he was complaining of off and on trouble swallowing with food getting stuck, we repeated EGD on 4/27/2018 which showed a nodular area with possible shallow ulceration in the stomach. Otherwise esophagus and stomach and duodenum were normal.  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  He was referred to gastroenterologist for manometry    The biopsies which were taken from the nodular shallow ulcer aren't consistent with poorly differentiated diffuse type adenocarcinoma with signet ring features  Esophogram showed extrinsic compression on the GE junction resulting in luminal narrowing and associated extended retention of barium tablet. This likely corresponds with soft tissue thickening about the diaphragmatic hiatus as demonstrated on prior CT examination    Repeat CT scan on 6/8/18 after C#30 is stable with stable circumferential soft  tissue thickening at the GE Junction and stable thickness of stomach wall and peritoneal thickening    C#31 6/13/2018 5FU/LV  C#32 6/27/18  C#33 7/11/2018   C#34 7/25/18  C#35 8/8/2018  C#36 8/22/18    CT scan on 8/31/18- stable, with minimal increased fascial thickening along the right anterior pararenal fossa.     C#37 9/5/18    He had one episode on 9/10/2018 when he probably did not chew the steak well and it got stuck and he had to go to the emergency room to push it down into his his stomach.  The EGD report mentions that there was no known narrowing at the distal end of the esophagus.      C#38 9/19/18    We delayed cycle #39 because he had 2 episodes of Chest tightness and he was getting cardiac workup done so he wanted to get that completed before resuming chemotherapy.  Apparently his EKG was normal.  He also had a stress test done a few days ago which was unremarkable.  We resumed chemotherapy after a 2 week delay    C#39 10/17/2018  C#40 10/31/2018    CT chest abdomen and pelvis done on 11/9/2018 showed progression of the disease with increasing gastric wall thickening and mucosal hyperenhancement with new ascites and worsening peritoneal carcinomatosis.  There is also a new right-sided pleural effusion.    2nd line Ramucirumab and paclitaxel every other week was started 11/15/18.  C#1 D#15 on 11/29/18    Repeat EGD showed a long stenosis involving the distal esophagus and proximal stomach. A 23 mm x 120 mm EndoMAXX fully covered stent was placed.  The findings were also suspicious for linitis plastica.      He then got admitted on 12/12/18 with recurrent nausea/vomiting, hiccups and abdominal pain and CT imaging demonstrating existing stent failure by invagination.   He underwent EGD on 12/12/18 with removal of the failed stent and replacement with a longer stent.   He continued to have nausea, vomiting and pain, and follow-up CT CAP on 12/12/18 showed persistent infolding of the mid-posterior wall of  the stent.  GJ tube placed 12/17 with need for tightening 12/18 due to ascites leak.  Tube feeds were started with goal of 70ml/hr.  He was instructed to open G tube for decompression as needed but in the hospital he was able to tolerate it being clamped for several hours at a time.   GI plans to repeat scope in 8-10 weeks. Will consider stent removal in the future     CT CAP also moderate ascites. He underwent IR-guided paracentesis on 12/14/18 with removal of 3.3 L clear yellow ascitic fluid. No SBP but cytology positive for malignancy.  Repeat para on 12/17 with 2L fluid removed.    There was also a possibility of aspiration PNA on CT scan and was started on Augmentin to complete 10 days course.    During the admission he was also noted to have recurrent genital herpes simplex eruption with cluster of small lesions on the shaft of penis.  He was started on acyclovir 400 mg 3 times a day for 10 days.    He was discharged on 12/20/2018.         INTERVAL HISTORY:   He comes in today accompanied by his family members and he is not doing well and over the last few weeks his condition has significantly deteriorated and now he is resting all the time and only getting up to go to the bathroom.  He is in significant amount of pain and he started using Dilaudid 4 mg every 3 hours but is now up to 5 mg every 3 hours.  Even with this his pain is not well controlled but he feels better after getting the Dilaudid.  He feels nauseous but has not vomited.  Denies any fevers or infections.  He has lost significant amount of weight.  He continues to use tube feeds but is also using the G-tube for venting.  Bowel movements are every 3 hours or so.  Feels short of breath when he is in pain.  Neuropathy is about the same.  He does not want to pursue any more chemotherapy and just wants to be made comfortable.    ECOG 3    ROS:  A comprehensive ROS was otherwise neg        MEDICATIONS:   Current Outpatient Medications   Medication      "acetaminophen (TYLENOL) 32 mg/mL liquid     acyclovir (ZOVIRAX) 200 MG/5ML suspension     allopurinol (ZYLOPRIM) 100 MG tablet     amLODIPine (NORVASC) 1 mg/mL SUSP     docusate (COLACE) 50 MG/5ML liquid     gabapentin (NEURONTIN) 250 MG/5ML solution     HYDROmorphone, STANDARD CONC, (DILAUDID) 1 MG/ML oral solution     indomethacin (INDOCIN) 25 MG capsule     lidocaine-prilocaine (EMLA) cream     LORazepam (ATIVAN) 0.5 MG tablet     multivitamins w/minerals (CERTAVITE) liquid     NEW MED     OLANZapine zydis (ZYPREXA) 5 MG ODT     ondansetron (ZOFRAN) 4 MG/5ML solution     pantoprazole (PROTONIX) 2 mg/mL SUSP suspension     prochlorperazine (COMPAZINE) 10 MG tablet     sucralfate (CARAFATE) 1 GM/10ML suspension     No current facility-administered medications for this visit.      Facility-Administered Medications Ordered in Other Visits   Medication     heparin 100 UNIT/ML injection 5 mL     sodium chloride (PF) 0.9% PF flush 10-20 mL            PHYSICAL EXAMINATION:   /75   Pulse 115   Temp 98.3  F (36.8  C)   Resp 28   Ht 1.791 m (5' 10.5\")   SpO2 96%   BMI 27.07 kg/m    CONSTITUTIONAL: He looks very unwell and is in considerable pain and looks much thinner than when I last saw him.  He felt better after getting another dose of Dilaudid in the clinic and was much more comfortable.  EYES: no palor or icterus.   ENT/MOUTH: Slightly dry mucous membranes  CVS: s1s2 no m r g .   RESPIRATORY: Decreased breath sounds at the right lung base  GI: GJ tube site is clean.  When he was more comfortable he did not have much tenderness on palpating the abdomen  NEURO: AAOX3    INTEGUMENT: no obvious rashes  EXTREMITIES: no edema  PSYCH: Mentation, mood and affect are normal. Decision making capacity is intact      LABS:   Reviewed   Results for LEONOR RALPH (MRN [id number]) as of 12/26/2018 10:29   Ref. Range 12/26/2018 09:41   Sodium Latest Ref Range: 133 - 144 mmol/L 133   Potassium Latest Ref Range: 3.4 - 5.3 " mmol/L 4.7   Chloride Latest Ref Range: 94 - 109 mmol/L 94   Carbon Dioxide Latest Ref Range: 20 - 32 mmol/L 31   Urea Nitrogen Latest Ref Range: 7 - 30 mg/dL 39 (H)   Creatinine Latest Ref Range: 0.66 - 1.25 mg/dL 1.22   GFR Estimate Latest Ref Range: >60 mL/min/1.73_m2 66   GFR Estimate If Black Latest Ref Range: >60 mL/min/1.73_m2 77   Calcium Latest Ref Range: 8.5 - 10.1 mg/dL 8.9   Anion Gap Latest Ref Range: 3 - 14 mmol/L 8   Albumin Latest Ref Range: 3.4 - 5.0 g/dL 1.9 (L)   Protein Total Latest Ref Range: 6.8 - 8.8 g/dL 6.9   Bilirubin Total Latest Ref Range: 0.2 - 1.3 mg/dL 0.5   Alkaline Phosphatase Latest Ref Range: 40 - 150 U/L 139   ALT Latest Ref Range: 0 - 70 U/L 28   AST Latest Ref Range: 0 - 45 U/L 41   Glucose Latest Ref Range: 70 - 99 mg/dL 103 (H)   WBC Latest Ref Range: 4.0 - 11.0 10e9/L 17.8 (H)   Hemoglobin Latest Ref Range: 13.3 - 17.7 g/dL 14.5   Hematocrit Latest Ref Range: 40.0 - 53.0 % 44.9   Platelet Count Latest Ref Range: 150 - 450 10e9/L 452 (H)   RBC Count Latest Ref Range: 4.4 - 5.9 10e12/L 5.04   MCV Latest Ref Range: 78 - 100 fl 89   MCH Latest Ref Range: 26.5 - 33.0 pg 28.8   MCHC Latest Ref Range: 31.5 - 36.5 g/dL 32.3   RDW Latest Ref Range: 10.0 - 15.0 % 15.5 (H)   Diff Method Unknown Automated Method   % Neutrophils Latest Units: % 78.6   % Lymphocytes Latest Units: % 8.4   % Monocytes Latest Units: % 11.5   % Eosinophils Latest Units: % 0.5   % Basophils Latest Units: % 0.5   % Immature Granulocytes Latest Units: % 0.5   Absolute Neutrophil Latest Ref Range: 1.6 - 8.3 10e9/L 14.0 (H)   Absolute Lymphocytes Latest Ref Range: 0.8 - 5.3 10e9/L 1.5   Absolute Monocytes Latest Ref Range: 0.0 - 1.3 10e9/L 2.1 (H)   Absolute Eosinophils Latest Ref Range: 0.0 - 0.7 10e9/L 0.1   Absolute Basophils Latest Ref Range: 0.0 - 0.2 10e9/L 0.1     IMAGING:  Reviewed     EXAMINATION: CT CHEST/ABDOMEN/PELVIS W CONTRAST, 12/14/2018 11:04 AM     TECHNIQUE: Helical CT images from the thoracic  inlet through the  symphysis pubis were obtained with intravenous contrast. Contrast  dose: 124 cc of isovue 370     COMPARISON: 12/12/2018 CT chest/abdomen/pelvis     HISTORY: Gastric cancer, monitor, post surg/post-op management; recent  esophogeal stent placement in distal esophagus/proximal stomach,  evaluate for appropriate stent placement in patient with uncontrolled  pain and unresolved sx     FINDINGS:     Chest: Decreased degree but persistent infolding of the mid posterior  wall of the recently replaced (on 12/12/2018) distal esophageal stent.  Persistent diffuse nodular distal esophageal wall thickening,  particularly at the site of stent wall infolding. The stent extends  from the mid esophagus to the gastroesophageal junction.     Normal heart size. Trace pericardial effusion. Moderate coronary  artery calcifications. Normal caliber ascending aorta and main  pulmonary artery. No central pulmonary embolism. Calcified  paratracheal, subcarinal, and hilar lymph nodes. No thoracic  lymphadenopathy. Unchanged subcentimeter hypoattenuating nodule in the  posterior right thyroid lobe. Right chest wall internal jugular  Port-A-Cath tip at the low SVC.     Stable large right and slightly increased small left pleural effusions  with adjacent compressive atelectasis. Persistent nodular opacities in  the left lower lobe. Numerous scattered calcified granulomas. No new  or enlarging pulmonary nodule. The central tracheobronchial tree is  clear. No pneumothorax.     Abdomen and pelvis: Unchanged ill-defined hypodensity in subcapsular  hepatic segment 8 measuring 12 mm (series 3, image 268) no new focal  hepatic lesion. The gallbladder, pancreas, spleen, adrenal glands, and  renal parenchyma are unremarkable. Stable distention of the right  renal pelvis and calyces without associated hydroureter. The bladder  is unremarkable. Dystrophic calcifications in the mildly enlarged  prostate. Stable moderate to large volume  ascites. Stable No free air.  No bowel obstruction. Diffuse gastric wall thickening and enhancement,  similar to prior. Scattered distal colonic diverticula.  Circumferential anorectal wall thickening. The major abdominal vessels  are patent. Moderate aortoiliac atherosclerotic calcification without  aneurysmal dilation. Stable prominent upper abdominal, periaortic  retroperitoneal, and scattered central mesenteric lymph nodes. Stable  soft tissue thickening in throughout the retroperitoneum. Stable  infiltrative heterogeneity of the anterior peritoneum without clear  soft tissue nodularity.     Bones and soft tissues: Increased subcutaneous edema about the  abdominal wall, hips, and thighs. No aggressive osseous lesion.  Degenerative changes of the hips, moderate to severe on the right.                                                                      IMPRESSION:   1. Following esophageal stent replacement on 12/12/2018, decreased but  persistent infolding of the mid posterior wall of the new stent.  Stable circumferential distal esophageal wall thickening, particularly  posteriorly at the site of stent wall infolding.  2. Stable diffuse gastric wall thickening and mucosal hyperenhancement  in this patient with history of signet cell gastric cancer.  3. Stable moderate to large volume ascites. Associated omental  heterogeneity and retroperitoneal soft tissue thickening in keeping  with carcinomatosis.  4. Stable prominent upper abdominal, central mesenteric, and  retroperitoneal lymph nodes.  5. Unchanged indeterminate focal hypodensity in hepatic segment 8.  6. Stable circumferential anorectal wall thickening.  7. Stable moderate to large right and slightly increased small left  pleural effusions with adjacent compressive atelectasis.  8. Persistent nodular opacities in the left lower lobe, concerning for  infection. Consider aspiration.        EXAMINATION: CT CHEST/ABDOMEN/PELVIS W CONTRAST, 11/9/2018 10:10  AM     TECHNIQUE:  Helical CT images from the thoracic inlet through the  symphysis pubis were obtained  with IV contrast.     COMPARISON: CT 8/31/2018, 6/8/2018, and 3/16/2018     HISTORY: follow up for gastric cancer; Malignant neoplasm of  overlapping sites of stomach (H)     DLP: 1128 mGy*cm     FINDINGS:     Chest: Right chest wall Port-A-Cath with the tip terminating in the  low SVC. Unchanged hypoattenuating nodule within the right thyroid  lobe. Numerous calcified mediastinal and hilar lymph nodes. The heart  size is within normal limits. Trace pericardial effusion. The thoracic  aorta main pulmonary artery diameters are within normal limits. No  central pulmonary embolism. Normal three-vessel branching pattern.  Atherosclerotic calcifications of the thoracic aorta and great  vessels.     New large right pleural effusion. Central tracheobronchial tree is  patent. No focal consolidation or pneumothorax. Multiple calcified  pulmonary nodules are unchanged in comparison to prior exam. A  suspicious pulmonary nodule.     Abdomen and pelvis: Increased gastric wall thickening and mucosal  hyperenhancement in comparison to exam from 6/8/2018. Stable soft  tissue thickening around the gastroesophageal junction. Sightly  decreased size of the prominent lymph nodes in the retroperitoneum and  lesser sac, for example there is a 10 mm lymph node in the marychuy  hepatis (series 3, image 155). Persistent hazy fat stranding extending  from the marychuy hepatis to the retroperitoneum to the level of the  distal aorta, probably worsened since prior exam. New hazy nodular  opacities involving the omentum. Increased thickening and nodularity  of the peritoneum, for example soft tissue nodularity with enhancement  is seen of the pararenal fascia and the peritoneum along the superior  surface of the bladder.      Unchanged subcapsular lesion within the right hepatic lobe measuring  1.2 x 1.0 cm and likely represents a hemangioma.  Stable subcentimeter  hypoattenuating lesion within the medial aspect of the left hepatic  lobe, too small to characterize. No new liver lesion. No intrahepatic  biliary dilatation. The gallbladder, pancreas, spleen, and adrenal  glands are unremarkable. Hypoattenuating foci in both kidneys are too  small to characterize. Eccentric bladder wall thickening along the  right lateral and posterior wall of the urinary bladder, significantly  worsened since March 2018 exam. Mildly enlarged prostate.     Rectosigmoid circumferential wall thickening with mucosal  hyperenhancement extends for approximately 5.9 cm (series 4, image  66), slightly increased mucosal enhancement from prior. Colonic  diverticulosis without evidence of diverticulitis. No dilated loops of  bowel. Moderate volume ascites in the perihepatic, right, and left  paracolic locations. The portal vein, splenic vein, SMV, celiac  access, and SMA are patent. No abdominal aorta aneurysmal dilatation.  Atherosclerotic calcifications of the distal abdominal aorta and iliac  vessels.     Bones and soft tissues: No inguinal or axillary lymphadenopathy.  Multilevel degenerative changes of the thoracic oh lumbar spine.  Degenerative changes of the hips bilaterally. No suspicious osseous  lesion.         IMPRESSION: In this patient with history of signet cell gastric  cancer, there is suggestion of slow progression:   1a. Increased gastric wall thickening and mucosal hyperenhancement  with new ascites, hazy nodular opacities involving the omentum, and  nodular thickening of the peritoneum (particularly along the anterior  aspect of pararenal fascia, and along the superior surface of the  urinary bladder). These findings are highly concerning for worsening  peritoneal carcinomatosis.   1b. Interval mild increase in hazy fat stranding involving the marychuy  hepatis and retroperitoneum to the level of the distal aorta. This  could be within the spectrum of worsening  peritoneal carcinomatosis.  Attention on follow-up.  2. New right pleural effusion. Innumerable calcified pulmonary  granulomas and calcified mediastinal and hilar lymph nodes, this is  likely sequela of prior granulomatous disease.  3. Slightly increased circumferential wall thickening of the  rectosigmoid colon with increased mucosal hyperenhancement. Findings  are suggestive of proctocolitis.    EXAMINATION: CT CHEST/ABDOMEN/PELVIS W CONTRAST  8/31/2018 8:54 AM       CLINICAL HISTORY: follow up for gastric ca; Acute gout, unspecified  cause, unspecified site; Malignant neoplasm of overlapping sites of  stomach (H). As per chart review: mPlease see my previous note for  details. ?Terry Yi is a 52-year-old male with linitis plastica and  metastatic periaortic lymph node in the aortocaval region, as well as  extensive infiltration of the lesser omentum and peripancreatic fat,  who started on chemotherapy with FOLFOX      COMPARISON: CT CAP on 06/08/2018, 03/16/2018     PROCEDURE COMMENTS: CT of the chest, abdomen, and pelvis was performed  with 134 ml isovue 370 intravenous and oral contrast. Axial MIP   images of the chest, and coronal and sagittal reformatted images of  the chest, abdomen, and pelvis obtained.     FINDINGS:    Support devices:   Right-sided Port-A-Cath with tip terminating at the atriocaval  junction.     Chest:  Small hypodense nodule arising from the right thyroid lobe, unchanged.     Heart is not enlarged. Trace pericardial fluid. No central pulmonary  embolism. Calcified mediastinal and hilar lymph nodes, stable. No  axillary adenopathy.     Trachea and central airway are patent. No focal consolidation or  pleural effusion. Multiple calcified pulmonary nodules, unchanged.  Previously seen 6 mm nodule along the right minor fissure, likely to  represent a perifissural lymph node. No suspicious pulmonary nodules.     Abdomen/pelvis:  Mild diffuse gastric wall thickening and mucosal  hyperenhancement is  not significantly changed compared to 06/08/2018 study.  Circumferential soft tissue thickening around the gastroesophageal  junction is also not significantly change. Persistent hazy/amorphous  retroperitoneal stranding extending from the level of the marychuy  hepatis to the level of the aortic bifurcation, unchanged. Mesenteric  and para-aortic lymph nodes are again seen. No bowel obstruction.  Colonic diverticulosis without diverticulitis. Mild circumferential  thickening of rectosigmoid wall, new or increased from prior.     1 x 1 cm subcapsular lesion in right hepatic lobe, not significant  changed with features suggestive of a hemangioma. Subcentimeter  hypodense lesion in the medial segment of the left hepatic lobe  (series 3, image 154), too small to accurately characterize, but  stable from the prior. Pancreas appears atrophic with diffuse fatty  infiltration. Gallbladder, spleen, adrenal glands are unremarkable.  Prominent right pelvocaliectasis without hydronephrosis; there is mild  urothelial thickening and enhancement which is new. Small  hypodensities arising from kidneys are too small to characterize, but  they favored cyst, unchanged. Mild progression of fascial thickening  along the right anterior pararenal fossa when compared to prior study  (series 3 image 179). Stable thickening anterior to the left perirenal  fascia. Urinary bladder is unremarkable. Internal aspiration of  prostate gland.     Major vasculature is patent. Calcification of abdominal aorta and  common iliac arteries. No infrarenal aortic aneurysm. Replaced right  hepatic artery arises from the SMA.     Soft tissue/bones: Unchanged degenerative cystic changes and  fragmentation about the right acetabulum. No acute bony lesion.         IMPRESSION: In this patient with linitis plastica   1a. Mild diffuse gastric wall and circumferential soft tissue  thickening at the level of gastroesophageal junction is  not  significantly changed when compared to 06/08/2018.  1b. Persistent hazy and amorphous retrograde stranding extending from  the level of the marychuy hepatis to the level of the aortic bifurcation,  unchanged. Numerous unchanged mesenteric and periaortic lymph nodes.   1c. Mild progression of peritoneal/fascial thickening along the right  anterior pararenal fossa. Stable peritoneal thickening anterior to the  left perirenal fossa.  2. Sequela of granulomatous disease in the lung as evidenced by  multiple calcified mediastinal/hilar lymph nodes and pulmonary  nodules. Previously seen 6 mm nodule along the right minor fissure,  likely to represent a perifissural lymph node.  3. New or significantly increased circumferential wall thickening  involving the rectosigmoid colon suggestive of proctocolitis in the  appropriate clinical setting.      EXAMINATION: CT CHEST/ABDOMEN/PELVIS W CONTRAST, 6/8/2018 8:28 AM     TECHNIQUE:  Helical CT images from the thoracic inlet through the  symphysis pubis were obtained  with contrast. Contrast dose: 135 ml  isovue 370     COMPARISON: 3/16/2018, PET CT 3/24/2017     HISTORY: follow up for gastric ca; Malignant neoplasm of overlapping  sites of stomach (H). Status post chemotherapy.     FINDINGS:     Chest: Right IJ port tip within the low SVC. Heart size is normal.  Normal caliber of the aorta and pulmonary artery. No central pulmonary  embolus. Visualized thyroid is unremarkable. Multiple calcified  mediastinal and bilateral hilar lymph nodes, not significantly  changed. No new or enlarged lymph nodes in the chest or axilla. The  upper and mid esophagus appear normal. Findings at the GE junction see  separate dissection. Central airway is patent. Scattered calcified  granulomas. Scattered small indeterminate noncalcified nodules are  unchanged from at least 3/24/2017, for example 6 mm nodule along the  right minor fissure (series 7 image 81).     Abdomen and pelvis: Mild diffuse  gastric wall thickening is not  significantly changed. Circumferential soft tissue thickening around  the gastroesophageal junction (series 3 image 128), measuring up to  1.2 cm in thickness which is not significantly changed. Hazy fat  stranding and mildly prominent lymph nodes in the retroperitoneum and  lesser sac are not significantly changed. For example rounded 1.2 cm  short axis diameter marychuy hepatis node (series 2 image 155). Unchanged  peritoneal thickening along the left anterior pararenal fascia (series  3 image 195).     Unchanged cyst in hepatic segment 4. 1.4 cm subcapsular lesion in the  right hepatic lobe (series 3 image 149) is not significantly changed  and has enhancement characteristics consistent with the benign  cavernous hemangioma. The gallbladder, pancreas, spleen, and adrenals  are within normal limits. Few hypoattenuating foci in both kidneys are  too small to characterize. Bladder is unremarkable. Prostate is  enlarged. Few colonic diverticula without evidence of diverticulitis.  The appendix and small bowel are within normal limits. Major  vasculature is patent. Aorta is normal in caliber with mild-moderate  atherosclerosis. No free air or significant free fluid.     Bones and soft tissues: Unchanged mild compression deformity of the  T12 vertebral body. Unchanged degenerative cystic change and  fragmentation about the right acetabulum.         IMPRESSION:   1. Mild diffuse gastric wall thickening and circumferential soft  tissue thickening around the gastroesophageal junction are not  significantly changed.  2. Unchanged fat stranding and mildly prominent lymph nodes in the  lesser sac and retroperitoneum.  3. Unchanged peritoneal thickening most prominent along the left  anterior pararenal fascia.   4. Sequelae of granulomatous disease in the lungs. A few small  noncalcified pulmonary nodules are unchanged from 3/24/2017. Attention  on follow-up examination.      Esophagram dated  5/16/2018     COMPARISON:    CT dated 3/16/2018     HISTORY:    Esophageal dysphagia in a patient with history of gastric  carcinoma.     FINDINGS: Examination of the esophagus reveals adequate primary and  secondary peristalsis. There is distal esophageal narrowing at the  gastroesophageal junction without associated mucosal abnormality, with  apparent extrinsic compression. This may correspond with soft tissue  thickening seen at the diaphragmatic hiatus on CT dated 3/16/2018. The  gastroesophageal junction is patent. No hiatal hernia was seen on  examination. There was absence of gastroesophageal reflux.     Fluoroscopy time was 1.6 minutes.     A 12mm barium tablet was administered and did not initially pass  through the gastroesophageal junction. 15 minutes after initial  administration and following ingestion of pudding and thick barium,  the barium tablet did pass through the gastroesophageal junction.         IMPRESSION: Extrinsic compression on the gastroesophageal junction  results in luminal narrowing and associated extended retention of  barium tablet. This likely corresponds with soft tissue thickening  about the diaphragmatic hiatus as demonstrated on prior CT  examination.            EGD 4/27/18  Findings:        One 12 mm nodular area with possible shallow uleration was found in the        gastric fundus. Biopsies were taken with a cold forceps for histology.        The esophagus was normal.        The stomach was normal except as noted above..        The examined duodenum was normal.       ORIGINAL REPORT:     SPECIMEN(S):   Stomach biopsy, fundus     FINAL DIAGNOSIS:   STOMACH, FUNDUS, BIOPSY:   - Poorly differentiated carcinoma (diffuse type) with signet ring features   - Biopsy is limited to mucosa     ASSESSMENT AND PLAN:      Metastatic gastric cancer, HER-2/catrachita negative, MSI intact, PD-L1 neg (<1%), presenting with early satiety and significant weight loss and epigastric discomfort upon eating  with some vomiting.  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  There is an indeterminate right liver lobe lesion which has remained stable and likely is not a metastasis.      We started palliative FOLFOX on 03/12/2017.     Oxaliplatin was dropped from cycle #11 onwards because of neuropathy     Scans after C#18 show stable disease    Repeat CT CAP after 24 cycles also show stable disease so we continued with 5FU/LV.     CT scan after cycle #30 - stable disease.    CT scan on 8/31/18 after C#36 also is stable.  I discussed with radiology and the subtle finding of fascial thickening around the pararenal fossa at this time is not very convincing progression of the disease  I discussed the findings with him and since he was doing well, we decided to continue the same chemotherapy without making any changes    We delayed cycle #39 because he had 2 episodes of chest tightness nausea and diaphoresis and shortness of breath and he was getting cardiac workup for that.  EKG was unremarkable.  He recently had a stress test done which was also unremarkable.  He again had some chest tightness and he went to a chiropractor who did some adjustments to his ribs and back and he felt much better after that and since then he has had no problems.   It is extremely unlikely that this is due to 5-FU because although it can cause vasospasm but he has received it so many times before without problems and the most recent episode happened just a few days ago and the last time he received 5-FU was on 9/19/2018.    We resumed chemotherapy after a 2-week delay.  As he was complaining of new onset of nausea and abdominal discomfort and poor appetite and had lost some weight, I was concerned that these could be due to progression of the disease.  We decided on repeating a CT scan after cycle #40.    CT chest abdomen and pelvis done on 11/9/2018  showed progression of the disease with increasing gastric wall thickening and mucosal hyperenhancement with new ascites and worsening peritoneal carcinomatosis.  There is also a new right-sided pleural effusion.    We started second line Ramucirumab and paclitaxel on 11/15/2018.  We plan on giving him every other week due to previous neuropathy.  Cycle 1 day 15 was on 11/29/2018.    Over the last several weeks his condition has significantly deteriorated and his disease has progressed symptomatically to a point where he is now almost bedbound, in significant pain, unable to swallow properly requiring esophageal stent and placement of GJ tube.  We discussed the situation in detail.  I recommend hospice and not pursuing any more chemotherapy as it is not in his best interest.  Our goals should be to make and keep him as comfortable as possible.  He and his family completely agrees with this approach.  We will have him enroll in hospice.    Abdominal pain.  I recommend increasing Dilaudid to 6 mg every 3 hours but I told the family that with the time his pain requirements would go higher.    Nausea.  Continue antiemetics as he is doing.    Goals of care.  I discussed with him goals of care at the end of life and he wants to be DNR/DNI.  We will get [facility] [address] form signed today.  I asked him to stick a copy to the refrigerator so that in case of an emergency, it is visible to everyone so that resuscitation is not performed against his wishes.        We did not address the following today  Right groin swelling and discomfort.  He resolved.      Genital herpes.  He was given 10 days of acyclovir.    Questionable aspiration pneumonia.  He was given 10 days of Augmentin.      Neuropathy.  He is on gabapentin.  Overall neuropathy has improved.  We have to keep a close watch on it when he is taking paclitaxel.      His genetic testing did not show any identifiable mutation not reveal any identifiable mutation     I wished him all  the best in a comfortable staying hospice.    All of his and his family's members' questions were answered to their satisfaction.  They agree with the plan.    LANEY HERNANDEZ MD

## 2018-12-26 NOTE — LETTER
12/26/2018         RE: Terry Yi  [address] [place] [address]        Dear Colleague,    Thank you for referring your patient, Terry Yi, to the [facility]. Please see a copy of my visit note below.        Agai12/26/2018      REASON FOR VISIT:  Follow-up for unresectable metastatic gastric carcinoma, diffuse type, grade 3, HER-2 negative, MSI-Intact. PD-L1 negative (<1%)     HISTORY OF PRESENT ILLNESS:  Please see my previous note for details.  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      CT CAP after C#6 shows stable disease    C# 9 FOLFOX 8/2/2017 ( Oxaliplatin dose reduced to 70mg/m2 due to neuropathy )  C#10 8/22/2017 ( Oxaliplatin dose reduced to 60mg/m2 due to neuropathy )  Delayed by 1 week- patient preference  C#11 9/6/17 5FU/LV ( Stopped Oxaliplatin )  C#12 9/20/2017 5FU/LV    Repeat CT scan after C#12 shows stable to slightly improved disease.    Repeat CT CAP on 12/22/17 after C#18 was stable    Repeat CT CAP on 3/16/18 after C#24 shows stable disease.    C# 27 4/18/2018 5FU/LV    As he was complaining of off and on trouble swallowing with food getting stuck, we repeated EGD on 4/27/2018 which showed a nodular area with possible shallow ulceration in the stomach. Otherwise esophagus and stomach and duodenum were normal.  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  He was referred to gastroenterologist for manometry    The biopsies which were taken from the nodular shallow ulcer aren't consistent with poorly differentiated diffuse type adenocarcinoma with signet ring features  Esophogram showed extrinsic compression on the GE junction resulting in luminal narrowing  and associated extended retention of barium tablet. This likely corresponds with soft tissue thickening about the diaphragmatic hiatus as demonstrated on prior CT examination    Repeat CT scan on 6/8/18 after C#30 is stable with stable circumferential soft tissue thickening at the GE Junction and stable thickness of stomach wall and peritoneal thickening    C#31 6/13/2018 5FU/LV  C#32 6/27/18  C#33 7/11/2018   C#34 7/25/18  C#35 8/8/2018  C#36 8/22/18    CT scan on 8/31/18- stable, with minimal increased fascial thickening along the right anterior pararenal fossa.     C#37 9/5/18    He had one episode on 9/10/2018 when he probably did not chew the steak well and it got stuck and he had to go to the emergency room to push it down into his his stomach.  The EGD report mentions that there was no known narrowing at the distal end of the esophagus.      C#38 9/19/18    We delayed cycle #39 because he had 2 episodes of Chest tightness and he was getting cardiac workup done so he wanted to get that completed before resuming chemotherapy.  Apparently his EKG was normal.  He also had a stress test done a few days ago which was unremarkable.  We resumed chemotherapy after a 2 week delay    C#39 10/17/2018  C#40 10/31/2018    CT chest abdomen and pelvis done on 11/9/2018 showed progression of the disease with increasing gastric wall thickening and mucosal hyperenhancement with new ascites and worsening peritoneal carcinomatosis.  There is also a new right-sided pleural effusion.    2nd line Ramucirumab and paclitaxel every other week was started 11/15/18.  C#1 D#15 on 11/29/18    Repeat EGD showed a long stenosis involving the distal esophagus and proximal stomach. A 23 mm x 120 mm EndoMAXX fully covered stent was placed.  The findings were also suspicious for linitis plastica.      He then got admitted on 12/12/18 with recurrent nausea/vomiting, hiccups and abdominal pain and CT imaging demonstrating existing stent failure by  invagination.   He underwent EGD on 12/12/18 with removal of the failed stent and replacement with a longer stent.   He continued to have nausea, vomiting and pain, and follow-up CT CAP on 12/12/18 showed persistent infolding of the mid-posterior wall of the stent.  GJ tube placed 12/17 with need for tightening 12/18 due to ascites leak.  Tube feeds were started with goal of 70ml/hr.  He was instructed to open G tube for decompression as needed but in the hospital he was able to tolerate it being clamped for several hours at a time.   GI plans to repeat scope in 8-10 weeks. Will consider stent removal in the future     CT CAP also moderate ascites. He underwent IR-guided paracentesis on 12/14/18 with removal of 3.3 L clear yellow ascitic fluid. No SBP but cytology positive for malignancy.  Repeat para on 12/17 with 2L fluid removed.    There was also a possibility of aspiration PNA on CT scan and was started on Augmentin to complete 10 days course.    During the admission he was also noted to have recurrent genital herpes simplex eruption with cluster of small lesions on the shaft of penis.  He was started on acyclovir 400 mg 3 times a day for 10 days.    He was discharged on 12/20/2018.         INTERVAL HISTORY:   He comes in today accompanied by his family members and he is not doing well and over the last few weeks his condition has significantly deteriorated and now he is resting all the time and only getting up to go to the bathroom.  He is in significant amount of pain and he started using Dilaudid 4 mg every 3 hours but is now up to 5 mg every 3 hours.  Even with this his pain is not well controlled but he feels better after getting the Dilaudid.  He feels nauseous but has not vomited.  Denies any fevers or infections.  He has lost significant amount of weight.  He continues to use tube feeds but is also using the G-tube for venting.  Bowel movements are every 3 hours or so.  Feels short of breath when he is  "in pain.  Neuropathy is about the same.  He does not want to pursue any more chemotherapy and just wants to be made comfortable.    ECOG 3    ROS:  A comprehensive ROS was otherwise neg        MEDICATIONS:   Current Outpatient Medications   Medication     acetaminophen (TYLENOL) 32 mg/mL liquid     acyclovir (ZOVIRAX) 200 MG/5ML suspension     allopurinol (ZYLOPRIM) 100 MG tablet     amLODIPine (NORVASC) 1 mg/mL SUSP     docusate (COLACE) 50 MG/5ML liquid     gabapentin (NEURONTIN) 250 MG/5ML solution     HYDROmorphone, STANDARD CONC, (DILAUDID) 1 MG/ML oral solution     indomethacin (INDOCIN) 25 MG capsule     lidocaine-prilocaine (EMLA) cream     LORazepam (ATIVAN) 0.5 MG tablet     multivitamins w/minerals (CERTAVITE) liquid     NEW MED     OLANZapine zydis (ZYPREXA) 5 MG ODT     ondansetron (ZOFRAN) 4 MG/5ML solution     pantoprazole (PROTONIX) 2 mg/mL SUSP suspension     prochlorperazine (COMPAZINE) 10 MG tablet     sucralfate (CARAFATE) 1 GM/10ML suspension     No current facility-administered medications for this visit.      Facility-Administered Medications Ordered in Other Visits   Medication     heparin 100 UNIT/ML injection 5 mL     sodium chloride (PF) 0.9% PF flush 10-20 mL            PHYSICAL EXAMINATION:   /75   Pulse 115   Temp 98.3  F (36.8  C)   Resp 28   Ht 1.791 m (5' 10.5\")   SpO2 96%   BMI 27.07 kg/m     CONSTITUTIONAL: He looks very unwell and is in considerable pain and looks much thinner than when I last saw him.  He felt better after getting another dose of Dilaudid in the clinic and was much more comfortable.  EYES: no palor or icterus.   ENT/MOUTH: Slightly dry mucous membranes  CVS: s1s2 no m r g .   RESPIRATORY: Decreased breath sounds at the right lung base  GI: GJ tube site is clean.  When he was more comfortable he did not have much tenderness on palpating the abdomen  NEURO: AAOX3    INTEGUMENT: no obvious rashes  EXTREMITIES: no edema  PSYCH: Mentation, mood and affect " are normal. Decision making capacity is intact      LABS:   Reviewed   Results for LEONOR RALPH (MRN [id number]) as of 12/26/2018 10:29   Ref. Range 12/26/2018 09:41   Sodium Latest Ref Range: 133 - 144 mmol/L 133   Potassium Latest Ref Range: 3.4 - 5.3 mmol/L 4.7   Chloride Latest Ref Range: 94 - 109 mmol/L 94   Carbon Dioxide Latest Ref Range: 20 - 32 mmol/L 31   Urea Nitrogen Latest Ref Range: 7 - 30 mg/dL 39 (H)   Creatinine Latest Ref Range: 0.66 - 1.25 mg/dL 1.22   GFR Estimate Latest Ref Range: >60 mL/min/1.73_m2 66   GFR Estimate If Black Latest Ref Range: >60 mL/min/1.73_m2 77   Calcium Latest Ref Range: 8.5 - 10.1 mg/dL 8.9   Anion Gap Latest Ref Range: 3 - 14 mmol/L 8   Albumin Latest Ref Range: 3.4 - 5.0 g/dL 1.9 (L)   Protein Total Latest Ref Range: 6.8 - 8.8 g/dL 6.9   Bilirubin Total Latest Ref Range: 0.2 - 1.3 mg/dL 0.5   Alkaline Phosphatase Latest Ref Range: 40 - 150 U/L 139   ALT Latest Ref Range: 0 - 70 U/L 28   AST Latest Ref Range: 0 - 45 U/L 41   Glucose Latest Ref Range: 70 - 99 mg/dL 103 (H)   WBC Latest Ref Range: 4.0 - 11.0 10e9/L 17.8 (H)   Hemoglobin Latest Ref Range: 13.3 - 17.7 g/dL 14.5   Hematocrit Latest Ref Range: 40.0 - 53.0 % 44.9   Platelet Count Latest Ref Range: 150 - 450 10e9/L 452 (H)   RBC Count Latest Ref Range: 4.4 - 5.9 10e12/L 5.04   MCV Latest Ref Range: 78 - 100 fl 89   MCH Latest Ref Range: 26.5 - 33.0 pg 28.8   MCHC Latest Ref Range: 31.5 - 36.5 g/dL 32.3   RDW Latest Ref Range: 10.0 - 15.0 % 15.5 (H)   Diff Method Unknown Automated Method   % Neutrophils Latest Units: % 78.6   % Lymphocytes Latest Units: % 8.4   % Monocytes Latest Units: % 11.5   % Eosinophils Latest Units: % 0.5   % Basophils Latest Units: % 0.5   % Immature Granulocytes Latest Units: % 0.5   Absolute Neutrophil Latest Ref Range: 1.6 - 8.3 10e9/L 14.0 (H)   Absolute Lymphocytes Latest Ref Range: 0.8 - 5.3 10e9/L 1.5   Absolute Monocytes Latest Ref Range: 0.0 - 1.3 10e9/L 2.1 (H)   Absolute  Eosinophils Latest Ref Range: 0.0 - 0.7 10e9/L 0.1   Absolute Basophils Latest Ref Range: 0.0 - 0.2 10e9/L 0.1     IMAGING:  Reviewed     EXAMINATION: CT CHEST/ABDOMEN/PELVIS W CONTRAST, 12/14/2018 11:04 AM     TECHNIQUE: Helical CT images from the thoracic inlet through the  symphysis pubis were obtained with intravenous contrast. Contrast  dose: 124 cc of isovue 370     COMPARISON: 12/12/2018 CT chest/abdomen/pelvis     HISTORY: Gastric cancer, monitor, post surg/post-op management; recent  esophogeal stent placement in distal esophagus/proximal stomach,  evaluate for appropriate stent placement in patient with uncontrolled  pain and unresolved sx     FINDINGS:     Chest: Decreased degree but persistent infolding of the mid posterior  wall of the recently replaced (on 12/12/2018) distal esophageal stent.  Persistent diffuse nodular distal esophageal wall thickening,  particularly at the site of stent wall infolding. The stent extends  from the mid esophagus to the gastroesophageal junction.     Normal heart size. Trace pericardial effusion. Moderate coronary  artery calcifications. Normal caliber ascending aorta and main  pulmonary artery. No central pulmonary embolism. Calcified  paratracheal, subcarinal, and hilar lymph nodes. No thoracic  lymphadenopathy. Unchanged subcentimeter hypoattenuating nodule in the  posterior right thyroid lobe. Right chest wall internal jugular  Port-A-Cath tip at the low SVC.     Stable large right and slightly increased small left pleural effusions  with adjacent compressive atelectasis. Persistent nodular opacities in  the left lower lobe. Numerous scattered calcified granulomas. No new  or enlarging pulmonary nodule. The central tracheobronchial tree is  clear. No pneumothorax.     Abdomen and pelvis: Unchanged ill-defined hypodensity in subcapsular  hepatic segment 8 measuring 12 mm (series 3, image 268) no new focal  hepatic lesion. The gallbladder, pancreas, spleen, adrenal  glands, and  renal parenchyma are unremarkable. Stable distention of the right  renal pelvis and calyces without associated hydroureter. The bladder  is unremarkable. Dystrophic calcifications in the mildly enlarged  prostate. Stable moderate to large volume ascites. Stable No free air.  No bowel obstruction. Diffuse gastric wall thickening and enhancement,  similar to prior. Scattered distal colonic diverticula.  Circumferential anorectal wall thickening. The major abdominal vessels  are patent. Moderate aortoiliac atherosclerotic calcification without  aneurysmal dilation. Stable prominent upper abdominal, periaortic  retroperitoneal, and scattered central mesenteric lymph nodes. Stable  soft tissue thickening in throughout the retroperitoneum. Stable  infiltrative heterogeneity of the anterior peritoneum without clear  soft tissue nodularity.     Bones and soft tissues: Increased subcutaneous edema about the  abdominal wall, hips, and thighs. No aggressive osseous lesion.  Degenerative changes of the hips, moderate to severe on the right.                                                                      IMPRESSION:   1. Following esophageal stent replacement on 12/12/2018, decreased but  persistent infolding of the mid posterior wall of the new stent.  Stable circumferential distal esophageal wall thickening, particularly  posteriorly at the site of stent wall infolding.  2. Stable diffuse gastric wall thickening and mucosal hyperenhancement  in this patient with history of signet cell gastric cancer.  3. Stable moderate to large volume ascites. Associated omental  heterogeneity and retroperitoneal soft tissue thickening in keeping  with carcinomatosis.  4. Stable prominent upper abdominal, central mesenteric, and  retroperitoneal lymph nodes.  5. Unchanged indeterminate focal hypodensity in hepatic segment 8.  6. Stable circumferential anorectal wall thickening.  7. Stable moderate to large right and slightly  increased small left  pleural effusions with adjacent compressive atelectasis.  8. Persistent nodular opacities in the left lower lobe, concerning for  infection. Consider aspiration.        EXAMINATION: CT CHEST/ABDOMEN/PELVIS W CONTRAST, 11/9/2018 10:10 AM     TECHNIQUE:  Helical CT images from the thoracic inlet through the  symphysis pubis were obtained  with IV contrast.     COMPARISON: CT 8/31/2018, 6/8/2018, and 3/16/2018     HISTORY: follow up for gastric cancer; Malignant neoplasm of  overlapping sites of stomach (H)     DLP: 1128 mGy*cm     FINDINGS:     Chest: Right chest wall Port-A-Cath with the tip terminating in the  low SVC. Unchanged hypoattenuating nodule within the right thyroid  lobe. Numerous calcified mediastinal and hilar lymph nodes. The heart  size is within normal limits. Trace pericardial effusion. The thoracic  aorta main pulmonary artery diameters are within normal limits. No  central pulmonary embolism. Normal three-vessel branching pattern.  Atherosclerotic calcifications of the thoracic aorta and great  vessels.     New large right pleural effusion. Central tracheobronchial tree is  patent. No focal consolidation or pneumothorax. Multiple calcified  pulmonary nodules are unchanged in comparison to prior exam. A  suspicious pulmonary nodule.     Abdomen and pelvis: Increased gastric wall thickening and mucosal  hyperenhancement in comparison to exam from 6/8/2018. Stable soft  tissue thickening around the gastroesophageal junction. Sightly  decreased size of the prominent lymph nodes in the retroperitoneum and  lesser sac, for example there is a 10 mm lymph node in the marychuy  hepatis (series 3, image 155). Persistent hazy fat stranding extending  from the marychuy hepatis to the retroperitoneum to the level of the  distal aorta, probably worsened since prior exam. New hazy nodular  opacities involving the omentum. Increased thickening and nodularity  of the peritoneum, for example soft  tissue nodularity with enhancement  is seen of the pararenal fascia and the peritoneum along the superior  surface of the bladder.      Unchanged subcapsular lesion within the right hepatic lobe measuring  1.2 x 1.0 cm and likely represents a hemangioma. Stable subcentimeter  hypoattenuating lesion within the medial aspect of the left hepatic  lobe, too small to characterize. No new liver lesion. No intrahepatic  biliary dilatation. The gallbladder, pancreas, spleen, and adrenal  glands are unremarkable. Hypoattenuating foci in both kidneys are too  small to characterize. Eccentric bladder wall thickening along the  right lateral and posterior wall of the urinary bladder, significantly  worsened since March 2018 exam. Mildly enlarged prostate.     Rectosigmoid circumferential wall thickening with mucosal  hyperenhancement extends for approximately 5.9 cm (series 4, image  66), slightly increased mucosal enhancement from prior. Colonic  diverticulosis without evidence of diverticulitis. No dilated loops of  bowel. Moderate volume ascites in the perihepatic, right, and left  paracolic locations. The portal vein, splenic vein, SMV, celiac  access, and SMA are patent. No abdominal aorta aneurysmal dilatation.  Atherosclerotic calcifications of the distal abdominal aorta and iliac  vessels.     Bones and soft tissues: No inguinal or axillary lymphadenopathy.  Multilevel degenerative changes of the thoracic oh lumbar spine.  Degenerative changes of the hips bilaterally. No suspicious osseous  lesion.         IMPRESSION: In this patient with history of signet cell gastric  cancer, there is suggestion of slow progression:   1a. Increased gastric wall thickening and mucosal hyperenhancement  with new ascites, hazy nodular opacities involving the omentum, and  nodular thickening of the peritoneum (particularly along the anterior  aspect of pararenal fascia, and along the superior surface of the  urinary bladder). These  findings are highly concerning for worsening  peritoneal carcinomatosis.   1b. Interval mild increase in hazy fat stranding involving the marychuy  hepatis and retroperitoneum to the level of the distal aorta. This  could be within the spectrum of worsening peritoneal carcinomatosis.  Attention on follow-up.  2. New right pleural effusion. Innumerable calcified pulmonary  granulomas and calcified mediastinal and hilar lymph nodes, this is  likely sequela of prior granulomatous disease.  3. Slightly increased circumferential wall thickening of the  rectosigmoid colon with increased mucosal hyperenhancement. Findings  are suggestive of proctocolitis.    EXAMINATION: CT CHEST/ABDOMEN/PELVIS W CONTRAST  8/31/2018 8:54 AM       CLINICAL HISTORY: follow up for gastric ca; Acute gout, unspecified  cause, unspecified site; Malignant neoplasm of overlapping sites of  stomach (H). As per chart review: mPlease see my previous note for  details. ?Terry Yi is a 52-year-old male with linitis plastica and  metastatic periaortic lymph node in the aortocaval region, as well as  extensive infiltration of the lesser omentum and peripancreatic fat,  who started on chemotherapy with FOLFOX      COMPARISON: CT CAP on 06/08/2018, 03/16/2018     PROCEDURE COMMENTS: CT of the chest, abdomen, and pelvis was performed  with 134 ml isovue 370 intravenous and oral contrast. Axial MIP   images of the chest, and coronal and sagittal reformatted images of  the chest, abdomen, and pelvis obtained.     FINDINGS:    Support devices:   Right-sided Port-A-Cath with tip terminating at the atriocaval  junction.     Chest:  Small hypodense nodule arising from the right thyroid lobe, unchanged.     Heart is not enlarged. Trace pericardial fluid. No central pulmonary  embolism. Calcified mediastinal and hilar lymph nodes, stable. No  axillary adenopathy.     Trachea and central airway are patent. No focal consolidation or  pleural effusion. Multiple  calcified pulmonary nodules, unchanged.  Previously seen 6 mm nodule along the right minor fissure, likely to  represent a perifissural lymph node. No suspicious pulmonary nodules.     Abdomen/pelvis:  Mild diffuse gastric wall thickening and mucosal hyperenhancement is  not significantly changed compared to 06/08/2018 study.  Circumferential soft tissue thickening around the gastroesophageal  junction is also not significantly change. Persistent hazy/amorphous  retroperitoneal stranding extending from the level of the marychuy  hepatis to the level of the aortic bifurcation, unchanged. Mesenteric  and para-aortic lymph nodes are again seen. No bowel obstruction.  Colonic diverticulosis without diverticulitis. Mild circumferential  thickening of rectosigmoid wall, new or increased from prior.     1 x 1 cm subcapsular lesion in right hepatic lobe, not significant  changed with features suggestive of a hemangioma. Subcentimeter  hypodense lesion in the medial segment of the left hepatic lobe  (series 3, image 154), too small to accurately characterize, but  stable from the prior. Pancreas appears atrophic with diffuse fatty  infiltration. Gallbladder, spleen, adrenal glands are unremarkable.  Prominent right pelvocaliectasis without hydronephrosis; there is mild  urothelial thickening and enhancement which is new. Small  hypodensities arising from kidneys are too small to characterize, but  they favored cyst, unchanged. Mild progression of fascial thickening  along the right anterior pararenal fossa when compared to prior study  (series 3 image 179). Stable thickening anterior to the left perirenal  fascia. Urinary bladder is unremarkable. Internal aspiration of  prostate gland.     Major vasculature is patent. Calcification of abdominal aorta and  common iliac arteries. No infrarenal aortic aneurysm. Replaced right  hepatic artery arises from the SMA.     Soft tissue/bones: Unchanged degenerative cystic changes  and  fragmentation about the right acetabulum. No acute bony lesion.         IMPRESSION: In this patient with linitis plastica   1a. Mild diffuse gastric wall and circumferential soft tissue  thickening at the level of gastroesophageal junction is not  significantly changed when compared to 06/08/2018.  1b. Persistent hazy and amorphous retrograde stranding extending from  the level of the marychuy hepatis to the level of the aortic bifurcation,  unchanged. Numerous unchanged mesenteric and periaortic lymph nodes.   1c. Mild progression of peritoneal/fascial thickening along the right  anterior pararenal fossa. Stable peritoneal thickening anterior to the  left perirenal fossa.  2. Sequela of granulomatous disease in the lung as evidenced by  multiple calcified mediastinal/hilar lymph nodes and pulmonary  nodules. Previously seen 6 mm nodule along the right minor fissure,  likely to represent a perifissural lymph node.  3. New or significantly increased circumferential wall thickening  involving the rectosigmoid colon suggestive of proctocolitis in the  appropriate clinical setting.      EXAMINATION: CT CHEST/ABDOMEN/PELVIS W CONTRAST, 6/8/2018 8:28 AM     TECHNIQUE:  Helical CT images from the thoracic inlet through the  symphysis pubis were obtained  with contrast. Contrast dose: 135 ml  isovue 370     COMPARISON: 3/16/2018, PET CT 3/24/2017     HISTORY: follow up for gastric ca; Malignant neoplasm of overlapping  sites of stomach (H). Status post chemotherapy.     FINDINGS:     Chest: Right IJ port tip within the low SVC. Heart size is normal.  Normal caliber of the aorta and pulmonary artery. No central pulmonary  embolus. Visualized thyroid is unremarkable. Multiple calcified  mediastinal and bilateral hilar lymph nodes, not significantly  changed. No new or enlarged lymph nodes in the chest or axilla. The  upper and mid esophagus appear normal. Findings at the GE junction see  separate dissection. Central airway  is patent. Scattered calcified  granulomas. Scattered small indeterminate noncalcified nodules are  unchanged from at least 3/24/2017, for example 6 mm nodule along the  right minor fissure (series 7 image 81).     Abdomen and pelvis: Mild diffuse gastric wall thickening is not  significantly changed. Circumferential soft tissue thickening around  the gastroesophageal junction (series 3 image 128), measuring up to  1.2 cm in thickness which is not significantly changed. Hazy fat  stranding and mildly prominent lymph nodes in the retroperitoneum and  lesser sac are not significantly changed. For example rounded 1.2 cm  short axis diameter marychuy hepatis node (series 2 image 155). Unchanged  peritoneal thickening along the left anterior pararenal fascia (series  3 image 195).     Unchanged cyst in hepatic segment 4. 1.4 cm subcapsular lesion in the  right hepatic lobe (series 3 image 149) is not significantly changed  and has enhancement characteristics consistent with the benign  cavernous hemangioma. The gallbladder, pancreas, spleen, and adrenals  are within normal limits. Few hypoattenuating foci in both kidneys are  too small to characterize. Bladder is unremarkable. Prostate is  enlarged. Few colonic diverticula without evidence of diverticulitis.  The appendix and small bowel are within normal limits. Major  vasculature is patent. Aorta is normal in caliber with mild-moderate  atherosclerosis. No free air or significant free fluid.     Bones and soft tissues: Unchanged mild compression deformity of the  T12 vertebral body. Unchanged degenerative cystic change and  fragmentation about the right acetabulum.         IMPRESSION:   1. Mild diffuse gastric wall thickening and circumferential soft  tissue thickening around the gastroesophageal junction are not  significantly changed.  2. Unchanged fat stranding and mildly prominent lymph nodes in the  lesser sac and retroperitoneum.  3. Unchanged peritoneal thickening  most prominent along the left  anterior pararenal fascia.   4. Sequelae of granulomatous disease in the lungs. A few small  noncalcified pulmonary nodules are unchanged from 3/24/2017. Attention  on follow-up examination.      Esophagram dated 5/16/2018     COMPARISON:    CT dated 3/16/2018     HISTORY:    Esophageal dysphagia in a patient with history of gastric  carcinoma.     FINDINGS: Examination of the esophagus reveals adequate primary and  secondary peristalsis. There is distal esophageal narrowing at the  gastroesophageal junction without associated mucosal abnormality, with  apparent extrinsic compression. This may correspond with soft tissue  thickening seen at the diaphragmatic hiatus on CT dated 3/16/2018. The  gastroesophageal junction is patent. No hiatal hernia was seen on  examination. There was absence of gastroesophageal reflux.     Fluoroscopy time was 1.6 minutes.     A 12mm barium tablet was administered and did not initially pass  through the gastroesophageal junction. 15 minutes after initial  administration and following ingestion of pudding and thick barium,  the barium tablet did pass through the gastroesophageal junction.         IMPRESSION: Extrinsic compression on the gastroesophageal junction  results in luminal narrowing and associated extended retention of  barium tablet. This likely corresponds with soft tissue thickening  about the diaphragmatic hiatus as demonstrated on prior CT  examination.            EGD 4/27/18  Findings:        One 12 mm nodular area with possible shallow uleration was found in the        gastric fundus. Biopsies were taken with a cold forceps for histology.        The esophagus was normal.        The stomach was normal except as noted above..        The examined duodenum was normal.       ORIGINAL REPORT:     SPECIMEN(S):   Stomach biopsy, fundus     FINAL DIAGNOSIS:   STOMACH, FUNDUS, BIOPSY:   - Poorly differentiated carcinoma (diffuse type) with signet  ring features   - Biopsy is limited to mucosa     ASSESSMENT AND PLAN:      Metastatic gastric cancer, HER-2/catrachita negative, MSI intact, PD-L1 neg (<1%), presenting with early satiety and significant weight loss and epigastric discomfort upon eating with some vomiting.  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  There is an indeterminate right liver lobe lesion which has remained stable and likely is not a metastasis.      We started palliative FOLFOX on 03/12/2017.     Oxaliplatin was dropped from cycle #11 onwards because of neuropathy     Scans after C#18 show stable disease    Repeat CT CAP after 24 cycles also show stable disease so we continued with 5FU/LV.     CT scan after cycle #30 - stable disease.    CT scan on 8/31/18 after C#36 also is stable.  I discussed with radiology and the subtle finding of fascial thickening around the pararenal fossa at this time is not very convincing progression of the disease  I discussed the findings with him and since he was doing well, we decided to continue the same chemotherapy without making any changes    We delayed cycle #39 because he had 2 episodes of chest tightness nausea and diaphoresis and shortness of breath and he was getting cardiac workup for that.  EKG was unremarkable.  He recently had a stress test done which was also unremarkable.  He again had some chest tightness and he went to a chiropractor who did some adjustments to his ribs and back and he felt much better after that and since then he has had no problems.   It is extremely unlikely that this is due to 5-FU because although it can cause vasospasm but he has received it so many times before without problems and the most recent episode happened just a few days ago and the last time he received 5-FU was on 9/19/2018.    We resumed chemotherapy after a 2-week delay.  As he was complaining of new onset of  nausea and abdominal discomfort and poor appetite and had lost some weight, I was concerned that these could be due to progression of the disease.  We decided on repeating a CT scan after cycle #40.    CT chest abdomen and pelvis done on 11/9/2018 showed progression of the disease with increasing gastric wall thickening and mucosal hyperenhancement with new ascites and worsening peritoneal carcinomatosis.  There is also a new right-sided pleural effusion.    We started second line Ramucirumab and paclitaxel on 11/15/2018.  We plan on giving him every other week due to previous neuropathy.  Cycle 1 day 15 was on 11/29/2018.    Over the last several weeks his condition has significantly deteriorated and his disease has progressed symptomatically to a point where he is now almost bedbound, in significant pain, unable to swallow properly requiring esophageal stent and placement of GJ tube.  We discussed the situation in detail.  I recommend hospice and not pursuing any more chemotherapy as it is not in his best interest.  Our goals should be to make and keep him as comfortable as possible.  He and his family completely agrees with this approach.  We will have him enroll in hospice.    Abdominal pain.  I recommend increasing Dilaudid to 6 mg every 3 hours but I told the family that with the time his pain requirements would go higher.    Nausea.  Continue antiemetics as he is doing.    Goals of care.  I discussed with him goals of care at the end of life and he wants to be DNR/DNI.  We will get [address] form signed today.  I asked him to stick a copy to the refrigerator so that in case of an emergency, it is visible to everyone so that resuscitation is not performed against his wishes.        We did not address the following today  Right groin swelling and discomfort.  He resolved.      Genital herpes.  He was given 10 days of acyclovir.    Questionable aspiration pneumonia.  He was given 10 days of  Augmentin.      Neuropathy.  He is on gabapentin.  Overall neuropathy has improved.  We have to keep a close watch on it when he is taking paclitaxel.      His genetic testing did not show any identifiable mutation not reveal any identifiable mutation     I wished him all the best in a comfortable staying hospice.    All of his and his family's members' questions were answered to their satisfaction.  They agree with the plan.    LANEY SHAFFER MD         n, thank you for allowing me to participate in the care of your patient.        Sincerely,        Laney Shaffer MD

## 2018-12-26 NOTE — PROGRESS NOTES
Terry here today to discuss his treatment options.  Patient accompanied by his wife, son and close friend.  He has not been doing well over the last few weeks with several admission into the hospital.  He is in a significant amount of pain; Dr. Shaffer increased his Dilaudid to 6 mg every 3 hours. Patient is requesting to just be comfortable.   Referral will be made to Hospice.  Patient is currently being serviced for homecare by [facility].  POLST (Provider Orders for Life Sustaining Treatment) was completed.

## 2018-12-26 NOTE — NURSING NOTE
"Oncology Rooming Note    December 26, 2018 10:01 AM   Terry Yi is a 53 year old male who presents for:    Chief Complaint   Patient presents with     Oncology Clinic Visit     prior to treatment      Initial Vitals: /75   Pulse 115   Temp 98.3  F (36.8  C)   Resp 28   Ht 1.791 m (5' 10.5\")   SpO2 96%   BMI 27.07 kg/m   Estimated body mass index is 27.07 kg/m  as calculated from the following:    Height as of this encounter: 1.791 m (5' 10.5\").    Weight as of 12/20/18: 86.8 kg (191 lb 6.4 oz). Body surface area is 2.08 meters squared.  Extreme Pain (9) Comment: Data Unavailable   No LMP for male patient.  Allergies reviewed: Yes  Medications reviewed: Yes    Medications: Medication refills not needed today.  Pharmacy name entered into EPIC: [id number] - [place], [place] - [address] [place] [address]     5 minutes for nursing intake (face to face time)     Tiffanie Farmer LPN              "

## 2018-12-26 NOTE — PROGRESS NOTES
Port needle left accessed for treatment. Several flushes required to obtain blood return. Patient also turned his head to the left and raised his right arm. Tolerated port access and draw without complaint. Port site scrubbed with Chloraprep for 30 seconds. Accessed using sterile technique. Kennedy drawn-Red/Green/Purple tubes. Double signed by patient and RN. See documentation flowsheet. Gave patient a urine specimen cup but he had just used the restroom. Arleth Brandon, RN, BSN, OCN

## 2018-12-26 NOTE — PROGRESS NOTES
Call made to [facility] - [phone] for referral in the [facility].    They are currently servicing patient for home care services.  Order and last office note was faxed.

## 2018-12-28 NOTE — PROGRESS NOTES
This is a recent snapshot of the patient's [facility] medical record.  For current drug dose and complete information and questions, call [phone] or In Basket pool, fv home infusion ([id number])  CSN Number:  [id number]

## 2019-01-02 ENCOUNTER — DOCUMENTATION ONLY (OUTPATIENT)
Dept: OTHER | Facility: CLINIC | Age: 54
End: 2019-01-02

## 2019-02-15 NOTE — PROGRESS NOTES
Harika, patient's wife called back; passed on message from [facility] about getting pump disconnected; patient is only interested in getting the disconnect in [place].  I advised that he may need to establish care with a provider and it may be beneficial to him to have a connection there in case of emergencies but we wouldn't know for sure until they call us back.    
Writer contacted [facility] in [place], [place] to inquire about patient establishing care with an Oncologist mostly for being able to have his pump disconnected after each cycle of chemotherapy (FOLFOX)  and to have closer access to an Oncologist in case of emergency.  Spoke with VILMA Silva in infusion and she states that it probably would be possible to have patient's pump disconnected; person in scheduling at [facility] will discuss with staff about above and when they can get patient in to be seen.  They will call back on Monday, April 17 with update on scheduling.  
pt npo s/p surgery

## 2019-03-18 ENCOUNTER — PATIENT OUTREACH (OUTPATIENT)
Dept: ONCOLOGY | Facility: CLINIC | Age: 54
End: 2019-03-18

## 2019-03-18 NOTE — PROGRESS NOTES
Harika, spouse of Roberto, called stating for insurance coverage, she is requesting that a letter be written stating Roberto was terminally ill and chemotherapy no longer effective and life expectancy was less than 6 months.  Harika requests that letter be sent to her home address.

## 2019-03-20 ENCOUNTER — DOCUMENTATION ONLY (OUTPATIENT)
Dept: ONCOLOGY | Facility: CLINIC | Age: 54
End: 2019-03-20

## 2019-04-14 NOTE — MR AVS SNAPSHOT
After Visit Summary   7/25/2018    Terry Yi    MRN: [id number]           Patient Information     Date Of Birth          1965        Visit Information        Provider Department      7/25/2018 8:00 AM BAY 2 INFUSION [facility]        Today's Diagnoses     Malignant neoplasm of overlapping sites of stomach (H)    -  1       Follow-ups after your visit        Your next 10 appointments already scheduled     Aug 08, 2018  8:00 AM CDT   Return Visit with [facility]   [facility]    [address] [facility] [address]   [phone]            Aug 08, 2018  8:45 AM CDT   Return Visit with Yakov Shaffer MD   [facility] ([facility]    [address] [facility] [address]   [phone]            Aug 08, 2018  9:30 AM CDT   Level 2 with BAY 6 INFUSION   [facility]    [address] [facility] [address]   [phone]            Aug 22, 2018  8:00 AM CDT   Return Visit with [facility] ONLY [facility]   [facility] ([facility])    [address] [facility] [address]   [phone]            Aug 22, 2018  8:30 AM CDT   Level 2 with BAY 4 INFUSION   [facility] ([facility])    [address] [facility] [address]   [phone]            Sep 05, 2018  9:30 AM CDT   Return Visit with [facility]SE ONLY [facility]CER CENTER   [facility] ([facility])    [address] [facility] [address]   [phone]            Sep 05, 2018 10:15 AM CDT   Return Visit with Yakov Shaffer MD   [facility] ([facility])    [address] [facility] [address]   [phone]            Sep 05, 2018 11:00 AM CDT   Level 2 with BAY 3 INFUSION   [facility] ([facility])    [address] [place] [address] [phone]              Who to contact     If you have questions or need follow up information about today's clinic visit or your schedule please contact [facility] directly at [phone].  Normal or non-critical lab and imaging results will be communicated to you by MyChart, letter or phone within 4 business days after the clinic has received the results. If you do not hear from us within 7 days, please contact the clinic through [employer] or phone. If you have a critical or abnormal lab result, we will notify you by phone as soon as possible.  Submit refill requests through [employer] or call your pharmacy and they will forward the refill request to us. Please allow 3 business days for your refill to be completed.          Additional Information About Your Visit        [employer] Information     [employer] gives you secure access to your electronic health record. If you see a primary care provider, you can also send messages to your care team and make appointments. If you have questions, please call your primary care clinic.  If you do not have a primary care provider, please call [phone] and they will assist you.      [employer] is an electronic gateway that provides easy, online access to your medical records. With [employer], you can request a clinic appointment, read your test results, renew a prescription or communicate with your care team.     To access your existing account, please contact your [facility] or call [phone] for assistance.        Care EveryWhere ID     This is your Care EveryWhere ID. This could be used by other organizations to access your [facility] medical records  [id number]        Your Vitals Were     Pulse Temperature Pulse Oximetry BMI (Body Mass Index)          73 97.7  F (36.5  C) (Oral) 99% 31.22 kg/m2         Blood Pressure from Last 3 Encounters:    07/25/18 144/86   07/11/18 121/79   06/27/18 109/65    Weight from Last 3 Encounters:   07/25/18 98.7 kg (217 lb 9.6 oz)   07/11/18 98.2 kg (216 lb 9.6 oz)   06/13/18 98.9 kg (218 lb)              Today, you had the following     No orders found for display       Primary Care Provider Office Phone # Fax #    Sandra TOLBERT Tuan, SARAI [phone]       [facility]  [address]  [facility] [address]        Equal Access to Services     [facility]: Hadii aad ku hadasho Soomaali, waaxda luqadaha, qaybta kaalmada adeegyada, claire mcdaniel hayguillermo cowart . So [facility] [phone].    ATENCIÓN: Si habla español, tiene a cuevas disposición servicios gratuitos de asistencia lingüística. LlThe [facility] [phone].    We comply with applicable federal civil rights laws and [place] laws. We do not discriminate on the basis of race, color, national origin, age, disability, sex, sexual orientation, or gender identity.            Thank you!     Thank you for choosing [facility]  for your care. Our goal is always to provide you with excellent care. Hearing back from our patients is one way we can continue to improve our services. Please take a few minutes to complete the written survey that you may receive in the mail after your visit with us. Thank you!             Your Updated Medication List - Protect others around you: Learn how to safely use, store and throw away your medicines at www.disposemymeds.org.          This list is accurate as of 7/25/18 10:33 AM.  Always use your most recent med list.                   Brand Name Dispense Instructions for use Diagnosis    ALEVE PO       Acute gout, unspecified cause, unspecified site       allopurinol 100 MG tablet    ZYLOPRIM    30 tablet    Take 1 tablet (100 mg) by mouth daily    Acute gout, unspecified cause, unspecified site       aspirin 81 MG chewable tablet      Take 81 mg by mouth daily        indomethacin 25 MG capsule    INDOCIN     42 capsule    Take 1 capsule (25 mg) by mouth 3 times daily (with meals)    Acute gout, unspecified cause, unspecified site       lidocaine-prilocaine cream    EMLA          LORazepam 0.5 MG tablet    ATIVAN    30 tablet    Take 1 tablet (0.5 mg) by mouth every 4 hours as needed (Anxiety, Nausea/Vomiting or Sleep)    Malignant neoplasm of overlapping sites of stomach (H)       MULTIVITAMIN ADULT PO           ondansetron 8 MG tablet    ZOFRAN    10 tablet    Take 1 tablet (8 mg) by mouth every 8 hours as needed (Nausea/Vomiting)    Malignant neoplasm of overlapping sites of stomach (H)       prochlorperazine 10 MG tablet    COMPAZINE    30 tablet    Take 1 tablet (10 mg) by mouth every 6 hours as needed (Nausea/Vomiting)    Malignant neoplasm of overlapping sites of stomach (H)       TYLENOL PO      Take by mouth as needed for mild pain or fever           Patient

## 2019-04-25 NOTE — PROGRESS NOTES
"Infusion Nursing Note:  Terry Yi presents today for leucovorin/ 5FU push and pump connect.    Patient seen by provider today: Yes: Dr. Shaffer   present during visit today: Not Applicable.    Note: N/A.    Intravenous Access:  Implanted Port.    Treatment Conditions:  Lab Results   Component Value Date    HGB 13.1 05/02/2018     Lab Results   Component Value Date    WBC 6.0 05/02/2018      Lab Results   Component Value Date    ANEU 3.3 05/02/2018     Lab Results   Component Value Date     05/02/2018      Lab Results   Component Value Date     05/02/2018                   Lab Results   Component Value Date    POTASSIUM 4.3 05/02/2018           No results found for: MAG         Lab Results   Component Value Date    CR 0.82 05/02/2018                   Lab Results   Component Value Date    BRITTNEY 9.1 05/02/2018                Lab Results   Component Value Date    BILITOTAL 0.5 05/02/2018           Lab Results   Component Value Date    ALBUMIN 3.7 05/02/2018                    Lab Results   Component Value Date    ALT 51 05/02/2018           Lab Results   Component Value Date    AST 34 05/02/2018       Results reviewed, labs MET treatment parameters, ok to proceed with treatment.      Post Infusion Assessment:  Patient tolerated infusion without incident.  Blood return noted pre and post infusion.  Prior to discharge: Port is secured in place with tegaderm and flushed with 10cc NS with positive blood return noted.  Continuous home infusion Dosi-Fuser pump connected.    All connectors secured in place and clamps taped open.    Pump started, \"running\" noted on display (CADD): Not Applicable.  Patient instructed to call our clinic or [facility] with any questions or concerns at home.  Patient verbalized understanding.    Patient set up for pump disconnect at clinic in [facility] on 5/4/18 at 9am.            Discharge Plan:   Patient will return 5/16/18 for next appointment.   Patient " IP F/U    Date: 04/24/19  Diagnosis: Gross Hematuria, Pain in Joint, Ankle and Foot, Left  Is patient active in care coordination? No  Was patient in TCU? No       discharged in stable condition accompanied by: self, wife and son.  Departure Mode: Ambulatory.    Michelle Fermin RN

## 2019-09-12 NOTE — PATIENT INSTRUCTIONS
Proceed with chemo today    On 9/29/17- repeat CT CAP    See me back in 2 weeks with labs prior and next chemo   No

## 2020-01-15 NOTE — PROGRESS NOTES
Patient was not available for the therapy session at this time.  Reason not seen: Other (comment)(dialysis) (01/15/20 1444).     Re-Attempt Plan: Will re-attempt tomorrow (01/15/20 1444).     See documentation flowsheet. Arleth Brandon, RN, BSN, OCN

## 2022-07-15 NOTE — MR AVS SNAPSHOT
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)    -  1       Follow-ups after your visit        Your next 10 appointments already scheduled     Jun 27, 2018  8:15 AM CDT   Return Visit with [facility]   [facility]    [address] [facility] [address]   [phone]            Jun 27, 2018  8:45 AM CDT   Level 2 with BAY 6 INFUSION   [facility] ([facility])    [address] [facility] [address]   [phone]            Jul 11, 2018  7:30 AM CDT   Return Visit with [facility]   [facility] ([facility])    [address] [facility] [address]   [phone]            Jul 11, 2018  8:15 AM CDT   Return Visit with Yakov Shaffer MD   [facility] ([facility])    [address] [facility] [address] [phone]            Jul 11, 2018  9:00 AM CDT   Level 2 with BAY 5 INFUSION   [facility] ([facility])    [address] [facility] [address] [phone]              Who to contact     If you have questions or need follow up information about today's clinic visit or your schedule please contact [facility] directly at [phone].  Normal or non-critical lab and imaging results will be communicated to you by MyChart, letter or phone within 4 business days after the clinic has received the results. If you do not hear from us within 7 days, please contact the clinic through MyChart or phone. If you have a critical or abnormal lab  result, we will notify you by phone as soon as possible.  Submit refill requests through [employer] or call your pharmacy and they will forward the refill request to us. Please allow 3 business days for your refill to be completed.          Additional Information About Your Visit        [employer] Information     [employer] gives you secure access to your electronic health record. If you see a primary care provider, you can also send messages to your care team and make appointments. If you have questions, please call your primary care clinic.  If you do not have a primary care provider, please call [phone] and they will assist you.      [employer] is an electronic gateway that provides easy, online access to your medical records. With [employer], you can request a clinic appointment, read your test results, renew a prescription or communicate with your care team.     To access your existing account, please contact your [facility] or call [phone] for assistance.        Care EveryWhere ID     This is your Care EveryWhere ID. This could be used by other organizations to access your [place] medical records  [id number]         Blood Pressure from Last 3 Encounters:   06/13/18 111/73   05/29/18 136/79   05/16/18 112/76    Weight from Last 3 Encounters:   06/13/18 98.9 kg (218 lb)   05/29/18 100.2 kg (221 lb)   05/16/18 98.9 kg (218 lb)              Today, you had the following     No orders found for display       Primary Care Provider Office Phone # Fax #    Sandra Dickerson [phone]       [facility]  [address]  [facility] [address]        Equal Access to Services     [facility]: Hadii aad ku hadasho Soomaali, waaxda luqadaha, qaybta kaalmada adeegyada, claire cowart . So [facility] [phone].    ATENCIÓN: Si habla español, tiene a cuevas disposición servicios gratuitos de asistencia lingüística. Llame al [phone].    We comply with  applicable federal civil rights laws and [place] laws. We do not discriminate on the basis of race, color, national origin, age, disability, sex, sexual orientation, or gender identity.            Thank you!     Thank you for choosing [facility]  for your care. Our goal is always to provide you with excellent care. Hearing back from our patients is one way we can continue to improve our services. Please take a few minutes to complete the written survey that you may receive in the mail after your visit with us. Thank you!             Your Updated Medication List - Protect others around you: Learn how to safely use, store and throw away your medicines at www.disposemymeds.org.          This list is accurate as of 6/13/18  4:04 PM.  Always use your most recent med list.                   Brand Name Dispense Instructions for use Diagnosis    ALEVE PO       Acute gout, unspecified cause, unspecified site       allopurinol 100 MG tablet    ZYLOPRIM    30 tablet    Take 1 tablet (100 mg) by mouth daily    Acute gout, unspecified cause, unspecified site       aspirin 81 MG chewable tablet      Take 81 mg by mouth daily        lidocaine-prilocaine cream    EMLA          LORazepam 0.5 MG tablet    ATIVAN    30 tablet    Take 1 tablet (0.5 mg) by mouth every 4 hours as needed (Anxiety, Nausea/Vomiting or Sleep)    Malignant neoplasm of overlapping sites of stomach (H)       MULTIVITAMIN ADULT PO           ondansetron 8 MG tablet    ZOFRAN    10 tablet    Take 1 tablet (8 mg) by mouth every 8 hours as needed (Nausea/Vomiting)    Malignant neoplasm of overlapping sites of stomach (H)       prochlorperazine 10 MG tablet    COMPAZINE    30 tablet    Take 1 tablet (10 mg) by mouth every 6 hours as needed (Nausea/Vomiting)    Malignant neoplasm of overlapping sites of stomach (H)       TYLENOL PO      Take by mouth as needed for mild pain or fever           Concentration (Mg/Ml) Of Additional Medication: 2.5

## 2022-10-04 NOTE — DISCHARGE SUMMARY
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 He is now admitted with recurrent nausea/vomiting, hiccups and abdominal pain and CT imaging demonstrating existing stent failure by invagination. He underwent urgent stent removal and replacement with GI service on 12/12/18, though per follow-up imaging, appears to have persistent infolding of the mid-posterior wall of the stent. S/p placement of PEG tube 12/17 and adjustment due to leak 12/18. Tolerating tube feeds well.      Hospital Course   Terry Yi was admitted on 12/12/2018.  The following problems were addressed during his hospitalization:    #Esophageal stent failure due to intraluminal malignant growth.  #Unresectable metastatic gastric carcinoma.  Presented from clinic with recurrent nausea/vomiting, abdominal pain and hiccups x 1 week. Had an esophogeal stent placed on 12/6/18 and felt miserable ever since. CT on admission showed invagination of the recently placed esophageal stent, likely due to ongoing malignant growth. He underwent EGD on 12/12/18 with removal of the failed stent and replacement with a longer stent. He continued to have nausea, vomiting and pain, and follow-up CT CAP on 12/12/18 showed persistent infolding of the mid-posterior wall of the stent.  GJ tube placed 12/17 with need for tightening 12/18 due to ascites leak (2cm now).  Restarted Tube feeds. Tolerating well, at 50/h rate at  time of discharge. Goal is 70 mL/h. Can open G tube for decompression as needed but able to tolerate it being clamped for several hours at a time.   - CLD   - Continue PPI BID (PO suspension), elevate HOB, reflux precautions.  - Per GI: repeat scope in 8-10 weeks. Will consider stent removal in the future (referral for appointment placed at discharge).  - Started Carafate QID.  - Dilaudid 4-6 mg q3h prn   - Follow-up with outpatient oncology is scheduled for 12/26/18 in [place]   - [facility] will draw a repeat BMP and mag and phos (all normal on day of discharge) tomorrow (12/21), results routed to Dr. Shaffer (his primary oncologist)       #Recurrent nausea, vomiting, poor PO, hiccups.  #Unintentional weight loss.  #Ongoing due to above stent issues, though seems to be improving following replacement procedure.   - Continue zofran 8 mg q8h with prn compazine for breakthrough nausea   - Home baclofen and gabapentin transitioned to suspensions.  - PO dilaudid 4-6 mg q3-4 hours prn pain     #Ascites.   Has had a paracentesis in the past. CT CAP this admission notable for moderate ascites. He underwent IR-guided paracentesis on 12/14/18 with removal of 3.3 L clear yellow ascitic fluid. Cell count with >1800 WBCs, cytology pending, no differential.  Repeat para on 12/17 with 2L fluid removed).  Initially treated with ciprofloxacin 500 mg PO BID-->discontinued 12/18 (white cells with around 1% neutrophils on preliminary diff, majority (~79 %) 'other cells'. Cx NGTD, cytology positive for malignancy. No evidence of recurrent ascites on day of discharge  - Will contact his oncology clinic when he is feeling he needs a repeat para outpatient      #Possible aspiration PNA on CT scan.   New leukocytosis this admission, though mild and he remains afebrile. Patient producing white sputum. Could be secondary to aspiration PNA as this was identified on CT chest/abdomen on 12/14/18.  - Started Augmentin for possible aspiration PNA on  12/14/18 continue to complete 10 days of treatment      #Hypertension.   - Started Norvasc 5 mg daily, increased to 10 mg (x12/18).     #Herpes simplex eruption.   History of genital herpes, treated intermittently when he develops eruptions. Has a cluster of small lesions on shaft of penis. No swab collected with known history.  - Started Acyclovir 400 mg tid x10 day    Discussed with attending, Dr. Hammad Duenas PA-C  Hematology/Oncology    Significant Results and Procedures     Pending Results   Unresulted Labs Ordered in the Past 30 Days of this Admission     No orders found from 10/13/2018 to 12/13/2018.          Code Status   DNR / DNI       Primary Care Physician   Brandon Mendoza        Discharge Disposition   Discharged to home  Condition at discharge: Stable    Consultations This Hospital Stay   INTERNAL MEDICINE PROCEDURE TEAM ADULT IP CONSULT [place] - PARACENTESIS  MEDICATION HISTORY IP PHARMACY CONSULT  INTERVENTIONAL RADIOLOGY ADULT/PEDS IP CONSULT  INTERVENTIONAL RADIOLOGY ADULT/PEDS IP CONSULT  NUTRITION SERVICES ADULT IP CONSULT  NUTRITION SERVICES ADULT IP CONSULT  PHARMACY IP CONSULT  PATIENT LEARNING CENTER IP CONSULT    Time Spent on this Encounter     Discharge Orders      Basic metabolic panel  (Ca, Cl, CO2, Creat, Gluc, K, Na, BUN)     Magnesium     Phosphorus     Home infusion referral      Reason for your hospital stay    Nausea and vomiting. Esophageal stent replacement. Placement of an GJ tube and start of tube feedings. Started on nausea medications and an antibiotic for pneumonia     Follow Up and recommended labs and tests    Your home infusion service will draw labs on 12/21  Follow up as scheduled in the [facility] on 12/26     Adult [facility] Follow-up and recommended labs and tests    Please schedule a follow up appointment with a dietician at the [facility] on 12/26.     Appointments on [place] and/or [facility] (with [facility] or [facility]  provider or service). Call [phone] if you haven't heard regarding these appointments within 7 days of discharge.     Activity    Your activity upon discharge: activity as tolerated     Tubes and drains    You are going home with the following tubes or drains: feeding tube GJ-Tube.   Tube cares per hospital or home care instructions     Discharge Instructions    Medications:   1) Dilaudid (hydromorphone) 4-6 mg every 3-4 hours as needed for pain (you were taking 4 mg in the hospital)  2) Zofran (ondansetron) 8 mg every 8 hours for nausea  3) zyprexa (olanzapine) 5 mg at bedtime (for nausea)  4) Acyclovir 400 mg three times per day for 7 days for herpetic lesions  5) Amlodipine 10 mg daily for high blood pressure  6) Augmentin (antibiotic) twice per day for 4 days  7) Protonix (pantoprazole- this replaces omeprazole) 40 mg twice per day for heartburn  8) Carafate 4 times per day for heartburn prevention    Medications can be taking orally if tolerated or put through the G tube (crush pills)  Your home infusion nurse will check you labs tomorrow from home. Please call Dr. Shaffer's clinic for worsening pain, chest pain, shortness of breath, fever >100.4, uncontrolled nausea/vomiting, worsening abdominal bloating/distension (the clinic can arrange a paracentesis if needed)     MD face to face encounter    Documentation of Face to Face and Certification for Home Health Services    I certify that patient: Terry Yi is under my care and that I, or a nurse practitioner or physician's assistant working with me, had a face-to-face encounter that meets the physician face-to-face encounter requirements with this patient on: 12/20/2018.    This encounter with the patient was in whole, or in part, for the following medical condition, which is the primary reason for home health care: Patient was admitted for:   1. Postoperative pain   2. Epigastric pain   3. Migration of esophageal stent, initial encounter   4. Dehydration    5. Non-intractable vomiting with nausea, unspecified vomiting type   6. Malignant neoplasm of overlapping sites of stomach (H)   7. Constipation, unspecified constipation type     I certify that, based on my findings, the following services are medically necessary home health services: Nursing.    My clinical findings support the need for the above services because: Nurse is needed: To assess status after changes in medications or other medical regimen, to provide caregiver training to assist with: new G/J tube. and To teach and train about the disease and treatments for cancer illness, because he has new tube.    Further, I certify that my clinical findings support that this patient is homebound (i.e. absences from home require considerable and taxing effort and are for medical reasons or [employer] services or infrequently or of short duration when for other reasons) because: Requires assistance of another person or specialized equipment to access medical services because patient: Is unable to exit home safely on own due to: weakness/fatigue.    Based on the above findings. I certify that this patient is confined to the home and needs intermittent skilled nursing care, physical therapy and/or speech therapy.  The patient is under my care, and I have initiated the establishment of the plan of care.  This patient will be followed by a physician who will periodically review the plan of care.  Physician/Provider to provide follow up care: Brandon Mendoza    Attending hospital physician (the Medicare certified [place] provider): Elizabeth German MD  Physician Signature: See electronic signature associated with these discharge orders.  Date: 12/20/2018     DNR/DNI     Diet    Follow this diet upon discharge: Orders Placed This Encounter      Adult Formula Drip Feeding: Continuous Isosource 1.5; Jejunostomy; Goal Rate: 70; mL/hr; Medication - Tube Feeding Flush Frequency: At least 15-30 mL water before and after medication  administration and with tube clogging;       Clear Liquid Diet     Discharge Medications   Current Discharge Medication List      START taking these medications    Details   acyclovir (ZOVIRAX) 200 MG/5ML suspension Take 10 mLs (400 mg) by mouth 3 times daily for 7 days  Qty: 210 mL, Refills: 0    Associated Diagnoses: Herpes simplex infection of penis      amLODIPine (NORVASC) 1 mg/mL SUSP Take 10 mLs (10 mg) by mouth daily  Qty: 300 mL, Refills: 0    Associated Diagnoses: Benign essential hypertension      amoxicillin-clavulanate (AUGMENTIN) 400-57 MG/5ML suspension Take 10.9 mLs (875 mg) by mouth 2 times daily for 4 days  Qty: 87.2 mL, Refills: 0    Associated Diagnoses: Pneumonia due to infectious organism, unspecified laterality, unspecified part of lung      HYDROmorphone, STANDARD CONC, (DILAUDID) 1 MG/ML oral solution Take 4-6 mLs (4-6 mg) by mouth every 3 hours as needed for moderate to severe pain  Qty: 960 mL, Refills: 0    Associated Diagnoses: Malignant neoplasm of overlapping sites of stomach (H)      multivitamins w/minerals (CERTAVITE) liquid 15 mLs by Per Feeding Tube route daily  Qty: 450 mL, Refills: 0    Associated Diagnoses: Malignant neoplasm of overlapping sites of stomach (H)      OLANZapine zydis (ZYPREXA) 5 MG ODT Take 1 tablet (5 mg) by mouth At Bedtime  Qty: 30 tablet, Refills: 0    Associated Diagnoses: Non-intractable vomiting with nausea, unspecified vomiting type; Malignant neoplasm of overlapping sites of stomach (H)      ondansetron (ZOFRAN) 4 MG/5ML solution Take 10 mLs (8 mg) by mouth 3 times daily  Qty: 900 mL, Refills: 0    Associated Diagnoses: Non-intractable vomiting with nausea, unspecified vomiting type; Malignant neoplasm of overlapping sites of stomach (H)      pantoprazole (PROTONIX) 2 mg/mL SUSP suspension 20 mLs (40 mg) by Oral or G tube route 2 times daily  Qty: 1200 mL, Refills: 0    Associated Diagnoses: Malignant neoplasm of overlapping sites of stomach (H)       sucralfate (CARAFATE) 1 GM/10ML suspension Take 10 mLs (1 g) by mouth 4 times daily (before meals and nightly)  Qty: 1200 mL, Refills: 0    Associated Diagnoses: Epigastric pain         CONTINUE these medications which have CHANGED    Details   docusate (COLACE) 50 MG/5ML liquid Take 10 mLs (100 mg) by mouth daily as needed for constipation  Qty: 300 mL, Refills: 0    Associated Diagnoses: Constipation, unspecified constipation type      prochlorperazine (COMPAZINE) 10 MG tablet 1 tablet (10 mg) by Oral or Feeding Tube route every 6 hours as needed (Nausea/Vomiting)  Qty: 30 tablet, Refills: 2    Associated Diagnoses: Malignant neoplasm of overlapping sites of stomach (H)         CONTINUE these medications which have NOT CHANGED    Details   acetaminophen (TYLENOL) 32 mg/mL liquid Take 960 mg by mouth every 8 hours as needed for fever or mild pain      lidocaine-prilocaine (EMLA) cream Apply topically as needed   Refills: 2      LORazepam (ATIVAN) 0.5 MG tablet Take 1 tablet (0.5 mg) by mouth every 4 hours as needed (Anxiety, Nausea/Vomiting or Sleep)  Qty: 30 tablet, Refills: 2    Associated Diagnoses: Malignant neoplasm of overlapping sites of stomach (H)      NEW MED CBD oil 1 Drop PRN pain      allopurinol (ZYLOPRIM) 100 MG tablet Take 1 tablet (100 mg) by mouth daily  Qty: 30 tablet, Refills: 3    Associated Diagnoses: Acute gout, unspecified cause, unspecified site; Malignant neoplasm of overlapping sites of stomach (H)      gabapentin (NEURONTIN) 250 MG/5ML solution Take 300 mg (6 mL) by mouth twice daily.  Refills: 1      indomethacin (INDOCIN) 25 MG capsule Take 1 capsule (25 mg) by mouth 3 times daily (with meals)  Qty: 42 capsule, Refills: 1    Associated Diagnoses: Acute gout, unspecified cause, unspecified site         STOP taking these medications       Baclofen 5 MG TABS Comments:   Reason for Stopping:         Multiple Vitamins-Minerals (MULTIVITAMIN ADULT PO) Comments:   Reason for Stopping:          naloxone (NARCAN) nasal spray Comments:   Reason for Stopping:         omeprazole (PRILOSEC) 40 MG DR capsule Comments:   Reason for Stopping:         oxyCODONE (ROXICODONE) 5 MG/5ML solution Comments:   Reason for Stopping:             Allergies   Allergies   Allergen Reactions     Latex      Rash (when picked up rubber tires)     Data   Most Recent 3 CBC's:  Recent Labs   Lab Test 12/20/18  0640 12/19/18  0536 12/18/18  0544   WBC 12.5* 13.0* 11.0   HGB 14.0 13.6 13.5   MCV 91 91 90    222 231      Most Recent 3 BMP's:  Recent Labs   Lab Test 12/20/18  1338 12/20/18  0640 12/19/18  0536    135 134   POTASSIUM 4.0 3.6 4.1   CHLORIDE 98 97 98   CO2 30 29 28   BUN 11 11 12   CR 0.70 0.62* 0.60*   ANIONGAP 7 9 8   BRITTNEY 8.1* 8.1* 8.1*   * 124* 85     Most Recent 2 LFT's:  Recent Labs   Lab Test 12/13/18  0627 12/12/18  1315   AST 35 40   ALT 32 33   ALKPHOS 109 113   BILITOTAL 0.6 0.9     Most Recent INR's and Anticoagulation Dosing History:  Anticoagulation Dose History     Recent Dosing and Labs Latest Ref Rng & Units 3/27/2017 12/12/2018 12/12/2018 12/14/2018 12/17/2018    INR 0.86 - 1.14 1.05 1.37(H) 1.42(H) 1.53(H) 1.59(H)        Most Recent 3 Troponin's:  Recent Labs   Lab Test 12/12/18  1315   TROPI <0.015     Most Recent Cholesterol Panel:No lab results found.  Most Recent 6 Bacteria Isolates From Any Culture (See EPIC Reports for Culture Details):  Recent Labs   Lab Test 12/14/18  1934 12/12/18  0850   CULT No growth No growth  No growth     Most Recent TSH, T4 and A1c Labs:No lab results found.  Results for orders placed or performed during the hospital encounter of 12/12/18   CT Chest/Abdomen/Pelvis w Contrast     Value    Radiologist flags Stent malfunction (Urgent)    Narrative    EXAMINATION: CT CHEST/ABDOMEN/PELVIS W CONTRAST, 12/12/2018 12:53 PM    TECHNIQUE:  Helical CT images from the thoracic inlet through the  symphysis pubis were obtained  with contrast. Contrast dose:  iopamidol  (ISOVUE-370) solution 119 mL    COMPARISON: 12/6/2018, 11/9/2018, 8/31/2018    HISTORY: Gastric cancer, unresectable, post primary tx, assess tx  response; worsening abdominal pain s/p stenting.  History of  gastric?carcinoma,?gastroparesis, diverticulosis,?s/p esophageal  placement (12/06/2018).    FINDINGS:    Chest: The central tracheobronchial tree is patent. Centrilobular  nodular opacities in the left lower lobe. Scattered calcified  granulomas. Moderate right and small left pleural effusions with  overlying atelectasis. No pneumothorax.    Heart size is normal. Mild three-vessel coronary calcifications. No  pericardial effusion. Normal caliber of the thoracic great vessels.  Numerous calcified hilar lymph nodes are similar from prior. Right  chest wall Port-A-Cath with tip in the mid SVC. Esophageal stent  folded in on itself. Upstream from this there is esophageal mucosal  thickening and enhancement.    Abdomen and pelvis: Unchanged 10 mm hypodensity in hepatic segment 7  (series 8 image 24). The gallbladder, spleen, adrenal glands, and  pancreas demonstrate no worrisome abnormalities. Subcentimeter renal  cortical hypodensities, too small to characterize. Prominent right  renal pelvis, similar to prior. No dilated loops of bowel. Sigmoid  diverticulosis. Unchanged gastric wall thickening and mucosal  enhancement. Unchanged nodular omental thickening. Unchanged soft  tissue thickening around the gastroesophageal junction. Numerous upper  abdominal abnormally enhancing lymph nodes are also unchanged, for  example a 8 mm celiac axis node (series 8 image 304). Unchanged  eccentric right urinary bladder wall thickening. Unchanged rectal wall  thickening and hyperenhancement of the mucosa.    Bones and soft tissues: Mild anasarca. No worrisome osseous  abnormality. Prominent degenerative changes in the hips.      Impression    IMPRESSION:   1. Esophageal stent folded in on itself, likely  significantly  restricting passage of contents into stomach. Upstream esophageal wall  thickening and enhancement may be related to associated esophagitis.  2. Centrilobular nodular opacities in the left lower lobe, likely  infection and/or aspiration.  3. Unchanged gastric wall thickening, soft tissue at the  gastroesophageal junction and gastric mucosal hyperenhancement, likely  site of primary malignancy.  4. Grossly unchanged omental nodularity with increased moderate  ascites from peritoneal carcinomatosis.  5. Unchanged metastatic upper abdominal adenopathy.  6. Moderate right and trace left pleural effusions with overlying  atelectasis.  7. Unchanged 10 mm hypodensity in hepatic segment 7.  8. Unchanged rectal wall thickening and hyperenhancement of the mucosa  since 11/9/2018.    [Urgent Result: Stent malfunction]    Finding was identified on 12/12/2018 12:56 PM.     Dr. Hills was contacted by Dr. Aguirre at 12/12/2018 1:17 PM and  verbalized understanding of the urgent finding.     I have personally reviewed the examination and initial interpretation  and I agree with the findings.    LIGAI SANDERS MD   XR Surgery YOSELYN Fluoro L/T 5 Min w Stills    Narrative    This exam was marked as non-reportable because it will not be read by a   radiologist or a [place] non-radiologist provider.             CT Chest/Abdomen/Pelvis w Contrast    Narrative    EXAMINATION: CT CHEST/ABDOMEN/PELVIS W CONTRAST, 12/14/2018 11:04 AM    TECHNIQUE: Helical CT images from the thoracic inlet through the  symphysis pubis were obtained with intravenous contrast. Contrast  dose: 124 cc of isovue 370    COMPARISON: 12/12/2018 CT chest/abdomen/pelvis    HISTORY: Gastric cancer, monitor, post surg/post-op management; recent  esophogeal stent placement in distal esophagus/proximal stomach,  evaluate for appropriate stent placement in patient with uncontrolled  pain and unresolved sx    FINDINGS:    Chest: Decreased degree but  persistent infolding of the mid posterior  wall of the recently replaced (on 12/12/2018) distal esophageal stent.  Persistent diffuse nodular distal esophageal wall thickening,  particularly at the site of stent wall infolding. The stent extends  from the mid esophagus to the gastroesophageal junction.    Normal heart size. Trace pericardial effusion. Moderate coronary  artery calcifications. Normal caliber ascending aorta and main  pulmonary artery. No central pulmonary embolism. Calcified  paratracheal, subcarinal, and hilar lymph nodes. No thoracic  lymphadenopathy. Unchanged subcentimeter hypoattenuating nodule in the  posterior right thyroid lobe. Right chest wall internal jugular  Port-A-Cath tip at the low SVC.    Stable large right and slightly increased small left pleural effusions  with adjacent compressive atelectasis. Persistent nodular opacities in  the left lower lobe. Numerous scattered calcified granulomas. No new  or enlarging pulmonary nodule. The central tracheobronchial tree is  clear. No pneumothorax.    Abdomen and pelvis: Unchanged ill-defined hypodensity in subcapsular  hepatic segment 8 measuring 12 mm (series 3, image 268) no new focal  hepatic lesion. The gallbladder, pancreas, spleen, adrenal glands, and  renal parenchyma are unremarkable. Stable distention of the right  renal pelvis and calyces without associated hydroureter. The bladder  is unremarkable. Dystrophic calcifications in the mildly enlarged  prostate. Stable moderate to large volume ascites. Stable No free air.  No bowel obstruction. Diffuse gastric wall thickening and enhancement,  similar to prior. Scattered distal colonic diverticula.  Circumferential anorectal wall thickening. The major abdominal vessels  are patent. Moderate aortoiliac atherosclerotic calcification without  aneurysmal dilation. Stable prominent upper abdominal, periaortic  retroperitoneal, and scattered central mesenteric lymph nodes. Stable  soft  tissue thickening in throughout the retroperitoneum. Stable  infiltrative heterogeneity of the anterior peritoneum without clear  soft tissue nodularity.    Bones and soft tissues: Increased subcutaneous edema about the  abdominal wall, hips, and thighs. No aggressive osseous lesion.  Degenerative changes of the hips, moderate to severe on the right.      Impression    IMPRESSION:   1. Following esophageal stent replacement on 12/12/2018, decreased but  persistent infolding of the mid posterior wall of the new stent.  Stable circumferential distal esophageal wall thickening, particularly  posteriorly at the site of stent wall infolding.  2. Stable diffuse gastric wall thickening and mucosal hyperenhancement  in this patient with history of signet cell gastric cancer.  3. Stable moderate to large volume ascites. Associated omental  heterogeneity and retroperitoneal soft tissue thickening in keeping  with carcinomatosis.  4. Stable prominent upper abdominal, central mesenteric, and  retroperitoneal lymph nodes.  5. Unchanged indeterminate focal hypodensity in hepatic segment 8.  6. Stable circumferential anorectal wall thickening.  7. Stable moderate to large right and slightly increased small left  pleural effusions with adjacent compressive atelectasis.  8. Persistent nodular opacities in the left lower lobe, concerning for  infection. Consider aspiration.    I have personally reviewed the examination and initial interpretation  and I agree with the findings.    ELIJAH CRESPO MD   IR Paracentesis    Narrative    PROCEDURES:  1. Ultrasound guided diagnostic and therapeutic paracentesis.    CLINICAL HISTORY: Ascites.    Staff Radiologist: Isaiah Agnlin MD    Fellow: Dr. James Long    Medications: Lidocaine 1% local.    PROCEDURE: The patient understood the limitations, alternatives, and  risks of the procedure and requested the procedure be performed. Both  written and oral consent were  obtained.    Ultrasound demonstrates moderate ascites with adequate pocket for  paracentesis in the right mid to lower quadrant.  This area was  prepped and draped in the usual sterile fashion. Using 1% lidocaine  for local anesthesia, ultrasound guided puncture is made into fluid  within the peritoneal space with a 5 [employer] centesis needle  from a right lower quadrant approach. Image documenting position was  entered into the patient's permanent record. The catheter was advanced  into the peritoneal space and connected to vacuum bottle drainage.   3320 cc clear yellow-colored ascites aspirated. Catheter removed, 120  mL of which was sent for requested laboratory studies. Sterile  dressing applied. No immediate complication.      Impression    IMPRESSION: Ultrasound guided paracentesis. 3320 cc ascites aspirated.  Labs sent.    UMU TRAORE MD   XR Abdomen Port 1 View    Narrative    Exam: Abdominal x-ray, upright views, 12/16/2018.    COMPARISON: CT exam 12/14/2018.    HISTORY: Nausea, vomiting, abdominal pain, status post esophageal  stent. Please evaluate for stent placement and obstruction.    FINDINGS: Upright views of the abdomen were obtained. The most  inferior portions and a portion of the left lateral abdomen were  collimated out of the field-of-view. Nonspecific paucity of bowel gas  throughout the abdomen. Again noted esophageal stent. No pneumatosis  or portal venous gas. No free air beneath the diaphragm. Moderate  right pleural effusion with overlying atelectasis versus  consolidation. Degenerative changes of the spine.      Impression    IMPRESSION:  1. Nonspecific paucity of bowel gas throughout the abdomen. Stable  position of esophageal stent correlating with CT exam 12/14/2018.  2. Moderate right pleural effusion with overlying atelectasis versus  consolidation.    CARLA MANLEY MD   IR Paracentesis    Narrative    PRE-PROCEDURE DIAGNOSIS:  Ascites    POST-PROCEDURE  DIAGNOSIS:  Same    PROCEDURE:  Ultrasound-guided therapeutic paracentesis    Impression    IMPRESSION:  Completed ultrasound-guided therapeutic paracentesis. A  total of 1800 mL clear yellow fluid aspirated from the abdomen. No  immediate complication.      ----------    CLINICAL HISTORY:  Ascites. Patients for PEG tube placement today,  request for paracentesis prior.    PERFORMED BY:  Mary Avina PA-C    CONSENT:  The patient understood the limitations, alternatives, and  risks of the procedure and agreed to the procedure.  Written informed  consent was obtained and is documented in the patient record.    MEDICATIONS:  No intravenous sedation was administered.  A 10:1 volume  mixture of 1% lidocaine without epinephrine buffered with 8.4%  bicarbonate solution was used for local anesthesia.  Intravenous  albumin was available (see nursing documentation for administration  record).      DESCRIPTION:  Ascites fluid was identified on limited abdominal  ultrasound exam and picture is documented in the patient's record.   The abdomen was prepped and draped in the usual sterile fashion.   Color ultrasound was used to assess the subcutaneous tissues. No  vessels were identified in the region of planned puncture. Local  anesthesia was achieved.  Under ultrasound guidance, a 5-Citizen of [place]  straight centesis needle/catheter was advanced into the peritoneal  space with ascites fluid return.  Needle was removed.  The catheter  was connected to drainage container.  Ascites was aspirated.  Catheter  was removed on completion of drainage and sterile dressing was  applied.     COMPLICATIONS:  No immediate concerns; the patient remained stable  throughout the procedure and tolerated it well.    ESTIMATED BLOOD LOSS:  Minimal    SPECIMENS: None    MARY AVINA PA-C   XR Surgery YOSELYN Fluoro L/T 5 Min w Stills    Narrative    This exam was marked as non-reportable because it will not be read by a   radiologist or a [place]  non-radiologist provider.

## 2023-05-20 NOTE — TELEPHONE ENCOUNTER
Message sent to Dr. Lopez for review.  If the hiccups continue, have him double dose of baclofen   If that does not work our only option is to remove the stent   Miguel     Called Harika to see how Terry is doing. She states they are down at [place] to see Dr. Hilliard and he is getting admitted to the hospital as a direct admit.     Advised of message above from Dr. Lopez and will let him know that they are on their way to [facility] today.     Miladys SR RN Care Coordinator  Dr. Graf, Dr. Lopez & Dr. Hancock   Advanced Endoscopy  [phone]       Yes [phone]

## 2024-01-24 NOTE — PROGRESS NOTES
This is a recent snapshot of the patient's [facility] medical record.  For current drug dose and complete information and questions, call [phone] or In Basket pool, [facility] ([id number])  CSN Number:  [id number]      
no

## (undated) DEVICE — PAD CHUX UNDERPAD 23X24" 7136

## (undated) DEVICE — TAPE DURAPORE 3" SILK 1538-3

## (undated) DEVICE — DRAPE SHEET MED 44X70" 9355

## (undated) DEVICE — LABEL MEDICATION SYSTEM 3303-P

## (undated) DEVICE — TAPE CLOTH 3" CARDINAL 3TRCL03

## (undated) DEVICE — KIT CONNECTOR FOR OLYMPUS ENDOSCOPES DEFENDO 100310

## (undated) DEVICE — INFLATION DEVICE BIG 60 ENDO-AN6012

## (undated) DEVICE — ENDO TUBING CO2 SMARTCAP STERILE DISP 100145CO2EXT

## (undated) DEVICE — SOL WATER IRRIG 1000ML BOTTLE 2F7114

## (undated) DEVICE — GOWN XLG DISP 9545

## (undated) DEVICE — GLOVE PROTEXIS POWDER FREE SMT 8.0  2D72PT80X

## (undated) DEVICE — NDL COUNTER 20CT 31142493

## (undated) DEVICE — BALLOON ULTRAMATRIX FOR OLYMPUS EUS LINEAR LATEX FREE USB-OL

## (undated) DEVICE — TUBE TRANS GASTROJEJUNOSTOMY 18FRX45CM 0250-18

## (undated) DEVICE — KIT ENDO FIRST STEP DISINFECTANT 200ML W/POUCH EP-4

## (undated) DEVICE — SUCTION MANIFOLD DORNOCH ULTRA CART UL-CL500

## (undated) DEVICE — INTR PEELAWAY 22FRX13CM G04096 PLVW-22.0-38

## (undated) DEVICE — Device

## (undated) DEVICE — PREP CHLORAPREP 26ML TINTED ORANGE  260815

## (undated) DEVICE — LINEN TOWEL PACK X5 5464

## (undated) DEVICE — ENDO CAP AND TUBING STERILE FOR ENDOGATOR  100130

## (undated) DEVICE — NDL BIOPSY SYSTEM SHARKCORE 22GA W/HANDLE DSC-22-01

## (undated) DEVICE — ENDO DEVICE LOCKING AND BIOPSY CAP M00545261

## (undated) DEVICE — PACK ENDOSCOPY GI CUSTOM UMMC

## (undated) DEVICE — ENDO BITE BLOCK ADULT OMNI-BLOC

## (undated) DEVICE — CATH RETRIEVAL BALLOON EXTRACTOR PRO RX-S INJ ABOVE 9-12MM

## (undated) DEVICE — DRSG DRAIN 2X2" 7087

## (undated) DEVICE — SYR 50ML CATH TIP W/O NDL 309620

## (undated) DEVICE — CATH BALLOON ELATION ESOPH/BILIARY 18-19-20MMX180CM EB18

## (undated) DEVICE — INTRODUCER KIT GASTROSTOMY MIC G 22FR 98433

## (undated) DEVICE — WIRE GUIDE 0.025"X450CM STR VISIGLIDE G-240-2545S

## (undated) DEVICE — DRSG GAUZE 4X4" TRAY 6939

## (undated) DEVICE — BAG URINARY DRAIN LUBRISIL IC 4000ML LF 253509A

## (undated) DEVICE — SOL WATER IRRIG 1000ML BOTTLE 07139-09

## (undated) DEVICE — NDL COUNTER 40CT  31142311

## (undated) DEVICE — TUBE GASTROSTOMY PEG SET 24FR G22636 PEG-24-PULL-S

## (undated) DEVICE — DRAPE C-ARM W/STRAPS 42X72" 07-CA104

## (undated) DEVICE — PITCHER STERILE 1000ML  SSK9004A

## (undated) DEVICE — DRAPE MAYO STAND 23X54 8337

## (undated) RX ORDER — FENTANYL CITRATE 50 UG/ML
INJECTION, SOLUTION INTRAMUSCULAR; INTRAVENOUS
Status: DISPENSED
Start: 2018-01-01

## (undated) RX ORDER — ONDANSETRON 2 MG/ML
INJECTION INTRAMUSCULAR; INTRAVENOUS
Status: DISPENSED
Start: 2017-03-27

## (undated) RX ORDER — ONDANSETRON 2 MG/ML
INJECTION INTRAMUSCULAR; INTRAVENOUS
Status: DISPENSED
Start: 2018-01-01

## (undated) RX ORDER — LIDOCAINE HYDROCHLORIDE 10 MG/ML
INJECTION, SOLUTION EPIDURAL; INFILTRATION; INTRACAUDAL; PERINEURAL
Status: DISPENSED
Start: 2018-01-01

## (undated) RX ORDER — SODIUM CHLORIDE, SODIUM LACTATE, POTASSIUM CHLORIDE, CALCIUM CHLORIDE 600; 310; 30; 20 MG/100ML; MG/100ML; MG/100ML; MG/100ML
INJECTION, SOLUTION INTRAVENOUS
Status: DISPENSED
Start: 2018-01-01

## (undated) RX ORDER — DEXAMETHASONE SODIUM PHOSPHATE 4 MG/ML
INJECTION, SOLUTION INTRA-ARTICULAR; INTRALESIONAL; INTRAMUSCULAR; INTRAVENOUS; SOFT TISSUE
Status: DISPENSED
Start: 2018-01-01

## (undated) RX ORDER — HYDROMORPHONE HYDROCHLORIDE 1 MG/ML
INJECTION, SOLUTION INTRAMUSCULAR; INTRAVENOUS; SUBCUTANEOUS
Status: DISPENSED
Start: 2018-01-01

## (undated) RX ORDER — PHENYLEPHRINE HCL IN 0.9% NACL 1 MG/10 ML
SYRINGE (ML) INTRAVENOUS
Status: DISPENSED
Start: 2018-01-01

## (undated) RX ORDER — LABETALOL HYDROCHLORIDE 5 MG/ML
INJECTION, SOLUTION INTRAVENOUS
Status: DISPENSED
Start: 2018-01-01

## (undated) RX ORDER — HYDRALAZINE HYDROCHLORIDE 20 MG/ML
INJECTION INTRAMUSCULAR; INTRAVENOUS
Status: DISPENSED
Start: 2018-01-01

## (undated) RX ORDER — LIDOCAINE HYDROCHLORIDE 20 MG/ML
INJECTION, SOLUTION EPIDURAL; INFILTRATION; INTRACAUDAL; PERINEURAL
Status: DISPENSED
Start: 2018-01-01

## (undated) RX ORDER — OXYCODONE HCL 5 MG/5 ML
SOLUTION, ORAL ORAL
Status: DISPENSED
Start: 2018-01-01

## (undated) RX ORDER — IOPAMIDOL 510 MG/ML
INJECTION, SOLUTION INTRAVASCULAR
Status: DISPENSED
Start: 2018-01-01

## (undated) RX ORDER — HEPARIN SODIUM (PORCINE) LOCK FLUSH IV SOLN 100 UNIT/ML 100 UNIT/ML
SOLUTION INTRAVENOUS
Status: DISPENSED
Start: 2018-01-01

## (undated) RX ORDER — METOPROLOL TARTRATE 1 MG/ML
INJECTION, SOLUTION INTRAVENOUS
Status: DISPENSED
Start: 2018-01-01

## (undated) RX ORDER — ESMOLOL HYDROCHLORIDE 10 MG/ML
INJECTION INTRAVENOUS
Status: DISPENSED
Start: 2018-01-01